# Patient Record
Sex: MALE | Race: WHITE | NOT HISPANIC OR LATINO | Employment: FULL TIME | URBAN - METROPOLITAN AREA
[De-identification: names, ages, dates, MRNs, and addresses within clinical notes are randomized per-mention and may not be internally consistent; named-entity substitution may affect disease eponyms.]

---

## 2017-01-10 DIAGNOSIS — R06.2 WHEEZING: ICD-10-CM

## 2017-02-13 ENCOUNTER — LAB VISIT (OUTPATIENT)
Dept: LAB | Facility: HOSPITAL | Age: 62
End: 2017-02-13
Attending: INTERNAL MEDICINE
Payer: COMMERCIAL

## 2017-02-13 ENCOUNTER — OFFICE VISIT (OUTPATIENT)
Dept: INTERNAL MEDICINE | Facility: CLINIC | Age: 62
End: 2017-02-13
Payer: COMMERCIAL

## 2017-02-13 VITALS
HEIGHT: 70 IN | TEMPERATURE: 99 F | BODY MASS INDEX: 38.13 KG/M2 | HEART RATE: 81 BPM | DIASTOLIC BLOOD PRESSURE: 78 MMHG | RESPIRATION RATE: 16 BRPM | SYSTOLIC BLOOD PRESSURE: 122 MMHG | WEIGHT: 266.31 LBS

## 2017-02-13 DIAGNOSIS — Z11.59 NEED FOR HEPATITIS C SCREENING TEST: ICD-10-CM

## 2017-02-13 DIAGNOSIS — M54.50 CHRONIC MIDLINE LOW BACK PAIN WITHOUT SCIATICA: ICD-10-CM

## 2017-02-13 DIAGNOSIS — Z00.00 ANNUAL PHYSICAL EXAM: ICD-10-CM

## 2017-02-13 DIAGNOSIS — I10 ESSENTIAL HYPERTENSION: Chronic | ICD-10-CM

## 2017-02-13 DIAGNOSIS — Z12.5 PROSTATE CANCER SCREENING: ICD-10-CM

## 2017-02-13 DIAGNOSIS — G47.00 INSOMNIA, UNSPECIFIED TYPE: Chronic | ICD-10-CM

## 2017-02-13 DIAGNOSIS — E78.1 HYPERTRIGLYCERIDEMIA: Chronic | ICD-10-CM

## 2017-02-13 DIAGNOSIS — G89.29 CHRONIC MIDLINE LOW BACK PAIN WITHOUT SCIATICA: ICD-10-CM

## 2017-02-13 DIAGNOSIS — Z00.00 ANNUAL PHYSICAL EXAM: Primary | ICD-10-CM

## 2017-02-13 LAB
ALBUMIN SERPL BCP-MCNC: 4.1 G/DL
ALP SERPL-CCNC: 80 U/L
ALT SERPL W/O P-5'-P-CCNC: 28 U/L
ANION GAP SERPL CALC-SCNC: 12 MMOL/L
AST SERPL-CCNC: 21 U/L
BASOPHILS # BLD AUTO: 0.03 K/UL
BASOPHILS NFR BLD: 0.2 %
BILIRUB SERPL-MCNC: 0.7 MG/DL
BUN SERPL-MCNC: 14 MG/DL
CALCIUM SERPL-MCNC: 9.5 MG/DL
CHLORIDE SERPL-SCNC: 108 MMOL/L
CHOLEST/HDLC SERPL: 4.2 {RATIO}
CO2 SERPL-SCNC: 24 MMOL/L
COMPLEXED PSA SERPL-MCNC: 2.8 NG/ML
CREAT SERPL-MCNC: 0.9 MG/DL
DIFFERENTIAL METHOD: ABNORMAL
EOSINOPHIL # BLD AUTO: 0.2 K/UL
EOSINOPHIL NFR BLD: 1.2 %
ERYTHROCYTE [DISTWIDTH] IN BLOOD BY AUTOMATED COUNT: 13.5 %
EST. GFR  (AFRICAN AMERICAN): >60 ML/MIN/1.73 M^2
EST. GFR  (NON AFRICAN AMERICAN): >60 ML/MIN/1.73 M^2
GLUCOSE SERPL-MCNC: 94 MG/DL
HCT VFR BLD AUTO: 45.2 %
HCV AB SERPL QL IA: NEGATIVE
HDL/CHOLESTEROL RATIO: 23.9 %
HDLC SERPL-MCNC: 159 MG/DL
HDLC SERPL-MCNC: 38 MG/DL
HGB BLD-MCNC: 15.3 G/DL
LDLC SERPL CALC-MCNC: 102.6 MG/DL
LYMPHOCYTES # BLD AUTO: 2.8 K/UL
LYMPHOCYTES NFR BLD: 23.1 %
MCH RBC QN AUTO: 32 PG
MCHC RBC AUTO-ENTMCNC: 33.8 %
MCV RBC AUTO: 95 FL
MONOCYTES # BLD AUTO: 0.7 K/UL
MONOCYTES NFR BLD: 5.6 %
NEUTROPHILS # BLD AUTO: 8.3 K/UL
NEUTROPHILS NFR BLD: 69.3 %
NONHDLC SERPL-MCNC: 121 MG/DL
PLATELET # BLD AUTO: 236 K/UL
PMV BLD AUTO: 10.5 FL
POTASSIUM SERPL-SCNC: 4.8 MMOL/L
PROT SERPL-MCNC: 7.1 G/DL
RBC # BLD AUTO: 4.78 M/UL
SODIUM SERPL-SCNC: 144 MMOL/L
TRIGL SERPL-MCNC: 92 MG/DL
TSH SERPL DL<=0.005 MIU/L-ACNC: 1.2 UIU/ML
WBC # BLD AUTO: 12.02 K/UL

## 2017-02-13 PROCEDURE — 99999 PR PBB SHADOW E&M-EST. PATIENT-LVL III: CPT | Mod: PBBFAC,,, | Performed by: INTERNAL MEDICINE

## 2017-02-13 PROCEDURE — 83036 HEMOGLOBIN GLYCOSYLATED A1C: CPT

## 2017-02-13 PROCEDURE — 99396 PREV VISIT EST AGE 40-64: CPT | Mod: S$GLB,,, | Performed by: INTERNAL MEDICINE

## 2017-02-13 PROCEDURE — 80061 LIPID PANEL: CPT

## 2017-02-13 PROCEDURE — 86803 HEPATITIS C AB TEST: CPT

## 2017-02-13 PROCEDURE — 36415 COLL VENOUS BLD VENIPUNCTURE: CPT | Mod: PO

## 2017-02-13 PROCEDURE — 84443 ASSAY THYROID STIM HORMONE: CPT

## 2017-02-13 PROCEDURE — 84153 ASSAY OF PSA TOTAL: CPT

## 2017-02-13 PROCEDURE — 80053 COMPREHEN METABOLIC PANEL: CPT

## 2017-02-13 PROCEDURE — 85025 COMPLETE CBC W/AUTO DIFF WBC: CPT

## 2017-02-13 PROCEDURE — 3074F SYST BP LT 130 MM HG: CPT | Mod: S$GLB,,, | Performed by: INTERNAL MEDICINE

## 2017-02-13 PROCEDURE — 3078F DIAST BP <80 MM HG: CPT | Mod: S$GLB,,, | Performed by: INTERNAL MEDICINE

## 2017-02-13 RX ORDER — ATORVASTATIN CALCIUM 40 MG/1
40 TABLET, FILM COATED ORAL NIGHTLY
Qty: 90 TABLET | Refills: 3 | Status: SHIPPED | OUTPATIENT
Start: 2017-02-13 | End: 2018-02-19 | Stop reason: SDUPTHER

## 2017-02-13 RX ORDER — AMITRIPTYLINE HYDROCHLORIDE 10 MG/1
10 TABLET, FILM COATED ORAL NIGHTLY PRN
Qty: 90 TABLET | Refills: 3 | Status: SHIPPED | OUTPATIENT
Start: 2017-02-13 | End: 2018-02-19 | Stop reason: SDUPTHER

## 2017-02-13 RX ORDER — SILDENAFIL 100 MG/1
100 TABLET, FILM COATED ORAL DAILY PRN
Qty: 5 TABLET | Refills: 11 | Status: SHIPPED | OUTPATIENT
Start: 2017-02-13 | End: 2018-02-19 | Stop reason: SDUPTHER

## 2017-02-13 RX ORDER — LISINOPRIL 40 MG/1
40 TABLET ORAL DAILY
Qty: 90 TABLET | Refills: 3 | Status: SHIPPED | OUTPATIENT
Start: 2017-02-13 | End: 2018-01-26 | Stop reason: SDUPTHER

## 2017-02-13 RX ORDER — AMLODIPINE BESYLATE 5 MG/1
5 TABLET ORAL DAILY
Qty: 90 TABLET | Refills: 3 | Status: SHIPPED | OUTPATIENT
Start: 2017-02-13 | End: 2018-02-19 | Stop reason: SDUPTHER
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
## 2017-02-13 NOTE — PATIENT INSTRUCTIONS
Varicella-Zoster Virus Vaccine Live injection  What is this medicine?  VARICELLA VIRUS VACCINE (antoinette mariya JEFF uh [facility] SEEN) is used to prevent infections of chickenpox.  HERPES ZOSTER VIRUS VACCINE (BREONNA peez ZOS ter [facility] SEEN) is used to prevent shingles in adults 50 years old and over. This vaccine is not used to treat shingles or nerve pain from shingles.  These medicines may be used for other purposes; ask your health care provider or pharmacist if you have questions.  How should I use this medicine?  These vaccines are for injection under the skin. They are given by a health care professional.  A copy of Vaccine Information Statements will be given before each varicella virus vaccination. Read this sheet carefully each time. The sheet may change frequently. A Vaccine Information Statement is not given before the herpes zoster virus vaccine.  Talk to your pediatrician regarding the use of the varicella virus vaccine in children. While this drug may be prescribed for children as young as 12 months of age for selected conditions, precautions do apply. The herpes zoster virus vaccine is not approved in children.  What side effects may I notice from receiving this medicine?  Side effects that you should report to your doctor or health care professional as soon as possible:  · allergic reactions like skin rash, itching or hives, swelling of the face, lips, or tongue  · breathing problems  · extreme changes in behavior  · feeling faint or lightheaded, falls  · fever over 102 degrees F  · pain, tingling, numbness in the hands or feet  · redness, blistering, peeling or loosening of the skin, including inside the mouth  · seizures  · unusually weak or tired  Side effects that usually do not require medical attention (report to your doctor or health care professional if they continue or are bothersome):  · aches or pains  · chickenpox-like rash  · diarrhea  · headache  · low-grade fever under 102 degrees  F  · loss of appetite  · nausea, vomiting  · redness, pain, swelling at site where injected  · sleepy  · trouble sleeping  What may interact with this medicine?  Do not take these medicines with any of the following medications:  · adalimumab  · anakinra  · etanercept  · infliximab  · medicines that suppress your immune system  · medicines to treat cancer  These medicines may also interact with the following medications:  · aspirin and aspirin-like medicines (varicella virus vaccine only)  · blood transfusions (varicella virus vaccine only)  · immunoglobulins (varicella virus vaccine only)  · steroid medicines like prednisone or cortisone  What if I miss a dose?  Keep appointments for follow-up (booster) doses of varicella virus vaccine as directed. It is important not to miss your dose. Call your doctor or health care professional if you are unable to keep an appointment.  Follow-up (booster) doses are not needed for the herpes zoster virus vaccine.  Where should I keep my medicine?  These drugs are given in a hospital or clinic and will not be stored at home.  What should I tell my health care provider before I take this medicine?  They need to know if you have any of the following conditions:  · blood disorders or disease  · cancer like leukemia or lymphoma  · immune system problems or therapy  · infection with fever  · recent immune globulin therapy  · tuberculosis  · an unusual or allergic reaction to vaccines, neomycin, gelatin, other medicines, foods, dyes, or preservatives  · pregnant or trying to get pregnant  · breast-feeding  What should I watch for while using this medicine?  Visit your doctor for regular check ups.  These vaccines, like all vaccines, may not fully protect everyone.  After receiving these vaccines it may be possible to pass chickenpox infection to others. For up to 6 weeks, avoid people with immune system problems, pregnant women who have not had chickenpox, newborns of women who have  not had chickenpox, and all newborns born at less than 28 weeks of pregnancy. Talk to your doctor for more information.  Do not become pregnant for 3 months after taking these vaccines. Women should inform their doctor if they wish to become pregnant or think they might be pregnant. There is a potential for serious side effects to an unborn child. Talk to your health care professional or pharmacist for more information.  Date Last Reviewed:   NOTE:This sheet is a summary. It may not cover all possible information. If you have questions about this medicine, talk to your doctor, pharmacist, or health care provider. Copyright© 2016 [employer]

## 2017-02-14 LAB
ESTIMATED AVG GLUCOSE: 108 MG/DL
HBA1C MFR BLD HPLC: 5.4 %

## 2017-02-17 ENCOUNTER — TELEPHONE (OUTPATIENT)
Dept: INTERNAL MEDICINE | Facility: CLINIC | Age: 62
End: 2017-02-17

## 2017-02-17 NOTE — TELEPHONE ENCOUNTER
----- Message from Ric Brambila MD sent at 2/14/2017  6:56 AM CST -----  Electrolytes, kidney/liver function, cholesterol, thyroid function, PSA are normal.

## 2017-02-17 NOTE — TELEPHONE ENCOUNTER
----- Message from Ric Brambila MD sent at 2/13/2017  1:35 PM CST -----  Blood counts are normal.  Awaiting further blood work.

## 2017-02-17 NOTE — TELEPHONE ENCOUNTER
----- Message from Ric Brambila MD sent at 2/13/2017  3:41 PM CST -----  Patient negative for hepatitis.

## 2017-10-17 ENCOUNTER — TELEPHONE (OUTPATIENT)
Dept: INTERNAL MEDICINE | Facility: CLINIC | Age: 62
End: 2017-10-17

## 2017-10-17 DIAGNOSIS — Z00.00 ANNUAL PHYSICAL EXAM: Primary | ICD-10-CM

## 2017-10-17 NOTE — TELEPHONE ENCOUNTER
----- Message from Case Frankel sent at 10/17/2017  7:45 AM CDT -----  Contact: self [phone]  Doctor appointment and lab have been scheduled.  Please link lab orders to the lab appointment.  Date of doctor appointment:  02/19  Physical or EP:  Physical  Date of lab appointment:  02/12  Comments:

## 2017-11-20 ENCOUNTER — HOSPITAL ENCOUNTER (OUTPATIENT)
Dept: RADIOLOGY | Facility: HOSPITAL | Age: 62
Discharge: HOME | End: 2017-11-20
Attending: INTERNAL MEDICINE
Payer: COMMERCIAL

## 2017-11-20 ENCOUNTER — OFFICE VISIT (OUTPATIENT)
Dept: INTERNAL MEDICINE | Facility: CLINIC | Age: 62
End: 2017-11-20
Payer: COMMERCIAL

## 2017-11-20 VITALS
HEIGHT: 70 IN | SYSTOLIC BLOOD PRESSURE: 143 MMHG | WEIGHT: 276.25 LBS | BODY MASS INDEX: 39.55 KG/M2 | TEMPERATURE: 98 F | DIASTOLIC BLOOD PRESSURE: 72 MMHG | HEART RATE: 75 BPM

## 2017-11-20 DIAGNOSIS — M79.605 BILATERAL LEG PAIN: ICD-10-CM

## 2017-11-20 DIAGNOSIS — M79.604 BILATERAL LEG PAIN: ICD-10-CM

## 2017-11-20 DIAGNOSIS — M79.605 BILATERAL LEG PAIN: Primary | ICD-10-CM

## 2017-11-20 DIAGNOSIS — M79.604 BILATERAL LEG PAIN: Primary | ICD-10-CM

## 2017-11-20 DIAGNOSIS — M79.89 LOCALIZED SWELLING OF LOWER EXTREMITY: ICD-10-CM

## 2017-11-20 PROCEDURE — 73562 X-RAY EXAM OF KNEE 3: CPT | Mod: 26,50,, | Performed by: RADIOLOGY

## 2017-11-20 PROCEDURE — 99214 OFFICE O/P EST MOD 30 MIN: CPT | Mod: S$GLB,,, | Performed by: INTERNAL MEDICINE

## 2017-11-20 PROCEDURE — 73562 X-RAY EXAM OF KNEE 3: CPT | Mod: 50,TC,PO

## 2017-11-20 PROCEDURE — 99999 PR PBB SHADOW E&M-EST. PATIENT-LVL III: CPT | Mod: PBBFAC,,, | Performed by: INTERNAL MEDICINE

## 2017-11-20 RX ORDER — NAPROXEN 500 MG/1
500 TABLET ORAL 2 TIMES DAILY
Qty: 60 TABLET | Refills: 1 | Status: SHIPPED | OUTPATIENT
Start: 2017-11-20 | End: 2018-02-19 | Stop reason: SDUPTHER

## 2017-11-20 NOTE — PROGRESS NOTES
Subjective:       Patient ID: Stevie Villalba is a 62 y.o. male.    Chief Complaint: Leg Pain (right leg)    HPI     62-year-old male here for evaluation of right leg pain.  This started about a week ago.  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  His legs are swollen and has been wearing the bandages and gets sharp pains from his knees down.  This is worse in the am.  When he puts pressure on it going to the bathroom, it feels better.  When he sits down, he has pain when he goes to get up.      Review of Systems    Objective:      Physical Exam   Constitutional: He is oriented to person, place, and time. He appears well-developed and well-nourished.   HENT:   Head: Normocephalic and atraumatic.   Mouth/Throat: No oropharyngeal exudate.   Eyes: EOM are normal. Pupils are equal, round, and reactive to light. Right eye exhibits no discharge. Left eye exhibits no discharge. No scleral icterus.   Neck: Normal range of motion. Neck supple. No tracheal deviation present. No thyromegaly present.   Cardiovascular: Normal rate, regular rhythm and normal heart sounds.  Exam reveals no gallop and no friction rub.    No murmur heard.  Pulmonary/Chest: Effort normal and breath sounds normal. No respiratory distress. He has no wheezes. He has no rales. He exhibits no tenderness.   Abdominal: Soft. Bowel sounds are normal. He exhibits no distension and no mass. There is no tenderness. There is no rebound and no guarding.   Musculoskeletal: Normal range of motion. He exhibits no edema or tenderness.   Neurological: He is alert and oriented to person, place, and time.   Skin: Skin is warm and dry. No rash noted. No erythema. No pallor.   Psychiatric: He has a normal mood and affect. His behavior is normal.   Vitals reviewed.      Assessment:       1. Bilateral leg pain    2. Localized swelling of lower extremity        Plan:       1/2.  Check x-rays bilateral knees.  Naproxen 500 mg twice a day prescribed.  Compression stockings  ordered as well.  Do not think this is a blood clot, because 0 Wells score.  If necessary, plan to refer to orthopedics.  Awaiting x-ray results before make this decision.

## 2018-01-26 RX ORDER — LISINOPRIL 40 MG/1
40 TABLET ORAL DAILY
Qty: 90 TABLET | Refills: 3 | Status: SHIPPED | OUTPATIENT
Start: 2018-01-26 | End: 2018-02-19 | Stop reason: SDUPTHER

## 2018-02-12 ENCOUNTER — LAB VISIT (OUTPATIENT)
Dept: LAB | Facility: HOSPITAL | Age: 63
End: 2018-02-12
Attending: INTERNAL MEDICINE
Payer: COMMERCIAL

## 2018-02-12 DIAGNOSIS — Z00.00 ANNUAL PHYSICAL EXAM: ICD-10-CM

## 2018-02-12 LAB
ALBUMIN SERPL BCP-MCNC: 3.7 G/DL
ALP SERPL-CCNC: 93 U/L
ALT SERPL W/O P-5'-P-CCNC: 35 U/L
ANION GAP SERPL CALC-SCNC: 10 MMOL/L
AST SERPL-CCNC: 26 U/L
BASOPHILS # BLD AUTO: 0.05 K/UL
BASOPHILS NFR BLD: 0.5 %
BILIRUB SERPL-MCNC: 0.7 MG/DL
BUN SERPL-MCNC: 13 MG/DL
CALCIUM SERPL-MCNC: 8.9 MG/DL
CHLORIDE SERPL-SCNC: 106 MMOL/L
CHOLEST SERPL-MCNC: 144 MG/DL
CHOLEST/HDLC SERPL: 4.2 {RATIO}
CO2 SERPL-SCNC: 24 MMOL/L
COMPLEXED PSA SERPL-MCNC: 3.2 NG/ML
CREAT SERPL-MCNC: 0.8 MG/DL
DIFFERENTIAL METHOD: ABNORMAL
EOSINOPHIL # BLD AUTO: 0.2 K/UL
EOSINOPHIL NFR BLD: 1.9 %
ERYTHROCYTE [DISTWIDTH] IN BLOOD BY AUTOMATED COUNT: 12.9 %
EST. GFR  (AFRICAN AMERICAN): >60 ML/MIN/1.73 M^2
EST. GFR  (NON AFRICAN AMERICAN): >60 ML/MIN/1.73 M^2
ESTIMATED AVG GLUCOSE: 105 MG/DL
GLUCOSE SERPL-MCNC: 103 MG/DL
HBA1C MFR BLD HPLC: 5.3 %
HCT VFR BLD AUTO: 44.3 %
HDLC SERPL-MCNC: 34 MG/DL
HDLC SERPL: 23.6 %
HGB BLD-MCNC: 14.7 G/DL
IMM GRANULOCYTES # BLD AUTO: 0.07 K/UL
IMM GRANULOCYTES NFR BLD AUTO: 0.7 %
LDLC SERPL CALC-MCNC: 89.4 MG/DL
LYMPHOCYTES # BLD AUTO: 3.2 K/UL
LYMPHOCYTES NFR BLD: 30.6 %
MCH RBC QN AUTO: 30.6 PG
MCHC RBC AUTO-ENTMCNC: 33.2 G/DL
MCV RBC AUTO: 92 FL
MONOCYTES # BLD AUTO: 0.8 K/UL
MONOCYTES NFR BLD: 7.9 %
NEUTROPHILS # BLD AUTO: 6.1 K/UL
NEUTROPHILS NFR BLD: 58.4 %
NONHDLC SERPL-MCNC: 110 MG/DL
NRBC BLD-RTO: 0 /100 WBC
PLATELET # BLD AUTO: 233 K/UL
PMV BLD AUTO: 10.5 FL
POTASSIUM SERPL-SCNC: 4.5 MMOL/L
PROT SERPL-MCNC: 6.9 G/DL
RBC # BLD AUTO: 4.8 M/UL
SODIUM SERPL-SCNC: 140 MMOL/L
TRIGL SERPL-MCNC: 103 MG/DL
TSH SERPL DL<=0.005 MIU/L-ACNC: 1.33 UIU/ML
WBC # BLD AUTO: 10.44 K/UL

## 2018-02-12 PROCEDURE — 80053 COMPREHEN METABOLIC PANEL: CPT

## 2018-02-12 PROCEDURE — 83036 HEMOGLOBIN GLYCOSYLATED A1C: CPT

## 2018-02-12 PROCEDURE — 80061 LIPID PANEL: CPT

## 2018-02-12 PROCEDURE — 84153 ASSAY OF PSA TOTAL: CPT

## 2018-02-12 PROCEDURE — 85025 COMPLETE CBC W/AUTO DIFF WBC: CPT

## 2018-02-12 PROCEDURE — 84443 ASSAY THYROID STIM HORMONE: CPT

## 2018-02-12 PROCEDURE — 36415 COLL VENOUS BLD VENIPUNCTURE: CPT | Mod: PO

## 2018-02-19 ENCOUNTER — OFFICE VISIT (OUTPATIENT)
Dept: INTERNAL MEDICINE | Facility: CLINIC | Age: 63
End: 2018-02-19
Payer: COMMERCIAL

## 2018-02-19 VITALS
TEMPERATURE: 98 F | WEIGHT: 268.31 LBS | RESPIRATION RATE: 16 BRPM | SYSTOLIC BLOOD PRESSURE: 149 MMHG | HEART RATE: 72 BPM | BODY MASS INDEX: 37.56 KG/M2 | HEIGHT: 71 IN | DIASTOLIC BLOOD PRESSURE: 71 MMHG

## 2018-02-19 DIAGNOSIS — Z00.00 ANNUAL PHYSICAL EXAM: Primary | ICD-10-CM

## 2018-02-19 DIAGNOSIS — E78.1 HYPERTRIGLYCERIDEMIA: Chronic | ICD-10-CM

## 2018-02-19 DIAGNOSIS — I10 ESSENTIAL HYPERTENSION: Chronic | ICD-10-CM

## 2018-02-19 DIAGNOSIS — G47.00 INSOMNIA, UNSPECIFIED TYPE: Chronic | ICD-10-CM

## 2018-02-19 PROCEDURE — 99999 PR PBB SHADOW E&M-EST. PATIENT-LVL III: CPT | Mod: PBBFAC,,, | Performed by: INTERNAL MEDICINE

## 2018-02-19 PROCEDURE — 90471 IMMUNIZATION ADMIN: CPT | Mod: S$GLB,,, | Performed by: INTERNAL MEDICINE

## 2018-02-19 PROCEDURE — 99396 PREV VISIT EST AGE 40-64: CPT | Mod: 25,S$GLB,, | Performed by: INTERNAL MEDICINE

## 2018-02-19 PROCEDURE — 90686 IIV4 VACC NO PRSV 0.5 ML IM: CPT | Mod: S$GLB,,, | Performed by: INTERNAL MEDICINE

## 2018-02-19 RX ORDER — ATORVASTATIN CALCIUM 40 MG/1
40 TABLET, FILM COATED ORAL NIGHTLY
Qty: 90 TABLET | Refills: 3 | Status: SHIPPED | OUTPATIENT
Start: 2018-02-19 | End: 2019-04-29 | Stop reason: SDUPTHER

## 2018-02-19 RX ORDER — ALBUTEROL SULFATE 90 UG/1
2 AEROSOL, METERED RESPIRATORY (INHALATION) EVERY 4 HOURS PRN
Qty: 1 INHALER | Refills: 11 | Status: SHIPPED | OUTPATIENT
Start: 2018-02-19 | End: 2021-08-25

## 2018-02-19 RX ORDER — SILDENAFIL 100 MG/1
100 TABLET, FILM COATED ORAL DAILY PRN
Qty: 5 TABLET | Refills: 11 | Status: SHIPPED | OUTPATIENT
Start: 2018-02-19 | End: 2020-07-06 | Stop reason: SDUPTHER

## 2018-02-19 RX ORDER — AMLODIPINE BESYLATE 10 MG/1
10 TABLET ORAL DAILY
Qty: 90 TABLET | Refills: 3 | Status: SHIPPED | OUTPATIENT
Start: 2018-02-19 | End: 2019-01-28 | Stop reason: SDUPTHER

## 2018-02-19 RX ORDER — AMITRIPTYLINE HYDROCHLORIDE 10 MG/1
10 TABLET, FILM COATED ORAL NIGHTLY PRN
Qty: 90 TABLET | Refills: 3 | Status: SHIPPED | OUTPATIENT
Start: 2018-02-19 | End: 2018-04-14

## 2018-02-19 RX ORDER — MONTELUKAST SODIUM 10 MG/1
10 TABLET ORAL NIGHTLY
Qty: 90 TABLET | Refills: 3 | Status: SHIPPED | OUTPATIENT
Start: 2018-02-19 | End: 2019-03-06 | Stop reason: SDUPTHER

## 2018-02-19 RX ORDER — NAPROXEN 500 MG/1
500 TABLET ORAL 2 TIMES DAILY
Qty: 60 TABLET | Refills: 1 | Status: SHIPPED | OUTPATIENT
Start: 2018-02-19 | End: 2018-05-31 | Stop reason: HOSPADM

## 2018-02-19 RX ORDER — LISINOPRIL 40 MG/1
40 TABLET ORAL DAILY
Qty: 90 TABLET | Refills: 3 | Status: SHIPPED | OUTPATIENT
Start: 2018-02-19 | End: 2019-04-29 | Stop reason: SDUPTHER
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
## 2018-03-05 ENCOUNTER — TELEPHONE (OUTPATIENT)
Dept: INTERNAL MEDICINE | Facility: CLINIC | Age: 63
End: 2018-03-05

## 2018-03-05 ENCOUNTER — HOSPITAL ENCOUNTER (OUTPATIENT)
Dept: RADIOLOGY | Facility: HOSPITAL | Age: 63
Discharge: HOME | End: 2018-03-05
Attending: INTERNAL MEDICINE
Payer: COMMERCIAL

## 2018-03-05 DIAGNOSIS — Z20.1 TUBERCULOSIS EXPOSURE: ICD-10-CM

## 2018-03-05 DIAGNOSIS — Z20.1 TUBERCULOSIS EXPOSURE: Primary | ICD-10-CM

## 2018-03-05 PROCEDURE — 71046 X-RAY EXAM CHEST 2 VIEWS: CPT | Mod: TC,PO

## 2018-03-05 PROCEDURE — 71046 X-RAY EXAM CHEST 2 VIEWS: CPT | Mod: 26,,, | Performed by: RADIOLOGY

## 2018-03-05 RX ORDER — LANOLIN ALCOHOL/MO/W.PET/CERES
50 CREAM (GRAM) TOPICAL DAILY
Refills: 0 | COMMUNITY
Start: 2018-03-05 | End: 2018-04-09

## 2018-03-05 RX ORDER — ISONIAZID 300 MG/1
300 TABLET ORAL DAILY
Qty: 30 TABLET | Refills: 5 | Status: SHIPPED | OUTPATIENT
Start: 2018-03-05 | End: 2018-04-09

## 2018-03-12 ENCOUNTER — LAB VISIT (OUTPATIENT)
Dept: LAB | Facility: HOSPITAL | Age: 63
End: 2018-03-12
Attending: INTERNAL MEDICINE
Payer: COMMERCIAL

## 2018-03-12 DIAGNOSIS — Z20.1 TUBERCULOSIS EXPOSURE: ICD-10-CM

## 2018-03-12 PROCEDURE — 86480 TB TEST CELL IMMUN MEASURE: CPT

## 2018-03-12 PROCEDURE — 36415 COLL VENOUS BLD VENIPUNCTURE: CPT

## 2018-03-13 LAB
MITOGEN NIL: >10 IU/ML
NIL: 0.02 IU/ML
TB ANTIGEN NIL: -0 IU/ML
TB ANTIGEN: 0.02 IU/ML
TB GOLD: NEGATIVE

## 2018-03-31 RX ORDER — AMLODIPINE BESYLATE 5 MG/1
TABLET ORAL
Qty: 90 TABLET | Refills: 2 | OUTPATIENT
Start: 2018-03-31

## 2018-04-09 ENCOUNTER — OFFICE VISIT (OUTPATIENT)
Dept: INTERNAL MEDICINE | Facility: CLINIC | Age: 63
End: 2018-04-09
Payer: COMMERCIAL

## 2018-04-09 ENCOUNTER — HOSPITAL ENCOUNTER (OUTPATIENT)
Dept: RADIOLOGY | Facility: HOSPITAL | Age: 63
Discharge: HOME | End: 2018-04-09
Attending: INTERNAL MEDICINE
Payer: COMMERCIAL

## 2018-04-09 VITALS
RESPIRATION RATE: 16 BRPM | WEIGHT: 272.5 LBS | DIASTOLIC BLOOD PRESSURE: 76 MMHG | BODY MASS INDEX: 38.15 KG/M2 | HEIGHT: 71 IN | HEART RATE: 75 BPM | TEMPERATURE: 98 F | SYSTOLIC BLOOD PRESSURE: 154 MMHG

## 2018-04-09 DIAGNOSIS — G89.29 CHRONIC PAIN OF BOTH KNEES: ICD-10-CM

## 2018-04-09 DIAGNOSIS — M25.562 CHRONIC PAIN OF BOTH KNEES: ICD-10-CM

## 2018-04-09 DIAGNOSIS — E78.1 HYPERTRIGLYCERIDEMIA: Chronic | ICD-10-CM

## 2018-04-09 DIAGNOSIS — I10 ESSENTIAL HYPERTENSION: Primary | Chronic | ICD-10-CM

## 2018-04-09 DIAGNOSIS — M25.561 CHRONIC PAIN OF BOTH KNEES: ICD-10-CM

## 2018-04-09 PROCEDURE — 73562 X-RAY EXAM OF KNEE 3: CPT | Mod: 50,TC,PO

## 2018-04-09 PROCEDURE — 99999 PR PBB SHADOW E&M-EST. PATIENT-LVL III: CPT | Mod: PBBFAC,,, | Performed by: INTERNAL MEDICINE

## 2018-04-09 PROCEDURE — 73562 X-RAY EXAM OF KNEE 3: CPT | Mod: 26,59,LT, | Performed by: RADIOLOGY

## 2018-04-09 PROCEDURE — 73562 X-RAY EXAM OF KNEE 3: CPT | Mod: 26,RT,, | Performed by: RADIOLOGY

## 2018-04-09 PROCEDURE — 99214 OFFICE O/P EST MOD 30 MIN: CPT | Mod: S$GLB,,, | Performed by: INTERNAL MEDICINE

## 2018-04-09 RX ORDER — TRAMADOL HYDROCHLORIDE 50 MG/1
50 TABLET ORAL EVERY 6 HOURS PRN
Qty: 28 TABLET | Refills: 0 | Status: SHIPPED | OUTPATIENT
Start: 2018-04-09 | End: 2018-04-19

## 2018-04-09 RX ORDER — HYDROCHLOROTHIAZIDE 25 MG/1
25 TABLET ORAL DAILY
Qty: 30 TABLET | Refills: 11 | Status: SHIPPED | OUTPATIENT
Start: 2018-04-09 | End: 2018-05-31 | Stop reason: SDUPTHER

## 2018-04-09 NOTE — PROGRESS NOTES
Subjective:       Patient ID: Stevie Villalba is a 63 y.o. male.    Chief Complaint: Follow-up    HPI     63-year-old male here for one-month follow-up.      HTN - Patient is currently on norvasc 10 mg, fosinopril 40 mg. He does check his BP at home, and it runs 148/60s. Side effects of medications note: none. Denies headaches, blurred vision, chest pain, shortness of breath, nausea.    Legs - he has soreness in his legs.  He has to hold onto the rails sometimes. He is favoring his right leg.  At night, his ankles are swollen from standing all day.  He has some knee braces and uses icy hot.  He has pain in his knees and behind his legs.  He feels like something is pulling.  He has been using naproxen without relief.    HLD - Patient is currently on lipitor 40 mg.  His last lipid panel was   Cholesterol   Date Value Ref Range Status   02/12/2018 144 120 - 199 mg/dL Final     Comment:     The National Cholesterol Education Program (NCEP) has set the  following guidelines (reference ranges) for Cholesterol:  Optimal.....................<200 mg/dL  Borderline High.............200-239 mg/dL  High........................> or = 240 mg/dL       Triglycerides   Date Value Ref Range Status   02/12/2018 103 30 - 150 mg/dL Final     Comment:     The National Cholesterol Education Program (NCEP) has set the  following guidelines (reference values) for triglycerides:  Normal......................<150 mg/dL  Borderline High.............150-199 mg/dL  High........................200-499 mg/dL       HDL   Date Value Ref Range Status   02/12/2018 34 (L) 40 - 75 mg/dL Final     Comment:     The National Cholesterol Education Program (NCEP) has set the  following guidelines (reference values) for HDL Cholesterol:  Low...............<40 mg/dL  Optimal...........>60 mg/dL       LDL Cholesterol   Date Value Ref Range Status   02/12/2018 89.4 63.0 - 159.0 mg/dL Final     Comment:     The National Cholesterol Education Program (NCEP) has  set the  following guidelines (reference values) for LDL Cholesterol:  Optimal.......................<130 mg/dL  Borderline High...............130-159 mg/dL  High..........................160-189 mg/dL  Very High.....................>190 mg/dL     .  Side effects of the medication: none.    Review of Systems    Objective:      Physical Exam   Constitutional: He is oriented to person, place, and time. He appears well-developed and well-nourished.   HENT:   Head: Normocephalic and atraumatic.   Mouth/Throat: No oropharyngeal exudate.   Eyes: EOM are normal. Pupils are equal, round, and reactive to light. Right eye exhibits no discharge. Left eye exhibits no discharge. No scleral icterus.   Neck: Normal range of motion. Neck supple. No tracheal deviation present. No thyromegaly present.   Cardiovascular: Normal rate, regular rhythm and normal heart sounds.  Exam reveals no gallop and no friction rub.    No murmur heard.  Pulmonary/Chest: Effort normal and breath sounds normal. No respiratory distress. He has no wheezes. He has no rales. He exhibits no tenderness.   Abdominal: Soft. Bowel sounds are normal. He exhibits no distension and no mass. There is no tenderness. There is no rebound and no guarding.   Musculoskeletal: Normal range of motion. He exhibits no edema or tenderness.   Neurological: He is alert and oriented to person, place, and time.   Skin: Skin is warm and dry. No rash noted. No erythema. No pallor.   Psychiatric: He has a normal mood and affect. His behavior is normal.   Vitals reviewed.      Assessment:       1. Essential hypertension    2. Hypertriglyceridemia    3. Chronic pain of both knees        Plan:       1.  Continue lisinopril 40 mg, Norvasc 10 mg.  Start HCTZ 25 mg.  2.  Continue Lipitor 40 mg.  3.  Refer to orthopedics, check x-ray.  Tramadol called to pharmacy.

## 2018-04-14 RX ORDER — AMITRIPTYLINE HYDROCHLORIDE 10 MG/1
10 TABLET, FILM COATED ORAL NIGHTLY PRN
Qty: 90 TABLET | Refills: 3 | Status: SHIPPED | OUTPATIENT
Start: 2018-04-14 | End: 2019-06-10 | Stop reason: SDUPTHER

## 2018-04-23 ENCOUNTER — HOSPITAL ENCOUNTER (OUTPATIENT)
Dept: RADIOLOGY | Facility: HOSPITAL | Age: 63
Discharge: HOME | End: 2018-04-23
Attending: PHYSICIAN ASSISTANT
Payer: COMMERCIAL

## 2018-04-23 ENCOUNTER — OFFICE VISIT (OUTPATIENT)
Dept: ORTHOPEDICS | Facility: CLINIC | Age: 63
End: 2018-04-23
Payer: COMMERCIAL

## 2018-04-23 VITALS — HEIGHT: 71 IN | WEIGHT: 269.31 LBS | BODY MASS INDEX: 37.7 KG/M2

## 2018-04-23 DIAGNOSIS — M17.0 PRIMARY OSTEOARTHRITIS OF BOTH KNEES: Primary | ICD-10-CM

## 2018-04-23 DIAGNOSIS — M25.561 PAIN IN BOTH KNEES, UNSPECIFIED CHRONICITY: ICD-10-CM

## 2018-04-23 DIAGNOSIS — M25.562 PAIN IN BOTH KNEES, UNSPECIFIED CHRONICITY: ICD-10-CM

## 2018-04-23 PROCEDURE — 99999 PR PBB SHADOW E&M-EST. PATIENT-LVL III: CPT | Mod: PBBFAC,,, | Performed by: PHYSICIAN ASSISTANT

## 2018-04-23 PROCEDURE — 73560 X-RAY EXAM OF KNEE 1 OR 2: CPT | Mod: 26,50,, | Performed by: RADIOLOGY

## 2018-04-23 PROCEDURE — 73560 X-RAY EXAM OF KNEE 1 OR 2: CPT | Mod: 50,TC

## 2018-04-23 PROCEDURE — 99203 OFFICE O/P NEW LOW 30 MIN: CPT | Mod: 25,S$GLB,, | Performed by: PHYSICIAN ASSISTANT

## 2018-04-23 PROCEDURE — 20610 DRAIN/INJ JOINT/BURSA W/O US: CPT | Mod: 50,S$GLB,, | Performed by: PHYSICIAN ASSISTANT

## 2018-04-23 RX ORDER — METHYLPREDNISOLONE ACETATE 80 MG/ML
80 INJECTION, SUSPENSION INTRA-ARTICULAR; INTRALESIONAL; INTRAMUSCULAR; SOFT TISSUE
Status: COMPLETED | OUTPATIENT
Start: 2018-04-23 | End: 2018-04-23

## 2018-04-23 RX ADMIN — METHYLPREDNISOLONE ACETATE 80 MG: 80 INJECTION, SUSPENSION INTRA-ARTICULAR; INTRALESIONAL; INTRAMUSCULAR; SOFT TISSUE at 11:04

## 2018-04-23 NOTE — PROGRESS NOTES
Subjective:      Patient ID: Stevie Villalba is a 63 y.o. male.    Chief Complaint: No chief complaint on file.    HPI  63 year old male presents with chief complaint of bilateral knee pain R>L x 6 months. He denies trauma. Pain is worse going up and down steps and increased standing. He took naproxen with no relief. He reports popping. He does not use assistive devices.   Review of Systems   Constitution: Negative for chills, fever and night sweats.   Cardiovascular: Negative for chest pain.   Respiratory: Negative for cough and shortness of breath.    Hematologic/Lymphatic: Does not bruise/bleed easily.   Skin: Negative for color change.   Gastrointestinal: Negative for heartburn.   Genitourinary: Negative for dysuria.   Neurological: Negative for numbness and paresthesias.   Psychiatric/Behavioral: Negative for altered mental status.   Allergic/Immunologic: Negative for persistent infections.         Objective:            General    Vitals reviewed.  Constitutional: He is oriented to person, place, and time. He appears well-developed and well-nourished.   Cardiovascular: Normal rate.    Neurological: He is alert and oriented to person, place, and time.             Right Knee Exam:  ROM 0-125 degrees  +crepitus  No effusion  TTP medial joint line    Left Knee Exam:  ROM 0-125 degrees  +crepitus  Small effusion  TTP medial and lateral joint line      X-ray: ordered and reviewed by myself. DJD both knees R>L.       Assessment:       Encounter Diagnosis   Name Primary?    Primary osteoarthritis of both knees Yes          Plan:       Discussed treatment options with patient. He would like to try cortisone injections. Ice and elevate knee. RTC prn.     PROCEDURE:  I have explained the risks, benefits, and alternatives of the procedure in detail.  The patient voices understanding and all questions have been answered.  The patient agrees to proceed as planned. So after I performed a sterile prep of the skin in the  normal fashion the bilateral knee is injected using a 22 gauge needle from the anteromedial approach with a combination of 4cc 1% plain lidocaine and 80 mg of depo medrol.  The patient is cautioned and immediate relief of pain is secondary to the local anesthetic and will be temporary.  After the anesthetic wears off there may be a increase in pain that may last for a few hours or a few days and they should use ice to help alleviate this flair up of pain.

## 2018-04-23 NOTE — LETTER
April 23, 2018      Ric Brambila MD  [address] [place] [address]           [facility] - Orthopedics  [address]  [facility] [address]  Phone: [phone]          Patient: Stevie Villalba   MR Number: [id number]   YOB: 1955   Date of Visit: 4/23/2018       Dear Dr. Ric Brambila:    Thank you for referring Stevie Villalba to me for evaluation. Attached you will find relevant portions of my assessment and plan of care.    If you have questions, please do not hesitate to call me. I look forward to following Stevie Villalba along with you.    Sincerely,    Tawnya Dsouza PA-C    Enclosure  CC:  No Recipients    If you would like to receive this communication electronically, please contact [email or web address] or [phone] to request more information on [facility] access.    For providers and/or their staff who would like to refer a patient to [facility], please contact us through our one-stop-shop provider referral line, Carlito Snider, at [phone].    If you feel you have received this communication in error or would no longer like to receive these types of communications, please e-mail [email or web address]

## 2018-05-01 ENCOUNTER — HOSPITAL ENCOUNTER (OUTPATIENT)
Dept: RADIOLOGY | Facility: HOSPITAL | Age: 63
Discharge: HOME | End: 2018-05-01
Attending: ORTHOPAEDIC SURGERY
Payer: COMMERCIAL

## 2018-05-01 ENCOUNTER — OFFICE VISIT (OUTPATIENT)
Dept: ORTHOPEDICS | Facility: CLINIC | Age: 63
End: 2018-05-01
Payer: COMMERCIAL

## 2018-05-01 VITALS
DIASTOLIC BLOOD PRESSURE: 76 MMHG | HEART RATE: 73 BPM | BODY MASS INDEX: 36.73 KG/M2 | WEIGHT: 262.38 LBS | SYSTOLIC BLOOD PRESSURE: 121 MMHG | HEIGHT: 71 IN

## 2018-05-01 DIAGNOSIS — M17.9 OSTEOARTHRITIS OF KNEE, UNSPECIFIED (CODE): Primary | ICD-10-CM

## 2018-05-01 DIAGNOSIS — M17.9 OSTEOARTHRITIS OF KNEE, UNSPECIFIED (CODE): ICD-10-CM

## 2018-05-01 DIAGNOSIS — M17.11 PRIMARY OSTEOARTHRITIS OF RIGHT KNEE: Primary | ICD-10-CM

## 2018-05-01 PROCEDURE — 99213 OFFICE O/P EST LOW 20 MIN: CPT | Mod: S$GLB,,, | Performed by: PHYSICIAN ASSISTANT

## 2018-05-01 PROCEDURE — 73560 X-RAY EXAM OF KNEE 1 OR 2: CPT | Mod: 26,RT,, | Performed by: RADIOLOGY

## 2018-05-01 PROCEDURE — 99999 PR PBB SHADOW E&M-EST. PATIENT-LVL IV: CPT | Mod: PBBFAC,,, | Performed by: PHYSICIAN ASSISTANT

## 2018-05-01 PROCEDURE — 73560 X-RAY EXAM OF KNEE 1 OR 2: CPT | Mod: TC,RT
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
## 2018-05-03 ENCOUNTER — TELEPHONE (OUTPATIENT)
Dept: INTERNAL MEDICINE | Facility: CLINIC | Age: 63
End: 2018-05-03

## 2018-05-03 ENCOUNTER — ANESTHESIA EVENT (OUTPATIENT)
Dept: SURGERY | Facility: HOSPITAL | Age: 63
DRG: 470 | End: 2018-05-03
Payer: COMMERCIAL

## 2018-05-03 NOTE — ANESTHESIA PREPROCEDURE EVALUATION
Anesthesia Assessment: Preoperative EQUATION    Planned Procedure: Procedure(s) (LRB):  REPLACEMENT-KNEE-TOTAL (Right)  Requested Anesthesia Type:Femoral Block  Surgeon: John L. [facility] Jr., MD  Service: Orthopedics  Known or anticipated Date of Surgery:5/23/2018    Plan:    Testing:  Hematology Profile, BMP, PT/INR, EKG and UA   Pre-anesthesia  visit       Visit focus: possible regional anesthesia and/or nerve block      Consultation:Patient's PCP for a statement of optimization  recent appt 4/9, f/u 5/14    Shama Bennett RN 05/03/2018 05/03/2018  Stevie Villalba is a 63 y.o., male.    Anesthesia Evaluation    I have reviewed the Patient Summary Reports.    I have reviewed the Nursing Notes.      Review of Systems  Anesthesia Hx:  No problems with previous Anesthesia Last sx hernia repair 15+ years ago   Social:  Non-Smoker, Alcohol Use 2-3 beers a week   Hematology/Oncology:  Hematology Normal   Oncology Normal     EENT/Dental:  EENT/Dental Normal Allergies in the winter months- uses flonase and inhaler around that time   Cardiovascular:   Hypertension hyperlipidemia Climbs 3-4 flights of stairs on the [place].yardwork. Denies chest pain or sob   Pulmonary:  Pulmonary Normal  Possible Obstructive Sleep Apnea , (STOP/BANG) Symptoms S - Snoring (loud), P - Pressure being treated for high BP, B - BMI > 35, A - Age > 50, N - Neck circumference > 40 cms and G - Gender (Male > Female)    Renal/:  Renal/ Normal     Hepatic/GI:   Denies GERD. Denies Liver Disease.    Musculoskeletal:  Musculoskeletal General/Symptoms: Functional capacity is ambulatory without assistance.  Joint Disease:  Arthritis, Osteoarthritis    Neurological:   Denies CVA. Denies Seizures.  Pain , onset is chronic , location of knee , quality of sharp , severity is a 10 , precipitating factors are going from sitting to standing ,  alleviating factors are topical pain medication. Osteoarthritis  Denies Peripheral Neuropathy    Endocrine:  Endocrine Normal    Dermatological:  Skin Normal    Psych:  Psychiatric Normal           Physical Exam  General:  Well nourished    Airway/Jaw/Neck:  Airway Findings: Mouth Opening: Normal General Airway Assessment: Adult  Mallampati: III  Improves to II with phonation.  TM Distance: Normal, at least 6 cm  Jaw/Neck Findings:  Neck ROM: Normal ROM      Dental:  Dental Findings: (right lower bridge)   Chest/Lungs:  Chest/Lungs Findings: Normal Respiratory Rate     Heart/Vascular:  Heart Findings: Rate: Normal  Sounds: Normal        Mental Status:  Mental Status Findings:  Cooperative, Alert and Oriented           Labs and ekg reviewed; BUN 24 creatinine 0.8    Discharge disposition: home with wife Tamika [phone]    Medical evaluation with Dr. Brambila 5/14-patient may proceed with planned procedure    Shama Bennett RN 05/14/2018                Anesthesia Plan  Type of Anesthesia, risks & benefits discussed:  Anesthesia Type:  CSE, spinal, general  Patient's Preference: Neuraxial vs GA  Intra-op Monitoring Plan: standard ASA monitors  Intra-op Monitoring Plan Comments:   Post Op Pain Control Plan: multimodal analgesia, IV/PO Opiods PRN and peripheral nerve block  Post Op Pain Control Plan Comments:   Induction:   IV  Beta Blocker:  Patient is not currently on a Beta-Blocker (No further documentation required).       Informed Consent: Patient understands risks and agrees with Anesthesia plan.  Questions answered. Anesthesia consent signed with patient.  ASA Score: 2     Day of Surgery Review of History & Physical:    H&P update referred to the surgeon.     Anesthesia Plan Notes: Discussed Risks/Benefits of anesthetic plan  PMH was reviewed and PE was performed  Will plan for neuraxial technique and convert to GA if needed        Ready For Surgery From Anesthesia Perspective.

## 2018-05-03 NOTE — PRE ADMISSION SCREENING
Anesthesia Assessment: Preoperative EQUATION    Planned Procedure: Procedure(s) (LRB):  REPLACEMENT-KNEE-TOTAL (Right)  Requested Anesthesia Type:Femoral Block  Surgeon: John L. [facility] Jr., MD  Service: Orthopedics  Known or anticipated Date of Surgery:5/23/2018    Plan:    Testing:  Hematology Profile, BMP, PT/INR, EKG and UA   Pre-anesthesia  visit       Visit focus: possible regional anesthesia and/or nerve block      Consultation:Patient's PCP for a statement of optimization  recent appt 4/9    Shama Bennett RN 05/03/2018

## 2018-05-03 NOTE — TELEPHONE ENCOUNTER
----- Message from Georgie Mi sent at 5/3/2018  8:22 AM CDT -----      ----- Message -----  From: Shama Bennett RN  Sent: 5/3/2018   7:32 AM  To: Kosta Larios Staff    Patient is having a R-TKR under spinal anesthesia 5/23 with Dr. [facility].  Patient will be seen in the anesthesia clinic and we will order a BMP, UA, hematology profile, EKG, PT/INR. Anesthesia is requesting medical optimization.  Patient was just seen by you 4/9. Can you clear by chart review based on his last appt.or will patient require a preop visit?    Shama Bennett RN  Preop Center  [phone]

## 2018-05-03 NOTE — TELEPHONE ENCOUNTER
Patient has an appointment scheduled on May 14th.  Please advise if he will need to be seen sooner.

## 2018-05-08 DIAGNOSIS — I10 HYPERTENSION, UNSPECIFIED TYPE: Primary | ICD-10-CM

## 2018-05-08 DIAGNOSIS — Z91.89 AT RISK OF UTI: ICD-10-CM

## 2018-05-08 DIAGNOSIS — M79.606 PAIN OF LOWER EXTREMITY, UNSPECIFIED LATERALITY: ICD-10-CM

## 2018-05-13 NOTE — DISCHARGE INSTRUCTIONS
Your surgery has been scheduled for:__________________________________________    You should report to:  ____[facility], located on the [place] side of the first floor of the           [facility] ([phone])  ____[facility], located on the [facility] side of the            Second floor of the [facility] ([phone])  ____3rd Floor [facility] located on the [facility] side of the [facility] [phone]  Please Note   - Tell your doctor if you take Aspirin, products containing Aspirin, herbal medications  or blood thinners, such as Coumadin, Ticlid, or Plavix.  (Consult your provider regarding holding or stopping before surgery).  - Arrange for someone to drive you home following surgery.  You will not be allowed to leave the surgical facility alone or drive yourself home following sedation and anesthesia.  Before Surgery  - Stop taking all herbal medications 14days prior to surgery  - No Motrin/Advil (Ibuprofen) 7 days before surgery  - No Aleve (Naproxen) 7 days before surgery  - Stop Taking Asprin, products containing Asprin _____days before surgery  - Stop taking blood thinners_______days before surgery  - Refrain from drinking alcoholic beverages for 24hours before and after surgery  - Stop or limit smoking _________days before surgery  Night before Surgery  - DO NOT EAT OR DRINK ANYTHING AFTER MIDNIGHT, INCLUDING GUM, HARD CANDY, MINTS, OR CHEWING TOBACCO.  - Take a shower or bath (shower is recommended).  Bathe with Hibiclens soap or an antibacterial soap from the neck down.  If not supplied by your surgeon, hibiclens soap will need to be purchased over the counter in pharmacy.  Rinse soap off thoroughly.  - Shampoo your hair with your regular shampoo  The Day of Surgery  - Take another bath or shower with hibiclens or any antibacterial soap, to reduce the chance of infection.  - Take heart and blood  pressure medications with a small sip of water, as advised by the perioperative team.  - Do not take fluid pills  - You may brush your teeth and rinse your mouth, but do not swall any additional water.   - Do not apply perfumes, powder, body lotions or deodorant on the day of surgery.  - Nail polish should be removed.  - Do not wear makeup or moisturizer  - Wear comfortable clothes, such as a button front shirt and loose fitting pants.  - Leave all jewelry, including body piercings, and valuables at home.    - Bring any devices you will neeed after surgery such as crutches or canes.  - If you have sleep apnea, please bring your CPAP machine  In the event that your physical condition changes including the onset of a cold or respiratory illness, or if you have to delay or cancel your surgery, please notify your surgeon.Anesthesia: Regional Anesthesia    Youre scheduled for surgery. During surgery, youll receive medicine called anesthesia to keep you comfortable and pain-free. Your surgeon has decided that youll receive regional anesthesia. This sheet tells you what to expect with this type of anesthesia.  What is regional anesthesia?  Regional anesthesia numbs one region of your body. The anesthesia may be given around nerves or into veins in your arms, neck, or legs (nerve block or Emilia block). Or it may be sent into the spinal fluid (spinal anesthesia) or into the space just outside the spinal fluid (epidural anesthesia). You may also be given sedatives to help you relax.  Nerve block or [place] block  A small area of the body, such as an arm or leg, can be numbed using a nerve block or Emilia block.  · Nerve block. During a nerve block, your skin is numbed. A needle is then inserted near nerves that serve the area to be numbed. Anesthetic is sent through the needle.  · IV regional or Emilia block. For this type of block, an IV line is put into a vein. The blood flow to the area to be numbed is blocked for a short time.  Anesthetic is sent through the IV.  Spinal anesthesia  Spinal anesthesia numbs your body from about the waist down.  · Anesthetic is injected into the spinal fluid. This is a substance that surrounds the spinal cord in your spinal column. The anesthetic blocks pain traveling from the body to the brain.  · To receive the anesthetic, your skin is numbed at the injection site on your back.  · A needle is then inserted into the spinal space. Anesthetic is sent into the spinal fluid through the needle.  Epidural anesthesia  Epidural anesthesia is most commonly used during childbirth and may also be used after surgical procedures of the chest, belly, and legs.  · Anesthetic is injected into the epidural space. This is just outside the dural sac which contains the spinal fluid.  · To receive the anesthetic, your skin is numbed at the injection site on your back.  · A needle is then inserted into the epidural space. Anesthetic is sent into the epidural space through the needle.  · A small flexible catheter may be attached to the needle and left in place. This allows for continuous injections or infusions of anesthetic.  Anesthesia tools and medicines that might be near you during your procedure  · Local anesthetic. This medicine is given through a needle numbs one region of your body.  · Electrocardiography leads (electrodes). These are used to record your heart rate and rhythm.  · Blood pressure cuff. A cuff is placed on your arm to keep track of your blood pressure.  · Pulse oximeter. This small clip is placed on the end of the finger. It measures your blood oxygen level.  · Sedatives. These medicines may be given through an IV. They help to relax you and keep you comfortable. You may stay awake or sleep lightly.  · Oxygen. You may be given oxygen through a facemask.  Risks and possible complications  Regional anesthesia carries some risks. These include:  · Nausea and vomiting  · Headache  · Backache  · Decreased blood  pressure  · Allergic reaction to the anesthetic  · Ongoing numbness (rare)  · Irregular heartbeat (rare)  · Cardiac arrest (rare)   Date Last Reviewed: 12/1/2016  © 6440-0156 [employer]. [address] [place] [address], [place] [address]. All rights reserved. This information is not intended as a substitute for professional medical care. Always follow your healthcare professional's instructions.

## 2018-05-14 ENCOUNTER — HOSPITAL ENCOUNTER (OUTPATIENT)
Dept: PREADMISSION TESTING | Facility: HOSPITAL | Age: 63
Discharge: HOME | End: 2018-05-14
Attending: ANESTHESIOLOGY
Payer: COMMERCIAL

## 2018-05-14 ENCOUNTER — HOSPITAL ENCOUNTER (OUTPATIENT)
Dept: CARDIOLOGY | Facility: CLINIC | Age: 63
Discharge: HOME | End: 2018-05-14
Attending: ANESTHESIOLOGY
Payer: COMMERCIAL

## 2018-05-14 ENCOUNTER — OFFICE VISIT (OUTPATIENT)
Dept: INTERNAL MEDICINE | Facility: CLINIC | Age: 63
End: 2018-05-14
Payer: COMMERCIAL

## 2018-05-14 ENCOUNTER — OFFICE VISIT (OUTPATIENT)
Dept: ORTHOPEDICS | Facility: CLINIC | Age: 63
End: 2018-05-14
Payer: COMMERCIAL

## 2018-05-14 VITALS
WEIGHT: 266.88 LBS | HEIGHT: 71 IN | HEART RATE: 77 BPM | SYSTOLIC BLOOD PRESSURE: 124 MMHG | DIASTOLIC BLOOD PRESSURE: 76 MMHG | BODY MASS INDEX: 37.36 KG/M2

## 2018-05-14 VITALS
WEIGHT: 265.88 LBS | HEART RATE: 74 BPM | SYSTOLIC BLOOD PRESSURE: 125 MMHG | BODY MASS INDEX: 38.06 KG/M2 | TEMPERATURE: 98 F | RESPIRATION RATE: 16 BRPM | HEIGHT: 70 IN | DIASTOLIC BLOOD PRESSURE: 78 MMHG

## 2018-05-14 VITALS — OXYGEN SATURATION: 95 %

## 2018-05-14 DIAGNOSIS — G47.00 INSOMNIA, UNSPECIFIED TYPE: Chronic | ICD-10-CM

## 2018-05-14 DIAGNOSIS — M17.11 PRIMARY OSTEOARTHRITIS OF RIGHT KNEE: Primary | ICD-10-CM

## 2018-05-14 DIAGNOSIS — I10 HYPERTENSION, UNSPECIFIED TYPE: ICD-10-CM

## 2018-05-14 DIAGNOSIS — I10 ESSENTIAL HYPERTENSION: Chronic | ICD-10-CM

## 2018-05-14 DIAGNOSIS — Z01.818 PRE-OP EVALUATION: Primary | ICD-10-CM

## 2018-05-14 DIAGNOSIS — E78.1 HYPERTRIGLYCERIDEMIA: Chronic | ICD-10-CM

## 2018-05-14 DIAGNOSIS — M79.606 PAIN OF LOWER EXTREMITY, UNSPECIFIED LATERALITY: ICD-10-CM

## 2018-05-14 PROCEDURE — 99999 PR PBB SHADOW E&M-EST. PATIENT-LVL IV: CPT | Mod: PBBFAC,,, | Performed by: PHYSICIAN ASSISTANT

## 2018-05-14 PROCEDURE — 99499 UNLISTED E&M SERVICE: CPT | Mod: S$GLB,,, | Performed by: PHYSICIAN ASSISTANT

## 2018-05-14 PROCEDURE — 99999 PR PBB SHADOW E&M-EST. PATIENT-LVL III: CPT | Mod: PBBFAC,,, | Performed by: INTERNAL MEDICINE

## 2018-05-14 PROCEDURE — 93000 ELECTROCARDIOGRAM COMPLETE: CPT | Mod: S$GLB,,, | Performed by: INTERNAL MEDICINE

## 2018-05-14 PROCEDURE — 99214 OFFICE O/P EST MOD 30 MIN: CPT | Mod: S$GLB,,, | Performed by: INTERNAL MEDICINE

## 2018-05-14 RX ORDER — ASPIRIN 325 MG
325 TABLET, DELAYED RELEASE (ENTERIC COATED) ORAL 2 TIMES DAILY
Status: CANCELLED | OUTPATIENT
Start: 2018-05-14

## 2018-05-14 RX ORDER — NALOXONE HCL 0.4 MG/ML
0.02 VIAL (ML) INJECTION
Status: CANCELLED | OUTPATIENT
Start: 2018-05-14

## 2018-05-14 RX ORDER — AMOXICILLIN 250 MG
1 CAPSULE ORAL 2 TIMES DAILY
Status: CANCELLED | OUTPATIENT
Start: 2018-05-14

## 2018-05-14 RX ORDER — SODIUM CHLORIDE 9 MG/ML
INJECTION, SOLUTION INTRAVENOUS
Status: CANCELLED | OUTPATIENT
Start: 2018-05-14

## 2018-05-14 RX ORDER — RAMELTEON 8 MG/1
8 TABLET ORAL NIGHTLY PRN
Status: CANCELLED | OUTPATIENT
Start: 2018-05-14

## 2018-05-14 RX ORDER — ACETAMINOPHEN 500 MG
1000 TABLET ORAL EVERY 6 HOURS
Status: CANCELLED | OUTPATIENT
Start: 2018-05-14 | End: 2018-05-16

## 2018-05-14 RX ORDER — MORPHINE SULFATE 10 MG/ML
2 INJECTION, SOLUTION INTRAMUSCULAR; INTRAVENOUS
Status: CANCELLED | OUTPATIENT
Start: 2018-05-14

## 2018-05-14 RX ORDER — ONDANSETRON 2 MG/ML
4 INJECTION INTRAMUSCULAR; INTRAVENOUS EVERY 8 HOURS PRN
Status: CANCELLED | OUTPATIENT
Start: 2018-05-14

## 2018-05-14 RX ORDER — OXYCODONE HYDROCHLORIDE 5 MG/1
15 TABLET ORAL
Status: CANCELLED | OUTPATIENT
Start: 2018-05-14

## 2018-05-14 RX ORDER — PREGABALIN 25 MG/1
75 CAPSULE ORAL NIGHTLY
Status: CANCELLED | OUTPATIENT
Start: 2018-05-14

## 2018-05-14 RX ORDER — SODIUM CHLORIDE 9 MG/ML
INJECTION, SOLUTION INTRAVENOUS CONTINUOUS
Status: CANCELLED | OUTPATIENT
Start: 2018-05-14 | End: 2018-05-15

## 2018-05-14 RX ORDER — AMOXICILLIN 500 MG
CAPSULE ORAL DAILY
COMMUNITY
End: 2022-02-02

## 2018-05-14 RX ORDER — FAMOTIDINE 20 MG/1
20 TABLET, FILM COATED ORAL 2 TIMES DAILY
Status: CANCELLED | OUTPATIENT
Start: 2018-05-14

## 2018-05-14 RX ORDER — ROPIVACAINE HYDROCHLORIDE 2 MG/ML
8 INJECTION, SOLUTION EPIDURAL; INFILTRATION; PERINEURAL CONTINUOUS
Status: CANCELLED | OUTPATIENT
Start: 2018-05-14

## 2018-05-14 RX ORDER — MIDAZOLAM HYDROCHLORIDE 5 MG/ML
1 INJECTION INTRAMUSCULAR; INTRAVENOUS EVERY 5 MIN PRN
Status: CANCELLED | OUTPATIENT
Start: 2018-05-14

## 2018-05-14 RX ORDER — CELECOXIB 100 MG/1
200 CAPSULE ORAL DAILY
Status: CANCELLED | OUTPATIENT
Start: 2018-05-14

## 2018-05-14 RX ORDER — SODIUM CHLORIDE 0.9 % (FLUSH) 0.9 %
3 SYRINGE (ML) INJECTION EVERY 8 HOURS PRN
Status: CANCELLED | OUTPATIENT
Start: 2018-05-14

## 2018-05-14 RX ORDER — MUPIROCIN 20 MG/G
1 OINTMENT TOPICAL
Status: CANCELLED | OUTPATIENT
Start: 2018-05-14

## 2018-05-14 RX ORDER — ACETAMINOPHEN 10 MG/ML
1000 INJECTION, SOLUTION INTRAVENOUS ONCE
Status: CANCELLED | OUTPATIENT
Start: 2018-05-14 | End: 2018-05-14

## 2018-05-14 RX ORDER — CELECOXIB 100 MG/1
400 CAPSULE ORAL
Status: CANCELLED | OUTPATIENT
Start: 2018-05-14

## 2018-05-14 RX ORDER — BISACODYL 10 MG
10 SUPPOSITORY, RECTAL RECTAL EVERY 12 HOURS PRN
Status: CANCELLED | OUTPATIENT
Start: 2018-05-14

## 2018-05-14 RX ORDER — OXYCODONE HYDROCHLORIDE 5 MG/1
5 TABLET ORAL
Status: CANCELLED | OUTPATIENT
Start: 2018-05-14

## 2018-05-14 RX ORDER — PREGABALIN 25 MG/1
75 CAPSULE ORAL
Status: CANCELLED | OUTPATIENT
Start: 2018-05-14

## 2018-05-14 RX ORDER — OXYCODONE HYDROCHLORIDE 5 MG/1
10 TABLET ORAL
Status: CANCELLED | OUTPATIENT
Start: 2018-05-14

## 2018-05-14 RX ORDER — FENTANYL CITRATE 50 UG/ML
25 INJECTION, SOLUTION INTRAMUSCULAR; INTRAVENOUS EVERY 5 MIN PRN
Status: CANCELLED | OUTPATIENT
Start: 2018-05-14

## 2018-05-14 RX ORDER — POLYETHYLENE GLYCOL 3350 17 G/17G
17 POWDER, FOR SOLUTION ORAL DAILY
Status: CANCELLED | OUTPATIENT
Start: 2018-05-14

## 2018-05-14 RX ORDER — LIDOCAINE HYDROCHLORIDE 10 MG/ML
1 INJECTION, SOLUTION EPIDURAL; INFILTRATION; INTRACAUDAL; PERINEURAL
Status: CANCELLED | OUTPATIENT
Start: 2018-05-14

## 2018-05-14 NOTE — PATIENT INSTRUCTIONS
Elavil 10 mg (take), Norvasc 10 mg (take), Lipitor 40 mg (take), soma 350 mg(take if needed), Flonase 50 µg (take if needed), HCTZ 25 mg (hold am of surgery), Norco  mg ((take if needed), Indocin 50 mg (try to hold week prior to surgery), lisinopril 40 mg (take), singular 10 mg (take), naproxen 500 mg (try to hold week prior to surgery), Viagra 100 mg (hold am of surgery), albuterol inhaler (take if needed)

## 2018-05-14 NOTE — PROGRESS NOTES
Stevie Villalba is a 63 y.o. year old here today for a pre-operative visit in preparation for a Right total knee arthroplasty to be performed by  Dr. [facility] on 5/23/2018.  he was last seen and treated in the clinic on 5/1/2018. he will be medically optimized by the pre op center. There has been no significant change in medical status since last visit. No fever, chills, malaise, or unexplained weight change.      Allergies, Medications, past medical and surgical history reviewed.    Focused examination performed.    Patient declined to see Dr. [facility] today in clinic.  All questions answered. Patient encouraged to call with questions. Contact information given.     Pre, masha, and post operative procedures and expectations discussed. Questions were answered. Stevie Villalba has been educated and is ready to proceed with surgery. Approximately 30 minutes was spent discussing surgical outcomes, plans, procedures pre, masha, and post operative expections and care.  Surgical consent signed.    Stevie Villalba will contact us if there are any questions, concerns, or changes in medical status prior to surgery.
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
## 2018-05-14 NOTE — PROGRESS NOTES
Subjective:       Patient ID: Stevie Villalba is a 63 y.o. male.    Chief Complaint: Pre-op Exam    HPI     62 yo male here for pre-op evaluation of knee replacement (right knee).    Denies CP/SOB/nausea/MI last 3 months, never diagnosed with heart failure, never diagnosed with arrhythmias, never diagnosed with valvular heart disease.    RCRI criteria: - IDDM, - CAD, - CVA, - chronic renal insufficiency, - heart failure, - high risk surgery    Functional status: Works daily.  He goes up and down three flights of stairs - no associated CP or SOB.    Bleeding risk - history of bleeding with prior surgeries - none    History of prior anesthetic complications - none    - tobacco, + EtOH - occasional beer, - Illicit substances    Chronic conditions/medication usage - Elavil 10 mg (take), Norvasc 10 mg (take), Lipitor 40 mg (take), soma 350 mg(take if needed), Flonase 50 µg (take if needed), HCTZ 25 mg (hold am of surgery), Norco  mg ((take if needed), Indocin 50 mg (try to hold week prior to surgery), lisinopril 40 mg (take), singular 10 mg (take), naproxen 500 mg (try to hold week prior to surgery), Viagra 100 mg (hold am of surgery), albuterol inhaler (take if needed)    Chronic Steroid usage - none    Review of Systems    Objective:      Physical Exam   Constitutional: He is oriented to person, place, and time. He appears well-developed and well-nourished.   HENT:   Head: Normocephalic and atraumatic.   Mouth/Throat: No oropharyngeal exudate.   Eyes: EOM are normal. Pupils are equal, round, and reactive to light. Right eye exhibits no discharge. Left eye exhibits no discharge. No scleral icterus.   Neck: Normal range of motion. Neck supple. No tracheal deviation present. No thyromegaly present.   Cardiovascular: Normal rate, regular rhythm and normal heart sounds.  Exam reveals no gallop and no friction rub.    No murmur heard.  Pulmonary/Chest: Effort normal and breath sounds normal. No respiratory distress.  He has no wheezes. He has no rales. He exhibits no tenderness.   Abdominal: Soft. Bowel sounds are normal. He exhibits no distension and no mass. There is no tenderness. There is no rebound and no guarding.   Musculoskeletal: Normal range of motion. He exhibits no edema or tenderness.   Neurological: He is alert and oriented to person, place, and time.   Skin: Skin is warm and dry. No rash noted. No erythema. No pallor.   Psychiatric: He has a normal mood and affect. His behavior is normal.   Vitals reviewed.      Assessment:       1. Pre-op evaluation    2. Essential hypertension    3. Hypertriglyceridemia    4. Insomnia, unspecified type        Plan:       1.  Surgery is not emergent.  Patient has no active cardiac conditions.  Surgery is not low risk.  Patient has greater than 4 METs.  Proceed with planned procedure.  2.  Continue Norvasc 10 mg, HCTZ 25 mg, lisinopril 40 mrem.  3.  Continue Lipitor 40 mrem per  4.  Monitor.  5.  DVT prevention - Recommend use of TEDs, and early ambulation if able.  Consider short term use of anti-coagulation if appropriate post-op.  6.  Chronic medications - Elavil 10 mg (take), Norvasc 10 mg (take), Lipitor 40 mg (take), soma 350 mg(take if needed), Flonase 50 µg (take if needed), HCTZ 25 mg (hold am of surgery), Norco  mg ((take if needed), Indocin 50 mg (try to hold week prior to surgery), lisinopril 40 mg (take), singular 10 mg (take), naproxen 500 mg (try to hold week prior to surgery), Viagra 100 mg (hold am of surgery), albuterol inhaler (take if needed)  11.  Minimize urinary catheter use.

## 2018-05-16 ENCOUNTER — TELEPHONE (OUTPATIENT)
Dept: ORTHOPEDICS | Facility: CLINIC | Age: 63
End: 2018-05-16

## 2018-05-21 ENCOUNTER — TELEPHONE (OUTPATIENT)
Dept: ORTHOPEDICS | Facility: CLINIC | Age: 63
End: 2018-05-21

## 2018-05-21 NOTE — TELEPHONE ENCOUNTER
Dr [facility] called Mr Villalba     He moved his surgery to the following Wednesday May 30th , he voiced understanding

## 2018-05-23 ENCOUNTER — ANESTHESIA (OUTPATIENT)
Dept: SURGERY | Facility: HOSPITAL | Age: 63
DRG: 470 | End: 2018-05-23
Payer: COMMERCIAL

## 2018-05-29 ENCOUNTER — TELEPHONE (OUTPATIENT)
Dept: ORTHOPEDICS | Facility: CLINIC | Age: 63
End: 2018-05-29

## 2018-05-29 NOTE — TELEPHONE ENCOUNTER
----- Message from Helen Gutierrez sent at 5/29/2018  9:28 AM CDT -----  Contact: [email or web address]  Patient called regarding surgery time for tomorrow please call patient.    We had spoken already  See previous note

## 2018-05-29 NOTE — TELEPHONE ENCOUNTER
we spoke : Reminded to eat light the night before surgery, NPO after midnight, take hibclens shower the night before surgery  & again in the am , sleep on clean sheets  in clean clothes, no laxatives or stool softeners , report to the 2nd floor surgery unit for 8 am tomorrow  He voiced understanding

## 2018-05-30 ENCOUNTER — HOSPITAL ENCOUNTER (OUTPATIENT)
Facility: HOSPITAL | Age: 63
Discharge: HOME HEALTH | DRG: 470 | End: 2018-05-31
Attending: ORTHOPAEDIC SURGERY | Admitting: ORTHOPAEDIC SURGERY
Payer: COMMERCIAL

## 2018-05-30 DIAGNOSIS — M17.11 PRIMARY OSTEOARTHRITIS OF RIGHT KNEE: ICD-10-CM

## 2018-05-30 LAB
ANION GAP SERPL CALC-SCNC: 9 MMOL/L
BUN SERPL-MCNC: 25 MG/DL
CALCIUM SERPL-MCNC: 8.6 MG/DL
CHLORIDE SERPL-SCNC: 107 MMOL/L
CO2 SERPL-SCNC: 21 MMOL/L
CREAT SERPL-MCNC: 0.8 MG/DL
EST. GFR  (AFRICAN AMERICAN): >60 ML/MIN/1.73 M^2
EST. GFR  (NON AFRICAN AMERICAN): >60 ML/MIN/1.73 M^2
GLUCOSE SERPL-MCNC: 106 MG/DL
POTASSIUM SERPL-SCNC: 4.8 MMOL/L
SODIUM SERPL-SCNC: 137 MMOL/L

## 2018-05-30 PROCEDURE — 27447 TOTAL KNEE ARTHROPLASTY: CPT | Mod: RT,,, | Performed by: ORTHOPAEDIC SURGERY

## 2018-05-30 PROCEDURE — 64448 NJX AA&/STRD FEM NRV NFS IMG: CPT | Mod: 59,RT,, | Performed by: ANESTHESIOLOGY

## 2018-05-30 PROCEDURE — 88311 DECALCIFY TISSUE: CPT | Performed by: PATHOLOGY

## 2018-05-30 PROCEDURE — 27201423 OPTIME MED/SURG SUP & DEVICES STERILE SUPPLY: Performed by: ORTHOPAEDIC SURGERY

## 2018-05-30 PROCEDURE — G0378 HOSPITAL OBSERVATION PER HR: HCPCS

## 2018-05-30 PROCEDURE — 27447 TOTAL KNEE ARTHROPLASTY: CPT | Mod: AS,RT,, | Performed by: PHYSICIAN ASSISTANT

## 2018-05-30 PROCEDURE — C1776 JOINT DEVICE (IMPLANTABLE): HCPCS | Performed by: ORTHOPAEDIC SURGERY

## 2018-05-30 PROCEDURE — 80048 BASIC METABOLIC PNL TOTAL CA: CPT

## 2018-05-30 PROCEDURE — 63600175 PHARM REV CODE 636 W HCPCS

## 2018-05-30 PROCEDURE — 25000003 PHARM REV CODE 250: Performed by: PHYSICIAN ASSISTANT

## 2018-05-30 PROCEDURE — 27200750 HC INSULATED NEEDLE/ STIMUPLEX: Performed by: ANESTHESIOLOGY

## 2018-05-30 PROCEDURE — 71000039 HC RECOVERY, EACH ADD'L HOUR: Performed by: ORTHOPAEDIC SURGERY

## 2018-05-30 PROCEDURE — 76942 ECHO GUIDE FOR BIOPSY: CPT | Mod: 26,,, | Performed by: ANESTHESIOLOGY

## 2018-05-30 PROCEDURE — 64450 NJX AA&/STRD OTHER PN/BRANCH: CPT | Performed by: ANESTHESIOLOGY

## 2018-05-30 PROCEDURE — 71000033 HC RECOVERY, INTIAL HOUR: Performed by: ORTHOPAEDIC SURGERY

## 2018-05-30 PROCEDURE — 63600175 PHARM REV CODE 636 W HCPCS: Performed by: NURSE ANESTHETIST, CERTIFIED REGISTERED

## 2018-05-30 PROCEDURE — 27800517 HC TRAY,EPIDURAL-CONTINUOUS: Performed by: ANESTHESIOLOGY

## 2018-05-30 PROCEDURE — 20610 DRAIN/INJ JOINT/BURSA W/O US: CPT | Mod: 51,LT,, | Performed by: ORTHOPAEDIC SURGERY

## 2018-05-30 PROCEDURE — 97161 PT EVAL LOW COMPLEX 20 MIN: CPT

## 2018-05-30 PROCEDURE — 25000003 PHARM REV CODE 250: Performed by: ANESTHESIOLOGY

## 2018-05-30 PROCEDURE — C1713 ANCHOR/SCREW BN/BN,TIS/BN: HCPCS | Performed by: ORTHOPAEDIC SURGERY

## 2018-05-30 PROCEDURE — 25000003 PHARM REV CODE 250: Performed by: ORTHOPAEDIC SURGERY

## 2018-05-30 PROCEDURE — G0379 DIRECT REFER HOSPITAL OBSERV: HCPCS

## 2018-05-30 PROCEDURE — 97535 SELF CARE MNGMENT TRAINING: CPT

## 2018-05-30 PROCEDURE — 63600175 PHARM REV CODE 636 W HCPCS: Performed by: PHYSICIAN ASSISTANT

## 2018-05-30 PROCEDURE — D9220A PRA ANESTHESIA: Mod: ANES,,, | Performed by: ANESTHESIOLOGY

## 2018-05-30 PROCEDURE — 25000003 PHARM REV CODE 250: Performed by: NURSE ANESTHETIST, CERTIFIED REGISTERED

## 2018-05-30 PROCEDURE — 97165 OT EVAL LOW COMPLEX 30 MIN: CPT

## 2018-05-30 PROCEDURE — D9220A PRA ANESTHESIA: Mod: CRNA,,, | Performed by: NURSE ANESTHETIST, CERTIFIED REGISTERED

## 2018-05-30 PROCEDURE — 36000710: Performed by: ORTHOPAEDIC SURGERY

## 2018-05-30 PROCEDURE — 12000002 HC ACUTE/MED SURGE SEMI-PRIVATE ROOM

## 2018-05-30 PROCEDURE — 36000711: Performed by: ORTHOPAEDIC SURGERY

## 2018-05-30 PROCEDURE — 94761 N-INVAS EAR/PLS OXIMETRY MLT: CPT

## 2018-05-30 PROCEDURE — 88311 DECALCIFY TISSUE: CPT | Mod: 26,,, | Performed by: PATHOLOGY

## 2018-05-30 PROCEDURE — 97530 THERAPEUTIC ACTIVITIES: CPT

## 2018-05-30 PROCEDURE — 99900035 HC TECH TIME PER 15 MIN (STAT)

## 2018-05-30 PROCEDURE — 63600175 PHARM REV CODE 636 W HCPCS: Performed by: ORTHOPAEDIC SURGERY

## 2018-05-30 PROCEDURE — 63600175 PHARM REV CODE 636 W HCPCS: Performed by: ANESTHESIOLOGY

## 2018-05-30 PROCEDURE — 37000009 HC ANESTHESIA EA ADD 15 MINS: Performed by: ORTHOPAEDIC SURGERY

## 2018-05-30 PROCEDURE — 88305 TISSUE EXAM BY PATHOLOGIST: CPT | Mod: 26,,, | Performed by: PATHOLOGY

## 2018-05-30 PROCEDURE — 37000008 HC ANESTHESIA 1ST 15 MINUTES: Performed by: ORTHOPAEDIC SURGERY

## 2018-05-30 DEVICE — TIBIA STM PSN 5 DEG SZ F R: Type: IMPLANTABLE DEVICE | Site: KNEE | Status: FUNCTIONAL

## 2018-05-30 DEVICE — PSN ALL POLY PAT 35MM: Type: IMPLANTABLE DEVICE | Site: KNEE | Status: FUNCTIONAL

## 2018-05-30 DEVICE — INSERT TIBIAL PSN ASF R 6-9EF: Type: IMPLANTABLE DEVICE | Site: KNEE | Status: FUNCTIONAL

## 2018-05-30 DEVICE — PSN FEM PS CMT CCR STD SZ 7 R: Type: IMPLANTABLE DEVICE | Site: KNEE | Status: FUNCTIONAL

## 2018-05-30 DEVICE — PLUG BONE #5: Type: IMPLANTABLE DEVICE | Site: KNEE | Status: FUNCTIONAL

## 2018-05-30 DEVICE — CEMENT BONE SIMPLE P INDIVID: Type: IMPLANTABLE DEVICE | Site: KNEE | Status: FUNCTIONAL

## 2018-05-30 RX ORDER — PROPOFOL 10 MG/ML
VIAL (ML) INTRAVENOUS
Status: DISCONTINUED | OUTPATIENT
Start: 2018-05-30 | End: 2018-05-30

## 2018-05-30 RX ORDER — OXYCODONE HYDROCHLORIDE 5 MG/1
15 TABLET ORAL
Status: DISCONTINUED | OUTPATIENT
Start: 2018-05-30 | End: 2018-05-31 | Stop reason: HOSPADM

## 2018-05-30 RX ORDER — MORPHINE SULFATE 2 MG/ML
2 INJECTION, SOLUTION INTRAMUSCULAR; INTRAVENOUS
Status: DISCONTINUED | OUTPATIENT
Start: 2018-05-30 | End: 2018-05-31 | Stop reason: HOSPADM

## 2018-05-30 RX ORDER — OXYCODONE AND ACETAMINOPHEN 5; 325 MG/1; MG/1
1 TABLET ORAL
Qty: 60 TABLET | Refills: 0 | Status: SHIPPED | OUTPATIENT
Start: 2018-05-30 | End: 2018-06-14 | Stop reason: SDUPTHER

## 2018-05-30 RX ORDER — PREGABALIN 150 MG/1
150 CAPSULE ORAL NIGHTLY
Status: DISCONTINUED | OUTPATIENT
Start: 2018-05-30 | End: 2018-05-31 | Stop reason: HOSPADM

## 2018-05-30 RX ORDER — AMOXICILLIN 250 MG
1 CAPSULE ORAL 2 TIMES DAILY
Status: DISCONTINUED | OUTPATIENT
Start: 2018-05-30 | End: 2018-05-31 | Stop reason: HOSPADM

## 2018-05-30 RX ORDER — DOCUSATE SODIUM 100 MG/1
100 CAPSULE, LIQUID FILLED ORAL 2 TIMES DAILY PRN
Qty: 60 CAPSULE | Refills: 0 | Status: SHIPPED | OUTPATIENT
Start: 2018-05-30 | End: 2018-08-23

## 2018-05-30 RX ORDER — VANCOMYCIN HYDROCHLORIDE 500 MG/10ML
INJECTION, POWDER, LYOPHILIZED, FOR SOLUTION INTRAVENOUS
Status: DISCONTINUED | OUTPATIENT
Start: 2018-05-30 | End: 2018-05-30 | Stop reason: HOSPADM

## 2018-05-30 RX ORDER — SODIUM CHLORIDE 0.9 % (FLUSH) 0.9 %
3 SYRINGE (ML) INJECTION EVERY 8 HOURS PRN
Status: DISCONTINUED | OUTPATIENT
Start: 2018-05-30 | End: 2018-05-31 | Stop reason: HOSPADM

## 2018-05-30 RX ORDER — BUPIVACAINE HYDROCHLORIDE 2.5 MG/ML
INJECTION, SOLUTION EPIDURAL; INFILTRATION; INTRACAUDAL
Status: DISCONTINUED | OUTPATIENT
Start: 2018-05-30 | End: 2018-05-30 | Stop reason: HOSPADM

## 2018-05-30 RX ORDER — ROPIVACAINE HYDROCHLORIDE 2 MG/ML
8 INJECTION, SOLUTION EPIDURAL; INFILTRATION; PERINEURAL CONTINUOUS
Status: DISCONTINUED | OUTPATIENT
Start: 2018-05-30 | End: 2018-05-31 | Stop reason: HOSPADM

## 2018-05-30 RX ORDER — SODIUM CHLORIDE 0.9 % (FLUSH) 0.9 %
3 SYRINGE (ML) INJECTION
Status: DISCONTINUED | OUTPATIENT
Start: 2018-05-30 | End: 2018-05-30 | Stop reason: HOSPADM

## 2018-05-30 RX ORDER — CEFAZOLIN SODIUM 1 G/3ML
2 INJECTION, POWDER, FOR SOLUTION INTRAMUSCULAR; INTRAVENOUS
Status: DISCONTINUED | OUTPATIENT
Start: 2018-05-30 | End: 2018-05-31 | Stop reason: HOSPADM

## 2018-05-30 RX ORDER — METHYLPREDNISOLONE ACETATE 40 MG/ML
INJECTION, SUSPENSION INTRA-ARTICULAR; INTRALESIONAL; INTRAMUSCULAR; SOFT TISSUE
Status: DISCONTINUED | OUTPATIENT
Start: 2018-05-30 | End: 2018-05-30 | Stop reason: HOSPADM

## 2018-05-30 RX ORDER — VANCOMYCIN HYDROCHLORIDE
1500
Status: COMPLETED | OUTPATIENT
Start: 2018-05-30 | End: 2018-05-30

## 2018-05-30 RX ORDER — NALOXONE HCL 0.4 MG/ML
0.02 VIAL (ML) INJECTION
Status: DISCONTINUED | OUTPATIENT
Start: 2018-05-30 | End: 2018-05-31 | Stop reason: HOSPADM

## 2018-05-30 RX ORDER — LIDOCAINE HYDROCHLORIDE 10 MG/ML
1 INJECTION, SOLUTION EPIDURAL; INFILTRATION; INTRACAUDAL; PERINEURAL
Status: COMPLETED | OUTPATIENT
Start: 2018-05-30 | End: 2018-05-30

## 2018-05-30 RX ORDER — ONDANSETRON 8 MG/1
8 TABLET, ORALLY DISINTEGRATING ORAL EVERY 12 HOURS PRN
Qty: 20 TABLET | Refills: 0 | Status: SHIPPED | OUTPATIENT
Start: 2018-05-30 | End: 2018-08-23

## 2018-05-30 RX ORDER — MIDAZOLAM HYDROCHLORIDE 1 MG/ML
INJECTION, SOLUTION INTRAMUSCULAR; INTRAVENOUS
Status: DISCONTINUED | OUTPATIENT
Start: 2018-05-30 | End: 2018-05-30

## 2018-05-30 RX ORDER — POLYETHYLENE GLYCOL 3350 17 G/17G
17 POWDER, FOR SOLUTION ORAL DAILY
Status: DISCONTINUED | OUTPATIENT
Start: 2018-05-31 | End: 2018-05-31 | Stop reason: HOSPADM

## 2018-05-30 RX ORDER — BISACODYL 10 MG
10 SUPPOSITORY, RECTAL RECTAL EVERY 12 HOURS PRN
Status: DISCONTINUED | OUTPATIENT
Start: 2018-05-30 | End: 2018-05-31 | Stop reason: HOSPADM

## 2018-05-30 RX ORDER — DEXAMETHASONE SODIUM PHOSPHATE 4 MG/ML
INJECTION, SOLUTION INTRA-ARTICULAR; INTRALESIONAL; INTRAMUSCULAR; INTRAVENOUS; SOFT TISSUE
Status: DISCONTINUED | OUTPATIENT
Start: 2018-05-30 | End: 2018-05-30

## 2018-05-30 RX ORDER — MUPIROCIN 20 MG/G
1 OINTMENT TOPICAL
Status: COMPLETED | OUTPATIENT
Start: 2018-05-30 | End: 2018-05-30

## 2018-05-30 RX ORDER — SODIUM CHLORIDE 9 MG/ML
INJECTION, SOLUTION INTRAVENOUS
Status: COMPLETED | OUTPATIENT
Start: 2018-05-30 | End: 2018-05-30

## 2018-05-30 RX ORDER — ASPIRIN 81 MG/1
81 TABLET ORAL 2 TIMES DAILY
Status: DISCONTINUED | OUTPATIENT
Start: 2018-05-30 | End: 2018-05-31 | Stop reason: HOSPADM

## 2018-05-30 RX ORDER — KETAMINE HCL IN 0.9 % NACL 50 MG/5 ML
SYRINGE (ML) INTRAVENOUS
Status: DISCONTINUED | OUTPATIENT
Start: 2018-05-30 | End: 2018-05-30

## 2018-05-30 RX ORDER — ACETAMINOPHEN 10 MG/ML
1000 INJECTION, SOLUTION INTRAVENOUS ONCE
Status: COMPLETED | OUTPATIENT
Start: 2018-05-30 | End: 2018-05-30

## 2018-05-30 RX ORDER — CELECOXIB 200 MG/1
400 CAPSULE ORAL
Status: COMPLETED | OUTPATIENT
Start: 2018-05-30 | End: 2018-05-30

## 2018-05-30 RX ORDER — ROPIVACAINE HYDROCHLORIDE 2 MG/ML
INJECTION, SOLUTION EPIDURAL; INFILTRATION; PERINEURAL
Status: COMPLETED
Start: 2018-05-30 | End: 2018-05-30

## 2018-05-30 RX ORDER — OXYCODONE HYDROCHLORIDE 5 MG/1
10 TABLET ORAL
Status: DISCONTINUED | OUTPATIENT
Start: 2018-05-30 | End: 2018-05-31 | Stop reason: HOSPADM

## 2018-05-30 RX ORDER — PREGABALIN 75 MG/1
75 CAPSULE ORAL
Status: COMPLETED | OUTPATIENT
Start: 2018-05-30 | End: 2018-05-30

## 2018-05-30 RX ORDER — GENTAMICIN SULFATE 40 MG/ML
INJECTION, SOLUTION INTRAMUSCULAR; INTRAVENOUS
Status: DISCONTINUED | OUTPATIENT
Start: 2018-05-30 | End: 2018-05-30 | Stop reason: HOSPADM

## 2018-05-30 RX ORDER — ACETAMINOPHEN 500 MG
1000 TABLET ORAL EVERY 6 HOURS
Status: DISCONTINUED | OUTPATIENT
Start: 2018-05-30 | End: 2018-05-31 | Stop reason: HOSPADM

## 2018-05-30 RX ORDER — OXYCODONE HYDROCHLORIDE 5 MG/1
5 TABLET ORAL
Status: DISCONTINUED | OUTPATIENT
Start: 2018-05-30 | End: 2018-05-31 | Stop reason: HOSPADM

## 2018-05-30 RX ORDER — PROPOFOL 10 MG/ML
VIAL (ML) INTRAVENOUS CONTINUOUS PRN
Status: DISCONTINUED | OUTPATIENT
Start: 2018-05-30 | End: 2018-05-30

## 2018-05-30 RX ORDER — MIDAZOLAM HYDROCHLORIDE 1 MG/ML
1 INJECTION INTRAMUSCULAR; INTRAVENOUS EVERY 5 MIN PRN
Status: DISCONTINUED | OUTPATIENT
Start: 2018-05-30 | End: 2018-05-30 | Stop reason: HOSPADM

## 2018-05-30 RX ORDER — CELECOXIB 200 MG/1
200 CAPSULE ORAL DAILY
Status: DISCONTINUED | OUTPATIENT
Start: 2018-05-31 | End: 2018-05-31 | Stop reason: HOSPADM

## 2018-05-30 RX ORDER — SODIUM CHLORIDE 9 MG/ML
INJECTION, SOLUTION INTRAVENOUS CONTINUOUS
Status: DISCONTINUED | OUTPATIENT
Start: 2018-05-30 | End: 2018-05-31 | Stop reason: HOSPADM

## 2018-05-30 RX ORDER — PHENYLEPHRINE HYDROCHLORIDE 10 MG/ML
INJECTION INTRAVENOUS
Status: DISCONTINUED | OUTPATIENT
Start: 2018-05-30 | End: 2018-05-30

## 2018-05-30 RX ORDER — LISINOPRIL 20 MG/1
40 TABLET ORAL DAILY
Status: DISCONTINUED | OUTPATIENT
Start: 2018-05-31 | End: 2018-05-31 | Stop reason: HOSPADM

## 2018-05-30 RX ORDER — FAMOTIDINE 20 MG/1
20 TABLET, FILM COATED ORAL 2 TIMES DAILY
Status: DISCONTINUED | OUTPATIENT
Start: 2018-05-30 | End: 2018-05-31 | Stop reason: HOSPADM

## 2018-05-30 RX ORDER — HYDROCHLOROTHIAZIDE 25 MG/1
25 TABLET ORAL DAILY
Status: DISCONTINUED | OUTPATIENT
Start: 2018-05-31 | End: 2018-05-31 | Stop reason: HOSPADM

## 2018-05-30 RX ORDER — ASPIRIN 81 MG/1
81 TABLET ORAL 2 TIMES DAILY
Qty: 30 TABLET | Refills: 0 | Status: SHIPPED | OUTPATIENT
Start: 2018-05-30 | End: 2018-08-23

## 2018-05-30 RX ORDER — CEFAZOLIN SODIUM 1 G/3ML
INJECTION, POWDER, FOR SOLUTION INTRAMUSCULAR; INTRAVENOUS
Status: DISCONTINUED | OUTPATIENT
Start: 2018-05-30 | End: 2018-05-30

## 2018-05-30 RX ORDER — ATORVASTATIN CALCIUM 10 MG/1
40 TABLET, FILM COATED ORAL NIGHTLY
Status: DISCONTINUED | OUTPATIENT
Start: 2018-05-31 | End: 2018-05-31 | Stop reason: HOSPADM

## 2018-05-30 RX ORDER — SODIUM CHLORIDE 9 MG/ML
INJECTION, SOLUTION INTRAVENOUS CONTINUOUS PRN
Status: DISCONTINUED | OUTPATIENT
Start: 2018-05-30 | End: 2018-05-30

## 2018-05-30 RX ORDER — FENTANYL CITRATE 50 UG/ML
25 INJECTION, SOLUTION INTRAMUSCULAR; INTRAVENOUS EVERY 5 MIN PRN
Status: DISCONTINUED | OUTPATIENT
Start: 2018-05-30 | End: 2018-05-30 | Stop reason: HOSPADM

## 2018-05-30 RX ORDER — AMLODIPINE BESYLATE 10 MG/1
10 TABLET ORAL DAILY
Status: DISCONTINUED | OUTPATIENT
Start: 2018-05-31 | End: 2018-05-31 | Stop reason: HOSPADM

## 2018-05-30 RX ORDER — AMITRIPTYLINE HYDROCHLORIDE 10 MG/1
10 TABLET, FILM COATED ORAL NIGHTLY PRN
Status: DISCONTINUED | OUTPATIENT
Start: 2018-05-30 | End: 2018-05-31 | Stop reason: HOSPADM

## 2018-05-30 RX ADMIN — SODIUM CHLORIDE: 0.9 INJECTION, SOLUTION INTRAVENOUS at 01:05

## 2018-05-30 RX ADMIN — SODIUM CHLORIDE, SODIUM GLUCONATE, SODIUM ACETATE, POTASSIUM CHLORIDE, MAGNESIUM CHLORIDE, SODIUM PHOSPHATE, DIBASIC, AND POTASSIUM PHOSPHATE: .53; .5; .37; .037; .03; .012; .00082 INJECTION, SOLUTION INTRAVENOUS at 11:05

## 2018-05-30 RX ADMIN — MUPIROCIN 1 G: 20 OINTMENT TOPICAL at 08:05

## 2018-05-30 RX ADMIN — FENTANYL CITRATE 25 MCG: 50 INJECTION, SOLUTION INTRAMUSCULAR; INTRAVENOUS at 03:05

## 2018-05-30 RX ADMIN — ASPIRIN 81 MG: 81 TABLET, COATED ORAL at 09:05

## 2018-05-30 RX ADMIN — ROPIVACAINE HYDROCHLORIDE 8 ML/HR: 2 INJECTION, SOLUTION EPIDURAL; INFILTRATION at 01:05

## 2018-05-30 RX ADMIN — SODIUM CHLORIDE, SODIUM GLUCONATE, SODIUM ACETATE, POTASSIUM CHLORIDE, MAGNESIUM CHLORIDE, SODIUM PHOSPHATE, DIBASIC, AND POTASSIUM PHOSPHATE: .53; .5; .37; .037; .03; .012; .00082 INJECTION, SOLUTION INTRAVENOUS at 12:05

## 2018-05-30 RX ADMIN — MORPHINE SULFATE 2 MG: 2 INJECTION, SOLUTION INTRAMUSCULAR; INTRAVENOUS at 04:05

## 2018-05-30 RX ADMIN — VANCOMYCIN HYDROCHLORIDE 1500 MG: 10 INJECTION, POWDER, LYOPHILIZED, FOR SOLUTION INTRAVENOUS at 09:05

## 2018-05-30 RX ADMIN — PROPOFOL 100 MCG/KG/MIN: 10 INJECTION, EMULSION INTRAVENOUS at 11:05

## 2018-05-30 RX ADMIN — DEXAMETHASONE SODIUM PHOSPHATE 8 MG: 4 INJECTION, SOLUTION INTRAMUSCULAR; INTRAVENOUS at 11:05

## 2018-05-30 RX ADMIN — OXYCODONE HYDROCHLORIDE 15 MG: 5 TABLET ORAL at 05:05

## 2018-05-30 RX ADMIN — SODIUM CHLORIDE: 0.9 INJECTION, SOLUTION INTRAVENOUS at 11:05

## 2018-05-30 RX ADMIN — LIDOCAINE HYDROCHLORIDE 10 MG: 10 INJECTION, SOLUTION EPIDURAL; INFILTRATION; INTRACAUDAL; PERINEURAL at 09:05

## 2018-05-30 RX ADMIN — CEFAZOLIN 2 G: 330 INJECTION, POWDER, FOR SOLUTION INTRAMUSCULAR; INTRAVENOUS at 10:05

## 2018-05-30 RX ADMIN — MORPHINE SULFATE 2 MG: 2 INJECTION, SOLUTION INTRAMUSCULAR; INTRAVENOUS at 03:05

## 2018-05-30 RX ADMIN — ACETAMINOPHEN 1000 MG: 10 INJECTION, SOLUTION INTRAVENOUS at 01:05

## 2018-05-30 RX ADMIN — MEPIVACAINE HYDROCHLORIDE 2.25 ML: 20 INJECTION, SOLUTION EPIDURAL; INFILTRATION at 11:05

## 2018-05-30 RX ADMIN — STANDARDIZED SENNA CONCENTRATE AND DOCUSATE SODIUM 1 TABLET: 8.6; 5 TABLET, FILM COATED ORAL at 09:05

## 2018-05-30 RX ADMIN — Medication 25 MG: at 11:05

## 2018-05-30 RX ADMIN — PHENYLEPHRINE HYDROCHLORIDE 150 MCG: 10 INJECTION INTRAVENOUS at 12:05

## 2018-05-30 RX ADMIN — OXYCODONE HYDROCHLORIDE 15 MG: 5 TABLET ORAL at 11:05

## 2018-05-30 RX ADMIN — SODIUM CHLORIDE: 0.9 INJECTION, SOLUTION INTRAVENOUS at 10:05

## 2018-05-30 RX ADMIN — FAMOTIDINE 20 MG: 20 TABLET ORAL at 09:05

## 2018-05-30 RX ADMIN — PROPOFOL 40 MG: 10 INJECTION, EMULSION INTRAVENOUS at 11:05

## 2018-05-30 RX ADMIN — ROPIVACAINE HYDROCHLORIDE 8 ML/HR: 2 INJECTION, SOLUTION EPIDURAL; INFILTRATION at 10:05

## 2018-05-30 RX ADMIN — MIDAZOLAM HYDROCHLORIDE 2 MG: 1 INJECTION, SOLUTION INTRAMUSCULAR; INTRAVENOUS at 10:05

## 2018-05-30 RX ADMIN — PHENYLEPHRINE HYDROCHLORIDE 100 MCG: 10 INJECTION INTRAVENOUS at 12:05

## 2018-05-30 RX ADMIN — Medication 1500 MG: at 09:05

## 2018-05-30 RX ADMIN — OXYCODONE HYDROCHLORIDE 15 MG: 5 TABLET ORAL at 01:05

## 2018-05-30 RX ADMIN — CEFAZOLIN 3 G: 330 INJECTION, POWDER, FOR SOLUTION INTRAMUSCULAR; INTRAVENOUS at 11:05

## 2018-05-30 RX ADMIN — OXYCODONE HYDROCHLORIDE 15 MG: 5 TABLET ORAL at 08:05

## 2018-05-30 RX ADMIN — MIDAZOLAM HYDROCHLORIDE 1 MG: 1 INJECTION, SOLUTION INTRAMUSCULAR; INTRAVENOUS at 11:05

## 2018-05-30 RX ADMIN — PHENYLEPHRINE HYDROCHLORIDE 100 MCG: 10 INJECTION INTRAVENOUS at 11:05

## 2018-05-30 RX ADMIN — FENTANYL CITRATE 50 MCG: 50 INJECTION, SOLUTION INTRAMUSCULAR; INTRAVENOUS at 10:05

## 2018-05-30 RX ADMIN — CELECOXIB 400 MG: 200 CAPSULE ORAL at 08:05

## 2018-05-30 RX ADMIN — PREGABALIN 150 MG: 150 CAPSULE ORAL at 09:05

## 2018-05-30 RX ADMIN — PREGABALIN 75 MG: 75 CAPSULE ORAL at 08:05

## 2018-05-30 NOTE — OPERATIVE NOTE ADDENDUM
Certification of Assistant at Surgery       Surgery Date: 5/30/2018     Participating Surgeons:  Surgeon(s) and Role:     * [facility] L[facility] Jr., MD - Primary    Procedures:  Procedure(s) (LRB):  REPLACEMENT-KNEE-TOTAL (Right)    Assistant Surgeon's Certification of Necessity:  I understand that section 1842 (b) (6) (d) of the Social Security Act generally prohibits Medicare Part B reasonable charge payment for the services of assistants at surgery in teaching hospitals when qualified residents are available to furnish such services. I certify that the services for which payment is claimed were medically necessary, and that no qualified resident was available to perform the services. I further understand that these services are subject to post-payment review by the Medicare carrier.      Stephanie Hickey PA-C    05/30/2018  1:45 PM
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
## 2018-05-30 NOTE — BRIEF OP NOTE
[facility]  Surgery Department  Operative Note    SUMMARY     Date of Procedure: 5/30/2018     Procedure: Procedure(s) (LRB):  REPLACEMENT-KNEE-TOTAL (Right)  ASPIRATION (Left)  INJECTION (Left)     Surgeon(s) and Role:     * [facility]. [facility] Jr., MD - Primary    Assisting Surgeon: None    Pre-Operative Diagnosis: Osteoarthritis of knee, unspecified (CODE) [M17.9]    Post-Operative Diagnosis: Post-Op Diagnosis Codes:     * Osteoarthritis of knee, unspecified (CODE) [M17.9]    Anesthesia: Spinal    Technical Procedures Used:  PS       Description of the Findings of the Procedure: Varus    Significant Surgical Tasks Conducted by the Assistant(s), if Applicable: closure    Complications: No    Estimated Blood Loss (EBL): 100cc             Implants:   Implant Name Type Inv. Item Serial No.  Lot No. LRB No. Used   PLUG BONE #5 - [id number]  PLUG BONE #5  Telanetix SUHAS. [id number] Right 1   TIBIA STM PSN 5 DEG SZ F R - [id number]  TIBIA STM PSN 5 DEG SZ F R  DIANA,INC [id number] Right 1   CEMENT BONE SIMPLE P INDIVID - [id number]  CEMENT BONE SIMPLE P INDIVID  FELICITACompleteSet SUHAS. [id number] Right 2   PSN ALL POLY PAT 35MM - [id number]  PSN ALL POLY PAT 35MM  DIANA,INC [id number] Right 1   PSN FEM PS CMT CCR STD SZ 7 R - [id number]  PSN FEM PS CMT CCR STD SZ 7 R  DIANA,INC [id number] Right 1   SCREW HEX HEAD - [id number]  SCREW HEX HEAD  DIANA,INC  Right 1   BIT DRILL PIN TROCAR 3.2MM - [id number]  BIT DRILL PIN TROCAR 3.2MM  DIANA,INC  Right 2   SCREW HEX HEAD 3.5X38MM - [id number]  SCREW HEX HEAD 3.5X38MM  DIANA,INC  Right 2   INSERT TIBIAL PSN ASF R 6-9EF - [id number]   INSERT TIBIAL PSN ASF R 6-9EF   DIANA,INC [id number] Right 1       Specimens:   Specimen (12h ago through future)    Start     Ordered    05/30/18 1334  Specimen to Pathology - Surgery  Once     Comments:  1.  Bone and soft tissue right knee - gross only      05/30/18 1336    05/30/18 1334  Specimen to Pathology - Surgery  Once       05/30/18 1336    05/30/18 1210  Specimen to Pathology - Surgery  Once     Comments:  1.) Right Knee bones and soft tissues - permanent      05/30/18 1211                  Condition: Good    Disposition: PACU - hemodynamically stable.    Attestation: I was present and scrubbed for the entire procedure.

## 2018-05-30 NOTE — ANESTHESIA PROCEDURE NOTES
R IPACK    Patient location during procedure: pre-op   Block not for primary anesthetic.  Reason for block: at surgeon's request and post-op pain management   Post-op Pain Location: R posterior knee  Start time: 5/30/2018 10:15 AM  Timeout: 5/30/2018 10:13 AM   End time: 5/30/2018 10:29 AM  Staffing  Anesthesiologist: ZENIA RODGERS  Performed: anesthesiologist   Preanesthetic Checklist  Completed: patient identified, site marked, surgical consent, pre-op evaluation, timeout performed, IV checked, risks and benefits discussed and monitors and equipment checked  Peripheral Block  Patient position: supine  Prep: ChloraPrep  Patient monitoring: heart rate, cardiac monitor, continuous pulse ox, continuous capnometry and frequent blood pressure checks  Block type: I PACK  Laterality: right  Injection technique: single shot  Needle  Needle type: Stimuplex   Needle gauge: 21 G  Needle length: 4 in  Needle localization: anatomical landmarks and ultrasound guidance   -ultrasound image captured on disc.  Assessment  Injection assessment: negative aspiration, negative parasthesia and local visualized surrounding nerve  Paresthesia pain: none  Heart rate change: no  Slow fractionated injection: yes  Medications:  Bolus administered: 20 mL of 0.25 ropivacaine  Epinephrine added: 3.75 mcg/mL (1/300,000)  Additional Notes  VSS.  [facility] RN monitoring vitals throughout procedure.  Patient tolerated procedure well.

## 2018-05-30 NOTE — TRANSFER OF CARE
"Anesthesia Transfer of Care Note    Patient: Stevie Villalba    Procedure(s) Performed: Procedure(s) (LRB):  REPLACEMENT-KNEE-TOTAL (Right)    Patient location: PACU    Anesthesia Type: spinal    Transport from OR: Transported from OR on room air with adequate spontaneous ventilation    Post pain: adequate analgesia    Post assessment: no apparent anesthetic complications    Post vital signs: stable    Level of consciousness: awake, alert and oriented    Nausea/Vomiting: no nausea/vomiting    Complications: none    Transfer of care protocol was followed      Last vitals:   Visit Vitals  BP (!) 99/54 (BP Location: Left arm, Patient Position: Lying)   Pulse 62   Temp 36.7 °C (98.1 °F) (Oral)   Resp 16   Ht 5' 10" (1.778 m)   Wt 120.2 kg (265 lb)   SpO2 97%   BMI 38.02 kg/m²     "

## 2018-05-30 NOTE — ANESTHESIA PROCEDURE NOTES
Spinal    Diagnosis: R knee arthropathy   Patient location during procedure: OR  Start time: 5/30/2018 11:24 AM  Timeout: 5/30/2018 11:24 AM  End time: 5/30/2018 11:30 AM  Staffing  Anesthesiologist: SANNA ADAN  Performed: anesthesiologist   Preanesthetic Checklist  Completed: patient identified, site marked, surgical consent, pre-op evaluation, timeout performed, IV checked, risks and benefits discussed and monitors and equipment checked  Spinal Block  Patient position: sitting  Prep: ChloraPrep  Patient monitoring: heart rate, cardiac monitor and continuous pulse ox  Approach: midline  Location: L3-4  Injection technique: single shot  CSF Fluid: clear free-flowing CSF  Needle  Needle type: Isela   Needle gauge: 25 G  Needle length: 4 in  Additional Documentation: incremental injection, negative aspiration for heme and no paresthesia on injection  Needle localization: anatomical landmarks  Assessment  Ease of block: easy  Patient's tolerance of the procedure: comfortable throughout block and no complaints  Medications:  Bolus administered: 2.3 mL of 2.0 mepivacaine

## 2018-05-30 NOTE — PLAN OF CARE
Problem: Physical Therapy Goal  Goal: Physical Therapy Goal  Goals to be met by: 18     Patient will increase functional independence with mobility by performin. Supine to sit with Supervision  2. Sit to supine with Supervision   3. Sit to stand transfer with Stand-by Assistance  4. Gait  x 150 feet with Stand-by Assistance using Rolling Walker.   5. Ascend/descend 5 stair with bilateral handrails with Contact Guard Assistance    6. Lower extremity exercise program x 30 reps per handout, with independence         Outcome: Ongoing (interventions implemented as appropriate)  PT eval complete and POC initiated.
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
## 2018-05-30 NOTE — OP NOTE
DATE OF PROCEDURE:  05/30/2018    SURGEON:  [facility]. [facility], Jr., M.D.    ASSISTANT:  ROSANNA Rutledge    OPERATION PERFORMED:  Right total knee arthroplasty & Aspiration and injection left knee.    PREOPERATIVE DIAGNOSIS:  Osteoarthritis, right knee.    POSTOPERATIVE DIAGNOSIS:  Osteoarthritis, right knee.    COMPONENTS USED:  Frank Persona knee system.  The tibia was a size F right   5-degree stemmed.  The femur was a size 7 right standard posterior stabilized.    The insert was a 12 mm articular insert, posterior stabilized, vitamin E, highly   cross-linked poly and the patella was a 35 mm patella.    No residents were available.  ROSANNA Rutledge, scrubbed on this case and was   necessary.    ESTIMATED BLOOD LOSS:  100 mL.    SPECIMEN:  Resected bone and tissue was sent to Pathology for routine   examination.    OPERATIVE TECHNIQUE:  The patient was placed supine on the operating table.    Spinal anesthesia had been introduced.  The right leg was prepped and draped in   sterile fashion.  The room was laminar airflow.  The patient was given   preoperative antibiotics and the OR team wore the sterile exhaust suits in order   to minimize risk for infection.  A foot pump was placed on the left foot to   help prevent DVT.  Right leg was exsanguinated and the tourniquet was inflated   to 300 pounds of pressure.  Straight anterior incision was made.  Sharp   dissection was carried down to the extensor mechanism.  The extensor mechanism   was opened in a straight Insall approach.  Partial release of the medial   collateral ligament was performed to correct some slight varus deformity.  The   intramedullary guide was placed in the femur.  Distal cut was made at 5 degrees   of valgus on the +2 setting.  There was a little bit of flexion contracture.    The proximal tibial cut was performed removing about 4 to 5 from the medial side   and about 6 to 7 from the lateral side.  The femur measured for a 7 and we cut   it  posteriorly for the 7.  The flexion-extension gaps were balanced.  I went   ahead and did the notch and chamfer-plasty on the femur, sized the tibia to the   F and drilled and prepared it with the same rotation as the femur.  The patella   was 24 mm, so we cut it at 16.  I then Pulsavac'd and dried the surfaces,   cemented the tibial baseplate, then the femoral component, removed the excess   cement, put the 12 mm trial in, put the knee out in extension and cemented the   patella, bathed the knee in the Betadine solution for 3 minutes, then Pulsavac'd   cleared.  When the cement was hard, I put the actual 12 mm insert in, then we   closed the extensor mechanism with #1 Vicryl over tranexamic acid and vancomycin   powder, closed the subcutaneous tissues with #0 and 3-0 Vicryl and the skin   with a running 3-0 Monocryl subcuticular stitch and skin adhesive.  Sterile   surgical dressing was applied.  There were no complications.   30 cc's drained from the left knee in sterile fashion. Cortisone and marcaine preparation injected.    LESLIE  dd: 05/30/2018 13:53:35 (CDT)  td: 05/30/2018 14:22:56 (CDT)  Doc ID   #[id number]  Job ID #[id number]    CC:

## 2018-05-30 NOTE — NURSING TRANSFER
Nursing Transfer Note      5/30/2018     Transfer To: [address]    Transfer via stretcher    Transfer with [facility]    Transported by pct    Medicines sent: yes    Chart send with patient: Yes    Notified: spouse

## 2018-05-30 NOTE — ANESTHESIA PROCEDURE NOTES
R Saphenous Cont    Patient location during procedure: pre-op   Block not for primary anesthetic.  Reason for block: at surgeon's request and post-op pain management   Post-op Pain Location: R knee  Start time: 5/30/2018 10:15 AM  Timeout: 5/30/2018 10:13 AM   End time: 5/30/2018 10:29 AM  Staffing  Anesthesiologist: ZENIA RODGERS  Performed: anesthesiologist   Preanesthetic Checklist  Completed: patient identified, site marked, surgical consent, pre-op evaluation, timeout performed, IV checked, risks and benefits discussed and monitors and equipment checked  Peripheral Block  Patient position: supine  Prep: ChloraPrep and site prepped and draped  Patient monitoring: heart rate, cardiac monitor, continuous pulse ox, continuous capnometry and frequent blood pressure checks  Block type: adductor canal  Laterality: right  Injection technique: continuous  Needle  Needle type: Tuohy   Needle gauge: 17 G  Needle length: 3.5 in  Needle localization: anatomical landmarks and ultrasound guidance  Catheter type: spring wound  Catheter size: 19 G  Test dose: lidocaine 1.5% with Epi 1-to-200,000 and negative   -ultrasound image captured on disc.  Assessment  Injection assessment: negative aspiration, negative parasthesia and local visualized surrounding nerve  Paresthesia pain: none  Heart rate change: no  Slow fractionated injection: yes  Medications:  Bolus administered: 20 mL of 0.25 ropivacaine  Epinephrine added: 3.75 mcg/mL (1/300,000)  Additional Notes  VSS.  [facility] RN monitoring vitals throughout procedure.  Patient tolerated procedure well.

## 2018-05-31 VITALS
SYSTOLIC BLOOD PRESSURE: 115 MMHG | HEART RATE: 74 BPM | DIASTOLIC BLOOD PRESSURE: 56 MMHG | WEIGHT: 265 LBS | HEIGHT: 70 IN | OXYGEN SATURATION: 98 % | RESPIRATION RATE: 16 BRPM | BODY MASS INDEX: 37.94 KG/M2 | TEMPERATURE: 96 F

## 2018-05-31 PROCEDURE — 97530 THERAPEUTIC ACTIVITIES: CPT

## 2018-05-31 PROCEDURE — 25000003 PHARM REV CODE 250: Performed by: ANESTHESIOLOGY

## 2018-05-31 PROCEDURE — 97535 SELF CARE MNGMENT TRAINING: CPT | Mod: 59

## 2018-05-31 PROCEDURE — 97116 GAIT TRAINING THERAPY: CPT

## 2018-05-31 PROCEDURE — 99231 SBSQ HOSP IP/OBS SF/LOW 25: CPT | Mod: ,,, | Performed by: ANESTHESIOLOGY

## 2018-05-31 PROCEDURE — 63600175 PHARM REV CODE 636 W HCPCS: Performed by: PHYSICIAN ASSISTANT

## 2018-05-31 PROCEDURE — 97110 THERAPEUTIC EXERCISES: CPT

## 2018-05-31 PROCEDURE — G0378 HOSPITAL OBSERVATION PER HR: HCPCS

## 2018-05-31 PROCEDURE — 25000003 PHARM REV CODE 250: Performed by: PHYSICIAN ASSISTANT

## 2018-05-31 RX ORDER — HYDROCHLOROTHIAZIDE 25 MG/1
25 TABLET ORAL DAILY
Qty: 90 TABLET | Refills: 3 | Status: SHIPPED | OUTPATIENT
Start: 2018-05-31 | End: 2019-06-10 | Stop reason: SDUPTHER

## 2018-05-31 RX ADMIN — FAMOTIDINE 20 MG: 20 TABLET ORAL at 08:05

## 2018-05-31 RX ADMIN — CEFAZOLIN 2 G: 330 INJECTION, POWDER, FOR SOLUTION INTRAMUSCULAR; INTRAVENOUS at 06:05

## 2018-05-31 RX ADMIN — ASPIRIN 81 MG: 81 TABLET, COATED ORAL at 08:05

## 2018-05-31 RX ADMIN — ACETAMINOPHEN 1000 MG: 500 TABLET, FILM COATED ORAL at 12:05

## 2018-05-31 RX ADMIN — STANDARDIZED SENNA CONCENTRATE AND DOCUSATE SODIUM 1 TABLET: 8.6; 5 TABLET, FILM COATED ORAL at 08:05

## 2018-05-31 RX ADMIN — OXYCODONE HYDROCHLORIDE 15 MG: 5 TABLET ORAL at 03:05

## 2018-05-31 RX ADMIN — ACETAMINOPHEN 1000 MG: 500 TABLET, FILM COATED ORAL at 06:05

## 2018-05-31 RX ADMIN — CELECOXIB 200 MG: 200 CAPSULE ORAL at 08:05

## 2018-05-31 RX ADMIN — OXYCODONE HYDROCHLORIDE 10 MG: 5 TABLET ORAL at 08:05

## 2018-05-31 NOTE — ANESTHESIA POSTPROCEDURE EVALUATION
"Anesthesia Post Evaluation    Patient: Stevie Villalba    Procedure(s) Performed: Procedure(s) (LRB):  REPLACEMENT-KNEE-TOTAL (Right)  ASPIRATION (Left)  INJECTION (Left)    Final Anesthesia Type: general  Patient location during evaluation: PACU  Patient participation: Yes- Able to Participate  Level of consciousness: awake and alert  Post-procedure vital signs: reviewed and stable  Pain management: adequate  Airway patency: patent  PONV status at discharge: No PONV  Anesthetic complications: no      Cardiovascular status: blood pressure returned to baseline and hemodynamically stable  Respiratory status: unassisted, spontaneous ventilation and room air  Hydration status: euvolemic  Follow-up not needed.        Visit Vitals  /65 (BP Location: Right arm, Patient Position: Lying)   Pulse 71   Temp 36.8 °C (98.2 °F) (Oral)   Resp 20   Ht 5' 10" (1.778 m)   Wt 120.2 kg (265 lb)   SpO2 97%   BMI 38.02 kg/m²       Pain/Lory Score: Pain Assessment Performed: Yes (5/30/2018  5:45 PM)  Presence of Pain: complains of pain/discomfort (5/30/2018  5:45 PM)  Pain Rating Prior to Med Admin: 8 (5/31/2018  3:59 AM)  Lory Score: 10 (5/30/2018  1:45 PM)      "

## 2018-05-31 NOTE — PLAN OF CARE
[facility]    HOME HEALTH ORDERS  FACE TO FACE ENCOUNTER    Patient Name: Stevie Villalba  YOB: 1955    PCP: Ric Brambila MD   PCP Address: [address] [facility] / [place] [address]  PCP Phone Number: [phone]  PCP Fax: [phone]    Encounter Date: 05/31/2018    Admit to Home Health    Diagnoses:  Active Hospital Problems    Diagnosis  POA    *Primary osteoarthritis of right knee [M17.11]  Yes    Hypertension [I10]  Yes     Chronic      Resolved Hospital Problems    Diagnosis Date Resolved POA   No resolved problems to display.       Future Appointments  Date Time Provider Department Center   6/14/2018 9:15 AM Sirena Blair NP [facility]           I have seen and examined this patient face to face today. My clinical findings that support the need for the home health skilled services and home bound status are the following:  Weakness/numbness causing balance and gait disturbance due to Joint Replacement making it taxing to leave home.    Allergies:Review of patient's allergies indicates:  No Known Allergies    Diet: regular diet    Activities: activity as tolerated    Nursing:   SN to complete comprehensive assessment including routine vital signs. Instruct on disease process and s/s of complications to report to MD. Follow specific home health arthoplasty protocol. Review/verify medication list sent home with the patient at time of discharge  and instruct patient/caregiver as needed. If coumadin ordered, coumadin clinic to manage INR with INR draws 2x per week with a goal to maintain INR between 1.8 and 2.2. Frequency may be adjusted depending on start of care date.    Notify MD if SBP > 160 or < 90; DBP > 90 or < 50; HR > 120 or < 50; Temp > 101    Home Medical Equipment:  Walker, 3-1 bedside commode, transfer tub bench    CONSULTS:    Physical Therapy to evaluate and treat. Evaluate for home safety and equipment needs; Establish/upgrade home exercise  program. Perform / instruct on therapeutic exercises, gait training, transfer training, and Range of Motion.    MISCELLANEOUS CARE:  Routine Skin for Bedridden Patients: Instruct patient/caregiver to apply moisture barrier cream to all skin folds and wet areas in perineal area daily and after baths and all bowel movements.    WOUND CARE ORDERS  Follow Home Health specific protocol.    Medications: Review discharge medications with patient and family and provide education.      Current Discharge Medication List      START taking these medications    Details   aspirin (ECOTRIN) 81 MG EC tablet Take 1 tablet (81 mg total) by mouth 2 (two) times daily.  Qty: 30 tablet, Refills: 0      docusate sodium (COLACE) 100 MG capsule Take 1 capsule (100 mg total) by mouth 2 (two) times daily as needed for Constipation.  Qty: 60 capsule, Refills: 0      ondansetron (ZOFRAN-ODT) 8 MG TbDL Take 1 tablet (8 mg total) by mouth every 12 (twelve) hours as needed (nausea).  Qty: 20 tablet, Refills: 0      oxyCODONE-acetaminophen (PERCOCET) 5-325 mg per tablet Take 1 tablet by mouth every 4 to 6 hours as needed for Pain.  Qty: 60 tablet, Refills: 0         CONTINUE these medications which have NOT CHANGED    Details   amLODIPine (NORVASC) 10 MG tablet Take 1 tablet (10 mg total) by mouth once daily.  Qty: 90 tablet, Refills: 3      atorvastatin (LIPITOR) 40 MG tablet Take 1 tablet (40 mg total) by mouth every evening.  Qty: 90 tablet, Refills: 3      hydroCHLOROthiazide (HYDRODIURIL) 25 MG tablet Take 1 tablet (25 mg total) by mouth once daily.  Qty: 30 tablet, Refills: 11      hydrocodone-acetaminophen 10-325mg (NORCO)  mg Tab Refills: 0      indomethacin (INDOCIN) 50 MG capsule TAKE 1 CAPSULE THREE TIMES A DAY AS NEEDED WITH FOOD  Refills: 0      lisinopril (PRINIVIL,ZESTRIL) 40 MG tablet Take 1 tablet (40 mg total) by mouth once daily.  Qty: 90 tablet, Refills: 3      montelukast (SINGULAIR) 10 mg tablet Take 1 tablet (10 mg  total) by mouth every evening.  Qty: 90 tablet, Refills: 3      albuterol 90 mcg/actuation inhaler Inhale 2 puffs into the lungs every 4 (four) hours as needed for Wheezing. Use with spacer  Qty: 1 Inhaler, Refills: 11      amitriptyline (ELAVIL) 10 MG tablet TAKE 1 TABLET (10 MG TOTAL) BY MOUTH NIGHTLY AS NEEDED FOR INSOMNIA.  Qty: 90 tablet, Refills: 3      butalbital-acetaminophen-caffeine -40 mg (FIORICET, ESGIC) -40 mg per tablet Refills: 0      carisoprodol (SOMA) 350 MG tablet Take 350 mg by mouth 4 (four) times daily as needed for Muscle spasms.      cetirizine (ZYRTEC) 10 MG tablet Take 1 tablet (10 mg total) by mouth every evening. Take nightly for allergies  Qty: 90 tablet, Refills: 3    Associated Diagnoses: Seasonal allergic rhinitis due to pollen      fish oil-omega-3 fatty acids 300-1,000 mg capsule Take by mouth once daily.      fluticasone (FLONASE) 50 mcg/actuation nasal spray PLACE 1 SPRAY INTO EACH NOSTRIL ONCE DAILY  Refills: 11      inhalation device (AEROCHAMBER PLUS FLOW-VU) Use with inhaler dispense with mouthpiece  Qty: 1 Device, Refills: 1    Associated Diagnoses: Wheezing      menthol/camphor (ICY HOT ADVANCED RELIEF TOP) Apply topically.      naproxen (NAPROSYN) 500 MG tablet Take 1 tablet (500 mg total) by mouth 2 (two) times daily.  Qty: 60 tablet, Refills: 1      sildenafil (VIAGRA) 100 MG tablet Take 1 tablet (100 mg total) by mouth daily as needed for Erectile Dysfunction.  Qty: 5 tablet, Refills: 11      TENS UNITS MISC by Misc.(Non-Drug; Combo Route) route.             I certify that this patient is confined to his home and needs intermittent skilled nursing care and physical therapy.

## 2018-05-31 NOTE — PT/OT/SLP PROGRESS
Physical Therapy Treatment and Discharge    Patient Name:  Stevie Villalba   MRN:  [id number]    Recommendations:     Discharge Recommendations:  home with home health   Discharge Equipment Recommendations: bedside commode, walker, rolling   Barriers to discharge: None    Assessment:     Stevie Villalba is a 63 y.o. male admitted with a medical diagnosis of Primary osteoarthritis of right knee.  Pt tolerated treatment well.  Pt is able to perform all functional mobility without incident and is safe to discharge home from a mobility standpoint.  Pt will continue to benefit from skilled PT services to address the below deficits and to increase functional independence.      Rehab Prognosis:  good; patient would benefit from acute skilled PT services to address these deficits and reach maximum level of function.      Recent Surgery: Procedure(s) (LRB):  REPLACEMENT-KNEE-TOTAL (Right)  ASPIRATION (Left)  INJECTION (Left) 1 Day Post-Op    Plan:     · D/C acute PT   Plan of Care Reviewed with: patient    Subjective     Communicated with RN prior to session.  Patient found seated in chair upon PT entry to room, agreeable to treatment.      Chief Complaint: none; ready to go home    Pain/Comfort:  · Pain Rating 1: 0/10  · Pain Rating Post-Intervention 1: 0/10    Patients cultural, spiritual, Yarsanism conflicts given the current situation: none noted    Objective:     Patient found with: perineural catheter     General Precautions: Standard, fall   Orthopedic Precautions:RLE weight bearing as tolerated   Braces: N/A     Functional Mobility:    Bed Mobility:    · Supine to Sit: (S)  · Sit to Supine: (S)    Transfers:  · Sit to Stand: (S) with RW    Gait:  Pt ambulated 160 feet with RW and SBA.  No LOB or SOB noted.  Good gait quality noted.  Able to perform reciprocal gait pattern.    Stairs:  Pt ascended/descended 4 stair(s) with No Assistive Device with right handrail with Stand-by Assistance.     AM-PAC 6 CLICK  MOBILITY  Turning over in bed (including adjusting bedclothes, sheets and blankets)?: 4  Sitting down on and standing up from a chair with arms (e.g., wheelchair, bedside commode, etc.): 3  Moving from lying on back to sitting on the side of the bed?: 4  Moving to and from a bed to a chair (including a wheelchair)?: 3  Need to walk in hospital room?: 3  Climbing 3-5 steps with a railing?: 3  Total Score: 20     Therapeutic Activities and Exercises:    Supine therex per handout (Knee protocol), 30x each  · Ankle pumps  · Quad sets  · Glute squeezes  · SAQ  · Heel slides  · Supine hip abduction  · SLR  · LAQ      Pt educated on:  · Car transfers  · DME Management   · Common signs and symptoms associated with TKA  · Importance of protecting surgical incision during functional tasks to reduce the risk of bleeding/infection  · HEP      Knee flexion: 108 degrees   Knee extension: -2 degrees     Patient left up in chair with all lines intact, call button in reach and RN notified..    GOALS:    Physical Therapy Goals        Problem: Physical Therapy Goal    Goal Priority Disciplines Outcome Goal Variances Interventions   Physical Therapy Goal     PT/OT, PT Ongoing (interventions implemented as appropriate)     Description:  Goals to be met by: 18     Patient will increase functional independence with mobility by performin. Supine to sit with Supervision  2. Sit to supine with Supervision   3. Sit to stand transfer with Stand-by Assistance  4. Gait  x 150 feet with Stand-by Assistance using Rolling Walker.   5. Ascend/descend 5 stair with bilateral handrails with Contact Guard Assistance    6. Lower extremity exercise program x 30 reps per handout, with independence                           Time Tracking:     PT Received On: 18  PT Start Time: 1000     PT Stop Time: 1042  PT Total Time (min): 42 min     Billable Minutes: Gait Training 10, Therapeutic Activity 10 and Therapeutic Exercise 22    Faustino Cote  PT, DPT  5/31/2018   [phone]

## 2018-05-31 NOTE — PLAN OF CARE
CM ordered a rolling walker and bedside commode from [facility] with [facility]. DME for bedside delivery.    Joanne Enriquez RN, CM  [facility]  [phone]

## 2018-05-31 NOTE — NURSING
Pt received dc instructions and verbalized understanding of instructions, pt received paper prescriptions, iv removed no signs of irritation, pt stable, Q ball in place, polar ice in place, pt has equipment, leaving via wheelchair with wife

## 2018-05-31 NOTE — DISCHARGE SUMMARY
[facility]  Orthopedics  Discharge Summary      Patient Name: Stevie Villalba  MRN: [id number]  Admission Date: 5/30/2018  Hospital Length of Stay: 1 days  Discharge Date and Time:  05/31/2018 3:22 PM  Attending Physician: J. [facility], MD  Discharging Provider: Lucie Lozada MD  Primary Care Provider: Ric Brambila MD    HPI:   Stevie Villalba is a 63 y.o. male with bilateral knee osteoarthritis.    Procedure(s) (LRB):  REPLACEMENT-KNEE-TOTAL (Right)  ASPIRATION (Left)  INJECTION (Left)      Hospital Course:  Patient presented for above procedure.  Tolerated it well and was discharged home POD1 after voiding, tolerating diet, ambulating, pain controlled.  Discharge instructions, follow-up appointment, and med rec are below.      Consults         Status Ordering Provider     Inpatient consult to Pain Management  Once     Provider:  (Not yet assigned)    Acknowledged VIRA DAVIS     Inpatient consult to Respiratory Care  Once     Provider:  (Not yet assigned)    Acknowledged VIRA DAVIS     Inpatient consult to Social Work  Once     Provider:  (Not yet assigned)    Acknowledged VIRA DAVIS          Significant Diagnostic Studies: No pertinent studies.    Pending Diagnostic Studies:     None        Final Active Diagnoses:    Diagnosis Date Noted POA    PRINCIPAL PROBLEM:  Primary osteoarthritis of right knee [M17.11] 05/30/2018 Yes    Hypertension [I10] 09/08/2014 Yes     Chronic      Problems Resolved During this Admission:    Diagnosis Date Noted Date Resolved POA      Discharged Condition: good    Disposition: Home-Health Care [facility]    Follow Up:  Follow-up Information     Sirena Blair NP On 6/14/2018.    Specialty:  Orthopedic Surgery  Why:  For wound re-check  Contact information:  [address]  [facility] [address]  [phone]                 Patient Instructions:     3 IN 1 COMMODE FOR HOME USE   Order Specific Question Answer Comments   Type: Standard   "  Height: 5' 11" (1.803 m)    Weight: 121.1 kg (266 lb 13.9 oz)    Does patient have medical equipment at home? none    Length of need (1-99 months): 99    Vendor: Other (use comments)    Expected Date of Delivery: 5/31/2018      TRANSFER TUB BENCH FOR HOME USE   Order Specific Question Answer Comments   Type of Transfer Tub Bench: Unpadded    Height: 5' 11" (1.803 m)    Weight: 121.1 kg (266 lb 13.9 oz)    Does patient have medical equipment at home? none    Length of need (1-99 months): 99    Vendor: Other (use comments)    Expected Date of Delivery: 5/31/2018      WALKER FOR HOME USE   Order Specific Question Answer Comments   Type of Walker: Adult (5'4"-6'6")    With wheels? No    Height: 5' 11" (1.803 m)    Weight: 121.1 kg (266 lb 13.9 oz)    Length of need (1-99 months): 99    Does patient have medical equipment at home? none    Please check all that apply: Walker will be used for gait training.    Vendor: Other (use comments)    Expected Date of Delivery: 5/31/2018      WALKER FOR HOME USE   Order Specific Question Answer Comments   Type of Walker: Adult (5'4"-6'6")    With wheels? Yes    Height: 5' 10" (1.778 m)    Weight: 120.2 kg (265 lb)    Length of need (1-99 months): 99    Does patient have medical equipment at home? none    Please check all that apply: Patient is unable to safely ambulate without equipment.    Please check all that apply: Walker will be used for gait training.    Vendor: [facility]    Expected Date of Delivery: 5/31/2018      COMMODE FOR HOME USE   Order Specific Question Answer Comments   Type: Standard    Height: 5' 10" (1.778 m)    Weight: 120.2 kg (265 lb)    Does patient have medical equipment at home? none    Length of need (1-99 months): 99    Vendor: [facility]    Expected Date of Delivery: 5/31/2018      Activity as tolerated     Call MD for:  difficulty breathing, headache or visual disturbances     Call MD for:  extreme fatigue     Call MD for:  hives     Call MD for:  " persistent dizziness or light-headedness     Call MD for:  persistent nausea and vomiting     Call MD for:  redness, tenderness, or signs of infection (pain, swelling, redness, odor or green/yellow discharge around incision site)     Call MD for:  severe uncontrolled pain     Call MD for:  temperature >100.4     Keep surgical extremity elevated     Leave dressing on - Keep it clean, dry, and intact until clinic visit   Order Comments: Do not remove surgical dressing for 2 weeks post-op. This will be done only by MD at initial post-op visit. If dressing is completely saturated, replace with identical dressing - silver-impregnated hydrocolloid dressing.     Do not get dressings wet. Do not shower.     If dressing continues to be saturated or there are signs of infection, please call [facility] [phone] for further instructions and to make appt to be seen.     Lifting restrictions   Order Comments: No strenuous exercise or lifting of > 10 lbs     No driving, operating heavy equipment or signing legal documents while taking pain medication     Sponge bath only until clinic visit     Weight bearing as tolerated       Medications:  Reconciled Home Medications:      Medication List      START taking these medications    aspirin 81 MG EC tablet  Commonly known as:  ECOTRIN  Take 1 tablet (81 mg total) by mouth 2 (two) times daily.     docusate sodium 100 MG capsule  Commonly known as:  COLACE  Take 1 capsule (100 mg total) by mouth 2 (two) times daily as needed for Constipation.     ondansetron 8 MG Tbdl  Commonly known as:  ZOFRAN-ODT  Take 1 tablet (8 mg total) by mouth every 12 (twelve) hours as needed (nausea).     oxyCODONE-acetaminophen 5-325 mg per tablet  Commonly known as:  PERCOCET  Take 1 tablet by mouth every 4 to 6 hours as needed for Pain.        CONTINUE taking these medications    albuterol 90 mcg/actuation inhaler  Inhale 2 puffs into the lungs every 4 (four) hours as needed for Wheezing. Use with  spacer     amitriptyline 10 MG tablet  Commonly known as:  ELAVIL  TAKE 1 TABLET (10 MG TOTAL) BY MOUTH NIGHTLY AS NEEDED FOR INSOMNIA.     amLODIPine 10 MG tablet  Commonly known as:  NORVASC  Take 1 tablet (10 mg total) by mouth once daily.     atorvastatin 40 MG tablet  Commonly known as:  LIPITOR  Take 1 tablet (40 mg total) by mouth every evening.     butalbital-acetaminophen-caffeine -40 mg -40 mg per tablet  Commonly known as:  FIORICET, ESGIC     carisoprodol 350 MG tablet  Commonly known as:  SOMA  Take 350 mg by mouth 4 (four) times daily as needed for Muscle spasms.     cetirizine 10 MG tablet  Commonly known as:  ZYRTEC  Take 1 tablet (10 mg total) by mouth every evening. Take nightly for allergies     fish oil-omega-3 fatty acids 300-1,000 mg capsule  Take by mouth once daily.     fluticasone 50 mcg/actuation nasal spray  Commonly known as:  FLONASE  PLACE 1 SPRAY INTO EACH NOSTRIL ONCE DAILY     ICY HOT ADVANCED RELIEF TOP  Apply topically.     inhalation spacing device  Use with inhaler dispense with mouthpiece     lisinopril 40 MG tablet  Commonly known as:  PRINIVIL,ZESTRIL  Take 1 tablet (40 mg total) by mouth once daily.     montelukast 10 mg tablet  Commonly known as:  SINGULAIR  Take 1 tablet (10 mg total) by mouth every evening.     sildenafil 100 MG tablet  Commonly known as:  VIAGRA  Take 1 tablet (100 mg total) by mouth daily as needed for Erectile Dysfunction.     TENS UNITS MISC  by Misc.(Non-Drug; Combo Route) route.        STOP taking these medications    HYDROcodone-acetaminophen  mg per tablet  Commonly known as:  NORCO     indomethacin 50 MG capsule  Commonly known as:  INDOCIN     naproxen 500 MG tablet  Commonly known as:  NAPROSRUDY Lozada MD  Orthopedics  [facility]

## 2018-05-31 NOTE — SUBJECTIVE & OBJECTIVE
"Principal Problem:Primary osteoarthritis of right knee    Principal Orthopedic Problem: same    Interval History: AFVSS. Pain controlled overnight. Worked with PT in recovery. Tolerating PO.    Review of patient's allergies indicates:  No Known Allergies    Current Facility-Administered Medications   Medication    0.9%  NaCl infusion    acetaminophen tablet 1,000 mg    amitriptyline tablet 10 mg    amLODIPine tablet 10 mg    aspirin EC tablet 81 mg    atorvastatin tablet 40 mg    bisacodyl suppository 10 mg    ceFAZolin injection 2 g    celecoxib capsule 200 mg    famotidine tablet 20 mg    hydroCHLOROthiazide tablet 25 mg    lisinopril tablet 40 mg    morphine injection 2 mg    morphine injection 2 mg    morphine injection 2 mg    naloxone 0.4 mg/mL injection 0.02 mg    oxyCODONE immediate release tablet 10 mg    oxyCODONE immediate release tablet 15 mg    oxyCODONE immediate release tablet 5 mg    polyethylene glycol packet 17 g    pregabalin capsule 150 mg    ropivacaine (PF) 2 mg/ml (0.2%) infusion    ropivacaine 0.2% ON-Q C-BLOC 400 ML (SELECT A FLOW)    senna-docusate 8.6-50 mg per tablet 1 tablet    sodium chloride 0.9% flush 3 mL     Objective:     Vital Signs (Most Recent):  Temp: 97.1 °F (36.2 °C) (05/31/18 0735)  Pulse: 73 (05/31/18 0735)  Resp: 17 (05/31/18 0735)  BP: 113/61 (05/31/18 0735)  SpO2: 97 % (05/31/18 0735) Vital Signs (24h Range):  Temp:  [96.8 °F (36 °C)-98.2 °F (36.8 °C)] 97.1 °F (36.2 °C)  Pulse:  [53-84] 73  Resp:  [16-22] 17  SpO2:  [90 %-99 %] 97 %  BP: ()/(54-75) 113/61     Weight: 120.2 kg (265 lb)  Height: 5' 10" (177.8 cm)  Body mass index is 38.02 kg/m².      Intake/Output Summary (Last 24 hours) at 05/31/18 0737  Last data filed at 05/30/18 1800   Gross per 24 hour   Intake             2480 ml   Output                0 ml   Net             2480 ml       Ortho/SPM Exam  HEENT: normocephalic, atraumatic  Resp: no increased work of breathing  CV: " regular rate and rhythm  MSK: moves b/l upper extremities well    right lower extremity:  Aquacel c/d/i  No ecchymoses, erythema, or swelling  Sensation intact SP/DP/T/Sural/Saphenous nerves  Motor intact EHL/FHL/TA/gastroc  Palpable DP/PT pulses      Significant Labs: All pertinent labs within the past 24 hours have been reviewed.    Significant Imaging: I have reviewed all pertinent imaging results/findings.

## 2018-05-31 NOTE — PLAN OF CARE
POD 1 s/p right TKA. PT/OT ordered to eval and treat. PT/OT recommended HH and DME. Patient's spouse is present at the bedside. Patient has good family support. CM completed discharge assessment and planning with patient and family. Patient and family verbalized understanding. All questions and concerns addressed. SW and CM will continue to follow for any additional needs. Plan A to discharge home with home health as soon as medically stable. Plan B to discharge home with family support and plans for outpatient rehab.     Patient requested [facility]. CM updated SW.    PCP: Ric Brambila MD    Pharmacy:   [id number] - [place], [place] - [address]  [address]  [place] [address]  Phone: [phone] Fax: [phone]    Payor: AETNA / Plan: [employer] CHOICE POS / Product Type: Commercial /      05/31/18 0800   Discharge Assessment   Assessment Type Discharge Planning Assessment   Confirmed/corrected address and phone number on facesheet? Yes   Assessment information obtained from? Patient;Caregiver;Medical Record   Expected Length of Stay (days) 2   Communicated expected length of stay with patient/caregiver yes   Prior to hospitilization cognitive status: Alert/Oriented   Prior to hospitalization functional status: Independent   Current cognitive status: Alert/Oriented   Current Functional Status: Assistive Equipment   Lives With spouse   Able to Return to Prior Arrangements yes   Is patient able to care for self after discharge? Yes   Who are your caregiver(s) and their phone number(s)? spouse- Wilda Villalba [phone], [phone]   Patient's perception of discharge disposition home health   Readmission Within The Last 30 Days no previous admission in last 30 days   Patient currently being followed by outpatient case management? No   Patient currently receives any other outside agency services? No   Equipment Currently Used at Home none   Do you have any problems affording any of your  prescribed medications? No   Is the patient taking medications as prescribed? yes   Does the patient have transportation home? Yes   Transportation Available family or friend will provide   Does the patient receive services at the [facility]? No   Discharge Plan A Home Health;Home with family   Discharge Plan B Home with family;Other  (outpatient rehab)   Patient/Family In Agreement With Plan yes

## 2018-05-31 NOTE — ASSESSMENT & PLAN NOTE
Stevie Villalba is a 63 y.o. male POD1 s/p R TKA  - Antibiotics: post-op Ancef  - Weight bearing status: wbat  - Labs: reviewed  - DVT Prophylaxis: mechanical, ASA 81 mg BID  - Lines/Drains: PIV, PNC  - Pain control: multimodal per anesthesia    Dispo: home with

## 2018-05-31 NOTE — PT/OT/SLP PROGRESS
Occupational Therapy   Treatment    Name: Stevie Villalba  MRN: [id number]  Admitting Diagnosis:  Primary osteoarthritis of right knee  1 Day Post-Op    Recommendations:     Discharge Recommendations: home health OT  Discharge Equipment Recommendations:  bedside commode, walker, rolling  Barriers to discharge:  None    Subjective     Communicated with: RN prior to session.  Pain/Comfort:  · Pain Rating 1: 0/10    Patients cultural, spiritual, Gnosticism conflicts given the current situation: None    Objective:     Patient found with: perineural catheter    General Precautions: Standard, fall   Orthopedic Precautions:RLE weight bearing as tolerated   Braces: N/A     Occupational Performance:    Bed Mobility:    · Patient completed Supine to Sit with stand by assistance     Functional Mobility/Transfers:  · Patient completed Sit <> Stand Transfer with stand by assistance  with  rolling walker   · Patient completed Bed <> Chair Transfer using Stand Pivot technique with stand by assistance with rolling walker  · Patient completed Toilet Transfer Stand Pivot technique with stand by assistance with  rolling walker  · Functional Mobility: Pt was able to walk to bathroom c SBA and RW.    Activities of Daily Living:  · UB Dressing: modified independence to don shirt.  · LB Dressing: modified independence to don shoes.  Pt donned shorts prior to OT arrival and states that he had no difficulty.    Patient left up in chair with all lines intact, call button in reach and RN notified    [facility]:  AMPA Total Score: 24    Treatment & Education:  Educated pt on bathing and car T/F's.  Education:    Assessment:     Stevie Villalba is a 63 y.o. male with a medical diagnosis of Primary osteoarthritis of right knee.  He was able to perform supine/sit, sit/stand, bed/chair, and BSC T/F c SBA and RW.  Able to perform UB/LB dressing c mod I.  Pt is progressing well.  Performance deficits affecting function are impaired self care  skills, impaired functional mobilty, orthopedic precautions.      Rehab Prognosis:  Good; patient would benefit from acute skilled OT services to address these deficits and reach maximum level of function.       Plan:     Patient to be seen daily to address the above listed problems via self-care/home management, therapeutic activities, therapeutic exercises  · Plan of Care Expires:    · Plan of Care Reviewed with: patient    This Plan of care has been discussed with the patient who was involved in its development and understands and is in agreement with the identified goals and treatment plan    GOALS:    Occupational Therapy Goals        Problem: Occupational Therapy Goal    Goal Priority Disciplines Outcome Interventions   Occupational Therapy Goal     OT, PT/OT Ongoing (interventions implemented as appropriate)    Description:  Goals to be met by: 7 days    Patient will increase functional independence with ADLs by performing:    UE Dressing with Supervision.  LE Dressing with Supervision with AD as needed.  Grooming while standing with Supervision.  Toileting from bedside commode with Supervision for hygiene and clothing management.   Stand pivot transfers with Supervision.  Toilet transfer to bedside commode with Supervision.                         Time Tracking:     OT Date of Treatment: 05/31/18  OT Start Time: 0854  OT Stop Time: 0917  OT Total Time (min): 23 min    Billable Minutes:Self Care/Home Management 23    VAUGHN Aaron  5/31/2018

## 2018-05-31 NOTE — PROGRESS NOTES
[facility]  Orthopedics  Progress Note    Patient Name: Stevie Villalba  MRN: [id number]  Admission Date: 5/30/2018  Hospital Length of Stay: 1 days  Attending Provider: John L. [facility] Jr., MD  Primary Care Provider: Ric Brambila MD  Follow-up For: Procedure(s) (LRB):  REPLACEMENT-KNEE-TOTAL (Right)  ASPIRATION (Left)  INJECTION (Left)    Post-Operative Day: 1 Day Post-Op  Subjective:     Principal Problem:Primary osteoarthritis of right knee    Principal Orthopedic Problem: same    Interval History: AFVSS. Pain controlled overnight. Worked with PT in recovery. Tolerating PO.    Review of patient's allergies indicates:  No Known Allergies    Current Facility-Administered Medications   Medication    0.9%  NaCl infusion    acetaminophen tablet 1,000 mg    amitriptyline tablet 10 mg    amLODIPine tablet 10 mg    aspirin EC tablet 81 mg    atorvastatin tablet 40 mg    bisacodyl suppository 10 mg    ceFAZolin injection 2 g    celecoxib capsule 200 mg    famotidine tablet 20 mg    hydroCHLOROthiazide tablet 25 mg    lisinopril tablet 40 mg    morphine injection 2 mg    morphine injection 2 mg    morphine injection 2 mg    naloxone 0.4 mg/mL injection 0.02 mg    oxyCODONE immediate release tablet 10 mg    oxyCODONE immediate release tablet 15 mg    oxyCODONE immediate release tablet 5 mg    polyethylene glycol packet 17 g    pregabalin capsule 150 mg    ropivacaine (PF) 2 mg/ml (0.2%) infusion    ropivacaine 0.2% ON-Q C-BLOC 400 ML (SELECT A FLOW)    senna-docusate 8.6-50 mg per tablet 1 tablet    sodium chloride 0.9% flush 3 mL     Objective:     Vital Signs (Most Recent):  Temp: 97.1 °F (36.2 °C) (05/31/18 0735)  Pulse: 73 (05/31/18 0735)  Resp: 17 (05/31/18 0735)  BP: 113/61 (05/31/18 0735)  SpO2: 97 % (05/31/18 0735) Vital Signs (24h Range):  Temp:  [96.8 °F (36 °C)-98.2 °F (36.8 °C)] 97.1 °F (36.2 °C)  Pulse:  [53-84] 73  Resp:  [16-22] 17  SpO2:  [90 %-99 %] 97 %  BP:  "(131)/(54-75) 113/61     Weight: 120.2 kg (265 lb)  Height: 5' 10" (177.8 cm)  Body mass index is 38.02 kg/m².      Intake/Output Summary (Last 24 hours) at 05/31/18 0737  Last data filed at 05/30/18 1800   Gross per 24 hour   Intake             2480 ml   Output                0 ml   Net             2480 ml       Ortho/SPM Exam  HEENT: normocephalic, atraumatic  Resp: no increased work of breathing  CV: regular rate and rhythm  MSK: moves b/l upper extremities well    right lower extremity:  Aquacel c/d/i  No ecchymoses, erythema, or swelling  Sensation intact SP/DP/T/Sural/Saphenous nerves  Motor intact EHL/FHL/TA/gastroc  Palpable DP/PT pulses      Significant Labs: All pertinent labs within the past 24 hours have been reviewed.    Significant Imaging: I have reviewed all pertinent imaging results/findings.    Assessment/Plan:     * Primary osteoarthritis of right knee    Stevie Villalba is a 63 y.o. male POD1 s/p R TKA  - Antibiotics: post-op Ancef  - Weight bearing status: wbat  - Labs: reviewed  - DVT Prophylaxis: mechanical, ASA 81 mg BID  - Lines/Drains: PIV, PNC  - Pain control: multimodal per anesthesia    Dispo: home with         Hypertension    - Home meds              Lucie Lozada MD  Orthopedics  [facility]  "

## 2018-05-31 NOTE — PLAN OF CARE
POD 1 s/p right TKA. PT/OT recommended HH and DME. Plans to discharge patient home today with DME and HH. Patient's spouse is present at the bedside. Patient and family verbalized understanding. All questions and concerns addressed. SW and CM will continue to follow for any additional needs. Discharge and follow-up instructions to be completed by the bedside nurse.    Future Appointments  Date Time Provider Department Center   6/14/2018 9:15 AM Sirena Blair NP [facility] ORTHO [place] [facility]      05/31/18 0900   Final Note   Assessment Type Final Discharge Note   Discharge Disposition Home-Health   What phone number can be called within the next 1-3 days to see how you are doing after discharge? ([phone])   Hospital Follow Up  Appt(s) scheduled? Yes   Discharge plans and expectations educations in teach back method with documentation complete? Yes

## 2018-05-31 NOTE — ADDENDUM NOTE
Addendum  created 05/31/18 1054 by Jose Hoyos MD    Charge Capture section accepted, Cosign clinical note with attestation

## 2018-05-31 NOTE — PLAN OF CARE
8:44 AM  SW received home health orders. Pt's preference is [facility] . Faxed orders to [facility]. Will f/u as needed.   9:57 AM  SW received notification from [facility] that they cannot accept pt as they do not accept insurance. Faxed orders to [facility]. Will f/u as needed.      Karina Melgoza LMSW   [facility]  Ext [phone]

## 2018-05-31 NOTE — PROGRESS NOTES
Pt and family present, consent to converting adductor PNC to On Q.  Site CDI.  Educated regarding continued pain management, fall risk, signs of complications, continued monitoring, as well as discontinuing catheter on 6/2.  Two numbers obtained for APS to follow up.  Understanding verbalized.

## 2018-05-31 NOTE — ANESTHESIA POST-OP PAIN MANAGEMENT
Acute Pain Service and Perioperative Surgical Home Progress Note    HPI  Stevie Villalba is a 63 y.o., male,     Interval history  Doing very well this AM. Ate well yesterday. Denies pain today; states had some pain yesterday afternoon but has since felt better. Required 3 doses of oxy 15s (got one this AM in preparation for PT) and 2 doses of IV morphine yesterday.  Excited to work with PT today and hopes to then go home    Surgery:  Procedure(s) (LRB):  REPLACEMENT-KNEE-TOTAL (Right)  ASPIRATION (Left)  INJECTION (Left)    Post Op Day #: 1    Catheter type: Perineural Adductor Canal    Infusion type: Ropivacaine 0.2%  8 ml/hr basal    Problem List:    Active Hospital Problems    Diagnosis  POA    *Primary osteoarthritis of right knee [M17.11]  Yes    Hypertension [I10]  Yes     Chronic      Resolved Hospital Problems    Diagnosis Date Resolved POA   No resolved problems to display.       Subjective:       General appearance of alert, oriented, no complaints   Pain with rest: 3    Numbers   Pain with movement: 6    Numbers   Side Effects    1. Pruritis No    2. Nausea No    3. Motor Blockade No, 0=Ability to raise lower extremities off bed    4. Sedation No, 1=awake and alert    Schedule Medications:    acetaminophen  1,000 mg Oral Q6H    amLODIPine  10 mg Oral Daily    aspirin  81 mg Oral BID    atorvastatin  40 mg Oral QHS    ceFAZolin (ANCEF) IVPB  2 g Intravenous Q6H    celecoxib  200 mg Oral Daily    famotidine  20 mg Oral BID    hydroCHLOROthiazide  25 mg Oral Daily    lisinopril  40 mg Oral Daily    polyethylene glycol  17 g Oral Daily    pregabalin  150 mg Oral QHS    senna-docusate 8.6-50 mg  1 tablet Oral BID        Continuous Infusions:   sodium chloride 0.9% 150 mL/hr at 05/30/18 1356    ropivacaine (PF) 2 mg/ml (0.2%) 8 mL/hr (05/30/18 8300)    ropivacaine          PRN Medications:  amitriptyline, bisacodyl, morphine, morphine, morphine, naloxone, oxyCODONE, oxyCODONE, oxyCODONE,  ropivacaine, sodium chloride 0.9%       Antibiotics:  Antibiotics     Start     Stop Route Frequency Ordered    05/30/18 1930  ceFAZolin injection 2 g      05/31 1759 IV Every 6 hours (non-standard times) 05/30/18 1334             Objective:     Catheter site clean, dry, intact          Vital Signs (Most Recent):  Temp: 36.2 °C (97.1 °F) (05/31/18 0735)  Pulse: 73 (05/31/18 0735)  Resp: 17 (05/31/18 0735)  BP: 113/61 (05/31/18 0735)  SpO2: 97 % (05/31/18 0735) Vital Signs Range (Last 24H):  Temp:  [36 °C (96.8 °F)-36.8 °C (98.2 °F)]   Pulse:  [53-84]   Resp:  [16-22]   BP: ()/(54-75)   SpO2:  [90 %-99 %]          I & O (Last 24H):  Intake/Output Summary (Last 24 hours) at 05/31/18 0743  Last data filed at 05/30/18 1800   Gross per 24 hour   Intake             2480 ml   Output                0 ml   Net             2480 ml       Physical Exam:    GA: Alert, comfortable, no acute distress.   Pulmonary: Clear to auscultation A/P/L. No wheezing, crackles, or rhonchi.  Cardiac: RRR S1 & S2 w/o rubs/murmurs/gallops.   Abdominal:Bowel sounds present. No tenderness to palpation or distension. No appreciable hepatosplenomegaly.   Skin: No jaundice, rashes, or visible lesions.         Laboratory:  CBC: No results for input(s): WBC, RBC, HGB, HCT, PLT, MCV, MCH, MCHC in the last 72 hours.    BMP: Recent Labs      05/30/18   1359   NA  137   K  4.8   CO2  21*   CL  107   BUN  25*   CREATININE  0.8   GLU  106   CALCIUM  8.6*       No results for input(s): PT, INR, PROTIME, APTT in the last 72 hours.      Anticoagulant dose: ASA 81 BID    Assessment:         Pain control adequate    Plan:     1) Pain:    Adductor canal perineural catheter in place and infusing. Good level. Multimodal pain regimen with acetaminophen, Celebrex, Lyrica, and prn oxycodone given  Will continue to monitor. Plan to D/C home with On-Q if patient does well with PT and is discharged today.   2) Home meds reordered; AFVSS   3) Post op labs stable    4)  FEN/GI: Tolerating liquids, advance diet as tolerated. - flatus. - BM.   5) Dispo: Pt working well with PT/OT. Case management and SW for placement. Plan for D/C  possibly today if patient  works well with PT and meets goals.          Evaluator Zina Bhatti MD

## 2018-06-01 NOTE — PROGRESS NOTES
Called and spoke with Mr. Villalba, who stated that his pain was well controlled and that he was having no issues with the catheter. All questions and concerns addressed. Will contact tomorrow to ensure safe catheter removal.     DEVON Eduardo  APS     no vomiting/no blood in stool

## 2018-06-02 NOTE — PROGRESS NOTES
Contacted Stevie Villalba at home regarding their OnQ ball. Patient reports tolerable pain level, well controlled w/ supplemental PO meds. Discussed removal of OnQ ball and reinforced instructions regarding removal. Patient plans to remove catheter w/ PT later this afternoon. All questions answered, all concerns addressed during telephone conversation.      Jacob Toussaint MD  CA-1  [phone]

## 2018-06-04 ENCOUNTER — TELEPHONE (OUTPATIENT)
Dept: ORTHOPEDICS | Facility: CLINIC | Age: 63
End: 2018-06-04

## 2018-06-04 NOTE — TELEPHONE ENCOUNTER
----- Message from Triny Gates sent at 6/4/2018  1:55 PM CDT -----  Contact: Ange olivas/[employer]   Ange is calling in regards to finding out when pt's surgery is scheduled for.    Ange would like a call back at [phone].    Thank you    I left a message that Ange had called asking about when was the surgery=May 30th  A right total knee was done    and his left knee was injected  He was discharged May 31  To call me back if need be

## 2018-06-14 ENCOUNTER — OFFICE VISIT (OUTPATIENT)
Dept: ORTHOPEDICS | Facility: CLINIC | Age: 63
End: 2018-06-14
Payer: COMMERCIAL

## 2018-06-14 ENCOUNTER — TELEPHONE (OUTPATIENT)
Dept: ORTHOPEDICS | Facility: CLINIC | Age: 63
End: 2018-06-14

## 2018-06-14 VITALS
DIASTOLIC BLOOD PRESSURE: 82 MMHG | WEIGHT: 260.25 LBS | HEIGHT: 70 IN | BODY MASS INDEX: 37.26 KG/M2 | SYSTOLIC BLOOD PRESSURE: 138 MMHG | HEART RATE: 85 BPM | TEMPERATURE: 98 F

## 2018-06-14 DIAGNOSIS — Z96.651 STATUS POST RIGHT KNEE REPLACEMENT: ICD-10-CM

## 2018-06-14 DIAGNOSIS — G89.18 POST-OP PAIN: Primary | ICD-10-CM

## 2018-06-14 PROCEDURE — 99024 POSTOP FOLLOW-UP VISIT: CPT | Mod: S$GLB,,, | Performed by: NURSE PRACTITIONER

## 2018-06-14 PROCEDURE — 99999 PR PBB SHADOW E&M-EST. PATIENT-LVL V: CPT | Mod: PBBFAC,,, | Performed by: NURSE PRACTITIONER

## 2018-06-14 RX ORDER — OXYCODONE AND ACETAMINOPHEN 5; 325 MG/1; MG/1
1 TABLET ORAL
Qty: 60 TABLET | Refills: 0 | Status: SHIPPED | OUTPATIENT
Start: 2018-06-14 | End: 2018-08-23

## 2018-06-14 RX ORDER — INDOMETHACIN 50 MG/1
CAPSULE ORAL
Refills: 3 | Status: ON HOLD | COMMUNITY
Start: 2018-06-06 | End: 2023-03-27 | Stop reason: HOSPADM

## 2018-06-14 NOTE — TELEPHONE ENCOUNTER
----- Message from Yana Louise MA sent at 6/14/2018  1:46 PM CDT -----  Contact: Self/ [phone]  Do you know if anything came yet?  ----- Message -----  From: Marilu Espino  Sent: 6/14/2018  11:33 AM  To: Rossy Pack Staff    Patient was calling to see if the office have the fax that was sent over.

## 2018-06-14 NOTE — TELEPHONE ENCOUNTER
Called and spoke to Laurent Chand. The fax number that they had was [phone]. Number corrected to [phone] and information to be faxed over.

## 2018-06-14 NOTE — PROGRESS NOTES
"Stevie Villalba presents for initial post-operative visit following a right total knee arthroplasty performed by Dr. [facility] on 5/23/2018. Tolerating pain medication well.      Exam:   Blood pressure 138/82, pulse 85, temperature 97.9 °F (36.6 °C), height 5' 10" (1.778 m), weight 118.1 kg (260 lb 4.1 oz).   Ambulating well without assistive device.  Incision is clean and dry without drainage or erythema. ROM:0-110    Initial post-operative radiographs reviewed today revealing a well fixed and aligned prosthesis.    A/P:  2 weeks s/p right total knee replacement  - The patient was advised to keep the incision clean and dry for the next 24 hours after which he may wash the area with antibacterial soap in the shower. Will not submerge until the incision is completely healed.   - Outpatient PT: orders sent to [facility] on [facility]  - Continue aspirin for 1 month from surgery.  - Pain medication refilled  - Follow up in 4 weeks with Dr. [facility]. Pt will call clinic with problems/concerns.     "

## 2018-06-15 ENCOUNTER — TELEPHONE (OUTPATIENT)
Dept: ORTHOPEDICS | Facility: CLINIC | Age: 63
End: 2018-06-15

## 2018-06-15 NOTE — TELEPHONE ENCOUNTER
Called patient and let him know that forms have been received. They will be completed once Dr. [facility] returns next week.

## 2018-06-15 NOTE — TELEPHONE ENCOUNTER
----- Message from Yana Louise MA sent at 6/15/2018  9:26 AM CDT -----  Contact: self      ----- Message -----  From: Margot Kline  Sent: 6/15/2018   9:11 AM  To: Rossy Pack Staff    Patient ask for a call from Sirena in regard to his forms for his insurance co. Patient can be reached at

## 2018-06-20 ENCOUNTER — TELEPHONE (OUTPATIENT)
Dept: ORTHOPEDICS | Facility: CLINIC | Age: 63
End: 2018-06-20

## 2018-06-20 NOTE — TELEPHONE ENCOUNTER
----- Message from Yana Louise MA sent at 6/20/2018  1:50 PM CDT -----  Contact:  Carly olivas/[facility]       ----- Message -----  From: Triny Gates  Sent: 6/20/2018   1:47 PM  To: Rossy Pack Staff    Freddy is calling in regards to when pt can be cleared to go back to work. Per Freddy is asking on the pt's behave.    Freddy can be reached at [phone].    Thank you     Spoke with Freddy-Mr Villalba works on the [employer]   He must be seen for his 6 week visit July 19  No work until Dr [facility] says so

## 2018-07-05 ENCOUNTER — TELEPHONE (OUTPATIENT)
Dept: ORTHOPEDICS | Facility: CLINIC | Age: 63
End: 2018-07-05

## 2018-07-05 NOTE — TELEPHONE ENCOUNTER
----- Message from Marilu Espino sent at 7/5/2018  9:05 AM CDT -----  Contact: Self/ [phone]  The patient would like to know can he come in sooner for his post-op appt..

## 2018-07-06 ENCOUNTER — TELEPHONE (OUTPATIENT)
Dept: ADMINISTRATIVE | Facility: CLINIC | Age: 63
End: 2018-07-06

## 2018-07-12 ENCOUNTER — OFFICE VISIT (OUTPATIENT)
Dept: ORTHOPEDICS | Facility: CLINIC | Age: 63
End: 2018-07-12
Payer: COMMERCIAL

## 2018-07-12 VITALS — BODY MASS INDEX: 37.46 KG/M2 | WEIGHT: 261.69 LBS | HEIGHT: 70 IN

## 2018-07-12 DIAGNOSIS — Z96.651 STATUS POST TOTAL RIGHT KNEE REPLACEMENT: Primary | ICD-10-CM

## 2018-07-12 PROCEDURE — 99999 PR PBB SHADOW E&M-EST. PATIENT-LVL II: CPT | Mod: PBBFAC,,, | Performed by: ORTHOPAEDIC SURGERY

## 2018-07-12 PROCEDURE — 99024 POSTOP FOLLOW-UP VISIT: CPT | Mod: S$GLB,,, | Performed by: ORTHOPAEDIC SURGERY

## 2018-07-12 RX ORDER — HYDROCODONE BITARTRATE AND ACETAMINOPHEN 10; 325 MG/1; MG/1
TABLET ORAL
Refills: 0 | COMMUNITY
Start: 2018-07-01

## 2018-08-23 ENCOUNTER — OFFICE VISIT (OUTPATIENT)
Dept: ORTHOPEDICS | Facility: CLINIC | Age: 63
End: 2018-08-23
Payer: COMMERCIAL

## 2018-08-23 VITALS
BODY MASS INDEX: 38.5 KG/M2 | HEIGHT: 70 IN | WEIGHT: 268.94 LBS | DIASTOLIC BLOOD PRESSURE: 61 MMHG | SYSTOLIC BLOOD PRESSURE: 100 MMHG | HEART RATE: 76 BPM

## 2018-08-23 DIAGNOSIS — M17.9 OSTEOARTHRITIS OF KNEE, UNSPECIFIED (CODE): ICD-10-CM

## 2018-08-23 DIAGNOSIS — Z96.651 STATUS POST TOTAL RIGHT KNEE REPLACEMENT: Primary | ICD-10-CM

## 2018-08-23 PROCEDURE — 99999 PR PBB SHADOW E&M-EST. PATIENT-LVL III: CPT | Mod: PBBFAC,,, | Performed by: ORTHOPAEDIC SURGERY

## 2018-08-23 PROCEDURE — 99024 POSTOP FOLLOW-UP VISIT: CPT | Mod: S$GLB,,, | Performed by: ORTHOPAEDIC SURGERY

## 2018-08-23 NOTE — PROGRESS NOTES
Patient is here today for 12 week PO FU of his TKA on 5/30/18. He is progressing well.      We will allow him to return as needed for repeat radiographs and certainly for any other questions.    xrays are  reviewed today with good alignment.    We will check repeat radiographs in 1 year.

## 2018-08-28 ENCOUNTER — TELEPHONE (OUTPATIENT)
Dept: ORTHOPEDICS | Facility: CLINIC | Age: 63
End: 2018-08-28

## 2018-08-28 NOTE — TELEPHONE ENCOUNTER
Spoke w pt to advise him injections haven't been approved yet. I will call back once they are approved.

## 2018-08-28 NOTE — TELEPHONE ENCOUNTER
----- Message from Marilu Espino sent at 8/28/2018  8:20 AM CDT -----  Contact: Self/ [phone]  Patient would like a call back to make sure his insurance was approved for his joint injections.

## 2018-09-04 ENCOUNTER — TELEPHONE (OUTPATIENT)
Dept: ORTHOPEDICS | Facility: CLINIC | Age: 63
End: 2018-09-04

## 2018-09-04 NOTE — TELEPHONE ENCOUNTER
----- Message from Alba Brandt sent at 9/4/2018  8:06 AM CDT -----  Pt called to possibly change his appt to a later time that day if possible. Pt could be reached at [phone]

## 2018-09-10 ENCOUNTER — OFFICE VISIT (OUTPATIENT)
Dept: ORTHOPEDICS | Facility: CLINIC | Age: 63
End: 2018-09-10
Payer: COMMERCIAL

## 2018-09-10 VITALS
WEIGHT: 266.75 LBS | HEART RATE: 79 BPM | SYSTOLIC BLOOD PRESSURE: 113 MMHG | DIASTOLIC BLOOD PRESSURE: 71 MMHG | BODY MASS INDEX: 38.19 KG/M2 | HEIGHT: 70 IN

## 2018-09-10 DIAGNOSIS — M17.12 PRIMARY OSTEOARTHRITIS OF LEFT KNEE: Primary | ICD-10-CM

## 2018-09-10 PROCEDURE — 99999 PR PBB SHADOW E&M-EST. PATIENT-LVL IV: CPT | Mod: PBBFAC,,, | Performed by: PHYSICIAN ASSISTANT

## 2018-09-10 PROCEDURE — 99499 UNLISTED E&M SERVICE: CPT | Mod: S$GLB,,, | Performed by: PHYSICIAN ASSISTANT

## 2018-09-10 PROCEDURE — 20610 DRAIN/INJ JOINT/BURSA W/O US: CPT | Mod: LT,S$GLB,, | Performed by: PHYSICIAN ASSISTANT

## 2018-09-10 NOTE — PROGRESS NOTES
Stevie Villalba presents to clinic today for the first left knee Euflexxa injection.    Exam demonstrates the no effusion in the  left knee, and the skin is intact.    Diagnosis: osteoarthritis knee    After time out was performed and patient ID, side, and site were verified, the  left  knee was sterilly prepped in the standard fashion.  A 22-gauge needle was introduced into left knee joint from an aleyda-lateral site without complication and knee was then injected with 2 ml of Euflexxa.  Sterile dressing was applied.  The patient was instructed to resume activities as tolerated and to call with any problems.     We will see Stevie Villalba back next week for the second injection.

## 2018-09-10 NOTE — LETTER
September 10, 2018      [facility]  [address]  [facility] [address]           [facility]  [address]  [facility] [address]  Phone: [phone]          Patient: Stevie Villalba   MR Number: [id number]   YOB: 1955   Date of Visit: 9/10/2018       Dear Dr[facility].:    Thank you for referring Stevie Villalba to me for evaluation. Attached you will find relevant portions of my assessment and plan of care.    If you have questions, please do not hesitate to call me. I look forward to following Stevie Villalba along with you.    Sincerely,    Stephanie Hickey PA-C    Enclosure  CC:  No Recipients    If you would like to receive this communication electronically, please contact [email or web address] or [phone] to request more information on [facility] Link access.    For providers and/or their staff who would like to refer a patient to [facility], please contact us through our one-stop-shop provider referral line, [facility] [place], at [phone].    If you feel you have received this communication in error or would no longer like to receive these types of communications, please e-mail [email or web address]

## 2018-09-17 ENCOUNTER — OFFICE VISIT (OUTPATIENT)
Dept: ORTHOPEDICS | Facility: CLINIC | Age: 63
End: 2018-09-17
Payer: COMMERCIAL

## 2018-09-17 VITALS
WEIGHT: 266.75 LBS | DIASTOLIC BLOOD PRESSURE: 66 MMHG | HEART RATE: 78 BPM | HEIGHT: 70 IN | SYSTOLIC BLOOD PRESSURE: 114 MMHG | BODY MASS INDEX: 38.19 KG/M2

## 2018-09-17 DIAGNOSIS — M17.12 PRIMARY OSTEOARTHRITIS OF LEFT KNEE: Primary | ICD-10-CM

## 2018-09-17 PROCEDURE — 20610 DRAIN/INJ JOINT/BURSA W/O US: CPT | Mod: LT,S$GLB,, | Performed by: PHYSICIAN ASSISTANT

## 2018-09-17 PROCEDURE — 99999 PR PBB SHADOW E&M-EST. PATIENT-LVL IV: CPT | Mod: PBBFAC,,, | Performed by: PHYSICIAN ASSISTANT

## 2018-09-17 PROCEDURE — 99499 UNLISTED E&M SERVICE: CPT | Mod: S$GLB,,, | Performed by: PHYSICIAN ASSISTANT

## 2018-09-17 NOTE — PROGRESS NOTES
Stevie Villalba presents to clinic today for the second left knee Euflexxa injection.    Exam demonstrates the no effusion in the  left knee, and the skin is intact.    Diagnosis: osteoarthritis knee    After time out was performed and patient ID, side, and site were verified, the  left  knee was sterilly prepped in the standard fashion.  A 22-gauge needle was introduced into left knee joint from an aleyda-lateral site without complication and knee was then injected with 2 ml of Euflexxa.  Sterile dressing was applied.  The patient was instructed to resume activities as tolerated and to call with any problems.     We will see Stevie Villalba back next week for the third injection.

## 2018-09-24 ENCOUNTER — OFFICE VISIT (OUTPATIENT)
Dept: ORTHOPEDICS | Facility: CLINIC | Age: 63
End: 2018-09-24
Payer: COMMERCIAL

## 2018-09-24 VITALS
WEIGHT: 266.75 LBS | HEART RATE: 78 BPM | SYSTOLIC BLOOD PRESSURE: 106 MMHG | HEIGHT: 70 IN | DIASTOLIC BLOOD PRESSURE: 64 MMHG | BODY MASS INDEX: 38.19 KG/M2

## 2018-09-24 DIAGNOSIS — M17.12 PRIMARY OSTEOARTHRITIS OF LEFT KNEE: Primary | ICD-10-CM

## 2018-09-24 PROCEDURE — 20610 DRAIN/INJ JOINT/BURSA W/O US: CPT | Mod: LT,S$GLB,, | Performed by: PHYSICIAN ASSISTANT

## 2018-09-24 PROCEDURE — 99999 PR PBB SHADOW E&M-EST. PATIENT-LVL IV: CPT | Mod: PBBFAC,,, | Performed by: PHYSICIAN ASSISTANT

## 2018-09-24 PROCEDURE — 99499 UNLISTED E&M SERVICE: CPT | Mod: S$GLB,,, | Performed by: PHYSICIAN ASSISTANT

## 2018-09-24 NOTE — PROGRESS NOTES
Stevie Villalba presents to clinic today for the third left knee Euflexxa injection.    Exam demonstrates the no effusion in the  left knee, and the skin is intact.    Diagnosis: osteoarthritis knee    After time out was performed and patient ID, side, and site were verified, the  left  knee was sterilly prepped in the standard fashion.  A 22-gauge needle was introduced into left knee joint from an aleyda-lateral site without complication and knee was then injected with 2 ml of Euflexxa.  Sterile dressing was applied.  The patient was instructed to resume activities as tolerated and to call with any problems.     F/u prn.

## 2019-01-28 RX ORDER — AMLODIPINE BESYLATE 10 MG/1
10 TABLET ORAL DAILY
Qty: 90 TABLET | Refills: 0 | Status: SHIPPED | OUTPATIENT
Start: 2019-01-28 | End: 2019-05-20 | Stop reason: SDUPTHER

## 2019-03-06 RX ORDER — MONTELUKAST SODIUM 10 MG/1
10 TABLET ORAL NIGHTLY
Qty: 90 TABLET | Refills: 0 | Status: SHIPPED | OUTPATIENT
Start: 2019-03-06 | End: 2019-06-10 | Stop reason: SDUPTHER

## 2019-04-29 DIAGNOSIS — Z12.5 PROSTATE CANCER SCREENING: ICD-10-CM

## 2019-04-29 DIAGNOSIS — Z00.00 ANNUAL PHYSICAL EXAM: Primary | ICD-10-CM

## 2019-04-29 RX ORDER — LISINOPRIL 40 MG/1
40 TABLET ORAL DAILY
Qty: 90 TABLET | Refills: 0 | Status: SHIPPED | OUTPATIENT
Start: 2019-04-29 | End: 2019-06-10

## 2019-04-29 RX ORDER — ATORVASTATIN CALCIUM 40 MG/1
40 TABLET, FILM COATED ORAL NIGHTLY
Qty: 90 TABLET | Refills: 0 | Status: SHIPPED | OUTPATIENT
Start: 2019-04-29 | End: 2019-06-10 | Stop reason: SDUPTHER

## 2019-05-01 ENCOUNTER — TELEPHONE (OUTPATIENT)
Dept: ORTHOPEDICS | Facility: CLINIC | Age: 64
End: 2019-05-01

## 2019-05-01 NOTE — TELEPHONE ENCOUNTER
----- Message from Aleksandra Knapp sent at 5/1/2019  7:00 AM CDT -----  Contact: self  Pt would like to discuss getting gel shots in his knee.  He can be reached at [phone]         We spoke  informed Dr [facility] does not do them  I will relay message to Ms Stephanie NICHOLS   I'm not sure if enough time has lapsed

## 2019-05-03 ENCOUNTER — TELEPHONE (OUTPATIENT)
Dept: ORTHOPEDICS | Facility: CLINIC | Age: 64
End: 2019-05-03

## 2019-05-06 DIAGNOSIS — M17.12 PRIMARY OSTEOARTHRITIS OF LEFT KNEE: Primary | ICD-10-CM

## 2019-05-06 NOTE — PROGRESS NOTES
Patient had Euflexxa in the past for left knee osteoarthritis. He did well and pain was relieved. Will reorder.

## 2019-05-07 ENCOUNTER — TELEPHONE (OUTPATIENT)
Dept: ORTHOPEDICS | Facility: CLINIC | Age: 64
End: 2019-05-07

## 2019-05-07 NOTE — TELEPHONE ENCOUNTER
----- Message from Geoffrey Red sent at 5/7/2019  9:08 AM CDT -----  Contact: Self   Pt is Requesting a call back in reference to the injection being covered.     [phone]

## 2019-05-20 RX ORDER — AMLODIPINE BESYLATE 10 MG/1
TABLET ORAL
Qty: 90 TABLET | Refills: 0 | Status: SHIPPED | OUTPATIENT
Start: 2019-05-20 | End: 2019-06-10 | Stop reason: SDUPTHER

## 2019-05-27 ENCOUNTER — LAB VISIT (OUTPATIENT)
Dept: LAB | Facility: HOSPITAL | Age: 64
End: 2019-05-27
Attending: INTERNAL MEDICINE
Payer: COMMERCIAL

## 2019-05-27 DIAGNOSIS — Z00.00 ANNUAL PHYSICAL EXAM: ICD-10-CM

## 2019-05-27 DIAGNOSIS — Z12.5 PROSTATE CANCER SCREENING: ICD-10-CM

## 2019-05-27 LAB
ALBUMIN SERPL BCP-MCNC: 3.8 G/DL (ref 3.5–5.2)
ALP SERPL-CCNC: 92 U/L (ref 55–135)
ALT SERPL W/O P-5'-P-CCNC: 25 U/L (ref 10–44)
ANION GAP SERPL CALC-SCNC: 8 MMOL/L (ref 8–16)
AST SERPL-CCNC: 20 U/L (ref 10–40)
BASOPHILS # BLD AUTO: 0.05 K/UL (ref 0–0.2)
BASOPHILS NFR BLD: 0.5 % (ref 0–1.9)
BILIRUB SERPL-MCNC: 0.6 MG/DL (ref 0.1–1)
BUN SERPL-MCNC: 23 MG/DL (ref 8–23)
CALCIUM SERPL-MCNC: 9.7 MG/DL (ref 8.7–10.5)
CHLORIDE SERPL-SCNC: 105 MMOL/L (ref 95–110)
CHOLEST SERPL-MCNC: 160 MG/DL (ref 120–199)
CHOLEST/HDLC SERPL: 5 {RATIO} (ref 2–5)
CO2 SERPL-SCNC: 26 MMOL/L (ref 23–29)
COMPLEXED PSA SERPL-MCNC: 3.5 NG/ML (ref 0–4)
CREAT SERPL-MCNC: 0.8 MG/DL (ref 0.5–1.4)
DIFFERENTIAL METHOD: NORMAL
EOSINOPHIL # BLD AUTO: 0.2 K/UL (ref 0–0.5)
EOSINOPHIL NFR BLD: 2.1 % (ref 0–8)
ERYTHROCYTE [DISTWIDTH] IN BLOOD BY AUTOMATED COUNT: 13.2 % (ref 11.5–14.5)
EST. GFR  (AFRICAN AMERICAN): >60 ML/MIN/1.73 M^2
EST. GFR  (NON AFRICAN AMERICAN): >60 ML/MIN/1.73 M^2
ESTIMATED AVG GLUCOSE: 114 MG/DL (ref 68–131)
GLUCOSE SERPL-MCNC: 118 MG/DL (ref 70–110)
HBA1C MFR BLD HPLC: 5.6 % (ref 4–5.6)
HCT VFR BLD AUTO: 44.1 % (ref 40–54)
HDLC SERPL-MCNC: 32 MG/DL (ref 40–75)
HDLC SERPL: 20 % (ref 20–50)
HGB BLD-MCNC: 14.5 G/DL (ref 14–18)
IMM GRANULOCYTES # BLD AUTO: 0.04 K/UL (ref 0–0.04)
IMM GRANULOCYTES NFR BLD AUTO: 0.4 % (ref 0–0.5)
LDLC SERPL CALC-MCNC: 88.4 MG/DL (ref 63–159)
LYMPHOCYTES # BLD AUTO: 2.8 K/UL (ref 1–4.8)
LYMPHOCYTES NFR BLD: 29.7 % (ref 18–48)
MCH RBC QN AUTO: 30.9 PG (ref 27–31)
MCHC RBC AUTO-ENTMCNC: 32.9 G/DL (ref 32–36)
MCV RBC AUTO: 94 FL (ref 82–98)
MONOCYTES # BLD AUTO: 0.9 K/UL (ref 0.3–1)
MONOCYTES NFR BLD: 9.1 % (ref 4–15)
NEUTROPHILS # BLD AUTO: 5.5 K/UL (ref 1.8–7.7)
NEUTROPHILS NFR BLD: 58.2 % (ref 38–73)
NONHDLC SERPL-MCNC: 128 MG/DL
NRBC BLD-RTO: 0 /100 WBC
PLATELET # BLD AUTO: 259 K/UL (ref 150–350)
PMV BLD AUTO: 10.8 FL (ref 9.2–12.9)
POTASSIUM SERPL-SCNC: 4.3 MMOL/L (ref 3.5–5.1)
PROT SERPL-MCNC: 7.1 G/DL (ref 6–8.4)
RBC # BLD AUTO: 4.69 M/UL (ref 4.6–6.2)
SODIUM SERPL-SCNC: 139 MMOL/L (ref 136–145)
TRIGL SERPL-MCNC: 198 MG/DL (ref 30–150)
TSH SERPL DL<=0.005 MIU/L-ACNC: 1.72 UIU/ML (ref 0.4–4)
WBC # BLD AUTO: 9.39 K/UL (ref 3.9–12.7)

## 2019-05-27 PROCEDURE — 83036 HEMOGLOBIN GLYCOSYLATED A1C: CPT

## 2019-05-27 PROCEDURE — 36415 COLL VENOUS BLD VENIPUNCTURE: CPT | Mod: PO

## 2019-05-27 PROCEDURE — 85025 COMPLETE CBC W/AUTO DIFF WBC: CPT

## 2019-05-27 PROCEDURE — 84153 ASSAY OF PSA TOTAL: CPT

## 2019-05-27 PROCEDURE — 80053 COMPREHEN METABOLIC PANEL: CPT

## 2019-05-27 PROCEDURE — 84443 ASSAY THYROID STIM HORMONE: CPT

## 2019-05-27 PROCEDURE — 80061 LIPID PANEL: CPT

## 2019-06-03 ENCOUNTER — OFFICE VISIT (OUTPATIENT)
Dept: ORTHOPEDICS | Facility: CLINIC | Age: 64
End: 2019-06-03
Payer: COMMERCIAL

## 2019-06-03 ENCOUNTER — HOSPITAL ENCOUNTER (OUTPATIENT)
Dept: RADIOLOGY | Facility: HOSPITAL | Age: 64
Discharge: HOME | End: 2019-06-03
Attending: PHYSICIAN ASSISTANT
Payer: COMMERCIAL

## 2019-06-03 VITALS
HEART RATE: 70 BPM | WEIGHT: 274.25 LBS | SYSTOLIC BLOOD PRESSURE: 120 MMHG | HEIGHT: 70 IN | BODY MASS INDEX: 39.26 KG/M2 | DIASTOLIC BLOOD PRESSURE: 67 MMHG

## 2019-06-03 DIAGNOSIS — M17.12 PRIMARY OSTEOARTHRITIS OF LEFT KNEE: Primary | ICD-10-CM

## 2019-06-03 DIAGNOSIS — M17.12 PRIMARY OSTEOARTHRITIS OF LEFT KNEE: ICD-10-CM

## 2019-06-03 PROCEDURE — 99999 PR PBB SHADOW E&M-EST. PATIENT-LVL IV: CPT | Mod: PBBFAC,,, | Performed by: PHYSICIAN ASSISTANT

## 2019-06-03 PROCEDURE — 73564 XR KNEE ORTHO LEFT WITH FLEXION: ICD-10-PCS | Mod: 26,LT,, | Performed by: RADIOLOGY

## 2019-06-03 PROCEDURE — 99999 PR PBB SHADOW E&M-EST. PATIENT-LVL IV: ICD-10-PCS | Mod: PBBFAC,,, | Performed by: PHYSICIAN ASSISTANT

## 2019-06-03 PROCEDURE — 73562 XR KNEE ORTHO LEFT WITH FLEXION: ICD-10-PCS | Mod: 26,59,RT, | Performed by: RADIOLOGY

## 2019-06-03 PROCEDURE — 20610 PR DRAIN/INJECT LARGE JOINT/BURSA: ICD-10-PCS | Mod: LT,S$GLB,, | Performed by: PHYSICIAN ASSISTANT

## 2019-06-03 PROCEDURE — 99499 UNLISTED E&M SERVICE: CPT | Mod: S$GLB,,, | Performed by: PHYSICIAN ASSISTANT

## 2019-06-03 PROCEDURE — 73564 X-RAY EXAM KNEE 4 OR MORE: CPT | Mod: 26,LT,, | Performed by: RADIOLOGY

## 2019-06-03 PROCEDURE — 20610 DRAIN/INJ JOINT/BURSA W/O US: CPT | Mod: LT,S$GLB,, | Performed by: PHYSICIAN ASSISTANT

## 2019-06-03 PROCEDURE — 99499 NO LOS: ICD-10-PCS | Mod: S$GLB,,, | Performed by: PHYSICIAN ASSISTANT

## 2019-06-03 PROCEDURE — 73562 X-RAY EXAM OF KNEE 3: CPT | Mod: TC,LT

## 2019-06-03 PROCEDURE — 73562 X-RAY EXAM OF KNEE 3: CPT | Mod: 26,59,RT, | Performed by: RADIOLOGY

## 2019-06-03 NOTE — PROGRESS NOTES
Stevie Villalba presents to clinic today for the first left knee Euflexxa injection.    Exam demonstrates the no effusion in the  left knee, and the skin is intact.    Diagnosis: osteoarthritis knee    After time out was performed and patient ID, side, and site were verified, the  left  knee was sterilly prepped in the standard fashion.  A 22-gauge needle was introduced into left knee joint from an aleyda-lateral site without complication and knee was then injected with 2 ml of Euflexxa.  Sterile dressing was applied.  The patient was instructed to resume activities as tolerated and to call with any problems.     XR: OCD medial femoral condyle left knee; tka on right     We will see Stevie Villalba back next week for the second injection.

## 2019-06-10 ENCOUNTER — OFFICE VISIT (OUTPATIENT)
Dept: INTERNAL MEDICINE | Facility: CLINIC | Age: 64
End: 2019-06-10
Payer: COMMERCIAL

## 2019-06-10 ENCOUNTER — OFFICE VISIT (OUTPATIENT)
Dept: ORTHOPEDICS | Facility: CLINIC | Age: 64
End: 2019-06-10
Payer: COMMERCIAL

## 2019-06-10 VITALS
HEART RATE: 73 BPM | WEIGHT: 274.38 LBS | HEIGHT: 70 IN | SYSTOLIC BLOOD PRESSURE: 117 MMHG | DIASTOLIC BLOOD PRESSURE: 67 MMHG | BODY MASS INDEX: 39.28 KG/M2

## 2019-06-10 VITALS
HEART RATE: 70 BPM | RESPIRATION RATE: 12 BRPM | OXYGEN SATURATION: 96 % | BODY MASS INDEX: 39.36 KG/M2 | SYSTOLIC BLOOD PRESSURE: 112 MMHG | TEMPERATURE: 98 F | WEIGHT: 274.94 LBS | HEIGHT: 70 IN | DIASTOLIC BLOOD PRESSURE: 68 MMHG

## 2019-06-10 DIAGNOSIS — I10 ESSENTIAL HYPERTENSION: Chronic | ICD-10-CM

## 2019-06-10 DIAGNOSIS — Z00.00 ANNUAL PHYSICAL EXAM: Primary | ICD-10-CM

## 2019-06-10 DIAGNOSIS — M17.12 PRIMARY OSTEOARTHRITIS OF LEFT KNEE: Primary | ICD-10-CM

## 2019-06-10 DIAGNOSIS — E78.1 HYPERTRIGLYCERIDEMIA: Chronic | ICD-10-CM

## 2019-06-10 PROCEDURE — 99396 PR PREVENTIVE VISIT,EST,40-64: ICD-10-PCS | Mod: 25,S$GLB,, | Performed by: INTERNAL MEDICINE

## 2019-06-10 PROCEDURE — 99499 UNLISTED E&M SERVICE: CPT | Mod: S$GLB,,, | Performed by: PHYSICIAN ASSISTANT

## 2019-06-10 PROCEDURE — 20610 DRAIN/INJ JOINT/BURSA W/O US: CPT | Mod: LT,S$GLB,, | Performed by: PHYSICIAN ASSISTANT

## 2019-06-10 PROCEDURE — 99999 PR PBB SHADOW E&M-EST. PATIENT-LVL III: ICD-10-PCS | Mod: PBBFAC,,, | Performed by: INTERNAL MEDICINE

## 2019-06-10 PROCEDURE — 99396 PREV VISIT EST AGE 40-64: CPT | Mod: 25,S$GLB,, | Performed by: INTERNAL MEDICINE

## 2019-06-10 PROCEDURE — 99999 PR PBB SHADOW E&M-EST. PATIENT-LVL III: CPT | Mod: PBBFAC,,, | Performed by: INTERNAL MEDICINE

## 2019-06-10 PROCEDURE — 99999 PR PBB SHADOW E&M-EST. PATIENT-LVL IV: ICD-10-PCS | Mod: PBBFAC,,, | Performed by: PHYSICIAN ASSISTANT

## 2019-06-10 PROCEDURE — 99999 PR PBB SHADOW E&M-EST. PATIENT-LVL IV: CPT | Mod: PBBFAC,,, | Performed by: PHYSICIAN ASSISTANT

## 2019-06-10 PROCEDURE — 99499 NO LOS: ICD-10-PCS | Mod: S$GLB,,, | Performed by: PHYSICIAN ASSISTANT

## 2019-06-10 PROCEDURE — 20610 PR DRAIN/INJECT LARGE JOINT/BURSA: ICD-10-PCS | Mod: LT,S$GLB,, | Performed by: PHYSICIAN ASSISTANT

## 2019-06-10 RX ORDER — LOSARTAN POTASSIUM 100 MG/1
100 TABLET ORAL DAILY
Qty: 90 TABLET | Refills: 3 | Status: SHIPPED | OUTPATIENT
Start: 2019-06-10 | End: 2020-05-04

## 2019-06-10 RX ORDER — HYDROCHLOROTHIAZIDE 25 MG/1
25 TABLET ORAL DAILY
Qty: 90 TABLET | Refills: 3 | Status: SHIPPED | OUTPATIENT
Start: 2019-06-10 | End: 2020-07-30

## 2019-06-10 RX ORDER — AMLODIPINE BESYLATE 10 MG/1
10 TABLET ORAL DAILY
Qty: 90 TABLET | Refills: 3 | Status: SHIPPED | OUTPATIENT
Start: 2019-06-10 | End: 2020-08-03

## 2019-06-10 RX ORDER — AMITRIPTYLINE HYDROCHLORIDE 10 MG/1
10 TABLET, FILM COATED ORAL NIGHTLY PRN
Qty: 90 TABLET | Refills: 3 | Status: SHIPPED | OUTPATIENT
Start: 2019-06-10 | End: 2020-06-21

## 2019-06-10 RX ORDER — MONTELUKAST SODIUM 10 MG/1
10 TABLET ORAL NIGHTLY
Qty: 90 TABLET | Refills: 3 | Status: SHIPPED | OUTPATIENT
Start: 2019-06-10 | End: 2021-08-25

## 2019-06-10 RX ORDER — ATORVASTATIN CALCIUM 40 MG/1
40 TABLET, FILM COATED ORAL NIGHTLY
Qty: 90 TABLET | Refills: 3 | Status: SHIPPED | OUTPATIENT
Start: 2019-06-10 | End: 2019-08-25

## 2019-06-10 RX ORDER — AMPICILLIN 500 MG/1
500 CAPSULE ORAL 2 TIMES DAILY
Refills: 0 | COMMUNITY
Start: 2019-03-18 | End: 2021-08-25
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
## 2019-06-17 ENCOUNTER — OFFICE VISIT (OUTPATIENT)
Dept: ORTHOPEDICS | Facility: CLINIC | Age: 64
End: 2019-06-17
Payer: COMMERCIAL

## 2019-06-17 VITALS
BODY MASS INDEX: 39.51 KG/M2 | HEART RATE: 69 BPM | HEIGHT: 70 IN | DIASTOLIC BLOOD PRESSURE: 68 MMHG | SYSTOLIC BLOOD PRESSURE: 115 MMHG | WEIGHT: 276 LBS

## 2019-06-17 DIAGNOSIS — M17.12 PRIMARY OSTEOARTHRITIS OF LEFT KNEE: Primary | ICD-10-CM

## 2019-06-17 PROCEDURE — 99999 PR PBB SHADOW E&M-EST. PATIENT-LVL IV: ICD-10-PCS | Mod: PBBFAC,,, | Performed by: PHYSICIAN ASSISTANT

## 2019-06-17 PROCEDURE — 99499 UNLISTED E&M SERVICE: CPT | Mod: S$GLB,,, | Performed by: PHYSICIAN ASSISTANT

## 2019-06-17 PROCEDURE — 20610 DRAIN/INJ JOINT/BURSA W/O US: CPT | Mod: LT,S$GLB,, | Performed by: PHYSICIAN ASSISTANT

## 2019-06-17 PROCEDURE — 99499 NO LOS: ICD-10-PCS | Mod: S$GLB,,, | Performed by: PHYSICIAN ASSISTANT

## 2019-06-17 PROCEDURE — 99999 PR PBB SHADOW E&M-EST. PATIENT-LVL IV: CPT | Mod: PBBFAC,,, | Performed by: PHYSICIAN ASSISTANT

## 2019-06-17 PROCEDURE — 20610 PR DRAIN/INJECT LARGE JOINT/BURSA: ICD-10-PCS | Mod: LT,S$GLB,, | Performed by: PHYSICIAN ASSISTANT

## 2019-07-30 RX ORDER — LISINOPRIL 40 MG/1
TABLET ORAL
Qty: 90 TABLET | Refills: 0 | OUTPATIENT
Start: 2019-07-30

## 2019-08-25 RX ORDER — ATORVASTATIN CALCIUM 40 MG/1
TABLET, FILM COATED ORAL
Qty: 90 TABLET | Refills: 3 | Status: SHIPPED | OUTPATIENT
Start: 2019-08-25 | End: 2020-08-03

## 2020-05-04 RX ORDER — LOSARTAN POTASSIUM 100 MG/1
TABLET ORAL
Qty: 90 TABLET | Refills: 0 | Status: SHIPPED | OUTPATIENT
Start: 2020-05-04 | End: 2020-08-03

## 2020-05-08 DIAGNOSIS — Z00.00 ANNUAL PHYSICAL EXAM: Primary | ICD-10-CM

## 2020-05-29 DIAGNOSIS — M17.12 PRIMARY OSTEOARTHRITIS OF LEFT KNEE: Primary | ICD-10-CM

## 2020-05-29 NOTE — PROGRESS NOTES
Patient is 64 yo male with advanced osteoarthritis of left knee. Kellgren-Chavez grade 3 on xray. He has had Euflexxa injections in the past with good relief. Will order and repeat.

## 2020-06-15 ENCOUNTER — OFFICE VISIT (OUTPATIENT)
Dept: ORTHOPEDICS | Facility: CLINIC | Age: 65
End: 2020-06-15
Payer: COMMERCIAL

## 2020-06-15 VITALS
WEIGHT: 276.13 LBS | BODY MASS INDEX: 39.53 KG/M2 | TEMPERATURE: 97 F | DIASTOLIC BLOOD PRESSURE: 76 MMHG | SYSTOLIC BLOOD PRESSURE: 134 MMHG | HEART RATE: 75 BPM | HEIGHT: 70 IN

## 2020-06-15 DIAGNOSIS — M17.12 PRIMARY OSTEOARTHRITIS OF LEFT KNEE: Primary | ICD-10-CM

## 2020-06-15 PROCEDURE — 20610 DRAIN/INJ JOINT/BURSA W/O US: CPT | Mod: LT,S$GLB,, | Performed by: PHYSICIAN ASSISTANT

## 2020-06-15 PROCEDURE — 99499 NO LOS: ICD-10-PCS | Mod: S$GLB,,, | Performed by: PHYSICIAN ASSISTANT

## 2020-06-15 PROCEDURE — 99999 PR PBB SHADOW E&M-EST. PATIENT-LVL IV: ICD-10-PCS | Mod: PBBFAC,,, | Performed by: PHYSICIAN ASSISTANT

## 2020-06-15 PROCEDURE — 20610 PR DRAIN/INJECT LARGE JOINT/BURSA: ICD-10-PCS | Mod: LT,S$GLB,, | Performed by: PHYSICIAN ASSISTANT

## 2020-06-15 PROCEDURE — 99499 UNLISTED E&M SERVICE: CPT | Mod: S$GLB,,, | Performed by: PHYSICIAN ASSISTANT

## 2020-06-15 PROCEDURE — 99999 PR PBB SHADOW E&M-EST. PATIENT-LVL IV: CPT | Mod: PBBFAC,,, | Performed by: PHYSICIAN ASSISTANT

## 2020-06-15 NOTE — PROGRESS NOTES
Stevie Villalba presents to clinic today for the first left knee Euflexxa injection.    Exam demonstrates the no effusion in the  left knee, and the skin is intact.    Diagnosis: primary osteoarthritis left knee    After time out was performed and patient ID, side, and site were verified, the  left  knee was sterilly prepped in the standard fashion.  A 22-gauge needle was introduced into left knee joint from an aleyda-lateral site without complication and knee was then injected with 2 ml of Euflexxa.  Sterile dressing was applied.  The patient was instructed to resume activities as tolerated and to call with any problems.     We will see Stevie Villalba back next week for the second injection.

## 2020-06-21 RX ORDER — AMITRIPTYLINE HYDROCHLORIDE 10 MG/1
10 TABLET, FILM COATED ORAL NIGHTLY PRN
Qty: 90 TABLET | Refills: 0 | Status: SHIPPED | OUTPATIENT
Start: 2020-06-21 | End: 2020-09-21

## 2020-06-22 ENCOUNTER — OFFICE VISIT (OUTPATIENT)
Dept: ORTHOPEDICS | Facility: CLINIC | Age: 65
End: 2020-06-22
Payer: COMMERCIAL

## 2020-06-22 VITALS
HEART RATE: 77 BPM | WEIGHT: 130.63 LBS | DIASTOLIC BLOOD PRESSURE: 71 MMHG | BODY MASS INDEX: 18.7 KG/M2 | SYSTOLIC BLOOD PRESSURE: 123 MMHG | TEMPERATURE: 97 F | HEIGHT: 70 IN

## 2020-06-22 DIAGNOSIS — M17.12 PRIMARY OSTEOARTHRITIS OF LEFT KNEE: Primary | ICD-10-CM

## 2020-06-22 PROCEDURE — 99499 NO LOS: ICD-10-PCS | Mod: S$GLB,,, | Performed by: PHYSICIAN ASSISTANT

## 2020-06-22 PROCEDURE — 99999 PR PBB SHADOW E&M-EST. PATIENT-LVL IV: CPT | Mod: PBBFAC,,, | Performed by: PHYSICIAN ASSISTANT

## 2020-06-22 PROCEDURE — 20610 PR DRAIN/INJECT LARGE JOINT/BURSA: ICD-10-PCS | Mod: LT,S$GLB,, | Performed by: PHYSICIAN ASSISTANT

## 2020-06-22 PROCEDURE — 99999 PR PBB SHADOW E&M-EST. PATIENT-LVL IV: ICD-10-PCS | Mod: PBBFAC,,, | Performed by: PHYSICIAN ASSISTANT

## 2020-06-22 PROCEDURE — 99499 UNLISTED E&M SERVICE: CPT | Mod: S$GLB,,, | Performed by: PHYSICIAN ASSISTANT

## 2020-06-22 PROCEDURE — 20610 DRAIN/INJ JOINT/BURSA W/O US: CPT | Mod: LT,S$GLB,, | Performed by: PHYSICIAN ASSISTANT

## 2020-06-22 NOTE — PROGRESS NOTES
Stevie Villalba presents to clinic today for the second left knee Euflexxa injection.    Exam demonstrates the no effusion in the  left knee, and the skin is intact.    Diagnosis: primary osteoarthritis left knee    After time out was performed and patient ID, side, and site were verified, the  left  knee was sterilly prepped in the standard fashion.  A 22-gauge needle was introduced into left knee joint from an aleyda-lateral site without complication and knee was then injected with 2 ml of Euflexxa.  Sterile dressing was applied.  The patient was instructed to resume activities as tolerated and to call with any problems.     We will see Stevie Villalba back next week for the third injection.

## 2020-06-29 ENCOUNTER — LAB VISIT (OUTPATIENT)
Dept: LAB | Facility: HOSPITAL | Age: 65
End: 2020-06-29
Attending: INTERNAL MEDICINE
Payer: COMMERCIAL

## 2020-06-29 DIAGNOSIS — Z00.00 ANNUAL PHYSICAL EXAM: ICD-10-CM

## 2020-06-29 LAB
ALBUMIN SERPL BCP-MCNC: 4 G/DL (ref 3.5–5.2)
ALP SERPL-CCNC: 84 U/L (ref 55–135)
ALT SERPL W/O P-5'-P-CCNC: 25 U/L (ref 10–44)
ANION GAP SERPL CALC-SCNC: 13 MMOL/L (ref 8–16)
AST SERPL-CCNC: 23 U/L (ref 10–40)
BASOPHILS # BLD AUTO: 0.04 K/UL (ref 0–0.2)
BASOPHILS NFR BLD: 0.4 % (ref 0–1.9)
BILIRUB SERPL-MCNC: 0.6 MG/DL (ref 0.1–1)
BUN SERPL-MCNC: 19 MG/DL (ref 8–23)
CALCIUM SERPL-MCNC: 9.2 MG/DL (ref 8.7–10.5)
CHLORIDE SERPL-SCNC: 101 MMOL/L (ref 95–110)
CHOLEST SERPL-MCNC: 175 MG/DL (ref 120–199)
CHOLEST/HDLC SERPL: 4.4 {RATIO} (ref 2–5)
CO2 SERPL-SCNC: 27 MMOL/L (ref 23–29)
COMPLEXED PSA SERPL-MCNC: 5 NG/ML (ref 0–4)
CREAT SERPL-MCNC: 0.9 MG/DL (ref 0.5–1.4)
DIFFERENTIAL METHOD: ABNORMAL
EOSINOPHIL # BLD AUTO: 0.2 K/UL (ref 0–0.5)
EOSINOPHIL NFR BLD: 1.5 % (ref 0–8)
ERYTHROCYTE [DISTWIDTH] IN BLOOD BY AUTOMATED COUNT: 13.1 % (ref 11.5–14.5)
EST. GFR  (AFRICAN AMERICAN): >60 ML/MIN/1.73 M^2
EST. GFR  (NON AFRICAN AMERICAN): >60 ML/MIN/1.73 M^2
ESTIMATED AVG GLUCOSE: 117 MG/DL (ref 68–131)
GLUCOSE SERPL-MCNC: 91 MG/DL (ref 70–110)
HBA1C MFR BLD HPLC: 5.7 % (ref 4–5.6)
HCT VFR BLD AUTO: 45.3 % (ref 40–54)
HDLC SERPL-MCNC: 40 MG/DL (ref 40–75)
HDLC SERPL: 22.9 % (ref 20–50)
HGB BLD-MCNC: 15.3 G/DL (ref 14–18)
IMM GRANULOCYTES # BLD AUTO: 0.08 K/UL (ref 0–0.04)
IMM GRANULOCYTES NFR BLD AUTO: 0.8 % (ref 0–0.5)
LDLC SERPL CALC-MCNC: 106.8 MG/DL (ref 63–159)
LYMPHOCYTES # BLD AUTO: 2.8 K/UL (ref 1–4.8)
LYMPHOCYTES NFR BLD: 26.2 % (ref 18–48)
MCH RBC QN AUTO: 31.5 PG (ref 27–31)
MCHC RBC AUTO-ENTMCNC: 33.8 G/DL (ref 32–36)
MCV RBC AUTO: 93 FL (ref 82–98)
MONOCYTES # BLD AUTO: 0.7 K/UL (ref 0.3–1)
MONOCYTES NFR BLD: 7 % (ref 4–15)
NEUTROPHILS # BLD AUTO: 6.8 K/UL (ref 1.8–7.7)
NEUTROPHILS NFR BLD: 64.1 % (ref 38–73)
NONHDLC SERPL-MCNC: 135 MG/DL
NRBC BLD-RTO: 0 /100 WBC
PLATELET # BLD AUTO: 267 K/UL (ref 150–350)
PMV BLD AUTO: 10.4 FL (ref 9.2–12.9)
POTASSIUM SERPL-SCNC: 4.1 MMOL/L (ref 3.5–5.1)
PROT SERPL-MCNC: 7.3 G/DL (ref 6–8.4)
RBC # BLD AUTO: 4.86 M/UL (ref 4.6–6.2)
SODIUM SERPL-SCNC: 141 MMOL/L (ref 136–145)
TRIGL SERPL-MCNC: 141 MG/DL (ref 30–150)
TSH SERPL DL<=0.005 MIU/L-ACNC: 1.85 UIU/ML (ref 0.4–4)
WBC # BLD AUTO: 10.53 K/UL (ref 3.9–12.7)

## 2020-06-29 PROCEDURE — 85025 COMPLETE CBC W/AUTO DIFF WBC: CPT

## 2020-06-29 PROCEDURE — 83036 HEMOGLOBIN GLYCOSYLATED A1C: CPT

## 2020-06-29 PROCEDURE — 84153 ASSAY OF PSA TOTAL: CPT

## 2020-06-29 PROCEDURE — 80053 COMPREHEN METABOLIC PANEL: CPT

## 2020-06-29 PROCEDURE — 36415 COLL VENOUS BLD VENIPUNCTURE: CPT | Mod: PO

## 2020-06-29 PROCEDURE — 84443 ASSAY THYROID STIM HORMONE: CPT

## 2020-06-29 PROCEDURE — 80061 LIPID PANEL: CPT

## 2020-07-06 ENCOUNTER — OFFICE VISIT (OUTPATIENT)
Dept: ORTHOPEDICS | Facility: CLINIC | Age: 65
End: 2020-07-06
Payer: COMMERCIAL

## 2020-07-06 ENCOUNTER — OFFICE VISIT (OUTPATIENT)
Dept: INTERNAL MEDICINE | Facility: CLINIC | Age: 65
End: 2020-07-06
Payer: COMMERCIAL

## 2020-07-06 ENCOUNTER — TELEPHONE (OUTPATIENT)
Dept: INTERNAL MEDICINE | Facility: CLINIC | Age: 65
End: 2020-07-06

## 2020-07-06 VITALS
SYSTOLIC BLOOD PRESSURE: 138 MMHG | TEMPERATURE: 98 F | BODY MASS INDEX: 41.25 KG/M2 | DIASTOLIC BLOOD PRESSURE: 70 MMHG | HEART RATE: 70 BPM | HEIGHT: 70 IN | WEIGHT: 288.13 LBS | RESPIRATION RATE: 16 BRPM

## 2020-07-06 DIAGNOSIS — E78.1 HYPERTRIGLYCERIDEMIA: Chronic | ICD-10-CM

## 2020-07-06 DIAGNOSIS — Z00.00 ANNUAL PHYSICAL EXAM: Primary | ICD-10-CM

## 2020-07-06 DIAGNOSIS — R97.20 ELEVATED PSA: ICD-10-CM

## 2020-07-06 DIAGNOSIS — M17.12 PRIMARY OSTEOARTHRITIS OF LEFT KNEE: Primary | ICD-10-CM

## 2020-07-06 DIAGNOSIS — R73.01 IMPAIRED FASTING BLOOD SUGAR: Chronic | ICD-10-CM

## 2020-07-06 DIAGNOSIS — I10 ESSENTIAL HYPERTENSION: Chronic | ICD-10-CM

## 2020-07-06 PROCEDURE — 99499 UNLISTED E&M SERVICE: CPT | Mod: S$GLB,,, | Performed by: PHYSICIAN ASSISTANT

## 2020-07-06 PROCEDURE — 20610 DRAIN/INJ JOINT/BURSA W/O US: CPT | Mod: LT,S$GLB,, | Performed by: PHYSICIAN ASSISTANT

## 2020-07-06 PROCEDURE — 99499 NO LOS: ICD-10-PCS | Mod: S$GLB,,, | Performed by: PHYSICIAN ASSISTANT

## 2020-07-06 PROCEDURE — 99999 PR PBB SHADOW E&M-EST. PATIENT-LVL V: ICD-10-PCS | Mod: PBBFAC,,, | Performed by: INTERNAL MEDICINE

## 2020-07-06 PROCEDURE — 99396 PREV VISIT EST AGE 40-64: CPT | Mod: S$GLB,,, | Performed by: INTERNAL MEDICINE

## 2020-07-06 PROCEDURE — 99999 PR PBB SHADOW E&M-EST. PATIENT-LVL V: CPT | Mod: PBBFAC,,, | Performed by: INTERNAL MEDICINE

## 2020-07-06 PROCEDURE — 99999 PR PBB SHADOW E&M-EST. PATIENT-LVL III: CPT | Mod: PBBFAC,,, | Performed by: PHYSICIAN ASSISTANT

## 2020-07-06 PROCEDURE — 99999 PR PBB SHADOW E&M-EST. PATIENT-LVL III: ICD-10-PCS | Mod: PBBFAC,,, | Performed by: PHYSICIAN ASSISTANT

## 2020-07-06 PROCEDURE — 99396 PR PREVENTIVE VISIT,EST,40-64: ICD-10-PCS | Mod: S$GLB,,, | Performed by: INTERNAL MEDICINE

## 2020-07-06 PROCEDURE — 20610 PR DRAIN/INJECT LARGE JOINT/BURSA: ICD-10-PCS | Mod: LT,S$GLB,, | Performed by: PHYSICIAN ASSISTANT

## 2020-07-06 RX ORDER — SILDENAFIL 100 MG/1
100 TABLET, FILM COATED ORAL DAILY PRN
Qty: 30 TABLET | Refills: 2 | Status: SHIPPED | OUTPATIENT
Start: 2020-07-06 | End: 2021-08-25 | Stop reason: SDUPTHER

## 2020-07-06 RX ORDER — SILDENAFIL 100 MG/1
100 TABLET, FILM COATED ORAL DAILY PRN
Qty: 30 TABLET | Refills: 2 | Status: SHIPPED | OUTPATIENT
Start: 2020-07-06 | End: 2020-07-06

## 2020-07-06 NOTE — PROGRESS NOTES
Stevie Villalba presents to clinic today for the third left knee Euflexxa injection.    Exam demonstrates the no effusion in the  left knee, and the skin is intact.    Diagnosis: primary osteoarthritis left knee    After time out was performed and patient ID, side, and site were verified, the  left  knee was sterilly prepped in the standard fashion.  A 22-gauge needle was introduced into left knee joint from an aleyda-lateral site without complication and knee was then injected with 2 ml of Euflexxa.  Sterile dressing was applied.  The patient was instructed to resume activities as tolerated and to call with any problems.     F/u prn.
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
## 2020-07-06 NOTE — TELEPHONE ENCOUNTER
----- Message from Sue Duvall sent at 7/6/2020  8:33 AM CDT -----  Contact: [facility] call pt. asap/[facility] meds/  [phone]  Caller says [facility] does not have his perscriptions yet, that you said you sent to them.

## 2020-08-03 ENCOUNTER — OFFICE VISIT (OUTPATIENT)
Dept: UROLOGY | Facility: CLINIC | Age: 65
End: 2020-08-03
Payer: COMMERCIAL

## 2020-08-03 VITALS
BODY MASS INDEX: 39.49 KG/M2 | HEART RATE: 78 BPM | HEIGHT: 70 IN | DIASTOLIC BLOOD PRESSURE: 71 MMHG | WEIGHT: 275.81 LBS | SYSTOLIC BLOOD PRESSURE: 133 MMHG

## 2020-08-03 DIAGNOSIS — R97.20 ELEVATED PSA: ICD-10-CM

## 2020-08-03 PROCEDURE — 99243 OFF/OP CNSLTJ NEW/EST LOW 30: CPT | Mod: S$GLB,,, | Performed by: UROLOGY

## 2020-08-03 PROCEDURE — 99999 PR PBB SHADOW E&M-EST. PATIENT-LVL V: ICD-10-PCS | Mod: PBBFAC,,, | Performed by: UROLOGY

## 2020-08-03 PROCEDURE — 99999 PR PBB SHADOW E&M-EST. PATIENT-LVL V: CPT | Mod: PBBFAC,,, | Performed by: UROLOGY

## 2020-08-03 PROCEDURE — 99243 PR OFFICE CONSULTATION,LEVEL III: ICD-10-PCS | Mod: S$GLB,,, | Performed by: UROLOGY

## 2020-08-03 RX ORDER — AMLODIPINE BESYLATE 10 MG/1
TABLET ORAL
Qty: 90 TABLET | Refills: 3 | Status: SHIPPED | OUTPATIENT
Start: 2020-08-03 | End: 2021-07-24

## 2020-08-03 RX ORDER — ATORVASTATIN CALCIUM 40 MG/1
TABLET, FILM COATED ORAL
Qty: 90 TABLET | Refills: 3 | Status: SHIPPED | OUTPATIENT
Start: 2020-08-03 | End: 2021-07-24

## 2020-08-03 RX ORDER — LIDOCAINE HYDROCHLORIDE 10 MG/ML
20 INJECTION INFILTRATION; PERINEURAL ONCE
Status: CANCELLED | OUTPATIENT
Start: 2020-08-03 | End: 2020-08-03

## 2020-08-03 RX ORDER — LIDOCAINE HYDROCHLORIDE 10 MG/ML
10 INJECTION INFILTRATION; PERINEURAL ONCE
Status: CANCELLED | OUTPATIENT
Start: 2020-08-03 | End: 2020-08-03

## 2020-08-03 RX ORDER — ENEMA 19; 7 G/133ML; G/133ML
1 ENEMA RECTAL ONCE
Qty: 1 ENEMA | Refills: 1 | Status: SHIPPED | OUTPATIENT
Start: 2020-08-03 | End: 2020-08-03

## 2020-08-03 RX ORDER — LIDOCAINE HYDROCHLORIDE 20 MG/ML
JELLY TOPICAL ONCE
Status: CANCELLED | OUTPATIENT
Start: 2020-08-03 | End: 2020-08-03

## 2020-08-03 RX ORDER — CIPROFLOXACIN 500 MG/1
500 TABLET ORAL 2 TIMES DAILY
Qty: 6 TABLET | Refills: 0 | Status: SHIPPED | OUTPATIENT
Start: 2020-08-03 | End: 2020-08-06

## 2020-08-03 RX ORDER — LOSARTAN POTASSIUM 100 MG/1
TABLET ORAL
Qty: 90 TABLET | Refills: 3 | Status: SHIPPED | OUTPATIENT
Start: 2020-08-03 | End: 2021-10-20

## 2020-08-03 NOTE — LETTER
August 3, 2020      Ric Brambila MD  [address] [facility]  [place] [address]           [place] [facility]  [address] [facility]  [place] [address]  Phone: [phone]          Patient: Stevie Villalba   MR Number: [id number]   YOB: 1955   Date of Visit: 8/3/2020       Dear Dr. Ric Brambila:    Thank you for referring Stevie Villalba to me for evaluation. Attached you will find relevant portions of my assessment and plan of care.    If you have questions, please do not hesitate to call me. I look forward to following Stevie Villalba along with you.    Sincerely,    Freedom Warren MD    Enclosure  CC:  No Recipients    If you would like to receive this communication electronically, please contact [email or web address] or [phone] to request more information on [employer] access.    For providers and/or their staff who would like to refer a patient to [facility], please contact us through our one-stop-shop provider referral line, [facility], at [phone].    If you feel you have received this communication in error or would no longer like to receive these types of communications, please e-mail [email or web address]
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
## 2020-08-13 ENCOUNTER — PROCEDURE VISIT (OUTPATIENT)
Dept: UROLOGY | Facility: CLINIC | Age: 65
End: 2020-08-13
Payer: COMMERCIAL

## 2020-08-13 VITALS
RESPIRATION RATE: 17 BRPM | HEIGHT: 70 IN | SYSTOLIC BLOOD PRESSURE: 139 MMHG | BODY MASS INDEX: 38.75 KG/M2 | TEMPERATURE: 99 F | HEART RATE: 80 BPM | WEIGHT: 270.69 LBS | DIASTOLIC BLOOD PRESSURE: 74 MMHG

## 2020-08-13 DIAGNOSIS — R97.20 ELEVATED PSA: ICD-10-CM

## 2020-08-13 PROCEDURE — 55700 PR BIOPSY OF PROSTATE,NEEDLE/PUNCH: CPT | Mod: S$GLB,,, | Performed by: UROLOGY

## 2020-08-13 PROCEDURE — 88341 PR IHC OR ICC EACH ADD'L SINGLE ANTIBODY  STAINPR: ICD-10-PCS | Mod: 26,,, | Performed by: PATHOLOGY

## 2020-08-13 PROCEDURE — 88341 IMHCHEM/IMCYTCHM EA ADD ANTB: CPT | Mod: 26,,, | Performed by: PATHOLOGY

## 2020-08-13 PROCEDURE — 88342 CHG IMMUNOCYTOCHEMISTRY: ICD-10-PCS | Mod: 26,,, | Performed by: PATHOLOGY

## 2020-08-13 PROCEDURE — 88342 IMHCHEM/IMCYTCHM 1ST ANTB: CPT | Performed by: PATHOLOGY

## 2020-08-13 PROCEDURE — 88341 IMHCHEM/IMCYTCHM EA ADD ANTB: CPT | Performed by: PATHOLOGY

## 2020-08-13 PROCEDURE — 76872 PR US TRANSRECTAL: ICD-10-PCS | Mod: 26,S$GLB,, | Performed by: UROLOGY

## 2020-08-13 PROCEDURE — 88342 IMHCHEM/IMCYTCHM 1ST ANTB: CPT | Mod: 26,,, | Performed by: PATHOLOGY

## 2020-08-13 PROCEDURE — 55700 PR BIOPSY OF PROSTATE,NEEDLE/PUNCH: ICD-10-PCS | Mod: S$GLB,,, | Performed by: UROLOGY

## 2020-08-13 PROCEDURE — 88305 TISSUE EXAM BY PATHOLOGIST: CPT | Mod: 26,,, | Performed by: PATHOLOGY

## 2020-08-13 PROCEDURE — 88305 TISSUE EXAM BY PATHOLOGIST: ICD-10-PCS | Mod: 26,,, | Performed by: PATHOLOGY

## 2020-08-13 PROCEDURE — 88305 TISSUE EXAM BY PATHOLOGIST: CPT | Mod: 59 | Performed by: PATHOLOGY

## 2020-08-13 PROCEDURE — 76872 US TRANSRECTAL: CPT | Mod: 26,S$GLB,, | Performed by: UROLOGY

## 2020-08-13 RX ORDER — LIDOCAINE HYDROCHLORIDE 10 MG/ML
20 INJECTION INFILTRATION; PERINEURAL ONCE
Status: COMPLETED | OUTPATIENT
Start: 2020-08-13 | End: 2020-08-13

## 2020-08-13 RX ORDER — LIDOCAINE HYDROCHLORIDE 20 MG/ML
JELLY TOPICAL ONCE
Status: COMPLETED | OUTPATIENT
Start: 2020-08-13 | End: 2020-08-13

## 2020-08-13 RX ADMIN — LIDOCAINE HYDROCHLORIDE 20 ML: 10 INJECTION INFILTRATION; PERINEURAL at 08:08

## 2020-08-13 RX ADMIN — LIDOCAINE HYDROCHLORIDE: 20 JELLY TOPICAL at 08:08

## 2020-08-13 NOTE — PATIENT INSTRUCTIONS

## 2020-08-13 NOTE — PROCEDURES
Transrectal Ultrasound w/ Biopsy    Date/Time: 8/13/2020 8:30 AM  Performed by: Freedom Warren MD  Authorized by: Freedom Warren MD     Total Biopsies:  0     Procedure Date:  08/13/2020    Procedure:                                                                        Transrectal Ultrasound of the Prostate                                       Transrectal Ultrasound Guided Prostate Biopsy                                - With Pudendal Nerve Block                                                                                      Pre-OP Diagnosis:                                                                 Elevated PSA                                              Post-OP Diagnosis:                                                                Awaiting final pathology                                                Anesthesia:                                                                       Anesthesia Administered:                                                     Intrarectal instillation of 10 mL 2% lidocaine (Xylocaine) jelly.       Findings:                                                                         --- Transrectal Ultrasound of the Prostate ---                               Prostate measurements.                                                                                               PSA: Lab Results       Component                Value               Date                       PSA                      5.0 (H)             06/29/2020                   - Volume:41 gm.           PSA Density: 0.12                                                         yes minimal calcification in the prostate at the center close to the midline                         Description of Procedure:                                                         Informed Consent:                                                            - Risks, benefits and alternatives of procedure discussed with                patient and informed consent obtained.                                       Patient Position:                                                            - Left lateral.                                                              --- Transrectal Ultrasound of the Prostate ---                               Instruments:                                                                 - Kushal and Armin.                                                           --- Transrectal U/S Biopsy ---                                               Instruments:                                                                 - Spring-loaded gun used for biopsy.                                         Procedure Details:                                                           Biopsy Core Details:                                                         - Twelve core(s) in total.                                                   - Cores Taken From: Right apex x 2, right mid prostate x 2, right            base x 2, left apex x 2, left mid prostate x 2 and left base x 2.            Estimated Blood Loss:                                                        - Minimal.                                                                   Pathology Specimen:                                                          - The specimen sent.                                                    Complications:                                                                    No immediate complications.                                             Post-OP Plan:                                                                                            Patient was discharged home in a stable condition.         Medications: finish off cipro given.         Will call him with the bx result.          If fever or unable to void, RTC or emergency room immediately.                                           RTC 6 months with PSA.

## 2020-08-18 ENCOUNTER — TELEPHONE (OUTPATIENT)
Dept: UROLOGY | Facility: CLINIC | Age: 65
End: 2020-08-18

## 2020-08-18 DIAGNOSIS — C61 PROSTATE CANCER: ICD-10-CM

## 2020-08-18 LAB
FINAL PATHOLOGIC DIAGNOSIS: NORMAL
GROSS: NORMAL
Lab: NORMAL

## 2020-08-18 NOTE — TELEPHONE ENCOUNTER
Transrectal Ultrasound w/ Biopsy   Date/Time: 8/13/2020 8:30 AM  Performed by: Freedom Warren MD  Authorized by: Freedom Warren MD      Total Biopsies:  0      Procedure Date:  08/13/2020     Procedure:                                                                        Transrectal Ultrasound of the Prostate                                       Transrectal Ultrasound Guided Prostate Biopsy                                - With Pudendal Nerve Block                                                                                      Pre-OP Diagnosis:                                                                 Elevated PSA                                              Post-OP Diagnosis:                                                                Awaiting final pathology                                                Anesthesia:                                                                       Anesthesia Administered:                                                     Intrarectal instillation of 10 mL 2% lidocaine (Xylocaine) jelly.       Findings:                                                                         --- Transrectal Ultrasound of the Prostate ---                               Prostate measurements.                                                                                                          PSA: Lab Results       Component                Value               Date                       PSA                      5.0 (H)             06/29/2020                   - Volume:41 gm.           PSA Density: 0.12                                                         yes minimal calcification in the prostate at the center close to the midline                         Pathology  1.  Prostate, left apex, biopsy:   Prostatic adenocarcinoma, Grade Group 1, [place] score (3+3)=6   Tumor is present in 1 of 2 cores   The tumor measures 5 mm in length, approximately 20% of overall tissue   No  perineural invasion identified   Immunohistochemical stains for AMACR, HMWK, and P63 with appropriate controls   support the diagnosis.   2.  Prostate, left middle, biopsy:   Prostatic adenocarcinoma, Grade Group 1, [place]da score (3+3)=6   Tumor is present in 2 of 3 cores   The tumor measures 4 mm in length, approximately 16% of overall tissue   No perineural invasion identified   3.  Prostate, left base, biopsy:   Benign prostatic tissue   4.  Prostate, right apex, biopsy:   Benign prostatic tissue   5.  Prostate, right middle, biopsy:   Atypical small acinar proliferation (ASAP)   6.  Prostate, right base, biopsy:   Benign prostatic tissue, supported by immunohistochemical stains for AMACR,   HMWK, and p63 with appropriate controls.     Prostate cancer    newly diagnosed prostate cancer with [place] 3+3 involving the left side.  Unable to reach the pt and left a message.  Please schedule him for PCa talk.

## 2020-08-25 ENCOUNTER — PATIENT OUTREACH (OUTPATIENT)
Dept: ADMINISTRATIVE | Facility: OTHER | Age: 65
End: 2020-08-25

## 2020-08-27 ENCOUNTER — OFFICE VISIT (OUTPATIENT)
Dept: UROLOGY | Facility: CLINIC | Age: 65
End: 2020-08-27
Payer: COMMERCIAL

## 2020-08-27 VITALS
HEART RATE: 70 BPM | SYSTOLIC BLOOD PRESSURE: 119 MMHG | HEIGHT: 70 IN | WEIGHT: 264 LBS | BODY MASS INDEX: 37.8 KG/M2 | DIASTOLIC BLOOD PRESSURE: 69 MMHG

## 2020-08-27 DIAGNOSIS — C61 MALIGNANT NEOPLASM OF PROSTATE: ICD-10-CM

## 2020-08-27 DIAGNOSIS — C61 PROSTATE CANCER: Primary | ICD-10-CM

## 2020-08-27 PROCEDURE — 99999 PR PBB SHADOW E&M-EST. PATIENT-LVL IV: ICD-10-PCS | Mod: PBBFAC,,, | Performed by: UROLOGY

## 2020-08-27 PROCEDURE — 99215 OFFICE O/P EST HI 40 MIN: CPT | Mod: S$GLB,,, | Performed by: UROLOGY

## 2020-08-27 PROCEDURE — 99215 PR OFFICE/OUTPT VISIT, EST, LEVL V, 40-54 MIN: ICD-10-PCS | Mod: S$GLB,,, | Performed by: UROLOGY

## 2020-08-27 PROCEDURE — 99999 PR PBB SHADOW E&M-EST. PATIENT-LVL IV: CPT | Mod: PBBFAC,,, | Performed by: UROLOGY
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
## 2020-09-21 RX ORDER — AMITRIPTYLINE HYDROCHLORIDE 10 MG/1
10 TABLET, FILM COATED ORAL NIGHTLY PRN
Qty: 90 TABLET | Refills: 3 | Status: SHIPPED | OUTPATIENT
Start: 2020-09-21 | End: 2021-08-25

## 2020-11-30 ENCOUNTER — HOSPITAL ENCOUNTER (OUTPATIENT)
Dept: RADIOLOGY | Facility: HOSPITAL | Age: 65
Discharge: HOME | End: 2020-11-30
Attending: UROLOGY
Payer: COMMERCIAL

## 2020-11-30 DIAGNOSIS — C61 MALIGNANT NEOPLASM OF PROSTATE: ICD-10-CM

## 2020-11-30 DIAGNOSIS — C61 PROSTATE CANCER: ICD-10-CM

## 2020-11-30 LAB
CREAT SERPL-MCNC: 0.9 MG/DL (ref 0.5–1.4)
SAMPLE: NORMAL

## 2020-11-30 PROCEDURE — A9585 GADOBUTROL INJECTION: HCPCS | Performed by: UROLOGY

## 2020-11-30 PROCEDURE — 72197 MRI PROSTATE W W/O CONTRAST: ICD-10-PCS | Mod: 26,,, | Performed by: RADIOLOGY

## 2020-11-30 PROCEDURE — 72197 MRI PELVIS W/O & W/DYE: CPT | Mod: 26,,, | Performed by: RADIOLOGY

## 2020-11-30 PROCEDURE — 25500020 PHARM REV CODE 255: Performed by: UROLOGY

## 2020-11-30 PROCEDURE — 72197 MRI PELVIS W/O & W/DYE: CPT | Mod: TC

## 2020-11-30 RX ORDER — GADOBUTROL 604.72 MG/ML
10 INJECTION INTRAVENOUS
Status: COMPLETED | OUTPATIENT
Start: 2020-11-30 | End: 2020-11-30

## 2020-11-30 RX ADMIN — GADOBUTROL 10 ML: 604.72 INJECTION INTRAVENOUS at 02:11
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
## 2020-12-03 ENCOUNTER — OFFICE VISIT (OUTPATIENT)
Dept: UROLOGY | Facility: CLINIC | Age: 65
End: 2020-12-03
Payer: COMMERCIAL

## 2020-12-03 VITALS
HEART RATE: 72 BPM | SYSTOLIC BLOOD PRESSURE: 157 MMHG | BODY MASS INDEX: 34.9 KG/M2 | WEIGHT: 243.81 LBS | HEIGHT: 70 IN | DIASTOLIC BLOOD PRESSURE: 81 MMHG

## 2020-12-03 DIAGNOSIS — C61 PROSTATE CANCER: Primary | ICD-10-CM

## 2020-12-03 PROCEDURE — 99215 PR OFFICE/OUTPT VISIT, EST, LEVL V, 40-54 MIN: ICD-10-PCS | Mod: S$GLB,,, | Performed by: UROLOGY

## 2020-12-03 PROCEDURE — 99999 PR PBB SHADOW E&M-EST. PATIENT-LVL IV: ICD-10-PCS | Mod: PBBFAC,,, | Performed by: UROLOGY

## 2020-12-03 PROCEDURE — 99215 OFFICE O/P EST HI 40 MIN: CPT | Mod: S$GLB,,, | Performed by: UROLOGY

## 2020-12-03 PROCEDURE — 99999 PR PBB SHADOW E&M-EST. PATIENT-LVL IV: CPT | Mod: PBBFAC,,, | Performed by: UROLOGY

## 2020-12-03 RX ORDER — LIDOCAINE HYDROCHLORIDE 20 MG/ML
JELLY TOPICAL ONCE
Status: CANCELLED | OUTPATIENT
Start: 2020-12-03 | End: 2020-12-03

## 2020-12-03 RX ORDER — CEFTRIAXONE 1 G/1
1 INJECTION, POWDER, FOR SOLUTION INTRAMUSCULAR; INTRAVENOUS ONCE
Status: CANCELLED | OUTPATIENT
Start: 2020-12-03 | End: 2020-12-03

## 2020-12-03 RX ORDER — CIPROFLOXACIN 500 MG/1
500 TABLET ORAL 2 TIMES DAILY
Qty: 6 TABLET | Refills: 0 | Status: SHIPPED | OUTPATIENT
Start: 2020-12-03 | End: 2020-12-06

## 2020-12-03 RX ORDER — LIDOCAINE HYDROCHLORIDE 10 MG/ML
20 INJECTION INFILTRATION; PERINEURAL ONCE
Status: CANCELLED | OUTPATIENT
Start: 2020-12-03 | End: 2020-12-03

## 2020-12-03 RX ORDER — ENEMA 19; 7 G/133ML; G/133ML
1 ENEMA RECTAL ONCE
Qty: 1 ENEMA | Refills: 1 | Status: SHIPPED | OUTPATIENT
Start: 2020-12-03 | End: 2020-12-03

## 2020-12-16 ENCOUNTER — PATIENT MESSAGE (OUTPATIENT)
Dept: UROLOGY | Facility: CLINIC | Age: 65
End: 2020-12-16

## 2021-01-21 ENCOUNTER — PROCEDURE VISIT (OUTPATIENT)
Dept: UROLOGY | Facility: CLINIC | Age: 66
End: 2021-01-21
Payer: COMMERCIAL

## 2021-01-21 VITALS
HEART RATE: 77 BPM | BODY MASS INDEX: 34.16 KG/M2 | RESPIRATION RATE: 16 BRPM | DIASTOLIC BLOOD PRESSURE: 74 MMHG | WEIGHT: 238.63 LBS | HEIGHT: 70 IN | SYSTOLIC BLOOD PRESSURE: 152 MMHG | TEMPERATURE: 99 F

## 2021-01-21 DIAGNOSIS — C61 PROSTATE CANCER: Primary | ICD-10-CM

## 2021-01-21 DIAGNOSIS — R97.20 ELEVATED PSA: ICD-10-CM

## 2021-01-21 PROCEDURE — 88342 IMHCHEM/IMCYTCHM 1ST ANTB: CPT | Performed by: PATHOLOGY

## 2021-01-21 PROCEDURE — 76872 TRANSRECTAL ULTRASOUND W/ BIOPSY: ICD-10-PCS | Mod: 26,S$GLB,, | Performed by: UROLOGY

## 2021-01-21 PROCEDURE — 55700 TRANSRECTAL ULTRASOUND W/ BIOPSY: CPT | Mod: S$GLB,,, | Performed by: UROLOGY

## 2021-01-21 PROCEDURE — 88341 IMHCHEM/IMCYTCHM EA ADD ANTB: CPT | Mod: 26,,, | Performed by: PATHOLOGY

## 2021-01-21 PROCEDURE — 88305 TISSUE EXAM BY PATHOLOGIST: CPT | Mod: 26,,, | Performed by: PATHOLOGY

## 2021-01-21 PROCEDURE — 88342 IMHCHEM/IMCYTCHM 1ST ANTB: CPT | Mod: 26,,, | Performed by: PATHOLOGY

## 2021-01-21 PROCEDURE — 55700 TRANSRECTAL ULTRASOUND W/ BIOPSY: ICD-10-PCS | Mod: S$GLB,,, | Performed by: UROLOGY

## 2021-01-21 PROCEDURE — 88341 PR IHC OR ICC EACH ADD'L SINGLE ANTIBODY  STAINPR: ICD-10-PCS | Mod: 26,,, | Performed by: PATHOLOGY

## 2021-01-21 PROCEDURE — 88341 IMHCHEM/IMCYTCHM EA ADD ANTB: CPT | Mod: 59 | Performed by: PATHOLOGY

## 2021-01-21 PROCEDURE — 88342 CHG IMMUNOCYTOCHEMISTRY: ICD-10-PCS | Mod: 26,,, | Performed by: PATHOLOGY

## 2021-01-21 PROCEDURE — 88305 TISSUE EXAM BY PATHOLOGIST: CPT | Performed by: PATHOLOGY

## 2021-01-21 PROCEDURE — 76872 US TRANSRECTAL: CPT | Mod: 26,S$GLB,, | Performed by: UROLOGY

## 2021-01-21 PROCEDURE — 88305 TISSUE EXAM BY PATHOLOGIST: ICD-10-PCS | Mod: 26,,, | Performed by: PATHOLOGY

## 2021-01-21 RX ORDER — LIDOCAINE HYDROCHLORIDE 10 MG/ML
20 INJECTION INFILTRATION; PERINEURAL ONCE
Status: COMPLETED | OUTPATIENT
Start: 2021-01-21 | End: 2021-01-21

## 2021-01-21 RX ORDER — CEFTRIAXONE 1 G/1
1 INJECTION, POWDER, FOR SOLUTION INTRAMUSCULAR; INTRAVENOUS ONCE
Status: COMPLETED | OUTPATIENT
Start: 2021-01-21 | End: 2021-01-21

## 2021-01-21 RX ORDER — LIDOCAINE HYDROCHLORIDE 20 MG/ML
JELLY TOPICAL ONCE
Status: COMPLETED | OUTPATIENT
Start: 2021-01-21 | End: 2021-01-21

## 2021-01-21 RX ADMIN — LIDOCAINE HYDROCHLORIDE: 20 JELLY TOPICAL at 08:01

## 2021-01-21 RX ADMIN — CEFTRIAXONE 1 G: 1 INJECTION, POWDER, FOR SOLUTION INTRAMUSCULAR; INTRAVENOUS at 08:01

## 2021-01-21 RX ADMIN — LIDOCAINE HYDROCHLORIDE 20 ML: 10 INJECTION INFILTRATION; PERINEURAL at 08:01

## 2021-01-28 ENCOUNTER — TELEPHONE (OUTPATIENT)
Dept: UROLOGY | Facility: CLINIC | Age: 66
End: 2021-01-28

## 2021-01-28 DIAGNOSIS — C61 PROSTATE CANCER: Primary | ICD-10-CM

## 2021-01-28 LAB
FINAL PATHOLOGIC DIAGNOSIS: NORMAL
GROSS: NORMAL
Lab: NORMAL

## 2021-01-29 ENCOUNTER — OFFICE VISIT (OUTPATIENT)
Dept: UROLOGY | Facility: CLINIC | Age: 66
End: 2021-01-29
Payer: COMMERCIAL

## 2021-01-29 DIAGNOSIS — C61 PROSTATE CANCER: Primary | ICD-10-CM

## 2021-01-29 PROCEDURE — 99215 PR OFFICE/OUTPT VISIT, EST, LEVL V, 40-54 MIN: ICD-10-PCS | Mod: 95,,, | Performed by: UROLOGY

## 2021-01-29 PROCEDURE — 99215 OFFICE O/P EST HI 40 MIN: CPT | Mod: 95,,, | Performed by: UROLOGY

## 2021-02-03 DIAGNOSIS — E11.9 TYPE 2 DIABETES MELLITUS WITHOUT COMPLICATION: ICD-10-CM

## 2021-02-11 ENCOUNTER — OFFICE VISIT (OUTPATIENT)
Dept: RADIATION ONCOLOGY | Facility: CLINIC | Age: 66
End: 2021-02-11
Payer: COMMERCIAL

## 2021-02-11 VITALS
BODY MASS INDEX: 33.93 KG/M2 | SYSTOLIC BLOOD PRESSURE: 154 MMHG | DIASTOLIC BLOOD PRESSURE: 73 MMHG | TEMPERATURE: 98 F | HEART RATE: 80 BPM | WEIGHT: 237 LBS | RESPIRATION RATE: 18 BRPM | HEIGHT: 70 IN

## 2021-02-11 DIAGNOSIS — C61 PROSTATE CANCER: Primary | ICD-10-CM

## 2021-02-11 PROCEDURE — 3077F PR MOST RECENT SYSTOLIC BLOOD PRESSURE >= 140 MM HG: ICD-10-PCS | Mod: CPTII,S$GLB,, | Performed by: RADIOLOGY

## 2021-02-11 PROCEDURE — 99999 PR PBB SHADOW E&M-EST. PATIENT-LVL IV: CPT | Mod: PBBFAC,,, | Performed by: RADIOLOGY

## 2021-02-11 PROCEDURE — 3078F DIAST BP <80 MM HG: CPT | Mod: CPTII,S$GLB,, | Performed by: RADIOLOGY

## 2021-02-11 PROCEDURE — 99204 OFFICE O/P NEW MOD 45 MIN: CPT | Mod: S$GLB,,, | Performed by: RADIOLOGY

## 2021-02-11 PROCEDURE — 99204 PR OFFICE/OUTPT VISIT, NEW, LEVL IV, 45-59 MIN: ICD-10-PCS | Mod: S$GLB,,, | Performed by: RADIOLOGY

## 2021-02-11 PROCEDURE — 3078F PR MOST RECENT DIASTOLIC BLOOD PRESSURE < 80 MM HG: ICD-10-PCS | Mod: CPTII,S$GLB,, | Performed by: RADIOLOGY

## 2021-02-11 PROCEDURE — 1126F AMNT PAIN NOTED NONE PRSNT: CPT | Mod: S$GLB,,, | Performed by: RADIOLOGY

## 2021-02-11 PROCEDURE — 3077F SYST BP >= 140 MM HG: CPT | Mod: CPTII,S$GLB,, | Performed by: RADIOLOGY

## 2021-02-11 PROCEDURE — 1126F PR PAIN SEVERITY QUANTIFIED, NO PAIN PRESENT: ICD-10-PCS | Mod: S$GLB,,, | Performed by: RADIOLOGY

## 2021-02-11 PROCEDURE — 99999 PR PBB SHADOW E&M-EST. PATIENT-LVL IV: ICD-10-PCS | Mod: PBBFAC,,, | Performed by: RADIOLOGY

## 2021-02-11 PROCEDURE — 3008F PR BODY MASS INDEX (BMI) DOCUMENTED: ICD-10-PCS | Mod: CPTII,S$GLB,, | Performed by: RADIOLOGY

## 2021-02-11 PROCEDURE — 3008F BODY MASS INDEX DOCD: CPT | Mod: CPTII,S$GLB,, | Performed by: RADIOLOGY

## 2021-02-18 ENCOUNTER — CLINICAL SUPPORT (OUTPATIENT)
Dept: URGENT CARE | Facility: CLINIC | Age: 66
End: 2021-02-18
Payer: COMMERCIAL

## 2021-02-18 DIAGNOSIS — Z11.59 ENCOUNTER FOR SCREENING FOR OTHER VIRAL DISEASES: Primary | ICD-10-CM

## 2021-02-18 LAB
CTP QC/QA: YES
SARS-COV-2 RDRP RESP QL NAA+PROBE: NEGATIVE

## 2021-02-18 PROCEDURE — U0002 COVID-19 LAB TEST NON-CDC: HCPCS | Mod: QW,S$GLB,, | Performed by: STUDENT IN AN ORGANIZED HEALTH CARE EDUCATION/TRAINING PROGRAM

## 2021-02-18 PROCEDURE — U0002: ICD-10-PCS | Mod: QW,S$GLB,, | Performed by: STUDENT IN AN ORGANIZED HEALTH CARE EDUCATION/TRAINING PROGRAM

## 2021-02-26 ENCOUNTER — IMMUNIZATION (OUTPATIENT)
Dept: INTERNAL MEDICINE | Facility: CLINIC | Age: 66
End: 2021-02-26
Payer: COMMERCIAL

## 2021-02-26 DIAGNOSIS — Z23 NEED FOR VACCINATION: Primary | ICD-10-CM

## 2021-02-26 PROCEDURE — 91300 COVID-19, MRNA, LNP-S, PF, 30 MCG/0.3 ML DOSE VACCINE: CPT | Mod: PBBFAC | Performed by: INTERNAL MEDICINE

## 2021-03-11 ENCOUNTER — TELEPHONE (OUTPATIENT)
Dept: UROLOGY | Facility: CLINIC | Age: 66
End: 2021-03-11

## 2021-03-11 DIAGNOSIS — C61 PROSTATE CANCER: Primary | ICD-10-CM

## 2021-03-19 ENCOUNTER — IMMUNIZATION (OUTPATIENT)
Dept: INTERNAL MEDICINE | Facility: CLINIC | Age: 66
End: 2021-03-19
Payer: COMMERCIAL

## 2021-03-19 DIAGNOSIS — Z23 NEED FOR VACCINATION: Primary | ICD-10-CM

## 2021-03-19 PROCEDURE — 91300 COVID-19, MRNA, LNP-S, PF, 30 MCG/0.3 ML DOSE VACCINE: CPT | Mod: PBBFAC | Performed by: INTERNAL MEDICINE

## 2021-03-19 PROCEDURE — 0002A COVID-19, MRNA, LNP-S, PF, 30 MCG/0.3 ML DOSE VACCINE: CPT | Mod: PBBFAC | Performed by: INTERNAL MEDICINE

## 2021-04-01 ENCOUNTER — HOSPITAL ENCOUNTER (OUTPATIENT)
Dept: RADIATION THERAPY | Facility: HOSPITAL | Age: 66
Discharge: HOME | End: 2021-04-01
Attending: RADIOLOGY
Payer: MEDICARE

## 2021-04-08 ENCOUNTER — ANESTHESIA EVENT (OUTPATIENT)
Dept: SURGERY | Facility: HOSPITAL | Age: 66
End: 2021-04-08
Payer: MEDICARE

## 2021-04-09 ENCOUNTER — TELEPHONE (OUTPATIENT)
Dept: PREADMISSION TESTING | Facility: HOSPITAL | Age: 66
End: 2021-04-09

## 2021-04-11 ENCOUNTER — LAB VISIT (OUTPATIENT)
Dept: URGENT CARE | Facility: CLINIC | Age: 66
End: 2021-04-11
Payer: MEDICARE

## 2021-04-11 DIAGNOSIS — C61 PROSTATE CANCER: ICD-10-CM

## 2021-04-11 PROCEDURE — U0003 INFECTIOUS AGENT DETECTION BY NUCLEIC ACID (DNA OR RNA); SEVERE ACUTE RESPIRATORY SYNDROME CORONAVIRUS 2 (SARS-COV-2) (CORONAVIRUS DISEASE [COVID-19]), AMPLIFIED PROBE TECHNIQUE, MAKING USE OF HIGH THROUGHPUT TECHNOLOGIES AS DESCRIBED BY CMS-2020-01-R: HCPCS | Performed by: UROLOGY

## 2021-04-11 PROCEDURE — U0005 INFEC AGEN DETEC AMPLI PROBE: HCPCS | Performed by: UROLOGY

## 2021-04-12 LAB — SARS-COV-2 RNA RESP QL NAA+PROBE: NOT DETECTED

## 2021-04-13 ENCOUNTER — TELEPHONE (OUTPATIENT)
Dept: UROLOGY | Facility: CLINIC | Age: 66
End: 2021-04-13

## 2021-04-13 ENCOUNTER — LAB VISIT (OUTPATIENT)
Dept: LAB | Facility: HOSPITAL | Age: 66
End: 2021-04-13
Attending: ANESTHESIOLOGY
Payer: MEDICARE

## 2021-04-13 ENCOUNTER — PATIENT MESSAGE (OUTPATIENT)
Dept: SURGERY | Facility: HOSPITAL | Age: 66
End: 2021-04-13

## 2021-04-13 DIAGNOSIS — Z01.818 PREOP TESTING: ICD-10-CM

## 2021-04-13 LAB
ANION GAP SERPL CALC-SCNC: 11 MMOL/L (ref 8–16)
BUN SERPL-MCNC: 11 MG/DL (ref 8–23)
CALCIUM SERPL-MCNC: 9 MG/DL (ref 8.7–10.5)
CHLORIDE SERPL-SCNC: 103 MMOL/L (ref 95–110)
CO2 SERPL-SCNC: 26 MMOL/L (ref 23–29)
CREAT SERPL-MCNC: 0.8 MG/DL (ref 0.5–1.4)
ERYTHROCYTE [DISTWIDTH] IN BLOOD BY AUTOMATED COUNT: 12.4 % (ref 11.5–14.5)
EST. GFR  (AFRICAN AMERICAN): >60 ML/MIN/1.73 M^2
EST. GFR  (NON AFRICAN AMERICAN): >60 ML/MIN/1.73 M^2
GLUCOSE SERPL-MCNC: 100 MG/DL (ref 70–110)
HCT VFR BLD AUTO: 41.4 % (ref 40–54)
HGB BLD-MCNC: 14.4 G/DL (ref 14–18)
MCH RBC QN AUTO: 32.9 PG (ref 27–31)
MCHC RBC AUTO-ENTMCNC: 34.8 G/DL (ref 32–36)
MCV RBC AUTO: 95 FL (ref 82–98)
PLATELET # BLD AUTO: 238 K/UL (ref 150–450)
PMV BLD AUTO: 9.6 FL (ref 9.2–12.9)
POTASSIUM SERPL-SCNC: 3.6 MMOL/L (ref 3.5–5.1)
RBC # BLD AUTO: 4.38 M/UL (ref 4.6–6.2)
SODIUM SERPL-SCNC: 140 MMOL/L (ref 136–145)
WBC # BLD AUTO: 10.26 K/UL (ref 3.9–12.7)

## 2021-04-13 PROCEDURE — 77295 3-D RADIOTHERAPY PLAN: CPT | Mod: TC | Performed by: RADIOLOGY

## 2021-04-13 PROCEDURE — 36415 COLL VENOUS BLD VENIPUNCTURE: CPT | Performed by: ANESTHESIOLOGY

## 2021-04-13 PROCEDURE — 80048 BASIC METABOLIC PNL TOTAL CA: CPT | Performed by: ANESTHESIOLOGY

## 2021-04-13 PROCEDURE — 77263 PR  RADIATION THERAPY PLAN COMPLEX: ICD-10-PCS | Mod: ,,, | Performed by: RADIOLOGY

## 2021-04-13 PROCEDURE — 77790 RADIATION HANDLING: CPT | Mod: TC | Performed by: RADIOLOGY

## 2021-04-13 PROCEDURE — 77295 PR 3D RADIOTHERAPY PLAN: ICD-10-PCS | Mod: 26,,, | Performed by: RADIOLOGY

## 2021-04-13 PROCEDURE — 77263 THER RADIOLOGY TX PLNG CPLX: CPT | Mod: ,,, | Performed by: RADIOLOGY

## 2021-04-13 PROCEDURE — 85027 COMPLETE CBC AUTOMATED: CPT | Performed by: ANESTHESIOLOGY

## 2021-04-13 PROCEDURE — 77295 3-D RADIOTHERAPY PLAN: CPT | Mod: 26,,, | Performed by: RADIOLOGY

## 2021-04-14 ENCOUNTER — HOSPITAL ENCOUNTER (OUTPATIENT)
Dept: RADIOLOGY | Facility: HOSPITAL | Age: 66
Discharge: HOME | End: 2021-04-14
Attending: UROLOGY | Admitting: UROLOGY
Payer: MEDICARE

## 2021-04-14 ENCOUNTER — HOSPITAL ENCOUNTER (OUTPATIENT)
Facility: HOSPITAL | Age: 66
Discharge: HOME | End: 2021-04-14
Attending: UROLOGY | Admitting: UROLOGY
Payer: MEDICARE

## 2021-04-14 ENCOUNTER — ANESTHESIA (OUTPATIENT)
Dept: SURGERY | Facility: HOSPITAL | Age: 66
End: 2021-04-14
Payer: MEDICARE

## 2021-04-14 VITALS
HEART RATE: 58 BPM | RESPIRATION RATE: 20 BRPM | OXYGEN SATURATION: 96 % | BODY MASS INDEX: 34.96 KG/M2 | WEIGHT: 236 LBS | SYSTOLIC BLOOD PRESSURE: 147 MMHG | TEMPERATURE: 98 F | HEIGHT: 69 IN | DIASTOLIC BLOOD PRESSURE: 90 MMHG

## 2021-04-14 DIAGNOSIS — C61 PROSTATE CANCER: Primary | ICD-10-CM

## 2021-04-14 DIAGNOSIS — C61 PROSTATE CANCER: ICD-10-CM

## 2021-04-14 PROCEDURE — C2640 BRACHYTX, STRANDED, P-103: HCPCS | Performed by: RADIOLOGY

## 2021-04-14 PROCEDURE — 37000008 HC ANESTHESIA 1ST 15 MINUTES: Performed by: UROLOGY

## 2021-04-14 PROCEDURE — 25000003 PHARM REV CODE 250: Performed by: UROLOGY

## 2021-04-14 PROCEDURE — 71000016 HC POSTOP RECOV ADDL HR: Performed by: UROLOGY

## 2021-04-14 PROCEDURE — D9220A PRA ANESTHESIA: Mod: ANES,,, | Performed by: ANESTHESIOLOGY

## 2021-04-14 PROCEDURE — 77470 SPECIAL RADIATION TREATMENT: CPT | Mod: 59,TC | Performed by: RADIOLOGY

## 2021-04-14 PROCEDURE — 76965 CHG SONO GUIDE RADIOELEMT APPLIC: ICD-10-PCS | Mod: 26,,, | Performed by: UROLOGY

## 2021-04-14 PROCEDURE — 36000705 HC OR TIME LEV I EA ADD 15 MIN: Performed by: UROLOGY

## 2021-04-14 PROCEDURE — D9220A PRA ANESTHESIA: ICD-10-PCS | Mod: ANES,,, | Performed by: ANESTHESIOLOGY

## 2021-04-14 PROCEDURE — D9220A PRA ANESTHESIA: Mod: CRNA,,, | Performed by: NURSE ANESTHETIST, CERTIFIED REGISTERED

## 2021-04-14 PROCEDURE — 77470 PR  SPECIAL RADIATION TREATMENT: ICD-10-PCS | Mod: 26,59,, | Performed by: RADIOLOGY

## 2021-04-14 PROCEDURE — 77370 RADIATION PHYSICS CONSULT: CPT | Performed by: RADIOLOGY

## 2021-04-14 PROCEDURE — 55875 PR PERCUT/NEEDLE INSERT,PROSTATE,RADIOISOT: ICD-10-PCS | Mod: ,,, | Performed by: UROLOGY

## 2021-04-14 PROCEDURE — C1715 BRACHYTHERAPY NEEDLE: HCPCS | Performed by: UROLOGY

## 2021-04-14 PROCEDURE — 76965 ECHO GUIDANCE RADIOTHERAPY: CPT | Mod: 26,,, | Performed by: UROLOGY

## 2021-04-14 PROCEDURE — 77778 APPLY INTERSTIT RADIAT COMPL: CPT | Mod: 26,,, | Performed by: RADIOLOGY

## 2021-04-14 PROCEDURE — 63600175 PHARM REV CODE 636 W HCPCS: Performed by: STUDENT IN AN ORGANIZED HEALTH CARE EDUCATION/TRAINING PROGRAM

## 2021-04-14 PROCEDURE — 71000015 HC POSTOP RECOV 1ST HR: Performed by: UROLOGY

## 2021-04-14 PROCEDURE — 55875 TRANSPERI NEEDLE PLACE PROS: CPT | Mod: ,,, | Performed by: UROLOGY

## 2021-04-14 PROCEDURE — 25000003 PHARM REV CODE 250: Performed by: NURSE ANESTHETIST, CERTIFIED REGISTERED

## 2021-04-14 PROCEDURE — 77300 RADIATION THERAPY DOSE PLAN: CPT | Mod: TC | Performed by: RADIOLOGY

## 2021-04-14 PROCEDURE — 77014 CT GUIDANCE RADIATION THERAPY FIELD: CPT | Mod: TC

## 2021-04-14 PROCEDURE — 71000044 HC DOSC ROUTINE RECOVERY FIRST HOUR: Performed by: UROLOGY

## 2021-04-14 PROCEDURE — 77778 PR 77778 - APPLY INTERSTITIAL RADIATION COMPLEX: ICD-10-PCS | Mod: 26,,, | Performed by: RADIOLOGY

## 2021-04-14 PROCEDURE — 77778 APPLY INTERSTIT RADIAT COMPL: CPT | Mod: TC | Performed by: RADIOLOGY

## 2021-04-14 PROCEDURE — D9220A PRA ANESTHESIA: ICD-10-PCS | Mod: CRNA,,, | Performed by: NURSE ANESTHETIST, CERTIFIED REGISTERED

## 2021-04-14 PROCEDURE — 37000009 HC ANESTHESIA EA ADD 15 MINS: Performed by: UROLOGY

## 2021-04-14 PROCEDURE — 63600175 PHARM REV CODE 636 W HCPCS: Performed by: NURSE ANESTHETIST, CERTIFIED REGISTERED

## 2021-04-14 PROCEDURE — 36000704 HC OR TIME LEV I 1ST 15 MIN: Performed by: UROLOGY

## 2021-04-14 PROCEDURE — 77470 SPECIAL RADIATION TREATMENT: CPT | Mod: 26,59,, | Performed by: RADIOLOGY

## 2021-04-14 DEVICE — NDL W/PD103 SEEDS STERILE: Type: IMPLANTABLE DEVICE | Site: PERIANAL | Status: FUNCTIONAL

## 2021-04-14 RX ORDER — SODIUM CHLORIDE 9 MG/ML
INJECTION, SOLUTION INTRAVENOUS CONTINUOUS
Status: DISCONTINUED | OUTPATIENT
Start: 2021-04-14 | End: 2021-04-14 | Stop reason: HOSPADM

## 2021-04-14 RX ORDER — SODIUM CHLORIDE 9 MG/ML
INJECTION, SOLUTION INTRAVENOUS CONTINUOUS PRN
Status: DISCONTINUED | OUTPATIENT
Start: 2021-04-14 | End: 2021-04-14

## 2021-04-14 RX ORDER — METHYLPREDNISOLONE 4 MG/1
TABLET ORAL
Qty: 21 TABLET | Refills: 0 | Status: SHIPPED | OUTPATIENT
Start: 2021-04-14 | End: 2021-08-25

## 2021-04-14 RX ORDER — PROPOFOL 10 MG/ML
VIAL (ML) INTRAVENOUS
Status: DISCONTINUED | OUTPATIENT
Start: 2021-04-14 | End: 2021-04-14

## 2021-04-14 RX ORDER — CIPROFLOXACIN 500 MG/1
500 TABLET ORAL EVERY 12 HOURS
Qty: 14 TABLET | Refills: 0 | Status: SHIPPED | OUTPATIENT
Start: 2021-04-14 | End: 2021-08-25 | Stop reason: ALTCHOICE

## 2021-04-14 RX ORDER — MIDAZOLAM HYDROCHLORIDE 1 MG/ML
INJECTION INTRAMUSCULAR; INTRAVENOUS
Status: DISCONTINUED | OUTPATIENT
Start: 2021-04-14 | End: 2021-04-14

## 2021-04-14 RX ORDER — DEXAMETHASONE SODIUM PHOSPHATE 4 MG/ML
INJECTION, SOLUTION INTRA-ARTICULAR; INTRALESIONAL; INTRAMUSCULAR; INTRAVENOUS; SOFT TISSUE
Status: DISCONTINUED | OUTPATIENT
Start: 2021-04-14 | End: 2021-04-14

## 2021-04-14 RX ORDER — LIDOCAINE HYDROCHLORIDE 10 MG/ML
1 INJECTION, SOLUTION EPIDURAL; INFILTRATION; INTRACAUDAL; PERINEURAL ONCE
Status: DISCONTINUED | OUTPATIENT
Start: 2021-04-14 | End: 2021-04-14 | Stop reason: HOSPADM

## 2021-04-14 RX ORDER — FENTANYL CITRATE 50 UG/ML
INJECTION, SOLUTION INTRAMUSCULAR; INTRAVENOUS
Status: DISCONTINUED | OUTPATIENT
Start: 2021-04-14 | End: 2021-04-14

## 2021-04-14 RX ORDER — HYDROMORPHONE HYDROCHLORIDE 1 MG/ML
0.2 INJECTION, SOLUTION INTRAMUSCULAR; INTRAVENOUS; SUBCUTANEOUS EVERY 5 MIN PRN
Status: DISCONTINUED | OUTPATIENT
Start: 2021-04-14 | End: 2021-04-14 | Stop reason: HOSPADM

## 2021-04-14 RX ORDER — HYDROCODONE BITARTRATE AND ACETAMINOPHEN 5; 325 MG/1; MG/1
1 TABLET ORAL ONCE AS NEEDED
Status: COMPLETED | OUTPATIENT
Start: 2021-04-14 | End: 2021-04-14

## 2021-04-14 RX ORDER — ONDANSETRON 2 MG/ML
INJECTION INTRAMUSCULAR; INTRAVENOUS
Status: DISCONTINUED | OUTPATIENT
Start: 2021-04-14 | End: 2021-04-14

## 2021-04-14 RX ORDER — IBUPROFEN 800 MG/1
800 TABLET ORAL EVERY 6 HOURS PRN
Qty: 10 TABLET | Refills: 0 | Status: SHIPPED | OUTPATIENT
Start: 2021-04-14 | End: 2022-02-02

## 2021-04-14 RX ORDER — CEFTRIAXONE 1 G/1
1 INJECTION, POWDER, FOR SOLUTION INTRAMUSCULAR; INTRAVENOUS
Status: COMPLETED | OUTPATIENT
Start: 2021-04-14 | End: 2021-04-14

## 2021-04-14 RX ORDER — LIDOCAINE HYDROCHLORIDE 20 MG/ML
JELLY TOPICAL
Status: DISCONTINUED | OUTPATIENT
Start: 2021-04-14 | End: 2021-04-14 | Stop reason: HOSPADM

## 2021-04-14 RX ORDER — FENTANYL CITRATE 50 UG/ML
50 INJECTION, SOLUTION INTRAMUSCULAR; INTRAVENOUS EVERY 5 MIN PRN
Status: DISCONTINUED | OUTPATIENT
Start: 2021-04-14 | End: 2021-04-14 | Stop reason: HOSPADM

## 2021-04-14 RX ORDER — DEXMEDETOMIDINE HYDROCHLORIDE 100 UG/ML
INJECTION, SOLUTION INTRAVENOUS
Status: DISCONTINUED | OUTPATIENT
Start: 2021-04-14 | End: 2021-04-14

## 2021-04-14 RX ORDER — TAMSULOSIN HYDROCHLORIDE 0.4 MG/1
0.4 CAPSULE ORAL DAILY
Qty: 30 CAPSULE | Refills: 2 | Status: SHIPPED | OUTPATIENT
Start: 2021-04-14 | End: 2023-09-25

## 2021-04-14 RX ORDER — LIDOCAINE HCL/PF 100 MG/5ML
SYRINGE (ML) INTRAVENOUS
Status: DISCONTINUED | OUTPATIENT
Start: 2021-04-14 | End: 2021-04-14

## 2021-04-14 RX ORDER — SODIUM CHLORIDE 0.9 % (FLUSH) 0.9 %
3 SYRINGE (ML) INJECTION
Status: DISCONTINUED | OUTPATIENT
Start: 2021-04-14 | End: 2021-04-14 | Stop reason: HOSPADM

## 2021-04-14 RX ORDER — HYDROCODONE BITARTRATE AND ACETAMINOPHEN 5; 325 MG/1; MG/1
1 TABLET ORAL EVERY 6 HOURS PRN
Qty: 6 TABLET | Refills: 0 | Status: SHIPPED | OUTPATIENT
Start: 2021-04-14 | End: 2021-08-25

## 2021-04-14 RX ADMIN — LIDOCAINE HYDROCHLORIDE 100 MG: 20 INJECTION, SOLUTION INTRAVENOUS at 12:04

## 2021-04-14 RX ADMIN — DEXMEDETOMIDINE HYDROCHLORIDE 4 MCG: 100 INJECTION, SOLUTION, CONCENTRATE INTRAVENOUS at 12:04

## 2021-04-14 RX ADMIN — ONDANSETRON 4 MG: 2 INJECTION, SOLUTION INTRAMUSCULAR; INTRAVENOUS at 12:04

## 2021-04-14 RX ADMIN — DEXAMETHASONE SODIUM PHOSPHATE 4 MG: 4 INJECTION, SOLUTION INTRAMUSCULAR; INTRAVENOUS at 12:04

## 2021-04-14 RX ADMIN — MIDAZOLAM HYDROCHLORIDE 2 MG: 1 INJECTION, SOLUTION INTRAMUSCULAR; INTRAVENOUS at 12:04

## 2021-04-14 RX ADMIN — CEFTRIAXONE SODIUM 1 G: 1 INJECTION, POWDER, FOR SOLUTION INTRAMUSCULAR; INTRAVENOUS at 12:04

## 2021-04-14 RX ADMIN — GLYCOPYRROLATE 0.2 MG: 0.2 INJECTION, SOLUTION INTRAMUSCULAR; INTRAVITREAL at 12:04

## 2021-04-14 RX ADMIN — HYDROCODONE BITARTRATE AND ACETAMINOPHEN 1 TABLET: 5; 325 TABLET ORAL at 01:04

## 2021-04-14 RX ADMIN — FENTANYL CITRATE 50 MCG: 50 INJECTION, SOLUTION INTRAMUSCULAR; INTRAVENOUS at 12:04

## 2021-04-14 RX ADMIN — SODIUM CHLORIDE: 0.9 INJECTION, SOLUTION INTRAVENOUS at 11:04

## 2021-04-14 RX ADMIN — PROPOFOL 200 MG: 10 INJECTION, EMULSION INTRAVENOUS at 12:04

## 2021-04-14 RX ADMIN — FENTANYL CITRATE 50 MCG: 50 INJECTION, SOLUTION INTRAMUSCULAR; INTRAVENOUS at 01:04

## 2021-04-16 ENCOUNTER — TELEPHONE (OUTPATIENT)
Dept: RADIATION ONCOLOGY | Facility: CLINIC | Age: 66
End: 2021-04-16

## 2021-04-20 PROCEDURE — 77318 BRACHYTX ISODOSE COMPLEX: CPT | Mod: TC | Performed by: RADIOLOGY

## 2021-04-20 PROCEDURE — 77318 PR BRACYTHERAPY ISODOSE PLAN; COMPLEX: ICD-10-PCS | Mod: 26,,, | Performed by: RADIOLOGY

## 2021-04-20 PROCEDURE — 77318 BRACHYTX ISODOSE COMPLEX: CPT | Mod: 26,,, | Performed by: RADIOLOGY

## 2021-04-29 ENCOUNTER — OFFICE VISIT (OUTPATIENT)
Dept: RADIATION ONCOLOGY | Facility: CLINIC | Age: 66
End: 2021-04-29
Payer: MEDICARE

## 2021-04-29 VITALS
WEIGHT: 234.69 LBS | BODY MASS INDEX: 33.6 KG/M2 | SYSTOLIC BLOOD PRESSURE: 136 MMHG | HEART RATE: 71 BPM | DIASTOLIC BLOOD PRESSURE: 68 MMHG | HEIGHT: 70 IN | RESPIRATION RATE: 18 BRPM | TEMPERATURE: 99 F

## 2021-04-29 DIAGNOSIS — C61 PROSTATE CANCER: Primary | ICD-10-CM

## 2021-04-29 PROCEDURE — 1101F PR PT FALLS ASSESS DOC 0-1 FALLS W/OUT INJ PAST YR: ICD-10-PCS | Mod: CPTII,S$GLB,, | Performed by: RADIOLOGY

## 2021-04-29 PROCEDURE — 99499 UNLISTED E&M SERVICE: CPT | Mod: HCNC,S$GLB,, | Performed by: RADIOLOGY

## 2021-04-29 PROCEDURE — 3008F PR BODY MASS INDEX (BMI) DOCUMENTED: ICD-10-PCS | Mod: CPTII,S$GLB,, | Performed by: RADIOLOGY

## 2021-04-29 PROCEDURE — 1101F PT FALLS ASSESS-DOCD LE1/YR: CPT | Mod: CPTII,S$GLB,, | Performed by: RADIOLOGY

## 2021-04-29 PROCEDURE — 99999 PR PBB SHADOW E&M-EST. PATIENT-LVL V: CPT | Mod: PBBFAC,,, | Performed by: RADIOLOGY

## 2021-04-29 PROCEDURE — 99499 UNLISTED E&M SERVICE: CPT | Mod: S$GLB,,, | Performed by: RADIOLOGY

## 2021-04-29 PROCEDURE — 3288F PR FALLS RISK ASSESSMENT DOCUMENTED: ICD-10-PCS | Mod: CPTII,S$GLB,, | Performed by: RADIOLOGY

## 2021-04-29 PROCEDURE — 99499 RISK ADDL DX/OHS AUDIT: ICD-10-PCS | Mod: HCNC,S$GLB,, | Performed by: RADIOLOGY

## 2021-04-29 PROCEDURE — 99999 PR PBB SHADOW E&M-EST. PATIENT-LVL V: ICD-10-PCS | Mod: PBBFAC,,, | Performed by: RADIOLOGY

## 2021-04-29 PROCEDURE — 3008F BODY MASS INDEX DOCD: CPT | Mod: CPTII,S$GLB,, | Performed by: RADIOLOGY

## 2021-04-29 PROCEDURE — 3288F FALL RISK ASSESSMENT DOCD: CPT | Mod: CPTII,S$GLB,, | Performed by: RADIOLOGY

## 2021-07-24 RX ORDER — ATORVASTATIN CALCIUM 40 MG/1
TABLET, FILM COATED ORAL
Qty: 30 TABLET | Refills: 0 | Status: SHIPPED | OUTPATIENT
Start: 2021-07-24 | End: 2021-08-18

## 2021-07-24 RX ORDER — AMLODIPINE BESYLATE 10 MG/1
TABLET ORAL
Qty: 30 TABLET | Refills: 0 | Status: SHIPPED | OUTPATIENT
Start: 2021-07-24 | End: 2021-08-18

## 2021-07-24 RX ORDER — HYDROCHLOROTHIAZIDE 25 MG/1
TABLET ORAL
Qty: 30 TABLET | Refills: 0 | Status: SHIPPED | OUTPATIENT
Start: 2021-07-24 | End: 2021-08-18

## 2021-07-26 ENCOUNTER — LAB VISIT (OUTPATIENT)
Dept: LAB | Facility: HOSPITAL | Age: 66
End: 2021-07-26
Attending: RADIOLOGY
Payer: MEDICARE

## 2021-07-26 DIAGNOSIS — C61 PROSTATE CANCER: ICD-10-CM

## 2021-07-26 LAB — COMPLEXED PSA SERPL-MCNC: 3.1 NG/ML (ref 0–4)

## 2021-07-26 PROCEDURE — 36415 COLL VENOUS BLD VENIPUNCTURE: CPT | Performed by: RADIOLOGY

## 2021-07-26 PROCEDURE — 84153 ASSAY OF PSA TOTAL: CPT | Performed by: RADIOLOGY

## 2021-07-28 ENCOUNTER — PATIENT MESSAGE (OUTPATIENT)
Dept: INTERNAL MEDICINE | Facility: CLINIC | Age: 66
End: 2021-07-28

## 2021-07-29 ENCOUNTER — OFFICE VISIT (OUTPATIENT)
Dept: RADIATION ONCOLOGY | Facility: CLINIC | Age: 66
End: 2021-07-29
Payer: MEDICARE

## 2021-07-29 VITALS
DIASTOLIC BLOOD PRESSURE: 63 MMHG | HEART RATE: 77 BPM | OXYGEN SATURATION: 95 % | WEIGHT: 239.63 LBS | RESPIRATION RATE: 18 BRPM | BODY MASS INDEX: 34.3 KG/M2 | HEIGHT: 70 IN | SYSTOLIC BLOOD PRESSURE: 134 MMHG

## 2021-07-29 DIAGNOSIS — C61 PROSTATE CANCER: Primary | ICD-10-CM

## 2021-07-29 PROCEDURE — 99212 PR OFFICE/OUTPT VISIT, EST, LEVL II, 10-19 MIN: ICD-10-PCS | Mod: S$GLB,,, | Performed by: RADIOLOGY

## 2021-07-29 PROCEDURE — 3288F PR FALLS RISK ASSESSMENT DOCUMENTED: ICD-10-PCS | Mod: CPTII,S$GLB,, | Performed by: RADIOLOGY

## 2021-07-29 PROCEDURE — 1159F PR MEDICATION LIST DOCUMENTED IN MEDICAL RECORD: ICD-10-PCS | Mod: CPTII,S$GLB,, | Performed by: RADIOLOGY

## 2021-07-29 PROCEDURE — 3075F SYST BP GE 130 - 139MM HG: CPT | Mod: CPTII,S$GLB,, | Performed by: RADIOLOGY

## 2021-07-29 PROCEDURE — 3008F PR BODY MASS INDEX (BMI) DOCUMENTED: ICD-10-PCS | Mod: CPTII,S$GLB,, | Performed by: RADIOLOGY

## 2021-07-29 PROCEDURE — 99999 PR PBB SHADOW E&M-EST. PATIENT-LVL V: ICD-10-PCS | Mod: PBBFAC,,, | Performed by: RADIOLOGY

## 2021-07-29 PROCEDURE — 3078F DIAST BP <80 MM HG: CPT | Mod: CPTII,S$GLB,, | Performed by: RADIOLOGY

## 2021-07-29 PROCEDURE — 1159F MED LIST DOCD IN RCRD: CPT | Mod: CPTII,S$GLB,, | Performed by: RADIOLOGY

## 2021-07-29 PROCEDURE — 3288F FALL RISK ASSESSMENT DOCD: CPT | Mod: CPTII,S$GLB,, | Performed by: RADIOLOGY

## 2021-07-29 PROCEDURE — 1160F PR REVIEW ALL MEDS BY PRESCRIBER/CLIN PHARMACIST DOCUMENTED: ICD-10-PCS | Mod: CPTII,S$GLB,, | Performed by: RADIOLOGY

## 2021-07-29 PROCEDURE — 3075F PR MOST RECENT SYSTOLIC BLOOD PRESS GE 130-139MM HG: ICD-10-PCS | Mod: CPTII,S$GLB,, | Performed by: RADIOLOGY

## 2021-07-29 PROCEDURE — 3008F BODY MASS INDEX DOCD: CPT | Mod: CPTII,S$GLB,, | Performed by: RADIOLOGY

## 2021-07-29 PROCEDURE — 99499 UNLISTED E&M SERVICE: CPT | Mod: S$GLB,,, | Performed by: RADIOLOGY

## 2021-07-29 PROCEDURE — 99499 RISK ADDL DX/OHS AUDIT: ICD-10-PCS | Mod: S$GLB,,, | Performed by: RADIOLOGY

## 2021-07-29 PROCEDURE — 3078F PR MOST RECENT DIASTOLIC BLOOD PRESSURE < 80 MM HG: ICD-10-PCS | Mod: CPTII,S$GLB,, | Performed by: RADIOLOGY

## 2021-07-29 PROCEDURE — 99212 OFFICE O/P EST SF 10 MIN: CPT | Mod: S$GLB,,, | Performed by: RADIOLOGY

## 2021-07-29 PROCEDURE — 1160F RVW MEDS BY RX/DR IN RCRD: CPT | Mod: CPTII,S$GLB,, | Performed by: RADIOLOGY

## 2021-07-29 PROCEDURE — 1126F PR PAIN SEVERITY QUANTIFIED, NO PAIN PRESENT: ICD-10-PCS | Mod: CPTII,S$GLB,, | Performed by: RADIOLOGY

## 2021-07-29 PROCEDURE — 1101F PR PT FALLS ASSESS DOC 0-1 FALLS W/OUT INJ PAST YR: ICD-10-PCS | Mod: CPTII,S$GLB,, | Performed by: RADIOLOGY

## 2021-07-29 PROCEDURE — 1101F PT FALLS ASSESS-DOCD LE1/YR: CPT | Mod: CPTII,S$GLB,, | Performed by: RADIOLOGY

## 2021-07-29 PROCEDURE — 1126F AMNT PAIN NOTED NONE PRSNT: CPT | Mod: CPTII,S$GLB,, | Performed by: RADIOLOGY

## 2021-07-29 PROCEDURE — 99999 PR PBB SHADOW E&M-EST. PATIENT-LVL V: CPT | Mod: PBBFAC,,, | Performed by: RADIOLOGY

## 2021-08-20 ENCOUNTER — TELEPHONE (OUTPATIENT)
Dept: INTERNAL MEDICINE | Facility: CLINIC | Age: 66
End: 2021-08-20

## 2021-08-20 DIAGNOSIS — I10 ESSENTIAL HYPERTENSION: Primary | ICD-10-CM

## 2021-08-20 DIAGNOSIS — E78.1 HYPERTRIGLYCERIDEMIA: ICD-10-CM

## 2021-08-20 DIAGNOSIS — M25.569 KNEE PAIN, UNSPECIFIED CHRONICITY, UNSPECIFIED LATERALITY: Primary | ICD-10-CM

## 2021-08-20 DIAGNOSIS — R73.01 IMPAIRED FASTING BLOOD SUGAR: ICD-10-CM

## 2021-08-23 ENCOUNTER — LAB VISIT (OUTPATIENT)
Dept: LAB | Facility: HOSPITAL | Age: 66
End: 2021-08-23
Attending: INTERNAL MEDICINE
Payer: MEDICARE

## 2021-08-23 DIAGNOSIS — I10 ESSENTIAL HYPERTENSION: ICD-10-CM

## 2021-08-23 DIAGNOSIS — E78.1 HYPERTRIGLYCERIDEMIA: ICD-10-CM

## 2021-08-23 DIAGNOSIS — R73.01 IMPAIRED FASTING BLOOD SUGAR: ICD-10-CM

## 2021-08-23 LAB
ALBUMIN SERPL BCP-MCNC: 3.9 G/DL (ref 3.5–5.2)
ALP SERPL-CCNC: 86 U/L (ref 55–135)
ALT SERPL W/O P-5'-P-CCNC: 36 U/L (ref 10–44)
ANION GAP SERPL CALC-SCNC: 11 MMOL/L (ref 8–16)
AST SERPL-CCNC: 25 U/L (ref 10–40)
BASOPHILS # BLD AUTO: 0.05 K/UL (ref 0–0.2)
BASOPHILS NFR BLD: 0.5 % (ref 0–1.9)
BILIRUB SERPL-MCNC: 0.8 MG/DL (ref 0.1–1)
BUN SERPL-MCNC: 20 MG/DL (ref 8–23)
CALCIUM SERPL-MCNC: 9 MG/DL (ref 8.7–10.5)
CHLORIDE SERPL-SCNC: 107 MMOL/L (ref 95–110)
CHOLEST SERPL-MCNC: 160 MG/DL (ref 120–199)
CHOLEST/HDLC SERPL: 3.5 {RATIO} (ref 2–5)
CO2 SERPL-SCNC: 23 MMOL/L (ref 23–29)
CREAT SERPL-MCNC: 0.9 MG/DL (ref 0.5–1.4)
DIFFERENTIAL METHOD: ABNORMAL
EOSINOPHIL # BLD AUTO: 0.2 K/UL (ref 0–0.5)
EOSINOPHIL NFR BLD: 1.8 % (ref 0–8)
ERYTHROCYTE [DISTWIDTH] IN BLOOD BY AUTOMATED COUNT: 12.8 % (ref 11.5–14.5)
EST. GFR  (AFRICAN AMERICAN): >60 ML/MIN/1.73 M^2
EST. GFR  (NON AFRICAN AMERICAN): >60 ML/MIN/1.73 M^2
ESTIMATED AVG GLUCOSE: 100 MG/DL (ref 68–131)
GLUCOSE SERPL-MCNC: 102 MG/DL (ref 70–110)
HBA1C MFR BLD: 5.1 % (ref 4–5.6)
HCT VFR BLD AUTO: 41.4 % (ref 40–54)
HDLC SERPL-MCNC: 46 MG/DL (ref 40–75)
HDLC SERPL: 28.8 % (ref 20–50)
HGB BLD-MCNC: 14.1 G/DL (ref 14–18)
IMM GRANULOCYTES # BLD AUTO: 0.1 K/UL (ref 0–0.04)
IMM GRANULOCYTES NFR BLD AUTO: 1 % (ref 0–0.5)
LDLC SERPL CALC-MCNC: 96.4 MG/DL (ref 63–159)
LYMPHOCYTES # BLD AUTO: 2.5 K/UL (ref 1–4.8)
LYMPHOCYTES NFR BLD: 25 % (ref 18–48)
MCH RBC QN AUTO: 31.5 PG (ref 27–31)
MCHC RBC AUTO-ENTMCNC: 34.1 G/DL (ref 32–36)
MCV RBC AUTO: 93 FL (ref 82–98)
MONOCYTES # BLD AUTO: 0.8 K/UL (ref 0.3–1)
MONOCYTES NFR BLD: 8.2 % (ref 4–15)
NEUTROPHILS # BLD AUTO: 6.4 K/UL (ref 1.8–7.7)
NEUTROPHILS NFR BLD: 63.5 % (ref 38–73)
NONHDLC SERPL-MCNC: 114 MG/DL
NRBC BLD-RTO: 0 /100 WBC
PLATELET # BLD AUTO: 260 K/UL (ref 150–450)
PMV BLD AUTO: 10 FL (ref 9.2–12.9)
POTASSIUM SERPL-SCNC: 4 MMOL/L (ref 3.5–5.1)
PROT SERPL-MCNC: 6.8 G/DL (ref 6–8.4)
RBC # BLD AUTO: 4.47 M/UL (ref 4.6–6.2)
SODIUM SERPL-SCNC: 141 MMOL/L (ref 136–145)
TRIGL SERPL-MCNC: 88 MG/DL (ref 30–150)
TSH SERPL DL<=0.005 MIU/L-ACNC: 1.99 UIU/ML (ref 0.4–4)
WBC # BLD AUTO: 10.06 K/UL (ref 3.9–12.7)

## 2021-08-23 PROCEDURE — 80053 COMPREHEN METABOLIC PANEL: CPT | Performed by: INTERNAL MEDICINE

## 2021-08-23 PROCEDURE — 83036 HEMOGLOBIN GLYCOSYLATED A1C: CPT | Performed by: INTERNAL MEDICINE

## 2021-08-23 PROCEDURE — 84443 ASSAY THYROID STIM HORMONE: CPT | Performed by: INTERNAL MEDICINE

## 2021-08-23 PROCEDURE — 36415 COLL VENOUS BLD VENIPUNCTURE: CPT | Mod: PN | Performed by: INTERNAL MEDICINE

## 2021-08-23 PROCEDURE — 80061 LIPID PANEL: CPT | Performed by: INTERNAL MEDICINE

## 2021-08-23 PROCEDURE — 85025 COMPLETE CBC W/AUTO DIFF WBC: CPT | Performed by: INTERNAL MEDICINE

## 2021-08-25 ENCOUNTER — OFFICE VISIT (OUTPATIENT)
Dept: INTERNAL MEDICINE | Facility: CLINIC | Age: 66
End: 2021-08-25
Payer: MEDICARE

## 2021-08-25 ENCOUNTER — TELEPHONE (OUTPATIENT)
Dept: SPORTS MEDICINE | Facility: CLINIC | Age: 66
End: 2021-08-25

## 2021-08-25 VITALS
HEART RATE: 72 BPM | TEMPERATURE: 99 F | SYSTOLIC BLOOD PRESSURE: 138 MMHG | DIASTOLIC BLOOD PRESSURE: 80 MMHG | HEIGHT: 70 IN | WEIGHT: 238.13 LBS | OXYGEN SATURATION: 98 % | BODY MASS INDEX: 34.09 KG/M2 | RESPIRATION RATE: 18 BRPM

## 2021-08-25 DIAGNOSIS — E78.1 HYPERTRIGLYCERIDEMIA: Chronic | ICD-10-CM

## 2021-08-25 DIAGNOSIS — I10 ESSENTIAL HYPERTENSION: Chronic | ICD-10-CM

## 2021-08-25 DIAGNOSIS — Z23 NEED FOR PNEUMOCOCCAL VACCINATION: Primary | ICD-10-CM

## 2021-08-25 DIAGNOSIS — Z00.00 ANNUAL PHYSICAL EXAM: Primary | ICD-10-CM

## 2021-08-25 DIAGNOSIS — R73.01 IMPAIRED FASTING BLOOD SUGAR: Chronic | ICD-10-CM

## 2021-08-25 DIAGNOSIS — C61 PROSTATE CANCER: ICD-10-CM

## 2021-08-25 DIAGNOSIS — M25.562 LEFT KNEE PAIN, UNSPECIFIED CHRONICITY: Primary | ICD-10-CM

## 2021-08-25 PROCEDURE — G0009 PNEUMOCOCCAL POLYSACCHARIDE VACCINE 23-VALENT =>2YO SQ IM: ICD-10-PCS | Mod: S$GLB,,, | Performed by: INTERNAL MEDICINE

## 2021-08-25 PROCEDURE — 1159F PR MEDICATION LIST DOCUMENTED IN MEDICAL RECORD: ICD-10-PCS | Mod: CPTII,S$GLB,, | Performed by: INTERNAL MEDICINE

## 2021-08-25 PROCEDURE — 1126F AMNT PAIN NOTED NONE PRSNT: CPT | Mod: CPTII,S$GLB,, | Performed by: INTERNAL MEDICINE

## 2021-08-25 PROCEDURE — 3288F PR FALLS RISK ASSESSMENT DOCUMENTED: ICD-10-PCS | Mod: CPTII,S$GLB,, | Performed by: INTERNAL MEDICINE

## 2021-08-25 PROCEDURE — 3288F FALL RISK ASSESSMENT DOCD: CPT | Mod: CPTII,S$GLB,, | Performed by: INTERNAL MEDICINE

## 2021-08-25 PROCEDURE — 99397 PER PM REEVAL EST PAT 65+ YR: CPT | Mod: S$GLB,,, | Performed by: INTERNAL MEDICINE

## 2021-08-25 PROCEDURE — 99397 PR PREVENTIVE VISIT,EST,65 & OVER: ICD-10-PCS | Mod: S$GLB,,, | Performed by: INTERNAL MEDICINE

## 2021-08-25 PROCEDURE — 1126F PR PAIN SEVERITY QUANTIFIED, NO PAIN PRESENT: ICD-10-PCS | Mod: CPTII,S$GLB,, | Performed by: INTERNAL MEDICINE

## 2021-08-25 PROCEDURE — G0009 ADMIN PNEUMOCOCCAL VACCINE: HCPCS | Mod: S$GLB,,, | Performed by: INTERNAL MEDICINE

## 2021-08-25 PROCEDURE — 3075F PR MOST RECENT SYSTOLIC BLOOD PRESS GE 130-139MM HG: ICD-10-PCS | Mod: CPTII,S$GLB,, | Performed by: INTERNAL MEDICINE

## 2021-08-25 PROCEDURE — 1159F MED LIST DOCD IN RCRD: CPT | Mod: CPTII,S$GLB,, | Performed by: INTERNAL MEDICINE

## 2021-08-25 PROCEDURE — 3008F PR BODY MASS INDEX (BMI) DOCUMENTED: ICD-10-PCS | Mod: CPTII,S$GLB,, | Performed by: INTERNAL MEDICINE

## 2021-08-25 PROCEDURE — 1160F RVW MEDS BY RX/DR IN RCRD: CPT | Mod: CPTII,S$GLB,, | Performed by: INTERNAL MEDICINE

## 2021-08-25 PROCEDURE — 1160F PR REVIEW ALL MEDS BY PRESCRIBER/CLIN PHARMACIST DOCUMENTED: ICD-10-PCS | Mod: CPTII,S$GLB,, | Performed by: INTERNAL MEDICINE

## 2021-08-25 PROCEDURE — 3079F DIAST BP 80-89 MM HG: CPT | Mod: CPTII,S$GLB,, | Performed by: INTERNAL MEDICINE

## 2021-08-25 PROCEDURE — 1101F PR PT FALLS ASSESS DOC 0-1 FALLS W/OUT INJ PAST YR: ICD-10-PCS | Mod: CPTII,S$GLB,, | Performed by: INTERNAL MEDICINE

## 2021-08-25 PROCEDURE — 99999 PR PBB SHADOW E&M-EST. PATIENT-LVL IV: CPT | Mod: PBBFAC,,, | Performed by: INTERNAL MEDICINE

## 2021-08-25 PROCEDURE — 1101F PT FALLS ASSESS-DOCD LE1/YR: CPT | Mod: CPTII,S$GLB,, | Performed by: INTERNAL MEDICINE

## 2021-08-25 PROCEDURE — 90732 PPSV23 VACC 2 YRS+ SUBQ/IM: CPT | Mod: S$GLB,,, | Performed by: INTERNAL MEDICINE

## 2021-08-25 PROCEDURE — 3008F BODY MASS INDEX DOCD: CPT | Mod: CPTII,S$GLB,, | Performed by: INTERNAL MEDICINE

## 2021-08-25 PROCEDURE — 3044F PR MOST RECENT HEMOGLOBIN A1C LEVEL <7.0%: ICD-10-PCS | Mod: CPTII,S$GLB,, | Performed by: INTERNAL MEDICINE

## 2021-08-25 PROCEDURE — 3075F SYST BP GE 130 - 139MM HG: CPT | Mod: CPTII,S$GLB,, | Performed by: INTERNAL MEDICINE

## 2021-08-25 PROCEDURE — 3079F PR MOST RECENT DIASTOLIC BLOOD PRESSURE 80-89 MM HG: ICD-10-PCS | Mod: CPTII,S$GLB,, | Performed by: INTERNAL MEDICINE

## 2021-08-25 PROCEDURE — 3044F HG A1C LEVEL LT 7.0%: CPT | Mod: CPTII,S$GLB,, | Performed by: INTERNAL MEDICINE

## 2021-08-25 PROCEDURE — 90732 PNEUMOCOCCAL POLYSACCHARIDE VACCINE 23-VALENT =>2YO SQ IM: ICD-10-PCS | Mod: S$GLB,,, | Performed by: INTERNAL MEDICINE

## 2021-08-25 PROCEDURE — 99999 PR PBB SHADOW E&M-EST. PATIENT-LVL IV: ICD-10-PCS | Mod: PBBFAC,,, | Performed by: INTERNAL MEDICINE

## 2021-08-25 RX ORDER — SILDENAFIL 100 MG/1
100 TABLET, FILM COATED ORAL DAILY PRN
Qty: 30 TABLET | Refills: 2 | Status: SHIPPED | OUTPATIENT
Start: 2021-08-25 | End: 2023-09-25 | Stop reason: SDUPTHER

## 2021-09-21 ENCOUNTER — TELEPHONE (OUTPATIENT)
Dept: SPORTS MEDICINE | Facility: CLINIC | Age: 66
End: 2021-09-21

## 2021-09-22 ENCOUNTER — TELEPHONE (OUTPATIENT)
Dept: SPORTS MEDICINE | Facility: CLINIC | Age: 66
End: 2021-09-22

## 2021-09-24 NOTE — ADDENDUM NOTE
Addendum  created 06/02/18 1454 by Eliot Toussaint MD    Anesthesia Event edited       Patient:  Clementina Jovel  YOB: 1930  Date of Visit:  09/24/21    Chief Complaint   Patient presents with   • Follow-up     pt here for follow up but has c/o cough for a couple weeks, dry cough and has noticed SOB.       HPI: Mrs. Jovel presents to the office today for follow-up but she is complaining of shortness of breath and coughing, she reports the cough is dry she has had it for several months and every time she stops coughing she feels very short of breath she has had a couple of episodes like that here in the office and it is evident that she is short of breath we did a walk test for oxygen saturation and her oxygen stays around 90s her heart rate has been in the 100s since she came to the office and her blood pressure is low, 94/53.  She was reporting that this feeling of shortness of breath has been with her for some time she also states that she has chest pain intermittent at night pressure-like short-lived localized to the sternum without radiations she is states that she used tries to fall asleep not pain attention to the pain and she falls asleep without difficulty and the pain does not wake her up she does not feel nauseous or short of breath when she is having the pain  She is also having back pain mid thoracic spine area right at the area of her kyphosis and every time she takes a deep breath she feels pain in that area.  She just saw her cardiologist on September 22 an electrocardiogram was performed there and it is reported as normal, she as per the note shows was not complaining of shortness of breath or chest pain during that time.  She has a history of hypertension, congestive heart failure hyperlipidemia, aortic valve stenosis, coronary artery disease, chronic diastolic heart failure, concentric left ventricular hypertrophy.  Hypothyroidism    Past Medical History:   Diagnosis Date   • CAD (coronary artery disease)    • GERD (gastroesophageal reflux disease)    •  Hyperlipidemia    • Osteoarthritis    • Osteopenia    • Palpitations    • SOB (shortness of breath)    • Thyroid disease        Past Surgical History:   Procedure Laterality Date   • Appendectomy     • Bronchoscopy  2009   • Cardiac catherization     • Colonoscopy  2007   • Esophagogastroduodenoscopy  10/09/2009   • Esophagogastroduodenoscopy  2012   • Hip surgery Right    • Joint replacement     • Knee arthroplasty Left 2005   • Nasal reconstruction  2007   • Shoulder surgery Left    • Tonsillectomy     • Trigger finger release Right 2010       No family history on file.    Social History     Tobacco Use   • Smoking status: Former Smoker     Packs/day: 0.00     Quit date: 2000     Years since quittin.7   • Smokeless tobacco: Never Used   Substance Use Topics   • Alcohol use: Yes     Alcohol/week: 5.0 standard drinks     Types: 5 Glasses of wine per week   • Drug use: Not Currently       ALLERGIES:   Allergen Reactions   • Dust Mite Extract Other (See Comments)   • Lactose Intolerance   (Food Or Med) Other (See Comments)   • Iodine   (Environmental Or Med) NAUSEA   • Shellfish Allergy   (Food Or Med) GI UPSET and Other (See Comments)   • Shrimp Flavor   (Food Or Med) Other (See Comments)       Current Outpatient Medications   Medication Sig Dispense Refill   • aspirin (Billy Aspirin) 325 MG tablet Take 1 tablet by mouth daily.     • HYDROcodone-acetaminophen (NORCO) 5-325 MG per tablet Take 1 tablet by mouth at bedtime. 20 tablet 0   • amLODIPine (NORVASC) 2.5 MG tablet Take 1 tablet by mouth daily. 90 tablet 1   • famotidine (PEPCID) 20 MG tablet Take 1 tablet by mouth 2 times daily. 60 tablet 1   • furosemide (LASIX) 20 MG tablet Take 1 tablet by mouth 2 times daily. 90 tablet 1   • albuterol 108 (90 Base) MCG/ACT inhaler Inhale 2 puffs into the lungs every 4 hours as needed for Shortness of Breath or Wheezing. 1 Inhaler 1   • budesonide-formoterol (SYMBICORT) 160-4.5  MCG/ACT inhaler Inhale 2 puffs into the lungs 2 times daily. 10.2 g 1   • oxybutynin (DITROPAN) 5 MG tablet Take 2 tablets by mouth twice daily 180 tablet 0   • ferrous sulfate 325 (65 FE) MG EC tablet Take 1 tablet by mouth daily. 90 tablet 0   • Respiratory Therapy Supplies (Nebulizer/Tubing/Mouthpiece) Kit Use qid as needed 1 kit 0   • Nebulizers Misc Use qid as needed 1 Device 0   • Eliquis 5 MG Tab Take 5 mg by mouth 2 times daily.     • lovastatin (MEVACOR) 40 MG tablet Take 1 tablet by mouth daily. 90 tablet 1   • metoPROLOL succinate (TOPROL-XL) 25 MG 24 hr tablet Take 25 mg by mouth.     • potassium chloride (KLOR-CON) 10 MEQ ER tablet Take 10 mEq by mouth daily.     • levothyroxine (SYNTHROID, LEVOTHROID) 100 MCG tablet Take 1 tablet by mouth daily. 60 tablet 1     No current facility-administered medications for this visit.       Review of Systems   Constitutional: Positive for fatigue. Negative for activity change, appetite change, chills, diaphoresis, fever and unexpected weight change.   HENT: Negative for congestion, ear discharge, ear pain, facial swelling, hearing loss, mouth sores, nosebleeds, postnasal drip, rhinorrhea, sinus pressure, sinus pain, sneezing, sore throat, tinnitus, trouble swallowing and voice change.    Eyes: Negative for photophobia, redness and visual disturbance.   Respiratory: Positive for shortness of breath. Negative for apnea, cough, choking, chest tightness, wheezing and stridor.    Cardiovascular: Negative for chest pain, palpitations and leg swelling.   Gastrointestinal: Negative for abdominal distention, abdominal pain, anal bleeding, blood in stool, constipation, diarrhea, nausea and rectal pain.   Endocrine: Negative for cold intolerance, heat intolerance, polydipsia, polyphagia and polyuria.   Genitourinary: Negative for decreased urine volume, difficulty urinating, dysuria, flank pain, frequency, genital sores, hematuria and urgency.   Musculoskeletal: Negative for  arthralgias, back pain, gait problem, joint swelling, myalgias, neck pain and neck stiffness.   Skin: Negative for color change, pallor, rash and wound.   Allergic/Immunologic: Negative for environmental allergies and food allergies.   Neurological: Negative for dizziness, tremors, seizures, syncope, speech difficulty, weakness, light-headedness, numbness and headaches.   Hematological: Negative for adenopathy. Does not bruise/bleed easily.   Psychiatric/Behavioral: Negative for agitation, behavioral problems, confusion, decreased concentration, dysphoric mood, hallucinations, self-injury, sleep disturbance and suicidal ideas. The patient is not nervous/anxious and is not hyperactive.        Vitals:    09/24/21 1509   BP: 94/53   Pulse: (!) 105   Resp: 18       Physical Exam  Vitals and nursing note reviewed.   Constitutional:       Appearance: Normal appearance. She is well-developed.      Comments: The patient appears to be breathing fast even though she is not complaining too much   HENT:      Head: Normocephalic and atraumatic.      Right Ear: External ear normal.      Left Ear: External ear normal.      Nose: Nose normal.      Mouth/Throat:      Mouth: Mucous membranes are moist.   Eyes:      Conjunctiva/sclera: Conjunctivae normal.      Pupils: Pupils are equal, round, and reactive to light.   Cardiovascular:      Rate and Rhythm: Normal rate. Rhythm irregularly irregular.      Heart sounds: Normal heart sounds.   Pulmonary:      Effort: Pulmonary effort is normal.      Comments: There are crepitations on the right lung fields clear to auscultation left lung fields slight rhonchi in the base of the left side  Abdominal:      General: Bowel sounds are normal.      Palpations: Abdomen is soft. There is no mass.      Tenderness: There is no abdominal tenderness.   Musculoskeletal:         General: Normal range of motion.      Cervical back: Normal range of motion and neck supple.      Right lower leg: No edema.       Left lower leg: No edema.   Skin:     General: Skin is warm and dry.      Capillary Refill: Capillary refill takes 2 to 3 seconds.   Neurological:      General: No focal deficit present.      Mental Status: She is alert and oriented to person, place, and time. Mental status is at baseline.      Cranial Nerves: No cranial nerve deficit.   Psychiatric:         Mood and Affect: Mood normal.         Behavior: Behavior normal.         Thought Content: Thought content normal.         Judgment: Judgment normal.         Results    No image results found.      Assessment/Plan    SOB (shortness of breath)  Attempted to contact the patient's cardiologist for an unknown reason the communication did not occur in a timely basis so I have decided to send the patient to the emergency room at [facility] I have made contact with the emergency room physician and gave him progress report of the findings.    Abnormal breath sounds  Patient has crepitation all the lung fields on the right hemithorax, rhonchi in the base of the left patient has been sent to the hospital for further evaluation    Abnormal electrocardiogram  Electrocardiogram was performed because the patient had an irregularly irregular rhythm during auscultation, the EKG reads sinus tachycardia with first-degree AV block with marked right axis deviation right bundle branch block, and the cardiology consultation note from 2 days ago it is reported that the electrocardiogram was normal, I tried to contact the patient's cardiologist was unable to do that on a timely basis, I discussed the situation with the patient and her  and they agreed to go to the emergency room, I made contact with the emergency room physician and gave him per his report and patient was taken by her  to the emergency room at [facility].        Total face to face time spent with patient: 75 minutes and greater than 50% of the total time was spent counseling  and/or coordinating care.      Salbador Bailey MD

## 2021-09-29 ENCOUNTER — OFFICE VISIT (OUTPATIENT)
Dept: SPORTS MEDICINE | Facility: CLINIC | Age: 66
End: 2021-09-29
Payer: MEDICARE

## 2021-09-29 ENCOUNTER — HOSPITAL ENCOUNTER (OUTPATIENT)
Dept: RADIOLOGY | Facility: HOSPITAL | Age: 66
Discharge: HOME | End: 2021-09-29
Attending: STUDENT IN AN ORGANIZED HEALTH CARE EDUCATION/TRAINING PROGRAM
Payer: MEDICARE

## 2021-09-29 VITALS
HEIGHT: 70 IN | SYSTOLIC BLOOD PRESSURE: 139 MMHG | BODY MASS INDEX: 34.02 KG/M2 | DIASTOLIC BLOOD PRESSURE: 81 MMHG | WEIGHT: 237.63 LBS

## 2021-09-29 DIAGNOSIS — G89.29 CHRONIC PAIN OF LEFT KNEE: ICD-10-CM

## 2021-09-29 DIAGNOSIS — M25.562 LEFT KNEE PAIN, UNSPECIFIED CHRONICITY: ICD-10-CM

## 2021-09-29 DIAGNOSIS — M17.12 PRIMARY OSTEOARTHRITIS OF LEFT KNEE: Primary | ICD-10-CM

## 2021-09-29 DIAGNOSIS — M25.562 CHRONIC PAIN OF LEFT KNEE: ICD-10-CM

## 2021-09-29 PROCEDURE — 4010F PR ACE/ARB THEARPY RXD/TAKEN: ICD-10-PCS | Mod: HCNC,CPTII,S$GLB, | Performed by: STUDENT IN AN ORGANIZED HEALTH CARE EDUCATION/TRAINING PROGRAM

## 2021-09-29 PROCEDURE — 99204 OFFICE O/P NEW MOD 45 MIN: CPT | Mod: HCNC,S$GLB,, | Performed by: STUDENT IN AN ORGANIZED HEALTH CARE EDUCATION/TRAINING PROGRAM

## 2021-09-29 PROCEDURE — 73564 XR KNEE ORTHO LEFT WITH FLEXION: ICD-10-PCS | Mod: 26,HCNC,LT, | Performed by: RADIOLOGY

## 2021-09-29 PROCEDURE — 73562 X-RAY EXAM OF KNEE 3: CPT | Mod: 26,HCNC,RT, | Performed by: RADIOLOGY

## 2021-09-29 PROCEDURE — 3008F PR BODY MASS INDEX (BMI) DOCUMENTED: ICD-10-PCS | Mod: HCNC,CPTII,S$GLB, | Performed by: STUDENT IN AN ORGANIZED HEALTH CARE EDUCATION/TRAINING PROGRAM

## 2021-09-29 PROCEDURE — 3044F PR MOST RECENT HEMOGLOBIN A1C LEVEL <7.0%: ICD-10-PCS | Mod: HCNC,CPTII,S$GLB, | Performed by: STUDENT IN AN ORGANIZED HEALTH CARE EDUCATION/TRAINING PROGRAM

## 2021-09-29 PROCEDURE — 3075F PR MOST RECENT SYSTOLIC BLOOD PRESS GE 130-139MM HG: ICD-10-PCS | Mod: HCNC,CPTII,S$GLB, | Performed by: STUDENT IN AN ORGANIZED HEALTH CARE EDUCATION/TRAINING PROGRAM

## 2021-09-29 PROCEDURE — 99204 PR OFFICE/OUTPT VISIT, NEW, LEVL IV, 45-59 MIN: ICD-10-PCS | Mod: HCNC,S$GLB,, | Performed by: STUDENT IN AN ORGANIZED HEALTH CARE EDUCATION/TRAINING PROGRAM

## 2021-09-29 PROCEDURE — 1159F MED LIST DOCD IN RCRD: CPT | Mod: HCNC,CPTII,S$GLB, | Performed by: STUDENT IN AN ORGANIZED HEALTH CARE EDUCATION/TRAINING PROGRAM

## 2021-09-29 PROCEDURE — 4010F ACE/ARB THERAPY RXD/TAKEN: CPT | Mod: HCNC,CPTII,S$GLB, | Performed by: STUDENT IN AN ORGANIZED HEALTH CARE EDUCATION/TRAINING PROGRAM

## 2021-09-29 PROCEDURE — 1160F RVW MEDS BY RX/DR IN RCRD: CPT | Mod: HCNC,CPTII,S$GLB, | Performed by: STUDENT IN AN ORGANIZED HEALTH CARE EDUCATION/TRAINING PROGRAM

## 2021-09-29 PROCEDURE — 99999 PR PBB SHADOW E&M-EST. PATIENT-LVL III: CPT | Mod: PBBFAC,HCNC,, | Performed by: STUDENT IN AN ORGANIZED HEALTH CARE EDUCATION/TRAINING PROGRAM

## 2021-09-29 PROCEDURE — 3075F SYST BP GE 130 - 139MM HG: CPT | Mod: HCNC,CPTII,S$GLB, | Performed by: STUDENT IN AN ORGANIZED HEALTH CARE EDUCATION/TRAINING PROGRAM

## 2021-09-29 PROCEDURE — 73562 XR KNEE ORTHO LEFT WITH FLEXION: ICD-10-PCS | Mod: 26,HCNC,RT, | Performed by: RADIOLOGY

## 2021-09-29 PROCEDURE — 3079F PR MOST RECENT DIASTOLIC BLOOD PRESSURE 80-89 MM HG: ICD-10-PCS | Mod: HCNC,CPTII,S$GLB, | Performed by: STUDENT IN AN ORGANIZED HEALTH CARE EDUCATION/TRAINING PROGRAM

## 2021-09-29 PROCEDURE — 3008F BODY MASS INDEX DOCD: CPT | Mod: HCNC,CPTII,S$GLB, | Performed by: STUDENT IN AN ORGANIZED HEALTH CARE EDUCATION/TRAINING PROGRAM

## 2021-09-29 PROCEDURE — 3044F HG A1C LEVEL LT 7.0%: CPT | Mod: HCNC,CPTII,S$GLB, | Performed by: STUDENT IN AN ORGANIZED HEALTH CARE EDUCATION/TRAINING PROGRAM

## 2021-09-29 PROCEDURE — 1125F PR PAIN SEVERITY QUANTIFIED, PAIN PRESENT: ICD-10-PCS | Mod: HCNC,CPTII,S$GLB, | Performed by: STUDENT IN AN ORGANIZED HEALTH CARE EDUCATION/TRAINING PROGRAM

## 2021-09-29 PROCEDURE — 3079F DIAST BP 80-89 MM HG: CPT | Mod: HCNC,CPTII,S$GLB, | Performed by: STUDENT IN AN ORGANIZED HEALTH CARE EDUCATION/TRAINING PROGRAM

## 2021-09-29 PROCEDURE — 99999 PR PBB SHADOW E&M-EST. PATIENT-LVL III: ICD-10-PCS | Mod: PBBFAC,HCNC,, | Performed by: STUDENT IN AN ORGANIZED HEALTH CARE EDUCATION/TRAINING PROGRAM

## 2021-09-29 PROCEDURE — 1159F PR MEDICATION LIST DOCUMENTED IN MEDICAL RECORD: ICD-10-PCS | Mod: HCNC,CPTII,S$GLB, | Performed by: STUDENT IN AN ORGANIZED HEALTH CARE EDUCATION/TRAINING PROGRAM

## 2021-09-29 PROCEDURE — 1125F AMNT PAIN NOTED PAIN PRSNT: CPT | Mod: HCNC,CPTII,S$GLB, | Performed by: STUDENT IN AN ORGANIZED HEALTH CARE EDUCATION/TRAINING PROGRAM

## 2021-09-29 PROCEDURE — 73564 X-RAY EXAM KNEE 4 OR MORE: CPT | Mod: TC,HCNC,PN,LT

## 2021-09-29 PROCEDURE — 73564 X-RAY EXAM KNEE 4 OR MORE: CPT | Mod: 26,HCNC,LT, | Performed by: RADIOLOGY

## 2021-09-29 PROCEDURE — 1160F PR REVIEW ALL MEDS BY PRESCRIBER/CLIN PHARMACIST DOCUMENTED: ICD-10-PCS | Mod: HCNC,CPTII,S$GLB, | Performed by: STUDENT IN AN ORGANIZED HEALTH CARE EDUCATION/TRAINING PROGRAM

## 2021-10-08 RX ORDER — AMLODIPINE BESYLATE 10 MG/1
TABLET ORAL
Qty: 30 TABLET | Refills: 10 | Status: SHIPPED | OUTPATIENT
Start: 2021-10-08 | End: 2022-07-04

## 2021-10-08 RX ORDER — ATORVASTATIN CALCIUM 40 MG/1
TABLET, FILM COATED ORAL
Qty: 30 TABLET | Refills: 10 | Status: SHIPPED | OUTPATIENT
Start: 2021-10-08 | End: 2022-07-04

## 2021-10-08 RX ORDER — HYDROCHLOROTHIAZIDE 25 MG/1
TABLET ORAL
Qty: 30 TABLET | Refills: 10 | Status: SHIPPED | OUTPATIENT
Start: 2021-10-08 | End: 2022-07-04

## 2021-10-11 ENCOUNTER — OFFICE VISIT (OUTPATIENT)
Dept: SPORTS MEDICINE | Facility: CLINIC | Age: 66
End: 2021-10-11
Payer: MEDICARE

## 2021-10-11 VITALS
BODY MASS INDEX: 34.02 KG/M2 | SYSTOLIC BLOOD PRESSURE: 115 MMHG | WEIGHT: 237.63 LBS | DIASTOLIC BLOOD PRESSURE: 67 MMHG | HEIGHT: 70 IN

## 2021-10-11 DIAGNOSIS — G89.29 CHRONIC PAIN OF LEFT KNEE: ICD-10-CM

## 2021-10-11 DIAGNOSIS — M17.12 PRIMARY OSTEOARTHRITIS OF LEFT KNEE: Primary | ICD-10-CM

## 2021-10-11 DIAGNOSIS — M25.562 CHRONIC PAIN OF LEFT KNEE: ICD-10-CM

## 2021-10-11 PROCEDURE — 4010F ACE/ARB THERAPY RXD/TAKEN: CPT | Mod: HCNC,CPTII,S$GLB, | Performed by: STUDENT IN AN ORGANIZED HEALTH CARE EDUCATION/TRAINING PROGRAM

## 2021-10-11 PROCEDURE — 3078F PR MOST RECENT DIASTOLIC BLOOD PRESSURE < 80 MM HG: ICD-10-PCS | Mod: HCNC,CPTII,S$GLB, | Performed by: STUDENT IN AN ORGANIZED HEALTH CARE EDUCATION/TRAINING PROGRAM

## 2021-10-11 PROCEDURE — 99499 NO LOS: ICD-10-PCS | Mod: HCNC,S$GLB,, | Performed by: STUDENT IN AN ORGANIZED HEALTH CARE EDUCATION/TRAINING PROGRAM

## 2021-10-11 PROCEDURE — 3078F DIAST BP <80 MM HG: CPT | Mod: HCNC,CPTII,S$GLB, | Performed by: STUDENT IN AN ORGANIZED HEALTH CARE EDUCATION/TRAINING PROGRAM

## 2021-10-11 PROCEDURE — 1160F RVW MEDS BY RX/DR IN RCRD: CPT | Mod: HCNC,CPTII,S$GLB, | Performed by: STUDENT IN AN ORGANIZED HEALTH CARE EDUCATION/TRAINING PROGRAM

## 2021-10-11 PROCEDURE — 1159F PR MEDICATION LIST DOCUMENTED IN MEDICAL RECORD: ICD-10-PCS | Mod: HCNC,CPTII,S$GLB, | Performed by: STUDENT IN AN ORGANIZED HEALTH CARE EDUCATION/TRAINING PROGRAM

## 2021-10-11 PROCEDURE — 3044F PR MOST RECENT HEMOGLOBIN A1C LEVEL <7.0%: ICD-10-PCS | Mod: HCNC,CPTII,S$GLB, | Performed by: STUDENT IN AN ORGANIZED HEALTH CARE EDUCATION/TRAINING PROGRAM

## 2021-10-11 PROCEDURE — 1159F MED LIST DOCD IN RCRD: CPT | Mod: HCNC,CPTII,S$GLB, | Performed by: STUDENT IN AN ORGANIZED HEALTH CARE EDUCATION/TRAINING PROGRAM

## 2021-10-11 PROCEDURE — 3008F PR BODY MASS INDEX (BMI) DOCUMENTED: ICD-10-PCS | Mod: HCNC,CPTII,S$GLB, | Performed by: STUDENT IN AN ORGANIZED HEALTH CARE EDUCATION/TRAINING PROGRAM

## 2021-10-11 PROCEDURE — 99999 PR PBB SHADOW E&M-EST. PATIENT-LVL III: ICD-10-PCS | Mod: PBBFAC,HCNC,, | Performed by: STUDENT IN AN ORGANIZED HEALTH CARE EDUCATION/TRAINING PROGRAM

## 2021-10-11 PROCEDURE — 3044F HG A1C LEVEL LT 7.0%: CPT | Mod: HCNC,CPTII,S$GLB, | Performed by: STUDENT IN AN ORGANIZED HEALTH CARE EDUCATION/TRAINING PROGRAM

## 2021-10-11 PROCEDURE — 99999 PR PBB SHADOW E&M-EST. PATIENT-LVL III: CPT | Mod: PBBFAC,HCNC,, | Performed by: STUDENT IN AN ORGANIZED HEALTH CARE EDUCATION/TRAINING PROGRAM

## 2021-10-11 PROCEDURE — 3074F SYST BP LT 130 MM HG: CPT | Mod: HCNC,CPTII,S$GLB, | Performed by: STUDENT IN AN ORGANIZED HEALTH CARE EDUCATION/TRAINING PROGRAM

## 2021-10-11 PROCEDURE — 1160F PR REVIEW ALL MEDS BY PRESCRIBER/CLIN PHARMACIST DOCUMENTED: ICD-10-PCS | Mod: HCNC,CPTII,S$GLB, | Performed by: STUDENT IN AN ORGANIZED HEALTH CARE EDUCATION/TRAINING PROGRAM

## 2021-10-11 PROCEDURE — 99499 UNLISTED E&M SERVICE: CPT | Mod: HCNC,S$GLB,, | Performed by: STUDENT IN AN ORGANIZED HEALTH CARE EDUCATION/TRAINING PROGRAM

## 2021-10-11 PROCEDURE — 3008F BODY MASS INDEX DOCD: CPT | Mod: HCNC,CPTII,S$GLB, | Performed by: STUDENT IN AN ORGANIZED HEALTH CARE EDUCATION/TRAINING PROGRAM

## 2021-10-11 PROCEDURE — 3074F PR MOST RECENT SYSTOLIC BLOOD PRESSURE < 130 MM HG: ICD-10-PCS | Mod: HCNC,CPTII,S$GLB, | Performed by: STUDENT IN AN ORGANIZED HEALTH CARE EDUCATION/TRAINING PROGRAM

## 2021-10-11 PROCEDURE — 4010F PR ACE/ARB THEARPY RXD/TAKEN: ICD-10-PCS | Mod: HCNC,CPTII,S$GLB, | Performed by: STUDENT IN AN ORGANIZED HEALTH CARE EDUCATION/TRAINING PROGRAM

## 2021-10-11 PROCEDURE — 20611 LARGE JOINT ASPIRATION/INJECTION: L KNEE: ICD-10-PCS | Mod: HCNC,LT,S$GLB, | Performed by: STUDENT IN AN ORGANIZED HEALTH CARE EDUCATION/TRAINING PROGRAM

## 2021-10-11 PROCEDURE — 20611 DRAIN/INJ JOINT/BURSA W/US: CPT | Mod: HCNC,LT,S$GLB, | Performed by: STUDENT IN AN ORGANIZED HEALTH CARE EDUCATION/TRAINING PROGRAM

## 2021-10-18 ENCOUNTER — OFFICE VISIT (OUTPATIENT)
Dept: SPORTS MEDICINE | Facility: CLINIC | Age: 66
End: 2021-10-18
Payer: MEDICARE

## 2021-10-18 ENCOUNTER — IMMUNIZATION (OUTPATIENT)
Dept: INTERNAL MEDICINE | Facility: CLINIC | Age: 66
End: 2021-10-18
Payer: MEDICARE

## 2021-10-18 VITALS
WEIGHT: 237.63 LBS | HEIGHT: 70 IN | SYSTOLIC BLOOD PRESSURE: 121 MMHG | DIASTOLIC BLOOD PRESSURE: 69 MMHG | BODY MASS INDEX: 34.02 KG/M2

## 2021-10-18 DIAGNOSIS — Z23 NEED FOR VACCINATION: Primary | ICD-10-CM

## 2021-10-18 DIAGNOSIS — M17.12 PRIMARY OSTEOARTHRITIS OF LEFT KNEE: Primary | ICD-10-CM

## 2021-10-18 PROCEDURE — 4010F ACE/ARB THERAPY RXD/TAKEN: CPT | Mod: HCNC,CPTII,S$GLB, | Performed by: STUDENT IN AN ORGANIZED HEALTH CARE EDUCATION/TRAINING PROGRAM

## 2021-10-18 PROCEDURE — 3008F BODY MASS INDEX DOCD: CPT | Mod: HCNC,CPTII,S$GLB, | Performed by: STUDENT IN AN ORGANIZED HEALTH CARE EDUCATION/TRAINING PROGRAM

## 2021-10-18 PROCEDURE — 1159F PR MEDICATION LIST DOCUMENTED IN MEDICAL RECORD: ICD-10-PCS | Mod: HCNC,CPTII,S$GLB, | Performed by: STUDENT IN AN ORGANIZED HEALTH CARE EDUCATION/TRAINING PROGRAM

## 2021-10-18 PROCEDURE — 99999 PR PBB SHADOW E&M-EST. PATIENT-LVL III: ICD-10-PCS | Mod: PBBFAC,HCNC,, | Performed by: STUDENT IN AN ORGANIZED HEALTH CARE EDUCATION/TRAINING PROGRAM

## 2021-10-18 PROCEDURE — 99999 PR PBB SHADOW E&M-EST. PATIENT-LVL III: CPT | Mod: PBBFAC,HCNC,, | Performed by: STUDENT IN AN ORGANIZED HEALTH CARE EDUCATION/TRAINING PROGRAM

## 2021-10-18 PROCEDURE — 1160F PR REVIEW ALL MEDS BY PRESCRIBER/CLIN PHARMACIST DOCUMENTED: ICD-10-PCS | Mod: HCNC,CPTII,S$GLB, | Performed by: STUDENT IN AN ORGANIZED HEALTH CARE EDUCATION/TRAINING PROGRAM

## 2021-10-18 PROCEDURE — 20611 LARGE JOINT ASPIRATION/INJECTION: L KNEE: ICD-10-PCS | Mod: HCNC,LT,S$GLB, | Performed by: STUDENT IN AN ORGANIZED HEALTH CARE EDUCATION/TRAINING PROGRAM

## 2021-10-18 PROCEDURE — 1125F AMNT PAIN NOTED PAIN PRSNT: CPT | Mod: HCNC,CPTII,S$GLB, | Performed by: STUDENT IN AN ORGANIZED HEALTH CARE EDUCATION/TRAINING PROGRAM

## 2021-10-18 PROCEDURE — 3044F PR MOST RECENT HEMOGLOBIN A1C LEVEL <7.0%: ICD-10-PCS | Mod: HCNC,CPTII,S$GLB, | Performed by: STUDENT IN AN ORGANIZED HEALTH CARE EDUCATION/TRAINING PROGRAM

## 2021-10-18 PROCEDURE — 1160F RVW MEDS BY RX/DR IN RCRD: CPT | Mod: HCNC,CPTII,S$GLB, | Performed by: STUDENT IN AN ORGANIZED HEALTH CARE EDUCATION/TRAINING PROGRAM

## 2021-10-18 PROCEDURE — 99499 NO LOS: ICD-10-PCS | Mod: HCNC,S$GLB,, | Performed by: STUDENT IN AN ORGANIZED HEALTH CARE EDUCATION/TRAINING PROGRAM

## 2021-10-18 PROCEDURE — 3078F DIAST BP <80 MM HG: CPT | Mod: HCNC,CPTII,S$GLB, | Performed by: STUDENT IN AN ORGANIZED HEALTH CARE EDUCATION/TRAINING PROGRAM

## 2021-10-18 PROCEDURE — 1159F MED LIST DOCD IN RCRD: CPT | Mod: HCNC,CPTII,S$GLB, | Performed by: STUDENT IN AN ORGANIZED HEALTH CARE EDUCATION/TRAINING PROGRAM

## 2021-10-18 PROCEDURE — 4010F PR ACE/ARB THEARPY RXD/TAKEN: ICD-10-PCS | Mod: HCNC,CPTII,S$GLB, | Performed by: STUDENT IN AN ORGANIZED HEALTH CARE EDUCATION/TRAINING PROGRAM

## 2021-10-18 PROCEDURE — 99499 UNLISTED E&M SERVICE: CPT | Mod: HCNC,S$GLB,, | Performed by: STUDENT IN AN ORGANIZED HEALTH CARE EDUCATION/TRAINING PROGRAM

## 2021-10-18 PROCEDURE — 0003A COVID-19, MRNA, LNP-S, PF, 30 MCG/0.3 ML DOSE VACCINE: CPT | Mod: HCNC,PBBFAC | Performed by: INTERNAL MEDICINE

## 2021-10-18 PROCEDURE — 3044F HG A1C LEVEL LT 7.0%: CPT | Mod: HCNC,CPTII,S$GLB, | Performed by: STUDENT IN AN ORGANIZED HEALTH CARE EDUCATION/TRAINING PROGRAM

## 2021-10-18 PROCEDURE — 3078F PR MOST RECENT DIASTOLIC BLOOD PRESSURE < 80 MM HG: ICD-10-PCS | Mod: HCNC,CPTII,S$GLB, | Performed by: STUDENT IN AN ORGANIZED HEALTH CARE EDUCATION/TRAINING PROGRAM

## 2021-10-18 PROCEDURE — 1125F PR PAIN SEVERITY QUANTIFIED, PAIN PRESENT: ICD-10-PCS | Mod: HCNC,CPTII,S$GLB, | Performed by: STUDENT IN AN ORGANIZED HEALTH CARE EDUCATION/TRAINING PROGRAM

## 2021-10-18 PROCEDURE — 3008F PR BODY MASS INDEX (BMI) DOCUMENTED: ICD-10-PCS | Mod: HCNC,CPTII,S$GLB, | Performed by: STUDENT IN AN ORGANIZED HEALTH CARE EDUCATION/TRAINING PROGRAM

## 2021-10-18 PROCEDURE — 3074F SYST BP LT 130 MM HG: CPT | Mod: HCNC,CPTII,S$GLB, | Performed by: STUDENT IN AN ORGANIZED HEALTH CARE EDUCATION/TRAINING PROGRAM

## 2021-10-18 PROCEDURE — 20611 DRAIN/INJ JOINT/BURSA W/US: CPT | Mod: HCNC,LT,S$GLB, | Performed by: STUDENT IN AN ORGANIZED HEALTH CARE EDUCATION/TRAINING PROGRAM

## 2021-10-18 PROCEDURE — 3074F PR MOST RECENT SYSTOLIC BLOOD PRESSURE < 130 MM HG: ICD-10-PCS | Mod: HCNC,CPTII,S$GLB, | Performed by: STUDENT IN AN ORGANIZED HEALTH CARE EDUCATION/TRAINING PROGRAM

## 2021-10-18 PROCEDURE — 91300 COVID-19, MRNA, LNP-S, PF, 30 MCG/0.3 ML DOSE VACCINE: CPT | Mod: HCNC,PBBFAC | Performed by: INTERNAL MEDICINE

## 2021-10-25 ENCOUNTER — OFFICE VISIT (OUTPATIENT)
Dept: SPORTS MEDICINE | Facility: CLINIC | Age: 66
End: 2021-10-25
Payer: MEDICARE

## 2021-10-25 VITALS
BODY MASS INDEX: 34.02 KG/M2 | SYSTOLIC BLOOD PRESSURE: 122 MMHG | HEIGHT: 70 IN | WEIGHT: 237.63 LBS | DIASTOLIC BLOOD PRESSURE: 73 MMHG

## 2021-10-25 DIAGNOSIS — M17.12 PRIMARY OSTEOARTHRITIS OF LEFT KNEE: Primary | ICD-10-CM

## 2021-10-25 PROCEDURE — 1159F PR MEDICATION LIST DOCUMENTED IN MEDICAL RECORD: ICD-10-PCS | Mod: HCNC,CPTII,S$GLB, | Performed by: STUDENT IN AN ORGANIZED HEALTH CARE EDUCATION/TRAINING PROGRAM

## 2021-10-25 PROCEDURE — 3078F DIAST BP <80 MM HG: CPT | Mod: HCNC,CPTII,S$GLB, | Performed by: STUDENT IN AN ORGANIZED HEALTH CARE EDUCATION/TRAINING PROGRAM

## 2021-10-25 PROCEDURE — 1160F PR REVIEW ALL MEDS BY PRESCRIBER/CLIN PHARMACIST DOCUMENTED: ICD-10-PCS | Mod: HCNC,CPTII,S$GLB, | Performed by: STUDENT IN AN ORGANIZED HEALTH CARE EDUCATION/TRAINING PROGRAM

## 2021-10-25 PROCEDURE — 3078F PR MOST RECENT DIASTOLIC BLOOD PRESSURE < 80 MM HG: ICD-10-PCS | Mod: HCNC,CPTII,S$GLB, | Performed by: STUDENT IN AN ORGANIZED HEALTH CARE EDUCATION/TRAINING PROGRAM

## 2021-10-25 PROCEDURE — 20611 DRAIN/INJ JOINT/BURSA W/US: CPT | Mod: HCNC,LT,S$GLB, | Performed by: STUDENT IN AN ORGANIZED HEALTH CARE EDUCATION/TRAINING PROGRAM

## 2021-10-25 PROCEDURE — 99999 PR PBB SHADOW E&M-EST. PATIENT-LVL III: CPT | Mod: PBBFAC,HCNC,, | Performed by: STUDENT IN AN ORGANIZED HEALTH CARE EDUCATION/TRAINING PROGRAM

## 2021-10-25 PROCEDURE — 99499 UNLISTED E&M SERVICE: CPT | Mod: HCNC,S$GLB,, | Performed by: STUDENT IN AN ORGANIZED HEALTH CARE EDUCATION/TRAINING PROGRAM

## 2021-10-25 PROCEDURE — 3008F PR BODY MASS INDEX (BMI) DOCUMENTED: ICD-10-PCS | Mod: HCNC,CPTII,S$GLB, | Performed by: STUDENT IN AN ORGANIZED HEALTH CARE EDUCATION/TRAINING PROGRAM

## 2021-10-25 PROCEDURE — 3008F BODY MASS INDEX DOCD: CPT | Mod: HCNC,CPTII,S$GLB, | Performed by: STUDENT IN AN ORGANIZED HEALTH CARE EDUCATION/TRAINING PROGRAM

## 2021-10-25 PROCEDURE — 4010F PR ACE/ARB THEARPY RXD/TAKEN: ICD-10-PCS | Mod: HCNC,CPTII,S$GLB, | Performed by: STUDENT IN AN ORGANIZED HEALTH CARE EDUCATION/TRAINING PROGRAM

## 2021-10-25 PROCEDURE — 1125F PR PAIN SEVERITY QUANTIFIED, PAIN PRESENT: ICD-10-PCS | Mod: HCNC,CPTII,S$GLB, | Performed by: STUDENT IN AN ORGANIZED HEALTH CARE EDUCATION/TRAINING PROGRAM

## 2021-10-25 PROCEDURE — 1160F RVW MEDS BY RX/DR IN RCRD: CPT | Mod: HCNC,CPTII,S$GLB, | Performed by: STUDENT IN AN ORGANIZED HEALTH CARE EDUCATION/TRAINING PROGRAM

## 2021-10-25 PROCEDURE — 20611 LARGE JOINT ASPIRATION/INJECTION: L KNEE: ICD-10-PCS | Mod: HCNC,LT,S$GLB, | Performed by: STUDENT IN AN ORGANIZED HEALTH CARE EDUCATION/TRAINING PROGRAM

## 2021-10-25 PROCEDURE — 3044F HG A1C LEVEL LT 7.0%: CPT | Mod: HCNC,CPTII,S$GLB, | Performed by: STUDENT IN AN ORGANIZED HEALTH CARE EDUCATION/TRAINING PROGRAM

## 2021-10-25 PROCEDURE — 4010F ACE/ARB THERAPY RXD/TAKEN: CPT | Mod: HCNC,CPTII,S$GLB, | Performed by: STUDENT IN AN ORGANIZED HEALTH CARE EDUCATION/TRAINING PROGRAM

## 2021-10-25 PROCEDURE — 1125F AMNT PAIN NOTED PAIN PRSNT: CPT | Mod: HCNC,CPTII,S$GLB, | Performed by: STUDENT IN AN ORGANIZED HEALTH CARE EDUCATION/TRAINING PROGRAM

## 2021-10-25 PROCEDURE — 1159F MED LIST DOCD IN RCRD: CPT | Mod: HCNC,CPTII,S$GLB, | Performed by: STUDENT IN AN ORGANIZED HEALTH CARE EDUCATION/TRAINING PROGRAM

## 2021-10-25 PROCEDURE — 3044F PR MOST RECENT HEMOGLOBIN A1C LEVEL <7.0%: ICD-10-PCS | Mod: HCNC,CPTII,S$GLB, | Performed by: STUDENT IN AN ORGANIZED HEALTH CARE EDUCATION/TRAINING PROGRAM

## 2021-10-25 PROCEDURE — 3074F SYST BP LT 130 MM HG: CPT | Mod: HCNC,CPTII,S$GLB, | Performed by: STUDENT IN AN ORGANIZED HEALTH CARE EDUCATION/TRAINING PROGRAM

## 2021-10-25 PROCEDURE — 99499 NO LOS: ICD-10-PCS | Mod: HCNC,S$GLB,, | Performed by: STUDENT IN AN ORGANIZED HEALTH CARE EDUCATION/TRAINING PROGRAM

## 2021-10-25 PROCEDURE — 99999 PR PBB SHADOW E&M-EST. PATIENT-LVL III: ICD-10-PCS | Mod: PBBFAC,HCNC,, | Performed by: STUDENT IN AN ORGANIZED HEALTH CARE EDUCATION/TRAINING PROGRAM

## 2021-10-25 PROCEDURE — 3074F PR MOST RECENT SYSTOLIC BLOOD PRESSURE < 130 MM HG: ICD-10-PCS | Mod: HCNC,CPTII,S$GLB, | Performed by: STUDENT IN AN ORGANIZED HEALTH CARE EDUCATION/TRAINING PROGRAM

## 2022-01-31 ENCOUNTER — LAB VISIT (OUTPATIENT)
Dept: LAB | Facility: HOSPITAL | Age: 67
End: 2022-01-31
Attending: RADIOLOGY
Payer: MEDICARE

## 2022-01-31 DIAGNOSIS — C61 PROSTATE CANCER: ICD-10-CM

## 2022-01-31 LAB — COMPLEXED PSA SERPL-MCNC: 1.9 NG/ML (ref 0–4)

## 2022-01-31 PROCEDURE — 84153 ASSAY OF PSA TOTAL: CPT | Mod: HCNC | Performed by: RADIOLOGY

## 2022-01-31 PROCEDURE — 36415 COLL VENOUS BLD VENIPUNCTURE: CPT | Mod: HCNC | Performed by: RADIOLOGY

## 2022-02-02 ENCOUNTER — OFFICE VISIT (OUTPATIENT)
Dept: RADIATION ONCOLOGY | Facility: CLINIC | Age: 67
End: 2022-02-02
Payer: MEDICARE

## 2022-02-02 VITALS
HEART RATE: 74 BPM | DIASTOLIC BLOOD PRESSURE: 76 MMHG | RESPIRATION RATE: 18 BRPM | WEIGHT: 253.19 LBS | BODY MASS INDEX: 36.25 KG/M2 | SYSTOLIC BLOOD PRESSURE: 149 MMHG | HEIGHT: 70 IN

## 2022-02-02 DIAGNOSIS — C61 PROSTATE CANCER: Primary | ICD-10-CM

## 2022-02-02 PROCEDURE — 1126F AMNT PAIN NOTED NONE PRSNT: CPT | Mod: HCNC,CPTII,S$GLB, | Performed by: RADIOLOGY

## 2022-02-02 PROCEDURE — 99212 PR OFFICE/OUTPT VISIT, EST, LEVL II, 10-19 MIN: ICD-10-PCS | Mod: HCNC,S$GLB,, | Performed by: RADIOLOGY

## 2022-02-02 PROCEDURE — 3288F PR FALLS RISK ASSESSMENT DOCUMENTED: ICD-10-PCS | Mod: HCNC,CPTII,S$GLB, | Performed by: RADIOLOGY

## 2022-02-02 PROCEDURE — 3008F PR BODY MASS INDEX (BMI) DOCUMENTED: ICD-10-PCS | Mod: HCNC,CPTII,S$GLB, | Performed by: RADIOLOGY

## 2022-02-02 PROCEDURE — 1160F PR REVIEW ALL MEDS BY PRESCRIBER/CLIN PHARMACIST DOCUMENTED: ICD-10-PCS | Mod: HCNC,CPTII,S$GLB, | Performed by: RADIOLOGY

## 2022-02-02 PROCEDURE — 1160F RVW MEDS BY RX/DR IN RCRD: CPT | Mod: HCNC,CPTII,S$GLB, | Performed by: RADIOLOGY

## 2022-02-02 PROCEDURE — 1101F PT FALLS ASSESS-DOCD LE1/YR: CPT | Mod: HCNC,CPTII,S$GLB, | Performed by: RADIOLOGY

## 2022-02-02 PROCEDURE — 4010F PR ACE/ARB THEARPY RXD/TAKEN: ICD-10-PCS | Mod: HCNC,CPTII,S$GLB, | Performed by: RADIOLOGY

## 2022-02-02 PROCEDURE — 1159F PR MEDICATION LIST DOCUMENTED IN MEDICAL RECORD: ICD-10-PCS | Mod: HCNC,CPTII,S$GLB, | Performed by: RADIOLOGY

## 2022-02-02 PROCEDURE — 1126F PR PAIN SEVERITY QUANTIFIED, NO PAIN PRESENT: ICD-10-PCS | Mod: HCNC,CPTII,S$GLB, | Performed by: RADIOLOGY

## 2022-02-02 PROCEDURE — 99999 PR PBB SHADOW E&M-EST. PATIENT-LVL IV: ICD-10-PCS | Mod: PBBFAC,HCNC,, | Performed by: RADIOLOGY

## 2022-02-02 PROCEDURE — 3078F DIAST BP <80 MM HG: CPT | Mod: HCNC,CPTII,S$GLB, | Performed by: RADIOLOGY

## 2022-02-02 PROCEDURE — 3008F BODY MASS INDEX DOCD: CPT | Mod: HCNC,CPTII,S$GLB, | Performed by: RADIOLOGY

## 2022-02-02 PROCEDURE — 1101F PR PT FALLS ASSESS DOC 0-1 FALLS W/OUT INJ PAST YR: ICD-10-PCS | Mod: HCNC,CPTII,S$GLB, | Performed by: RADIOLOGY

## 2022-02-02 PROCEDURE — 3077F SYST BP >= 140 MM HG: CPT | Mod: HCNC,CPTII,S$GLB, | Performed by: RADIOLOGY

## 2022-02-02 PROCEDURE — 1159F MED LIST DOCD IN RCRD: CPT | Mod: HCNC,CPTII,S$GLB, | Performed by: RADIOLOGY

## 2022-02-02 PROCEDURE — 99999 PR PBB SHADOW E&M-EST. PATIENT-LVL IV: CPT | Mod: PBBFAC,HCNC,, | Performed by: RADIOLOGY

## 2022-02-02 PROCEDURE — 99212 OFFICE O/P EST SF 10 MIN: CPT | Mod: HCNC,S$GLB,, | Performed by: RADIOLOGY

## 2022-02-02 PROCEDURE — 3078F PR MOST RECENT DIASTOLIC BLOOD PRESSURE < 80 MM HG: ICD-10-PCS | Mod: HCNC,CPTII,S$GLB, | Performed by: RADIOLOGY

## 2022-02-02 PROCEDURE — 4010F ACE/ARB THERAPY RXD/TAKEN: CPT | Mod: HCNC,CPTII,S$GLB, | Performed by: RADIOLOGY

## 2022-02-02 PROCEDURE — 3288F FALL RISK ASSESSMENT DOCD: CPT | Mod: HCNC,CPTII,S$GLB, | Performed by: RADIOLOGY

## 2022-02-02 PROCEDURE — 3077F PR MOST RECENT SYSTOLIC BLOOD PRESSURE >= 140 MM HG: ICD-10-PCS | Mod: HCNC,CPTII,S$GLB, | Performed by: RADIOLOGY

## 2022-02-02 RX ORDER — HYALURONATE SODIUM 30 MG/2 ML
SYRINGE (ML) INTRAARTICULAR
Status: ON HOLD | COMMUNITY
Start: 2021-10-25 | End: 2023-03-27 | Stop reason: HOSPADM

## 2022-02-02 RX ORDER — PNEUMOCOCCAL VACCINE POLYVALENT 25; 25; 25; 25; 25; 25; 25; 25; 25; 25; 25; 25; 25; 25; 25; 25; 25; 25; 25; 25; 25; 25; 25 UG/.5ML; UG/.5ML; UG/.5ML; UG/.5ML; UG/.5ML; UG/.5ML; UG/.5ML; UG/.5ML; UG/.5ML; UG/.5ML; UG/.5ML; UG/.5ML; UG/.5ML; UG/.5ML; UG/.5ML; UG/.5ML; UG/.5ML; UG/.5ML; UG/.5ML; UG/.5ML; UG/.5ML; UG/.5ML; UG/.5ML
INJECTION, SOLUTION INTRAMUSCULAR; SUBCUTANEOUS
Status: ON HOLD | COMMUNITY
Start: 2021-08-25 | End: 2023-03-27 | Stop reason: HOSPADM

## 2022-02-02 NOTE — PROGRESS NOTES
Subjective:       Patient ID: Stevie Villalba is a 66 y.o. male.    Chief Complaint: Prostate Cancer (F/u w/psa)    This patient presents for follow up visit.     Mr. Villalba has a history of Stage IIB, T1c, N0, M0, P<10, G2 prostate cancer.  He presented for evaluation of a mildly elevated PSA of 5 ng/ml drawn in June of 2020. Biopsies from the Lt. apex and Lt. mid gland revealed [place] 6 (3+3) adenocarcinoma involving 20% of 1 of 2 cores from the apex and 16% of 2 of 3 cores at the mid gland. The remaining biopsies were negative.  MRI of the prostate on 11/30/20 revealed  a 2.5 cm T2 hypointense lesion bilaterally in the anterior mid zone, PI-RADS 5.  There was no extracapsular extension.  The neurovascular bundles and seminal vesicles were unremarkable.  There was no lymphadenopathy.  Repeat TRUS with targeted biopsies were performed on 1/21/21. Biopsies from the targeted lesion revealed [place] 7 (3+4) adenocarcinoma involving 80% of 4 of 4 cores.  The [place] pattern 4 accounted for 5% of the tumor.  There was no perineural invasion.  The remaining cores from throughout the prostate were negative.  The patient elected to proceed with prostate brachytherapy and underwent placement of Palladium seeds on 4/14/21. Today the patient states he feels well  no complaints.  Currently not taking tamsulosin.   Sexually active with Viagra.     Review of Systems   Constitutional: Negative for activity change, appetite change, chills and fatigue.   Respiratory: Negative for choking and shortness of breath.    Gastrointestinal: Negative for abdominal pain, change in bowel habit, constipation, diarrhea, fecal incontinence and change in bowel habit.   Genitourinary: Negative for bladder incontinence, difficulty urinating, dysuria, frequency and hematuria.        Nocturia at 2 - 3 times per night which is his baseline          Objective:      Physical Exam  Constitutional:       General: He is not in acute distress.      Appearance: Normal appearance.   Pulmonary:      Effort: Pulmonary effort is normal. No respiratory distress.   Abdominal:      General: Abdomen is flat. There is no distension.   Neurological:      Mental Status: He is alert and oriented to person, place, and time.   Psychiatric:         Mood and Affect: Mood normal.         Judgment: Judgment normal.       PSA decreased to 1.9 ng/ml.    Assessment:         Prostate cancer   Plan:       Doing well.  PSA continues to decrease.  Patient still notes some ED with Viagra, would like to state PIP injection.  Will send to urology.  plan follow up in 6 months with Dr. Warren with PSA.

## 2022-03-07 ENCOUNTER — OFFICE VISIT (OUTPATIENT)
Dept: UROLOGY | Facility: CLINIC | Age: 67
End: 2022-03-07
Payer: MEDICARE

## 2022-03-07 VITALS
WEIGHT: 253 LBS | BODY MASS INDEX: 36.22 KG/M2 | HEART RATE: 74 BPM | HEIGHT: 70 IN | DIASTOLIC BLOOD PRESSURE: 78 MMHG | SYSTOLIC BLOOD PRESSURE: 133 MMHG

## 2022-03-07 DIAGNOSIS — Z92.3 HISTORY OF BRACHYTHERAPY: ICD-10-CM

## 2022-03-07 DIAGNOSIS — N52.35 ERECTILE DYSFUNCTION AFTER PROSTATE BRACHYTHERAPY: ICD-10-CM

## 2022-03-07 DIAGNOSIS — C61 PROSTATE CANCER: Primary | ICD-10-CM

## 2022-03-07 PROCEDURE — 99999 PR PBB SHADOW E&M-EST. PATIENT-LVL V: ICD-10-PCS | Mod: PBBFAC,,, | Performed by: NURSE PRACTITIONER

## 2022-03-07 PROCEDURE — 1126F AMNT PAIN NOTED NONE PRSNT: CPT | Mod: CPTII,S$GLB,, | Performed by: NURSE PRACTITIONER

## 2022-03-07 PROCEDURE — 1160F PR REVIEW ALL MEDS BY PRESCRIBER/CLIN PHARMACIST DOCUMENTED: ICD-10-PCS | Mod: CPTII,S$GLB,, | Performed by: NURSE PRACTITIONER

## 2022-03-07 PROCEDURE — 99214 OFFICE O/P EST MOD 30 MIN: CPT | Mod: S$GLB,,, | Performed by: NURSE PRACTITIONER

## 2022-03-07 PROCEDURE — 1160F RVW MEDS BY RX/DR IN RCRD: CPT | Mod: CPTII,S$GLB,, | Performed by: NURSE PRACTITIONER

## 2022-03-07 PROCEDURE — 3075F PR MOST RECENT SYSTOLIC BLOOD PRESS GE 130-139MM HG: ICD-10-PCS | Mod: CPTII,S$GLB,, | Performed by: NURSE PRACTITIONER

## 2022-03-07 PROCEDURE — 4010F ACE/ARB THERAPY RXD/TAKEN: CPT | Mod: CPTII,S$GLB,, | Performed by: NURSE PRACTITIONER

## 2022-03-07 PROCEDURE — 1159F MED LIST DOCD IN RCRD: CPT | Mod: CPTII,S$GLB,, | Performed by: NURSE PRACTITIONER

## 2022-03-07 PROCEDURE — 3008F BODY MASS INDEX DOCD: CPT | Mod: CPTII,S$GLB,, | Performed by: NURSE PRACTITIONER

## 2022-03-07 PROCEDURE — 3078F DIAST BP <80 MM HG: CPT | Mod: CPTII,S$GLB,, | Performed by: NURSE PRACTITIONER

## 2022-03-07 PROCEDURE — 4010F PR ACE/ARB THEARPY RXD/TAKEN: ICD-10-PCS | Mod: CPTII,S$GLB,, | Performed by: NURSE PRACTITIONER

## 2022-03-07 PROCEDURE — 3075F SYST BP GE 130 - 139MM HG: CPT | Mod: CPTII,S$GLB,, | Performed by: NURSE PRACTITIONER

## 2022-03-07 PROCEDURE — 99999 PR PBB SHADOW E&M-EST. PATIENT-LVL V: CPT | Mod: PBBFAC,,, | Performed by: NURSE PRACTITIONER

## 2022-03-07 PROCEDURE — 3078F PR MOST RECENT DIASTOLIC BLOOD PRESSURE < 80 MM HG: ICD-10-PCS | Mod: CPTII,S$GLB,, | Performed by: NURSE PRACTITIONER

## 2022-03-07 PROCEDURE — 1126F PR PAIN SEVERITY QUANTIFIED, NO PAIN PRESENT: ICD-10-PCS | Mod: CPTII,S$GLB,, | Performed by: NURSE PRACTITIONER

## 2022-03-07 PROCEDURE — 1159F PR MEDICATION LIST DOCUMENTED IN MEDICAL RECORD: ICD-10-PCS | Mod: CPTII,S$GLB,, | Performed by: NURSE PRACTITIONER

## 2022-03-07 PROCEDURE — 3008F PR BODY MASS INDEX (BMI) DOCUMENTED: ICD-10-PCS | Mod: CPTII,S$GLB,, | Performed by: NURSE PRACTITIONER

## 2022-03-07 PROCEDURE — 99214 PR OFFICE/OUTPT VISIT, EST, LEVL IV, 30-39 MIN: ICD-10-PCS | Mod: S$GLB,,, | Performed by: NURSE PRACTITIONER

## 2022-03-07 RX ORDER — PAPAVERINE HYDROCHLORIDE 30 MG/ML
INJECTION INTRAMUSCULAR; INTRAVENOUS
Qty: 5 ML | Refills: 0 | Status: SHIPPED | OUTPATIENT
Start: 2022-03-07 | End: 2022-03-21 | Stop reason: ALTCHOICE
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
## 2022-03-07 NOTE — PATIENT INSTRUCTIONS
[facility]  [phone]      [facility]  [phone]      [facility]  [phone]

## 2022-03-21 ENCOUNTER — OFFICE VISIT (OUTPATIENT)
Dept: UROLOGY | Facility: CLINIC | Age: 67
End: 2022-03-21
Payer: MEDICARE

## 2022-03-21 VITALS
HEIGHT: 70 IN | WEIGHT: 253 LBS | BODY MASS INDEX: 36.22 KG/M2 | DIASTOLIC BLOOD PRESSURE: 68 MMHG | SYSTOLIC BLOOD PRESSURE: 122 MMHG | HEART RATE: 75 BPM

## 2022-03-21 DIAGNOSIS — C61 PROSTATE CANCER: Primary | ICD-10-CM

## 2022-03-21 DIAGNOSIS — N52.35 ERECTILE DYSFUNCTION AFTER PROSTATE BRACHYTHERAPY: ICD-10-CM

## 2022-03-21 PROCEDURE — 1126F PR PAIN SEVERITY QUANTIFIED, NO PAIN PRESENT: ICD-10-PCS | Mod: CPTII,S$GLB,, | Performed by: NURSE PRACTITIONER

## 2022-03-21 PROCEDURE — 3008F PR BODY MASS INDEX (BMI) DOCUMENTED: ICD-10-PCS | Mod: CPTII,S$GLB,, | Performed by: NURSE PRACTITIONER

## 2022-03-21 PROCEDURE — 1160F RVW MEDS BY RX/DR IN RCRD: CPT | Mod: CPTII,S$GLB,, | Performed by: NURSE PRACTITIONER

## 2022-03-21 PROCEDURE — 3078F PR MOST RECENT DIASTOLIC BLOOD PRESSURE < 80 MM HG: ICD-10-PCS | Mod: CPTII,S$GLB,, | Performed by: NURSE PRACTITIONER

## 2022-03-21 PROCEDURE — 1159F PR MEDICATION LIST DOCUMENTED IN MEDICAL RECORD: ICD-10-PCS | Mod: CPTII,S$GLB,, | Performed by: NURSE PRACTITIONER

## 2022-03-21 PROCEDURE — 1101F PT FALLS ASSESS-DOCD LE1/YR: CPT | Mod: CPTII,S$GLB,, | Performed by: NURSE PRACTITIONER

## 2022-03-21 PROCEDURE — 1101F PR PT FALLS ASSESS DOC 0-1 FALLS W/OUT INJ PAST YR: ICD-10-PCS | Mod: CPTII,S$GLB,, | Performed by: NURSE PRACTITIONER

## 2022-03-21 PROCEDURE — 3288F FALL RISK ASSESSMENT DOCD: CPT | Mod: CPTII,S$GLB,, | Performed by: NURSE PRACTITIONER

## 2022-03-21 PROCEDURE — 1126F AMNT PAIN NOTED NONE PRSNT: CPT | Mod: CPTII,S$GLB,, | Performed by: NURSE PRACTITIONER

## 2022-03-21 PROCEDURE — 99999 PR PBB SHADOW E&M-EST. PATIENT-LVL IV: CPT | Mod: PBBFAC,,, | Performed by: NURSE PRACTITIONER

## 2022-03-21 PROCEDURE — 1159F MED LIST DOCD IN RCRD: CPT | Mod: CPTII,S$GLB,, | Performed by: NURSE PRACTITIONER

## 2022-03-21 PROCEDURE — 3078F DIAST BP <80 MM HG: CPT | Mod: CPTII,S$GLB,, | Performed by: NURSE PRACTITIONER

## 2022-03-21 PROCEDURE — 99999 PR PBB SHADOW E&M-EST. PATIENT-LVL IV: ICD-10-PCS | Mod: PBBFAC,,, | Performed by: NURSE PRACTITIONER

## 2022-03-21 PROCEDURE — 3008F BODY MASS INDEX DOCD: CPT | Mod: CPTII,S$GLB,, | Performed by: NURSE PRACTITIONER

## 2022-03-21 PROCEDURE — 4010F ACE/ARB THERAPY RXD/TAKEN: CPT | Mod: CPTII,S$GLB,, | Performed by: NURSE PRACTITIONER

## 2022-03-21 PROCEDURE — 3288F PR FALLS RISK ASSESSMENT DOCUMENTED: ICD-10-PCS | Mod: CPTII,S$GLB,, | Performed by: NURSE PRACTITIONER

## 2022-03-21 PROCEDURE — 99215 OFFICE O/P EST HI 40 MIN: CPT | Mod: S$GLB,,, | Performed by: NURSE PRACTITIONER

## 2022-03-21 PROCEDURE — 99215 PR OFFICE/OUTPT VISIT, EST, LEVL V, 40-54 MIN: ICD-10-PCS | Mod: S$GLB,,, | Performed by: NURSE PRACTITIONER

## 2022-03-21 PROCEDURE — 3074F PR MOST RECENT SYSTOLIC BLOOD PRESSURE < 130 MM HG: ICD-10-PCS | Mod: CPTII,S$GLB,, | Performed by: NURSE PRACTITIONER

## 2022-03-21 PROCEDURE — 4010F PR ACE/ARB THEARPY RXD/TAKEN: ICD-10-PCS | Mod: CPTII,S$GLB,, | Performed by: NURSE PRACTITIONER

## 2022-03-21 PROCEDURE — 1160F PR REVIEW ALL MEDS BY PRESCRIBER/CLIN PHARMACIST DOCUMENTED: ICD-10-PCS | Mod: CPTII,S$GLB,, | Performed by: NURSE PRACTITIONER

## 2022-03-21 PROCEDURE — 3074F SYST BP LT 130 MM HG: CPT | Mod: CPTII,S$GLB,, | Performed by: NURSE PRACTITIONER

## 2022-03-21 RX ORDER — PAPAVERINE HYDROCHLORIDE 30 MG/ML
INJECTION INTRAMUSCULAR; INTRAVENOUS
Qty: 5 ML | Refills: 12 | Status: SHIPPED | OUTPATIENT
Start: 2022-03-21 | End: 2023-03-21

## 2022-03-21 RX ORDER — NAPROXEN SODIUM 220 MG
TABLET ORAL
Qty: 10 EACH | Status: SHIPPED | OUTPATIENT
Start: 2022-03-21
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
## 2022-07-04 RX ORDER — ATORVASTATIN CALCIUM 40 MG/1
TABLET, FILM COATED ORAL
Qty: 90 TABLET | Refills: 0 | Status: SHIPPED | OUTPATIENT
Start: 2022-07-04 | End: 2022-09-30

## 2022-07-04 RX ORDER — HYDROCHLOROTHIAZIDE 25 MG/1
TABLET ORAL
Qty: 90 TABLET | Refills: 0 | Status: SHIPPED | OUTPATIENT
Start: 2022-07-04 | End: 2022-09-30

## 2022-07-04 RX ORDER — AMLODIPINE BESYLATE 10 MG/1
TABLET ORAL
Qty: 90 TABLET | Refills: 0 | Status: SHIPPED | OUTPATIENT
Start: 2022-07-04 | End: 2022-09-30

## 2022-07-18 ENCOUNTER — HOSPITAL ENCOUNTER (OUTPATIENT)
Dept: RADIOLOGY | Facility: HOSPITAL | Age: 67
Discharge: HOME | End: 2022-07-18
Attending: INTERNAL MEDICINE
Payer: MEDICARE

## 2022-07-18 ENCOUNTER — OFFICE VISIT (OUTPATIENT)
Dept: INTERNAL MEDICINE | Facility: CLINIC | Age: 67
End: 2022-07-18
Payer: MEDICARE

## 2022-07-18 VITALS
OXYGEN SATURATION: 96 % | HEIGHT: 70 IN | RESPIRATION RATE: 18 BRPM | HEART RATE: 78 BPM | BODY MASS INDEX: 36.83 KG/M2 | DIASTOLIC BLOOD PRESSURE: 50 MMHG | SYSTOLIC BLOOD PRESSURE: 130 MMHG | WEIGHT: 257.25 LBS | TEMPERATURE: 98 F

## 2022-07-18 DIAGNOSIS — I10 PRIMARY HYPERTENSION: Primary | Chronic | ICD-10-CM

## 2022-07-18 DIAGNOSIS — R60.1 GENERALIZED EDEMA: ICD-10-CM

## 2022-07-18 DIAGNOSIS — M54.2 NECK PAIN: ICD-10-CM

## 2022-07-18 DIAGNOSIS — R30.0 DYSURIA: ICD-10-CM

## 2022-07-18 DIAGNOSIS — M79.89 SWELLING OF BOTH LOWER EXTREMITIES: ICD-10-CM

## 2022-07-18 DIAGNOSIS — R07.9 CHEST PAIN, UNSPECIFIED TYPE: ICD-10-CM

## 2022-07-18 PROCEDURE — 3008F BODY MASS INDEX DOCD: CPT | Mod: CPTII,S$GLB,, | Performed by: INTERNAL MEDICINE

## 2022-07-18 PROCEDURE — 93010 EKG 12-LEAD: ICD-10-PCS | Mod: S$GLB,,, | Performed by: INTERNAL MEDICINE

## 2022-07-18 PROCEDURE — 1126F PR PAIN SEVERITY QUANTIFIED, NO PAIN PRESENT: ICD-10-PCS | Mod: CPTII,S$GLB,, | Performed by: INTERNAL MEDICINE

## 2022-07-18 PROCEDURE — 3078F DIAST BP <80 MM HG: CPT | Mod: CPTII,S$GLB,, | Performed by: INTERNAL MEDICINE

## 2022-07-18 PROCEDURE — 1159F MED LIST DOCD IN RCRD: CPT | Mod: CPTII,S$GLB,, | Performed by: INTERNAL MEDICINE

## 2022-07-18 PROCEDURE — 72040 X-RAY EXAM NECK SPINE 2-3 VW: CPT | Mod: 26,,, | Performed by: INTERNAL MEDICINE

## 2022-07-18 PROCEDURE — 1101F PR PT FALLS ASSESS DOC 0-1 FALLS W/OUT INJ PAST YR: ICD-10-PCS | Mod: CPTII,S$GLB,, | Performed by: INTERNAL MEDICINE

## 2022-07-18 PROCEDURE — 3288F FALL RISK ASSESSMENT DOCD: CPT | Mod: CPTII,S$GLB,, | Performed by: INTERNAL MEDICINE

## 2022-07-18 PROCEDURE — 3078F PR MOST RECENT DIASTOLIC BLOOD PRESSURE < 80 MM HG: ICD-10-PCS | Mod: CPTII,S$GLB,, | Performed by: INTERNAL MEDICINE

## 2022-07-18 PROCEDURE — 99999 PR PBB SHADOW E&M-EST. PATIENT-LVL V: CPT | Mod: PBBFAC,,, | Performed by: INTERNAL MEDICINE

## 2022-07-18 PROCEDURE — 93010 ELECTROCARDIOGRAM REPORT: CPT | Mod: S$GLB,,, | Performed by: INTERNAL MEDICINE

## 2022-07-18 PROCEDURE — 3075F SYST BP GE 130 - 139MM HG: CPT | Mod: CPTII,S$GLB,, | Performed by: INTERNAL MEDICINE

## 2022-07-18 PROCEDURE — 99214 OFFICE O/P EST MOD 30 MIN: CPT | Mod: S$GLB,,, | Performed by: INTERNAL MEDICINE

## 2022-07-18 PROCEDURE — 1160F PR REVIEW ALL MEDS BY PRESCRIBER/CLIN PHARMACIST DOCUMENTED: ICD-10-PCS | Mod: CPTII,S$GLB,, | Performed by: INTERNAL MEDICINE

## 2022-07-18 PROCEDURE — 1101F PT FALLS ASSESS-DOCD LE1/YR: CPT | Mod: CPTII,S$GLB,, | Performed by: INTERNAL MEDICINE

## 2022-07-18 PROCEDURE — 3008F PR BODY MASS INDEX (BMI) DOCUMENTED: ICD-10-PCS | Mod: CPTII,S$GLB,, | Performed by: INTERNAL MEDICINE

## 2022-07-18 PROCEDURE — 3075F PR MOST RECENT SYSTOLIC BLOOD PRESS GE 130-139MM HG: ICD-10-PCS | Mod: CPTII,S$GLB,, | Performed by: INTERNAL MEDICINE

## 2022-07-18 PROCEDURE — 93005 ELECTROCARDIOGRAM TRACING: CPT | Mod: S$GLB,,, | Performed by: INTERNAL MEDICINE

## 2022-07-18 PROCEDURE — 99999 PR PBB SHADOW E&M-EST. PATIENT-LVL V: ICD-10-PCS | Mod: PBBFAC,,, | Performed by: INTERNAL MEDICINE

## 2022-07-18 PROCEDURE — 72040 X-RAY EXAM NECK SPINE 2-3 VW: CPT | Mod: TC,PO

## 2022-07-18 PROCEDURE — 99214 PR OFFICE/OUTPT VISIT, EST, LEVL IV, 30-39 MIN: ICD-10-PCS | Mod: S$GLB,,, | Performed by: INTERNAL MEDICINE

## 2022-07-18 PROCEDURE — 3288F PR FALLS RISK ASSESSMENT DOCUMENTED: ICD-10-PCS | Mod: CPTII,S$GLB,, | Performed by: INTERNAL MEDICINE

## 2022-07-18 PROCEDURE — 72040 XR CERVICAL SPINE AP LATERAL: ICD-10-PCS | Mod: 26,,, | Performed by: INTERNAL MEDICINE

## 2022-07-18 PROCEDURE — 1126F AMNT PAIN NOTED NONE PRSNT: CPT | Mod: CPTII,S$GLB,, | Performed by: INTERNAL MEDICINE

## 2022-07-18 PROCEDURE — 4010F ACE/ARB THERAPY RXD/TAKEN: CPT | Mod: CPTII,S$GLB,, | Performed by: INTERNAL MEDICINE

## 2022-07-18 PROCEDURE — 1159F PR MEDICATION LIST DOCUMENTED IN MEDICAL RECORD: ICD-10-PCS | Mod: CPTII,S$GLB,, | Performed by: INTERNAL MEDICINE

## 2022-07-18 PROCEDURE — 4010F PR ACE/ARB THEARPY RXD/TAKEN: ICD-10-PCS | Mod: CPTII,S$GLB,, | Performed by: INTERNAL MEDICINE

## 2022-07-18 PROCEDURE — 1160F RVW MEDS BY RX/DR IN RCRD: CPT | Mod: CPTII,S$GLB,, | Performed by: INTERNAL MEDICINE

## 2022-07-18 PROCEDURE — 93005 EKG 12-LEAD: ICD-10-PCS | Mod: S$GLB,,, | Performed by: INTERNAL MEDICINE

## 2022-07-18 RX ORDER — CIPROFLOXACIN 500 MG/1
500 TABLET ORAL EVERY 12 HOURS
Qty: 14 TABLET | Refills: 0 | Status: SHIPPED | OUTPATIENT
Start: 2022-07-18 | End: 2022-07-25

## 2022-07-18 NOTE — PROGRESS NOTES
Subjective:       Patient ID: Stevie Villalba is a 67 y.o. male.    Chief Complaint: Joint Swelling and R hand/arm numbness/tingling    HPI     67-year-old male here for evaluation of hypertension and swelling in legs.    HTN - Patient is currently on amlodipine 10 mg, HCTZ 25 mg, losartan 100 mg. He does occasionally check his BP at home, and it runs 130s/50s. Side effects of medications note: none. Denies headaches, blurred vision, chest pain, shortness of breath, nausea.  He has had back pain, but feels better today.      Patient has swelling in his lower extremities.  He has had swelling in his feet at night when he is on his feet all day.  He has pain in his right foot on the bottom.  He has used indomethacin for this with relief.  This bothers him when he has more swelling.    He has had pains under his right arm.  This goes numb.  This has been going on the last couple of weeks.  It is inconsistent.  He is concerned about having heart disease because his brother just had an MI at 71 yo.      He has dysuria that has been going on the last couple of weeks.  He got a needle shot instead of viagra.  When he first starts to urinate, he gets a burning sensation.  He urinates more than usual.    Review of Systems      Objective:      Physical Exam  Vitals reviewed. Exam conducted with a chaperone present.   Constitutional:       Appearance: He is well-developed.   HENT:      Head: Normocephalic and atraumatic.      Mouth/Throat:      Pharynx: No oropharyngeal exudate.   Eyes:      General: No scleral icterus.        Right eye: No discharge.         Left eye: No discharge.      Pupils: Pupils are equal, round, and reactive to light.   Neck:      Thyroid: No thyromegaly.      Trachea: No tracheal deviation.   Cardiovascular:      Rate and Rhythm: Normal rate and regular rhythm.      Heart sounds: Normal heart sounds. No murmur heard.    No friction rub. No gallop.   Pulmonary:      Effort: Pulmonary effort is normal.  No respiratory distress.      Breath sounds: Normal breath sounds. No wheezing or rales.   Chest:      Chest wall: No tenderness.   Abdominal:      General: Bowel sounds are normal. There is no distension.      Palpations: Abdomen is soft. There is no mass.      Tenderness: There is no abdominal tenderness. There is no guarding or rebound.   Genitourinary:     Rectum: Normal.   Musculoskeletal:         General: No tenderness. Normal range of motion.      Cervical back: Normal range of motion and neck supple.   Skin:     General: Skin is warm and dry.      Coloration: Skin is not pale.      Findings: No erythema or rash.   Neurological:      Mental Status: He is alert and oriented to person, place, and time.   Psychiatric:         Behavior: Behavior normal.         Assessment:       1. Primary hypertension    2. Swelling of both lower extremities  - COMPRESSION STOCKINGS  - CV US Lower Extremity Veins Bilateral Insufficiency; Future    3. Generalized edema   - CV US Lower Extremity Veins Bilateral Insufficiency; Future    4. Chest pain, unspecified type  - Stress Echo Which stress agent will be used? Treadmill Exercise; Color Flow Doppler? No; Future  - X-Ray Cervical Spine AP And Lateral; Future  - IN OFFICE EKG 12-LEAD (to Muse)    5. Dysuria  - Urinalysis; Future  - CULTURE, URINE; Future      Plan:       1.  Continue amlodipine 10 mg, HCTZ 25 mg, losartan 100 mg  2/3. Compression stockings, check ultrasound.  4. Check EKG, chest x-ray, exercise stress echo.  5. Check urinalysis, urine culture.  Cipro 500 mg twice daily for 7 days prescribed.

## 2022-08-01 ENCOUNTER — HOSPITAL ENCOUNTER (OUTPATIENT)
Dept: CARDIOLOGY | Facility: HOSPITAL | Age: 67
Discharge: HOME | End: 2022-08-01
Attending: INTERNAL MEDICINE
Payer: MEDICARE

## 2022-08-01 VITALS — BODY MASS INDEX: 36.79 KG/M2 | WEIGHT: 257 LBS | HEIGHT: 70 IN

## 2022-08-01 DIAGNOSIS — R60.1 GENERALIZED EDEMA: ICD-10-CM

## 2022-08-01 DIAGNOSIS — M79.89 SWELLING OF BOTH LOWER EXTREMITIES: ICD-10-CM

## 2022-08-01 DIAGNOSIS — R07.9 CHEST PAIN, UNSPECIFIED TYPE: ICD-10-CM

## 2022-08-01 LAB
ASCENDING AORTA: 3.06 CM
BSA FOR ECHO PROCEDURE: 2.4 M2
CV ECHO LV RWT: 0.18 CM
CV STRESS BASE HR: 82 BPM
DIASTOLIC BLOOD PRESSURE: 76 MMHG
DOP CALC LVOT AREA: 3.7 CM2
DOP CALC LVOT DIAMETER: 2.16 CM
DOP CALC LVOT PEAK VEL: 0.94 M/S
DOP CALC LVOT STROKE VOLUME: 66.47 CM3
DOP CALCLVOT PEAK VEL VTI: 18.15 CM
E/E' RATIO: 10.19 M/S
ECHO LV POSTERIOR WALL: 0.48 CM (ref 0.6–1.1)
EJECTION FRACTION: 55 %
FRACTIONAL SHORTENING: 33 % (ref 28–44)
INTERVENTRICULAR SEPTUM: 0.68 CM (ref 0.6–1.1)
IVRT: 71.36 MSEC
LA MAJOR: 6.94 CM
LA MINOR: 6.91 CM
LA WIDTH: 4.38 CM
LEFT ATRIUM SIZE: 4.36 CM
LEFT ATRIUM VOLUME INDEX MOD: 49.1 ML/M2
LEFT ATRIUM VOLUME INDEX: 48.5 ML/M2
LEFT ATRIUM VOLUME MOD: 114 CM3
LEFT ATRIUM VOLUME: 112.41 CM3
LEFT GREAT SAPHENOUS DISTAL THIGH DIA: 0.42 CM
LEFT GREAT SAPHENOUS JUNCTION DIA: 0.41 CM
LEFT GREAT SAPHENOUS KNEE DIA: 0.26 CM
LEFT GREAT SAPHENOUS MIDDLE THIGH DIA: 0.37 CM
LEFT GREAT SAPHENOUS PROXIMAL CALF DIA: 0.3 CM
LEFT INTERNAL DIMENSION IN SYSTOLE: 3.51 CM (ref 2.1–4)
LEFT SMALL SAPHENOUS SPJ DIA: 0.27 CM
LEFT VENTRICLE DIASTOLIC VOLUME INDEX: 56.78 ML/M2
LEFT VENTRICLE DIASTOLIC VOLUME: 131.72 ML
LEFT VENTRICLE MASS INDEX: 43 G/M2
LEFT VENTRICLE SYSTOLIC VOLUME INDEX: 22 ML/M2
LEFT VENTRICLE SYSTOLIC VOLUME: 51.14 ML
LEFT VENTRICULAR INTERNAL DIMENSION IN DIASTOLE: 5.24 CM (ref 3.5–6)
LEFT VENTRICULAR MASS: 99 G
LV LATERAL E/E' RATIO: 9.73 M/S
LV SEPTAL E/E' RATIO: 10.7 M/S
MV PEAK E VEL: 1.07 M/S
OHS CV CPX 1 MINUTE RECOVERY HEART RATE: 133 BPM
OHS CV CPX 85 PERCENT MAX PREDICTED HEART RATE MALE: 131
OHS CV CPX ESTIMATED METS: 9
OHS CV CPX MAX PREDICTED HEART RATE: 154
OHS CV CPX PATIENT IS FEMALE: 0
OHS CV CPX PATIENT IS MALE: 1
OHS CV CPX PEAK DIASTOLIC BLOOD PRESSURE: 44 MMHG
OHS CV CPX PEAK HEAR RATE: 164 BPM
OHS CV CPX PEAK RATE PRESSURE PRODUCT: ABNORMAL
OHS CV CPX PEAK SYSTOLIC BLOOD PRESSURE: 199 MMHG
OHS CV CPX PERCENT MAX PREDICTED HEART RATE ACHIEVED: 106
OHS CV CPX RATE PRESSURE PRODUCT PRESENTING: ABNORMAL
PISA TR MAX VEL: 2.69 M/S
PULM VEIN S/D RATIO: 0.56
PV PEAK D VEL: 0.61 M/S
PV PEAK S VEL: 0.34 M/S
RA MAJOR: 6.34 CM
RA PRESSURE: 3 MMHG
RA WIDTH: 4.73 CM
RIGHT ATRIAL AREA: 20 CM2
RIGHT GIAC DIA: 0.3 MM
RIGHT GREAT SAPHENOUS DISTAL THIGH DIA: 0.33 CM
RIGHT GREAT SAPHENOUS JUNCTION DIA: 0.41 CM
RIGHT GREAT SAPHENOUS KNEE DIA: 0.26 CM
RIGHT GREAT SAPHENOUS MIDDLE THIGH DIA: 0.45 CM
RIGHT GREAT SAPHENOUS PROXIMAL CALF DIA: 0.28 CM
RIGHT VENTRICULAR END-DIASTOLIC DIMENSION: 3.5 CM
RV TISSUE DOPPLER FREE WALL SYSTOLIC VELOCITY 1 (APICAL 4 CHAMBER VIEW): 11.85 CM/S
SINUS: 2.73 CM
STJ: 2.46 CM
STRESS ECHO POST EXERCISE DUR MIN: 5 MINUTES
STRESS ECHO POST EXERCISE DUR SEC: 31 SECONDS
SYSTOLIC BLOOD PRESSURE: 144 MMHG
TDI LATERAL: 0.11 M/S
TDI SEPTAL: 0.1 M/S
TDI: 0.11 M/S
TR MAX PG: 29 MMHG
TRICUSPID ANNULAR PLANE SYSTOLIC EXCURSION: 1.85 CM
TV REST PULMONARY ARTERY PRESSURE: 32 MMHG

## 2022-08-01 PROCEDURE — 93970 EXTREMITY STUDY: CPT | Mod: TC

## 2022-08-01 PROCEDURE — 93970 EXTREMITY STUDY: CPT | Mod: 26,,, | Performed by: INTERNAL MEDICINE

## 2022-08-01 PROCEDURE — 93351 STRESS TTE COMPLETE: CPT | Mod: 26,,, | Performed by: INTERNAL MEDICINE

## 2022-08-01 PROCEDURE — 93351 STRESS TTE COMPLETE: CPT

## 2022-08-01 PROCEDURE — 93970 CV US LOWER VENOUS INSUFFICIENCY BILATERAL (CUPID ONLY): ICD-10-PCS | Mod: 26,,, | Performed by: INTERNAL MEDICINE

## 2022-08-01 PROCEDURE — 93351 STRESS ECHO (CUPID ONLY): ICD-10-PCS | Mod: 26,,, | Performed by: INTERNAL MEDICINE

## 2022-08-08 ENCOUNTER — OFFICE VISIT (OUTPATIENT)
Dept: CARDIOLOGY | Facility: CLINIC | Age: 67
End: 2022-08-08
Payer: MEDICARE

## 2022-08-08 VITALS
WEIGHT: 263.25 LBS | DIASTOLIC BLOOD PRESSURE: 68 MMHG | HEART RATE: 77 BPM | OXYGEN SATURATION: 96 % | BODY MASS INDEX: 37.69 KG/M2 | SYSTOLIC BLOOD PRESSURE: 121 MMHG | HEIGHT: 70 IN

## 2022-08-08 DIAGNOSIS — R60.0 LOWER EXTREMITY EDEMA: ICD-10-CM

## 2022-08-08 DIAGNOSIS — E78.5 HYPERLIPIDEMIA, UNSPECIFIED HYPERLIPIDEMIA TYPE: ICD-10-CM

## 2022-08-08 DIAGNOSIS — G47.33 OSA (OBSTRUCTIVE SLEEP APNEA): ICD-10-CM

## 2022-08-08 DIAGNOSIS — I10 HYPERTENSION, UNSPECIFIED TYPE: ICD-10-CM

## 2022-08-08 DIAGNOSIS — I48.91 ATRIAL FIBRILLATION, UNSPECIFIED TYPE: Primary | ICD-10-CM

## 2022-08-08 PROCEDURE — 1101F PT FALLS ASSESS-DOCD LE1/YR: CPT | Mod: CPTII,GC,S$GLB, | Performed by: STUDENT IN AN ORGANIZED HEALTH CARE EDUCATION/TRAINING PROGRAM

## 2022-08-08 PROCEDURE — 3074F SYST BP LT 130 MM HG: CPT | Mod: CPTII,GC,S$GLB, | Performed by: STUDENT IN AN ORGANIZED HEALTH CARE EDUCATION/TRAINING PROGRAM

## 2022-08-08 PROCEDURE — 99999 PR PBB SHADOW E&M-EST. PATIENT-LVL V: ICD-10-PCS | Mod: PBBFAC,GC,, | Performed by: STUDENT IN AN ORGANIZED HEALTH CARE EDUCATION/TRAINING PROGRAM

## 2022-08-08 PROCEDURE — 1159F MED LIST DOCD IN RCRD: CPT | Mod: CPTII,GC,S$GLB, | Performed by: STUDENT IN AN ORGANIZED HEALTH CARE EDUCATION/TRAINING PROGRAM

## 2022-08-08 PROCEDURE — 1101F PR PT FALLS ASSESS DOC 0-1 FALLS W/OUT INJ PAST YR: ICD-10-PCS | Mod: CPTII,GC,S$GLB, | Performed by: STUDENT IN AN ORGANIZED HEALTH CARE EDUCATION/TRAINING PROGRAM

## 2022-08-08 PROCEDURE — 1126F AMNT PAIN NOTED NONE PRSNT: CPT | Mod: CPTII,GC,S$GLB, | Performed by: STUDENT IN AN ORGANIZED HEALTH CARE EDUCATION/TRAINING PROGRAM

## 2022-08-08 PROCEDURE — 3074F PR MOST RECENT SYSTOLIC BLOOD PRESSURE < 130 MM HG: ICD-10-PCS | Mod: CPTII,GC,S$GLB, | Performed by: STUDENT IN AN ORGANIZED HEALTH CARE EDUCATION/TRAINING PROGRAM

## 2022-08-08 PROCEDURE — 3078F PR MOST RECENT DIASTOLIC BLOOD PRESSURE < 80 MM HG: ICD-10-PCS | Mod: CPTII,GC,S$GLB, | Performed by: STUDENT IN AN ORGANIZED HEALTH CARE EDUCATION/TRAINING PROGRAM

## 2022-08-08 PROCEDURE — 99204 OFFICE O/P NEW MOD 45 MIN: CPT | Mod: 25,GC,S$GLB, | Performed by: STUDENT IN AN ORGANIZED HEALTH CARE EDUCATION/TRAINING PROGRAM

## 2022-08-08 PROCEDURE — 93000 EKG 12-LEAD: ICD-10-PCS | Mod: S$GLB,,, | Performed by: INTERNAL MEDICINE

## 2022-08-08 PROCEDURE — 1126F PR PAIN SEVERITY QUANTIFIED, NO PAIN PRESENT: ICD-10-PCS | Mod: CPTII,GC,S$GLB, | Performed by: STUDENT IN AN ORGANIZED HEALTH CARE EDUCATION/TRAINING PROGRAM

## 2022-08-08 PROCEDURE — 3008F PR BODY MASS INDEX (BMI) DOCUMENTED: ICD-10-PCS | Mod: CPTII,GC,S$GLB, | Performed by: STUDENT IN AN ORGANIZED HEALTH CARE EDUCATION/TRAINING PROGRAM

## 2022-08-08 PROCEDURE — 99999 PR PBB SHADOW E&M-EST. PATIENT-LVL V: CPT | Mod: PBBFAC,GC,, | Performed by: STUDENT IN AN ORGANIZED HEALTH CARE EDUCATION/TRAINING PROGRAM

## 2022-08-08 PROCEDURE — 3078F DIAST BP <80 MM HG: CPT | Mod: CPTII,GC,S$GLB, | Performed by: STUDENT IN AN ORGANIZED HEALTH CARE EDUCATION/TRAINING PROGRAM

## 2022-08-08 PROCEDURE — 4010F ACE/ARB THERAPY RXD/TAKEN: CPT | Mod: CPTII,GC,S$GLB, | Performed by: STUDENT IN AN ORGANIZED HEALTH CARE EDUCATION/TRAINING PROGRAM

## 2022-08-08 PROCEDURE — 3008F BODY MASS INDEX DOCD: CPT | Mod: CPTII,GC,S$GLB, | Performed by: STUDENT IN AN ORGANIZED HEALTH CARE EDUCATION/TRAINING PROGRAM

## 2022-08-08 PROCEDURE — 4010F PR ACE/ARB THEARPY RXD/TAKEN: ICD-10-PCS | Mod: CPTII,GC,S$GLB, | Performed by: STUDENT IN AN ORGANIZED HEALTH CARE EDUCATION/TRAINING PROGRAM

## 2022-08-08 PROCEDURE — 1159F PR MEDICATION LIST DOCUMENTED IN MEDICAL RECORD: ICD-10-PCS | Mod: CPTII,GC,S$GLB, | Performed by: STUDENT IN AN ORGANIZED HEALTH CARE EDUCATION/TRAINING PROGRAM

## 2022-08-08 PROCEDURE — 99204 PR OFFICE/OUTPT VISIT, NEW, LEVL IV, 45-59 MIN: ICD-10-PCS | Mod: 25,GC,S$GLB, | Performed by: STUDENT IN AN ORGANIZED HEALTH CARE EDUCATION/TRAINING PROGRAM

## 2022-08-08 PROCEDURE — 93000 ELECTROCARDIOGRAM COMPLETE: CPT | Mod: S$GLB,,, | Performed by: INTERNAL MEDICINE

## 2022-08-08 PROCEDURE — 3288F PR FALLS RISK ASSESSMENT DOCUMENTED: ICD-10-PCS | Mod: CPTII,GC,S$GLB, | Performed by: STUDENT IN AN ORGANIZED HEALTH CARE EDUCATION/TRAINING PROGRAM

## 2022-08-08 PROCEDURE — 3288F FALL RISK ASSESSMENT DOCD: CPT | Mod: CPTII,GC,S$GLB, | Performed by: STUDENT IN AN ORGANIZED HEALTH CARE EDUCATION/TRAINING PROGRAM
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
## 2022-08-10 DIAGNOSIS — I48.91 ATRIAL FIBRILLATION, UNSPECIFIED TYPE: Primary | ICD-10-CM

## 2022-08-11 ENCOUNTER — OFFICE VISIT (OUTPATIENT)
Dept: ELECTROPHYSIOLOGY | Facility: CLINIC | Age: 67
End: 2022-08-11
Payer: MEDICARE

## 2022-08-11 ENCOUNTER — HOSPITAL ENCOUNTER (OUTPATIENT)
Dept: CARDIOLOGY | Facility: CLINIC | Age: 67
Discharge: HOME | End: 2022-08-11
Payer: MEDICARE

## 2022-08-11 VITALS
SYSTOLIC BLOOD PRESSURE: 140 MMHG | BODY MASS INDEX: 36.58 KG/M2 | HEART RATE: 72 BPM | WEIGHT: 255.5 LBS | DIASTOLIC BLOOD PRESSURE: 81 MMHG | HEIGHT: 70 IN

## 2022-08-11 DIAGNOSIS — Z85.46 HISTORY OF PROSTATE CANCER: ICD-10-CM

## 2022-08-11 DIAGNOSIS — M17.11 PRIMARY OSTEOARTHRITIS OF RIGHT KNEE: ICD-10-CM

## 2022-08-11 DIAGNOSIS — I48.0 PAROXYSMAL ATRIAL FIBRILLATION: Primary | ICD-10-CM

## 2022-08-11 DIAGNOSIS — I10 PRIMARY HYPERTENSION: ICD-10-CM

## 2022-08-11 DIAGNOSIS — I48.91 ATRIAL FIBRILLATION, UNSPECIFIED TYPE: ICD-10-CM

## 2022-08-11 DIAGNOSIS — M54.50 CHRONIC MIDLINE LOW BACK PAIN, UNSPECIFIED WHETHER SCIATICA PRESENT: ICD-10-CM

## 2022-08-11 DIAGNOSIS — E78.2 MIXED HYPERLIPIDEMIA: ICD-10-CM

## 2022-08-11 DIAGNOSIS — E66.9 CLASS 2 OBESITY WITH BODY MASS INDEX (BMI) OF 36.0 TO 36.9 IN ADULT, UNSPECIFIED OBESITY TYPE, UNSPECIFIED WHETHER SERIOUS COMORBIDITY PRESENT: ICD-10-CM

## 2022-08-11 DIAGNOSIS — G47.33 OSA (OBSTRUCTIVE SLEEP APNEA): ICD-10-CM

## 2022-08-11 DIAGNOSIS — G89.29 CHRONIC MIDLINE LOW BACK PAIN, UNSPECIFIED WHETHER SCIATICA PRESENT: ICD-10-CM

## 2022-08-11 PROBLEM — E66.812 CLASS 2 OBESITY WITH BODY MASS INDEX (BMI) OF 36.0 TO 36.9 IN ADULT: Status: ACTIVE | Noted: 2022-08-11

## 2022-08-11 PROCEDURE — 3008F BODY MASS INDEX DOCD: CPT | Mod: CPTII,S$GLB,, | Performed by: STUDENT IN AN ORGANIZED HEALTH CARE EDUCATION/TRAINING PROGRAM

## 2022-08-11 PROCEDURE — 3077F SYST BP >= 140 MM HG: CPT | Mod: CPTII,S$GLB,, | Performed by: STUDENT IN AN ORGANIZED HEALTH CARE EDUCATION/TRAINING PROGRAM

## 2022-08-11 PROCEDURE — 3077F PR MOST RECENT SYSTOLIC BLOOD PRESSURE >= 140 MM HG: ICD-10-PCS | Mod: CPTII,S$GLB,, | Performed by: STUDENT IN AN ORGANIZED HEALTH CARE EDUCATION/TRAINING PROGRAM

## 2022-08-11 PROCEDURE — 1159F MED LIST DOCD IN RCRD: CPT | Mod: CPTII,S$GLB,, | Performed by: STUDENT IN AN ORGANIZED HEALTH CARE EDUCATION/TRAINING PROGRAM

## 2022-08-11 PROCEDURE — 3079F PR MOST RECENT DIASTOLIC BLOOD PRESSURE 80-89 MM HG: ICD-10-PCS | Mod: CPTII,S$GLB,, | Performed by: STUDENT IN AN ORGANIZED HEALTH CARE EDUCATION/TRAINING PROGRAM

## 2022-08-11 PROCEDURE — 3079F DIAST BP 80-89 MM HG: CPT | Mod: CPTII,S$GLB,, | Performed by: STUDENT IN AN ORGANIZED HEALTH CARE EDUCATION/TRAINING PROGRAM

## 2022-08-11 PROCEDURE — 93010 ELECTROCARDIOGRAM REPORT: CPT | Mod: S$GLB,,, | Performed by: INTERNAL MEDICINE

## 2022-08-11 PROCEDURE — 99205 PR OFFICE/OUTPT VISIT, NEW, LEVL V, 60-74 MIN: ICD-10-PCS | Mod: S$GLB,,, | Performed by: STUDENT IN AN ORGANIZED HEALTH CARE EDUCATION/TRAINING PROGRAM

## 2022-08-11 PROCEDURE — 93010 RHYTHM STRIP: ICD-10-PCS | Mod: S$GLB,,, | Performed by: INTERNAL MEDICINE

## 2022-08-11 PROCEDURE — 1126F AMNT PAIN NOTED NONE PRSNT: CPT | Mod: CPTII,S$GLB,, | Performed by: STUDENT IN AN ORGANIZED HEALTH CARE EDUCATION/TRAINING PROGRAM

## 2022-08-11 PROCEDURE — 1159F PR MEDICATION LIST DOCUMENTED IN MEDICAL RECORD: ICD-10-PCS | Mod: CPTII,S$GLB,, | Performed by: STUDENT IN AN ORGANIZED HEALTH CARE EDUCATION/TRAINING PROGRAM

## 2022-08-11 PROCEDURE — 4010F ACE/ARB THERAPY RXD/TAKEN: CPT | Mod: CPTII,S$GLB,, | Performed by: STUDENT IN AN ORGANIZED HEALTH CARE EDUCATION/TRAINING PROGRAM

## 2022-08-11 PROCEDURE — 99999 PR PBB SHADOW E&M-EST. PATIENT-LVL IV: CPT | Mod: PBBFAC,,, | Performed by: STUDENT IN AN ORGANIZED HEALTH CARE EDUCATION/TRAINING PROGRAM

## 2022-08-11 PROCEDURE — 1126F PR PAIN SEVERITY QUANTIFIED, NO PAIN PRESENT: ICD-10-PCS | Mod: CPTII,S$GLB,, | Performed by: STUDENT IN AN ORGANIZED HEALTH CARE EDUCATION/TRAINING PROGRAM

## 2022-08-11 PROCEDURE — 93005 ELECTROCARDIOGRAM TRACING: CPT | Mod: S$GLB,,, | Performed by: STUDENT IN AN ORGANIZED HEALTH CARE EDUCATION/TRAINING PROGRAM

## 2022-08-11 PROCEDURE — 4010F PR ACE/ARB THEARPY RXD/TAKEN: ICD-10-PCS | Mod: CPTII,S$GLB,, | Performed by: STUDENT IN AN ORGANIZED HEALTH CARE EDUCATION/TRAINING PROGRAM

## 2022-08-11 PROCEDURE — 3008F PR BODY MASS INDEX (BMI) DOCUMENTED: ICD-10-PCS | Mod: CPTII,S$GLB,, | Performed by: STUDENT IN AN ORGANIZED HEALTH CARE EDUCATION/TRAINING PROGRAM

## 2022-08-11 PROCEDURE — 93005 RHYTHM STRIP: ICD-10-PCS | Mod: S$GLB,,, | Performed by: STUDENT IN AN ORGANIZED HEALTH CARE EDUCATION/TRAINING PROGRAM

## 2022-08-11 PROCEDURE — 99205 OFFICE O/P NEW HI 60 MIN: CPT | Mod: S$GLB,,, | Performed by: STUDENT IN AN ORGANIZED HEALTH CARE EDUCATION/TRAINING PROGRAM

## 2022-08-11 PROCEDURE — 99999 PR PBB SHADOW E&M-EST. PATIENT-LVL IV: ICD-10-PCS | Mod: PBBFAC,,, | Performed by: STUDENT IN AN ORGANIZED HEALTH CARE EDUCATION/TRAINING PROGRAM

## 2022-08-11 NOTE — Clinical Note
Yoseph Sotelo,  This patient will need a DEB/cardioversion. He is on apixaban, but just started this agent last week. Let's plan to start flecainide and Bb following his cardioversion.   Thank you!
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
## 2022-08-12 DIAGNOSIS — I48.19 PERSISTENT ATRIAL FIBRILLATION: Primary | ICD-10-CM

## 2022-08-12 DIAGNOSIS — I49.8 OTHER CARDIAC ARRHYTHMIA: ICD-10-CM

## 2022-08-15 ENCOUNTER — HOSPITAL ENCOUNTER (OUTPATIENT)
Dept: CARDIOLOGY | Facility: HOSPITAL | Age: 67
Discharge: HOME | End: 2022-08-15
Attending: STUDENT IN AN ORGANIZED HEALTH CARE EDUCATION/TRAINING PROGRAM
Payer: MEDICARE

## 2022-08-15 DIAGNOSIS — R60.0 LOWER EXTREMITY EDEMA: ICD-10-CM

## 2022-08-15 LAB
LEFT ABI: 1.32
LEFT ARM BP: 118 MMHG
LEFT DORSALIS PEDIS: 171 MMHG
LEFT POSTERIOR TIBIAL: 169 MMHG
LEFT TBI: 0.95
LEFT TOE PRESSURE: 124 MMHG
RIGHT ABI: 1.53
RIGHT ARM BP: 130 MMHG
RIGHT DORSALIS PEDIS: 199 MMHG
RIGHT POSTERIOR TIBIAL: 197 MMHG
RIGHT TBI: 0.86
RIGHT TOE PRESSURE: 112 MMHG

## 2022-08-15 PROCEDURE — 93922 ANKLE BRACHIAL INDICES (ABI): ICD-10-PCS | Mod: 26,,, | Performed by: INTERNAL MEDICINE

## 2022-08-15 PROCEDURE — 93922 UPR/L XTREMITY ART 2 LEVELS: CPT

## 2022-08-15 PROCEDURE — 93922 UPR/L XTREMITY ART 2 LEVELS: CPT | Mod: 26,,, | Performed by: INTERNAL MEDICINE

## 2022-08-18 ENCOUNTER — TELEPHONE (OUTPATIENT)
Dept: ELECTROPHYSIOLOGY | Facility: CLINIC | Age: 67
End: 2022-08-18
Payer: MEDICARE

## 2022-08-18 NOTE — TELEPHONE ENCOUNTER
Spoke to Mr. Villalba    CONFIRMED procedure arrival time of 1100 8/22    Reiterated instructions including:  -Directions to check in desk  -NPO after midnight night prior to procedure   -Pre-procedure LABS done, no alerts  -COVID test n/a, vaccinated  -Confirmed no fever, cough, or shortness of breath in the past 30 days    Patient verbalizes understanding of above and appreciates call.

## 2022-08-22 ENCOUNTER — ANESTHESIA (OUTPATIENT)
Dept: MEDSURG UNIT | Facility: HOSPITAL | Age: 67
End: 2022-08-22
Payer: MEDICARE

## 2022-08-22 ENCOUNTER — HOSPITAL ENCOUNTER (OUTPATIENT)
Facility: HOSPITAL | Age: 67
Discharge: HOME | End: 2022-08-22
Attending: STUDENT IN AN ORGANIZED HEALTH CARE EDUCATION/TRAINING PROGRAM | Admitting: STUDENT IN AN ORGANIZED HEALTH CARE EDUCATION/TRAINING PROGRAM
Payer: MEDICARE

## 2022-08-22 ENCOUNTER — HOSPITAL ENCOUNTER (OUTPATIENT)
Dept: CARDIOLOGY | Facility: HOSPITAL | Age: 67
Discharge: HOME | End: 2022-08-22
Attending: STUDENT IN AN ORGANIZED HEALTH CARE EDUCATION/TRAINING PROGRAM | Admitting: STUDENT IN AN ORGANIZED HEALTH CARE EDUCATION/TRAINING PROGRAM
Payer: MEDICARE

## 2022-08-22 ENCOUNTER — ANESTHESIA EVENT (OUTPATIENT)
Dept: MEDSURG UNIT | Facility: HOSPITAL | Age: 67
End: 2022-08-22
Payer: MEDICARE

## 2022-08-22 VITALS
BODY MASS INDEX: 37.94 KG/M2 | OXYGEN SATURATION: 93 % | HEIGHT: 70 IN | WEIGHT: 265 LBS | SYSTOLIC BLOOD PRESSURE: 113 MMHG | DIASTOLIC BLOOD PRESSURE: 69 MMHG | TEMPERATURE: 98 F | RESPIRATION RATE: 23 BRPM | HEART RATE: 86 BPM

## 2022-08-22 VITALS
DIASTOLIC BLOOD PRESSURE: 68 MMHG | BODY MASS INDEX: 37.94 KG/M2 | HEIGHT: 70 IN | SYSTOLIC BLOOD PRESSURE: 126 MMHG | WEIGHT: 265 LBS

## 2022-08-22 DIAGNOSIS — I48.19 PERSISTENT ATRIAL FIBRILLATION: ICD-10-CM

## 2022-08-22 DIAGNOSIS — I49.8 OTHER CARDIAC ARRHYTHMIA: ICD-10-CM

## 2022-08-22 DIAGNOSIS — I48.91 ATRIAL FIBRILLATION: ICD-10-CM

## 2022-08-22 LAB
AORTIC ROOT ANNULUS: 2.4 CM
ASCENDING AORTA: 3.3 CM
BSA FOR ECHO PROCEDURE: 2.44 M2
EJECTION FRACTION: 50 %
PROX AORTA: 3.5 CM
SINUS: 3.3 CM
STJ: 2.8 CM

## 2022-08-22 PROCEDURE — D9220A PRA ANESTHESIA: ICD-10-PCS | Mod: CRNA,,, | Performed by: NURSE ANESTHETIST, CERTIFIED REGISTERED

## 2022-08-22 PROCEDURE — 93010 ELECTROCARDIOGRAM REPORT: CPT | Mod: ,,, | Performed by: INTERNAL MEDICINE

## 2022-08-22 PROCEDURE — 93320 DOPPLER ECHO COMPLETE: CPT | Mod: 26,,, | Performed by: INTERNAL MEDICINE

## 2022-08-22 PROCEDURE — D9220A PRA ANESTHESIA: Mod: ANES,,, | Performed by: ANESTHESIOLOGY

## 2022-08-22 PROCEDURE — 37000008 HC ANESTHESIA 1ST 15 MINUTES: Performed by: STUDENT IN AN ORGANIZED HEALTH CARE EDUCATION/TRAINING PROGRAM

## 2022-08-22 PROCEDURE — 92960 PR CARDIOVERSION, ELECTIVE;EXTERN: ICD-10-PCS | Mod: ,,, | Performed by: STUDENT IN AN ORGANIZED HEALTH CARE EDUCATION/TRAINING PROGRAM

## 2022-08-22 PROCEDURE — 93325 DOPPLER ECHO COLOR FLOW MAPG: CPT | Mod: 26,,, | Performed by: INTERNAL MEDICINE

## 2022-08-22 PROCEDURE — 93005 ELECTROCARDIOGRAM TRACING: CPT | Mod: 59

## 2022-08-22 PROCEDURE — 25000003 PHARM REV CODE 250: Performed by: NURSE ANESTHETIST, CERTIFIED REGISTERED

## 2022-08-22 PROCEDURE — 92960 CARDIOVERSION ELECTRIC EXT: CPT | Performed by: STUDENT IN AN ORGANIZED HEALTH CARE EDUCATION/TRAINING PROGRAM

## 2022-08-22 PROCEDURE — 93312 TRANSESOPHAGEAL ECHO (TEE) (CUPID ONLY): ICD-10-PCS | Mod: 26,,, | Performed by: INTERNAL MEDICINE

## 2022-08-22 PROCEDURE — 92960 CARDIOVERSION ELECTRIC EXT: CPT | Mod: ,,, | Performed by: STUDENT IN AN ORGANIZED HEALTH CARE EDUCATION/TRAINING PROGRAM

## 2022-08-22 PROCEDURE — D9220A PRA ANESTHESIA: Mod: CRNA,,, | Performed by: NURSE ANESTHETIST, CERTIFIED REGISTERED

## 2022-08-22 PROCEDURE — 93005 ELECTROCARDIOGRAM TRACING: CPT

## 2022-08-22 PROCEDURE — 37000009 HC ANESTHESIA EA ADD 15 MINS: Performed by: STUDENT IN AN ORGANIZED HEALTH CARE EDUCATION/TRAINING PROGRAM

## 2022-08-22 PROCEDURE — 93325 DOPPLER ECHO COLOR FLOW MAPG: CPT

## 2022-08-22 PROCEDURE — 93312 ECHO TRANSESOPHAGEAL: CPT | Mod: 26,,, | Performed by: INTERNAL MEDICINE

## 2022-08-22 PROCEDURE — 93010 EKG 12-LEAD: ICD-10-PCS | Mod: ,,, | Performed by: INTERNAL MEDICINE

## 2022-08-22 PROCEDURE — 93320 TRANSESOPHAGEAL ECHO (TEE) (CUPID ONLY): ICD-10-PCS | Mod: 26,,, | Performed by: INTERNAL MEDICINE

## 2022-08-22 PROCEDURE — 63600175 PHARM REV CODE 636 W HCPCS: Performed by: NURSE ANESTHETIST, CERTIFIED REGISTERED

## 2022-08-22 PROCEDURE — D9220A PRA ANESTHESIA: ICD-10-PCS | Mod: ANES,,, | Performed by: ANESTHESIOLOGY

## 2022-08-22 PROCEDURE — 93325 TRANSESOPHAGEAL ECHO (TEE) (CUPID ONLY): ICD-10-PCS | Mod: 26,,, | Performed by: INTERNAL MEDICINE

## 2022-08-22 RX ORDER — METOPROLOL SUCCINATE 25 MG/1
25 TABLET, EXTENDED RELEASE ORAL DAILY
Qty: 30 TABLET | Refills: 11 | Status: SHIPPED | OUTPATIENT
Start: 2022-08-22 | End: 2023-09-25 | Stop reason: SDUPTHER

## 2022-08-22 RX ORDER — SODIUM CHLORIDE 0.9 % (FLUSH) 0.9 %
10 SYRINGE (ML) INJECTION
Status: DISCONTINUED | OUTPATIENT
Start: 2022-08-22 | End: 2022-08-22 | Stop reason: HOSPADM

## 2022-08-22 RX ORDER — LIDOCAINE HYDROCHLORIDE 10 MG/ML
INJECTION, SOLUTION INTRAVENOUS
Status: DISCONTINUED | OUTPATIENT
Start: 2022-08-22 | End: 2022-08-22

## 2022-08-22 RX ORDER — LIDOCAINE HYDROCHLORIDE 20 MG/ML
SOLUTION OROPHARYNGEAL
Status: DISCONTINUED | OUTPATIENT
Start: 2022-08-22 | End: 2022-08-22

## 2022-08-22 RX ORDER — MIDAZOLAM HYDROCHLORIDE 1 MG/ML
INJECTION, SOLUTION INTRAMUSCULAR; INTRAVENOUS
Status: DISCONTINUED | OUTPATIENT
Start: 2022-08-22 | End: 2022-08-22

## 2022-08-22 RX ORDER — FENTANYL CITRATE 50 UG/ML
INJECTION, SOLUTION INTRAMUSCULAR; INTRAVENOUS
Status: DISCONTINUED | OUTPATIENT
Start: 2022-08-22 | End: 2022-08-22

## 2022-08-22 RX ORDER — KETAMINE HCL IN 0.9 % NACL 50 MG/5 ML
SYRINGE (ML) INTRAVENOUS
Status: DISCONTINUED | OUTPATIENT
Start: 2022-08-22 | End: 2022-08-22

## 2022-08-22 RX ORDER — PROPOFOL 10 MG/ML
VIAL (ML) INTRAVENOUS CONTINUOUS PRN
Status: DISCONTINUED | OUTPATIENT
Start: 2022-08-22 | End: 2022-08-22

## 2022-08-22 RX ORDER — FLECAINIDE ACETATE 100 MG/1
100 TABLET ORAL EVERY 12 HOURS
Qty: 60 TABLET | Refills: 11 | Status: SHIPPED | OUTPATIENT
Start: 2022-08-22 | End: 2023-08-22

## 2022-08-22 RX ADMIN — LIDOCAINE HYDROCHLORIDE 15 ML: 20 SOLUTION ORAL; TOPICAL at 01:08

## 2022-08-22 RX ADMIN — MIDAZOLAM 2 MG: 1 INJECTION INTRAMUSCULAR; INTRAVENOUS at 01:08

## 2022-08-22 RX ADMIN — PROPOFOL 100 MCG/KG/MIN: 10 INJECTION, EMULSION INTRAVENOUS at 01:08

## 2022-08-22 RX ADMIN — SODIUM CHLORIDE: 0.9 INJECTION, SOLUTION INTRAVENOUS at 01:08

## 2022-08-22 RX ADMIN — LIDOCAINE HYDROCHLORIDE 50 MG: 10 INJECTION, SOLUTION INTRAVENOUS at 01:08

## 2022-08-22 RX ADMIN — Medication 40 MG: at 01:08

## 2022-08-22 RX ADMIN — GLYCOPYRROLATE 0.2 MG: 0.2 INJECTION INTRAMUSCULAR; INTRAVENOUS at 01:08

## 2022-08-22 RX ADMIN — FENTANYL CITRATE 100 MCG: 50 INJECTION, SOLUTION INTRAMUSCULAR; INTRAVENOUS at 01:08

## 2022-08-22 RX ADMIN — Medication 10 MG: at 01:08
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
## 2022-08-22 NOTE — HPI
67-year-old gentleman who presents today for evaluation and recommendations regarding paroxysmal atrial fibrillation. He has a past medical history significant for paroxysmal atrial fibrillation, hypertension, hyperlipidemia, a history of prostate cancer, chronic low back pain, likely obstructive sleep apnea, and obesity.      He is followed in general cardiology clinic with Grayson Walker and Jose, and was recently seen in clinic on 8/8/2022. At that encounter, his primary complaint was continued bilateral lower extremity edema, with reported fatigue and numbness bilaterally in his lower extremities at the end of the day. Recently, he completed an echocardiogram exercise stress test that captured atrial fibrillation. There was no evidence of ischemia or regional wall motion abnormalities, with preserved systolic function, LVEF 55%. In discussion with Suzi Deejay, he reports that he was originally diagnosed with paroxysmal atrial fibrillation in 2018 at a screening ECG with his PCP. He spontaneously converted to sinus rhythm and to his knowledge, did not have any subsequent episodes of atrial fibrillation until 7/2022. At that time, his wife reports that his snoring had gotten worse, with slight weight gain, and she encouraged him to be seen by a cardiologist. His echocardiogram with atrial fibrillation was performed on 8/1/2022, and at his visit with Dr. Walker on 8/8/2022, he remained in rate-controlled atrial fibrillation. On ECG today, he remains in atrial fibrillation with excellent rate control. He was initiated on apixaban 5mg po BID and denies any adverse bleeding events.       Mr. Villalba presents to clinic today with his wife. In discussion with Mr. Villalba today, he tells me that he is feeling overall quite well. He cannot tell when he is in atrial fibrillation, and denies any palpitation symptoms. His wife feels that he has mild exercise intolerance and slightly worsened shortness of breath since  returning to atrial fibrillation. He denies any episodes of dizziness, lightheadedness, syncope/presyncope, palpitations, chest pain or chest discomfort, nausea or vomiting, orthopnea, or PND. He reports baseline mild shortness of breath and dyspnea with exertion. He reports baseline trace bilateral pedal edema that he feels has remained stable with his compression socks. 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 He reports loud snoring at night and his wife has recorded him having several apneic events - he has an upcoming appointment with the sleep clinic for diagnosis of likely LATANYA.

## 2022-08-22 NOTE — ASSESSMENT & PLAN NOTE
Presents today for Latisha/DCCV    1. LATISHA for evaluation of ROBERTH thrombus  -No absolute contraindications of esophageal stricture, tumor, perforation, laceration,or diverticulum and/or active GI bleed  -The risks, benefits & alternatives of the procedure were explained to the patient.   -The risks of transesophageal echo include but are not limited to:  Dental trauma, esophageal trauma/perforation, bleeding, laryngospasm/brochospasm, aspiration, sore throat/hoarseness, & dislodgement of the endotracheal tube/nasogastric tube (where applicable).    -The risks of moderate sedation include hypotension, respiratory depression, arrhythmias, bronchospasm, & death.    -Informed consent was obtained. The patient is agreeable to proceed with the procedure and all questions and concerns addressed.    Case discussed with an attending in echocardiography lab.     Further recommendations per attending addendum

## 2022-08-22 NOTE — ANESTHESIA POSTPROCEDURE EVALUATION
Anesthesia Post Evaluation    Patient: Stevie Villalba    Procedure(s) Performed: Procedure(s) (LRB):  Cardioversion or Defibrillation (N/A)  Transesophageal echo (DEB) intra-procedure log documentation (N/A)    Final Anesthesia Type: general (Natural airway)      Patient location during evaluation: PACU  Patient participation: Yes- Able to Participate  Level of consciousness: awake and alert  Post-procedure vital signs: reviewed and stable  Pain management: adequate  Airway patency: patent    PONV status at discharge: No PONV  Anesthetic complications: no      Cardiovascular status: hemodynamically stable  Respiratory status: unassisted  Hydration status: euvolemic  Follow-up not needed.          Vitals Value Taken Time   /68 08/22/22 1431   Temp 36.6 °C (97.9 °F) 08/22/22 1355   Pulse 90 08/22/22 1439   Resp 31 08/22/22 1434   SpO2 95 % 08/22/22 1439   Vitals shown include unvalidated device data.      No case tracking events are documented in the log.      Pain/Lory Score: Lory Score: 10 (8/22/2022  1:55 PM)

## 2022-08-22 NOTE — PLAN OF CARE
Report received from RN and CRNA. Patient received s/p DEB/dccv. Aao x4. Vss. No complaints. No distress noted.  EKG notified for post EKG.

## 2022-08-22 NOTE — PROGRESS NOTES
Reviewed discharge instructions with patient and family. Verbalized understanding. Removed right arm piv. Dressed. Able to ambulate and void without complication. Patient ambulated off unit with family.

## 2022-08-22 NOTE — TRANSFER OF CARE
"Anesthesia Transfer of Care Note    Patient: Stevie Villalba    Procedure(s) Performed: Procedure(s) (LRB):  Cardioversion or Defibrillation (N/A)  Transesophageal echo (DEB) intra-procedure log documentation (N/A)    Patient location: PACU    Anesthesia Type: general    Transport from OR: Transported from OR on room air with adequate spontaneous ventilation    Post pain: adequate analgesia    Post assessment: no apparent anesthetic complications and tolerated procedure well    Post vital signs: stable    Level of consciousness: awake    Nausea/Vomiting: no nausea/vomiting    Complications: none    Transfer of care protocol was followed      Last vitals:   Visit Vitals  /81 (BP Location: Left arm, Patient Position: Lying)   Pulse 72   Temp 37.2 °C (99 °F)   Resp 18   Ht 5' 10" (1.778 m)   Wt 120.2 kg (265 lb)   SpO2 96%   BMI 38.02 kg/m²     "

## 2022-08-22 NOTE — ANESTHESIA PREPROCEDURE EVALUATION
"                                                                                                             08/22/2022  Pre-operative evaluation for Procedure(s) (LRB):  Cardioversion or Defibrillation (N/A)  Transesophageal echo (DEB) intra-procedure log documentation (N/A)    Stevie Villalba is a 67 y.o. male     Patient Active Problem List   Diagnosis    Hypertriglyceridemia    Hypertension    Insomnia    Lumbago    Primary osteoarthritis of right knee    Impaired fasting blood sugar    Elevated PSA    Prostate cancer    Paroxysmal atrial fibrillation    Class 2 obesity with body mass index (BMI) of 36.0 to 36.9 in adult       Review of patient's allergies indicates:  No Known Allergies    No current facility-administered medications on file prior to encounter.     Current Outpatient Medications on File Prior to Encounter   Medication Sig Dispense Refill    amLODIPine (NORVASC) 10 MG tablet TAKE 1 TABLET BY MOUTH EVERY DAY 90 tablet 0    apixaban (ELIQUIS) 5 mg Tab Take 1 tablet (5 mg total) by mouth 2 (two) times daily. 180 tablet 3    atorvastatin (LIPITOR) 40 MG tablet TAKE 1 TABLET BY MOUTH EVERY DAY IN THE EVENING 90 tablet 0    carisoprodol (SOMA) 350 MG tablet Take 350 mg by mouth 4 (four) times daily as needed for Muscle spasms.      hydroCHLOROthiazide (HYDRODIURIL) 25 MG tablet TAKE 1 TABLET BY MOUTH EVERY DAY 90 tablet 0    HYDROcodone-acetaminophen (NORCO)  mg per tablet TK 1 T PO  Q 6 TO 8 PRN P  0    indomethacin (INDOCIN) 50 MG capsule TK ONE C PO TID PRF PAIN  3    insulin syringe-needle U-100 0.5 mL 31 gauge x 5/16" Syrg Use along with ICI therapy. 10 each PRN    losartan (COZAAR) 100 MG tablet TAKE 1 TABLET BY MOUTH EVERY DAY 90 tablet 3    ORTHOVISC 30 mg/2 mL       papaverine 30 mg/mL injection Add: Phentolamine 1 mg  Add: PGE1 10 mcg    Sig:  Inject 15 units as directed; titrate up by 5 units until desired results 5 mL 12    PNEUMOVAX-23 25 mcg/0.5 mL vaccine    "    sildenafiL (VIAGRA) 100 MG tablet Take 1 tablet (100 mg total) by mouth daily as needed for Erectile Dysfunction. (Patient not taking: Reported on 2022) 30 tablet 2    tamsulosin (FLOMAX) 0.4 mg Cap Take 1 capsule (0.4 mg total) by mouth once daily. 30 capsule 2       Past Surgical History:   Procedure Laterality Date    HERNIA REPAIR      umbilical and inguinal    JOINT REPLACEMENT      RADIOACTIVE SEED IMPLANTATION N/A 2021    Procedure: INSERTION, RADIOACTIVE SEED;  Surgeon: Freedom Warren MD;  Location: [facility] OR [address] [place] [address];  Service: Urology;  Laterality: N/A;  1 hour     TONSILLECTOMY      TOTAL KNEE ARTHROPLASTY Right 2018    Procedure: REPLACEMENT-KNEE-TOTAL;  Surgeon: John L. Ochsner Jr., MD;  Location: [facility] OR [address] [place] [address];  Service: Orthopedics;  Laterality: Right;  23hr observation    TRANSRECTAL ULTRASOUND EXAMINATION N/A 2021    Procedure: ULTRASOUND, RECTAL APPROACH;  Surgeon: Freedom Warren MD;  Location: [facility] OR [address] [place] [address];  Service: Urology;  Laterality: N/A;       Social History     Socioeconomic History    Marital status:     Number of children: 3   Tobacco Use    Smoking status: Never Smoker    Smokeless tobacco: Never Used   Substance and Sexual Activity    Alcohol use: Yes     Alcohol/week: 10.0 standard drinks     Types: 10 Cans of beer per week     Comment: couple here and there    Drug use: No    Sexual activity: Yes     Partners: Female     Comment:          CBC: No results for input(s): WBC, RBC, HGB, HCT, PLT, MCV, MCH, MCHC in the last 72 hours.    CMP: No results for input(s): NA, K, CL, CO2, BUN, CREATININE, GLU, MG, PHOS, CALCIUM, ALBUMIN, PROT, ALKPHOS, ALT, AST, BILITOT in the last 72 hours.    INR  No results for input(s): PT, INR, PROTIME, APTT in the last 72 hours.        Diagnostic Studies:      EKD Echo:  No results found for this or any previous visit.        Pre-op Assessment    I have reviewed the Patient Summary Reports.      I have reviewed the Nursing Notes. I have reviewed the NPO Status.      Review of Systems  Hematology/Oncology:         -- Cancer in past history (prostate):   Cardiovascular:   Hypertension Dysrhythmias atrial fibrillation    Musculoskeletal:   Arthritis     Endocrine:  Obesity / BMI > 30      Physical Exam  General: Well nourished, Cooperative and Alert    Airway:  Mallampati: II   Mouth Opening: Normal    Dental:  Intact        Anesthesia Plan  Type of Anesthesia, risks & benefits discussed:    Anesthesia Type: Gen Natural Airway, Gen ETT  Informed Consent: Informed consent signed with the Patient and all parties understand the risks and agree with anesthesia plan.  All questions answered.   ASA Score: 3  Day of Surgery Review of History & Physical: H&P Update referred to the surgeon/provider.    Ready For Surgery From Anesthesia Perspective.     .

## 2022-08-22 NOTE — HOSPITAL COURSE
Pt brought to the DEB lab.  Underwent DEB which showed no ROBERTH thrombus and low normal BiV function.  Tolerated procedure well.  Underwent DCCV x1 with Dr. Alaniz with successful conversion to NSR.  He tolerated both procedures without complications and recovered without issues.  He was started on flecainide 100mg BID and metoprolol succinate 25mg qD.

## 2022-08-22 NOTE — DISCHARGE SUMMARY
[facility]  Cardiology  Discharge Summary      Patient Name: Stevie Villalba  MRN: [id number]  Admission Date: 8/22/2022  Hospital Length of Stay: 0 days  Discharge Date and Time:  08/22/2022 1:40 PM  Attending Physician: TAL Alaniz MD    Discharging Provider: Andrea Cruz MD  Primary Care Physician: Ric Brambila MD    HPI:   67-year-old gentleman who presents today for evaluation and recommendations regarding paroxysmal atrial fibrillation. He has a past medical history significant for paroxysmal atrial fibrillation, hypertension, hyperlipidemia, a history of prostate cancer, chronic low back pain, likely obstructive sleep apnea, and obesity.      He is followed in general cardiology clinic with Grayson Walker and Jose, and was recently seen in clinic on 8/8/2022. At that encounter, his primary complaint was continued bilateral lower extremity edema, with reported fatigue and numbness bilaterally in his lower extremities at the end of the day. Recently, he completed an echocardiogram exercise stress test that captured atrial fibrillation. There was no evidence of ischemia or regional wall motion abnormalities, with preserved systolic function, LVEF 55%. In discussion with Mr. Villalba, he reports that he was originally diagnosed with paroxysmal atrial fibrillation in 2018 at a screening ECG with his PCP. He spontaneously converted to sinus rhythm and to his knowledge, did not have any subsequent episodes of atrial fibrillation until 7/2022. At that time, his wife reports that his snoring had gotten worse, with slight weight gain, and she encouraged him to be seen by a cardiologist. His echocardiogram with atrial fibrillation was performed on 8/1/2022, and at his visit with Dr. Walker on 8/8/2022, he remained in rate-controlled atrial fibrillation. On ECG today, he remains in atrial fibrillation with excellent rate control. He was initiated on apixaban 5mg po BID and denies any adverse bleeding  events.       Mr. Villalba presents to clinic today with his wife. In discussion with Mr. Villalba today, he tells me that he is feeling overall quite well. He cannot tell when he is in atrial fibrillation, and denies any palpitation symptoms. His wife feels that he has mild exercise intolerance and slightly worsened shortness of breath since returning to atrial fibrillation. He denies any episodes of dizziness, lightheadedness, syncope/presyncope, palpitations, chest pain or chest discomfort, nausea or vomiting, orthopnea, or PND. He reports baseline mild shortness of breath and dyspnea with exertion. He reports baseline trace bilateral pedal edema that he feels has remained stable with his compression socks. 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 He reports loud snoring at night and his wife has recorded him having several apneic events - he has an upcoming appointment with the sleep clinic for diagnosis of likely LATANYA.       Procedure(s) (LRB):  Cardioversion or Defibrillation (N/A)  Transesophageal echo (DEB) intra-procedure log documentation (N/A)     Indwelling Lines/Drains at time of discharge:  Lines/Drains/Airways     Epidural Line  Duration                Perineural Analgesia/Anesthesia Assessment (using dermatomes) Epidural 05/30/18 1015 1545 days                Hospital Course:  Pt brought to the DEB lab.  Underwent EDB which showed no ROBERTH thrombus and low normal BiV function.  Tolerated procedure well.  Underwent DCCV x1 with Dr. Alaniz with successful conversion to NSR.  He tolerated both procedures without complications and recovered without issues.  He was started on flecainide 100mg BID and metoprolol succinate 25mg qD.      Goals of Care Treatment Preferences:         Consults:     Significant Diagnostic Studies: DEB: no ROBERTH thrombus, see report for details    Pending Diagnostic Studies:      "Procedure Component Value Units Date/Time    EKG 12-lead [[id number]]     Order Status: Sent Lab Status: No result           Final Active Diagnoses:    Diagnosis Date Noted POA    Paroxysmal atrial fibrillation [I48.0] 08/11/2022 Yes      Problems Resolved During this Admission:     No new Assessment & Plan notes have been filed under this hospital service since the last note was generated.  Service: Cardiology      Discharged Condition: good    Disposition: Home or Self Care    Follow Up:    Patient Instructions:   No discharge procedures on file.  Medications:  Reconciled Home Medications:      Medication List      START taking these medications    flecainide 100 MG Tab  Commonly known as: TAMBOCOR  Take 1 tablet (100 mg total) by mouth every 12 (twelve) hours.     metoprolol succinate 25 MG 24 hr tablet  Commonly known as: TOPROL-XL  Take 1 tablet (25 mg total) by mouth once daily.        CONTINUE taking these medications    amLODIPine 10 MG tablet  Commonly known as: NORVASC  TAKE 1 TABLET BY MOUTH EVERY DAY     apixaban 5 mg Tab  Commonly known as: ELIQUIS  Take 1 tablet (5 mg total) by mouth 2 (two) times daily.     atorvastatin 40 MG tablet  Commonly known as: LIPITOR  TAKE 1 TABLET BY MOUTH EVERY DAY IN THE EVENING     carisoprodoL 350 MG tablet  Commonly known as: SOMA  Take 350 mg by mouth 4 (four) times daily as needed for Muscle spasms.     hydroCHLOROthiazide 25 MG tablet  Commonly known as: HYDRODIURIL  TAKE 1 TABLET BY MOUTH EVERY DAY     HYDROcodone-acetaminophen  mg per tablet  Commonly known as: NORCO  TK 1 T PO  Q 6 TO 8 PRN P     indomethacin 50 MG capsule  Commonly known as: INDOCIN  TK ONE C PO TID PRF PAIN     insulin syringe-needle U-100 0.5 mL 31 gauge x 5/16" Syrg  Use along with ICI therapy.     losartan 100 MG tablet  Commonly known as: COZAAR  TAKE 1 TABLET BY MOUTH EVERY DAY     ORTHOVISC 30 mg/2 mL  Generic drug: sodium hyaluronate (orthovisc)     papaverine 30 mg/mL " injection  Add: Phentolamine 1 mg  Add: PGE1 10 mcg    Sig:  Inject 15 units as directed; titrate up by 5 units until desired results     PNEUMOVAX-23 25 mcg/0.5 mL vaccine  Generic drug: pneumococcal 23-anat ps     sildenafiL 100 MG tablet  Commonly known as: VIAGRA  Take 1 tablet (100 mg total) by mouth daily as needed for Erectile Dysfunction.     tamsulosin 0.4 mg Cap  Commonly known as: FLOMAX  Take 1 capsule (0.4 mg total) by mouth once daily.            Time spent on the discharge of patient: 31 minutes    Andrea Cruz MD  Cardiology  [facility] - Cardiology
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
## 2022-08-29 ENCOUNTER — HOSPITAL ENCOUNTER (OUTPATIENT)
Dept: CARDIOLOGY | Facility: CLINIC | Age: 67
Discharge: HOME | End: 2022-08-29
Payer: MEDICARE

## 2022-08-29 DIAGNOSIS — I48.91 ATRIAL FIBRILLATION, UNSPECIFIED TYPE: ICD-10-CM

## 2022-08-29 PROCEDURE — 93010 ELECTROCARDIOGRAM REPORT: CPT | Mod: S$GLB,,, | Performed by: INTERNAL MEDICINE

## 2022-08-29 PROCEDURE — 93005 RHYTHM STRIP: ICD-10-PCS | Mod: S$GLB,,, | Performed by: STUDENT IN AN ORGANIZED HEALTH CARE EDUCATION/TRAINING PROGRAM

## 2022-08-29 PROCEDURE — 93010 RHYTHM STRIP: ICD-10-PCS | Mod: S$GLB,,, | Performed by: INTERNAL MEDICINE

## 2022-08-29 PROCEDURE — 93005 ELECTROCARDIOGRAM TRACING: CPT | Mod: S$GLB,,, | Performed by: STUDENT IN AN ORGANIZED HEALTH CARE EDUCATION/TRAINING PROGRAM

## 2022-09-12 ENCOUNTER — LAB VISIT (OUTPATIENT)
Dept: LAB | Facility: HOSPITAL | Age: 67
End: 2022-09-12
Attending: INTERNAL MEDICINE
Payer: MEDICARE

## 2022-09-12 ENCOUNTER — OFFICE VISIT (OUTPATIENT)
Dept: INTERNAL MEDICINE | Facility: CLINIC | Age: 67
End: 2022-09-12
Payer: MEDICARE

## 2022-09-12 VITALS
DIASTOLIC BLOOD PRESSURE: 72 MMHG | TEMPERATURE: 98 F | HEIGHT: 70 IN | HEART RATE: 61 BPM | SYSTOLIC BLOOD PRESSURE: 130 MMHG | OXYGEN SATURATION: 95 % | RESPIRATION RATE: 16 BRPM | WEIGHT: 254.63 LBS | BODY MASS INDEX: 36.45 KG/M2

## 2022-09-12 DIAGNOSIS — I10 PRIMARY HYPERTENSION: Chronic | ICD-10-CM

## 2022-09-12 DIAGNOSIS — M17.12 PRIMARY OSTEOARTHRITIS OF LEFT KNEE: Primary | ICD-10-CM

## 2022-09-12 DIAGNOSIS — Z12.5 PROSTATE CANCER SCREENING: ICD-10-CM

## 2022-09-12 DIAGNOSIS — R73.01 IMPAIRED FASTING BLOOD SUGAR: Chronic | ICD-10-CM

## 2022-09-12 DIAGNOSIS — Z00.00 ANNUAL PHYSICAL EXAM: Primary | ICD-10-CM

## 2022-09-12 DIAGNOSIS — E78.1 HYPERTRIGLYCERIDEMIA: Chronic | ICD-10-CM

## 2022-09-12 DIAGNOSIS — G47.00 INSOMNIA, UNSPECIFIED TYPE: Chronic | ICD-10-CM

## 2022-09-12 DIAGNOSIS — I48.0 PAROXYSMAL ATRIAL FIBRILLATION: ICD-10-CM

## 2022-09-12 LAB
ALBUMIN SERPL BCP-MCNC: 4.2 G/DL (ref 3.5–5.2)
ALP SERPL-CCNC: 98 U/L (ref 55–135)
ALT SERPL W/O P-5'-P-CCNC: 34 U/L (ref 10–44)
ANION GAP SERPL CALC-SCNC: 11 MMOL/L (ref 8–16)
AST SERPL-CCNC: 22 U/L (ref 10–40)
BILIRUB SERPL-MCNC: 1.4 MG/DL (ref 0.1–1)
BUN SERPL-MCNC: 20 MG/DL (ref 8–23)
CALCIUM SERPL-MCNC: 9.9 MG/DL (ref 8.7–10.5)
CHLORIDE SERPL-SCNC: 103 MMOL/L (ref 95–110)
CHOLEST SERPL-MCNC: 135 MG/DL (ref 120–199)
CHOLEST/HDLC SERPL: 4 {RATIO} (ref 2–5)
CO2 SERPL-SCNC: 25 MMOL/L (ref 23–29)
COMPLEXED PSA SERPL-MCNC: 1.3 NG/ML (ref 0–4)
CREAT SERPL-MCNC: 1.1 MG/DL (ref 0.5–1.4)
EST. GFR  (NO RACE VARIABLE): >60 ML/MIN/1.73 M^2
ESTIMATED AVG GLUCOSE: 105 MG/DL (ref 68–131)
GLUCOSE SERPL-MCNC: 126 MG/DL (ref 70–110)
HBA1C MFR BLD: 5.3 % (ref 4–5.6)
HDLC SERPL-MCNC: 34 MG/DL (ref 40–75)
HDLC SERPL: 25.2 % (ref 20–50)
LDLC SERPL CALC-MCNC: 76.6 MG/DL (ref 63–159)
NONHDLC SERPL-MCNC: 101 MG/DL
POTASSIUM SERPL-SCNC: 4.3 MMOL/L (ref 3.5–5.1)
PROT SERPL-MCNC: 7.1 G/DL (ref 6–8.4)
SODIUM SERPL-SCNC: 139 MMOL/L (ref 136–145)
TRIGL SERPL-MCNC: 122 MG/DL (ref 30–150)
TSH SERPL DL<=0.005 MIU/L-ACNC: 1.95 UIU/ML (ref 0.4–4)

## 2022-09-12 PROCEDURE — 3078F PR MOST RECENT DIASTOLIC BLOOD PRESSURE < 80 MM HG: ICD-10-PCS | Mod: CPTII,S$GLB,, | Performed by: INTERNAL MEDICINE

## 2022-09-12 PROCEDURE — 36415 COLL VENOUS BLD VENIPUNCTURE: CPT | Mod: PO | Performed by: INTERNAL MEDICINE

## 2022-09-12 PROCEDURE — 3075F SYST BP GE 130 - 139MM HG: CPT | Mod: CPTII,S$GLB,, | Performed by: INTERNAL MEDICINE

## 2022-09-12 PROCEDURE — 3288F FALL RISK ASSESSMENT DOCD: CPT | Mod: CPTII,S$GLB,, | Performed by: INTERNAL MEDICINE

## 2022-09-12 PROCEDURE — 99397 PR PREVENTIVE VISIT,EST,65 & OVER: ICD-10-PCS | Mod: S$GLB,,, | Performed by: INTERNAL MEDICINE

## 2022-09-12 PROCEDURE — 1159F MED LIST DOCD IN RCRD: CPT | Mod: CPTII,S$GLB,, | Performed by: INTERNAL MEDICINE

## 2022-09-12 PROCEDURE — 84443 ASSAY THYROID STIM HORMONE: CPT | Performed by: INTERNAL MEDICINE

## 2022-09-12 PROCEDURE — 1160F RVW MEDS BY RX/DR IN RCRD: CPT | Mod: CPTII,S$GLB,, | Performed by: INTERNAL MEDICINE

## 2022-09-12 PROCEDURE — 3008F PR BODY MASS INDEX (BMI) DOCUMENTED: ICD-10-PCS | Mod: CPTII,S$GLB,, | Performed by: INTERNAL MEDICINE

## 2022-09-12 PROCEDURE — 3075F PR MOST RECENT SYSTOLIC BLOOD PRESS GE 130-139MM HG: ICD-10-PCS | Mod: CPTII,S$GLB,, | Performed by: INTERNAL MEDICINE

## 2022-09-12 PROCEDURE — 3078F DIAST BP <80 MM HG: CPT | Mod: CPTII,S$GLB,, | Performed by: INTERNAL MEDICINE

## 2022-09-12 PROCEDURE — 1160F PR REVIEW ALL MEDS BY PRESCRIBER/CLIN PHARMACIST DOCUMENTED: ICD-10-PCS | Mod: CPTII,S$GLB,, | Performed by: INTERNAL MEDICINE

## 2022-09-12 PROCEDURE — 3288F PR FALLS RISK ASSESSMENT DOCUMENTED: ICD-10-PCS | Mod: CPTII,S$GLB,, | Performed by: INTERNAL MEDICINE

## 2022-09-12 PROCEDURE — 99999 PR PBB SHADOW E&M-EST. PATIENT-LVL IV: CPT | Mod: PBBFAC,,, | Performed by: INTERNAL MEDICINE

## 2022-09-12 PROCEDURE — 4010F ACE/ARB THERAPY RXD/TAKEN: CPT | Mod: CPTII,S$GLB,, | Performed by: INTERNAL MEDICINE

## 2022-09-12 PROCEDURE — 4010F PR ACE/ARB THEARPY RXD/TAKEN: ICD-10-PCS | Mod: CPTII,S$GLB,, | Performed by: INTERNAL MEDICINE

## 2022-09-12 PROCEDURE — 1159F PR MEDICATION LIST DOCUMENTED IN MEDICAL RECORD: ICD-10-PCS | Mod: CPTII,S$GLB,, | Performed by: INTERNAL MEDICINE

## 2022-09-12 PROCEDURE — 84153 ASSAY OF PSA TOTAL: CPT | Performed by: INTERNAL MEDICINE

## 2022-09-12 PROCEDURE — 3008F BODY MASS INDEX DOCD: CPT | Mod: CPTII,S$GLB,, | Performed by: INTERNAL MEDICINE

## 2022-09-12 PROCEDURE — 83036 HEMOGLOBIN GLYCOSYLATED A1C: CPT | Performed by: INTERNAL MEDICINE

## 2022-09-12 PROCEDURE — 80061 LIPID PANEL: CPT | Performed by: INTERNAL MEDICINE

## 2022-09-12 PROCEDURE — 80053 COMPREHEN METABOLIC PANEL: CPT | Performed by: INTERNAL MEDICINE

## 2022-09-12 PROCEDURE — 1126F PR PAIN SEVERITY QUANTIFIED, NO PAIN PRESENT: ICD-10-PCS | Mod: CPTII,S$GLB,, | Performed by: INTERNAL MEDICINE

## 2022-09-12 PROCEDURE — 99999 PR PBB SHADOW E&M-EST. PATIENT-LVL IV: ICD-10-PCS | Mod: PBBFAC,,, | Performed by: INTERNAL MEDICINE

## 2022-09-12 PROCEDURE — 99397 PER PM REEVAL EST PAT 65+ YR: CPT | Mod: S$GLB,,, | Performed by: INTERNAL MEDICINE

## 2022-09-12 PROCEDURE — 1101F PT FALLS ASSESS-DOCD LE1/YR: CPT | Mod: CPTII,S$GLB,, | Performed by: INTERNAL MEDICINE

## 2022-09-12 PROCEDURE — 1101F PR PT FALLS ASSESS DOC 0-1 FALLS W/OUT INJ PAST YR: ICD-10-PCS | Mod: CPTII,S$GLB,, | Performed by: INTERNAL MEDICINE

## 2022-09-12 PROCEDURE — 1126F AMNT PAIN NOTED NONE PRSNT: CPT | Mod: CPTII,S$GLB,, | Performed by: INTERNAL MEDICINE
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
## 2022-09-12 NOTE — PROGRESS NOTES
Stevie Villalba has primary osteoarthritis of left knee. Radiographs reveal Kellgren- Chavez grade 4. Patient has had Euflexxa injections in the past with excellent relief and would like to repeat. Will order and schedule 30 mg weekly for three weeks.

## 2022-09-12 NOTE — Clinical Note
You've seen him in the past for knee injections.  He is asking to get back in for another injection.  I think you've seen him at [facility], which he would prefer.  Ric Brambila

## 2022-09-19 ENCOUNTER — OFFICE VISIT (OUTPATIENT)
Dept: SLEEP MEDICINE | Facility: CLINIC | Age: 67
End: 2022-09-19
Payer: MEDICARE

## 2022-09-19 ENCOUNTER — OFFICE VISIT (OUTPATIENT)
Dept: ORTHOPEDICS | Facility: CLINIC | Age: 67
End: 2022-09-19
Payer: MEDICARE

## 2022-09-19 VITALS — WEIGHT: 262.31 LBS | BODY MASS INDEX: 37.55 KG/M2 | HEIGHT: 70 IN

## 2022-09-19 VITALS
BODY MASS INDEX: 36.92 KG/M2 | DIASTOLIC BLOOD PRESSURE: 72 MMHG | HEART RATE: 61 BPM | HEIGHT: 70 IN | WEIGHT: 257.88 LBS | SYSTOLIC BLOOD PRESSURE: 130 MMHG

## 2022-09-19 DIAGNOSIS — G47.33 OSA (OBSTRUCTIVE SLEEP APNEA): ICD-10-CM

## 2022-09-19 DIAGNOSIS — G47.10 HYPERSOMNOLENCE: Primary | ICD-10-CM

## 2022-09-19 DIAGNOSIS — F51.09 OTHER INSOMNIA NOT DUE TO A SUBSTANCE OR KNOWN PHYSIOLOGICAL CONDITION: ICD-10-CM

## 2022-09-19 DIAGNOSIS — M17.12 PRIMARY OSTEOARTHRITIS OF LEFT KNEE: Primary | ICD-10-CM

## 2022-09-19 PROCEDURE — 3008F BODY MASS INDEX DOCD: CPT | Mod: CPTII,S$GLB,, | Performed by: PHYSICIAN ASSISTANT

## 2022-09-19 PROCEDURE — 1126F AMNT PAIN NOTED NONE PRSNT: CPT | Mod: CPTII,S$GLB,, | Performed by: INTERNAL MEDICINE

## 2022-09-19 PROCEDURE — 4010F PR ACE/ARB THEARPY RXD/TAKEN: ICD-10-PCS | Mod: CPTII,S$GLB,, | Performed by: INTERNAL MEDICINE

## 2022-09-19 PROCEDURE — 3044F HG A1C LEVEL LT 7.0%: CPT | Mod: CPTII,S$GLB,, | Performed by: INTERNAL MEDICINE

## 2022-09-19 PROCEDURE — 1159F MED LIST DOCD IN RCRD: CPT | Mod: CPTII,S$GLB,, | Performed by: INTERNAL MEDICINE

## 2022-09-19 PROCEDURE — 3078F PR MOST RECENT DIASTOLIC BLOOD PRESSURE < 80 MM HG: ICD-10-PCS | Mod: CPTII,S$GLB,, | Performed by: INTERNAL MEDICINE

## 2022-09-19 PROCEDURE — 99499 NO LOS: ICD-10-PCS | Mod: S$GLB,,, | Performed by: PHYSICIAN ASSISTANT

## 2022-09-19 PROCEDURE — 3044F HG A1C LEVEL LT 7.0%: CPT | Mod: CPTII,S$GLB,, | Performed by: PHYSICIAN ASSISTANT

## 2022-09-19 PROCEDURE — 1159F PR MEDICATION LIST DOCUMENTED IN MEDICAL RECORD: ICD-10-PCS | Mod: CPTII,S$GLB,, | Performed by: INTERNAL MEDICINE

## 2022-09-19 PROCEDURE — 1159F PR MEDICATION LIST DOCUMENTED IN MEDICAL RECORD: ICD-10-PCS | Mod: CPTII,S$GLB,, | Performed by: PHYSICIAN ASSISTANT

## 2022-09-19 PROCEDURE — 99999 PR PBB SHADOW E&M-EST. PATIENT-LVL IV: CPT | Mod: PBBFAC,,, | Performed by: INTERNAL MEDICINE

## 2022-09-19 PROCEDURE — 1125F PR PAIN SEVERITY QUANTIFIED, PAIN PRESENT: ICD-10-PCS | Mod: CPTII,S$GLB,, | Performed by: PHYSICIAN ASSISTANT

## 2022-09-19 PROCEDURE — 3075F SYST BP GE 130 - 139MM HG: CPT | Mod: CPTII,S$GLB,, | Performed by: INTERNAL MEDICINE

## 2022-09-19 PROCEDURE — 99204 OFFICE O/P NEW MOD 45 MIN: CPT | Mod: S$GLB,,, | Performed by: INTERNAL MEDICINE

## 2022-09-19 PROCEDURE — 20610 DRAIN/INJ JOINT/BURSA W/O US: CPT | Mod: LT,S$GLB,, | Performed by: PHYSICIAN ASSISTANT

## 2022-09-19 PROCEDURE — 4010F ACE/ARB THERAPY RXD/TAKEN: CPT | Mod: CPTII,S$GLB,, | Performed by: INTERNAL MEDICINE

## 2022-09-19 PROCEDURE — 1125F AMNT PAIN NOTED PAIN PRSNT: CPT | Mod: CPTII,S$GLB,, | Performed by: PHYSICIAN ASSISTANT

## 2022-09-19 PROCEDURE — 20610 PR DRAIN/INJECT LARGE JOINT/BURSA: ICD-10-PCS | Mod: LT,S$GLB,, | Performed by: PHYSICIAN ASSISTANT

## 2022-09-19 PROCEDURE — 3008F PR BODY MASS INDEX (BMI) DOCUMENTED: ICD-10-PCS | Mod: CPTII,S$GLB,, | Performed by: INTERNAL MEDICINE

## 2022-09-19 PROCEDURE — 3044F PR MOST RECENT HEMOGLOBIN A1C LEVEL <7.0%: ICD-10-PCS | Mod: CPTII,S$GLB,, | Performed by: INTERNAL MEDICINE

## 2022-09-19 PROCEDURE — 3288F PR FALLS RISK ASSESSMENT DOCUMENTED: ICD-10-PCS | Mod: CPTII,S$GLB,, | Performed by: PHYSICIAN ASSISTANT

## 2022-09-19 PROCEDURE — 1126F PR PAIN SEVERITY QUANTIFIED, NO PAIN PRESENT: ICD-10-PCS | Mod: CPTII,S$GLB,, | Performed by: INTERNAL MEDICINE

## 2022-09-19 PROCEDURE — 4010F PR ACE/ARB THEARPY RXD/TAKEN: ICD-10-PCS | Mod: CPTII,S$GLB,, | Performed by: PHYSICIAN ASSISTANT

## 2022-09-19 PROCEDURE — 3288F FALL RISK ASSESSMENT DOCD: CPT | Mod: CPTII,S$GLB,, | Performed by: PHYSICIAN ASSISTANT

## 2022-09-19 PROCEDURE — 4010F ACE/ARB THERAPY RXD/TAKEN: CPT | Mod: CPTII,S$GLB,, | Performed by: PHYSICIAN ASSISTANT

## 2022-09-19 PROCEDURE — 99204 PR OFFICE/OUTPT VISIT, NEW, LEVL IV, 45-59 MIN: ICD-10-PCS | Mod: S$GLB,,, | Performed by: INTERNAL MEDICINE

## 2022-09-19 PROCEDURE — 3044F PR MOST RECENT HEMOGLOBIN A1C LEVEL <7.0%: ICD-10-PCS | Mod: CPTII,S$GLB,, | Performed by: PHYSICIAN ASSISTANT

## 2022-09-19 PROCEDURE — 3008F BODY MASS INDEX DOCD: CPT | Mod: CPTII,S$GLB,, | Performed by: INTERNAL MEDICINE

## 2022-09-19 PROCEDURE — 99499 UNLISTED E&M SERVICE: CPT | Mod: S$GLB,,, | Performed by: PHYSICIAN ASSISTANT

## 2022-09-19 PROCEDURE — 3078F DIAST BP <80 MM HG: CPT | Mod: CPTII,S$GLB,, | Performed by: INTERNAL MEDICINE

## 2022-09-19 PROCEDURE — 1101F PT FALLS ASSESS-DOCD LE1/YR: CPT | Mod: CPTII,S$GLB,, | Performed by: PHYSICIAN ASSISTANT

## 2022-09-19 PROCEDURE — 1159F MED LIST DOCD IN RCRD: CPT | Mod: CPTII,S$GLB,, | Performed by: PHYSICIAN ASSISTANT

## 2022-09-19 PROCEDURE — 99999 PR PBB SHADOW E&M-EST. PATIENT-LVL III: ICD-10-PCS | Mod: PBBFAC,,, | Performed by: PHYSICIAN ASSISTANT

## 2022-09-19 PROCEDURE — 3075F PR MOST RECENT SYSTOLIC BLOOD PRESS GE 130-139MM HG: ICD-10-PCS | Mod: CPTII,S$GLB,, | Performed by: INTERNAL MEDICINE

## 2022-09-19 PROCEDURE — 99999 PR PBB SHADOW E&M-EST. PATIENT-LVL IV: ICD-10-PCS | Mod: PBBFAC,,, | Performed by: INTERNAL MEDICINE

## 2022-09-19 PROCEDURE — 99999 PR PBB SHADOW E&M-EST. PATIENT-LVL III: CPT | Mod: PBBFAC,,, | Performed by: PHYSICIAN ASSISTANT

## 2022-09-19 PROCEDURE — 1101F PR PT FALLS ASSESS DOC 0-1 FALLS W/OUT INJ PAST YR: ICD-10-PCS | Mod: CPTII,S$GLB,, | Performed by: PHYSICIAN ASSISTANT

## 2022-09-19 PROCEDURE — 3008F PR BODY MASS INDEX (BMI) DOCUMENTED: ICD-10-PCS | Mod: CPTII,S$GLB,, | Performed by: PHYSICIAN ASSISTANT
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
## 2022-09-19 NOTE — PROGRESS NOTES
Stevie Villalba presents to clinic today for the first left knee Orthovisc injection.     Exam demonstrates the no effusion in the  left knee, and the skin is intact.    Radiographs reveal degenerative changes of knee    Diagnosis: primary osteoarthritis left knee    After time out was performed and patient ID, side, and site were verified, the  left  knee was sterilly prepped in the standard fashion.  A 22-gauge needle was introduced into left knee joint from an aleyda-lateral site without complication and knee was then injected with 2 ml of Orthovisc  Sterile dressing was applied.  The patient was instructed to resume activities as tolerated and to call with any problems.     Patient will return next week for the second injection.

## 2022-09-20 ENCOUNTER — TELEPHONE (OUTPATIENT)
Dept: SLEEP MEDICINE | Facility: OTHER | Age: 67
End: 2022-09-20
Payer: MEDICARE

## 2022-09-23 ENCOUNTER — TELEPHONE (OUTPATIENT)
Dept: SLEEP MEDICINE | Facility: OTHER | Age: 67
End: 2022-09-23
Payer: MEDICARE

## 2022-09-26 ENCOUNTER — OFFICE VISIT (OUTPATIENT)
Dept: ORTHOPEDICS | Facility: CLINIC | Age: 67
End: 2022-09-26
Payer: MEDICARE

## 2022-09-26 ENCOUNTER — HOSPITAL ENCOUNTER (OUTPATIENT)
Dept: SLEEP MEDICINE | Facility: OTHER | Age: 67
Discharge: HOME | End: 2022-09-26
Attending: INTERNAL MEDICINE
Payer: MEDICARE

## 2022-09-26 VITALS — WEIGHT: 260 LBS | HEIGHT: 70 IN | BODY MASS INDEX: 37.22 KG/M2

## 2022-09-26 DIAGNOSIS — F51.09 OTHER INSOMNIA NOT DUE TO A SUBSTANCE OR KNOWN PHYSIOLOGICAL CONDITION: ICD-10-CM

## 2022-09-26 DIAGNOSIS — G47.33 OSA (OBSTRUCTIVE SLEEP APNEA): ICD-10-CM

## 2022-09-26 DIAGNOSIS — M17.12 PRIMARY OSTEOARTHRITIS OF LEFT KNEE: Primary | ICD-10-CM

## 2022-09-26 DIAGNOSIS — G47.10 HYPERSOMNOLENCE: ICD-10-CM

## 2022-09-26 PROCEDURE — 4010F PR ACE/ARB THEARPY RXD/TAKEN: ICD-10-PCS | Mod: CPTII,S$GLB,, | Performed by: PHYSICIAN ASSISTANT

## 2022-09-26 PROCEDURE — 3008F BODY MASS INDEX DOCD: CPT | Mod: CPTII,S$GLB,, | Performed by: PHYSICIAN ASSISTANT

## 2022-09-26 PROCEDURE — 99499 NO LOS: ICD-10-PCS | Mod: S$GLB,,, | Performed by: PHYSICIAN ASSISTANT

## 2022-09-26 PROCEDURE — 99999 PR PBB SHADOW E&M-EST. PATIENT-LVL III: ICD-10-PCS | Mod: PBBFAC,,, | Performed by: PHYSICIAN ASSISTANT

## 2022-09-26 PROCEDURE — 1126F AMNT PAIN NOTED NONE PRSNT: CPT | Mod: CPTII,S$GLB,, | Performed by: PHYSICIAN ASSISTANT

## 2022-09-26 PROCEDURE — 99499 UNLISTED E&M SERVICE: CPT | Mod: S$GLB,,, | Performed by: PHYSICIAN ASSISTANT

## 2022-09-26 PROCEDURE — 3288F PR FALLS RISK ASSESSMENT DOCUMENTED: ICD-10-PCS | Mod: CPTII,S$GLB,, | Performed by: PHYSICIAN ASSISTANT

## 2022-09-26 PROCEDURE — 1159F PR MEDICATION LIST DOCUMENTED IN MEDICAL RECORD: ICD-10-PCS | Mod: CPTII,S$GLB,, | Performed by: PHYSICIAN ASSISTANT

## 2022-09-26 PROCEDURE — 3044F PR MOST RECENT HEMOGLOBIN A1C LEVEL <7.0%: ICD-10-PCS | Mod: CPTII,S$GLB,, | Performed by: PHYSICIAN ASSISTANT

## 2022-09-26 PROCEDURE — 1159F MED LIST DOCD IN RCRD: CPT | Mod: CPTII,S$GLB,, | Performed by: PHYSICIAN ASSISTANT

## 2022-09-26 PROCEDURE — 99999 PR PBB SHADOW E&M-EST. PATIENT-LVL III: CPT | Mod: PBBFAC,,, | Performed by: PHYSICIAN ASSISTANT

## 2022-09-26 PROCEDURE — 3008F PR BODY MASS INDEX (BMI) DOCUMENTED: ICD-10-PCS | Mod: CPTII,S$GLB,, | Performed by: PHYSICIAN ASSISTANT

## 2022-09-26 PROCEDURE — 1126F PR PAIN SEVERITY QUANTIFIED, NO PAIN PRESENT: ICD-10-PCS | Mod: CPTII,S$GLB,, | Performed by: PHYSICIAN ASSISTANT

## 2022-09-26 PROCEDURE — 4010F ACE/ARB THERAPY RXD/TAKEN: CPT | Mod: CPTII,S$GLB,, | Performed by: PHYSICIAN ASSISTANT

## 2022-09-26 PROCEDURE — 95800 SLP STDY UNATTENDED: CPT

## 2022-09-26 PROCEDURE — 3288F FALL RISK ASSESSMENT DOCD: CPT | Mod: CPTII,S$GLB,, | Performed by: PHYSICIAN ASSISTANT

## 2022-09-26 PROCEDURE — 1101F PR PT FALLS ASSESS DOC 0-1 FALLS W/OUT INJ PAST YR: ICD-10-PCS | Mod: CPTII,S$GLB,, | Performed by: PHYSICIAN ASSISTANT

## 2022-09-26 PROCEDURE — 95806 PR SLEEP STUDY, UNATTENDED, SIMUL RECORD HR/O2 SAT/RESP FLOW/RESP EFFT: ICD-10-PCS | Mod: 26,,, | Performed by: INTERNAL MEDICINE

## 2022-09-26 PROCEDURE — 20610 PR DRAIN/INJECT LARGE JOINT/BURSA: ICD-10-PCS | Mod: LT,S$GLB,, | Performed by: PHYSICIAN ASSISTANT

## 2022-09-26 PROCEDURE — 3044F HG A1C LEVEL LT 7.0%: CPT | Mod: CPTII,S$GLB,, | Performed by: PHYSICIAN ASSISTANT

## 2022-09-26 PROCEDURE — 20610 DRAIN/INJ JOINT/BURSA W/O US: CPT | Mod: LT,S$GLB,, | Performed by: PHYSICIAN ASSISTANT

## 2022-09-26 PROCEDURE — 1101F PT FALLS ASSESS-DOCD LE1/YR: CPT | Mod: CPTII,S$GLB,, | Performed by: PHYSICIAN ASSISTANT

## 2022-09-26 PROCEDURE — 95806 SLEEP STUDY UNATT&RESP EFFT: CPT | Mod: 26,,, | Performed by: INTERNAL MEDICINE

## 2022-09-26 NOTE — PROGRESS NOTES
Stevie Villalba presents to clinic today for the second left knee Orthovisc injection.     Exam demonstrates the no effusion in the  left knee, and the skin is intact.    Radiographs reveal degenerative changes of knee    Diagnosis: primary osteoarthritis left knee    After time out was performed and patient ID, side, and site were verified, the  left  knee was sterilly prepped in the standard fashion.  A 22-gauge needle was introduced into left knee joint from an aleyda-lateral site without complication and knee was then injected with 2 ml of Orthovisc  Sterile dressing was applied.  The patient was instructed to resume activities as tolerated and to call with any problems.     Patient will return next week for the third injection.

## 2022-09-27 PROBLEM — G47.33 OSA (OBSTRUCTIVE SLEEP APNEA): Status: ACTIVE | Noted: 2022-09-27

## 2022-10-02 ENCOUNTER — PATIENT MESSAGE (OUTPATIENT)
Dept: ORTHOPEDICS | Facility: CLINIC | Age: 67
End: 2022-10-02
Payer: MEDICARE

## 2022-10-03 ENCOUNTER — LAB VISIT (OUTPATIENT)
Dept: LAB | Facility: HOSPITAL | Age: 67
End: 2022-10-03
Payer: MEDICARE

## 2022-10-03 ENCOUNTER — HOSPITAL ENCOUNTER (OUTPATIENT)
Dept: CARDIOLOGY | Facility: CLINIC | Age: 67
Discharge: HOME | End: 2022-10-03
Payer: MEDICARE

## 2022-10-03 ENCOUNTER — OFFICE VISIT (OUTPATIENT)
Dept: ELECTROPHYSIOLOGY | Facility: CLINIC | Age: 67
End: 2022-10-03
Payer: MEDICARE

## 2022-10-03 ENCOUNTER — OFFICE VISIT (OUTPATIENT)
Dept: ORTHOPEDICS | Facility: CLINIC | Age: 67
End: 2022-10-03
Payer: MEDICARE

## 2022-10-03 VITALS
RESPIRATION RATE: 18 BRPM | SYSTOLIC BLOOD PRESSURE: 131 MMHG | HEIGHT: 70 IN | WEIGHT: 259.94 LBS | HEART RATE: 54 BPM | BODY MASS INDEX: 37.21 KG/M2 | DIASTOLIC BLOOD PRESSURE: 70 MMHG

## 2022-10-03 VITALS
DIASTOLIC BLOOD PRESSURE: 68 MMHG | SYSTOLIC BLOOD PRESSURE: 120 MMHG | HEART RATE: 57 BPM | WEIGHT: 263 LBS | BODY MASS INDEX: 37.65 KG/M2 | HEIGHT: 70 IN

## 2022-10-03 DIAGNOSIS — E66.9 CLASS 2 OBESITY WITH BODY MASS INDEX (BMI) OF 37.0 TO 37.9 IN ADULT, UNSPECIFIED OBESITY TYPE, UNSPECIFIED WHETHER SERIOUS COMORBIDITY PRESENT: ICD-10-CM

## 2022-10-03 DIAGNOSIS — E78.2 MIXED HYPERLIPIDEMIA: ICD-10-CM

## 2022-10-03 DIAGNOSIS — M17.12 PRIMARY OSTEOARTHRITIS OF LEFT KNEE: ICD-10-CM

## 2022-10-03 DIAGNOSIS — M54.50 CHRONIC MIDLINE LOW BACK PAIN, UNSPECIFIED WHETHER SCIATICA PRESENT: ICD-10-CM

## 2022-10-03 DIAGNOSIS — G47.33 OSA (OBSTRUCTIVE SLEEP APNEA): ICD-10-CM

## 2022-10-03 DIAGNOSIS — G89.29 CHRONIC MIDLINE LOW BACK PAIN, UNSPECIFIED WHETHER SCIATICA PRESENT: ICD-10-CM

## 2022-10-03 DIAGNOSIS — M17.11 PRIMARY OSTEOARTHRITIS OF RIGHT KNEE: ICD-10-CM

## 2022-10-03 DIAGNOSIS — Z96.659 HISTORY OF TOTAL KNEE REPLACEMENT, UNSPECIFIED LATERALITY: Primary | ICD-10-CM

## 2022-10-03 DIAGNOSIS — Z96.659 HISTORY OF TOTAL KNEE REPLACEMENT, UNSPECIFIED LATERALITY: ICD-10-CM

## 2022-10-03 DIAGNOSIS — I10 PRIMARY HYPERTENSION: ICD-10-CM

## 2022-10-03 DIAGNOSIS — I48.0 PAROXYSMAL ATRIAL FIBRILLATION: Primary | ICD-10-CM

## 2022-10-03 DIAGNOSIS — M17.12 PRIMARY OSTEOARTHRITIS OF LEFT KNEE: Primary | ICD-10-CM

## 2022-10-03 DIAGNOSIS — I48.91 ATRIAL FIBRILLATION, UNSPECIFIED TYPE: ICD-10-CM

## 2022-10-03 DIAGNOSIS — Z85.46 HISTORY OF PROSTATE CANCER: ICD-10-CM

## 2022-10-03 PROCEDURE — 3044F PR MOST RECENT HEMOGLOBIN A1C LEVEL <7.0%: ICD-10-PCS | Mod: CPTII,S$GLB,, | Performed by: PHYSICIAN ASSISTANT

## 2022-10-03 PROCEDURE — 1101F PT FALLS ASSESS-DOCD LE1/YR: CPT | Mod: CPTII,S$GLB,, | Performed by: STUDENT IN AN ORGANIZED HEALTH CARE EDUCATION/TRAINING PROGRAM

## 2022-10-03 PROCEDURE — 1125F AMNT PAIN NOTED PAIN PRSNT: CPT | Mod: CPTII,S$GLB,, | Performed by: PHYSICIAN ASSISTANT

## 2022-10-03 PROCEDURE — 36415 COLL VENOUS BLD VENIPUNCTURE: CPT | Performed by: NURSE PRACTITIONER

## 2022-10-03 PROCEDURE — 93010 ELECTROCARDIOGRAM REPORT: CPT | Mod: S$GLB,,, | Performed by: INTERNAL MEDICINE

## 2022-10-03 PROCEDURE — 99999 PR PBB SHADOW E&M-EST. PATIENT-LVL III: ICD-10-PCS | Mod: PBBFAC,,, | Performed by: STUDENT IN AN ORGANIZED HEALTH CARE EDUCATION/TRAINING PROGRAM

## 2022-10-03 PROCEDURE — 99499 NO LOS: ICD-10-PCS | Mod: S$GLB,,, | Performed by: PHYSICIAN ASSISTANT

## 2022-10-03 PROCEDURE — 3008F BODY MASS INDEX DOCD: CPT | Mod: CPTII,S$GLB,, | Performed by: STUDENT IN AN ORGANIZED HEALTH CARE EDUCATION/TRAINING PROGRAM

## 2022-10-03 PROCEDURE — 93010 RHYTHM STRIP: ICD-10-PCS | Mod: S$GLB,,, | Performed by: INTERNAL MEDICINE

## 2022-10-03 PROCEDURE — 99499 UNLISTED E&M SERVICE: CPT | Mod: S$GLB,,, | Performed by: PHYSICIAN ASSISTANT

## 2022-10-03 PROCEDURE — 99214 OFFICE O/P EST MOD 30 MIN: CPT | Mod: S$GLB,,, | Performed by: STUDENT IN AN ORGANIZED HEALTH CARE EDUCATION/TRAINING PROGRAM

## 2022-10-03 PROCEDURE — 99999 PR PBB SHADOW E&M-EST. PATIENT-LVL III: CPT | Mod: PBBFAC,,, | Performed by: STUDENT IN AN ORGANIZED HEALTH CARE EDUCATION/TRAINING PROGRAM

## 2022-10-03 PROCEDURE — 3075F PR MOST RECENT SYSTOLIC BLOOD PRESS GE 130-139MM HG: ICD-10-PCS | Mod: CPTII,S$GLB,, | Performed by: PHYSICIAN ASSISTANT

## 2022-10-03 PROCEDURE — 20610 DRAIN/INJ JOINT/BURSA W/O US: CPT | Mod: LT,S$GLB,, | Performed by: PHYSICIAN ASSISTANT

## 2022-10-03 PROCEDURE — 3078F PR MOST RECENT DIASTOLIC BLOOD PRESSURE < 80 MM HG: ICD-10-PCS | Mod: CPTII,S$GLB,, | Performed by: PHYSICIAN ASSISTANT

## 2022-10-03 PROCEDURE — 20610 PR DRAIN/INJECT LARGE JOINT/BURSA: ICD-10-PCS | Mod: LT,S$GLB,, | Performed by: PHYSICIAN ASSISTANT

## 2022-10-03 PROCEDURE — 4010F ACE/ARB THERAPY RXD/TAKEN: CPT | Mod: CPTII,S$GLB,, | Performed by: PHYSICIAN ASSISTANT

## 2022-10-03 PROCEDURE — 1126F PR PAIN SEVERITY QUANTIFIED, NO PAIN PRESENT: ICD-10-PCS | Mod: CPTII,S$GLB,, | Performed by: STUDENT IN AN ORGANIZED HEALTH CARE EDUCATION/TRAINING PROGRAM

## 2022-10-03 PROCEDURE — 86353 LYMPHOCYTE TRANSFORMATION: CPT | Mod: 91 | Performed by: NURSE PRACTITIONER

## 2022-10-03 PROCEDURE — 3044F HG A1C LEVEL LT 7.0%: CPT | Mod: CPTII,S$GLB,, | Performed by: PHYSICIAN ASSISTANT

## 2022-10-03 PROCEDURE — 1159F MED LIST DOCD IN RCRD: CPT | Mod: CPTII,S$GLB,, | Performed by: STUDENT IN AN ORGANIZED HEALTH CARE EDUCATION/TRAINING PROGRAM

## 2022-10-03 PROCEDURE — 4010F ACE/ARB THERAPY RXD/TAKEN: CPT | Mod: CPTII,S$GLB,, | Performed by: STUDENT IN AN ORGANIZED HEALTH CARE EDUCATION/TRAINING PROGRAM

## 2022-10-03 PROCEDURE — 93005 ELECTROCARDIOGRAM TRACING: CPT | Mod: S$GLB,,, | Performed by: STUDENT IN AN ORGANIZED HEALTH CARE EDUCATION/TRAINING PROGRAM

## 2022-10-03 PROCEDURE — 3288F FALL RISK ASSESSMENT DOCD: CPT | Mod: CPTII,S$GLB,, | Performed by: STUDENT IN AN ORGANIZED HEALTH CARE EDUCATION/TRAINING PROGRAM

## 2022-10-03 PROCEDURE — 3288F PR FALLS RISK ASSESSMENT DOCUMENTED: ICD-10-PCS | Mod: CPTII,S$GLB,, | Performed by: STUDENT IN AN ORGANIZED HEALTH CARE EDUCATION/TRAINING PROGRAM

## 2022-10-03 PROCEDURE — 3008F PR BODY MASS INDEX (BMI) DOCUMENTED: ICD-10-PCS | Mod: CPTII,S$GLB,, | Performed by: STUDENT IN AN ORGANIZED HEALTH CARE EDUCATION/TRAINING PROGRAM

## 2022-10-03 PROCEDURE — 1101F PR PT FALLS ASSESS DOC 0-1 FALLS W/OUT INJ PAST YR: ICD-10-PCS | Mod: CPTII,S$GLB,, | Performed by: STUDENT IN AN ORGANIZED HEALTH CARE EDUCATION/TRAINING PROGRAM

## 2022-10-03 PROCEDURE — 99999 PR PBB SHADOW E&M-EST. PATIENT-LVL III: ICD-10-PCS | Mod: PBBFAC,,, | Performed by: PHYSICIAN ASSISTANT

## 2022-10-03 PROCEDURE — 1125F PR PAIN SEVERITY QUANTIFIED, PAIN PRESENT: ICD-10-PCS | Mod: CPTII,S$GLB,, | Performed by: PHYSICIAN ASSISTANT

## 2022-10-03 PROCEDURE — 93005 RHYTHM STRIP: ICD-10-PCS | Mod: S$GLB,,, | Performed by: STUDENT IN AN ORGANIZED HEALTH CARE EDUCATION/TRAINING PROGRAM

## 2022-10-03 PROCEDURE — 99499 UNLISTED E&M SERVICE: CPT | Mod: HCNC,S$GLB,, | Performed by: STUDENT IN AN ORGANIZED HEALTH CARE EDUCATION/TRAINING PROGRAM

## 2022-10-03 PROCEDURE — 4010F PR ACE/ARB THEARPY RXD/TAKEN: ICD-10-PCS | Mod: CPTII,S$GLB,, | Performed by: STUDENT IN AN ORGANIZED HEALTH CARE EDUCATION/TRAINING PROGRAM

## 2022-10-03 PROCEDURE — 3008F BODY MASS INDEX DOCD: CPT | Mod: CPTII,S$GLB,, | Performed by: PHYSICIAN ASSISTANT

## 2022-10-03 PROCEDURE — 3075F SYST BP GE 130 - 139MM HG: CPT | Mod: CPTII,S$GLB,, | Performed by: PHYSICIAN ASSISTANT

## 2022-10-03 PROCEDURE — 99499 RISK ADDL DX/OHS AUDIT: ICD-10-PCS | Mod: HCNC,S$GLB,, | Performed by: STUDENT IN AN ORGANIZED HEALTH CARE EDUCATION/TRAINING PROGRAM

## 2022-10-03 PROCEDURE — 99214 PR OFFICE/OUTPT VISIT, EST, LEVL IV, 30-39 MIN: ICD-10-PCS | Mod: S$GLB,,, | Performed by: STUDENT IN AN ORGANIZED HEALTH CARE EDUCATION/TRAINING PROGRAM

## 2022-10-03 PROCEDURE — 99999 PR PBB SHADOW E&M-EST. PATIENT-LVL III: CPT | Mod: PBBFAC,,, | Performed by: PHYSICIAN ASSISTANT

## 2022-10-03 PROCEDURE — 3074F SYST BP LT 130 MM HG: CPT | Mod: CPTII,S$GLB,, | Performed by: STUDENT IN AN ORGANIZED HEALTH CARE EDUCATION/TRAINING PROGRAM

## 2022-10-03 PROCEDURE — 1126F AMNT PAIN NOTED NONE PRSNT: CPT | Mod: CPTII,S$GLB,, | Performed by: STUDENT IN AN ORGANIZED HEALTH CARE EDUCATION/TRAINING PROGRAM

## 2022-10-03 PROCEDURE — 3078F DIAST BP <80 MM HG: CPT | Mod: CPTII,S$GLB,, | Performed by: STUDENT IN AN ORGANIZED HEALTH CARE EDUCATION/TRAINING PROGRAM

## 2022-10-03 PROCEDURE — 1159F PR MEDICATION LIST DOCUMENTED IN MEDICAL RECORD: ICD-10-PCS | Mod: CPTII,S$GLB,, | Performed by: STUDENT IN AN ORGANIZED HEALTH CARE EDUCATION/TRAINING PROGRAM

## 2022-10-03 PROCEDURE — 3044F HG A1C LEVEL LT 7.0%: CPT | Mod: CPTII,S$GLB,, | Performed by: STUDENT IN AN ORGANIZED HEALTH CARE EDUCATION/TRAINING PROGRAM

## 2022-10-03 PROCEDURE — 3078F PR MOST RECENT DIASTOLIC BLOOD PRESSURE < 80 MM HG: ICD-10-PCS | Mod: CPTII,S$GLB,, | Performed by: STUDENT IN AN ORGANIZED HEALTH CARE EDUCATION/TRAINING PROGRAM

## 2022-10-03 PROCEDURE — 4010F PR ACE/ARB THEARPY RXD/TAKEN: ICD-10-PCS | Mod: CPTII,S$GLB,, | Performed by: PHYSICIAN ASSISTANT

## 2022-10-03 PROCEDURE — 3078F DIAST BP <80 MM HG: CPT | Mod: CPTII,S$GLB,, | Performed by: PHYSICIAN ASSISTANT

## 2022-10-03 PROCEDURE — 3074F PR MOST RECENT SYSTOLIC BLOOD PRESSURE < 130 MM HG: ICD-10-PCS | Mod: CPTII,S$GLB,, | Performed by: STUDENT IN AN ORGANIZED HEALTH CARE EDUCATION/TRAINING PROGRAM

## 2022-10-03 PROCEDURE — 3008F PR BODY MASS INDEX (BMI) DOCUMENTED: ICD-10-PCS | Mod: CPTII,S$GLB,, | Performed by: PHYSICIAN ASSISTANT

## 2022-10-03 PROCEDURE — 3044F PR MOST RECENT HEMOGLOBIN A1C LEVEL <7.0%: ICD-10-PCS | Mod: CPTII,S$GLB,, | Performed by: STUDENT IN AN ORGANIZED HEALTH CARE EDUCATION/TRAINING PROGRAM
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
## 2022-10-05 ENCOUNTER — PATIENT MESSAGE (OUTPATIENT)
Dept: SLEEP MEDICINE | Facility: CLINIC | Age: 67
End: 2022-10-05
Payer: MEDICARE

## 2022-10-05 DIAGNOSIS — G47.33 SEVERE OBSTRUCTIVE SLEEP APNEA: Primary | ICD-10-CM

## 2022-10-10 NOTE — PROGRESS NOTES
Stevie Villalba presents to clinic today for the third left knee Orthovisc injection.     Exam demonstrates the no effusion in the  left knee, and the skin is intact.    Radiographs reveal degenerative changes of knee    Diagnosis: primary osteoarthritis left knee    After time out was performed and patient ID, side, and site were verified, the  left  knee was sterilly prepped in the standard fashion.  A 22-gauge needle was introduced into left knee joint from an aleyda-lateral site without complication and knee was then injected with 2 ml of Orthovisc  Sterile dressing was applied.  The patient was instructed to resume activities as tolerated and to call with any problems.     Patient will return as needed

## 2022-10-11 NOTE — PROGRESS NOTES
LINKS immunization registry updated  Care Everywhere updated  Health Maintenance updated  Chart reviewed for overdue [facility]active [facility] Encounters ([facility]) health maintenance testing (CRS, Breast Ca, Diabetic Eye Exam)   Orders entered:N/A   oral

## 2022-10-12 ENCOUNTER — TELEPHONE (OUTPATIENT)
Dept: SLEEP MEDICINE | Facility: OTHER | Age: 67
End: 2022-10-12
Payer: MEDICARE

## 2022-10-17 LAB — METAL SENSITIVITY TESTING, METALS ONLY: NORMAL

## 2022-10-24 ENCOUNTER — HOSPITAL ENCOUNTER (OUTPATIENT)
Dept: SLEEP MEDICINE | Facility: OTHER | Age: 67
Discharge: HOME | End: 2022-10-24
Attending: INTERNAL MEDICINE
Payer: MEDICARE

## 2022-10-24 ENCOUNTER — TELEPHONE (OUTPATIENT)
Dept: SLEEP MEDICINE | Facility: OTHER | Age: 67
End: 2022-10-24
Payer: MEDICARE

## 2022-10-24 DIAGNOSIS — G47.33 SEVERE OBSTRUCTIVE SLEEP APNEA: ICD-10-CM

## 2022-10-24 PROCEDURE — 95811 POLYSOM 6/>YRS CPAP 4/> PARM: CPT

## 2022-10-24 PROCEDURE — 95811 PR POLYSOMNOGRAPHY W/CPAP: ICD-10-PCS | Mod: 26,,, | Performed by: INTERNAL MEDICINE

## 2022-10-24 PROCEDURE — 95811 POLYSOM 6/>YRS CPAP 4/> PARM: CPT | Mod: 26,,, | Performed by: INTERNAL MEDICINE

## 2022-10-25 NOTE — PROGRESS NOTES
A Cpap titration study was preformed on 67 year old Stevie Villalba on the night of 10/24/2022. The procedure was explained to the patient. Education was giving which included the entire sleep study process, the cpap titration protocal, the importance of supine sleep and why the tech would need to enter the room. All questions were asked and answered prior to the start of the study. The patient did meet cpap titration criteria. Disposable equipment was used where applicable. An end of the night instruction sheet was giving to the patient upon leaving the lab. The patient's reponse to cpap at the end of the night was that it was pretty good.

## 2022-11-01 ENCOUNTER — NURSE TRIAGE (OUTPATIENT)
Dept: ADMINISTRATIVE | Facility: CLINIC | Age: 67
End: 2022-11-01
Payer: MEDICARE

## 2022-11-01 ENCOUNTER — HOSPITAL ENCOUNTER (OUTPATIENT)
Facility: HOSPITAL | Age: 67
Discharge: HOME | End: 2022-11-02
Attending: EMERGENCY MEDICINE | Admitting: INTERNAL MEDICINE
Payer: MEDICARE

## 2022-11-01 DIAGNOSIS — K62.5 RECTAL BLEEDING: Primary | ICD-10-CM

## 2022-11-01 DIAGNOSIS — R07.9 CHEST PAIN: ICD-10-CM

## 2022-11-01 DIAGNOSIS — K92.2 GI BLEED: ICD-10-CM

## 2022-11-01 PROBLEM — K59.00 CONSTIPATION: Status: ACTIVE | Noted: 2022-11-01

## 2022-11-01 LAB
ABO + RH BLD: NORMAL
ALBUMIN SERPL BCP-MCNC: 4.1 G/DL (ref 3.5–5.2)
ALP SERPL-CCNC: 100 U/L (ref 55–135)
ALT SERPL W/O P-5'-P-CCNC: 23 U/L (ref 10–44)
ANION GAP SERPL CALC-SCNC: 8 MMOL/L (ref 8–16)
AST SERPL-CCNC: 20 U/L (ref 10–40)
BASOPHILS # BLD AUTO: 0.06 K/UL (ref 0–0.2)
BASOPHILS NFR BLD: 0.5 % (ref 0–1.9)
BILIRUB SERPL-MCNC: 1.1 MG/DL (ref 0.1–1)
BILIRUB UR QL STRIP: NEGATIVE
BLD GP AB SCN CELLS X3 SERPL QL: NORMAL
BUN SERPL-MCNC: 24 MG/DL (ref 8–23)
CALCIUM SERPL-MCNC: 9.5 MG/DL (ref 8.7–10.5)
CHLORIDE SERPL-SCNC: 102 MMOL/L (ref 95–110)
CLARITY UR REFRACT.AUTO: CLEAR
CO2 SERPL-SCNC: 27 MMOL/L (ref 23–29)
COLOR UR AUTO: NORMAL
CREAT SERPL-MCNC: 1.1 MG/DL (ref 0.5–1.4)
DIFFERENTIAL METHOD: ABNORMAL
EOSINOPHIL # BLD AUTO: 0.2 K/UL (ref 0–0.5)
EOSINOPHIL NFR BLD: 1.3 % (ref 0–8)
ERYTHROCYTE [DISTWIDTH] IN BLOOD BY AUTOMATED COUNT: 12 % (ref 11.5–14.5)
EST. GFR  (NO RACE VARIABLE): >60 ML/MIN/1.73 M^2
GLUCOSE SERPL-MCNC: 115 MG/DL (ref 70–110)
GLUCOSE UR QL STRIP: NEGATIVE
HCT VFR BLD AUTO: 40.1 % (ref 40–54)
HCV AB SERPL QL IA: NORMAL
HGB BLD-MCNC: 13.4 G/DL (ref 14–18)
HGB BLD-MCNC: 13.9 G/DL (ref 14–18)
HGB UR QL STRIP: NEGATIVE
HIV 1+2 AB+HIV1 P24 AG SERPL QL IA: NORMAL
IMM GRANULOCYTES # BLD AUTO: 0.11 K/UL (ref 0–0.04)
IMM GRANULOCYTES NFR BLD AUTO: 0.9 % (ref 0–0.5)
KETONES UR QL STRIP: NEGATIVE
LEUKOCYTE ESTERASE UR QL STRIP: NEGATIVE
LYMPHOCYTES # BLD AUTO: 2.5 K/UL (ref 1–4.8)
LYMPHOCYTES NFR BLD: 20.9 % (ref 18–48)
MCH RBC QN AUTO: 31.4 PG (ref 27–31)
MCHC RBC AUTO-ENTMCNC: 34.7 G/DL (ref 32–36)
MCV RBC AUTO: 91 FL (ref 82–98)
MONOCYTES # BLD AUTO: 0.9 K/UL (ref 0.3–1)
MONOCYTES NFR BLD: 7.3 % (ref 4–15)
NEUTROPHILS # BLD AUTO: 8.2 K/UL (ref 1.8–7.7)
NEUTROPHILS NFR BLD: 69.1 % (ref 38–73)
NITRITE UR QL STRIP: NEGATIVE
NRBC BLD-RTO: 0 /100 WBC
PH UR STRIP: 7 [PH] (ref 5–8)
PLATELET # BLD AUTO: 229 K/UL (ref 150–450)
PMV BLD AUTO: 9.8 FL (ref 9.2–12.9)
POCT GLUCOSE: 171 MG/DL (ref 70–110)
POTASSIUM SERPL-SCNC: 4.1 MMOL/L (ref 3.5–5.1)
PROT SERPL-MCNC: 7.2 G/DL (ref 6–8.4)
PROT UR QL STRIP: NEGATIVE
RBC # BLD AUTO: 4.42 M/UL (ref 4.6–6.2)
SODIUM SERPL-SCNC: 137 MMOL/L (ref 136–145)
SP GR UR STRIP: 1.01 (ref 1–1.03)
URN SPEC COLLECT METH UR: NORMAL
WBC # BLD AUTO: 11.91 K/UL (ref 3.9–12.7)

## 2022-11-01 PROCEDURE — 96374 THER/PROPH/DIAG INJ IV PUSH: CPT

## 2022-11-01 PROCEDURE — 25000003 PHARM REV CODE 250: Performed by: PHYSICIAN ASSISTANT

## 2022-11-01 PROCEDURE — 80053 COMPREHEN METABOLIC PANEL: CPT

## 2022-11-01 PROCEDURE — 87389 HIV-1 AG W/HIV-1&-2 AB AG IA: CPT | Performed by: PHYSICIAN ASSISTANT

## 2022-11-01 PROCEDURE — 93010 ELECTROCARDIOGRAM REPORT: CPT | Mod: ,,, | Performed by: INTERNAL MEDICINE

## 2022-11-01 PROCEDURE — 85018 HEMOGLOBIN: CPT | Mod: 59

## 2022-11-01 PROCEDURE — 99285 EMERGENCY DEPT VISIT HI MDM: CPT | Mod: 25

## 2022-11-01 PROCEDURE — 96361 HYDRATE IV INFUSION ADD-ON: CPT

## 2022-11-01 PROCEDURE — 25000003 PHARM REV CODE 250

## 2022-11-01 PROCEDURE — 81003 URINALYSIS AUTO W/O SCOPE: CPT | Performed by: PHYSICIAN ASSISTANT

## 2022-11-01 PROCEDURE — G0378 HOSPITAL OBSERVATION PER HR: HCPCS

## 2022-11-01 PROCEDURE — 86803 HEPATITIS C AB TEST: CPT | Performed by: PHYSICIAN ASSISTANT

## 2022-11-01 PROCEDURE — 86850 RBC ANTIBODY SCREEN: CPT

## 2022-11-01 PROCEDURE — 99220 PR INITIAL OBSERVATION CARE,LEVL III: ICD-10-PCS | Mod: ,,,

## 2022-11-01 PROCEDURE — 63600175 PHARM REV CODE 636 W HCPCS: Performed by: PHYSICIAN ASSISTANT

## 2022-11-01 PROCEDURE — 93010 EKG 12-LEAD: ICD-10-PCS | Mod: ,,, | Performed by: INTERNAL MEDICINE

## 2022-11-01 PROCEDURE — 99220 PR INITIAL OBSERVATION CARE,LEVL III: CPT | Mod: ,,,

## 2022-11-01 PROCEDURE — 93005 ELECTROCARDIOGRAM TRACING: CPT

## 2022-11-01 PROCEDURE — 36415 COLL VENOUS BLD VENIPUNCTURE: CPT

## 2022-11-01 PROCEDURE — C9113 INJ PANTOPRAZOLE SODIUM, VIA: HCPCS | Performed by: PHYSICIAN ASSISTANT

## 2022-11-01 PROCEDURE — 85025 COMPLETE CBC W/AUTO DIFF WBC: CPT

## 2022-11-01 PROCEDURE — 99285 EMERGENCY DEPT VISIT HI MDM: CPT | Mod: ,,, | Performed by: EMERGENCY MEDICINE

## 2022-11-01 PROCEDURE — 99285 PR EMERGENCY DEPT VISIT,LEVEL V: ICD-10-PCS | Mod: ,,, | Performed by: EMERGENCY MEDICINE

## 2022-11-01 RX ORDER — TAMSULOSIN HYDROCHLORIDE 0.4 MG/1
0.4 CAPSULE ORAL DAILY
Status: DISCONTINUED | OUTPATIENT
Start: 2022-11-02 | End: 2022-11-02 | Stop reason: HOSPADM

## 2022-11-01 RX ORDER — HYDROCHLOROTHIAZIDE 25 MG/1
25 TABLET ORAL DAILY
Status: DISCONTINUED | OUTPATIENT
Start: 2022-11-02 | End: 2022-11-02 | Stop reason: HOSPADM

## 2022-11-01 RX ORDER — HYDROCODONE BITARTRATE AND ACETAMINOPHEN 10; 325 MG/1; MG/1
1 TABLET ORAL EVERY 8 HOURS PRN
Status: DISCONTINUED | OUTPATIENT
Start: 2022-11-01 | End: 2022-11-02 | Stop reason: HOSPADM

## 2022-11-01 RX ORDER — FLECAINIDE ACETATE 50 MG/1
100 TABLET ORAL EVERY 12 HOURS
Status: DISCONTINUED | OUTPATIENT
Start: 2022-11-01 | End: 2022-11-02 | Stop reason: HOSPADM

## 2022-11-01 RX ORDER — GLUCAGON 1 MG
1 KIT INJECTION
Status: DISCONTINUED | OUTPATIENT
Start: 2022-11-01 | End: 2022-11-02 | Stop reason: HOSPADM

## 2022-11-01 RX ORDER — PANTOPRAZOLE SODIUM 40 MG/10ML
40 INJECTION, POWDER, LYOPHILIZED, FOR SOLUTION INTRAVENOUS 2 TIMES DAILY
Status: DISCONTINUED | OUTPATIENT
Start: 2022-11-01 | End: 2022-11-02 | Stop reason: HOSPADM

## 2022-11-01 RX ORDER — AMLODIPINE BESYLATE 10 MG/1
10 TABLET ORAL DAILY
Status: DISCONTINUED | OUTPATIENT
Start: 2022-11-02 | End: 2022-11-02 | Stop reason: HOSPADM

## 2022-11-01 RX ORDER — ACETAMINOPHEN 325 MG/1
650 TABLET ORAL EVERY 4 HOURS PRN
Status: DISCONTINUED | OUTPATIENT
Start: 2022-11-01 | End: 2022-11-02 | Stop reason: HOSPADM

## 2022-11-01 RX ORDER — BISACODYL 10 MG
10 SUPPOSITORY, RECTAL RECTAL DAILY PRN
Status: DISCONTINUED | OUTPATIENT
Start: 2022-11-01 | End: 2022-11-02 | Stop reason: HOSPADM

## 2022-11-01 RX ORDER — TALC
6 POWDER (GRAM) TOPICAL NIGHTLY PRN
Status: DISCONTINUED | OUTPATIENT
Start: 2022-11-01 | End: 2022-11-02 | Stop reason: HOSPADM

## 2022-11-01 RX ORDER — METOPROLOL SUCCINATE 25 MG/1
25 TABLET, EXTENDED RELEASE ORAL DAILY
Status: DISCONTINUED | OUTPATIENT
Start: 2022-11-02 | End: 2022-11-02 | Stop reason: HOSPADM

## 2022-11-01 RX ORDER — POLYETHYLENE GLYCOL 3350 17 G/17G
17 POWDER, FOR SOLUTION ORAL DAILY PRN
Status: DISCONTINUED | OUTPATIENT
Start: 2022-11-01 | End: 2022-11-01

## 2022-11-01 RX ORDER — IPRATROPIUM BROMIDE AND ALBUTEROL SULFATE 2.5; .5 MG/3ML; MG/3ML
3 SOLUTION RESPIRATORY (INHALATION) EVERY 4 HOURS PRN
Status: DISCONTINUED | OUTPATIENT
Start: 2022-11-01 | End: 2022-11-02 | Stop reason: HOSPADM

## 2022-11-01 RX ORDER — POLYETHYLENE GLYCOL 3350 17 G/17G
17 POWDER, FOR SOLUTION ORAL 2 TIMES DAILY
Status: DISCONTINUED | OUTPATIENT
Start: 2022-11-01 | End: 2022-11-02 | Stop reason: HOSPADM

## 2022-11-01 RX ORDER — INSULIN ASPART 100 [IU]/ML
0-5 INJECTION, SOLUTION INTRAVENOUS; SUBCUTANEOUS
Status: DISCONTINUED | OUTPATIENT
Start: 2022-11-01 | End: 2022-11-02 | Stop reason: HOSPADM

## 2022-11-01 RX ORDER — LOSARTAN POTASSIUM 50 MG/1
100 TABLET ORAL DAILY
Status: DISCONTINUED | OUTPATIENT
Start: 2022-11-02 | End: 2022-11-02 | Stop reason: HOSPADM

## 2022-11-01 RX ORDER — IBUPROFEN 200 MG
24 TABLET ORAL
Status: DISCONTINUED | OUTPATIENT
Start: 2022-11-01 | End: 2022-11-02 | Stop reason: HOSPADM

## 2022-11-01 RX ORDER — IBUPROFEN 200 MG
16 TABLET ORAL
Status: DISCONTINUED | OUTPATIENT
Start: 2022-11-01 | End: 2022-11-02 | Stop reason: HOSPADM

## 2022-11-01 RX ORDER — SODIUM CHLORIDE 0.9 % (FLUSH) 0.9 %
10 SYRINGE (ML) INJECTION EVERY 8 HOURS PRN
Status: DISCONTINUED | OUTPATIENT
Start: 2022-11-01 | End: 2022-11-02 | Stop reason: HOSPADM

## 2022-11-01 RX ORDER — ONDANSETRON 2 MG/ML
4 INJECTION INTRAMUSCULAR; INTRAVENOUS EVERY 8 HOURS PRN
Status: DISCONTINUED | OUTPATIENT
Start: 2022-11-01 | End: 2022-11-02 | Stop reason: HOSPADM

## 2022-11-01 RX ORDER — CARISOPRODOL 350 MG/1
350 TABLET ORAL 4 TIMES DAILY PRN
Status: DISCONTINUED | OUTPATIENT
Start: 2022-11-01 | End: 2022-11-02 | Stop reason: HOSPADM

## 2022-11-01 RX ORDER — ATORVASTATIN CALCIUM 40 MG/1
40 TABLET, FILM COATED ORAL NIGHTLY
Status: DISCONTINUED | OUTPATIENT
Start: 2022-11-01 | End: 2022-11-02 | Stop reason: HOSPADM

## 2022-11-01 RX ORDER — NALOXONE HCL 0.4 MG/ML
0.02 VIAL (ML) INJECTION
Status: DISCONTINUED | OUTPATIENT
Start: 2022-11-01 | End: 2022-11-02 | Stop reason: HOSPADM

## 2022-11-01 RX ADMIN — SODIUM CHLORIDE 1000 ML: 0.9 INJECTION, SOLUTION INTRAVENOUS at 06:11

## 2022-11-01 RX ADMIN — PANTOPRAZOLE SODIUM 40 MG: 40 INJECTION, POWDER, FOR SOLUTION INTRAVENOUS at 09:11

## 2022-11-01 RX ADMIN — FLECAINIDE ACETATE 100 MG: 50 TABLET ORAL at 09:11

## 2022-11-01 RX ADMIN — ATORVASTATIN CALCIUM 40 MG: 40 TABLET, FILM COATED ORAL at 09:11

## 2022-11-01 NOTE — ASSESSMENT & PLAN NOTE
Body mass index is 37.74 kg/m². Morbid obesity complicates all aspects of disease management from diagnostic modalities to treatment. Weight loss encouraged and health benefits explained to patient.

## 2022-11-01 NOTE — ASSESSMENT & PLAN NOTE
Patient presented to the ED after two episodes of rectal bleeding. No reports of melena or hematemesis. Described as low volume, light to bright red bleeding. No history of hemorrhoids or GIB.     - Hold Eliquis (for Afib)  - IV Pantoprazole 40 mg BID  - H/H 14/40 at time of admission  - Trend Hgb with CBC BID  - IVF hydration  - 2 large bore IV access  - PRN anti-emetics   - GIB pathway  - Obtain type and screen  - Transfuse if Hgb > 7  - Consult GI in setting of down trending H/H or worsening symptoms
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
## 2022-11-01 NOTE — ASSESSMENT & PLAN NOTE
- Encourage oral hydration  - IVF bolus  - Miralax BID  - Monitor for rectal bleeding with bowel movements

## 2022-11-01 NOTE — ED NOTES
I assumed care of this patient at this time. Pt is AAOx4 + ambulatory. No acute distress observed. Pt denies pain or needs at this time. Pt currently waiting for re-evaluation by MD. Bed low and locked; side rails up x2.    Patient identifiers for Stevie Villalba 67 y.o. male checked and correct.  Chief Complaint   Patient presents with    Rectal Bleeding     Onset yesterday states on Eliquis     LOC: Patient is awake, alert, and aware of environment with an appropriate affect. Patient is oriented x 3 and speaking appropriately.  APPEARANCE: Patient resting comfortably and in no acute distress. Patient is clean and well groomed, patient's clothing is properly fastened.  HEENT: WDL  SKIN: The skin is warm and dry. Patient has normal skin turgor and moist mucus membranes. Skin is intact; no bruising or breakdown noted.  MUSKULOSKELETAL: Patient is moving all extremities well, no obvious deformities noted. Pulses intact.   RESPIRATORY: Airway is open and patent. Respirations are spontaneous and non-labored with normal effort and rate, BBS=clear  CARDIAC: Patient has a normal rate and rhythm. No peripheral edema noted.   ABDOMEN: No distention noted. Bowel sounds active in all 4 quadrants. Soft and non-tender upon palpation.  NEUROLOGICAL: PERRL. Facial expression is symmetrical. Hand grasps are equal bilaterally. Normal sensation in all extremities when touched with finger.
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
## 2022-11-01 NOTE — TELEPHONE ENCOUNTER
Yesterday after noon started seeing blood in his rectum. Cg stated the blood is present without stool. Was spotty yesterday but more blood today. Pt thinks he had a small hemorrhoid last week. Tarry bowel movements. It was spotty on toilet paper yesterday and today its in the toilet. Care advice recommend pt go to Er. Pt verbalized understanding.   Reason for Disposition   Bloody, black, or tarry bowel movements  (Exception: Chronic-unchanged black-grey bowel movements and is taking iron pills or Pepto-Bismol.)    Additional Information   Negative: Passed out (i.e., fainted, collapsed and was not responding)   Negative: Shock suspected (e.g., cold/pale/clammy skin, too weak to stand, low BP, rapid pulse)   Negative: Vomiting red blood or black (coffee ground) material   Negative: Sounds like a life-threatening emergency to the triager   Negative: SEVERE rectal bleeding (large blood clots; constant or on and off bleeding)   Negative: SEVERE dizziness (e.g., unable to stand, requires support to walk, feels like passing out now)   Negative: MODERATE rectal bleeding (small blood clots, passing blood without stool, or toilet water turns red) more than once a day    Protocols used: Rectal Bleeding-A-OH

## 2022-11-01 NOTE — ED TRIAGE NOTES
Patient presents to the ER with complaints of rectal bleeding with bowel movements that started yesterday.Blood noted only with wiping. Patient states it is bright red blood. Patient recently started taking Eliquis daily. Patient denies any pain. Patient denies shortness of breath.

## 2022-11-01 NOTE — ASSESSMENT & PLAN NOTE
Patient with Paroxysmal (<7 days) atrial fibrillation which is controlled currently with Beta Blocker and Calcium Channel Blocker. Patient is currently in sinus rhythm.[id number] Score: 1. Anticoagulation indicated. Anticoagulation done with Eliquis.    - Continue home metoprolol 25 mg daily  - Continue home flecainide 100 mg BID  - On Eliquis 5 mg BID at home - HELD in setting of acute rectal bleeding   - Started anticoagulation in August 2022  - Telemetry

## 2022-11-01 NOTE — ED NOTES
Bed: [facility]  Expected date:   Expected time:   Means of arrival:   Comments:

## 2022-11-01 NOTE — ASSESSMENT & PLAN NOTE
- Continue home amlodipine 10 mg daily  - Continue home HCTZ 25 mg daily  - Continue home losartan 100 mg daily

## 2022-11-02 VITALS
TEMPERATURE: 98 F | RESPIRATION RATE: 18 BRPM | BODY MASS INDEX: 37.96 KG/M2 | WEIGHT: 265.13 LBS | DIASTOLIC BLOOD PRESSURE: 68 MMHG | HEIGHT: 70 IN | SYSTOLIC BLOOD PRESSURE: 124 MMHG | HEART RATE: 68 BPM | OXYGEN SATURATION: 97 %

## 2022-11-02 DIAGNOSIS — K62.7 RADIATION PROCTITIS: ICD-10-CM

## 2022-11-02 DIAGNOSIS — K92.1 HEMATOCHEZIA: Primary | ICD-10-CM

## 2022-11-02 LAB
ALBUMIN SERPL BCP-MCNC: 3.5 G/DL (ref 3.5–5.2)
ALP SERPL-CCNC: 86 U/L (ref 55–135)
ALT SERPL W/O P-5'-P-CCNC: 17 U/L (ref 10–44)
ANION GAP SERPL CALC-SCNC: 11 MMOL/L (ref 8–16)
AST SERPL-CCNC: 18 U/L (ref 10–40)
BASOPHILS # BLD AUTO: 0.05 K/UL (ref 0–0.2)
BASOPHILS NFR BLD: 0.5 % (ref 0–1.9)
BILIRUB SERPL-MCNC: 0.8 MG/DL (ref 0.1–1)
BUN SERPL-MCNC: 17 MG/DL (ref 8–23)
CALCIUM SERPL-MCNC: 9 MG/DL (ref 8.7–10.5)
CHLORIDE SERPL-SCNC: 104 MMOL/L (ref 95–110)
CO2 SERPL-SCNC: 22 MMOL/L (ref 23–29)
CREAT SERPL-MCNC: 0.9 MG/DL (ref 0.5–1.4)
DIFFERENTIAL METHOD: ABNORMAL
EOSINOPHIL # BLD AUTO: 0.2 K/UL (ref 0–0.5)
EOSINOPHIL NFR BLD: 1.7 % (ref 0–8)
ERYTHROCYTE [DISTWIDTH] IN BLOOD BY AUTOMATED COUNT: 12.3 % (ref 11.5–14.5)
EST. GFR  (NO RACE VARIABLE): >60 ML/MIN/1.73 M^2
GLUCOSE SERPL-MCNC: 110 MG/DL (ref 70–110)
HCT VFR BLD AUTO: 35.2 % (ref 40–54)
HGB BLD-MCNC: 12.2 G/DL (ref 14–18)
IMM GRANULOCYTES # BLD AUTO: 0.06 K/UL (ref 0–0.04)
IMM GRANULOCYTES NFR BLD AUTO: 0.5 % (ref 0–0.5)
LYMPHOCYTES # BLD AUTO: 2.1 K/UL (ref 1–4.8)
LYMPHOCYTES NFR BLD: 19.3 % (ref 18–48)
MAGNESIUM SERPL-MCNC: 1.7 MG/DL (ref 1.6–2.6)
MCH RBC QN AUTO: 31.6 PG (ref 27–31)
MCHC RBC AUTO-ENTMCNC: 34.7 G/DL (ref 32–36)
MCV RBC AUTO: 91 FL (ref 82–98)
MONOCYTES # BLD AUTO: 0.9 K/UL (ref 0.3–1)
MONOCYTES NFR BLD: 8.3 % (ref 4–15)
NEUTROPHILS # BLD AUTO: 7.7 K/UL (ref 1.8–7.7)
NEUTROPHILS NFR BLD: 69.7 % (ref 38–73)
NRBC BLD-RTO: 0 /100 WBC
PLATELET # BLD AUTO: 213 K/UL (ref 150–450)
PMV BLD AUTO: 10.4 FL (ref 9.2–12.9)
POCT GLUCOSE: 103 MG/DL (ref 70–110)
POCT GLUCOSE: 118 MG/DL (ref 70–110)
POTASSIUM SERPL-SCNC: 3.8 MMOL/L (ref 3.5–5.1)
PROT SERPL-MCNC: 6.3 G/DL (ref 6–8.4)
RBC # BLD AUTO: 3.86 M/UL (ref 4.6–6.2)
SODIUM SERPL-SCNC: 137 MMOL/L (ref 136–145)
WBC # BLD AUTO: 11.01 K/UL (ref 3.9–12.7)

## 2022-11-02 PROCEDURE — 63600175 PHARM REV CODE 636 W HCPCS: Performed by: PHYSICIAN ASSISTANT

## 2022-11-02 PROCEDURE — 99204 PR OFFICE/OUTPT VISIT, NEW, LEVL IV, 45-59 MIN: ICD-10-PCS | Mod: ,,, | Performed by: INTERNAL MEDICINE

## 2022-11-02 PROCEDURE — 85025 COMPLETE CBC W/AUTO DIFF WBC: CPT | Performed by: PHYSICIAN ASSISTANT

## 2022-11-02 PROCEDURE — 36415 COLL VENOUS BLD VENIPUNCTURE: CPT | Performed by: PHYSICIAN ASSISTANT

## 2022-11-02 PROCEDURE — 96376 TX/PRO/DX INJ SAME DRUG ADON: CPT

## 2022-11-02 PROCEDURE — C9113 INJ PANTOPRAZOLE SODIUM, VIA: HCPCS | Performed by: PHYSICIAN ASSISTANT

## 2022-11-02 PROCEDURE — 99204 OFFICE O/P NEW MOD 45 MIN: CPT | Mod: ,,, | Performed by: INTERNAL MEDICINE

## 2022-11-02 PROCEDURE — 99217 PR OBSERVATION CARE DISCHARGE: CPT | Mod: ,,,

## 2022-11-02 PROCEDURE — 25000003 PHARM REV CODE 250: Performed by: STUDENT IN AN ORGANIZED HEALTH CARE EDUCATION/TRAINING PROGRAM

## 2022-11-02 PROCEDURE — G0378 HOSPITAL OBSERVATION PER HR: HCPCS

## 2022-11-02 PROCEDURE — 25000003 PHARM REV CODE 250: Performed by: PHYSICIAN ASSISTANT

## 2022-11-02 PROCEDURE — 99217 PR OBSERVATION CARE DISCHARGE: ICD-10-PCS | Mod: ,,,

## 2022-11-02 PROCEDURE — 83735 ASSAY OF MAGNESIUM: CPT | Performed by: PHYSICIAN ASSISTANT

## 2022-11-02 PROCEDURE — 25000003 PHARM REV CODE 250

## 2022-11-02 PROCEDURE — 80053 COMPREHEN METABOLIC PANEL: CPT | Performed by: PHYSICIAN ASSISTANT

## 2022-11-02 RX ORDER — SUCRALFATE 1 G/10ML
2 SUSPENSION ORAL 2 TIMES DAILY
Status: DISCONTINUED | OUTPATIENT
Start: 2022-11-02 | End: 2022-11-02 | Stop reason: HOSPADM

## 2022-11-02 RX ORDER — SUCRALFATE 1 G/10ML
2 SUSPENSION ORAL 2 TIMES DAILY
Qty: 1120 ML | Refills: 0 | Status: SHIPPED | OUTPATIENT
Start: 2022-11-02 | End: 2022-11-30

## 2022-11-02 RX ORDER — POLYETHYLENE GLYCOL 3350 17 G/17G
17 POWDER, FOR SOLUTION ORAL 2 TIMES DAILY
Qty: 1020 G | Refills: 0 | Status: SHIPPED | OUTPATIENT
Start: 2022-11-02

## 2022-11-02 RX ORDER — SUCRALFATE 1 G/1
2 TABLET ORAL 2 TIMES DAILY
Qty: 112 TABLET | Refills: 0 | Status: SHIPPED | OUTPATIENT
Start: 2022-11-02 | End: 2022-11-30

## 2022-11-02 RX ADMIN — HYDROCHLOROTHIAZIDE 25 MG: 25 TABLET ORAL at 09:11

## 2022-11-02 RX ADMIN — POLYETHYLENE GLYCOL 3350 17 G: 17 POWDER, FOR SOLUTION ORAL at 09:11

## 2022-11-02 RX ADMIN — SUCRALFATE 2 G: 1 SUSPENSION ORAL at 12:11

## 2022-11-02 RX ADMIN — AMLODIPINE BESYLATE 10 MG: 10 TABLET ORAL at 09:11

## 2022-11-02 RX ADMIN — FLECAINIDE ACETATE 100 MG: 50 TABLET ORAL at 09:11

## 2022-11-02 RX ADMIN — LOSARTAN POTASSIUM 100 MG: 50 TABLET, FILM COATED ORAL at 09:11

## 2022-11-02 RX ADMIN — PANTOPRAZOLE SODIUM 40 MG: 40 INJECTION, POWDER, FOR SOLUTION INTRAVENOUS at 12:11

## 2022-11-02 RX ADMIN — TAMSULOSIN HYDROCHLORIDE 0.4 MG: 0.4 CAPSULE ORAL at 09:11

## 2022-11-02 NOTE — PLAN OF CARE
[facility] - Observation 11H  Initial Discharge Assessment       Primary Care Provider: Ric Brambila MD    Admission Diagnosis: Rectal bleeding [K62.5]  GI bleed [K92.2]  Chest pain [R07.9]    Admission Date: 11/1/2022  Expected Discharge Date: 11/2/2022         Payor: HU[employer] MANAGED MEDICARE / Plan: HUMANA TOTAL CARE ADVANTAGE / Product Type: Medicare Advantage /     Extended Emergency Contact Information  Primary Emergency Contact: Mary Kay Villalba  Mobile Phone: [phone]  Relation: Daughter  Preferred language: English   needed? No  Secondary Emergency Contact: ZEB BROWN  Mobile Phone: [phone]  Relation: Sister  Preferred language: English   needed? No    Discharge Plan A: (P) Home  Discharge Plan B: (P) Home      [id number] - [place], [place] - [address]  [address]  [place] [address]  Phone: [phone] Fax: [phone]    [facility] ([place]) - [place] - [address] [place] [address]  [address] [place] [address]  [place] [address]  Phone: [phone] Fax: [phone]    [facility]  [place] [address]  [address]  [place] [address]  Phone: [phone] Fax: [phone]             SW completed Discharge Planning Assessment with patient via bedside. Discharge planning booklet given to patient/family and whiteboard updated with JUANY and phone #. All questions answered.    Patient reported that family will provide transportation upon discharge.     Patient reported that he lives alone and prior to hospitalization he was independent with his ADL's. Patient reported that he is not on dialysis and does not go to a Coumadin clinic.      Patient lives in a [facility] with 0 steps to enter.       Renetta Cardoza LMSW  [facility]  Ext. [phone]

## 2022-11-02 NOTE — DISCHARGE INSTRUCTIONS
- Miralax twice a day  - Carafate enemas x 4 weeks    Rectal Retention enema: 2 g (tablets or suspension) dissolved in 20 mL water twice daily for at least 4 weeks or until resolution of symptoms; retain each enema for as long as possible or at least 5 minutes

## 2022-11-02 NOTE — PLAN OF CARE
[facility] - Observation 11H  Discharge Final Note    Primary Care Provider: Ric Brambila MD    Expected Discharge Date: 11/2/2022    Patient discharged to home via personal transportation.     Patient's bedside nurse and patient notified of the above.      Final Discharge Note (most recent)       Final Note - 11/02/22 1603          Final Note    Assessment Type Final Discharge Note (P)      Anticipated Discharge Disposition Home or Self Care (P)         Post-Acute Status    Post-Acute Authorization Other (P)      Other Status No Post-Acute Service Needs (P)                      Important Message from Medicare                 Future Appointments   Date Time Provider Department Center   11/3/2022 10:00 AM Anne Lloyd NP [facility] COLON [facility]   4/3/2023  8:40 AM A. Kaycee Alaniz MD [facility] ARRHYTH [facility]        scheduled post-discharge follow-up appointment and information added to AVS.     Renetta Cardoza LMSW  [facility]  Ext. [phone]

## 2022-11-02 NOTE — NURSING
Discharge instructions reviewed with the patient. The patient states understanding of all instructions, including DC medications (and medication schedule), follow-up appointments, and S/S to report to the MD. Printed education was provided on new medications.     Supplies were provided to the patient for home enema administration. Instructions were provided; the patient states understanding.     Ordered medications were prepared at the [facility]; the patient will be picking them up on his way leaving the hospital.     VSS. The patient denies pain. Skin is intact; he appears ready and appropriate for DC.     The patient declined WC transport and requested to ampbulate off unit, accompanied by nursing. Pt Dc'd from unit @ 2427.      Sarah Fernández RN
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
## 2022-11-02 NOTE — HOSPITAL COURSE
Stevie Villalba admitted to  observation with rectal bleeding. Trended H/H, noted to be stable. Consulted GI. Concern for radiation proctitis. Discharged patient on carafate enemas x 4 weeks and Miralax BID. Patient to obtain endoscopy outpatient in 2 weeks from discharge.

## 2022-11-02 NOTE — PLAN OF CARE
Problem: Adjustment to Illness (Gastrointestinal Bleeding)  Goal: Optimal Coping with Acute Illness  Outcome: Ongoing, Progressing     Problem: Bleeding (Gastrointestinal Bleeding)  Goal: Hemostasis  Outcome: Ongoing, Progressing     Problem: Adult Inpatient Plan of Care  Goal: Plan of Care Review  Outcome: Ongoing, Progressing  Goal: Patient-Specific Goal (Individualized)  Outcome: Ongoing, Progressing  Goal: Absence of Hospital-Acquired Illness or Injury  Outcome: Ongoing, Progressing  Goal: Optimal Comfort and Wellbeing  Outcome: Ongoing, Progressing  Goal: Readiness for Transition of Care  Outcome: Ongoing, Progressing     Problem: Fall Injury Risk  Goal: Absence of Fall and Fall-Related Injury  Outcome: Ongoing, Progressing

## 2022-11-03 ENCOUNTER — TELEPHONE (OUTPATIENT)
Dept: ENDOSCOPY | Facility: HOSPITAL | Age: 67
End: 2022-11-03
Payer: MEDICARE

## 2022-11-03 ENCOUNTER — OFFICE VISIT (OUTPATIENT)
Dept: SURGERY | Facility: CLINIC | Age: 67
End: 2022-11-03
Payer: MEDICARE

## 2022-11-03 VITALS
DIASTOLIC BLOOD PRESSURE: 68 MMHG | SYSTOLIC BLOOD PRESSURE: 130 MMHG | WEIGHT: 251.56 LBS | BODY MASS INDEX: 36.02 KG/M2 | HEIGHT: 70 IN | HEART RATE: 74 BPM

## 2022-11-03 DIAGNOSIS — K62.5 RECTAL BLEEDING: ICD-10-CM

## 2022-11-03 DIAGNOSIS — K62.5 GASTROINTESTINAL HEMORRHAGE ASSOCIATED WITH ANORECTAL SOURCE: Primary | ICD-10-CM

## 2022-11-03 DIAGNOSIS — K64.5 THROMBOSED HEMORRHOIDS: ICD-10-CM

## 2022-11-03 DIAGNOSIS — Z98.890 H/O COLONOSCOPY WITH POLYPECTOMY: ICD-10-CM

## 2022-11-03 DIAGNOSIS — Z86.010 H/O COLONOSCOPY WITH POLYPECTOMY: ICD-10-CM

## 2022-11-03 DIAGNOSIS — Z12.11 COLON CANCER SCREENING: Primary | ICD-10-CM

## 2022-11-03 PROCEDURE — 99999 PR PBB SHADOW E&M-EST. PATIENT-LVL V: CPT | Mod: PBBFAC,,, | Performed by: NURSE PRACTITIONER

## 2022-11-03 PROCEDURE — 1160F PR REVIEW ALL MEDS BY PRESCRIBER/CLIN PHARMACIST DOCUMENTED: ICD-10-PCS | Mod: CPTII,S$GLB,, | Performed by: NURSE PRACTITIONER

## 2022-11-03 PROCEDURE — 3078F DIAST BP <80 MM HG: CPT | Mod: CPTII,S$GLB,, | Performed by: NURSE PRACTITIONER

## 2022-11-03 PROCEDURE — 99999 PR PBB SHADOW E&M-EST. PATIENT-LVL V: ICD-10-PCS | Mod: PBBFAC,,, | Performed by: NURSE PRACTITIONER

## 2022-11-03 PROCEDURE — 1159F PR MEDICATION LIST DOCUMENTED IN MEDICAL RECORD: ICD-10-PCS | Mod: CPTII,S$GLB,, | Performed by: NURSE PRACTITIONER

## 2022-11-03 PROCEDURE — 3075F PR MOST RECENT SYSTOLIC BLOOD PRESS GE 130-139MM HG: ICD-10-PCS | Mod: CPTII,S$GLB,, | Performed by: NURSE PRACTITIONER

## 2022-11-03 PROCEDURE — 4010F ACE/ARB THERAPY RXD/TAKEN: CPT | Mod: CPTII,S$GLB,, | Performed by: NURSE PRACTITIONER

## 2022-11-03 PROCEDURE — 3008F PR BODY MASS INDEX (BMI) DOCUMENTED: ICD-10-PCS | Mod: CPTII,S$GLB,, | Performed by: NURSE PRACTITIONER

## 2022-11-03 PROCEDURE — 1101F PT FALLS ASSESS-DOCD LE1/YR: CPT | Mod: CPTII,S$GLB,, | Performed by: NURSE PRACTITIONER

## 2022-11-03 PROCEDURE — 3008F BODY MASS INDEX DOCD: CPT | Mod: CPTII,S$GLB,, | Performed by: NURSE PRACTITIONER

## 2022-11-03 PROCEDURE — 1126F PR PAIN SEVERITY QUANTIFIED, NO PAIN PRESENT: ICD-10-PCS | Mod: CPTII,S$GLB,, | Performed by: NURSE PRACTITIONER

## 2022-11-03 PROCEDURE — 3288F PR FALLS RISK ASSESSMENT DOCUMENTED: ICD-10-PCS | Mod: CPTII,S$GLB,, | Performed by: NURSE PRACTITIONER

## 2022-11-03 PROCEDURE — 1101F PR PT FALLS ASSESS DOC 0-1 FALLS W/OUT INJ PAST YR: ICD-10-PCS | Mod: CPTII,S$GLB,, | Performed by: NURSE PRACTITIONER

## 2022-11-03 PROCEDURE — 46600 PR DIAG2STIC A2SCOPY: ICD-10-PCS | Mod: S$GLB,,, | Performed by: NURSE PRACTITIONER

## 2022-11-03 PROCEDURE — 99204 OFFICE O/P NEW MOD 45 MIN: CPT | Mod: 25,S$GLB,, | Performed by: NURSE PRACTITIONER

## 2022-11-03 PROCEDURE — 4010F PR ACE/ARB THEARPY RXD/TAKEN: ICD-10-PCS | Mod: CPTII,S$GLB,, | Performed by: NURSE PRACTITIONER

## 2022-11-03 PROCEDURE — 1126F AMNT PAIN NOTED NONE PRSNT: CPT | Mod: CPTII,S$GLB,, | Performed by: NURSE PRACTITIONER

## 2022-11-03 PROCEDURE — 1160F RVW MEDS BY RX/DR IN RCRD: CPT | Mod: CPTII,S$GLB,, | Performed by: NURSE PRACTITIONER

## 2022-11-03 PROCEDURE — 3044F PR MOST RECENT HEMOGLOBIN A1C LEVEL <7.0%: ICD-10-PCS | Mod: CPTII,S$GLB,, | Performed by: NURSE PRACTITIONER

## 2022-11-03 PROCEDURE — 1159F MED LIST DOCD IN RCRD: CPT | Mod: CPTII,S$GLB,, | Performed by: NURSE PRACTITIONER

## 2022-11-03 PROCEDURE — 99204 PR OFFICE/OUTPT VISIT, NEW, LEVL IV, 45-59 MIN: ICD-10-PCS | Mod: 25,S$GLB,, | Performed by: NURSE PRACTITIONER

## 2022-11-03 PROCEDURE — 46600 DIAGNOSTIC ANOSCOPY SPX: CPT | Mod: S$GLB,,, | Performed by: NURSE PRACTITIONER

## 2022-11-03 PROCEDURE — 3288F FALL RISK ASSESSMENT DOCD: CPT | Mod: CPTII,S$GLB,, | Performed by: NURSE PRACTITIONER

## 2022-11-03 PROCEDURE — 3078F PR MOST RECENT DIASTOLIC BLOOD PRESSURE < 80 MM HG: ICD-10-PCS | Mod: CPTII,S$GLB,, | Performed by: NURSE PRACTITIONER

## 2022-11-03 PROCEDURE — 3044F HG A1C LEVEL LT 7.0%: CPT | Mod: CPTII,S$GLB,, | Performed by: NURSE PRACTITIONER

## 2022-11-03 PROCEDURE — 3075F SYST BP GE 130 - 139MM HG: CPT | Mod: CPTII,S$GLB,, | Performed by: NURSE PRACTITIONER

## 2022-11-03 RX ORDER — HYDROCORTISONE 25 MG/G
CREAM TOPICAL 2 TIMES DAILY
Qty: 28 G | Refills: 1 | Status: SHIPPED | OUTPATIENT
Start: 2022-11-03

## 2022-11-03 RX ORDER — POLYETHYLENE GLYCOL 3350, SODIUM SULFATE ANHYDROUS, SODIUM BICARBONATE, SODIUM CHLORIDE, POTASSIUM CHLORIDE 236; 22.74; 6.74; 5.86; 2.97 G/4L; G/4L; G/4L; G/4L; G/4L
4 POWDER, FOR SOLUTION ORAL ONCE
Qty: 4000 ML | Refills: 0 | Status: SHIPPED | OUTPATIENT
Start: 2022-11-03 | End: 2022-11-03

## 2022-11-03 NOTE — TELEPHONE ENCOUNTER
Patient was seen in ER 11/1 for rectal bleeding and discharged off his Eliquis. He is in CRS clinic today to schedule his Colonoscopy that is ordered.   He states his last dose of Eliquis was 10/31/22. Can hold his Eliquis for potential colonoscopy on 11/15/22? If so do you want him to stay off the Eliquis until then or does he need to restart it now and then hold the 2 days prior to his colonoscopy on 11/15/22.    Please advise.    Sincerely,  DA Page  Outpatient Endoscopy scheduling
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
## 2022-11-03 NOTE — TELEPHONE ENCOUNTER
Please MD note about his Eliquis. Patient states he has been off since 11/1 when he was discharged from the hospital. Do you want him to stay off until his colonoscopy? Please advise.
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
## 2022-11-04 NOTE — ASSESSMENT & PLAN NOTE
Body mass index is 38.04 kg/m². Morbid obesity complicates all aspects of disease management from diagnostic modalities to treatment. Weight loss encouraged and health benefits explained to patient.

## 2022-11-04 NOTE — ASSESSMENT & PLAN NOTE
Patient with Paroxysmal (<7 days) atrial fibrillation which is controlled currently with Beta Blocker and Calcium Channel Blocker. Patient is currently in sinus rhythm.[id number] Score: 1. Anticoagulation indicated. Anticoagulation done with Eliquis.    - Continue home metoprolol 25 mg daily  - Continue home flecainide 100 mg BID  - On Eliquis 5 mg BID at home - HELD in setting of acute rectal bleeding   - Started anticoagulation in August 2022  - Telemetry

## 2022-11-04 NOTE — ASSESSMENT & PLAN NOTE
Patient presented to the ED after two episodes of rectal bleeding. No reports of melena or hematemesis. Described as low volume, light to bright red bleeding. No history of hemorrhoids or GIB.     - Hold Eliquis (for Afib)  - IV Pantoprazole 40 mg BID  - H/H 14/40 at time of admission  - Trend Hgb with CBC BID - stable  - IVF hydration  - 2 large bore IV access  - PRN anti-emetics   - GIB pathway  - Obtain type and screen  - Transfuse if Hgb > 7  - Consulted GI   - Carafate enemas x 4 weeks recommended   - 20 mL of a 10 percent sucralfate suspension in water twice daily   - Miralax BID    - No inpatient endoscopy, outpatient colonoscopy in 2 weeks

## 2022-11-06 ENCOUNTER — PATIENT MESSAGE (OUTPATIENT)
Dept: SLEEP MEDICINE | Facility: CLINIC | Age: 67
End: 2022-11-06
Payer: MEDICARE

## 2022-11-06 NOTE — PROCEDURES
[facility]  Sleep Center  Tel: [phone]    CPAP TITRATION         Patient Name: DORINDA [facility] #: [id number]   Sex: Female Study Date: 10/24/2022   : 1955 Clinic #: [id number]   Age: 67 Referring Physician: ANUPAM YI MD   Height: 70.0 in Referring Physician #    Weight: 263.0 lbs Sleep Specialist:    B.M.I.: 37.7 Sleep Specialist #    Hypopnea rule: AASM 1B Scoring Tech: JAMILA Varela, RPSGT   Total AHI: 8.3 Recording Tech EVANS SCHULTZ RRT, RPSGT   Lowest O2 sat: 88.0% Recording Location: [facility]     Sleep architecture: This is a CPAP titration study. At light's out, the patient fell asleep in 6.9 minutes. Sleep efficiency was 75.0%. Total sleep time (TST) was 326.5 minutes. Slow wave sleep proportion of TST was reduced and REM stage sleep proportion of TST was reduced. REM latency was 62.5 minutes.    Motor movement / Parasomnia: There were no significant limb movements of sleep noted.  Cardiac: single lead EKG revealed normal sinus rhythm    CPAP titration:  The mask used in the study was airfit P30i size medium with chin strap  Consolidated sleep was first seen at 7 cwp.  CPAP = 11 cwp was partially effective in supine REM.  CPAP = 14 cwp was effective in supine REM.    LIMITATIONS: mild mouth-venting in stage REM sleep    Oxygenation:  At therapeutic levels of PAP therapy, there was no baseline hypoxemia.    Impression:  -obstructive sleep apnea     Recommendations:    -auto-CPAP with CPAP min = 7 cwp  and CPAP max =  14 cwp is recommended  -pressures should be adjusted to CPAP min = 14 cwp depending on pressure tolerance  -the patient has follow up with Sleep Medicine        Anupam Yi MD    (This Sleep Study was interpreted by a Board Certified Sleep Specialist who conducted an epoch-by-epoch review of the entire raw data recording.)  (The indication for this sleep study was reviewed and deemed appropriate by AASM Practice Parameters or other reasons by a Board  Certified Sleep Specialist.)        TABLES

## 2022-11-07 ENCOUNTER — LAB VISIT (OUTPATIENT)
Dept: LAB | Facility: HOSPITAL | Age: 67
End: 2022-11-07
Attending: INTERNAL MEDICINE
Payer: MEDICARE

## 2022-11-07 DIAGNOSIS — Z86.010 H/O COLONOSCOPY WITH POLYPECTOMY: ICD-10-CM

## 2022-11-07 DIAGNOSIS — Z98.890 H/O COLONOSCOPY WITH POLYPECTOMY: ICD-10-CM

## 2022-11-07 DIAGNOSIS — K62.5 GASTROINTESTINAL HEMORRHAGE ASSOCIATED WITH ANORECTAL SOURCE: ICD-10-CM

## 2022-11-07 LAB
BASOPHILS # BLD AUTO: 0.05 K/UL (ref 0–0.2)
BASOPHILS NFR BLD: 0.5 % (ref 0–1.9)
DIFFERENTIAL METHOD: ABNORMAL
EOSINOPHIL # BLD AUTO: 0.2 K/UL (ref 0–0.5)
EOSINOPHIL NFR BLD: 1.6 % (ref 0–8)
ERYTHROCYTE [DISTWIDTH] IN BLOOD BY AUTOMATED COUNT: 12.5 % (ref 11.5–14.5)
HCT VFR BLD AUTO: 39.5 % (ref 40–54)
HGB BLD-MCNC: 13.3 G/DL (ref 14–18)
IMM GRANULOCYTES # BLD AUTO: 0.06 K/UL (ref 0–0.04)
IMM GRANULOCYTES NFR BLD AUTO: 0.6 % (ref 0–0.5)
LYMPHOCYTES # BLD AUTO: 2.5 K/UL (ref 1–4.8)
LYMPHOCYTES NFR BLD: 26.1 % (ref 18–48)
MCH RBC QN AUTO: 32.1 PG (ref 27–31)
MCHC RBC AUTO-ENTMCNC: 33.7 G/DL (ref 32–36)
MCV RBC AUTO: 95 FL (ref 82–98)
MONOCYTES # BLD AUTO: 0.7 K/UL (ref 0.3–1)
MONOCYTES NFR BLD: 7 % (ref 4–15)
NEUTROPHILS # BLD AUTO: 6 K/UL (ref 1.8–7.7)
NEUTROPHILS NFR BLD: 64.2 % (ref 38–73)
NRBC BLD-RTO: 0 /100 WBC
PLATELET # BLD AUTO: 227 K/UL (ref 150–450)
PMV BLD AUTO: 10.6 FL (ref 9.2–12.9)
RBC # BLD AUTO: 4.14 M/UL (ref 4.6–6.2)
WBC # BLD AUTO: 9.4 K/UL (ref 3.9–12.7)

## 2022-11-07 PROCEDURE — 85025 COMPLETE CBC W/AUTO DIFF WBC: CPT | Performed by: NURSE PRACTITIONER

## 2022-11-07 PROCEDURE — 36415 COLL VENOUS BLD VENIPUNCTURE: CPT | Mod: PN | Performed by: NURSE PRACTITIONER

## 2022-11-13 ENCOUNTER — PATIENT MESSAGE (OUTPATIENT)
Dept: ELECTROPHYSIOLOGY | Facility: CLINIC | Age: 67
End: 2022-11-13
Payer: MEDICARE

## 2022-11-14 ENCOUNTER — TELEPHONE (OUTPATIENT)
Dept: ELECTROPHYSIOLOGY | Facility: CLINIC | Age: 67
End: 2022-11-14
Payer: MEDICARE

## 2022-11-14 NOTE — TELEPHONE ENCOUNTER
"I thought I sent a message this morning but I cannot find it. Can you please reach out to patient to see if he may qualify for financial assistance with Eliquis? He is likely "in the gap" and cannot afford to pay $170 for it.   Thanks  "

## 2022-11-14 NOTE — TELEPHONE ENCOUNTER
"Patient may be "in the gap" and cannot afford the $170 copay for Eliquis. Can you please reach out to see if he qualifies for any type of financial assistance?  Thanks  "

## 2022-11-15 ENCOUNTER — ANESTHESIA EVENT (OUTPATIENT)
Dept: ENDOSCOPY | Facility: HOSPITAL | Age: 67
End: 2022-11-15
Payer: MEDICARE

## 2022-11-15 ENCOUNTER — HOSPITAL ENCOUNTER (OUTPATIENT)
Facility: HOSPITAL | Age: 67
Discharge: HOME | End: 2022-11-15
Attending: COLON & RECTAL SURGERY | Admitting: COLON & RECTAL SURGERY
Payer: MEDICARE

## 2022-11-15 ENCOUNTER — ANESTHESIA (OUTPATIENT)
Dept: ENDOSCOPY | Facility: HOSPITAL | Age: 67
End: 2022-11-15
Payer: MEDICARE

## 2022-11-15 VITALS
HEART RATE: 63 BPM | DIASTOLIC BLOOD PRESSURE: 75 MMHG | OXYGEN SATURATION: 98 % | BODY MASS INDEX: 36.51 KG/M2 | HEIGHT: 70 IN | RESPIRATION RATE: 16 BRPM | TEMPERATURE: 97 F | WEIGHT: 255 LBS | SYSTOLIC BLOOD PRESSURE: 131 MMHG

## 2022-11-15 DIAGNOSIS — Z86.010 HISTORY OF COLON POLYPS: Primary | ICD-10-CM

## 2022-11-15 PROCEDURE — E9220 PRA ENDO ANESTHESIA: HCPCS | Mod: ,,, | Performed by: NURSE ANESTHETIST, CERTIFIED REGISTERED

## 2022-11-15 PROCEDURE — G0105 COLORECTAL SCRN; HI RISK IND: HCPCS | Performed by: COLON & RECTAL SURGERY

## 2022-11-15 PROCEDURE — 37000009 HC ANESTHESIA EA ADD 15 MINS: Performed by: COLON & RECTAL SURGERY

## 2022-11-15 PROCEDURE — G0105 COLORECTAL SCRN; HI RISK IND: HCPCS | Mod: ,,, | Performed by: COLON & RECTAL SURGERY

## 2022-11-15 PROCEDURE — 25000003 PHARM REV CODE 250: Performed by: COLON & RECTAL SURGERY

## 2022-11-15 PROCEDURE — 37000008 HC ANESTHESIA 1ST 15 MINUTES: Performed by: COLON & RECTAL SURGERY

## 2022-11-15 PROCEDURE — E9220 PRA ENDO ANESTHESIA: ICD-10-PCS | Mod: ,,, | Performed by: NURSE ANESTHETIST, CERTIFIED REGISTERED

## 2022-11-15 PROCEDURE — 63600175 PHARM REV CODE 636 W HCPCS: Performed by: NURSE ANESTHETIST, CERTIFIED REGISTERED

## 2022-11-15 PROCEDURE — G0105 COLORECTAL SCRN; HI RISK IND: ICD-10-PCS | Mod: ,,, | Performed by: COLON & RECTAL SURGERY

## 2022-11-15 RX ORDER — PROPOFOL 10 MG/ML
VIAL (ML) INTRAVENOUS
Status: DISCONTINUED | OUTPATIENT
Start: 2022-11-15 | End: 2022-11-15

## 2022-11-15 RX ORDER — LIDOCAINE HYDROCHLORIDE 20 MG/ML
INJECTION, SOLUTION EPIDURAL; INFILTRATION; INTRACAUDAL; PERINEURAL
Status: DISCONTINUED | OUTPATIENT
Start: 2022-11-15 | End: 2022-11-15

## 2022-11-15 RX ORDER — PROPOFOL 10 MG/ML
VIAL (ML) INTRAVENOUS CONTINUOUS PRN
Status: DISCONTINUED | OUTPATIENT
Start: 2022-11-15 | End: 2022-11-15

## 2022-11-15 RX ORDER — SODIUM CHLORIDE 9 MG/ML
INJECTION, SOLUTION INTRAVENOUS CONTINUOUS
Status: DISCONTINUED | OUTPATIENT
Start: 2022-11-15 | End: 2022-11-15 | Stop reason: HOSPADM

## 2022-11-15 RX ADMIN — Medication 150 MCG/KG/MIN: at 11:11

## 2022-11-15 RX ADMIN — PROPOFOL 100 MG: 10 INJECTION, EMULSION INTRAVENOUS at 11:11

## 2022-11-15 RX ADMIN — PROPOFOL 30 MG: 10 INJECTION, EMULSION INTRAVENOUS at 11:11

## 2022-11-15 RX ADMIN — LIDOCAINE HYDROCHLORIDE 60 MG: 20 INJECTION, SOLUTION EPIDURAL; INFILTRATION; INTRACAUDAL; PERINEURAL at 11:11

## 2022-11-15 RX ADMIN — SODIUM CHLORIDE: 0.9 INJECTION, SOLUTION INTRAVENOUS at 11:11

## 2022-11-15 NOTE — ANESTHESIA POSTPROCEDURE EVALUATION
Anesthesia Post Evaluation    Patient: Stevie Villalba    Procedure(s) Performed: Procedure(s) (LRB):  COLONOSCOPY (N/A)    Final Anesthesia Type: general      Patient location during evaluation: PACU  Patient participation: Yes- Able to Participate  Level of consciousness: awake and alert  Post-procedure vital signs: reviewed and stable  Pain management: adequate  Airway patency: patent    PONV status at discharge: No PONV  Anesthetic complications: no      Cardiovascular status: hemodynamically stable  Respiratory status: spontaneous ventilation  Follow-up not needed.          Vitals Value Taken Time   /75 11/15/22 1222   Temp 36.3 °C (97.3 °F) 11/15/22 1149   Pulse 63 11/15/22 1222   Resp 16 11/15/22 1222   SpO2 98 % 11/15/22 1222         Event Time   Out of Recovery 12:24:11         Pain/Lory Score: Lory Score: 10 (11/15/2022 12:02 PM)

## 2022-11-15 NOTE — PLAN OF CARE
Discharge instructions reviewed. Pt verbalized understanding & is w/out any questions or concerns at this time.
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
## 2022-11-15 NOTE — TRANSFER OF CARE
"Anesthesia Transfer of Care Note    Patient: Stevie Villalba    Procedure(s) Performed: Procedure(s) (LRB):  COLONOSCOPY (N/A)    Patient location: GI    Anesthesia Type: general    Transport from OR: Transported from OR on room air with adequate spontaneous ventilation    Post pain: adequate analgesia    Post assessment: no apparent anesthetic complications    Post vital signs: stable    Level of consciousness: awake    Nausea/Vomiting: no nausea/vomiting    Complications: none    Transfer of care protocol was followed      Last vitals:   Visit Vitals  /66   Pulse 70   Temp 36.5   Resp 20   Ht 5' 10" (1.778 m)   Wt 115.7 kg (255 lb)   SpO2 95%   BMI 36.59 kg/m²     "

## 2022-11-15 NOTE — PROVATION PATIENT INSTRUCTIONS
Discharge Summary/Instructions after an Endoscopic Procedure  Patient Name: Stevie Villalba  Patient MRN: [id number]  Patient YOB: 1955  Tuesday, November 15, 2022  Woo Goldman MD  Dear patient,  As a result of recent federal legislation (The Federal Cures Act), you may   receive lab or pathology results from your procedure in your [facility]   account before your physician is able to contact you. Your physician or   their representative will relay the results to you with their   recommendations at their soonest availability.  Thank you,  RESTRICTIONS:  During your procedure today, you received medications for sedation.  These   medications may affect your judgment, balance and coordination.  Therefore,   for 24 hours, you have the following restrictions:   - DO NOT drive a car, operate machinery, make legal/financial decisions,   sign important papers or drink alcohol.    ACTIVITY:  Today: no heavy lifting, straining or running due to procedural   sedation/anesthesia.  The following day: return to full activity including work.  DIET:  Eat and drink normally unless instructed otherwise.     TREATMENT FOR COMMON SIDE EFFECTS:  - Mild abdominal pain, nausea, belching, bloating or excessive gas:  rest,   eat lightly and use a heating pad.  - Sore Throat: treat with throat lozenges and/or gargle with warm salt   water.  - Because air was used during the procedure, expelling large amounts of air   from your rectum or belching is normal.  - If a bowel prep was taken, you may not have a bowel movement for 1-3 days.    This is normal.  SYMPTOMS TO WATCH FOR AND REPORT TO YOUR PHYSICIAN:  1. Abdominal pain or bloating, other than gas cramps.  2. Chest pain.  3. Back pain.  4. Signs of infection such as: chills or fever occurring within 24 hours   after the procedure.  5. Rectal bleeding, which would show as bright red, maroon, or black stools.   (A tablespoon of blood from the rectum is not serious, especially if    hemorrhoids are present.)  6. Vomiting.  7. Weakness or dizziness.  GO DIRECTLY TO THE NEAREST EMERGENCY ROOM IF YOU HAVE ANY OF THE FOLLOWING:      Difficulty breathing              Chills and/or fever over 101 F   Persistent vomiting and/or vomiting blood   Severe abdominal pain   Severe chest pain   Black, tarry stools   Bleeding- more than one tablespoon   Any other symptom or condition that you feel may need urgent attention  Your doctor recommends these additional instructions:  If any biopsies were taken, your doctors clinic will contact you in 1 to 2   weeks with any results.  - Discharge patient to home.   - High fiber diet.   - Continue present medications.   - Patient has a contact number available for emergencies.  The signs and   symptoms of potential delayed complications were discussed with the   patient.  Return to normal activities tomorrow.  Written discharge   instructions were provided to the patient.   - Repeat colonoscopy in 5 years for surveillance.  For questions, problems or results please call your physician - Woo Goldman MD at Work:  [phone].  [facility], EMERGENCY ROOM PHONE NUMBER: [phone]  IF A COMPLICATION OR EMERGENCY SITUATION ARISES AND YOU ARE UNABLE TO REACH   YOUR PHYSICIAN - GO DIRECTLY TO THE EMERGENCY ROOM.  Woo Goldman MD  11/15/2022 11:49:16 AM  This report has been verified and signed electronically.  Dear patient,  As a result of recent federal legislation (The Federal Cures Act), you may   receive lab or pathology results from your procedure in your [facility]   account before your physician is able to contact you. Your physician or   their representative will relay the results to you with their   recommendations at their soonest availability.  Thank you,  PROVATION

## 2022-11-16 ENCOUNTER — TELEPHONE (OUTPATIENT)
Dept: PHARMACY | Facility: CLINIC | Age: 67
End: 2022-11-16
Payer: MEDICARE

## 2022-11-16 NOTE — TELEPHONE ENCOUNTER
I have reached out to Stevie Villalba to inform him of the [employer]  application process for Eliquis and whats required to apply.  Stevie Villalba did not answer. I left a voicemail and mailed a letter introducing him to the pharmacy patient assistance program. I will follow up in 5 business days.

## 2022-11-16 NOTE — LETTER
November 16, 2022    Stevie Villalba  [address]  [facility] [address]             [facility]  [address]  [facility] [address]  Phone: [phone]  Fax: [phone]   Dear Mr. Villalba,    My Name is Steffanie Joshua. I am a Pharmacy Technician reaching out on behalf of [facility]s Pharmacy Patient Assistance Team after receiving a referral from your provider inquiring about assistance with your medications. I tried to call you on 11/16/2022 but was unable to reach you. Our goal is to assist qualified patients with financial assistance for their medications to better help you achieve your health goals!    Please note that enrollment and eligibility into available support may require the following documents:     Proof of household Income( such as social security statement, 1099 form, pension statement or 3 consecutive pay stubs   Copy of all insurance cards (front and back)    Print out from your insurance or pharmacy showing how much you have spent on prescriptions this year   Signed HIPAA and Authorization forms (These forms will be sent to you once you contact us)     Please reach out to my phone number below if you are still in need of assistance with your medications. We will attempt to reach out to you through [employer] or via phone call again in 5 business days. We look forward to hearing from you soon!    Thank you for choosing [facility] for your healthcare needs    Sincerely  Huseyin Fernandez

## 2022-11-23 ENCOUNTER — PATIENT MESSAGE (OUTPATIENT)
Dept: PHARMACY | Facility: CLINIC | Age: 67
End: 2022-11-23
Payer: MEDICARE

## 2022-11-23 NOTE — TELEPHONE ENCOUNTER
A 2nd attempt has been made to establish contact with Stevie Villalba  via MY [employer]. The final contact attempt will be made in 5 business days

## 2023-02-02 ENCOUNTER — PES CALL (OUTPATIENT)
Dept: ADMINISTRATIVE | Facility: CLINIC | Age: 68
End: 2023-02-02
Payer: MEDICARE

## 2023-02-06 PROBLEM — K92.2 GIB (GASTROINTESTINAL BLEEDING): Status: RESOLVED | Noted: 2022-11-01 | Resolved: 2023-02-06

## 2023-02-07 DIAGNOSIS — Z00.00 ENCOUNTER FOR MEDICARE ANNUAL WELLNESS EXAM: ICD-10-CM

## 2023-02-09 DIAGNOSIS — Z00.00 ENCOUNTER FOR MEDICARE ANNUAL WELLNESS EXAM: ICD-10-CM

## 2023-03-26 ENCOUNTER — HOSPITAL ENCOUNTER (OUTPATIENT)
Facility: HOSPITAL | Age: 68
Discharge: HOME | End: 2023-03-27
Attending: EMERGENCY MEDICINE | Admitting: STUDENT IN AN ORGANIZED HEALTH CARE EDUCATION/TRAINING PROGRAM
Payer: MEDICARE

## 2023-03-26 DIAGNOSIS — K92.1 MELENA: Primary | ICD-10-CM

## 2023-03-26 DIAGNOSIS — R07.9 CHEST PAIN: ICD-10-CM

## 2023-03-26 DIAGNOSIS — K92.2 GI BLEED: ICD-10-CM

## 2023-03-26 DIAGNOSIS — Z79.01 ON ANTICOAGULANT THERAPY: ICD-10-CM

## 2023-03-26 DIAGNOSIS — I48.0 PAROXYSMAL ATRIAL FIBRILLATION: Chronic | ICD-10-CM

## 2023-03-26 DIAGNOSIS — K92.2 GASTROINTESTINAL HEMORRHAGE, UNSPECIFIED GASTROINTESTINAL HEMORRHAGE TYPE: ICD-10-CM

## 2023-03-26 DIAGNOSIS — K92.1 GASTROINTESTINAL HEMORRHAGE WITH MELENA: ICD-10-CM

## 2023-03-26 PROBLEM — K57.90 DIVERTICULOSIS: Status: ACTIVE | Noted: 2023-03-26

## 2023-03-26 PROBLEM — D62 ACUTE BLOOD LOSS ANEMIA: Status: ACTIVE | Noted: 2023-03-26

## 2023-03-26 PROBLEM — M10.9 GOUT: Status: ACTIVE | Noted: 2023-03-26

## 2023-03-26 LAB
ABO + RH BLD: NORMAL
ALBUMIN SERPL BCP-MCNC: 3.8 G/DL (ref 3.5–5.2)
ALP SERPL-CCNC: 86 U/L (ref 55–135)
ALT SERPL W/O P-5'-P-CCNC: 24 U/L (ref 10–44)
ANION GAP SERPL CALC-SCNC: 12 MMOL/L (ref 8–16)
AST SERPL-CCNC: 23 U/L (ref 10–40)
BASOPHILS # BLD AUTO: 0.04 K/UL (ref 0–0.2)
BASOPHILS # BLD AUTO: 0.06 K/UL (ref 0–0.2)
BASOPHILS NFR BLD: 0.3 % (ref 0–1.9)
BASOPHILS NFR BLD: 0.5 % (ref 0–1.9)
BILIRUB SERPL-MCNC: 0.8 MG/DL (ref 0.1–1)
BLD GP AB SCN CELLS X3 SERPL QL: NORMAL
BUN SERPL-MCNC: 19 MG/DL (ref 8–23)
BUN SERPL-MCNC: 27 MG/DL (ref 6–30)
CALCIUM SERPL-MCNC: 9.1 MG/DL (ref 8.7–10.5)
CHLORIDE SERPL-SCNC: 104 MMOL/L (ref 95–110)
CHLORIDE SERPL-SCNC: 106 MMOL/L (ref 95–110)
CO2 SERPL-SCNC: 22 MMOL/L (ref 23–29)
CREAT SERPL-MCNC: 0.9 MG/DL (ref 0.5–1.4)
CREAT SERPL-MCNC: 1 MG/DL (ref 0.5–1.4)
DIFFERENTIAL METHOD: ABNORMAL
DIFFERENTIAL METHOD: ABNORMAL
EOSINOPHIL # BLD AUTO: 0 K/UL (ref 0–0.5)
EOSINOPHIL # BLD AUTO: 0 K/UL (ref 0–0.5)
EOSINOPHIL NFR BLD: 0.2 % (ref 0–8)
EOSINOPHIL NFR BLD: 0.3 % (ref 0–8)
ERYTHROCYTE [DISTWIDTH] IN BLOOD BY AUTOMATED COUNT: 12.2 % (ref 11.5–14.5)
ERYTHROCYTE [DISTWIDTH] IN BLOOD BY AUTOMATED COUNT: 12.2 % (ref 11.5–14.5)
EST. GFR  (NO RACE VARIABLE): >60 ML/MIN/1.73 M^2
GLUCOSE SERPL-MCNC: 123 MG/DL (ref 70–110)
GLUCOSE SERPL-MCNC: 128 MG/DL (ref 70–110)
HCT VFR BLD AUTO: 24.3 % (ref 40–54)
HCT VFR BLD AUTO: 24.5 % (ref 40–54)
HCT VFR BLD AUTO: 25.8 % (ref 40–54)
HCT VFR BLD AUTO: 26.3 % (ref 40–54)
HCT VFR BLD CALC: 25 %PCV (ref 36–54)
HGB BLD-MCNC: 8.4 G/DL (ref 14–18)
HGB BLD-MCNC: 8.4 G/DL (ref 14–18)
HGB BLD-MCNC: 9 G/DL (ref 14–18)
HGB BLD-MCNC: 9 G/DL (ref 14–18)
IMM GRANULOCYTES # BLD AUTO: 0.08 K/UL (ref 0–0.04)
IMM GRANULOCYTES # BLD AUTO: 0.09 K/UL (ref 0–0.04)
IMM GRANULOCYTES NFR BLD AUTO: 0.7 % (ref 0–0.5)
IMM GRANULOCYTES NFR BLD AUTO: 0.7 % (ref 0–0.5)
LYMPHOCYTES # BLD AUTO: 1.8 K/UL (ref 1–4.8)
LYMPHOCYTES # BLD AUTO: 2.2 K/UL (ref 1–4.8)
LYMPHOCYTES NFR BLD: 15.3 % (ref 18–48)
LYMPHOCYTES NFR BLD: 18.3 % (ref 18–48)
MCH RBC QN AUTO: 32.5 PG (ref 27–31)
MCH RBC QN AUTO: 32.6 PG (ref 27–31)
MCHC RBC AUTO-ENTMCNC: 34.3 G/DL (ref 32–36)
MCHC RBC AUTO-ENTMCNC: 34.9 G/DL (ref 32–36)
MCV RBC AUTO: 93 FL (ref 82–98)
MCV RBC AUTO: 95 FL (ref 82–98)
MONOCYTES # BLD AUTO: 0.7 K/UL (ref 0.3–1)
MONOCYTES # BLD AUTO: 0.7 K/UL (ref 0.3–1)
MONOCYTES NFR BLD: 5.7 % (ref 4–15)
MONOCYTES NFR BLD: 5.8 % (ref 4–15)
NEUTROPHILS # BLD AUTO: 9.1 K/UL (ref 1.8–7.7)
NEUTROPHILS # BLD AUTO: 9.3 K/UL (ref 1.8–7.7)
NEUTROPHILS NFR BLD: 74.4 % (ref 38–73)
NEUTROPHILS NFR BLD: 77.8 % (ref 38–73)
NRBC BLD-RTO: 0 /100 WBC
NRBC BLD-RTO: 0 /100 WBC
PLATELET # BLD AUTO: 196 K/UL (ref 150–450)
PLATELET # BLD AUTO: 223 K/UL (ref 150–450)
PMV BLD AUTO: 9.7 FL (ref 9.2–12.9)
PMV BLD AUTO: 9.8 FL (ref 9.2–12.9)
POC IONIZED CALCIUM: 0.99 MMOL/L (ref 1.06–1.42)
POC TCO2 (MEASURED): 27 MMOL/L (ref 23–29)
POTASSIUM BLD-SCNC: 7 MMOL/L (ref 3.5–5.1)
POTASSIUM SERPL-SCNC: 3.6 MMOL/L (ref 3.5–5.1)
PROT SERPL-MCNC: 6.5 G/DL (ref 6–8.4)
RBC # BLD AUTO: 2.58 M/UL (ref 4.6–6.2)
RBC # BLD AUTO: 2.77 M/UL (ref 4.6–6.2)
SAMPLE: ABNORMAL
SODIUM BLD-SCNC: 135 MMOL/L (ref 136–145)
SODIUM SERPL-SCNC: 140 MMOL/L (ref 136–145)
SPECIMEN OUTDATE: NORMAL
WBC # BLD AUTO: 11.98 K/UL (ref 3.9–12.7)
WBC # BLD AUTO: 12.17 K/UL (ref 3.9–12.7)

## 2023-03-26 PROCEDURE — 25500020 PHARM REV CODE 255: Mod: HCNC | Performed by: FAMILY MEDICINE

## 2023-03-26 PROCEDURE — 63600175 PHARM REV CODE 636 W HCPCS: Mod: HCNC | Performed by: EMERGENCY MEDICINE

## 2023-03-26 PROCEDURE — 85025 COMPLETE CBC W/AUTO DIFF WBC: CPT | Mod: 91,HCNC | Performed by: PHYSICIAN ASSISTANT

## 2023-03-26 PROCEDURE — 36415 COLL VENOUS BLD VENIPUNCTURE: CPT | Mod: HCNC | Performed by: FAMILY MEDICINE

## 2023-03-26 PROCEDURE — 63600175 PHARM REV CODE 636 W HCPCS: Mod: HCNC | Performed by: PHYSICIAN ASSISTANT

## 2023-03-26 PROCEDURE — 99285 PR EMERGENCY DEPT VISIT,LEVEL V: ICD-10-PCS | Mod: ,,, | Performed by: EMERGENCY MEDICINE

## 2023-03-26 PROCEDURE — 86900 BLOOD TYPING SEROLOGIC ABO: CPT | Mod: HCNC | Performed by: EMERGENCY MEDICINE

## 2023-03-26 PROCEDURE — 82330 ASSAY OF CALCIUM: CPT | Mod: HCNC

## 2023-03-26 PROCEDURE — 82272 OCCULT BLD FECES 1-3 TESTS: CPT | Mod: HCNC

## 2023-03-26 PROCEDURE — 80053 COMPREHEN METABOLIC PANEL: CPT | Mod: HCNC | Performed by: EMERGENCY MEDICINE

## 2023-03-26 PROCEDURE — C9113 INJ PANTOPRAZOLE SODIUM, VIA: HCPCS | Mod: HCNC | Performed by: EMERGENCY MEDICINE

## 2023-03-26 PROCEDURE — 80047 BASIC METABLC PNL IONIZED CA: CPT | Mod: HCNC

## 2023-03-26 PROCEDURE — 99214 PR OFFICE/OUTPT VISIT, EST, LEVL IV, 30-39 MIN: ICD-10-PCS | Mod: HCNC,,, | Performed by: INTERNAL MEDICINE

## 2023-03-26 PROCEDURE — 85014 HEMATOCRIT: CPT | Mod: HCNC,91 | Performed by: FAMILY MEDICINE

## 2023-03-26 PROCEDURE — 93010 EKG 12-LEAD: ICD-10-PCS | Mod: HCNC,,, | Performed by: INTERNAL MEDICINE

## 2023-03-26 PROCEDURE — 99223 PR INITIAL HOSPITAL CARE,LEVL III: ICD-10-PCS | Mod: HCNC,,, | Performed by: PHYSICIAN ASSISTANT

## 2023-03-26 PROCEDURE — 25000003 PHARM REV CODE 250: Mod: HCNC | Performed by: PHYSICIAN ASSISTANT

## 2023-03-26 PROCEDURE — 93010 ELECTROCARDIOGRAM REPORT: CPT | Mod: HCNC,,, | Performed by: INTERNAL MEDICINE

## 2023-03-26 PROCEDURE — G0378 HOSPITAL OBSERVATION PER HR: HCPCS | Mod: HCNC

## 2023-03-26 PROCEDURE — 94761 N-INVAS EAR/PLS OXIMETRY MLT: CPT | Mod: HCNC

## 2023-03-26 PROCEDURE — C9113 INJ PANTOPRAZOLE SODIUM, VIA: HCPCS | Mod: HCNC | Performed by: PHYSICIAN ASSISTANT

## 2023-03-26 PROCEDURE — 93005 ELECTROCARDIOGRAM TRACING: CPT | Mod: HCNC

## 2023-03-26 PROCEDURE — 99214 OFFICE O/P EST MOD 30 MIN: CPT | Mod: HCNC,,, | Performed by: INTERNAL MEDICINE

## 2023-03-26 PROCEDURE — 99285 EMERGENCY DEPT VISIT HI MDM: CPT | Mod: 25,HCNC

## 2023-03-26 PROCEDURE — 99285 EMERGENCY DEPT VISIT HI MDM: CPT | Mod: ,,, | Performed by: EMERGENCY MEDICINE

## 2023-03-26 PROCEDURE — 96376 TX/PRO/DX INJ SAME DRUG ADON: CPT | Mod: HCNC

## 2023-03-26 PROCEDURE — 85018 HEMOGLOBIN: CPT | Mod: HCNC | Performed by: FAMILY MEDICINE

## 2023-03-26 PROCEDURE — 99223 1ST HOSP IP/OBS HIGH 75: CPT | Mod: HCNC,,, | Performed by: PHYSICIAN ASSISTANT

## 2023-03-26 PROCEDURE — 85025 COMPLETE CBC W/AUTO DIFF WBC: CPT | Mod: HCNC | Performed by: EMERGENCY MEDICINE

## 2023-03-26 PROCEDURE — 96374 THER/PROPH/DIAG INJ IV PUSH: CPT | Mod: HCNC,59

## 2023-03-26 RX ORDER — SIMETHICONE 80 MG
1 TABLET,CHEWABLE ORAL 4 TIMES DAILY PRN
Status: DISCONTINUED | OUTPATIENT
Start: 2023-03-26 | End: 2023-03-27 | Stop reason: HOSPADM

## 2023-03-26 RX ORDER — ACETAMINOPHEN 325 MG/1
650 TABLET ORAL EVERY 4 HOURS PRN
Status: DISCONTINUED | OUTPATIENT
Start: 2023-03-26 | End: 2023-03-27 | Stop reason: HOSPADM

## 2023-03-26 RX ORDER — ATORVASTATIN CALCIUM 20 MG/1
40 TABLET, FILM COATED ORAL NIGHTLY
Status: DISCONTINUED | OUTPATIENT
Start: 2023-03-26 | End: 2023-03-27 | Stop reason: HOSPADM

## 2023-03-26 RX ORDER — MAG HYDROX/ALUMINUM HYD/SIMETH 200-200-20
30 SUSPENSION, ORAL (FINAL DOSE FORM) ORAL 4 TIMES DAILY PRN
Status: DISCONTINUED | OUTPATIENT
Start: 2023-03-26 | End: 2023-03-27 | Stop reason: HOSPADM

## 2023-03-26 RX ORDER — NALOXONE HCL 0.4 MG/ML
0.02 VIAL (ML) INJECTION
Status: DISCONTINUED | OUTPATIENT
Start: 2023-03-26 | End: 2023-03-27 | Stop reason: HOSPADM

## 2023-03-26 RX ORDER — ONDANSETRON 8 MG/1
8 TABLET, ORALLY DISINTEGRATING ORAL EVERY 8 HOURS PRN
Status: DISCONTINUED | OUTPATIENT
Start: 2023-03-26 | End: 2023-03-27 | Stop reason: HOSPADM

## 2023-03-26 RX ORDER — ACETAMINOPHEN 325 MG/1
650 TABLET ORAL EVERY 8 HOURS PRN
Status: DISCONTINUED | OUTPATIENT
Start: 2023-03-26 | End: 2023-03-27 | Stop reason: HOSPADM

## 2023-03-26 RX ORDER — PANTOPRAZOLE SODIUM 40 MG/10ML
40 INJECTION, POWDER, LYOPHILIZED, FOR SOLUTION INTRAVENOUS 2 TIMES DAILY
Status: DISCONTINUED | OUTPATIENT
Start: 2023-03-26 | End: 2023-03-27 | Stop reason: HOSPADM

## 2023-03-26 RX ORDER — CARISOPRODOL 350 MG/1
350 TABLET ORAL 4 TIMES DAILY PRN
Status: DISCONTINUED | OUTPATIENT
Start: 2023-03-26 | End: 2023-03-27 | Stop reason: HOSPADM

## 2023-03-26 RX ORDER — FLECAINIDE ACETATE 50 MG/1
100 TABLET ORAL EVERY 12 HOURS
Status: DISCONTINUED | OUTPATIENT
Start: 2023-03-26 | End: 2023-03-27 | Stop reason: HOSPADM

## 2023-03-26 RX ORDER — SODIUM CHLORIDE 0.9 % (FLUSH) 0.9 %
5 SYRINGE (ML) INJECTION
Status: DISCONTINUED | OUTPATIENT
Start: 2023-03-26 | End: 2023-03-27 | Stop reason: HOSPADM

## 2023-03-26 RX ORDER — GLUCAGON 1 MG
1 KIT INJECTION
Status: DISCONTINUED | OUTPATIENT
Start: 2023-03-26 | End: 2023-03-27 | Stop reason: HOSPADM

## 2023-03-26 RX ORDER — ONDANSETRON 2 MG/ML
4 INJECTION INTRAMUSCULAR; INTRAVENOUS EVERY 8 HOURS PRN
Status: DISCONTINUED | OUTPATIENT
Start: 2023-03-26 | End: 2023-03-27 | Stop reason: HOSPADM

## 2023-03-26 RX ORDER — POLYETHYLENE GLYCOL 3350 17 G/17G
17 POWDER, FOR SOLUTION ORAL DAILY
Status: DISCONTINUED | OUTPATIENT
Start: 2023-03-27 | End: 2023-03-27 | Stop reason: HOSPADM

## 2023-03-26 RX ORDER — BISACODYL 10 MG
10 SUPPOSITORY, RECTAL RECTAL DAILY PRN
Status: DISCONTINUED | OUTPATIENT
Start: 2023-03-26 | End: 2023-03-27 | Stop reason: HOSPADM

## 2023-03-26 RX ORDER — PANTOPRAZOLE SODIUM 40 MG/10ML
80 INJECTION, POWDER, LYOPHILIZED, FOR SOLUTION INTRAVENOUS
Status: COMPLETED | OUTPATIENT
Start: 2023-03-26 | End: 2023-03-26

## 2023-03-26 RX ORDER — TAMSULOSIN HYDROCHLORIDE 0.4 MG/1
0.4 CAPSULE ORAL DAILY
Status: DISCONTINUED | OUTPATIENT
Start: 2023-03-27 | End: 2023-03-27 | Stop reason: HOSPADM

## 2023-03-26 RX ORDER — DEXTROSE 40 %
30 GEL (GRAM) ORAL
Status: DISCONTINUED | OUTPATIENT
Start: 2023-03-26 | End: 2023-03-27 | Stop reason: HOSPADM

## 2023-03-26 RX ORDER — HYDROCODONE BITARTRATE AND ACETAMINOPHEN 5; 325 MG/1; MG/1
1 TABLET ORAL EVERY 6 HOURS PRN
Status: DISCONTINUED | OUTPATIENT
Start: 2023-03-26 | End: 2023-03-27 | Stop reason: HOSPADM

## 2023-03-26 RX ORDER — DEXTROSE 40 %
15 GEL (GRAM) ORAL
Status: DISCONTINUED | OUTPATIENT
Start: 2023-03-26 | End: 2023-03-27 | Stop reason: HOSPADM

## 2023-03-26 RX ADMIN — PANTOPRAZOLE SODIUM 80 MG: 40 INJECTION, POWDER, FOR SOLUTION INTRAVENOUS at 02:03

## 2023-03-26 RX ADMIN — PANTOPRAZOLE SODIUM 40 MG: 40 INJECTION, POWDER, FOR SOLUTION INTRAVENOUS at 09:03

## 2023-03-26 RX ADMIN — ATORVASTATIN CALCIUM 40 MG: 40 TABLET, FILM COATED ORAL at 09:03

## 2023-03-26 RX ADMIN — IOHEXOL 100 ML: 350 INJECTION, SOLUTION INTRAVENOUS at 04:03

## 2023-03-26 RX ADMIN — FLECAINIDE ACETATE 100 MG: 100 TABLET ORAL at 09:03
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
## 2023-03-26 NOTE — ASSESSMENT & PLAN NOTE
Acute blood loss anemia  Diverticulosis    Patient on eliquis for Afib and indomethacin for gout, with diverticulosis on recent colonoscopy (11/22) presents with melena and symptomatic anemia.     - Hgb 9 on admit, baseline ~13  - HDS, afebrile without leukocytosis  - protonix 80 mg IV followed by 40 mg IV BID  - GI consulted, plan for EGD in am  - CT pending to rule out diverticulitis  - hold home eliquis, indomethacin  - typed and screened, consented  - transfuse for Hgb < 7 or <8 if active bleeding or significant signs of anemia   - correct any coagulopathy with platelets and FFP for goal of platelets >50K and INR <2.0  - CLD, NPO midnight
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
## 2023-03-26 NOTE — ASSESSMENT & PLAN NOTE
- will hold home amlopinine, lisinopril HCTZ in setting of GIB  - continue home metoprolol  - add back as needed

## 2023-03-26 NOTE — HPI
Stevie Villalba is a 67-year-old male with a history of hyperlipidemia, hypertension, atrial fibrillation status post cardioversion on flecainide, metoprolol and Eliquis, gout on indomethacin, history of prostate cancer in remission presenting with abdominal pain in the left lower quadrant and dark black stools X 3 days. Patient reports three episodes of melena since onset associated with abdominal cramping, intermittent left abdominal pain, fatigue, GALLAGHER, and lightheadedness. He is on indomethacin and eliquis. He has chronic LE edema and urinary frequency. No fever, chills, CP,SOB, cough, N/V, hematuria, focal numbness, or weakness. Patient underwent colonoscopy 11/22 which showed diverticulosis and patient has been compliant with a high fiber diet since.     In ED, VSS. Hgb 9, baseline 13. Patient given protonix 80 mg IV X 1 and admitted to observation for GIB.
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
## 2023-03-26 NOTE — ED TRIAGE NOTES
"Pt reports having Melena since Friday 3/24/2023. Pt report stool being "coffee-ground" when wiping. Pt reports using using blood thinners and has hx of HTN. Pt currently complaining of abdominal pain of LLQ. Pt AAOx4. Pt placed on cardiac monitor and cont pulse ox.   "
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
## 2023-03-27 ENCOUNTER — ANESTHESIA EVENT (OUTPATIENT)
Dept: ENDOSCOPY | Facility: HOSPITAL | Age: 68
End: 2023-03-27
Payer: MEDICARE

## 2023-03-27 ENCOUNTER — ANESTHESIA (OUTPATIENT)
Dept: ENDOSCOPY | Facility: HOSPITAL | Age: 68
End: 2023-03-27
Payer: MEDICARE

## 2023-03-27 VITALS
HEART RATE: 62 BPM | WEIGHT: 256.38 LBS | OXYGEN SATURATION: 97 % | TEMPERATURE: 98 F | SYSTOLIC BLOOD PRESSURE: 142 MMHG | DIASTOLIC BLOOD PRESSURE: 67 MMHG | RESPIRATION RATE: 17 BRPM | BODY MASS INDEX: 36.7 KG/M2 | HEIGHT: 70 IN

## 2023-03-27 PROBLEM — K92.1 MELENA: Status: ACTIVE | Noted: 2023-03-27

## 2023-03-27 LAB
ANION GAP SERPL CALC-SCNC: 10 MMOL/L (ref 8–16)
BASOPHILS # BLD AUTO: 0.05 K/UL (ref 0–0.2)
BASOPHILS NFR BLD: 0.4 % (ref 0–1.9)
BUN SERPL-MCNC: 13 MG/DL (ref 8–23)
CALCIUM SERPL-MCNC: 9 MG/DL (ref 8.7–10.5)
CHLORIDE SERPL-SCNC: 108 MMOL/L (ref 95–110)
CO2 SERPL-SCNC: 25 MMOL/L (ref 23–29)
CREAT SERPL-MCNC: 0.9 MG/DL (ref 0.5–1.4)
DIFFERENTIAL METHOD: ABNORMAL
EOSINOPHIL # BLD AUTO: 0.1 K/UL (ref 0–0.5)
EOSINOPHIL NFR BLD: 0.9 % (ref 0–8)
ERYTHROCYTE [DISTWIDTH] IN BLOOD BY AUTOMATED COUNT: 12.3 % (ref 11.5–14.5)
EST. GFR  (NO RACE VARIABLE): >60 ML/MIN/1.73 M^2
GLUCOSE SERPL-MCNC: 100 MG/DL (ref 70–110)
HCT VFR BLD AUTO: 26.3 % (ref 40–54)
HGB BLD-MCNC: 8.9 G/DL (ref 14–18)
IMM GRANULOCYTES # BLD AUTO: 0.08 K/UL (ref 0–0.04)
IMM GRANULOCYTES NFR BLD AUTO: 0.7 % (ref 0–0.5)
LYMPHOCYTES # BLD AUTO: 2.1 K/UL (ref 1–4.8)
LYMPHOCYTES NFR BLD: 18.1 % (ref 18–48)
MCH RBC QN AUTO: 32.1 PG (ref 27–31)
MCHC RBC AUTO-ENTMCNC: 33.8 G/DL (ref 32–36)
MCV RBC AUTO: 95 FL (ref 82–98)
MONOCYTES # BLD AUTO: 0.7 K/UL (ref 0.3–1)
MONOCYTES NFR BLD: 6.4 % (ref 4–15)
NEUTROPHILS # BLD AUTO: 8.3 K/UL (ref 1.8–7.7)
NEUTROPHILS NFR BLD: 73.5 % (ref 38–73)
NRBC BLD-RTO: 0 /100 WBC
PLATELET # BLD AUTO: 212 K/UL (ref 150–450)
PMV BLD AUTO: 9.7 FL (ref 9.2–12.9)
POCT GLUCOSE: 109 MG/DL (ref 70–110)
POCT GLUCOSE: 115 MG/DL (ref 70–110)
POCT GLUCOSE: 93 MG/DL (ref 70–110)
POTASSIUM SERPL-SCNC: 3.5 MMOL/L (ref 3.5–5.1)
RBC # BLD AUTO: 2.77 M/UL (ref 4.6–6.2)
SODIUM SERPL-SCNC: 143 MMOL/L (ref 136–145)
WBC # BLD AUTO: 11.34 K/UL (ref 3.9–12.7)

## 2023-03-27 PROCEDURE — 63600175 PHARM REV CODE 636 W HCPCS: Mod: HCNC | Performed by: PHYSICIAN ASSISTANT

## 2023-03-27 PROCEDURE — 99239 HOSP IP/OBS DSCHRG MGMT >30: CPT | Mod: HCNC,,, | Performed by: FAMILY MEDICINE

## 2023-03-27 PROCEDURE — 25000003 PHARM REV CODE 250: Mod: HCNC | Performed by: PHYSICIAN ASSISTANT

## 2023-03-27 PROCEDURE — 37000008 HC ANESTHESIA 1ST 15 MINUTES: Mod: HCNC | Performed by: INTERNAL MEDICINE

## 2023-03-27 PROCEDURE — 27201012 HC FORCEPS, HOT/COLD, DISP: Mod: HCNC | Performed by: INTERNAL MEDICINE

## 2023-03-27 PROCEDURE — 36415 COLL VENOUS BLD VENIPUNCTURE: CPT | Mod: HCNC | Performed by: PHYSICIAN ASSISTANT

## 2023-03-27 PROCEDURE — 25000003 PHARM REV CODE 250: Mod: HCNC | Performed by: FAMILY MEDICINE

## 2023-03-27 PROCEDURE — 85025 COMPLETE CBC W/AUTO DIFF WBC: CPT | Mod: HCNC | Performed by: PHYSICIAN ASSISTANT

## 2023-03-27 PROCEDURE — 80048 BASIC METABOLIC PNL TOTAL CA: CPT | Mod: HCNC | Performed by: PHYSICIAN ASSISTANT

## 2023-03-27 PROCEDURE — 43239 EGD BIOPSY SINGLE/MULTIPLE: CPT | Mod: HCNC,,, | Performed by: INTERNAL MEDICINE

## 2023-03-27 PROCEDURE — 88342 IMHCHEM/IMCYTCHM 1ST ANTB: CPT | Mod: 26,HCNC,, | Performed by: STUDENT IN AN ORGANIZED HEALTH CARE EDUCATION/TRAINING PROGRAM

## 2023-03-27 PROCEDURE — G0378 HOSPITAL OBSERVATION PER HR: HCPCS | Mod: HCNC

## 2023-03-27 PROCEDURE — C9113 INJ PANTOPRAZOLE SODIUM, VIA: HCPCS | Mod: HCNC | Performed by: PHYSICIAN ASSISTANT

## 2023-03-27 PROCEDURE — D9220A PRA ANESTHESIA: ICD-10-PCS | Mod: HCNC,CRNA,, | Performed by: NURSE ANESTHETIST, CERTIFIED REGISTERED

## 2023-03-27 PROCEDURE — 88305 TISSUE EXAM BY PATHOLOGIST: CPT | Mod: HCNC | Performed by: STUDENT IN AN ORGANIZED HEALTH CARE EDUCATION/TRAINING PROGRAM

## 2023-03-27 PROCEDURE — D9220A PRA ANESTHESIA: Mod: HCNC,ANES,, | Performed by: ANESTHESIOLOGY

## 2023-03-27 PROCEDURE — 43239 PR EGD, FLEX, W/BIOPSY, SGL/MULTI: ICD-10-PCS | Mod: HCNC,,, | Performed by: INTERNAL MEDICINE

## 2023-03-27 PROCEDURE — 88305 TISSUE EXAM BY PATHOLOGIST: ICD-10-PCS | Mod: 26,HCNC,, | Performed by: STUDENT IN AN ORGANIZED HEALTH CARE EDUCATION/TRAINING PROGRAM

## 2023-03-27 PROCEDURE — D9220A PRA ANESTHESIA: Mod: HCNC,CRNA,, | Performed by: NURSE ANESTHETIST, CERTIFIED REGISTERED

## 2023-03-27 PROCEDURE — 88305 TISSUE EXAM BY PATHOLOGIST: CPT | Mod: 26,HCNC,, | Performed by: STUDENT IN AN ORGANIZED HEALTH CARE EDUCATION/TRAINING PROGRAM

## 2023-03-27 PROCEDURE — 88342 CHG IMMUNOCYTOCHEMISTRY: ICD-10-PCS | Mod: 26,HCNC,, | Performed by: STUDENT IN AN ORGANIZED HEALTH CARE EDUCATION/TRAINING PROGRAM

## 2023-03-27 PROCEDURE — 96376 TX/PRO/DX INJ SAME DRUG ADON: CPT

## 2023-03-27 PROCEDURE — 99239 PR HOSPITAL DISCHARGE DAY,>30 MIN: ICD-10-PCS | Mod: HCNC,,, | Performed by: FAMILY MEDICINE

## 2023-03-27 PROCEDURE — 37000009 HC ANESTHESIA EA ADD 15 MINS: Mod: HCNC | Performed by: INTERNAL MEDICINE

## 2023-03-27 PROCEDURE — 25000003 PHARM REV CODE 250: Mod: HCNC | Performed by: NURSE ANESTHETIST, CERTIFIED REGISTERED

## 2023-03-27 PROCEDURE — D9220A PRA ANESTHESIA: ICD-10-PCS | Mod: HCNC,ANES,, | Performed by: ANESTHESIOLOGY

## 2023-03-27 PROCEDURE — 43239 EGD BIOPSY SINGLE/MULTIPLE: CPT | Mod: HCNC | Performed by: INTERNAL MEDICINE

## 2023-03-27 PROCEDURE — 88342 IMHCHEM/IMCYTCHM 1ST ANTB: CPT | Mod: HCNC | Performed by: STUDENT IN AN ORGANIZED HEALTH CARE EDUCATION/TRAINING PROGRAM

## 2023-03-27 PROCEDURE — 63600175 PHARM REV CODE 636 W HCPCS: Mod: HCNC | Performed by: NURSE ANESTHETIST, CERTIFIED REGISTERED

## 2023-03-27 RX ORDER — PROPOFOL 10 MG/ML
VIAL (ML) INTRAVENOUS CONTINUOUS PRN
Status: DISCONTINUED | OUTPATIENT
Start: 2023-03-27 | End: 2023-03-27

## 2023-03-27 RX ORDER — PANTOPRAZOLE SODIUM 40 MG/1
40 TABLET, DELAYED RELEASE ORAL 2 TIMES DAILY
Qty: 60 TABLET | Refills: 1 | Status: SHIPPED | OUTPATIENT
Start: 2023-03-27 | End: 2023-09-25 | Stop reason: SDUPTHER

## 2023-03-27 RX ORDER — PROPOFOL 10 MG/ML
VIAL (ML) INTRAVENOUS
Status: DISCONTINUED | OUTPATIENT
Start: 2023-03-27 | End: 2023-03-27

## 2023-03-27 RX ORDER — SODIUM CHLORIDE 0.9 % (FLUSH) 0.9 %
3 SYRINGE (ML) INJECTION
Status: DISCONTINUED | OUTPATIENT
Start: 2023-03-27 | End: 2023-03-27 | Stop reason: HOSPADM

## 2023-03-27 RX ORDER — HYDROMORPHONE HYDROCHLORIDE 1 MG/ML
0.2 INJECTION, SOLUTION INTRAMUSCULAR; INTRAVENOUS; SUBCUTANEOUS EVERY 5 MIN PRN
Status: DISCONTINUED | OUTPATIENT
Start: 2023-03-27 | End: 2023-03-27 | Stop reason: HOSPADM

## 2023-03-27 RX ORDER — LIDOCAINE HYDROCHLORIDE 20 MG/ML
INJECTION INTRAVENOUS
Status: DISCONTINUED | OUTPATIENT
Start: 2023-03-27 | End: 2023-03-27

## 2023-03-27 RX ADMIN — PROPOFOL 150 MCG/KG/MIN: 10 INJECTION, EMULSION INTRAVENOUS at 01:03

## 2023-03-27 RX ADMIN — LIDOCAINE HYDROCHLORIDE 50 MG: 20 INJECTION INTRAVENOUS at 01:03

## 2023-03-27 RX ADMIN — PROPOFOL 70 MG: 10 INJECTION, EMULSION INTRAVENOUS at 01:03

## 2023-03-27 RX ADMIN — METOPROLOL SUCCINATE 12.5 MG: 25 TABLET, EXTENDED RELEASE ORAL at 03:03

## 2023-03-27 RX ADMIN — TAMSULOSIN HYDROCHLORIDE 0.4 MG: 0.4 CAPSULE ORAL at 03:03

## 2023-03-27 RX ADMIN — PANTOPRAZOLE SODIUM 40 MG: 40 INJECTION, POWDER, FOR SOLUTION INTRAVENOUS at 03:03

## 2023-03-27 RX ADMIN — FLECAINIDE ACETATE 100 MG: 100 TABLET ORAL at 03:03

## 2023-03-27 RX ADMIN — SODIUM CHLORIDE: 0.9 INJECTION, SOLUTION INTRAVENOUS at 01:03

## 2023-03-27 NOTE — HOSPITAL COURSE
"Patient admitted to the hospitalist Service with the assistance of Gastroenterology for probable upper GI bleed.  EGD 3/27 with normal esophagus, nonbleeding gastric ulcer with no stigmata of bleeding.  Biopsies were taken for H pylori testing.  Recommend 40 mg b.i.d. Protonix for 8 weeks, follow up H pylori pathology, okay to resume diet and Eliquis, and follow-up with Gastroenterology in approximately 8 weeks for repeat EGD.  Prescription for 8 weeks of Protonix provided.  Discussed NSAIDs at length with patient and he will hold indomethacin for now.  No active gout.  Patient says he will discuss allopurinol with primary care physician.  CT abdomen pelvis with contrast ordered given history of diverticulosis which revealed [place] mesentery with small with small reactive appearing lymph nodes, overall nonspecific and can be seen with numerous etiologies such as mesenteric panniculitis versus recent or remote infectious or inflammatory process including enteritis.  Omental infarct or sequela of underlying malignancy including lymphoma, less likely but not entirely excluded.  Follow-up CT should be considered to exclude lymphoproliferative process such as lymphoma." No evidence clinically of enteritis, panniculitis, infarct as patient does not have any significant abdominal discomfort, nausea, vomiting, diarrhea, fevers.  CT also reveals fat containing ventral abdominal hernia which is known to the patient.  Additional incidental findings included punctate right renal calculus which is asymptomatic and diverticulosis.  Discussed findings with patient.  Patient has follow-up with outpatient oncologist soon.  Patient says he will discuss repeat CT imaging with them.  Patient is eager to be discharged in medically stable to do so.  Discussed with Gastroenterology and outpatient follow-up is being arranged.  Patient follow-up with PCP in 1-2 weeks and oncologist at next available appointment.  Repeat CBC ordered " 04/03/2023 for continued monitoring of hemoglobin.    The patient's chronic medical conditions were managed appropriately. Discharge plan of care was discussed at length with patient including patient's need for close outpatient follow-up. Medication counseling also took place with the patient being educated on potential side effects/adverse events of medications. Patient also counseled about avoiding driving, operating machinery, or participating in any activities that may pose harm to self or others if they are taking medications that cause drowsiness or altered mental status. Patient also counseled to take medicines as prescribed and do not drink alcohol, use tobacco products, or use illicit substances. Patient counseled to return to the hospital or seek medical care if baseline status should suddenly change, return of symptoms occurs, or for any new or concerning symptoms that arise. Patient was in agreement with plan of care going forward and was then discharged.

## 2023-03-27 NOTE — PROGRESS NOTES
[facility] - [facility] ([facility])  [facility] Medicine  Progress Note    Patient Name: Stevie Villalba  MRN: [id number]  Patient Class: OP- Observation   Admission Date: 3/26/2023  Length of Stay: 0 days  Attending Physician: Sarmad Reis MD  Primary Care Provider: Ric Brambila MD        Subjective:     Principal Problem:GIB (gastrointestinal bleeding)        HPI:  Stevie Villalba is a 67-year-old male with a history of hyperlipidemia, hypertension, atrial fibrillation status post cardioversion on flecainide, metoprolol and Eliquis, gout on indomethacin, history of prostate cancer in remission presenting with abdominal pain in the left lower quadrant and dark black stools X 3 days. Patient reports three episodes of melena since onset associated with abdominal cramping, intermittent left abdominal pain, fatigue, GALLAGHER, and lightheadedness. He is on indomethacin and eliquis. He has chronic LE edema and urinary frequency. No fever, chills, CP,SOB, cough, N/V, hematuria, focal numbness, or weakness. Patient underwent colonoscopy 11/22 which showed diverticulosis and patient has been compliant with a high fiber diet since.     In ED, VSS. Hgb 9, baseline 13. Patient given protonix 80 mg IV X 1 and admitted to observation for GIB.      Overview/Hospital Course:  Patient admitted to the hospitalist Service with the assistance of Gastroenterology for probable upper GI bleed.  EGD 3/27 pending.  Discussed NSAIDs at length with patient and he will hold indomethacin for now.  No active gout.  Patient says he will discuss allopurinol with primary care physician.  CT abdomen pelvis with contrast ordered given history of diverticulosis which revealed [place] mesentery with small with small reactive appearing lymph nodes, overall nonspecific and can be seen with numerous etiologies such as mesenteric panniculitis versus recent or remote infectious or inflammatory process including enteritis.  Omental infarct or sequela of underlying  "malignancy including lymphoma, less likely but not entirely excluded.  Follow-up CT should be considered to exclude lymphoproliferative process such as lymphoma." No evidence clinically of enteritis as patient does not have any significant abdominal discomfort, nausea, vomiting, diarrhea, fevers.  CT also reveals fat containing ventral abdominal hernia which is known to the patient.  In addition to punctate right renal calculus which is asymptomatic and diverticulosis.  Discussed findings with patient.  Patient has follow-up with outpatient oncologist soon.  Patient says he will discuss repeat CT imaging with them.      Interval History:  Patient reports some mild abdominal discomfort in the epigastric area this morning.  Denies any nausea or vomiting.  Patient has not had a bowel movement since admission.  Patient has been up to urinate and denies any dizziness.  Patient tells me that he has been taking indomethacin for some time for gout.  Discussed association of NSAIDs and GI bleeds and possible alternatives for gout prevention.  Patient says that this time he does not have a gout flare and would like to discontinue the indomethacin and discuss with primary care physician regarding starting medications such as allopurinol.    Objective:     Vital Signs (Most Recent):  Temp: 98.4 °F (36.9 °C) (03/27/23 1400)  Pulse: (!) 58 (03/27/23 1400)  Resp: 10 (03/27/23 1400)  BP: (!) 104/55 (03/27/23 1400)  SpO2: 100 % (03/27/23 1400)   Vital Signs (24h Range):  Temp:  [97.9 °F (36.6 °C)-98.4 °F (36.9 °C)] 98.4 °F (36.9 °C)  Pulse:  [53-90] 58  Resp:  [10-19] 10  SpO2:  [92 %-100 %] 100 %  BP: (104-152)/(55-95) 104/55     Weight: 116.3 kg (256 lb 6.3 oz)  Body mass index is 36.79 kg/m².    Intake/Output Summary (Last 24 hours) at 3/27/2023 1408  Last data filed at 3/27/2023 1353  Gross per 24 hour   Intake 600 ml   Output 550 ml   Net 50 ml      Physical Exam  Vitals and nursing note reviewed.   Constitutional:       " Appearance: He is well-developed.      Comments: Elevated BMI, 36.79   Cardiovascular:      Rate and Rhythm: Normal rate and regular rhythm.   Pulmonary:      Effort: Pulmonary effort is normal.      Breath sounds: Normal breath sounds.   Abdominal:      Palpations: Abdomen is soft.      Tenderness: There is no abdominal tenderness.      Comments: Protuberant   Musculoskeletal:         General: No tenderness.   Lymphadenopathy:      Head:      Right side of head: No submental or submandibular adenopathy.      Left side of head: No submental or submandibular adenopathy.      Cervical: No cervical adenopathy.      Upper Body:      Right upper body: No supraclavicular or axillary adenopathy.      Left upper body: No axillary adenopathy.   Skin:     General: Skin is warm and dry.   Neurological:      General: No focal deficit present.      Mental Status: He is alert.   Psychiatric:         Behavior: Behavior normal.       Significant Labs: All pertinent labs within the past 24 hours have been reviewed.  CBC:   Recent Labs   Lab 03/26/23  1326 03/26/23  1351 03/26/23  1748 03/26/23  2326 03/27/23  0522   WBC 12.17  --  11.98  --  11.34   HGB 9.0*  --  8.4*  8.4* 9.0* 8.9*   HCT 25.8*   < > 24.3*  24.5* 26.3* 26.3*     --  196  --  212    < > = values in this interval not displayed.     CMP:   Recent Labs   Lab 03/26/23  1326 03/27/23  0522    143   K 3.6 3.5    108   CO2 22* 25   * 100   BUN 19 13   CREATININE 1.0 0.9   CALCIUM 9.1 9.0   PROT 6.5  --    ALBUMIN 3.8  --    BILITOT 0.8  --    ALKPHOS 86  --    AST 23  --    ALT 24  --    ANIONGAP 12 10       Significant Imaging: I have reviewed all pertinent imaging results/findings within the past 24 hours.      Assessment/Plan:      * GIB (gastrointestinal bleeding)  Acute blood loss anemia  Diverticulosis, no evidence of diverticulitis    Patient on eliquis for Afib and indomethacin for gout, with diverticulosis on recent colonoscopy (11/22)  presents with melena and symptomatic anemia.     - Hgb 9 on admit, baseline ~13, stable  -  afebrile without leukocytosis, no evidence of indirect  - protonix 80 mg IV followed by 40 mg IV BID  - GI consulted, EGD 3/27 pending  - CT abdomen pelvis results reviewed, see hospital course above.  - hold home eliquis, indomethacin  - typed and screened, consented  - transfuse for Hgb < 7 or <8 if active bleeding or significant signs of anemia   - correct any coagulopathy with platelets and FFP for goal of platelets >50K and INR <2.0      Paroxysmal atrial fibrillation  Patient with Paroxysmal (<7 days) atrial fibrillation which is controlled currently with Beta Blocker and flecainide. Patient is currently in sinus rhythm.[id number] Score: 1.  Anticoagulation not indicated due to active GIB.  - hold eliquis in setting of GIB    Gout  - hold home indomethacin   - no acute attacks  - discussed allopurinol with patient for gout prevention.  Patient said that he would prefer to hold the indomethacin for now and discuss further treatment with primary care physician.    Severe obstructive sleep apnea  - cpap hs    Class 2 obesity with body mass index (BMI) of 36.0 to 36.9 in adult  Body mass index is 36.79 kg/m². Morbid obesity complicates all aspects of disease management from diagnostic modalities to treatment. Weight loss encouraged and health benefits explained to patient.         Hypertension  - will hold home amlopinine, lisinopril HCTZ in setting of GIB, blood pressure currently acceptable  - multiple readings of bradycardia in the 50s, decreased dose of metoprolol for now, added parameters.  - will introduce home medications as needed.    Hypertriglyceridemia  - continue home statin    VTE Risk Mitigation (From admission, onward)         Ordered     IP VTE HIGH RISK PATIENT  Once         03/26/23 1618     Place sequential compression device  Until discontinued         03/26/23 1618     Place sequential compression  device  Until discontinued         03/26/23 1540                Discharge Planning   JUANY: 3/28/2023     Code Status: Full Code   Is the patient medically ready for discharge?:     Reason for patient still in hospital (select all that apply): Patient trending condition, Treatment and Consult recommendations                     Chavez Vernon III, MD  Department of Hospital Medicine   [facility] - Surgery ([facility])

## 2023-03-27 NOTE — PLAN OF CARE
Problem: Adult Inpatient Plan of Care  Goal: Plan of Care Review  Outcome: Ongoing, Progressing  Goal: Patient-Specific Goal (Individualized)  Outcome: Ongoing, Progressing  Goal: Absence of Hospital-Acquired Illness or Injury  Outcome: Ongoing, Progressing  Goal: Optimal Comfort and Wellbeing  Outcome: Ongoing, Progressing  Goal: Readiness for Transition of Care  Outcome: Ongoing, Progressing     Problem: Adjustment to Illness (Gastrointestinal Bleeding)  Goal: Optimal Coping with Acute Illness  Outcome: Ongoing, Progressing     Problem: Bleeding (Gastrointestinal Bleeding)  Goal: Hemostasis  Outcome: Ongoing, Progressing     Problem: Fall Injury Risk  Goal: Absence of Fall and Fall-Related Injury  Outcome: Ongoing, Progressing     Problem: Fluid Volume Deficit  Goal: Fluid Balance  Outcome: Ongoing, Progressing     Problem: Pain Acute  Goal: Acceptable Pain Control and Functional Ability  Outcome: Ongoing, Progressing     Problem: Hyperglycemia  Goal: Blood Glucose Level Within Targeted Range  Outcome: Ongoing, Progressing

## 2023-03-27 NOTE — TREATMENT PLAN
Post-Procedure Gastroenterology Treatment Plan:     Stevie Villalba is status post EGD with the following findings:     - Normal esophagus.   - Non-bleeding gastric ulcer with no stigmata of bleeding. Biopsies were taken with a cold forceps for Helicobacter pylori testing.   - Normal examined duodenum.     Our recommendations are as follows:     -Etiology of melena likely from gastric ulcer.   -Okay to resume diet and Apixaban.   -Protonix 40 mg BID for 8 weeks.   -Follow-up H.pylori pathology results.   -Avoid NSAID products.     No further endoscopic interventions planned. We are signing off. Please call with questions.       Gagandeep Del Valle MD, PGY-V  Gastroenterology Fellow  [facility]

## 2023-03-27 NOTE — TRANSFER OF CARE
"Anesthesia Transfer of Care Note    Patient: Stevie Villalba    Procedure(s) Performed: Procedure(s) (LRB):  EGD (ESOPHAGOGASTRODUODENOSCOPY) (N/A)    Patient location: PACU    Anesthesia Type: general    Transport from OR: Transported from OR on 2-3 L/min O2 by NC with adequate spontaneous ventilation    Post pain: adequate analgesia    Post assessment: no apparent anesthetic complications    Post vital signs: stable    Level of consciousness: awake and alert    Nausea/Vomiting: no nausea/vomiting    Complications: none    Transfer of care protocol was followed      Last vitals:   Visit Vitals  BP (!) 104/55 (BP Location: Right arm, Patient Position: Lying)   Pulse (!) 58   Temp 36.9 °C (98.4 °F) (Temporal)   Resp 10   Ht 5' 10" (1.778 m)   Wt 116.3 kg (256 lb 6.3 oz)   SpO2 100%   BMI 36.79 kg/m²     "

## 2023-03-27 NOTE — ANESTHESIA PREPROCEDURE EVALUATION
03/27/2023  Stevie Villalba is a 67 y.o., male.      Pre-op Assessment    I have reviewed the Patient Summary Reports.          Review of Systems  Anesthesia Hx:  No problems with previous Anesthesia    Social:  Non-Smoker    Hematology/Oncology:  Hematology Normal   Oncology Normal     EENT/Dental:EENT/Dental Normal   Cardiovascular:   Hypertension Dysrhythmias atrial fibrillation    Pulmonary:   Sleep Apnea    Renal/:  Renal/ Normal     Hepatic/GI:  Hepatic/GI Normal    Musculoskeletal:  Musculoskeletal Normal    Neurological:  Neurology Normal    Endocrine:  Endocrine Normal    Dermatological:  Skin Normal    Psych:  Psychiatric Normal           Physical Exam  General: Alert and Oriented    Airway:  Mallampati: II / II  Mouth Opening: Normal  TM Distance: Normal  Tongue: Normal  Neck ROM: Normal ROM    Dental:  Intact    Chest/Lungs:  Clear to auscultation, Normal Respiratory Rate    Heart:  Rate: Normal  Rhythm: Regular Rhythm  Sounds: Normal        Anesthesia Plan  Type of Anesthesia, risks & benefits discussed:    Anesthesia Type: Gen ETT, MAC  Intra-op Monitoring Plan: Standard ASA Monitors  Post Op Pain Control Plan: multimodal analgesia  Airway Plan: Direct  Informed Consent: Informed consent signed with the Patient and all parties understand the risks and agree with anesthesia plan.  All questions answered.   ASA Score: 3    Ready For Surgery From Anesthesia Perspective.     .

## 2023-03-27 NOTE — ASSESSMENT & PLAN NOTE
- will hold home amlopinine, lisinopril HCTZ in setting of GIB, blood pressure currently acceptable  - multiple readings of bradycardia in the 50s, decreased dose of metoprolol for now, added parameters.  - will introduce home medications as needed.

## 2023-03-27 NOTE — DISCHARGE SUMMARY
[facility] - Int[facility] ([place] [address])  [facility]  Discharge Summary      Patient Name: Stevie Villalba  MRN: [id number]  AFIA: [id number]  Patient Class: OP- Observation  Admission Date: 3/26/2023  Hospital Length of Stay: 0 days  Discharge Date and Time:  03/27/2023 6:34 PM  Attending Physician: Chavez Vernon III,*   Discharging Provider: Chavez Vernon III, MD  Primary Care Provider: Ric Brambila MD  [facility] Medicine Team: [facility] MED D Chavez Vernon III, MD  Primary Care Team: [facility] D    HPI:   Stevie Villalba is a 67-year-old male with a history of hyperlipidemia, hypertension, atrial fibrillation status post cardioversion on flecainide, metoprolol and Eliquis, gout on indomethacin, history of prostate cancer in remission presenting with abdominal pain in the left lower quadrant and dark black stools X 3 days. Patient reports three episodes of melena since onset associated with abdominal cramping, intermittent left abdominal pain, fatigue, GALLAGHER, and lightheadedness. He is on indomethacin and eliquis. He has chronic LE edema and urinary frequency. No fever, chills, CP,SOB, cough, N/V, hematuria, focal numbness, or weakness. Patient underwent colonoscopy 11/22 which showed diverticulosis and patient has been compliant with a high fiber diet since.     In ED, VSS. Hgb 9, baseline 13. Patient given protonix 80 mg IV X 1 and admitted to observation for GIB.      Procedure(s) (LRB):  EGD (ESOPHAGOGASTRODUODENOSCOPY) (N/A)      Hospital Course:   Patient admitted to the hospitalist Service with the assistance of Gastroenterology for probable upper GI bleed.  EGD 3/27 with normal esophagus, nonbleeding gastric ulcer with no stigmata of bleeding.  Biopsies were taken for H pylori testing.  Recommend 40 mg b.i.d. Protonix for 8 weeks, follow up H pylori pathology, okay to resume diet and Eliquis, and follow-up with Gastroenterology in approximately 8 weeks for repeat EGD.  Prescription for 8  "weeks of Protonix provided.  Discussed NSAIDs at length with patient and he will hold indomethacin for now.  No active gout.  Patient says he will discuss allopurinol with primary care physician.  CT abdomen pelvis with contrast ordered given history of diverticulosis which revealed [place] mesentery with small with small reactive appearing lymph nodes, overall nonspecific and can be seen with numerous etiologies such as mesenteric panniculitis versus recent or remote infectious or inflammatory process including enteritis.  Omental infarct or sequela of underlying malignancy including lymphoma, less likely but not entirely excluded.  Follow-up CT should be considered to exclude lymphoproliferative process such as lymphoma." No evidence clinically of enteritis, panniculitis, infarct as patient does not have any significant abdominal discomfort, nausea, vomiting, diarrhea, fevers.  CT also reveals fat containing ventral abdominal hernia which is known to the patient.  Additional incidental findings included punctate right renal calculus which is asymptomatic and diverticulosis.  Discussed findings with patient.  Patient has follow-up with outpatient oncologist soon.  Patient says he will discuss repeat CT imaging with them.  Patient is eager to be discharged in medically stable to do so.  Hemoglobin has remained stable and symptomatic anemia has resolved.  Discussed with Gastroenterology and outpatient follow-up is being arranged.  Patient should follow-up with PCP in 1-2 weeks and oncologist at next available appointment.  Repeat CBC ordered 04/03/2023 for continued monitoring of hemoglobin.    The patient's chronic medical conditions were managed appropriately. Discharge plan of care was discussed at length with patient including patient's need for close outpatient follow-up. Medication counseling also took place with the patient being educated on potential side effects/adverse events of medications. Patient also " counseled about avoiding driving, operating machinery, or participating in any activities that may pose harm to self or others if they are taking medications that cause drowsiness or altered mental status. Patient also counseled to take medicines as prescribed and do not drink alcohol, use tobacco products, or use illicit substances. Patient counseled to return to the hospital or seek medical care if baseline status should suddenly change, return of symptoms occurs, or for any new or concerning symptoms that arise. Patient was in agreement with plan of care going forward and was then discharged.        Goals of Care Treatment Preferences:  Code Status: Full Code      Consults:   Consults (From admission, onward)        Status Ordering Provider     Inpatient consult to Gastroenterology  Once        Provider:  (Not yet assigned)    Completed FLAQUITO BATRES          No new Assessment & Plan notes have been filed under this hospital service since the last note was generated.  Service: Hospital Medicine    Final Active Diagnoses:    Diagnosis Date Noted POA    PRINCIPAL PROBLEM:  GIB (gastrointestinal bleeding) [K92.2] 11/01/2022 Yes    Paroxysmal atrial fibrillation [I48.0] 08/11/2022 Yes     Chronic    Gout [M10.9] 03/26/2023 Yes    Acute blood loss anemia [D62] 03/26/2023 Yes    Diverticulosis [K57.90] 03/26/2023 Yes    Severe obstructive sleep apnea [G47.33] 09/27/2022 Yes    Class 2 obesity with body mass index (BMI) of 36.0 to 36.9 in adult [E66.9, Z68.36] 08/11/2022 Not Applicable    Hypertension [I10] 09/08/2014 Yes     Chronic      Problems Resolved During this Admission:       Discharged Condition: stable    Disposition: Home or Self Care    Follow Up:   Follow-up Information     Ric Brambila MD Follow up in 2 week(s).    Specialty: Internal Medicine  Why: 1-2 weeks  Contact information:  [address] [facility] [address]  [phone]                       Patient Instructions:      MANJINDER  Without Differential   Standing Status: Future Standing Exp. Date: 05/25/24     Diet Cardiac     Activity as tolerated       Significant Diagnostic Studies: Labs:   BMP:   Recent Labs   Lab 03/26/23  1326 03/27/23  0522   * 100    143   K 3.6 3.5    108   CO2 22* 25   BUN 19 13   CREATININE 1.0 0.9   CALCIUM 9.1 9.0    and CBC   Recent Labs   Lab 03/26/23  1326 03/26/23  1351 03/26/23  1748 03/26/23  2326 03/27/23  0522   WBC 12.17  --  11.98  --  11.34   HGB 9.0*  --  8.4*  8.4* 9.0* 8.9*   HCT 25.8*   < > 24.3*  24.5* 26.3* 26.3*     --  196  --  212    < > = values in this interval not displayed.       Pending Diagnostic Studies:     Procedure Component Value Units Date/Time    Specimen to Pathology, Surgery Gastrointestinal tract [[id number]] Collected: 03/27/23 1353    Order Status: Sent Lab Status: In process Updated: 03/27/23 1728    Specimen: Tissue          Medications:  Reconciled Home Medications:      Medication List      START taking these medications    pantoprazole 40 MG tablet  Commonly known as: PROTONIX  Take 1 tablet (40 mg total) by mouth 2 (two) times daily.        CONTINUE taking these medications    amLODIPine 10 MG tablet  Commonly known as: NORVASC  TAKE 1 TABLET BY MOUTH EVERY DAY     apixaban 5 mg Tab  Commonly known as: ELIQUIS  Take 5 mg by mouth 2 (two) times daily.     atorvastatin 40 MG tablet  Commonly known as: LIPITOR  TAKE 1 TABLET BY MOUTH EVERY DAY IN THE EVENING     carisoprodoL 350 MG tablet  Commonly known as: SOMA  Take 350 mg by mouth 4 (four) times daily as needed for Muscle spasms.     flecainide 100 MG Tab  Commonly known as: TAMBOCOR  Take 1 tablet (100 mg total) by mouth every 12 (twelve) hours.     GAVILAX 17 gram/dose powder  Generic drug: polyethylene glycol  Measure 17g with cap and mix with liquid. Then take by mouth 2 (two) times daily.     hydroCHLOROthiazide 25 MG tablet  Commonly known as: HYDRODIURIL  TAKE 1 TABLET BY MOUTH EVERY  "DAY     HYDROcodone-acetaminophen  mg per tablet  Commonly known as: NORCO  TK 1 T PO  Q 6 TO 8 PRN P     hydrocortisone 2.5 % rectal cream  Commonly known as: ANUSOL-HC  Place rectally 2 (two) times daily.     insulin syringe-needle U-100 0.5 mL 31 gauge x 5/16" Syrg  Use along with ICI therapy.     losartan 100 MG tablet  Commonly known as: COZAAR  TAKE 1 TABLET BY MOUTH EVERY DAY     metoprolol succinate 25 MG 24 hr tablet  Commonly known as: TOPROL-XL  Take 1 tablet (25 mg total) by mouth once daily.     sildenafiL 100 MG tablet  Commonly known as: VIAGRA  Take 1 tablet (100 mg total) by mouth daily as needed for Erectile Dysfunction.     tamsulosin 0.4 mg Cap  Commonly known as: FLOMAX  Take 1 capsule (0.4 mg total) by mouth once daily.        STOP taking these medications    indomethacin 50 MG capsule  Commonly known as: INDOCIN     ORTHOVISC 30 mg/2 mL  Generic drug: sodium hyaluronate (orthovisc)     PNEUMOVAX-23 25 mcg/0.5 mL vaccine  Generic drug: pneumococcal 23-anat ps            Indwelling Lines/Drains at time of discharge:   Lines/Drains/Airways     None                 Time spent on the discharge of patient: 55 minutes         Chavez Vernon III, MD  Department of Hospital Medicine  [facility] - Intensive Care ([place] [address])  "

## 2023-03-27 NOTE — NURSING TRANSFER
Nursing Transfer Note      3/27/2023     Reason patient is being transferred: Out of PACU    Transfer To: [id number]    Transfer via stretcher    Transfer with N/A    Transported by Transport, Leticia    Medicines sent: N/A    Any special needs or follow-up needed: nO    Chart send with patient: Yes    Notified: daughterSUSAN    Patient reassessed at: 2656

## 2023-03-27 NOTE — PROVATION PATIENT INSTRUCTIONS
Discharge Summary/Instructions after an Endoscopic Procedure  Patient Name: Stevie Villalba  Patient MRN: [id number]  Patient YOB: 1955 Monday, March 27, 2023  Diaz Knapp MD  Dear patient,  As a result of recent federal legislation (The Federal Cures Act), you may   receive lab or pathology results from your procedure in your [facility]   account before your physician is able to contact you. Your physician or   their representative will relay the results to you with their   recommendations at their soonest availability.  Thank you,  RESTRICTIONS:  During your procedure today, you received medications for sedation.  These   medications may affect your judgment, balance and coordination.  Therefore,   for 24 hours, you have the following restrictions:   - DO NOT drive a car, operate machinery, make legal/financial decisions,   sign important papers or drink alcohol.    ACTIVITY:  Today: no heavy lifting, straining or running due to procedural   sedation/anesthesia.  The following day: return to full activity including work.  DIET:  Eat and drink normally unless instructed otherwise.     TREATMENT FOR COMMON SIDE EFFECTS:  - Mild abdominal pain, nausea, belching, bloating or excessive gas:  rest,   eat lightly and use a heating pad.  - Sore Throat: treat with throat lozenges and/or gargle with warm salt   water.  - Because air was used during the procedure, expelling large amounts of air   from your rectum or belching is normal.  - If a bowel prep was taken, you may not have a bowel movement for 1-3 days.    This is normal.  SYMPTOMS TO WATCH FOR AND REPORT TO YOUR PHYSICIAN:  1. Abdominal pain or bloating, other than gas cramps.  2. Chest pain.  3. Back pain.  4. Signs of infection such as: chills or fever occurring within 24 hours   after the procedure.  5. Rectal bleeding, which would show as bright red, maroon, or black stools.   (A tablespoon of blood from the rectum is not serious, especially if    hemorrhoids are present.)  6. Vomiting.  7. Weakness or dizziness.  GO DIRECTLY TO THE NEAREST EMERGENCY ROOM IF YOU HAVE ANY OF THE FOLLOWING:      Difficulty breathing              Chills and/or fever over 101 F   Persistent vomiting and/or vomiting blood   Severe abdominal pain   Severe chest pain   Black, tarry stools   Bleeding- more than one tablespoon   Any other symptom or condition that you feel may need urgent attention  Your doctor recommends these additional instructions:  If any biopsies were taken, your doctors clinic will contact you in 1 to 2   weeks with any results.  - Return patient to hospital [facility] for ongoing care.   - Resume previous diet.   - No aspirin, ibuprofen, naproxen, or other non-steroidal anti-inflammatory   drugs.   - Use a proton pump inhibitor PO BID for 8 weeks.   - Await pathology results.   - Return to referring physician.  For questions, problems or results please call your physician - Diaz Knapp MD at Work:  [phone].  [facility], EMERGENCY ROOM PHONE NUMBER: [phone]  IF A COMPLICATION OR EMERGENCY SITUATION ARISES AND YOU ARE UNABLE TO REACH   YOUR PHYSICIAN - GO DIRECTLY TO THE EMERGENCY ROOM.  Diaz Knapp MD  3/27/2023 2:02:07 PM  This report has been verified and signed electronically.  Dear patient,  As a result of recent federal legislation (The Federal Cures Act), you may   receive lab or pathology results from your procedure in your [facility]   account before your physician is able to contact you. Your physician or   their representative will relay the results to you with their   recommendations at their soonest availability.  Thank you,  PROVATION

## 2023-03-27 NOTE — ASSESSMENT & PLAN NOTE
- hold home indomethacin   - no acute attacks  - discussed allopurinol with patient for gout prevention.  Patient said that he would prefer to hold the indomethacin for now and discuss further treatment with primary care physician.

## 2023-03-27 NOTE — PLAN OF CARE
Problem: Adult Inpatient Plan of Care  Goal: Plan of Care Review  Outcome: Ongoing, Progressing     Problem: Adult Inpatient Plan of Care  Goal: Patient-Specific Goal (Individualized)  Outcome: Ongoing, Progressing     Problem: Adjustment to Illness (Gastrointestinal Bleeding)  Goal: Optimal Coping with Acute Illness  Outcome: Ongoing, Progressing     Problem: Bleeding (Gastrointestinal Bleeding)  Goal: Hemostasis  Outcome: Ongoing, Progressing     Problem: Fluid Volume Deficit  Goal: Fluid Balance  Outcome: Ongoing, Progressing     Problem: Pain Acute  Goal: Acceptable Pain Control and Functional Ability  Outcome: Ongoing, Progressing     Problem: Hyperglycemia  Goal: Blood Glucose Level Within Targeted Range  Outcome: Ongoing, Progressing

## 2023-03-27 NOTE — SUBJECTIVE & OBJECTIVE
Interval History:  Patient reports some mild abdominal discomfort in the epigastric area this morning.  Denies any nausea or vomiting.  Patient has not had a bowel movement since admission.  Patient has been up to urinate and denies any dizziness.  Patient tells me that he has been taking indomethacin for some time for gout.  Discussed association of NSAIDs and GI bleeds and possible alternatives for gout prevention.  Patient says that this time he does not have a gout flare and would like to discontinue the indomethacin and discuss with primary care physician regarding starting medications such as allopurinol.    Objective:     Vital Signs (Most Recent):  Temp: 98.4 °F (36.9 °C) (03/27/23 1400)  Pulse: (!) 58 (03/27/23 1400)  Resp: 10 (03/27/23 1400)  BP: (!) 104/55 (03/27/23 1400)  SpO2: 100 % (03/27/23 1400)   Vital Signs (24h Range):  Temp:  [97.9 °F (36.6 °C)-98.4 °F (36.9 °C)] 98.4 °F (36.9 °C)  Pulse:  [53-90] 58  Resp:  [10-19] 10  SpO2:  [92 %-100 %] 100 %  BP: (104-152)/(55-95) 104/55     Weight: 116.3 kg (256 lb 6.3 oz)  Body mass index is 36.79 kg/m².    Intake/Output Summary (Last 24 hours) at 3/27/2023 1408  Last data filed at 3/27/2023 1353  Gross per 24 hour   Intake 600 ml   Output 550 ml   Net 50 ml      Physical Exam  Vitals and nursing note reviewed.   Constitutional:       Appearance: He is well-developed.      Comments: Elevated BMI, 36.79   Cardiovascular:      Rate and Rhythm: Normal rate and regular rhythm.   Pulmonary:      Effort: Pulmonary effort is normal.      Breath sounds: Normal breath sounds.   Abdominal:      Palpations: Abdomen is soft.      Tenderness: There is no abdominal tenderness.      Comments: Protuberant   Musculoskeletal:         General: No tenderness.   Lymphadenopathy:      Head:      Right side of head: No submental or submandibular adenopathy.      Left side of head: No submental or submandibular adenopathy.      Cervical: No cervical adenopathy.      Upper Body:       Right upper body: No supraclavicular or axillary adenopathy.      Left upper body: No axillary adenopathy.   Skin:     General: Skin is warm and dry.   Neurological:      General: No focal deficit present.      Mental Status: He is alert.   Psychiatric:         Behavior: Behavior normal.       Significant Labs: All pertinent labs within the past 24 hours have been reviewed.  CBC:   Recent Labs   Lab 03/26/23  1326 03/26/23  1351 03/26/23  1748 03/26/23  2326 03/27/23  0522   WBC 12.17  --  11.98  --  11.34   HGB 9.0*  --  8.4*  8.4* 9.0* 8.9*   HCT 25.8*   < > 24.3*  24.5* 26.3* 26.3*     --  196  --  212    < > = values in this interval not displayed.     CMP:   Recent Labs   Lab 03/26/23  1326 03/27/23  0522    143   K 3.6 3.5    108   CO2 22* 25   * 100   BUN 19 13   CREATININE 1.0 0.9   CALCIUM 9.1 9.0   PROT 6.5  --    ALBUMIN 3.8  --    BILITOT 0.8  --    ALKPHOS 86  --    AST 23  --    ALT 24  --    ANIONGAP 12 10       Significant Imaging: I have reviewed all pertinent imaging results/findings within the past 24 hours.

## 2023-03-27 NOTE — DISCHARGE INSTRUCTIONS
Seek medical attention for return of black stools, bright red stools or other developing symptoms such as chest pain, shortness of breath, palpitations, worsening fatigue, dizziness or lightheadedness, abdominal pain, nausea/vomiting, fevers/chills.     Avoid tobacco products, alcohol, illicit substances.    As discussed, discontinue indomethacin.  This medication has been associated with GI bleeds.  Additionally, other NSAIDs can cause bleeding as well.  Some examples of other NSAIDs are naproxen, Goody Powder, Advil, Motrin, ibuprofen.  Tylenol is okay to take for pain.  It does not work in the same manner as the NSAIDs.    Discussed with gastroenterology, you are cleared to restart Eliquis at the moment.  However, if he do notice bright red blood or black stools, discontinue this medication and seek medical attention.    As discussed, follow-up with PCP regarding gout preventative medications.  Allopurinol is a medication that is commonly prescribed.    As discussed, there were enlarged lymph nodes seen on CT scan.  Doubt this represents infection as you do not have any symptoms such as diarrhea, nausea, vomiting.  These types of findings are nonspecific and could be associated with inflammatory processes or underlying lymphoma but that diagnosis is less likely.  Follow-up with your oncologist for repeat imaging.    Follow-up with PCP within 1-2 weeks. It is important to note that each of these medications that I have prescribed typically will not have refills.  You will need 8 weeks of Protonix so I did give 1 refill for that medication.  This is so your primary care physician or other specialists in the outpatient setting can review your progress and determine if these medications are to be continued. If they determine the medications need to be continued, make sure that your remind them about refills at your follow-up appointments.     Your PCP should also have the results of the biopsy that was performed on  the ulcer in your stomach.  This biopsy was to rule out an infection called H.pylori.    Follow-up with Gastroenterology in 8 weeks, you will need repeat EGD.    Repeat labs have been ordered for Monday 04/03/2023 to make sure blood levels are not dropping.

## 2023-03-27 NOTE — ASSESSMENT & PLAN NOTE
Patient with Paroxysmal (<7 days) atrial fibrillation which is controlled currently with Beta Blocker and flecainide. Patient is currently in sinus rhythm.[id number] Score: 1.  Anticoagulation not indicated due to active GIB.  - hold eliquis in setting of GIB

## 2023-03-27 NOTE — ASSESSMENT & PLAN NOTE
Body mass index is 36.79 kg/m². Morbid obesity complicates all aspects of disease management from diagnostic modalities to treatment. Weight loss encouraged and health benefits explained to patient.

## 2023-03-27 NOTE — ASSESSMENT & PLAN NOTE
Acute blood loss anemia  Diverticulosis, no evidence of diverticulitis    Patient on eliquis for Afib and indomethacin for gout, with diverticulosis on recent colonoscopy (11/22) presents with melena and symptomatic anemia.     - Hgb 9 on admit, baseline ~13, stable  -  afebrile without leukocytosis, no evidence of indirect  - protonix 80 mg IV followed by 40 mg IV BID  - GI consulted, EGD 3/27 pending  - CT abdomen pelvis results reviewed, see hospital course above.  - hold home eliquis, indomethacin  - typed and screened, consented  - transfuse for Hgb < 7 or <8 if active bleeding or significant signs of anemia   - correct any coagulopathy with platelets and FFP for goal of platelets >50K and INR <2.0

## 2023-03-27 NOTE — NURSING
Pt arrived unit accompanied by RN, in no acute distress. Bed locked and in lowest position, call light within reach. Will continue to monitor.

## 2023-03-27 NOTE — INTERVAL H&P NOTE
The patient has been examined and the H&P has been reviewed:    I concur with the findings and no changes have occurred since H&P was written.    Procedure risks, benefits and alternative options discussed and understood by patient/family.    EGD for melena    Active Hospital Problems    Diagnosis  POA    *GIB (gastrointestinal bleeding) [K92.2]  Yes    Acute blood loss anemia [D62]  Yes    Gout [M10.9]  Yes    Diverticulosis [K57.90]  Yes    Severe obstructive sleep apnea [G47.33]  Yes    Paroxysmal atrial fibrillation [I48.0]  Yes     Chronic    Class 2 obesity with body mass index (BMI) of 36.0 to 36.9 in adult [E66.9, Z68.36]  Not Applicable    Hypertension [I10]  Yes     Chronic      Resolved Hospital Problems   No resolved problems to display.

## 2023-03-28 NOTE — ANESTHESIA POSTPROCEDURE EVALUATION
Anesthesia Post Evaluation    Patient: Stevie Villalba    Procedure(s) Performed: Procedure(s) (LRB):  EGD (ESOPHAGOGASTRODUODENOSCOPY) (N/A)    Final Anesthesia Type: general      Patient location during evaluation: PACU  Patient participation: Yes- Able to Participate  Level of consciousness: awake and alert  Post-procedure vital signs: reviewed and stable  Pain control: Pain has been treated.  Airway patency: patent    PONV status: PONV absent or treated.  Anesthetic complications: no      Cardiovascular status: hemodynamically stable  Respiratory status: spontaneous ventilation  Hydration status: euvolemic  Follow-up not needed.          Vitals Value Taken Time   /67 03/27/23 1639   Temp 36.7 °C (98 °F) 03/27/23 1639   Pulse 81 03/27/23 1737   Resp 17 03/27/23 1639   SpO2 95 % 03/27/23 1737   Vitals shown include unvalidated device data.      Event Time   Out of Recovery 14:22:50         Pain/Lory Score: Lory Score: 9 (3/27/2023  2:00 PM)

## 2023-04-03 ENCOUNTER — HOSPITAL ENCOUNTER (OUTPATIENT)
Dept: CARDIOLOGY | Facility: CLINIC | Age: 68
Discharge: HOME | End: 2023-04-03
Payer: MEDICARE

## 2023-04-03 ENCOUNTER — OFFICE VISIT (OUTPATIENT)
Dept: ELECTROPHYSIOLOGY | Facility: CLINIC | Age: 68
End: 2023-04-03
Payer: MEDICARE

## 2023-04-03 VITALS
WEIGHT: 263.69 LBS | HEIGHT: 70 IN | BODY MASS INDEX: 37.75 KG/M2 | SYSTOLIC BLOOD PRESSURE: 127 MMHG | HEART RATE: 62 BPM | DIASTOLIC BLOOD PRESSURE: 60 MMHG

## 2023-04-03 DIAGNOSIS — M17.12 PRIMARY OSTEOARTHRITIS OF LEFT KNEE: ICD-10-CM

## 2023-04-03 DIAGNOSIS — I70.0 AORTIC ATHEROSCLEROSIS: ICD-10-CM

## 2023-04-03 DIAGNOSIS — M54.50 CHRONIC MIDLINE LOW BACK PAIN, UNSPECIFIED WHETHER SCIATICA PRESENT: ICD-10-CM

## 2023-04-03 DIAGNOSIS — G89.29 CHRONIC MIDLINE LOW BACK PAIN, UNSPECIFIED WHETHER SCIATICA PRESENT: ICD-10-CM

## 2023-04-03 DIAGNOSIS — D62 ACUTE BLOOD LOSS ANEMIA: ICD-10-CM

## 2023-04-03 DIAGNOSIS — K57.90 DIVERTICULOSIS: ICD-10-CM

## 2023-04-03 DIAGNOSIS — G47.33 OSA (OBSTRUCTIVE SLEEP APNEA): ICD-10-CM

## 2023-04-03 DIAGNOSIS — K25.4 GASTROINTESTINAL HEMORRHAGE ASSOCIATED WITH GASTRIC ULCER: ICD-10-CM

## 2023-04-03 DIAGNOSIS — M17.11 PRIMARY OSTEOARTHRITIS OF RIGHT KNEE: ICD-10-CM

## 2023-04-03 DIAGNOSIS — I48.0 PAROXYSMAL ATRIAL FIBRILLATION: Primary | ICD-10-CM

## 2023-04-03 DIAGNOSIS — E78.2 MIXED HYPERLIPIDEMIA: ICD-10-CM

## 2023-04-03 DIAGNOSIS — K92.1 MELENA: ICD-10-CM

## 2023-04-03 DIAGNOSIS — E66.9 CLASS 2 OBESITY WITH BODY MASS INDEX (BMI) OF 37.0 TO 37.9 IN ADULT, UNSPECIFIED OBESITY TYPE, UNSPECIFIED WHETHER SERIOUS COMORBIDITY PRESENT: ICD-10-CM

## 2023-04-03 DIAGNOSIS — I10 PRIMARY HYPERTENSION: ICD-10-CM

## 2023-04-03 DIAGNOSIS — Z85.46 HISTORY OF PROSTATE CANCER: ICD-10-CM

## 2023-04-03 DIAGNOSIS — R73.01 IMPAIRED FASTING BLOOD SUGAR: Chronic | ICD-10-CM

## 2023-04-03 DIAGNOSIS — R97.20 ELEVATED PSA: ICD-10-CM

## 2023-04-03 DIAGNOSIS — I48.91 ATRIAL FIBRILLATION, UNSPECIFIED TYPE: ICD-10-CM

## 2023-04-03 DIAGNOSIS — E66.01 SEVERE OBESITY (BMI 35.0-39.9) WITH COMORBIDITY: ICD-10-CM

## 2023-04-03 DIAGNOSIS — K59.00 CONSTIPATION, UNSPECIFIED CONSTIPATION TYPE: ICD-10-CM

## 2023-04-03 DIAGNOSIS — M10.9 GOUT, UNSPECIFIED CAUSE, UNSPECIFIED CHRONICITY, UNSPECIFIED SITE: ICD-10-CM

## 2023-04-03 LAB
FINAL PATHOLOGIC DIAGNOSIS: NORMAL
GROSS: NORMAL
Lab: NORMAL
SUPPLEMENTAL DIAGNOSIS: NORMAL

## 2023-04-03 PROCEDURE — 93010 ELECTROCARDIOGRAM REPORT: CPT | Mod: HCNC,S$GLB,, | Performed by: INTERNAL MEDICINE

## 2023-04-03 PROCEDURE — 93005 ELECTROCARDIOGRAM TRACING: CPT | Mod: HCNC,S$GLB,, | Performed by: STUDENT IN AN ORGANIZED HEALTH CARE EDUCATION/TRAINING PROGRAM

## 2023-04-03 PROCEDURE — 3078F DIAST BP <80 MM HG: CPT | Mod: HCNC,CPTII,S$GLB, | Performed by: STUDENT IN AN ORGANIZED HEALTH CARE EDUCATION/TRAINING PROGRAM

## 2023-04-03 PROCEDURE — 3074F SYST BP LT 130 MM HG: CPT | Mod: HCNC,CPTII,S$GLB, | Performed by: STUDENT IN AN ORGANIZED HEALTH CARE EDUCATION/TRAINING PROGRAM

## 2023-04-03 PROCEDURE — 3288F PR FALLS RISK ASSESSMENT DOCUMENTED: ICD-10-PCS | Mod: HCNC,CPTII,S$GLB, | Performed by: STUDENT IN AN ORGANIZED HEALTH CARE EDUCATION/TRAINING PROGRAM

## 2023-04-03 PROCEDURE — 99999 PR PBB SHADOW E&M-EST. PATIENT-LVL III: ICD-10-PCS | Mod: PBBFAC,HCNC,, | Performed by: STUDENT IN AN ORGANIZED HEALTH CARE EDUCATION/TRAINING PROGRAM

## 2023-04-03 PROCEDURE — 3288F FALL RISK ASSESSMENT DOCD: CPT | Mod: HCNC,CPTII,S$GLB, | Performed by: STUDENT IN AN ORGANIZED HEALTH CARE EDUCATION/TRAINING PROGRAM

## 2023-04-03 PROCEDURE — 1101F PR PT FALLS ASSESS DOC 0-1 FALLS W/OUT INJ PAST YR: ICD-10-PCS | Mod: HCNC,CPTII,S$GLB, | Performed by: STUDENT IN AN ORGANIZED HEALTH CARE EDUCATION/TRAINING PROGRAM

## 2023-04-03 PROCEDURE — 3074F PR MOST RECENT SYSTOLIC BLOOD PRESSURE < 130 MM HG: ICD-10-PCS | Mod: HCNC,CPTII,S$GLB, | Performed by: STUDENT IN AN ORGANIZED HEALTH CARE EDUCATION/TRAINING PROGRAM

## 2023-04-03 PROCEDURE — 1126F AMNT PAIN NOTED NONE PRSNT: CPT | Mod: HCNC,CPTII,S$GLB, | Performed by: STUDENT IN AN ORGANIZED HEALTH CARE EDUCATION/TRAINING PROGRAM

## 2023-04-03 PROCEDURE — 1126F PR PAIN SEVERITY QUANTIFIED, NO PAIN PRESENT: ICD-10-PCS | Mod: HCNC,CPTII,S$GLB, | Performed by: STUDENT IN AN ORGANIZED HEALTH CARE EDUCATION/TRAINING PROGRAM

## 2023-04-03 PROCEDURE — 99999 PR PBB SHADOW E&M-EST. PATIENT-LVL III: CPT | Mod: PBBFAC,HCNC,, | Performed by: STUDENT IN AN ORGANIZED HEALTH CARE EDUCATION/TRAINING PROGRAM

## 2023-04-03 PROCEDURE — 3008F PR BODY MASS INDEX (BMI) DOCUMENTED: ICD-10-PCS | Mod: HCNC,CPTII,S$GLB, | Performed by: STUDENT IN AN ORGANIZED HEALTH CARE EDUCATION/TRAINING PROGRAM

## 2023-04-03 PROCEDURE — 1159F PR MEDICATION LIST DOCUMENTED IN MEDICAL RECORD: ICD-10-PCS | Mod: HCNC,CPTII,S$GLB, | Performed by: STUDENT IN AN ORGANIZED HEALTH CARE EDUCATION/TRAINING PROGRAM

## 2023-04-03 PROCEDURE — 93005 RHYTHM STRIP: ICD-10-PCS | Mod: HCNC,S$GLB,, | Performed by: STUDENT IN AN ORGANIZED HEALTH CARE EDUCATION/TRAINING PROGRAM

## 2023-04-03 PROCEDURE — 3078F PR MOST RECENT DIASTOLIC BLOOD PRESSURE < 80 MM HG: ICD-10-PCS | Mod: HCNC,CPTII,S$GLB, | Performed by: STUDENT IN AN ORGANIZED HEALTH CARE EDUCATION/TRAINING PROGRAM

## 2023-04-03 PROCEDURE — 99214 PR OFFICE/OUTPT VISIT, EST, LEVL IV, 30-39 MIN: ICD-10-PCS | Mod: HCNC,S$GLB,, | Performed by: STUDENT IN AN ORGANIZED HEALTH CARE EDUCATION/TRAINING PROGRAM

## 2023-04-03 PROCEDURE — 93010 RHYTHM STRIP: ICD-10-PCS | Mod: HCNC,S$GLB,, | Performed by: INTERNAL MEDICINE

## 2023-04-03 PROCEDURE — 1101F PT FALLS ASSESS-DOCD LE1/YR: CPT | Mod: HCNC,CPTII,S$GLB, | Performed by: STUDENT IN AN ORGANIZED HEALTH CARE EDUCATION/TRAINING PROGRAM

## 2023-04-03 PROCEDURE — 99214 OFFICE O/P EST MOD 30 MIN: CPT | Mod: HCNC,S$GLB,, | Performed by: STUDENT IN AN ORGANIZED HEALTH CARE EDUCATION/TRAINING PROGRAM

## 2023-04-03 PROCEDURE — 3008F BODY MASS INDEX DOCD: CPT | Mod: HCNC,CPTII,S$GLB, | Performed by: STUDENT IN AN ORGANIZED HEALTH CARE EDUCATION/TRAINING PROGRAM

## 2023-04-03 PROCEDURE — 1159F MED LIST DOCD IN RCRD: CPT | Mod: HCNC,CPTII,S$GLB, | Performed by: STUDENT IN AN ORGANIZED HEALTH CARE EDUCATION/TRAINING PROGRAM
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
## 2023-04-27 ENCOUNTER — PATIENT MESSAGE (OUTPATIENT)
Dept: INTERNAL MEDICINE | Facility: CLINIC | Age: 68
End: 2023-04-27
Payer: MEDICARE

## 2023-04-27 NOTE — TELEPHONE ENCOUNTER
Pt is requesting a different brand of gout medication , says he is having a gout flare in left leg currently . However, with  most recent procedure, the doctor said the gout medication pt was taking is what cause the ulcer in  stomach.

## 2023-05-01 ENCOUNTER — OFFICE VISIT (OUTPATIENT)
Dept: INTERNAL MEDICINE | Facility: CLINIC | Age: 68
End: 2023-05-01
Payer: MEDICARE

## 2023-05-01 ENCOUNTER — HOSPITAL ENCOUNTER (OUTPATIENT)
Dept: RADIOLOGY | Facility: HOSPITAL | Age: 68
Discharge: HOME | End: 2023-05-01
Attending: INTERNAL MEDICINE
Payer: MEDICARE

## 2023-05-01 VITALS
BODY MASS INDEX: 38.19 KG/M2 | SYSTOLIC BLOOD PRESSURE: 136 MMHG | WEIGHT: 266.75 LBS | DIASTOLIC BLOOD PRESSURE: 64 MMHG | HEART RATE: 86 BPM | HEIGHT: 70 IN | OXYGEN SATURATION: 96 % | TEMPERATURE: 99 F | RESPIRATION RATE: 12 BRPM

## 2023-05-01 DIAGNOSIS — G89.29 CHRONIC FOOT PAIN, LEFT: ICD-10-CM

## 2023-05-01 DIAGNOSIS — M79.672 CHRONIC FOOT PAIN, LEFT: ICD-10-CM

## 2023-05-01 DIAGNOSIS — M79.89 SWELLING OF BOTH LOWER EXTREMITIES: ICD-10-CM

## 2023-05-01 DIAGNOSIS — Z09 HOSPITAL DISCHARGE FOLLOW-UP: Primary | ICD-10-CM

## 2023-05-01 DIAGNOSIS — K92.2 UGIB (UPPER GASTROINTESTINAL BLEED): ICD-10-CM

## 2023-05-01 DIAGNOSIS — M1A.9XX0 CHRONIC GOUT WITHOUT TOPHUS, UNSPECIFIED CAUSE, UNSPECIFIED SITE: ICD-10-CM

## 2023-05-01 PROCEDURE — 1125F PR PAIN SEVERITY QUANTIFIED, PAIN PRESENT: ICD-10-PCS | Mod: HCNC,CPTII,S$GLB, | Performed by: INTERNAL MEDICINE

## 2023-05-01 PROCEDURE — 99999 PR PBB SHADOW E&M-EST. PATIENT-LVL V: ICD-10-PCS | Mod: PBBFAC,HCNC,, | Performed by: INTERNAL MEDICINE

## 2023-05-01 PROCEDURE — 73630 XR FOOT COMPLETE 3 VIEW LEFT: ICD-10-PCS | Mod: 26,HCNC,LT, | Performed by: RADIOLOGY

## 2023-05-01 PROCEDURE — 73630 X-RAY EXAM OF FOOT: CPT | Mod: 26,HCNC,LT, | Performed by: RADIOLOGY

## 2023-05-01 PROCEDURE — 3078F DIAST BP <80 MM HG: CPT | Mod: HCNC,CPTII,S$GLB, | Performed by: INTERNAL MEDICINE

## 2023-05-01 PROCEDURE — 73630 X-RAY EXAM OF FOOT: CPT | Mod: TC,HCNC,PO,LT

## 2023-05-01 PROCEDURE — 4010F PR ACE/ARB THEARPY RXD/TAKEN: ICD-10-PCS | Mod: HCNC,CPTII,S$GLB, | Performed by: INTERNAL MEDICINE

## 2023-05-01 PROCEDURE — 99214 OFFICE O/P EST MOD 30 MIN: CPT | Mod: HCNC,S$GLB,, | Performed by: INTERNAL MEDICINE

## 2023-05-01 PROCEDURE — 1125F AMNT PAIN NOTED PAIN PRSNT: CPT | Mod: HCNC,CPTII,S$GLB, | Performed by: INTERNAL MEDICINE

## 2023-05-01 PROCEDURE — 4010F ACE/ARB THERAPY RXD/TAKEN: CPT | Mod: HCNC,CPTII,S$GLB, | Performed by: INTERNAL MEDICINE

## 2023-05-01 PROCEDURE — 3075F SYST BP GE 130 - 139MM HG: CPT | Mod: HCNC,CPTII,S$GLB, | Performed by: INTERNAL MEDICINE

## 2023-05-01 PROCEDURE — 3008F PR BODY MASS INDEX (BMI) DOCUMENTED: ICD-10-PCS | Mod: HCNC,CPTII,S$GLB, | Performed by: INTERNAL MEDICINE

## 2023-05-01 PROCEDURE — 3008F BODY MASS INDEX DOCD: CPT | Mod: HCNC,CPTII,S$GLB, | Performed by: INTERNAL MEDICINE

## 2023-05-01 PROCEDURE — 1160F RVW MEDS BY RX/DR IN RCRD: CPT | Mod: HCNC,CPTII,S$GLB, | Performed by: INTERNAL MEDICINE

## 2023-05-01 PROCEDURE — 3078F PR MOST RECENT DIASTOLIC BLOOD PRESSURE < 80 MM HG: ICD-10-PCS | Mod: HCNC,CPTII,S$GLB, | Performed by: INTERNAL MEDICINE

## 2023-05-01 PROCEDURE — 99999 PR PBB SHADOW E&M-EST. PATIENT-LVL V: CPT | Mod: PBBFAC,HCNC,, | Performed by: INTERNAL MEDICINE

## 2023-05-01 PROCEDURE — 3075F PR MOST RECENT SYSTOLIC BLOOD PRESS GE 130-139MM HG: ICD-10-PCS | Mod: HCNC,CPTII,S$GLB, | Performed by: INTERNAL MEDICINE

## 2023-05-01 PROCEDURE — 1160F PR REVIEW ALL MEDS BY PRESCRIBER/CLIN PHARMACIST DOCUMENTED: ICD-10-PCS | Mod: HCNC,CPTII,S$GLB, | Performed by: INTERNAL MEDICINE

## 2023-05-01 PROCEDURE — 1159F PR MEDICATION LIST DOCUMENTED IN MEDICAL RECORD: ICD-10-PCS | Mod: HCNC,CPTII,S$GLB, | Performed by: INTERNAL MEDICINE

## 2023-05-01 PROCEDURE — 99214 PR OFFICE/OUTPT VISIT, EST, LEVL IV, 30-39 MIN: ICD-10-PCS | Mod: HCNC,S$GLB,, | Performed by: INTERNAL MEDICINE

## 2023-05-01 PROCEDURE — 1159F MED LIST DOCD IN RCRD: CPT | Mod: HCNC,CPTII,S$GLB, | Performed by: INTERNAL MEDICINE

## 2023-05-01 RX ORDER — ALLOPURINOL 300 MG/1
300 TABLET ORAL DAILY
Qty: 90 TABLET | Refills: 3 | Status: SHIPPED | OUTPATIENT
Start: 2023-05-01

## 2023-05-01 RX ORDER — VALSARTAN 320 MG/1
320 TABLET ORAL DAILY
Qty: 90 TABLET | Refills: 3 | Status: SHIPPED | OUTPATIENT
Start: 2023-05-01 | End: 2023-10-30 | Stop reason: SDUPTHER

## 2023-05-01 RX ORDER — COLCHICINE 0.6 MG/1
0.6 TABLET ORAL DAILY PRN
Qty: 30 TABLET | Refills: 11 | Status: SHIPPED | OUTPATIENT
Start: 2023-05-01 | End: 2024-04-30

## 2023-05-01 NOTE — PROGRESS NOTES
Subjective:       Patient ID: Stevie Villalba is a 68 y.o. male.    Chief Complaint: Hospital Follow Up (/)    HPI     68-year-old male here for hospital follow-up.  Discharged 03/27/2023    Family and/or Caretaker present at visit?  Yes.  Diagnostic tests reviewed/disposition: I have reviewed all completed as well as pending diagnostic tests at the time of discharge.  Disease/illness education: UGIB  Home health/community services discussion/referrals: Patient does not have home health established from hospital visit.  They do not need home health.  If needed, we will set up home health for the patient.   Establishment or re-establishment of referral orders for community resources: No other necessary community resources.   Discussion with other health care providers: No discussion with other health care providers necessary.  I reviewed and reconciled the medications from hospital discharge.    He has chronic pain in the arch of his left foot.  He cannot walk some mornings.    Discharge summary:  HPI:   Stevie Villalba is a 67-year-old male with a history of hyperlipidemia, hypertension, atrial fibrillation status post cardioversion on flecainide, metoprolol and Eliquis, gout on indomethacin, history of prostate cancer in remission presenting with abdominal pain in the left lower quadrant and dark black stools X 3 days. Patient reports three episodes of melena since onset associated with abdominal cramping, intermittent left abdominal pain, fatigue, GALLAGHER, and lightheadedness. He is on indomethacin and eliquis. He has chronic LE edema and urinary frequency. No fever, chills, CP,SOB, cough, N/V, hematuria, focal numbness, or weakness. Patient underwent colonoscopy 11/22 which showed diverticulosis and patient has been compliant with a high fiber diet since.      In ED, VSS. Hgb 9, baseline 13. Patient given protonix 80 mg IV X 1 and admitted to observation for GIB.        Procedure(s) (LRB):  EGD  "(ESOPHAGOGASTRODUODENOSCOPY) (N/A)       Hospital Course:   Patient admitted to the hospitalist Service with the assistance of Gastroenterology for probable upper GI bleed.  EGD 3/27 with normal esophagus, nonbleeding gastric ulcer with no stigmata of bleeding.  Biopsies were taken for H pylori testing.  Recommend 40 mg b.i.d. Protonix for 8 weeks, follow up H pylori pathology, okay to resume diet and Eliquis, and follow-up with Gastroenterology in approximately 8 weeks for repeat EGD.  Prescription for 8 weeks of Protonix provided.  Discussed NSAIDs at length with patient and he will hold indomethacin for now.  No active gout.  Patient says he will discuss allopurinol with primary care physician.  CT abdomen pelvis with contrast ordered given history of diverticulosis which revealed [place] mesentery with small with small reactive appearing lymph nodes, overall nonspecific and can be seen with numerous etiologies such as mesenteric panniculitis versus recent or remote infectious or inflammatory process including enteritis.  Omental infarct or sequela of underlying malignancy including lymphoma, less likely but not entirely excluded.  Follow-up CT should be considered to exclude lymphoproliferative process such as lymphoma." No evidence clinically of enteritis, panniculitis, infarct as patient does not have any significant abdominal discomfort, nausea, vomiting, diarrhea, fevers.  CT also reveals fat containing ventral abdominal hernia which is known to the patient.  Additional incidental findings included punctate right renal calculus which is asymptomatic and diverticulosis.  Discussed findings with patient.  Patient has follow-up with outpatient oncologist soon.  Patient says he will discuss repeat CT imaging with them.  Patient is eager to be discharged in medically stable to do so.  Hemoglobin has remained stable and symptomatic anemia has resolved.  Discussed with Gastroenterology and outpatient follow-up is " being arranged.  Patient should follow-up with PCP in 1-2 weeks and oncologist at next available appointment.  Repeat CBC ordered 04/03/2023 for continued monitoring of hemoglobin.     The patient's chronic medical conditions were managed appropriately. Discharge plan of care was discussed at length with patient including patient's need for close outpatient follow-up. Medication counseling also took place with the patient being educated on potential side effects/adverse events of medications. Patient also counseled about avoiding driving, operating machinery, or participating in any activities that may pose harm to self or others if they are taking medications that cause drowsiness or altered mental status. Patient also counseled to take medicines as prescribed and do not drink alcohol, use tobacco products, or use illicit substances. Patient counseled to return to the hospital or seek medical care if baseline status should suddenly change, return of symptoms occurs, or for any new or concerning symptoms that arise. Patient was in agreement with plan of care going forward and was then discharged.        Review of Systems      Objective:      Physical Exam  Vitals reviewed.   Constitutional:       Appearance: He is well-developed.   HENT:      Head: Normocephalic and atraumatic.      Mouth/Throat:      Pharynx: No oropharyngeal exudate.   Eyes:      General: No scleral icterus.        Right eye: No discharge.         Left eye: No discharge.      Pupils: Pupils are equal, round, and reactive to light.   Neck:      Thyroid: No thyromegaly.      Trachea: No tracheal deviation.   Cardiovascular:      Rate and Rhythm: Normal rate and regular rhythm.      Heart sounds: Normal heart sounds. No murmur heard.    No friction rub. No gallop.   Pulmonary:      Effort: Pulmonary effort is normal. No respiratory distress.      Breath sounds: Normal breath sounds. No wheezing or rales.   Chest:      Chest wall: No tenderness.    Abdominal:      General: Bowel sounds are normal. There is no distension.      Palpations: Abdomen is soft. There is no mass.      Tenderness: There is no abdominal tenderness. There is no guarding or rebound.   Musculoskeletal:         General: No tenderness. Normal range of motion.      Cervical back: Normal range of motion and neck supple.   Skin:     General: Skin is warm and dry.      Coloration: Skin is not pale.      Findings: No erythema or rash.   Neurological:      Mental Status: He is alert and oriented to person, place, and time.   Psychiatric:         Behavior: Behavior normal.       Assessment:       1. Hospital discharge follow-up    2. UGIB (upper gastrointestinal bleed)  - CBC Auto Differential; Future    3. Chronic gout without tophus, unspecified cause, unspecified site  - Uric Acid; Future    4. Swelling of both lower extremities  - COMPRESSION STOCKINGS    5. Chronic foot pain, left  - X-Ray Foot Complete 3 view Left; Future  - Ambulatory referral/consult to Podiatry; Future      Plan:       1/2.  Check CBC  3. Check uric acid.  Start allopurinol 300 mg.  Colchicine 0.6 mg daily for the next month.  Then, take it daily as needed.    4.  Compression stockings ordered.    5. Check x-ray and refer to Podiatry.

## 2023-05-08 ENCOUNTER — OFFICE VISIT (OUTPATIENT)
Dept: PODIATRY | Facility: CLINIC | Age: 68
End: 2023-05-08
Payer: MEDICARE

## 2023-05-08 VITALS
SYSTOLIC BLOOD PRESSURE: 128 MMHG | BODY MASS INDEX: 38.38 KG/M2 | DIASTOLIC BLOOD PRESSURE: 72 MMHG | HEIGHT: 70 IN | WEIGHT: 268.06 LBS | HEART RATE: 61 BPM

## 2023-05-08 DIAGNOSIS — G89.29 CHRONIC FOOT PAIN, LEFT: ICD-10-CM

## 2023-05-08 DIAGNOSIS — M79.672 CHRONIC FOOT PAIN, LEFT: ICD-10-CM

## 2023-05-08 DIAGNOSIS — M72.2 PLANTAR FASCIITIS: Primary | ICD-10-CM

## 2023-05-08 PROCEDURE — 3074F PR MOST RECENT SYSTOLIC BLOOD PRESSURE < 130 MM HG: ICD-10-PCS | Mod: HCNC,CPTII,S$GLB, | Performed by: PODIATRIST

## 2023-05-08 PROCEDURE — 3008F PR BODY MASS INDEX (BMI) DOCUMENTED: ICD-10-PCS | Mod: HCNC,CPTII,S$GLB, | Performed by: PODIATRIST

## 2023-05-08 PROCEDURE — 1125F AMNT PAIN NOTED PAIN PRSNT: CPT | Mod: HCNC,CPTII,S$GLB, | Performed by: PODIATRIST

## 2023-05-08 PROCEDURE — 3078F PR MOST RECENT DIASTOLIC BLOOD PRESSURE < 80 MM HG: ICD-10-PCS | Mod: HCNC,CPTII,S$GLB, | Performed by: PODIATRIST

## 2023-05-08 PROCEDURE — 1159F MED LIST DOCD IN RCRD: CPT | Mod: HCNC,CPTII,S$GLB, | Performed by: PODIATRIST

## 2023-05-08 PROCEDURE — 3008F BODY MASS INDEX DOCD: CPT | Mod: HCNC,CPTII,S$GLB, | Performed by: PODIATRIST

## 2023-05-08 PROCEDURE — 3078F DIAST BP <80 MM HG: CPT | Mod: HCNC,CPTII,S$GLB, | Performed by: PODIATRIST

## 2023-05-08 PROCEDURE — 99999 PR PBB SHADOW E&M-EST. PATIENT-LVL IV: ICD-10-PCS | Mod: PBBFAC,HCNC,, | Performed by: PODIATRIST

## 2023-05-08 PROCEDURE — 1125F PR PAIN SEVERITY QUANTIFIED, PAIN PRESENT: ICD-10-PCS | Mod: HCNC,CPTII,S$GLB, | Performed by: PODIATRIST

## 2023-05-08 PROCEDURE — 4010F PR ACE/ARB THEARPY RXD/TAKEN: ICD-10-PCS | Mod: HCNC,CPTII,S$GLB, | Performed by: PODIATRIST

## 2023-05-08 PROCEDURE — 1159F PR MEDICATION LIST DOCUMENTED IN MEDICAL RECORD: ICD-10-PCS | Mod: HCNC,CPTII,S$GLB, | Performed by: PODIATRIST

## 2023-05-08 PROCEDURE — 1160F RVW MEDS BY RX/DR IN RCRD: CPT | Mod: HCNC,CPTII,S$GLB, | Performed by: PODIATRIST

## 2023-05-08 PROCEDURE — 20550 PR INJECT TENDON SHEATH/LIGAMENT: ICD-10-PCS | Mod: HCNC,LT,S$GLB, | Performed by: PODIATRIST

## 2023-05-08 PROCEDURE — 20550 NJX 1 TENDON SHEATH/LIGAMENT: CPT | Mod: HCNC,LT,S$GLB, | Performed by: PODIATRIST

## 2023-05-08 PROCEDURE — 99203 PR OFFICE/OUTPT VISIT, NEW, LEVL III, 30-44 MIN: ICD-10-PCS | Mod: 25,HCNC,S$GLB, | Performed by: PODIATRIST

## 2023-05-08 PROCEDURE — 3074F SYST BP LT 130 MM HG: CPT | Mod: HCNC,CPTII,S$GLB, | Performed by: PODIATRIST

## 2023-05-08 PROCEDURE — 1160F PR REVIEW ALL MEDS BY PRESCRIBER/CLIN PHARMACIST DOCUMENTED: ICD-10-PCS | Mod: HCNC,CPTII,S$GLB, | Performed by: PODIATRIST

## 2023-05-08 PROCEDURE — 99999 PR PBB SHADOW E&M-EST. PATIENT-LVL IV: CPT | Mod: PBBFAC,HCNC,, | Performed by: PODIATRIST

## 2023-05-08 PROCEDURE — 4010F ACE/ARB THERAPY RXD/TAKEN: CPT | Mod: HCNC,CPTII,S$GLB, | Performed by: PODIATRIST

## 2023-05-08 PROCEDURE — 99203 OFFICE O/P NEW LOW 30 MIN: CPT | Mod: 25,HCNC,S$GLB, | Performed by: PODIATRIST

## 2023-05-08 RX ORDER — BUPIVACAINE HYDROCHLORIDE 5 MG/ML
1 INJECTION, SOLUTION EPIDURAL; INTRACAUDAL ONCE
Status: COMPLETED | OUTPATIENT
Start: 2023-05-08 | End: 2023-05-08

## 2023-05-08 RX ORDER — TRIAMCINOLONE ACETONIDE 40 MG/ML
20 INJECTION, SUSPENSION INTRA-ARTICULAR; INTRAMUSCULAR ONCE
Status: COMPLETED | OUTPATIENT
Start: 2023-05-08 | End: 2023-05-08

## 2023-05-08 RX ORDER — DEXAMETHASONE SODIUM PHOSPHATE 4 MG/ML
2 INJECTION, SOLUTION INTRA-ARTICULAR; INTRALESIONAL; INTRAMUSCULAR; INTRAVENOUS; SOFT TISSUE ONCE
Status: COMPLETED | OUTPATIENT
Start: 2023-05-08 | End: 2023-05-08

## 2023-05-08 RX ORDER — LIDOCAINE HYDROCHLORIDE 20 MG/ML
1 INJECTION, SOLUTION EPIDURAL; INFILTRATION; INTRACAUDAL; PERINEURAL ONCE
Status: COMPLETED | OUTPATIENT
Start: 2023-05-08 | End: 2023-05-08

## 2023-05-08 RX ADMIN — DEXAMETHASONE SODIUM PHOSPHATE 2 MG: 4 INJECTION, SOLUTION INTRA-ARTICULAR; INTRALESIONAL; INTRAMUSCULAR; INTRAVENOUS; SOFT TISSUE at 09:05

## 2023-05-08 RX ADMIN — BUPIVACAINE HYDROCHLORIDE 5 MG: 5 INJECTION, SOLUTION EPIDURAL; INTRACAUDAL at 09:05

## 2023-05-08 RX ADMIN — LIDOCAINE HYDROCHLORIDE 20 MG: 20 INJECTION, SOLUTION EPIDURAL; INFILTRATION; INTRACAUDAL; PERINEURAL at 09:05

## 2023-05-08 RX ADMIN — TRIAMCINOLONE ACETONIDE 20 MG: 40 INJECTION, SUSPENSION INTRA-ARTICULAR; INTRAMUSCULAR at 09:05

## 2023-05-08 NOTE — PATIENT INSTRUCTIONS
Supplements for inflammation: Arnica Tabs, bromelain with tumeric, alpha lipoic acid, garlic     Over the counter pain creams: Voltaren Gel, Biofreeze, Bengay, tiger balm, two old goat, lidocaine gel,  Absorbine Veterinary Liniment Gel Topical Analgesic Sore Muscle and Joint Pain Relief    Recommend lotions: eucerin, eucerin for diabetics, aquaphor, A&D ointment, gold bond for diabetics, sween, Ross's Bees all purpose baby ointment,  urea 40 with aloe (found on amazon.com)    Shoe recommendations: (try 6pm.com, zappos.FooPets , nordstromrack.FooPets, or shoes.FooPets for discounted prices) you can visit [employer] in [place]  or [employer] [facility] in the [facility] (there are also several shoe brand outlets in the [facility])    Asics (GT 2000 or gel foundations), new balance stability type shoes (such as the 940 series), collins (stabil c3),  Fountain (GTS or Beast or transcend), propet, Gino, HokaOne  (tennis shoe)    Sofft Brand, baretraps (women) Ku&Jude (men), clarks, crocs, aerosoles, naturalizers, SAS, ecco, born, charles de los santos, rhona (dress shoes)    Vionic, burkenstocks, fitflops, propet, baretraps (sandals)    HokaOne sandals, rubber birkenstock sandals crocs, propet (house shoes)    Nail Home remedy:  Vicks Vapor rub or Emuaid to nails for easier manageability    Occasional soaks for 15-20 mins in luke warm water with 1/2 cup of listerine and 1 cup of apple cider vinegar are ok You may add several drops of oil of oregano or tea tree oil as well    Understanding Plantar Fasciitis    Plantar fasciitis is a condition that causes foot and heel pain. The plantar fascia is a tough band of tissue that runs across the bottom of the foot from the heel to the toes. This tissue pulls on the heel bone. It supports the arch of the foot as it pushes off the ground. If the tissue becomes irritated or red and swollen (inflamed), it is called plantar fasciitis.  How to say it  PLAN-boris fa-see-IY-tis   What causes plantar  fasciitis?  Plantar fasciitis most often occurs from overusing the plantar fascia. The tissue may become damaged from activities that put repeated stress on the heel and foot. Or it may wear down over time with age and ankle stiffness. You are more likely to have plantar fasciitis if you:  Do activities that require a lot of running, jumping, or dancing  Have a job that requires being on your feet for long periods  Are overweight or obese  Have certain foot problems, such as a tight Achilles tendon, flat feet, or high arches  Often wear poorly fitting shoes  Symptoms of plantar fasciitis  The condition most often causes pain in the heel and the bottom of the foot. The pain may occur when you take your first steps in the morning. It may get better as you walk throughout the day. But as you continue to put weight on the foot, the pain often returns. Pain may also occur after standing or sitting for long periods.  Treating plantar fasciitis  Treatments for plantar fasciitis include:  Resting the foot. This involves limiting movements that make your foot hurt. You may also need to avoid certain sports and types of work for a time.  Using cold packs. Put an ice pack on the heel and foot to help reduce pain and swelling.  Taking pain medicines. Prescription and over-the-counter pain medicines can help relieve pain and swelling.  Using heel cups or foot inserts (orthotics). These are placed in the shoes to help support the heel or arch and cushion the heel. You may also be told to buy proper-fitting shoes with good arch support and cushioned soles.  Taping the foot. This supports the arch and limits the movement of the plantar fascia to help relieve symptoms.  Wearing a night splint. This stretches the plantar fascia and leg muscles while you sleep. This may help relieve pain.  Doing exercises and physical therapy. These stretch and strengthen the plantar fascia and the muscles in the leg that support the heel and  foot.  Getting shots of medicine into the foot. These may help relieve symptoms for a time.  Having surgery. This may be needed if other treatments fail to relieve symptoms. During surgery, the surgeon may partially cut the plantar fascia to release tension.  Possible complications of plantar fasciitis  Without proper care and treatment, healing may take longer than normal. Also, symptoms may continue or get worse. Over time, the plantar fascia may be damaged. This can make it hard to walk or even stand without pain.  When to call your healthcare provider  Call your healthcare provider right away if you have any of these:  Fever of 100.4°F (38°C) or higher, or as directed  Symptoms that dont get better with treatment, or get worse  New symptoms, such as numbness, tingling, or weakness in the foot   © 8332-1094 [employer]. [address] [place] [address], [place] [address]. All rights reserved. This information is not intended as a substitute for professional medical care. Always follow your healthcare professional's instructions.        Treating Plantar Fasciitis  First, your healthcare provider tries to determine the cause of your problem in order to suggest ways to relieve pain. If your pain is due to poor foot mechanics, custom-made shoe inserts (orthoses) may help.    Reduce symptoms  To relieve mild symptoms, try aspirin, ibuprofen, or other medicines as directed. Rubbing ice on the affected area may also help.  To reduce severe pain and swelling, your healthcare provider may prescribe pills or injections or a walking cast in some instances. Physical therapy, such as ultrasound or a daily stretching program, may also be recommended. Surgery is rarely required.  To reduce symptoms caused by poor foot mechanics, your foot may be taped. This supports the arch and temporarily controls movement. Night splints may also help by stretching the fascia.  Control movement  If taping helps, your healthcare provider  may prescribe orthoses. Built from plaster casts of your feet, these inserts control the way your foot moves. As a result, your symptoms should go away.  Reduce overuse  Every time your foot strikes the ground, the plantar fascia is stretched. You can reduce the strain on the plantar fascia and the possibility of overuse by following these suggestions:  Lose any excess weight.  Avoid running on hard or uneven ground.  Use orthoses at all times in your shoes and house slippers.  If surgery is needed  Your healthcare provider may consider surgery if other types of treatment don't control your pain. During surgery, the plantar fascia is partially cut to release tension. As you heal, fibrous tissue fills the space between the heel bone and the plantar fascia.   © 1310-1918 [employer]. [address] [place] [address], [place] [address]. All rights reserved. This information is not intended as a substitute for professional medical care. Always follow your healthcare professional's instructions.        Wearing Proper Shoes                    You walk on your feet every day, forcing them to support the weight of your body. Repeated stress on your feet can cause damage over time. The right shoes can help protect your feet. The wrong shoes can cause more foot problems. Read the information below to help you find a shoe that fits your foot needs.     A good shoe fit will cover your foot outline.       A shoe that doesnt cover the outline is a bad fit.      Whats Your Foot Shape?  To get a good fit, you need to know the shape of your foot. Do this simple test: While standing, place your foot on a piece of paper and trace around it. Is your foot straight or curved? Do you have a foot problem, such as a bunion, that causes your foot outline to show a bulge on the side of your big toe?  Finding Your Fit  Bring your foot outline to the [employer] store to help you find the right shoe. Place a shoe you like on top of the outline  to see if it matches the shape. The shoe should cover the outline. (If you have a bunion, the shoe may not cover the bulge on the outline. Look for soft leather shoes to stretch over the bunion.) Once youve found a pair of proper shoes, put them on. Walk around. Be sure the shoes dont rub or pinch. If the shoes feel good, youve found your fit!  The Right Shoe for You  A good shoe has features that provide comfort and support. It must also be the right size and shape for your feet. Look for a shoe made of breathable fabric and lining, such as leather or canvas. Be sure that shoes have enough tread to prevent slipping. Go to a good shoe store for help finding the right shoe.  Good Shoe Features  An ideal shoe has the following:  Laces for support. If tying laces is a problem for you, try shoes with Velcro fasteners or [place].  A front of the shoe (toe box) with ½ inch space in front of your longest toes.  An arch shape that supports your foot.  No more than 1½ inches of heel.  A stiff, snug back of the shoe to keep your foot from sliding around.  A smooth lining with no rough seams.  Shoe Shopping Tips  Below are some dos and donts for when you go to the shoe store.  Do:  Select the shoes that feel right. Wear them around the house. Then bring them to your foot doctor to check for fit. If they dont fit well, return them.  Shop late in the day, when your feet will be slightly bigger.  Each time you buy shoes, have both your feet measured while you are standing. Foot size changes with time.  Pick shoes to suit their purpose. High heels are okay for an occasional night on the town. But for everyday wear, choose a more sensible shoe.  Try on shoes while wearing any inserts specially made for your feet (orthoses).  Try on both the right and left shoes. If your feet are different sizes, pick a pair that fits the larger foot.  Dont:  Dont buy shoes based on shoe size alone. Always try on shoes, as sizes differ from  brand to brand and within brands.  Dont expect shoes to break in. If they dont fit at the store, dont buy them.  Dont buy a shoe that doesnt match your foot shape.  What About Socks?  Always wear socks with shoes. Socks help absorb sweat and reduce friction and blistering. When shopping for shoes, choose soft, padded socks with seams that dont irritate your feet.  If You Have Foot Problems  Some foot problems cause deformities. This can make it hard to find a good fit. Look for shoes made of soft leather to stretch over the deformity. If you have bunions, buy shoes with a wider toe box. To fit hammertoes, look for shoes with a tall toe box. If you have arch problems, you may need inserts. In some cases, youll need to have custom footwear or orthoses made for your feet.  Suggested Footwear  Ask your healthcare provider what kind of footwear you need. He or she may recommend a certain brand or shoe store.  © 7326-6637 [employer]. [address] [place] [address]. All rights reserved. This information is not intended as a substitute for professional medical care. Always follow your healthcare professional's instructions.        Toe Extension (Flexibility)    These instructions are for your right foot. Switch sides for your left foot.  Sit in a chair. Rest your right ankle on your left knee.  Hold your toes with your right hand. Gently bend the toes backward. Feel a stretch in the undersides of the toes and ball of the foot. Hold for 30 to 60 seconds.  Then gently bend the toes in the other direction. Gently press on them until your foot is pointed. Hold for 30 to 60 seconds.  Repeat 5 times, or as instructed.  © 0616-9211 [employer]. [address] [place] [address]. All rights reserved. This information is not intended as a substitute for professional medical care. Always follow your healthcare professional's instructions.

## 2023-05-08 NOTE — PROGRESS NOTES
Subjective:      Patient ID: Stevie Villalba is a 68 y.o. male.    Chief Complaint: No chief complaint on file.    Stevie Villalba is a 68 y.o. male who presents to the clinic complaining of  Left plantar medial heel pain, especially with the first step in the morning. The pain is described as Aching, Burning, Throbbing, and Sharp.  Stevie Villalba rates the pain as 8/10.  The onset of the pain was gradual and has worsened over the past several week(s).  he relates pain began without known inciting event.  Referred from PCP. Xray's taken prior to encounter.      History of trauma: denies  Relates history of R knee surgery    Shoe gear: boat shoe     Patient Active Problem List   Diagnosis    Hypertriglyceridemia    Hypertension    Insomnia    Lumbago    Primary osteoarthritis of right knee    Impaired fasting blood sugar    Elevated PSA    Prostate cancer    Paroxysmal atrial fibrillation    Class 2 obesity with body mass index (BMI) of 36.0 to 36.9 in adult    Severe obstructive sleep apnea    GIB (gastrointestinal bleeding)    Constipation    Acute blood loss anemia    Gout    Diverticulosis    Melena    Severe obesity (BMI 35.0-39.9) with comorbidity    Aortic atherosclerosis       Current Outpatient Medications on File Prior to Visit   Medication Sig Dispense Refill    allopurinoL (ZYLOPRIM) 300 MG tablet Take 1 tablet (300 mg total) by mouth once daily. 90 tablet 3    apixaban (ELIQUIS) 5 mg Tab Take 5 mg by mouth 2 (two) times daily.      atorvastatin (LIPITOR) 40 MG tablet TAKE 1 TABLET BY MOUTH EVERY DAY IN THE EVENING 90 tablet 3    carisoprodol (SOMA) 350 MG tablet Take 350 mg by mouth 4 (four) times daily as needed for Muscle spasms.      colchicine (COLCRYS) 0.6 mg tablet Take 1 tablet (0.6 mg total) by mouth daily as needed. 30 tablet 11    flecainide (TAMBOCOR) 100 MG Tab Take 1 tablet (100 mg total) by mouth every 12 (twelve) hours. 60 tablet 11    hydroCHLOROthiazide (HYDRODIURIL) 25 MG tablet  "TAKE 1 TABLET BY MOUTH EVERY DAY 90 tablet 3    HYDROcodone-acetaminophen (NORCO)  mg per tablet TK 1 T PO  Q 6 TO 8 PRN P  0    hydrocortisone (ANUSOL-HC) 2.5 % rectal cream Place rectally 2 (two) times daily. 28 g 1    insulin syringe-needle U-100 0.5 mL 31 gauge x 5/16" Syrg Use along with ICI therapy. 10 each PRN    metoprolol succinate (TOPROL-XL) 25 MG 24 hr tablet Take 1 tablet (25 mg total) by mouth once daily. 30 tablet 11    pantoprazole (PROTONIX) 40 MG tablet Take 1 tablet (40 mg total) by mouth 2 (two) times daily. 60 tablet 1    polyethylene glycol (GLYCOLAX) 17 gram/dose powder Measure 17g with cap and mix with liquid. Then take by mouth 2 (two) times daily. 1020 g 0    sildenafiL (VIAGRA) 100 MG tablet Take 1 tablet (100 mg total) by mouth daily as needed for Erectile Dysfunction. 30 tablet 2    valsartan (DIOVAN) 320 MG tablet Take 1 tablet (320 mg total) by mouth once daily. 90 tablet 3    tamsulosin (FLOMAX) 0.4 mg Cap Take 1 capsule (0.4 mg total) by mouth once daily. 30 capsule 2     No current facility-administered medications on file prior to visit.       Review of patient's allergies indicates:  No Known Allergies    Past Surgical History:   Procedure Laterality Date    COLONOSCOPY N/A 11/15/2022    Procedure: COLONOSCOPY;  Surgeon: Woo Goldman MD;  Location: [facility] (4TH FLR);  Service: Endoscopy;  Laterality: N/A;  instructions handed to patient in office -   pt is to restart Eliquis on 11/4/22 and then go ahead and approval to hold 2 days prior to procedure per Dr. Alaniz and Anne Lloyd NP see note 11/3/22 -   precall done/ no answer/mleone    ESOPHAGOGASTRODUODENOSCOPY N/A 3/27/2023    Procedure: EGD (ESOPHAGOGASTRODUODENOSCOPY);  Surgeon: Diaz Knapp MD;  Location: [facility] (2ND FLR);  Service: Endoscopy;  Laterality: N/A;    HERNIA REPAIR      umbilical and inguinal    JOINT REPLACEMENT      RADIOACTIVE SEED IMPLANTATION N/A 4/14/2021    Procedure: " INSERTION, RADIOACTIVE SEED;  Surgeon: Freedom Warren MD;  [facility] [facility];  Service: Urology;  Laterality: N/A;  1 hour     TONSILLECTOMY      TOTAL KNEE ARTHROPLASTY Right 5/30/2018    Procedure: REPLACEMENT-KNEE-TOTAL;  Surgeon: John L. Ochsner Jr., MD;  Location: [facility] OR 2ND FLR;  Service: Orthopedics;  Laterality: Right;  23hr observation    TRANSRECTAL ULTRASOUND EXAMINATION N/A 4/14/2021    Procedure: ULTRASOUND, RECTAL APPROACH;  Surgeon: Freedom Warren MD;  Location: [facility] OR 1ST FLR;  Service: Urology;  Laterality: N/A;    TREATMENT OF CARDIAC ARRHYTHMIA N/A 8/22/2022    Procedure: Cardioversion or Defibrillation;  Surgeon: TAL Alaniz MD;  Location: [facility] EP LAB;  Service: Cardiology;  Laterality: N/A;  AF, DEB, DCCV, MAC, EH, 3 Prep       Family History   Problem Relation Age of Onset    Cancer Mother         lung cancer    Cancer Father         prostate cancer    Prostate cancer Father     Diabetes Father     Stroke Father     Hypertension Father     Heart disease Father         CABG x 3    Hyperlipidemia Father     Colon cancer Neg Hx     Cataracts Neg Hx     Blindness Neg Hx     Glaucoma Neg Hx     Macular degeneration Neg Hx     Retinal detachment Neg Hx     Strabismus Neg Hx     Anesthesia problems Neg Hx        Social History     Socioeconomic History    Marital status:     Number of children: 3   Tobacco Use    Smoking status: Never    Smokeless tobacco: Never   Substance and Sexual Activity    Alcohol use: Yes     Alcohol/week: 10.0 standard drinks     Types: 10 Cans of beer per week     Comment: couple here and there    Drug use: No    Sexual activity: Yes     Partners: Female     Comment:        Review of Systems   Constitutional: Negative for chills and fever.   Cardiovascular:  Negative for claudication and leg swelling.   Respiratory:  Negative for cough and shortness of breath.    Skin:  Positive for dry skin. Negative for itching and rash.   Musculoskeletal:   "Positive for arthritis, gout, joint pain, myalgias and stiffness. Negative for falls, joint swelling and muscle weakness.   Gastrointestinal:  Negative for diarrhea, nausea and vomiting.   Neurological:  Negative for numbness, paresthesias, tremors and weakness.   Psychiatric/Behavioral:  Negative for altered mental status and hallucinations.          Objective:      Vitals:    05/08/23 0805   BP: 128/72   Pulse: 61   Weight: 121.6 kg (268 lb 1.3 oz)   Height: 5' 10" (1.778 m)   PainSc:   8   PainLoc: Foot       Physical Exam          Assessment:       Encounter Diagnoses   Name Primary?    Chronic foot pain, left     Plantar fasciitis Yes         Plan:       Diagnoses and all orders for this visit:    Plantar fasciitis    Chronic foot pain, left  -     Ambulatory referral/consult to Podiatry    Other orders  -     BUPivacaine (PF) 0.5% (5 mg/mL) injection 5 mg  -     dexAMETHasone injection 2 mg  -     LIDOcaine (PF) 20 mg/ml (2%) injection 20 mg  -     triamcinolone acetonide injection 20 mg      I counseled the patient on his conditions, their implications and medical management.      Discussed different treatment options for heel pain. Detailed conversation about patient responsibility for resolution of this condition to avoid need for surgical intervention. Discussed the amount of time it may take to achieve resolution but also that this condition can reoccur. I gave written and verbal instructions on stretching exercises. Patient expressed understanding. Discussed icing the affected area as needed and also wearing appropriate shoe gear and avoiding flats, slippers, sandals, and going barefoot. My recommendation for OTC supports is [employer]. Patient instructed on adequate icing techniques. Patient should ice the affected area at least once per day x 10 minutes for 10 days . I advised the patient that extra icing would also be beneficial to ensure adequate anti inflammatory effect. We also discussed " cortisone injections and NSAID therapy.     Patient would like injection today. Skin was prepped with alcohol and anesthetized with ethyl chloride.  The following mixture was injected into left medial calcaneal tubercle, plantar approach: 3ccs of mixture of (1cc 2% plain Lidocaine : 1cc 0.5% Marcaine plain:  0.5cc kenalog-40 : 0.5cc dexamethasone) directly into problematic areas.  Patient  tolerated the injection well. Related nearly 100% relief following injection.     RTC if no improvement, at this time may require referral to PT. Patient is amenable to plan.

## 2023-05-16 NOTE — PATIENT INSTRUCTIONS
1. Wipe off top of medication vial with an alcohol prep.   2. With the vial held firmly on a flat surface, insert the syringe into the vial.  3. Now carefully  the vial with the needle in place and turn upside down.  4. You can then push in syringe (like you are giving a shot) to get all the air out of the syringe.  5. You can then pull the plunger of the syringe back down while keeping the needle inserted in the vial to get your desired results.   6. If having difficulty seeing the medication, rapidly pull back the syringe and then you will see the medication fill up.    You may never get all the tiny air bubbles out. It is ok.     He was instructed to keep the dosage at 15 units. If noticing a decrease in reaction he can increase the dosage by 5 units. He may also refill the Rx.    C3 nurse attempted to contact Jenniffer Guillory for a TCC post hospital discharge follow up call. Call back at 3Pm for daughter Afshan   Has apt with Elenita Hastings Monday May 15, 2023 1:00 PM  Reed Ruiz MD Thursday May 18, 2023 10:00 AM

## 2023-05-27 ENCOUNTER — PATIENT MESSAGE (OUTPATIENT)
Dept: INTERNAL MEDICINE | Facility: CLINIC | Age: 68
End: 2023-05-27
Payer: MEDICARE

## 2023-05-27 DIAGNOSIS — M25.569 KNEE PAIN, UNSPECIFIED CHRONICITY, UNSPECIFIED LATERALITY: Primary | ICD-10-CM

## 2023-05-29 DIAGNOSIS — M25.562 LEFT KNEE PAIN, UNSPECIFIED CHRONICITY: Primary | ICD-10-CM

## 2023-06-12 ENCOUNTER — HOSPITAL ENCOUNTER (OUTPATIENT)
Dept: RADIOLOGY | Facility: HOSPITAL | Age: 68
Discharge: HOME | End: 2023-06-12
Attending: PHYSICIAN ASSISTANT
Payer: MEDICARE

## 2023-06-12 ENCOUNTER — OFFICE VISIT (OUTPATIENT)
Dept: SPORTS MEDICINE | Facility: CLINIC | Age: 68
End: 2023-06-12
Payer: MEDICARE

## 2023-06-12 ENCOUNTER — OFFICE VISIT (OUTPATIENT)
Dept: INTERNAL MEDICINE | Facility: CLINIC | Age: 68
End: 2023-06-12
Payer: MEDICARE

## 2023-06-12 VITALS
SYSTOLIC BLOOD PRESSURE: 122 MMHG | BODY MASS INDEX: 36.45 KG/M2 | OXYGEN SATURATION: 99 % | TEMPERATURE: 98 F | HEIGHT: 70 IN | RESPIRATION RATE: 20 BRPM | DIASTOLIC BLOOD PRESSURE: 76 MMHG | HEART RATE: 61 BPM | WEIGHT: 254.63 LBS

## 2023-06-12 VITALS
BODY MASS INDEX: 36.57 KG/M2 | SYSTOLIC BLOOD PRESSURE: 121 MMHG | DIASTOLIC BLOOD PRESSURE: 78 MMHG | WEIGHT: 254.88 LBS | HEART RATE: 61 BPM

## 2023-06-12 DIAGNOSIS — I10 PRIMARY HYPERTENSION: Chronic | ICD-10-CM

## 2023-06-12 DIAGNOSIS — G89.29 CHRONIC PAIN OF LEFT KNEE: Primary | ICD-10-CM

## 2023-06-12 DIAGNOSIS — M17.12 PRIMARY OSTEOARTHRITIS OF LEFT KNEE: ICD-10-CM

## 2023-06-12 DIAGNOSIS — I48.0 PAROXYSMAL ATRIAL FIBRILLATION: Chronic | ICD-10-CM

## 2023-06-12 DIAGNOSIS — E78.1 HYPERTRIGLYCERIDEMIA: Chronic | ICD-10-CM

## 2023-06-12 DIAGNOSIS — M25.562 CHRONIC PAIN OF LEFT KNEE: Primary | ICD-10-CM

## 2023-06-12 DIAGNOSIS — R63.4 WEIGHT LOSS: ICD-10-CM

## 2023-06-12 DIAGNOSIS — M25.562 LEFT KNEE PAIN, UNSPECIFIED CHRONICITY: ICD-10-CM

## 2023-06-12 DIAGNOSIS — M1A.9XX0 CHRONIC GOUT WITHOUT TOPHUS, UNSPECIFIED CAUSE, UNSPECIFIED SITE: Primary | ICD-10-CM

## 2023-06-12 PROCEDURE — 3288F FALL RISK ASSESSMENT DOCD: CPT | Mod: HCNC,CPTII,S$GLB, | Performed by: PHYSICIAN ASSISTANT

## 2023-06-12 PROCEDURE — 99214 OFFICE O/P EST MOD 30 MIN: CPT | Mod: HCNC,S$GLB,, | Performed by: PHYSICIAN ASSISTANT

## 2023-06-12 PROCEDURE — 3288F PR FALLS RISK ASSESSMENT DOCUMENTED: ICD-10-PCS | Mod: HCNC,CPTII,S$GLB, | Performed by: INTERNAL MEDICINE

## 2023-06-12 PROCEDURE — 73564 X-RAY EXAM KNEE 4 OR MORE: CPT | Mod: TC,50,HCNC,PN

## 2023-06-12 PROCEDURE — 1125F AMNT PAIN NOTED PAIN PRSNT: CPT | Mod: HCNC,CPTII,S$GLB, | Performed by: PHYSICIAN ASSISTANT

## 2023-06-12 PROCEDURE — 3074F PR MOST RECENT SYSTOLIC BLOOD PRESSURE < 130 MM HG: ICD-10-PCS | Mod: HCNC,CPTII,S$GLB, | Performed by: INTERNAL MEDICINE

## 2023-06-12 PROCEDURE — 99999 PR PBB SHADOW E&M-EST. PATIENT-LVL IV: ICD-10-PCS | Mod: PBBFAC,HCNC,, | Performed by: INTERNAL MEDICINE

## 2023-06-12 PROCEDURE — 3078F PR MOST RECENT DIASTOLIC BLOOD PRESSURE < 80 MM HG: ICD-10-PCS | Mod: HCNC,CPTII,S$GLB, | Performed by: INTERNAL MEDICINE

## 2023-06-12 PROCEDURE — 3008F BODY MASS INDEX DOCD: CPT | Mod: HCNC,CPTII,S$GLB, | Performed by: INTERNAL MEDICINE

## 2023-06-12 PROCEDURE — 1160F RVW MEDS BY RX/DR IN RCRD: CPT | Mod: HCNC,CPTII,S$GLB, | Performed by: PHYSICIAN ASSISTANT

## 2023-06-12 PROCEDURE — 3074F SYST BP LT 130 MM HG: CPT | Mod: HCNC,CPTII,S$GLB, | Performed by: INTERNAL MEDICINE

## 2023-06-12 PROCEDURE — 1160F RVW MEDS BY RX/DR IN RCRD: CPT | Mod: HCNC,CPTII,S$GLB, | Performed by: INTERNAL MEDICINE

## 2023-06-12 PROCEDURE — 4010F ACE/ARB THERAPY RXD/TAKEN: CPT | Mod: HCNC,CPTII,S$GLB, | Performed by: PHYSICIAN ASSISTANT

## 2023-06-12 PROCEDURE — 3008F PR BODY MASS INDEX (BMI) DOCUMENTED: ICD-10-PCS | Mod: HCNC,CPTII,S$GLB, | Performed by: INTERNAL MEDICINE

## 2023-06-12 PROCEDURE — 1126F PR PAIN SEVERITY QUANTIFIED, NO PAIN PRESENT: ICD-10-PCS | Mod: HCNC,CPTII,S$GLB, | Performed by: INTERNAL MEDICINE

## 2023-06-12 PROCEDURE — 3074F PR MOST RECENT SYSTOLIC BLOOD PRESSURE < 130 MM HG: ICD-10-PCS | Mod: HCNC,CPTII,S$GLB, | Performed by: PHYSICIAN ASSISTANT

## 2023-06-12 PROCEDURE — 1159F PR MEDICATION LIST DOCUMENTED IN MEDICAL RECORD: ICD-10-PCS | Mod: HCNC,CPTII,S$GLB, | Performed by: INTERNAL MEDICINE

## 2023-06-12 PROCEDURE — 3078F PR MOST RECENT DIASTOLIC BLOOD PRESSURE < 80 MM HG: ICD-10-PCS | Mod: HCNC,CPTII,S$GLB, | Performed by: PHYSICIAN ASSISTANT

## 2023-06-12 PROCEDURE — 3288F PR FALLS RISK ASSESSMENT DOCUMENTED: ICD-10-PCS | Mod: HCNC,CPTII,S$GLB, | Performed by: PHYSICIAN ASSISTANT

## 2023-06-12 PROCEDURE — 1101F PR PT FALLS ASSESS DOC 0-1 FALLS W/OUT INJ PAST YR: ICD-10-PCS | Mod: HCNC,CPTII,S$GLB, | Performed by: INTERNAL MEDICINE

## 2023-06-12 PROCEDURE — 3008F BODY MASS INDEX DOCD: CPT | Mod: HCNC,CPTII,S$GLB, | Performed by: PHYSICIAN ASSISTANT

## 2023-06-12 PROCEDURE — 3288F FALL RISK ASSESSMENT DOCD: CPT | Mod: HCNC,CPTII,S$GLB, | Performed by: INTERNAL MEDICINE

## 2023-06-12 PROCEDURE — 3074F SYST BP LT 130 MM HG: CPT | Mod: HCNC,CPTII,S$GLB, | Performed by: PHYSICIAN ASSISTANT

## 2023-06-12 PROCEDURE — 99214 OFFICE O/P EST MOD 30 MIN: CPT | Mod: HCNC,S$GLB,, | Performed by: INTERNAL MEDICINE

## 2023-06-12 PROCEDURE — 1159F MED LIST DOCD IN RCRD: CPT | Mod: HCNC,CPTII,S$GLB, | Performed by: PHYSICIAN ASSISTANT

## 2023-06-12 PROCEDURE — 99214 PR OFFICE/OUTPT VISIT, EST, LEVL IV, 30-39 MIN: ICD-10-PCS | Mod: HCNC,S$GLB,, | Performed by: INTERNAL MEDICINE

## 2023-06-12 PROCEDURE — 1125F PR PAIN SEVERITY QUANTIFIED, PAIN PRESENT: ICD-10-PCS | Mod: HCNC,CPTII,S$GLB, | Performed by: PHYSICIAN ASSISTANT

## 2023-06-12 PROCEDURE — 73564 X-RAY EXAM KNEE 4 OR MORE: CPT | Mod: 26,50,HCNC, | Performed by: RADIOLOGY

## 2023-06-12 PROCEDURE — 4010F PR ACE/ARB THEARPY RXD/TAKEN: ICD-10-PCS | Mod: HCNC,CPTII,S$GLB, | Performed by: PHYSICIAN ASSISTANT

## 2023-06-12 PROCEDURE — 73564 XR KNEE ORTHO BILAT WITH FLEXION: ICD-10-PCS | Mod: 26,50,HCNC, | Performed by: RADIOLOGY

## 2023-06-12 PROCEDURE — 1159F MED LIST DOCD IN RCRD: CPT | Mod: HCNC,CPTII,S$GLB, | Performed by: INTERNAL MEDICINE

## 2023-06-12 PROCEDURE — 99999 PR PBB SHADOW E&M-EST. PATIENT-LVL IV: ICD-10-PCS | Mod: PBBFAC,HCNC,, | Performed by: PHYSICIAN ASSISTANT

## 2023-06-12 PROCEDURE — 3078F DIAST BP <80 MM HG: CPT | Mod: HCNC,CPTII,S$GLB, | Performed by: PHYSICIAN ASSISTANT

## 2023-06-12 PROCEDURE — 1160F PR REVIEW ALL MEDS BY PRESCRIBER/CLIN PHARMACIST DOCUMENTED: ICD-10-PCS | Mod: HCNC,CPTII,S$GLB, | Performed by: INTERNAL MEDICINE

## 2023-06-12 PROCEDURE — 1101F PT FALLS ASSESS-DOCD LE1/YR: CPT | Mod: HCNC,CPTII,S$GLB, | Performed by: INTERNAL MEDICINE

## 2023-06-12 PROCEDURE — 99214 PR OFFICE/OUTPT VISIT, EST, LEVL IV, 30-39 MIN: ICD-10-PCS | Mod: HCNC,S$GLB,, | Performed by: PHYSICIAN ASSISTANT

## 2023-06-12 PROCEDURE — 1101F PT FALLS ASSESS-DOCD LE1/YR: CPT | Mod: HCNC,CPTII,S$GLB, | Performed by: PHYSICIAN ASSISTANT

## 2023-06-12 PROCEDURE — 1101F PR PT FALLS ASSESS DOC 0-1 FALLS W/OUT INJ PAST YR: ICD-10-PCS | Mod: HCNC,CPTII,S$GLB, | Performed by: PHYSICIAN ASSISTANT

## 2023-06-12 PROCEDURE — 99999 PR PBB SHADOW E&M-EST. PATIENT-LVL IV: CPT | Mod: PBBFAC,HCNC,, | Performed by: INTERNAL MEDICINE

## 2023-06-12 PROCEDURE — 4010F ACE/ARB THERAPY RXD/TAKEN: CPT | Mod: HCNC,CPTII,S$GLB, | Performed by: INTERNAL MEDICINE

## 2023-06-12 PROCEDURE — 3078F DIAST BP <80 MM HG: CPT | Mod: HCNC,CPTII,S$GLB, | Performed by: INTERNAL MEDICINE

## 2023-06-12 PROCEDURE — 4010F PR ACE/ARB THEARPY RXD/TAKEN: ICD-10-PCS | Mod: HCNC,CPTII,S$GLB, | Performed by: INTERNAL MEDICINE

## 2023-06-12 PROCEDURE — 1159F PR MEDICATION LIST DOCUMENTED IN MEDICAL RECORD: ICD-10-PCS | Mod: HCNC,CPTII,S$GLB, | Performed by: PHYSICIAN ASSISTANT

## 2023-06-12 PROCEDURE — 99999 PR PBB SHADOW E&M-EST. PATIENT-LVL IV: CPT | Mod: PBBFAC,HCNC,, | Performed by: PHYSICIAN ASSISTANT

## 2023-06-12 PROCEDURE — 1160F PR REVIEW ALL MEDS BY PRESCRIBER/CLIN PHARMACIST DOCUMENTED: ICD-10-PCS | Mod: HCNC,CPTII,S$GLB, | Performed by: PHYSICIAN ASSISTANT

## 2023-06-12 PROCEDURE — 3008F PR BODY MASS INDEX (BMI) DOCUMENTED: ICD-10-PCS | Mod: HCNC,CPTII,S$GLB, | Performed by: PHYSICIAN ASSISTANT

## 2023-06-12 PROCEDURE — 1126F AMNT PAIN NOTED NONE PRSNT: CPT | Mod: HCNC,CPTII,S$GLB, | Performed by: INTERNAL MEDICINE

## 2023-06-12 RX ORDER — SEMAGLUTIDE 0.25 MG/.5ML
0.25 INJECTION, SOLUTION SUBCUTANEOUS WEEKLY
Qty: 2 ML | Refills: 0 | Status: SHIPPED | OUTPATIENT
Start: 2023-06-12 | End: 2023-09-25
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
## 2023-06-12 NOTE — PROGRESS NOTES
Subjective:       Patient ID: Stevie Villalba is a 68 y.o. male.    Chief Complaint: Follow-up    HPI    68-year-old male here for follow-up.  His legs are not swollen any longer since stopping the norvasc 10 mg in April.      Patient has gout and is on allopurinol 300 mg, colchicine 0.6 mg as needed.  He has had no gout flares.  This is doing ok.    HTN - Patient is currently on HCTZ 25 mg, Toprol-XL 25 mg, valsartan 320 mg. He does check his BP at home, and it runs 120s/70s. Side effects of medications note: none. Denies headaches, blurred vision, chest pain, shortness of breath, nausea.    HLD - Patient is currently on lipitor 40 mg.  His last lipid panel was   Cholesterol   Date Value Ref Range Status   09/12/2022 135 120 - 199 mg/dL Final     Comment:     The National Cholesterol Education Program (NCEP) has set the  following guidelines (reference ranges) for Cholesterol:  Optimal.....................<200 mg/dL  Borderline High.............200-239 mg/dL  High........................> or = 240 mg/dL       Triglycerides   Date Value Ref Range Status   09/12/2022 122 30 - 150 mg/dL Final     Comment:     The National Cholesterol Education Program (NCEP) has set the  following guidelines (reference values) for triglycerides:  Normal......................<150 mg/dL  Borderline High.............150-199 mg/dL  High........................200-499 mg/dL       HDL   Date Value Ref Range Status   09/12/2022 34 (L) 40 - 75 mg/dL Final     Comment:     The National Cholesterol Education Program (NCEP) has set the  following guidelines (reference values) for HDL Cholesterol:  Low...............<40 mg/dL  Optimal...........>60 mg/dL       LDL Cholesterol   Date Value Ref Range Status   09/12/2022 76.6 63.0 - 159.0 mg/dL Final     Comment:     The National Cholesterol Education Program (NCEP) has set the  following guidelines (reference values) for LDL Cholesterol:  Optimal.......................<130 mg/dL  Borderline  High...............130-159 mg/dL  High..........................160-189 mg/dL  Very High.....................>190 mg/dL     .  Side effects of the medication: none.    Review of Systems      Objective:      Physical Exam  Vitals reviewed.   Constitutional:       Appearance: He is well-developed.   HENT:      Head: Normocephalic and atraumatic.      Mouth/Throat:      Pharynx: No oropharyngeal exudate.   Eyes:      General: No scleral icterus.        Right eye: No discharge.         Left eye: No discharge.      Pupils: Pupils are equal, round, and reactive to light.   Neck:      Thyroid: No thyromegaly.      Trachea: No tracheal deviation.   Cardiovascular:      Rate and Rhythm: Normal rate and regular rhythm.      Heart sounds: Normal heart sounds. No murmur heard.    No friction rub. No gallop.   Pulmonary:      Effort: Pulmonary effort is normal. No respiratory distress.      Breath sounds: Normal breath sounds. No wheezing or rales.   Chest:      Chest wall: No tenderness.   Abdominal:      General: Bowel sounds are normal. There is no distension.      Palpations: Abdomen is soft. There is no mass.      Tenderness: There is no abdominal tenderness. There is no guarding or rebound.   Musculoskeletal:         General: No tenderness. Normal range of motion.      Cervical back: Normal range of motion and neck supple.   Skin:     General: Skin is warm and dry.      Coloration: Skin is not pale.      Findings: No erythema or rash.   Neurological:      Mental Status: He is alert and oriented to person, place, and time.   Psychiatric:         Behavior: Behavior normal.       Assessment:       1. Chronic gout without tophus, unspecified cause, unspecified site    2. Primary hypertension    3. Hypertriglyceridemia    4. Paroxysmal atrial fibrillation    5. Weight loss  - semaglutide, weight loss, (WEGOVY) 0.25 mg/0.5 mL PnIj; Inject 0.25 mg into the skin once a week.  Dispense: 2 mL; Refill: 0      Plan:       1. Continue  allopurinol 300 mg, colchicine 0.6 mg as needed  2. Continue HCTZ 25 mg, Toprol-XL 25 mg, valsartan 320 mg.  3. Continue lipitor 40 mg  4. Continue eliquis 5 mg BID, toprol XL 25 mg  5. Try wegovy.  Side effects discussed.

## 2023-06-19 ENCOUNTER — OFFICE VISIT (OUTPATIENT)
Dept: SPORTS MEDICINE | Facility: CLINIC | Age: 68
End: 2023-06-19
Payer: MEDICARE

## 2023-06-19 VITALS
HEART RATE: 58 BPM | WEIGHT: 257.94 LBS | SYSTOLIC BLOOD PRESSURE: 124 MMHG | BODY MASS INDEX: 37.01 KG/M2 | DIASTOLIC BLOOD PRESSURE: 68 MMHG

## 2023-06-19 DIAGNOSIS — M17.12 PRIMARY OSTEOARTHRITIS OF LEFT KNEE: Primary | ICD-10-CM

## 2023-06-19 DIAGNOSIS — M25.562 CHRONIC PAIN OF LEFT KNEE: ICD-10-CM

## 2023-06-19 DIAGNOSIS — G89.29 CHRONIC PAIN OF LEFT KNEE: ICD-10-CM

## 2023-06-19 PROCEDURE — 20610 DRAIN/INJ JOINT/BURSA W/O US: CPT | Mod: HCNC,LT,S$GLB, | Performed by: PHYSICIAN ASSISTANT

## 2023-06-19 PROCEDURE — 99999 PR PBB SHADOW E&M-EST. PATIENT-LVL III: CPT | Mod: PBBFAC,HCNC,, | Performed by: PHYSICIAN ASSISTANT

## 2023-06-19 PROCEDURE — 20610 LARGE JOINT ASPIRATION/INJECTION: L KNEE: ICD-10-PCS | Mod: HCNC,LT,S$GLB, | Performed by: PHYSICIAN ASSISTANT

## 2023-06-19 PROCEDURE — 99499 UNLISTED E&M SERVICE: CPT | Mod: HCNC,S$GLB,, | Performed by: PHYSICIAN ASSISTANT

## 2023-06-19 PROCEDURE — 99499 NO LOS: ICD-10-PCS | Mod: HCNC,S$GLB,, | Performed by: PHYSICIAN ASSISTANT

## 2023-06-19 PROCEDURE — 99999 PR PBB SHADOW E&M-EST. PATIENT-LVL III: ICD-10-PCS | Mod: PBBFAC,HCNC,, | Performed by: PHYSICIAN ASSISTANT

## 2023-06-19 NOTE — PROCEDURES
Large Joint Aspiration/Injection: L knee    Date/Time: 6/19/2023 11:00 AM  Performed by: Santi Villalobos PA-C  Authorized by: Santi Villalobos PA-C     Consent Done?:  Yes (Verbal)  Indications:  Pain and arthritis  Site marked: the procedure site was marked    Timeout: prior to procedure the correct patient, procedure, and site was verified    Prep: patient was prepped and draped in usual sterile fashion    Local anesthesia used?: No      Details:  Needle Size:  22 G  Ultrasonic Guidance for needle placement?: No    Approach:  Anterolateral  Location:  Knee  Site:  L knee  Medications:  30 mg sodium hyaluronate (orthovisc) 30 mg/2 mL  Patient tolerance:  Patient tolerated the procedure well with no immediate complications

## 2023-06-19 NOTE — PROGRESS NOTES
Patient is here for follow up of left knee arthritis. Pt is requesting Orthovisc injection #1.  PMFH reviewed per encounter record. Has failed other conservative modalities including NSAIDS, activity modification, weight loss.     He has received good relief with these injections in the past.      The prior shots were tolerated well.     PHYSICAL EXAMINATION:      General: The patient is alert and oriented x 3. Mood is pleasant.   Observation of ears, eyes and nose reveals no gross abnormalities. No   labored breathing observed.      No signs of infection or adverse reaction to knee.    X-rays bilateral knees (6/12/2023):    FINDINGS:  Left knee:     No fracture.  Reconfirmed narrowing of medial compartment and mild genu varum.  Preserved lateral and patellofemoral compartments.  Stable periarticular spurring about tibiofemoral and patellofemoral compartments.  No suprapatellar bursal effusion or prepatellar soft tissue swelling.  Vascular calcifications.  Left knee findings unchanged to earlier exam.     Right knee:     Reconfirmed findings of right total knee procedure, stable and satisfactory alignment and position of tibial and femoral hardware.  No acute or periprosthetic fracture.  Several small soft tissue calcifications seen on the lateral exam superior to the patella.  No suprapatellar bursal effusion or prepatellar soft tissue swelling.  Vascular calcifications.  Right knee findings do not appear significantly changed to earlier exam.     Kellgren Chavez grade 2 - left knee           Orthovisc Injection Procedure #1 - left knee    A time out was performed, including verification of patient ID, procedure, site and side, availability of information and equipment, review of safety issues, and agreement with consent, the procedure site was marked.    The patient was prepped aseptically with povidone-iodine swabsticks. A diagnostic and therapeutic injection of 2cc Orthovisc was given under sterile technique  using a 22g x 1.5 needle from the anterolateral aspect of the left knee Joint in the supine position.   Stevie Villalba had no adverse reactions to the medication. Pain decreased. male was instructed to apply ice to the joint for 20 minutes and avoid strenuous activities for 24-36 hours following the injection. male was warned of possible blood pressure changes during that time. Following that time, male can resume activities as prior to the injection.    male was reminded to call the clinic immediately for any adverse side effects as explained in clinic today.        Exp:  6/30/2025  Lot:  [id number]

## 2023-06-26 ENCOUNTER — OFFICE VISIT (OUTPATIENT)
Dept: SPORTS MEDICINE | Facility: CLINIC | Age: 68
End: 2023-06-26
Payer: MEDICARE

## 2023-06-26 VITALS
HEART RATE: 57 BPM | DIASTOLIC BLOOD PRESSURE: 64 MMHG | BODY MASS INDEX: 36 KG/M2 | WEIGHT: 250.88 LBS | SYSTOLIC BLOOD PRESSURE: 126 MMHG

## 2023-06-26 DIAGNOSIS — M25.562 CHRONIC PAIN OF LEFT KNEE: ICD-10-CM

## 2023-06-26 DIAGNOSIS — G89.29 CHRONIC PAIN OF LEFT KNEE: ICD-10-CM

## 2023-06-26 DIAGNOSIS — M17.12 PRIMARY OSTEOARTHRITIS OF LEFT KNEE: Primary | ICD-10-CM

## 2023-06-26 PROBLEM — K92.2 GIB (GASTROINTESTINAL BLEEDING): Status: RESOLVED | Noted: 2022-11-01 | Resolved: 2023-06-26

## 2023-06-26 PROCEDURE — 99499 NO LOS: ICD-10-PCS | Mod: HCNC,S$GLB,, | Performed by: PHYSICIAN ASSISTANT

## 2023-06-26 PROCEDURE — 20610 LARGE JOINT ASPIRATION/INJECTION: L KNEE: ICD-10-PCS | Mod: HCNC,LT,S$GLB, | Performed by: PHYSICIAN ASSISTANT

## 2023-06-26 PROCEDURE — 99999 PR PBB SHADOW E&M-EST. PATIENT-LVL III: CPT | Mod: PBBFAC,HCNC,, | Performed by: PHYSICIAN ASSISTANT

## 2023-06-26 PROCEDURE — 20610 DRAIN/INJ JOINT/BURSA W/O US: CPT | Mod: HCNC,LT,S$GLB, | Performed by: PHYSICIAN ASSISTANT

## 2023-06-26 PROCEDURE — 99499 UNLISTED E&M SERVICE: CPT | Mod: HCNC,S$GLB,, | Performed by: PHYSICIAN ASSISTANT

## 2023-06-26 PROCEDURE — 99999 PR PBB SHADOW E&M-EST. PATIENT-LVL III: ICD-10-PCS | Mod: PBBFAC,HCNC,, | Performed by: PHYSICIAN ASSISTANT

## 2023-06-26 NOTE — PROGRESS NOTES
Patient is here for follow up of left knee arthritis. Pt is requesting Orthovisc injection #2.  PMFH reviewed per encounter record. Has failed other conservative modalities including NSAIDS, activity modification, weight loss.     He has received good relief with these injections in the past.      The prior shots were tolerated well.     PHYSICAL EXAMINATION:      General: The patient is alert and oriented x 3. Mood is pleasant.   Observation of ears, eyes and nose reveals no gross abnormalities. No   labored breathing observed.      No signs of infection or adverse reaction to knee.    X-rays bilateral knees (6/12/2023):    FINDINGS:  Left knee:     No fracture.  Reconfirmed narrowing of medial compartment and mild genu varum.  Preserved lateral and patellofemoral compartments.  Stable periarticular spurring about tibiofemoral and patellofemoral compartments.  No suprapatellar bursal effusion or prepatellar soft tissue swelling.  Vascular calcifications.  Left knee findings unchanged to earlier exam.     Right knee:     Reconfirmed findings of right total knee procedure, stable and satisfactory alignment and position of tibial and femoral hardware.  No acute or periprosthetic fracture.  Several small soft tissue calcifications seen on the lateral exam superior to the patella.  No suprapatellar bursal effusion or prepatellar soft tissue swelling.  Vascular calcifications.  Right knee findings do not appear significantly changed to earlier exam.     Kellgren Chavez grade 2 - left knee           Orthovisc Injection Procedure #2 - left knee    A time out was performed, including verification of patient ID, procedure, site and side, availability of information and equipment, review of safety issues, and agreement with consent, the procedure site was marked.    The patient was prepped aseptically with povidone-iodine swabsticks. A diagnostic and therapeutic injection of 2cc Orthovisc was given under sterile technique  using a 22g x 1.5 needle from the anterolateral aspect of the left knee Joint in the supine position.   Stevie Villalba had no adverse reactions to the medication. Pain decreased. male was instructed to apply ice to the joint for 20 minutes and avoid strenuous activities for 24-36 hours following the injection. male was warned of possible blood pressure changes during that time. Following that time, male can resume activities as prior to the injection.    male was reminded to call the clinic immediately for any adverse side effects as explained in clinic today.        Exp:  6/30/2025  Lot:  [id number]

## 2023-06-26 NOTE — PROCEDURES
Large Joint Aspiration/Injection: L knee    Date/Time: 6/26/2023 2:30 PM  Performed by: Santi Villalobos PA-C  Authorized by: Santi Villalobos PA-C     Consent Done?:  Yes (Verbal)  Indications:  Pain  Site marked: the procedure site was marked    Timeout: prior to procedure the correct patient, procedure, and site was verified    Prep: patient was prepped and draped in usual sterile fashion    Local anesthesia used?: No      Details:  Needle Size:  22 G  Ultrasonic Guidance for needle placement?: No    Approach:  Superior  Location:  Knee  Site:  L knee  Medications:  30 mg sodium hyaluronate (orthovisc) 30 mg/2 mL  Patient tolerance:  Patient tolerated the procedure well with no immediate complications   Yes

## 2023-07-10 ENCOUNTER — OFFICE VISIT (OUTPATIENT)
Dept: SPORTS MEDICINE | Facility: CLINIC | Age: 68
End: 2023-07-10
Payer: MEDICARE

## 2023-07-10 VITALS
SYSTOLIC BLOOD PRESSURE: 106 MMHG | DIASTOLIC BLOOD PRESSURE: 68 MMHG | HEART RATE: 63 BPM | WEIGHT: 250.88 LBS | BODY MASS INDEX: 36 KG/M2

## 2023-07-10 DIAGNOSIS — G89.29 CHRONIC PAIN OF LEFT KNEE: ICD-10-CM

## 2023-07-10 DIAGNOSIS — M25.562 CHRONIC PAIN OF LEFT KNEE: ICD-10-CM

## 2023-07-10 DIAGNOSIS — M17.12 PRIMARY OSTEOARTHRITIS OF LEFT KNEE: Primary | ICD-10-CM

## 2023-07-10 PROCEDURE — 99999 PR PBB SHADOW E&M-EST. PATIENT-LVL III: ICD-10-PCS | Mod: PBBFAC,HCNC,, | Performed by: PHYSICIAN ASSISTANT

## 2023-07-10 PROCEDURE — 99499 NO LOS: ICD-10-PCS | Mod: HCNC,S$GLB,, | Performed by: PHYSICIAN ASSISTANT

## 2023-07-10 PROCEDURE — 20610 LARGE JOINT ASPIRATION/INJECTION: L KNEE: ICD-10-PCS | Mod: HCNC,LT,S$GLB, | Performed by: PHYSICIAN ASSISTANT

## 2023-07-10 PROCEDURE — 99999 PR PBB SHADOW E&M-EST. PATIENT-LVL III: CPT | Mod: PBBFAC,HCNC,, | Performed by: PHYSICIAN ASSISTANT

## 2023-07-10 PROCEDURE — 20610 DRAIN/INJ JOINT/BURSA W/O US: CPT | Mod: HCNC,LT,S$GLB, | Performed by: PHYSICIAN ASSISTANT

## 2023-07-10 PROCEDURE — 99499 UNLISTED E&M SERVICE: CPT | Mod: HCNC,S$GLB,, | Performed by: PHYSICIAN ASSISTANT

## 2023-07-10 NOTE — PROGRESS NOTES
Patient is here for follow up of left knee arthritis. Pt is requesting Orthovisc injection #3.  PMFH reviewed per encounter record. Has failed other conservative modalities including NSAIDS, activity modification, weight loss.     He has received good relief with these injections in the past.      The prior shots were tolerated well.     PHYSICAL EXAMINATION:      General: The patient is alert and oriented x 3. Mood is pleasant.   Observation of ears, eyes and nose reveals no gross abnormalities. No   labored breathing observed.      No signs of infection or adverse reaction to knee.    X-rays bilateral knees (6/12/2023):    FINDINGS:  Left knee:     No fracture.  Reconfirmed narrowing of medial compartment and mild genu varum.  Preserved lateral and patellofemoral compartments.  Stable periarticular spurring about tibiofemoral and patellofemoral compartments.  No suprapatellar bursal effusion or prepatellar soft tissue swelling.  Vascular calcifications.  Left knee findings unchanged to earlier exam.     Right knee:     Reconfirmed findings of right total knee procedure, stable and satisfactory alignment and position of tibial and femoral hardware.  No acute or periprosthetic fracture.  Several small soft tissue calcifications seen on the lateral exam superior to the patella.  No suprapatellar bursal effusion or prepatellar soft tissue swelling.  Vascular calcifications.  Right knee findings do not appear significantly changed to earlier exam.     Kellgren Chavez grade 2 - left knee           Orthovisc Injection Procedure #3- left knee    A time out was performed, including verification of patient ID, procedure, site and side, availability of information and equipment, review of safety issues, and agreement with consent, the procedure site was marked.    The patient was prepped aseptically with povidone-iodine swabsticks. A diagnostic and therapeutic injection of 2cc Orthovisc was given under sterile technique  using a 22g x 1.5 needle from the anterolateral aspect of the left knee Joint in the supine position.   Stevie Villalba had no adverse reactions to the medication. Pain decreased. male was instructed to apply ice to the joint for 20 minutes and avoid strenuous activities for 24-36 hours following the injection. male was warned of possible blood pressure changes during that time. Following that time, male can resume activities as prior to the injection.    male was reminded to call the clinic immediately for any adverse side effects as explained in clinic today.        Exp:  6/30/2025  Lot:  [id number]

## 2023-07-10 NOTE — PROCEDURES
Large Joint Aspiration/Injection: L knee    Date/Time: 7/10/2023 2:15 PM  Performed by: Santi Villalobos PA-C  Authorized by: Santi Villalobos PA-C     Consent Done?:  Yes (Verbal)  Indications:  Pain and arthritis  Site marked: the procedure site was marked    Timeout: prior to procedure the correct patient, procedure, and site was verified    Prep: patient was prepped and draped in usual sterile fashion    Local anesthesia used?: No      Details:  Needle Size:  22 G  Ultrasonic Guidance for needle placement?: No    Approach:  Anterolateral  Location:  Knee  Site:  L knee  Medications:  30 mg sodium hyaluronate (orthovisc) 30 mg/2 mL  Patient tolerance:  Patient tolerated the procedure well with no immediate complications

## 2023-09-25 ENCOUNTER — OFFICE VISIT (OUTPATIENT)
Dept: INTERNAL MEDICINE | Facility: CLINIC | Age: 68
End: 2023-09-25
Payer: MEDICARE

## 2023-09-25 ENCOUNTER — LAB VISIT (OUTPATIENT)
Dept: LAB | Facility: HOSPITAL | Age: 68
End: 2023-09-25
Attending: INTERNAL MEDICINE
Payer: MEDICARE

## 2023-09-25 VITALS
DIASTOLIC BLOOD PRESSURE: 70 MMHG | HEIGHT: 70 IN | SYSTOLIC BLOOD PRESSURE: 130 MMHG | RESPIRATION RATE: 10 BRPM | HEART RATE: 70 BPM | WEIGHT: 247.13 LBS | BODY MASS INDEX: 35.38 KG/M2

## 2023-09-25 DIAGNOSIS — R73.01 IMPAIRED FASTING BLOOD SUGAR: Chronic | ICD-10-CM

## 2023-09-25 DIAGNOSIS — Z12.5 PROSTATE CANCER SCREENING: ICD-10-CM

## 2023-09-25 DIAGNOSIS — I10 PRIMARY HYPERTENSION: Chronic | ICD-10-CM

## 2023-09-25 DIAGNOSIS — I70.0 AORTIC ATHEROSCLEROSIS: ICD-10-CM

## 2023-09-25 DIAGNOSIS — Z85.46 HISTORY OF PROSTATE CANCER: ICD-10-CM

## 2023-09-25 DIAGNOSIS — G47.00 INSOMNIA, UNSPECIFIED TYPE: Chronic | ICD-10-CM

## 2023-09-25 DIAGNOSIS — I48.0 PAROXYSMAL ATRIAL FIBRILLATION: Chronic | ICD-10-CM

## 2023-09-25 DIAGNOSIS — E66.01 SEVERE OBESITY (BMI 35.0-39.9) WITH COMORBIDITY: Chronic | ICD-10-CM

## 2023-09-25 DIAGNOSIS — E78.1 HYPERTRIGLYCERIDEMIA: Chronic | ICD-10-CM

## 2023-09-25 DIAGNOSIS — Z00.00 ANNUAL PHYSICAL EXAM: Primary | ICD-10-CM

## 2023-09-25 DIAGNOSIS — M1A.9XX0 CHRONIC GOUT WITHOUT TOPHUS, UNSPECIFIED CAUSE, UNSPECIFIED SITE: Chronic | ICD-10-CM

## 2023-09-25 PROBLEM — M10.9 GOUT: Chronic | Status: ACTIVE | Noted: 2023-03-26

## 2023-09-25 LAB
ALBUMIN SERPL BCP-MCNC: 3.8 G/DL (ref 3.5–5.2)
ALP SERPL-CCNC: 97 U/L (ref 55–135)
ALT SERPL W/O P-5'-P-CCNC: 20 U/L (ref 10–44)
ANION GAP SERPL CALC-SCNC: 8 MMOL/L (ref 8–16)
AST SERPL-CCNC: 20 U/L (ref 10–40)
BASOPHILS # BLD AUTO: 0.06 K/UL (ref 0–0.2)
BASOPHILS NFR BLD: 0.6 % (ref 0–1.9)
BILIRUB SERPL-MCNC: 0.6 MG/DL (ref 0.1–1)
BUN SERPL-MCNC: 12 MG/DL (ref 8–23)
CALCIUM SERPL-MCNC: 9.4 MG/DL (ref 8.7–10.5)
CHLORIDE SERPL-SCNC: 107 MMOL/L (ref 95–110)
CHOLEST SERPL-MCNC: 156 MG/DL (ref 120–199)
CHOLEST/HDLC SERPL: 3.6 {RATIO} (ref 2–5)
CO2 SERPL-SCNC: 26 MMOL/L (ref 23–29)
COMPLEXED PSA SERPL-MCNC: 0.89 NG/ML (ref 0–4)
CREAT SERPL-MCNC: 0.9 MG/DL (ref 0.5–1.4)
DIFFERENTIAL METHOD: ABNORMAL
EOSINOPHIL # BLD AUTO: 0.1 K/UL (ref 0–0.5)
EOSINOPHIL NFR BLD: 0.7 % (ref 0–8)
ERYTHROCYTE [DISTWIDTH] IN BLOOD BY AUTOMATED COUNT: 13.2 % (ref 11.5–14.5)
EST. GFR  (NO RACE VARIABLE): >60 ML/MIN/1.73 M^2
ESTIMATED AVG GLUCOSE: 103 MG/DL (ref 68–131)
GLUCOSE SERPL-MCNC: 93 MG/DL (ref 70–110)
HBA1C MFR BLD: 5.2 % (ref 4–5.6)
HCT VFR BLD AUTO: 44.8 % (ref 40–54)
HDLC SERPL-MCNC: 43 MG/DL (ref 40–75)
HDLC SERPL: 27.6 % (ref 20–50)
HGB BLD-MCNC: 15.1 G/DL (ref 14–18)
IMM GRANULOCYTES # BLD AUTO: 0.09 K/UL (ref 0–0.04)
IMM GRANULOCYTES NFR BLD AUTO: 0.9 % (ref 0–0.5)
LDLC SERPL CALC-MCNC: 91.4 MG/DL (ref 63–159)
LYMPHOCYTES # BLD AUTO: 1.8 K/UL (ref 1–4.8)
LYMPHOCYTES NFR BLD: 18.3 % (ref 18–48)
MCH RBC QN AUTO: 31.9 PG (ref 27–31)
MCHC RBC AUTO-ENTMCNC: 33.7 G/DL (ref 32–36)
MCV RBC AUTO: 95 FL (ref 82–98)
MONOCYTES # BLD AUTO: 0.6 K/UL (ref 0.3–1)
MONOCYTES NFR BLD: 5.9 % (ref 4–15)
NEUTROPHILS # BLD AUTO: 7.3 K/UL (ref 1.8–7.7)
NEUTROPHILS NFR BLD: 73.6 % (ref 38–73)
NONHDLC SERPL-MCNC: 113 MG/DL
NRBC BLD-RTO: 0 /100 WBC
PLATELET # BLD AUTO: 290 K/UL (ref 150–450)
PMV BLD AUTO: 10.1 FL (ref 9.2–12.9)
POTASSIUM SERPL-SCNC: 4.8 MMOL/L (ref 3.5–5.1)
PROT SERPL-MCNC: 7 G/DL (ref 6–8.4)
RBC # BLD AUTO: 4.74 M/UL (ref 4.6–6.2)
SODIUM SERPL-SCNC: 141 MMOL/L (ref 136–145)
TRIGL SERPL-MCNC: 108 MG/DL (ref 30–150)
TSH SERPL DL<=0.005 MIU/L-ACNC: 1.23 UIU/ML (ref 0.4–4)
WBC # BLD AUTO: 9.94 K/UL (ref 3.9–12.7)

## 2023-09-25 PROCEDURE — 3075F SYST BP GE 130 - 139MM HG: CPT | Mod: HCNC,CPTII,S$GLB, | Performed by: INTERNAL MEDICINE

## 2023-09-25 PROCEDURE — 3078F PR MOST RECENT DIASTOLIC BLOOD PRESSURE < 80 MM HG: ICD-10-PCS | Mod: HCNC,CPTII,S$GLB, | Performed by: INTERNAL MEDICINE

## 2023-09-25 PROCEDURE — 4010F ACE/ARB THERAPY RXD/TAKEN: CPT | Mod: HCNC,CPTII,S$GLB, | Performed by: INTERNAL MEDICINE

## 2023-09-25 PROCEDURE — 1159F MED LIST DOCD IN RCRD: CPT | Mod: HCNC,CPTII,S$GLB, | Performed by: INTERNAL MEDICINE

## 2023-09-25 PROCEDURE — 99397 PER PM REEVAL EST PAT 65+ YR: CPT | Mod: HCNC,S$GLB,, | Performed by: INTERNAL MEDICINE

## 2023-09-25 PROCEDURE — 1160F PR REVIEW ALL MEDS BY PRESCRIBER/CLIN PHARMACIST DOCUMENTED: ICD-10-PCS | Mod: HCNC,CPTII,S$GLB, | Performed by: INTERNAL MEDICINE

## 2023-09-25 PROCEDURE — 99999 PR PBB SHADOW E&M-EST. PATIENT-LVL III: CPT | Mod: PBBFAC,HCNC,, | Performed by: INTERNAL MEDICINE

## 2023-09-25 PROCEDURE — 3075F PR MOST RECENT SYSTOLIC BLOOD PRESS GE 130-139MM HG: ICD-10-PCS | Mod: HCNC,CPTII,S$GLB, | Performed by: INTERNAL MEDICINE

## 2023-09-25 PROCEDURE — 84443 ASSAY THYROID STIM HORMONE: CPT | Mod: HCNC | Performed by: INTERNAL MEDICINE

## 2023-09-25 PROCEDURE — 3008F BODY MASS INDEX DOCD: CPT | Mod: HCNC,CPTII,S$GLB, | Performed by: INTERNAL MEDICINE

## 2023-09-25 PROCEDURE — 80053 COMPREHEN METABOLIC PANEL: CPT | Mod: HCNC | Performed by: INTERNAL MEDICINE

## 2023-09-25 PROCEDURE — 1160F RVW MEDS BY RX/DR IN RCRD: CPT | Mod: HCNC,CPTII,S$GLB, | Performed by: INTERNAL MEDICINE

## 2023-09-25 PROCEDURE — 99999 PR PBB SHADOW E&M-EST. PATIENT-LVL III: ICD-10-PCS | Mod: PBBFAC,HCNC,, | Performed by: INTERNAL MEDICINE

## 2023-09-25 PROCEDURE — 99397 PR PREVENTIVE VISIT,EST,65 & OVER: ICD-10-PCS | Mod: HCNC,S$GLB,, | Performed by: INTERNAL MEDICINE

## 2023-09-25 PROCEDURE — 80061 LIPID PANEL: CPT | Mod: HCNC | Performed by: INTERNAL MEDICINE

## 2023-09-25 PROCEDURE — 84153 ASSAY OF PSA TOTAL: CPT | Mod: HCNC | Performed by: INTERNAL MEDICINE

## 2023-09-25 PROCEDURE — 3078F DIAST BP <80 MM HG: CPT | Mod: HCNC,CPTII,S$GLB, | Performed by: INTERNAL MEDICINE

## 2023-09-25 PROCEDURE — 36415 COLL VENOUS BLD VENIPUNCTURE: CPT | Mod: HCNC,PO | Performed by: INTERNAL MEDICINE

## 2023-09-25 PROCEDURE — 3008F PR BODY MASS INDEX (BMI) DOCUMENTED: ICD-10-PCS | Mod: HCNC,CPTII,S$GLB, | Performed by: INTERNAL MEDICINE

## 2023-09-25 PROCEDURE — 1159F PR MEDICATION LIST DOCUMENTED IN MEDICAL RECORD: ICD-10-PCS | Mod: HCNC,CPTII,S$GLB, | Performed by: INTERNAL MEDICINE

## 2023-09-25 PROCEDURE — 85025 COMPLETE CBC W/AUTO DIFF WBC: CPT | Mod: HCNC | Performed by: INTERNAL MEDICINE

## 2023-09-25 PROCEDURE — 4010F PR ACE/ARB THEARPY RXD/TAKEN: ICD-10-PCS | Mod: HCNC,CPTII,S$GLB, | Performed by: INTERNAL MEDICINE

## 2023-09-25 PROCEDURE — 83036 HEMOGLOBIN GLYCOSYLATED A1C: CPT | Mod: HCNC | Performed by: INTERNAL MEDICINE

## 2023-09-25 RX ORDER — HYDROCHLOROTHIAZIDE 25 MG/1
25 TABLET ORAL DAILY
Qty: 90 TABLET | Refills: 3 | Status: SHIPPED | OUTPATIENT
Start: 2023-09-25

## 2023-09-25 RX ORDER — PANTOPRAZOLE SODIUM 40 MG/1
40 TABLET, DELAYED RELEASE ORAL 2 TIMES DAILY
Qty: 180 TABLET | Refills: 3 | Status: SHIPPED | OUTPATIENT
Start: 2023-09-25 | End: 2024-09-19

## 2023-09-25 RX ORDER — SILDENAFIL 100 MG/1
100 TABLET, FILM COATED ORAL DAILY PRN
Qty: 30 TABLET | Refills: 2 | Status: SHIPPED | OUTPATIENT
Start: 2023-09-25

## 2023-09-25 RX ORDER — METOPROLOL SUCCINATE 25 MG/1
25 TABLET, EXTENDED RELEASE ORAL DAILY
Qty: 90 TABLET | Refills: 3 | Status: SHIPPED | OUTPATIENT
Start: 2023-09-25 | End: 2024-09-24

## 2023-09-25 RX ORDER — ATORVASTATIN CALCIUM 40 MG/1
40 TABLET, FILM COATED ORAL NIGHTLY
Qty: 90 TABLET | Refills: 3 | Status: SHIPPED | OUTPATIENT
Start: 2023-09-25
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
## 2023-09-26 RX ORDER — AMLODIPINE BESYLATE 10 MG/1
TABLET ORAL
Qty: 90 TABLET | Refills: 3 | OUTPATIENT
Start: 2023-09-26

## 2023-09-26 NOTE — TELEPHONE ENCOUNTER
Refill Decision Note   Stevie Villalba  is requesting a refill authorization.  Brief Assessment and Rationale for Refill:  Quick Discontinue     Medication Therapy Plan:  discontinued on 5/1/2023 by Ric Brambila MD      Comments:     Note composed:6:08 AM 09/26/2023             Appointments     Last Visit   9/25/2023 Ric Brambila MD   Next Visit   Visit date not found Ric Brambila MD

## 2023-09-26 NOTE — TELEPHONE ENCOUNTER
No care due was identified.  [facility] Embedded Care Due Messages. Reference number: [id number].   9/26/2023 12:06:15 AM CDT

## 2023-10-23 NOTE — TELEPHONE ENCOUNTER
No care due was identified.  [facility] Embedded Care Due Messages. Reference number: [id number].   10/23/2023 11:13:44 AM CDT

## 2023-10-24 RX ORDER — LOSARTAN POTASSIUM 100 MG/1
TABLET ORAL
Qty: 90 TABLET | Refills: 3 | OUTPATIENT
Start: 2023-10-24

## 2023-10-24 NOTE — TELEPHONE ENCOUNTER
Refill Decision Note   Stevie Villalba  is requesting a refill authorization.  Brief Assessment and Rationale for Refill:  Quick Discontinue     Medication Therapy Plan:  discontinued on 5/1/2023 by Ric Brambila MD      Comments:     Note composed:12:32 PM 10/24/2023

## 2023-10-30 ENCOUNTER — PATIENT MESSAGE (OUTPATIENT)
Dept: INTERNAL MEDICINE | Facility: CLINIC | Age: 68
End: 2023-10-30
Payer: MEDICARE

## 2023-10-30 RX ORDER — VALSARTAN 320 MG/1
320 TABLET ORAL DAILY
Qty: 90 TABLET | Refills: 3 | Status: SHIPPED | OUTPATIENT
Start: 2023-10-30

## 2023-10-30 NOTE — TELEPHONE ENCOUNTER
Refill Decision Note   Stevie Karenjerry  is requesting a refill authorization.  Brief Assessment and Rationale for Refill:  Approve     Medication Therapy Plan:         Comments:     Note composed:4:39 PM 10/30/2023

## 2023-10-30 NOTE — TELEPHONE ENCOUNTER
No care due was identified.  [facility] Embedded Care Due Messages. Reference number: [id number].   10/30/2023 11:56:57 AM CDT

## 2024-02-05 ENCOUNTER — PATIENT MESSAGE (OUTPATIENT)
Dept: INTERNAL MEDICINE | Facility: CLINIC | Age: 69
End: 2024-02-05
Payer: MEDICARE

## 2024-02-05 DIAGNOSIS — M25.569 KNEE PAIN, UNSPECIFIED CHRONICITY, UNSPECIFIED LATERALITY: Primary | ICD-10-CM

## 2024-02-19 ENCOUNTER — PATIENT MESSAGE (OUTPATIENT)
Dept: SPORTS MEDICINE | Facility: CLINIC | Age: 69
End: 2024-02-19
Payer: MEDICARE

## 2024-02-19 ENCOUNTER — TELEPHONE (OUTPATIENT)
Dept: SPORTS MEDICINE | Facility: CLINIC | Age: 69
End: 2024-02-19
Payer: MEDICARE

## 2024-02-19 NOTE — TELEPHONE ENCOUNTER
Called patient to schedule gel injections with Santi. He does not want to be seen in [place]. Wants to see someone at [facility] Dr Villalobos   Im contacting you regarding me getting a knee injection, its really starting to hurt and been awhile since Krysta had one. My Primary Care Providers Dr Ric Brambila office has already sent your office at [facility], but I havent heard anything from your office as to when I can come in for the injection. Its been two weeks since referral was sent. Please let me know when I can come in to get the injection please and thank you.  Sincerely Stevie Villalba

## 2024-02-20 ENCOUNTER — TELEPHONE (OUTPATIENT)
Dept: SPORTS MEDICINE | Facility: CLINIC | Age: 69
End: 2024-02-20
Payer: MEDICARE

## 2024-02-20 DIAGNOSIS — M17.12 PRIMARY OSTEOARTHRITIS OF LEFT KNEE: Primary | ICD-10-CM

## 2024-02-20 NOTE — TELEPHONE ENCOUNTER
Spoke to the patient in regard to scheduling left knee injections. While on the call I scheduled the patient for series of knee injections with Connie Lewis PA-C at the [facility] location.

## 2024-02-20 NOTE — PROGRESS NOTES
We have discussed a variety of treatment options including medications, injections, physical therapy and other alternative treatments. I also explained the indications, risks and benefits of surgery.  Patient's pain is refractory HEP, conservative management, and NSAIDs. Pt would like to proceed with visco-supplementation.    Medical Necessity for viscosupplementation use: After thorough evaluation of the patient, I have determined that viscosupplementation treatment is medically necessary. The patient has painful degenerative joint disease (DJD) of the knee(s) with failure of conservative treatments including lifestyle modifications and rehabilitation exercises. Oral analgesics including NSAIDs have not adequately controlled the patient's symptoms. There is radiographic evidence of Kellgren-Chavez grade II (or greater) osteoarthritic (OA) changes, or if lack of radiographic evidence, there is arthroscopic or other evidence of chondrosis of the knee(s).     I made the decision to obtain old records of the patient including previous notes and imaging. I independently reviewed and interpreted lab results today as well as prior imaging.        1. Pre-authorization placed for left Orthovisc series injections.  2. Ice compress to the affected area 2-3x a day for 15-20 minutes as needed for pain management.  3. RTC to see Connie Lewis PA-C for left Orthovisc series injections.    All of the patient's questions were answered and the patient will contact us if they have any questions or concerns in the interim.

## 2024-03-07 ENCOUNTER — OFFICE VISIT (OUTPATIENT)
Dept: SPORTS MEDICINE | Facility: CLINIC | Age: 69
End: 2024-03-07
Payer: MEDICARE

## 2024-03-07 VITALS
BODY MASS INDEX: 36.74 KG/M2 | HEART RATE: 57 BPM | WEIGHT: 256.63 LBS | DIASTOLIC BLOOD PRESSURE: 72 MMHG | SYSTOLIC BLOOD PRESSURE: 125 MMHG | HEIGHT: 70 IN

## 2024-03-07 DIAGNOSIS — M17.12 PRIMARY OSTEOARTHRITIS OF LEFT KNEE: Primary | ICD-10-CM

## 2024-03-07 PROCEDURE — 20610 DRAIN/INJ JOINT/BURSA W/O US: CPT | Mod: HCNC,LT,S$GLB, | Performed by: PHYSICIAN ASSISTANT

## 2024-03-07 PROCEDURE — 99499 UNLISTED E&M SERVICE: CPT | Mod: HCNC,S$GLB,, | Performed by: PHYSICIAN ASSISTANT

## 2024-03-07 PROCEDURE — 99999 PR PBB SHADOW E&M-EST. PATIENT-LVL III: CPT | Mod: PBBFAC,HCNC,, | Performed by: PHYSICIAN ASSISTANT

## 2024-03-07 NOTE — PROGRESS NOTES
Patient is here for follow up of left knee arthritis. Pt is requesting left knee Orthovisc #1.  [facility]. Has failed other conservative modalities including NSAIDS, activity modification, weight loss.    The prior shot was tolerated well.    PHYSICAL EXAMINATION:     General: The patient is alert and oriented x 3. Mood is pleasant.   Observation of ears, eyes and nose reveals no gross abnormalities. No   labored breathing observed.     No signs of infection or adverse reaction to knee.    PROCEDURE NOTE:  Injection Procedure left knee  A time out was performed, including verification of patient ID, procedure, site and side, availability of information and equipment, review of safety issues, and agreement with consent, the procedure site was marked.    After time out was performed, the patient was prepped aseptically with povidone-iodine swabsticks. A diagnostic and therapeutic injection of 2cc Orthovisc was given under sterile technique using a 22g x 1.5 needle from the anterolateral  aspect of the left Knee Joint in the sitting position.      Stevie Villalba had no adverse reactions to the medication. Pain decreased. He was instructed to apply ice to the joint for 20 minutes and avoid strenuous activities for 24-36 hours following the injection. He was warned of possible blood sugar and/or blood pressure changes during that time. Following that time, he can resume regular activities.    He was reminded to call the clinic immediately for any adverse side effects as explained in clinic today.      RTC 1 week for 2nd injection.  All questions were answered, pt will contact us for questions or concerns in the interim.

## 2024-03-14 ENCOUNTER — OFFICE VISIT (OUTPATIENT)
Dept: SPORTS MEDICINE | Facility: CLINIC | Age: 69
End: 2024-03-14
Payer: MEDICARE

## 2024-03-14 VITALS
DIASTOLIC BLOOD PRESSURE: 68 MMHG | HEIGHT: 70 IN | HEART RATE: 54 BPM | WEIGHT: 255.94 LBS | SYSTOLIC BLOOD PRESSURE: 129 MMHG | BODY MASS INDEX: 36.64 KG/M2

## 2024-03-14 DIAGNOSIS — M17.12 PRIMARY OSTEOARTHRITIS OF LEFT KNEE: Primary | ICD-10-CM

## 2024-03-14 PROCEDURE — 99999 PR PBB SHADOW E&M-EST. PATIENT-LVL III: CPT | Mod: PBBFAC,HCNC,, | Performed by: PHYSICIAN ASSISTANT

## 2024-03-14 PROCEDURE — 20610 DRAIN/INJ JOINT/BURSA W/O US: CPT | Mod: HCNC,LT,S$GLB, | Performed by: PHYSICIAN ASSISTANT

## 2024-03-14 PROCEDURE — 99499 UNLISTED E&M SERVICE: CPT | Mod: HCNC,S$GLB,, | Performed by: PHYSICIAN ASSISTANT

## 2024-03-14 NOTE — PROGRESS NOTES
Patient is here for follow up of left knee arthritis. Pt is requesting left knee Orthovisc #2.  [facility] Has failed other conservative modalities including NSAIDS, activity modification, weight loss.    The prior shot was tolerated well.    PHYSICAL EXAMINATION:     General: The patient is alert and oriented x 3. Mood is pleasant.   Observation of ears, eyes and nose reveals no gross abnormalities. No   labored breathing observed.     No signs of infection or adverse reaction to knee.    PROCEDURE NOTE:  Injection Procedure left knee  A time out was performed, including verification of patient ID, procedure, site and side, availability of information and equipment, review of safety issues, and agreement with consent, the procedure site was marked.    After time out was performed, the patient was prepped aseptically with povidone-iodine swabsticks. A diagnostic and therapeutic injection of 2cc Orthovisc was given under sterile technique using a 22g x 1.5 needle from the anterolateral aspect of the left Knee Joint in the sitting position.      Stevie Villalba had no adverse reactions to the medication. Pain decreased. He was instructed to apply ice to the joint for 20 minutes and avoid strenuous activities for 24-36 hours following the injection. He was warned of possible blood sugar and/or blood pressure changes during that time. Following that time, he can resume regular activities.    He was reminded to call the clinic immediately for any adverse side effects as explained in clinic today.      RTC 1 week for 3rd injection.  All questions were answered, pt will contact us for questions or concerns in the interim.

## 2024-03-21 ENCOUNTER — OFFICE VISIT (OUTPATIENT)
Dept: SPORTS MEDICINE | Facility: CLINIC | Age: 69
End: 2024-03-21
Payer: MEDICARE

## 2024-03-21 VITALS
DIASTOLIC BLOOD PRESSURE: 65 MMHG | WEIGHT: 255.94 LBS | HEIGHT: 70 IN | SYSTOLIC BLOOD PRESSURE: 117 MMHG | HEART RATE: 61 BPM | BODY MASS INDEX: 36.64 KG/M2

## 2024-03-21 DIAGNOSIS — M17.12 PRIMARY OSTEOARTHRITIS OF LEFT KNEE: Primary | ICD-10-CM

## 2024-03-21 PROCEDURE — 20610 DRAIN/INJ JOINT/BURSA W/O US: CPT | Mod: HCNC,LT,S$GLB, | Performed by: PHYSICIAN ASSISTANT

## 2024-03-21 PROCEDURE — 99499 UNLISTED E&M SERVICE: CPT | Mod: 25,HCNC,S$GLB, | Performed by: PHYSICIAN ASSISTANT

## 2024-03-21 PROCEDURE — 99999 PR PBB SHADOW E&M-EST. PATIENT-LVL III: CPT | Mod: PBBFAC,HCNC,, | Performed by: PHYSICIAN ASSISTANT

## 2024-03-21 NOTE — PROGRESS NOTES
Patient is here for follow up of left knee arthritis. Pt is requesting left knee Orthovisc #3.  [facility] reviewed per encounter record. Has failed other conservative modalities including NSAIDS, activity modification, weight loss.    The prior shot was tolerated well.    PHYSICAL EXAMINATION:     General: The patient is alert and oriented x 3. Mood is pleasant.   Observation of ears, eyes and nose reveals no gross abnormalities. No   labored breathing observed.     No signs of infection or adverse reaction to knee.    PROCEDURE NOTE:  Injection Procedure left knee  A time out was performed, including verification of patient ID, procedure, site and side, availability of information and equipment, review of safety issues, and agreement with consent, the procedure site was marked.    After time out was performed, the patient was prepped aseptically with povidone-iodine swabsticks. A diagnostic and therapeutic injection of 2cc Orthovisc was given under sterile technique using a 22g x 1.5 needle from the anterolateral aspect of the left Knee Joint in the sitting position.      Stevie Villalba had no adverse reactions to the medication. Pain decreased. He was instructed to apply ice to the joint for 20 minutes and avoid strenuous activities for 24-36 hours following the injection. He was warned of possible blood sugar and/or blood pressure changes during that time. Following that time, he can resume regular activities.    He was reminded to call the clinic immediately for any adverse side effects as explained in clinic today.      RTC in 6 months with Connie Lewis PA-C for possible repeat visco supplementation.  All questions were answered, pt will contact us for questions or concerns in the interim.

## 2024-03-25 ENCOUNTER — OFFICE VISIT (OUTPATIENT)
Dept: INTERNAL MEDICINE | Facility: CLINIC | Age: 69
End: 2024-03-25
Payer: MEDICARE

## 2024-03-25 VITALS
OXYGEN SATURATION: 95 % | BODY MASS INDEX: 36.29 KG/M2 | HEART RATE: 62 BPM | WEIGHT: 253.5 LBS | RESPIRATION RATE: 12 BRPM | HEIGHT: 70 IN | DIASTOLIC BLOOD PRESSURE: 70 MMHG | TEMPERATURE: 98 F | SYSTOLIC BLOOD PRESSURE: 118 MMHG

## 2024-03-25 DIAGNOSIS — E66.01 SEVERE OBESITY (BMI 35.0-39.9) WITH COMORBIDITY: Chronic | ICD-10-CM

## 2024-03-25 DIAGNOSIS — I48.0 PAROXYSMAL ATRIAL FIBRILLATION: Chronic | ICD-10-CM

## 2024-03-25 DIAGNOSIS — Z85.46 HISTORY OF PROSTATE CANCER: ICD-10-CM

## 2024-03-25 DIAGNOSIS — I10 PRIMARY HYPERTENSION: Primary | Chronic | ICD-10-CM

## 2024-03-25 DIAGNOSIS — I70.0 AORTIC ATHEROSCLEROSIS: Chronic | ICD-10-CM

## 2024-03-25 DIAGNOSIS — E78.1 HYPERTRIGLYCERIDEMIA: Chronic | ICD-10-CM

## 2024-03-25 DIAGNOSIS — M1A.9XX0 CHRONIC GOUT WITHOUT TOPHUS, UNSPECIFIED CAUSE, UNSPECIFIED SITE: Chronic | ICD-10-CM

## 2024-03-25 DIAGNOSIS — R73.01 IMPAIRED FASTING BLOOD SUGAR: Chronic | ICD-10-CM

## 2024-03-25 PROCEDURE — 3074F SYST BP LT 130 MM HG: CPT | Mod: HCNC,CPTII,S$GLB, | Performed by: INTERNAL MEDICINE

## 2024-03-25 PROCEDURE — 1159F MED LIST DOCD IN RCRD: CPT | Mod: HCNC,CPTII,S$GLB, | Performed by: INTERNAL MEDICINE

## 2024-03-25 PROCEDURE — 4010F ACE/ARB THERAPY RXD/TAKEN: CPT | Mod: HCNC,CPTII,S$GLB, | Performed by: INTERNAL MEDICINE

## 2024-03-25 PROCEDURE — 3078F DIAST BP <80 MM HG: CPT | Mod: HCNC,CPTII,S$GLB, | Performed by: INTERNAL MEDICINE

## 2024-03-25 PROCEDURE — 1160F RVW MEDS BY RX/DR IN RCRD: CPT | Mod: HCNC,CPTII,S$GLB, | Performed by: INTERNAL MEDICINE

## 2024-03-25 PROCEDURE — 99999 PR PBB SHADOW E&M-EST. PATIENT-LVL IV: CPT | Mod: PBBFAC,HCNC,, | Performed by: INTERNAL MEDICINE

## 2024-03-25 PROCEDURE — 1126F AMNT PAIN NOTED NONE PRSNT: CPT | Mod: HCNC,CPTII,S$GLB, | Performed by: INTERNAL MEDICINE

## 2024-03-25 PROCEDURE — 99214 OFFICE O/P EST MOD 30 MIN: CPT | Mod: HCNC,S$GLB,, | Performed by: INTERNAL MEDICINE

## 2024-03-25 PROCEDURE — 3008F BODY MASS INDEX DOCD: CPT | Mod: HCNC,CPTII,S$GLB, | Performed by: INTERNAL MEDICINE

## 2024-03-25 NOTE — PROGRESS NOTES
Subjective:       Patient ID: Stevie Villalba is a 68 y.o. male.    Chief Complaint: Follow-up    HPI    68-year-old male here for follow-up.    HTN - Patient is currently on valsartan 320 mg, Toprol-XL 25 mg, HCTZ 25 mg. He does check his BP at home, and it runs 120/60. Side effects of medications note: none. Denies headaches, blurred vision, chest pain, shortness of breath, nausea.    HLD - Patient is currently on Lipitor 40 mg.  His last lipid panel was   Cholesterol   Date Value Ref Range Status   09/25/2023 156 120 - 199 mg/dL Final     Comment:     The National Cholesterol Education Program (NCEP) has set the  following guidelines (reference ranges) for Cholesterol:  Optimal.....................<200 mg/dL  Borderline High.............200-239 mg/dL  High........................> or = 240 mg/dL       Triglycerides   Date Value Ref Range Status   09/25/2023 108 30 - 150 mg/dL Final     Comment:     The National Cholesterol Education Program (NCEP) has set the  following guidelines (reference values) for triglycerides:  Normal......................<150 mg/dL  Borderline High.............150-199 mg/dL  High........................200-499 mg/dL       HDL   Date Value Ref Range Status   09/25/2023 43 40 - 75 mg/dL Final     Comment:     The National Cholesterol Education Program (NCEP) has set the  following guidelines (reference values) for HDL Cholesterol:  Low...............<40 mg/dL  Optimal...........>60 mg/dL       LDL Cholesterol   Date Value Ref Range Status   09/25/2023 91.4 63.0 - 159.0 mg/dL Final     Comment:     The National Cholesterol Education Program (NCEP) has set the  following guidelines (reference values) for LDL Cholesterol:  Optimal.......................<130 mg/dL  Borderline High...............130-159 mg/dL  High..........................160-189 mg/dL  Very High.....................>190 mg/dL     .  Side effects of the medication: none.    Patient has atrial fibrillation and is on flecainide  100 mg, Toprol-XL 25 mg, Eliquis 5 mg b.i.d..  He has no issues with the atrial fibrillation.    Patient has gout and is on allopurinol 300 mg.  He has had no gout attacks the last 6 months.    He is going to [place] April 6th on a cruise.    Lost his brother the other day. He was in a nursing home and fell and broke his pelvis.    Review of Systems      Objective:      Physical Exam  Vitals reviewed.   Constitutional:       Appearance: He is well-developed.   HENT:      Head: Normocephalic and atraumatic.      Mouth/Throat:      Pharynx: No oropharyngeal exudate.   Eyes:      General: No scleral icterus.        Right eye: No discharge.         Left eye: No discharge.      Pupils: Pupils are equal, round, and reactive to light.   Neck:      Thyroid: No thyromegaly.      Trachea: No tracheal deviation.   Cardiovascular:      Rate and Rhythm: Normal rate and regular rhythm.      Heart sounds: Normal heart sounds. No murmur heard.     No friction rub. No gallop.   Pulmonary:      Effort: Pulmonary effort is normal. No respiratory distress.      Breath sounds: Normal breath sounds. No wheezing or rales.   Chest:      Chest wall: No tenderness.   Abdominal:      General: Bowel sounds are normal. There is no distension.      Palpations: Abdomen is soft. There is no mass.      Tenderness: There is no abdominal tenderness. There is no guarding or rebound.   Musculoskeletal:         General: No tenderness. Normal range of motion.      Cervical back: Normal range of motion and neck supple.   Skin:     General: Skin is warm and dry.      Coloration: Skin is not pale.      Findings: No erythema or rash.   Neurological:      Mental Status: He is alert and oriented to person, place, and time.   Psychiatric:         Behavior: Behavior normal.         Assessment:       1. Primary hypertension    2. Hypertriglyceridemia    3. Aortic atherosclerosis    4. Paroxysmal atrial fibrillation    5. Severe obesity (BMI 35.0-39.9) with  comorbidity    6. Impaired fasting blood sugar    7. Chronic gout without tophus, unspecified cause, unspecified site    8. History of prostate cancer      Plan:       1. Continue HCTZ 25 mg, valsartan 320 mg, Toprol-XL 25 mg p.o.  2/3.  Continue Lipitor 40 mg p.o.   4. Continue Eliquis 5 mg b.i.d., flecainide as needed, Toprol-XL 25 mg p.o.  5.  Monitor  6.  Monitor   7. Continue allopurinol 300 mg p.o.  8.  Monitor.

## 2024-05-23 ENCOUNTER — PATIENT MESSAGE (OUTPATIENT)
Dept: PRIMARY CARE CLINIC | Facility: CLINIC | Age: 69
End: 2024-05-23

## 2024-05-23 ENCOUNTER — OFFICE VISIT (OUTPATIENT)
Dept: PRIMARY CARE CLINIC | Facility: CLINIC | Age: 69
End: 2024-05-23
Payer: MEDICARE

## 2024-05-23 VITALS
HEIGHT: 70 IN | SYSTOLIC BLOOD PRESSURE: 128 MMHG | TEMPERATURE: 98 F | BODY MASS INDEX: 35.73 KG/M2 | RESPIRATION RATE: 12 BRPM | WEIGHT: 249.56 LBS | DIASTOLIC BLOOD PRESSURE: 83 MMHG | OXYGEN SATURATION: 96 % | HEART RATE: 60 BPM

## 2024-05-23 DIAGNOSIS — G89.29 CHRONIC PAIN OF LEFT KNEE: Primary | ICD-10-CM

## 2024-05-23 DIAGNOSIS — M25.562 CHRONIC PAIN OF LEFT KNEE: Primary | ICD-10-CM

## 2024-05-23 DIAGNOSIS — M17.12 ARTHRITIS OF LEFT KNEE: ICD-10-CM

## 2024-05-23 PROCEDURE — 99999 PR PBB SHADOW E&M-EST. PATIENT-LVL IV: CPT | Mod: PBBFAC,,, | Performed by: INTERNAL MEDICINE

## 2024-05-23 PROCEDURE — 1160F RVW MEDS BY RX/DR IN RCRD: CPT | Mod: CPTII,S$GLB,, | Performed by: INTERNAL MEDICINE

## 2024-05-23 PROCEDURE — 1126F AMNT PAIN NOTED NONE PRSNT: CPT | Mod: CPTII,S$GLB,, | Performed by: INTERNAL MEDICINE

## 2024-05-23 PROCEDURE — 3079F DIAST BP 80-89 MM HG: CPT | Mod: CPTII,S$GLB,, | Performed by: INTERNAL MEDICINE

## 2024-05-23 PROCEDURE — 1159F MED LIST DOCD IN RCRD: CPT | Mod: CPTII,S$GLB,, | Performed by: INTERNAL MEDICINE

## 2024-05-23 PROCEDURE — 99214 OFFICE O/P EST MOD 30 MIN: CPT | Mod: S$GLB,,, | Performed by: INTERNAL MEDICINE

## 2024-05-23 PROCEDURE — G2211 COMPLEX E/M VISIT ADD ON: HCPCS | Mod: S$GLB,,, | Performed by: INTERNAL MEDICINE

## 2024-05-23 PROCEDURE — 3074F SYST BP LT 130 MM HG: CPT | Mod: CPTII,S$GLB,, | Performed by: INTERNAL MEDICINE

## 2024-05-23 PROCEDURE — 3008F BODY MASS INDEX DOCD: CPT | Mod: CPTII,S$GLB,, | Performed by: INTERNAL MEDICINE

## 2024-05-23 PROCEDURE — 4010F ACE/ARB THERAPY RXD/TAKEN: CPT | Mod: CPTII,S$GLB,, | Performed by: INTERNAL MEDICINE

## 2024-05-23 RX ORDER — MELOXICAM 7.5 MG/1
7.5 TABLET ORAL DAILY
Qty: 14 TABLET | Refills: 0 | Status: SHIPPED | OUTPATIENT
Start: 2024-05-23

## 2024-05-23 NOTE — PROGRESS NOTES
Subjective:       Patient ID: Stevie Villalba is a 69 y.o. male.    Chief Complaint: Knee Pain    HPI    69-year-old male here for evaluation of left knee pain.  He reports that this started back in March with the cruise ship. It felt like it was in his knee, but is behind his knee.  He elevates his legs at night.  He has a compression sock to put on it at night.  He wears this on the boat.      Review of Systems      Objective:      Physical Exam  Vitals reviewed.   Constitutional:       Appearance: He is well-developed.   HENT:      Head: Normocephalic and atraumatic.      Mouth/Throat:      Pharynx: No oropharyngeal exudate.   Eyes:      General: No scleral icterus.        Right eye: No discharge.         Left eye: No discharge.      Pupils: Pupils are equal, round, and reactive to light.   Neck:      Thyroid: No thyromegaly.      Trachea: No tracheal deviation.   Cardiovascular:      Rate and Rhythm: Normal rate and regular rhythm.      Heart sounds: Normal heart sounds. No murmur heard.     No friction rub. No gallop.   Pulmonary:      Effort: Pulmonary effort is normal. No respiratory distress.      Breath sounds: Normal breath sounds. No wheezing or rales.   Chest:      Chest wall: No tenderness.   Abdominal:      General: Bowel sounds are normal. There is no distension.      Palpations: Abdomen is soft. There is no mass.      Tenderness: There is no abdominal tenderness. There is no guarding or rebound.   Musculoskeletal:         General: No tenderness.      Cervical back: Normal range of motion and neck supple.      Left knee: Crepitus present. Decreased range of motion.   Skin:     General: Skin is warm and dry.      Coloration: Skin is not pale.      Findings: No erythema or rash.   Neurological:      Mental Status: He is alert and oriented to person, place, and time.   Psychiatric:         Behavior: Behavior normal.         Assessment:       1. Chronic pain of left knee  - Ambulatory referral/consult to  Orthopedics; Future    2. Arthritis of left knee  - Ambulatory referral/consult to Orthopedics; Future      Plan:       1/2.  Refer to orthopedics.  Mobic 7.5 mg sent to pharmacy.

## 2024-06-17 ENCOUNTER — HOSPITAL ENCOUNTER (OUTPATIENT)
Dept: RADIOLOGY | Facility: HOSPITAL | Age: 69
Discharge: HOME | End: 2024-06-17
Attending: PHYSICIAN ASSISTANT
Payer: MEDICARE

## 2024-06-17 ENCOUNTER — OFFICE VISIT (OUTPATIENT)
Dept: ORTHOPEDICS | Facility: CLINIC | Age: 69
End: 2024-06-17
Payer: MEDICARE

## 2024-06-17 DIAGNOSIS — M25.562 CHRONIC PAIN OF LEFT KNEE: ICD-10-CM

## 2024-06-17 DIAGNOSIS — M25.562 LEFT KNEE PAIN, UNSPECIFIED CHRONICITY: ICD-10-CM

## 2024-06-17 DIAGNOSIS — G89.29 CHRONIC PAIN OF LEFT KNEE: ICD-10-CM

## 2024-06-17 DIAGNOSIS — M17.12 PRIMARY OSTEOARTHRITIS OF LEFT KNEE: Primary | ICD-10-CM

## 2024-06-17 PROCEDURE — 73560 X-RAY EXAM OF KNEE 1 OR 2: CPT | Mod: TC,HCNC,RT

## 2024-06-17 PROCEDURE — 1101F PT FALLS ASSESS-DOCD LE1/YR: CPT | Mod: HCNC,CPTII,S$GLB, | Performed by: PHYSICIAN ASSISTANT

## 2024-06-17 PROCEDURE — 73560 X-RAY EXAM OF KNEE 1 OR 2: CPT | Mod: 26,59,HCNC,RT | Performed by: RADIOLOGY

## 2024-06-17 PROCEDURE — 1159F MED LIST DOCD IN RCRD: CPT | Mod: HCNC,CPTII,S$GLB, | Performed by: PHYSICIAN ASSISTANT

## 2024-06-17 PROCEDURE — 1125F AMNT PAIN NOTED PAIN PRSNT: CPT | Mod: HCNC,CPTII,S$GLB, | Performed by: PHYSICIAN ASSISTANT

## 2024-06-17 PROCEDURE — 73562 X-RAY EXAM OF KNEE 3: CPT | Mod: 26,HCNC,LT, | Performed by: RADIOLOGY

## 2024-06-17 PROCEDURE — 20610 DRAIN/INJ JOINT/BURSA W/O US: CPT | Mod: HCNC,LT,S$GLB, | Performed by: PHYSICIAN ASSISTANT

## 2024-06-17 PROCEDURE — 99999 PR PBB SHADOW E&M-EST. PATIENT-LVL III: CPT | Mod: PBBFAC,HCNC,, | Performed by: PHYSICIAN ASSISTANT

## 2024-06-17 PROCEDURE — 4010F ACE/ARB THERAPY RXD/TAKEN: CPT | Mod: HCNC,CPTII,S$GLB, | Performed by: PHYSICIAN ASSISTANT

## 2024-06-17 PROCEDURE — 1160F RVW MEDS BY RX/DR IN RCRD: CPT | Mod: HCNC,CPTII,S$GLB, | Performed by: PHYSICIAN ASSISTANT

## 2024-06-17 PROCEDURE — 3288F FALL RISK ASSESSMENT DOCD: CPT | Mod: HCNC,CPTII,S$GLB, | Performed by: PHYSICIAN ASSISTANT

## 2024-06-17 PROCEDURE — 99213 OFFICE O/P EST LOW 20 MIN: CPT | Mod: HCNC,25,S$GLB, | Performed by: PHYSICIAN ASSISTANT

## 2024-06-17 RX ORDER — TRIAMCINOLONE ACETONIDE 40 MG/ML
40 INJECTION, SUSPENSION INTRA-ARTICULAR; INTRAMUSCULAR
Status: DISCONTINUED | OUTPATIENT
Start: 2024-06-17 | End: 2024-06-17 | Stop reason: HOSPADM

## 2024-06-17 RX ORDER — LIDOCAINE HYDROCHLORIDE 10 MG/ML
2 INJECTION INFILTRATION; PERINEURAL
Status: DISCONTINUED | OUTPATIENT
Start: 2024-06-17 | End: 2024-06-17 | Stop reason: HOSPADM

## 2024-06-17 RX ADMIN — LIDOCAINE HYDROCHLORIDE 2 ML: 10 INJECTION INFILTRATION; PERINEURAL at 11:06

## 2024-06-17 RX ADMIN — TRIAMCINOLONE ACETONIDE 40 MG: 40 INJECTION, SUSPENSION INTRA-ARTICULAR; INTRAMUSCULAR at 11:06

## 2024-06-17 NOTE — PROGRESS NOTES
Patient ID: Stevie Villalba is a 69 y.o. male.    Chief Complaint: Pain of the Left Knee      HISTORY:  Stevie Villalba is a 69 y.o. male with history of gout here today for evaluation of left knee pain.  He was most recently treated in our sports medicine clinic with HA injections (last orthovisc March 2024). He says that the injections were helpful.  Today he reports some pain in his left knee following possible gout flare in his ankle.  He says it is feeling a little better after starting his gout medication.  He is able to bear weight and he denies any warmth or redness.       PMH/PSH/FamHx/SocHx:    Unchanged from prior visit.    ROS:  Constitution: Negative for chills, fever and weakness.   Respiratory: Negative for cough and shortness of breath.   Musculoskeletal: Positive for left knee pain  Psychiatric/Behavioral: The patient is not nervous/anxious.       PHYSICAL EXAM:   Left knee  Skin intact  No warmth or effusion  ROM 0-130  Stable to testing    IMAGING: X-rays of the left knee, personally reviewed by me, demonstrate moderate degenerative changes with joint space narrowing, osteophyte formation and subchondral sclerosis.  No fracture or dislocation.     ASSESSMENT/PLAN:    Stevie was seen today for pain.    Diagnoses and all orders for this visit:    Primary osteoarthritis of left knee  -     X-ray Knee Ortho Left; Future  -     Large Joint Aspiration/Injection: L knee    Chronic pain of left knee  -     Ambulatory referral/consult to Orthopedics    - Left knee CSI performed today  - Rest, ice as needed  - Follow up if symptoms worsen or fail to improve.  Can return in 3 months for repeat orthovisc if needed

## 2024-06-17 NOTE — PROCEDURES
Large Joint Aspiration/Injection: L knee    Date/Time: 6/17/2024 11:00 AM    Performed by: Margaret Weller PA-C  Authorized by: Margaret Weller PA-C    Consent Done?:  Yes (Verbal)  Indications:  Pain  Timeout: prior to procedure the correct patient, procedure, and site was verified    Prep: patient was prepped and draped in usual sterile fashion    Local anesthetic:  Topical anesthetic    Details:  Needle Size:  22 G  Approach:  Anterolateral  Location:  Knee  Site:  L knee  Medications:  40 mg triamcinolone acetonide 40 mg/mL; 2 mL LIDOcaine HCL 10 mg/ml (1%) 10 mg/mL (1 %)  Patient tolerance:  Patient tolerated the procedure well with no immediate complications

## 2024-06-19 RX ORDER — ALLOPURINOL 300 MG/1
300 TABLET ORAL DAILY
Qty: 90 TABLET | Refills: 3 | Status: SHIPPED | OUTPATIENT
Start: 2024-06-19

## 2024-06-19 NOTE — TELEPHONE ENCOUNTER
Refill Routing Note   Medication(s) are not appropriate for processing by [facility] for the following reason(s):        Required labs outdated: Uric Acid    [facility] action(s):  Defer     Requires labs : Yes             Appointments  past 12m or future 3m with PCP    Date Provider   Last Visit   5/23/2024 Ric Brambila MD   Next Visit   9/30/2024 Ric Brambila MD   ED visits in past 90 days: 0        Note composed:2:45 PM 06/19/2024

## 2024-06-19 NOTE — TELEPHONE ENCOUNTER
Care Due:                  Date            Visit Type   Department     Provider  --------------------------------------------------------------------------------                                EP -                              PRIMARY      [facility] PRIMARY  Last Visit: 05-      CARE ([facility])   CARE           Ric Brambila  Next Visit: None Scheduled  None         None Found                                                            Last  Test          Frequency    Reason                     Performed    Due Date  --------------------------------------------------------------------------------    Uric Acid...  12 months..  allopurinoL, colchicine..  05- 04-    [facility] Embedded Care Due Messages. Reference number: [id number].   6/19/2024 9:13:17 AM CDT

## 2024-09-12 RX ORDER — METOPROLOL SUCCINATE 25 MG/1
25 TABLET, EXTENDED RELEASE ORAL
Qty: 90 TABLET | Refills: 2 | Status: SHIPPED | OUTPATIENT
Start: 2024-09-12

## 2024-09-12 NOTE — TELEPHONE ENCOUNTER
Care Due:                  Date            Visit Type   Department     Provider  --------------------------------------------------------------------------------                                EP -                              PRIMARY      [facility] PRIMARY  Last Visit: 05-      CARE ([facility])   KAMILA Brambila                              EP -                              PRIMARY      [facility] INTERNAL  Next Visit: 09-      CARE ([facility])   WANG Brambila                                                            Last  Test          Frequency    Reason                     Performed    Due Date  --------------------------------------------------------------------------------    CBC.........  12 months..  allopurinoL, meloxicam...  09- 09-    CMP.........  12 months..  allopurinoL,               09- 09-                             atorvastatin, colchicine,                             hydroCHLOROthiazide,                             meloxicam, valsartan.....    Lipid Panel.  12 months..  atorvastatin.............  09- 09-    Uric Acid...  12 months..  allopurinoL, colchicine..  05- 04-    [facility] Embedded Care Due Messages. Reference number: [id number].   9/12/2024 12:20:32 AM CDT

## 2024-09-12 NOTE — TELEPHONE ENCOUNTER
Provider Staff:  Action required for this patient    Requires labs      Please see care gap opportunities below in Care Due Message.    Thanks!  [facility]     Appointments      Date Provider   Last Visit   5/23/2024 Ric Brambila MD   Next Visit   9/30/2024 Ric Brambila MD     Refill Decision Note   Stevie Villalba  is requesting a refill authorization.  Brief Assessment and Rationale for Refill:  Approve     Medication Therapy Plan:  FOVS      Comments:     Note composed:10:02 AM 09/12/2024

## 2024-09-18 RX ORDER — PANTOPRAZOLE SODIUM 40 MG/1
40 TABLET, DELAYED RELEASE ORAL 2 TIMES DAILY
Qty: 180 TABLET | Refills: 2 | Status: SHIPPED | OUTPATIENT
Start: 2024-09-18

## 2024-09-18 NOTE — TELEPHONE ENCOUNTER
No care due was identified.  [facility] Embedded Care Due Messages. Reference number: [id number].   9/18/2024 12:20:28 AM CDT

## 2024-09-18 NOTE — TELEPHONE ENCOUNTER
Refill Decision Note   Stevie Villalba  is requesting a refill authorization.  Brief Assessment and Rationale for Refill:  Approve     Medication Therapy Plan:         Comments:     Note composed:3:22 PM 09/18/2024             Appointments     Last Visit   5/23/2024 Ric Brambila MD   Next Visit   9/30/2024 Ric Brambila MD

## 2024-09-24 RX ORDER — HYDROCHLOROTHIAZIDE 25 MG/1
25 TABLET ORAL
Qty: 90 TABLET | Refills: 1 | Status: SHIPPED | OUTPATIENT
Start: 2024-09-24

## 2024-09-24 RX ORDER — ATORVASTATIN CALCIUM 40 MG/1
40 TABLET, FILM COATED ORAL NIGHTLY
Qty: 90 TABLET | Refills: 1 | Status: SHIPPED | OUTPATIENT
Start: 2024-09-24

## 2024-09-24 NOTE — TELEPHONE ENCOUNTER
No care due was identified.  [facility] Embedded Care Due Messages. Reference number: [id number].   9/24/2024 12:22:45 AM CDT

## 2024-09-24 NOTE — TELEPHONE ENCOUNTER
Refill Routing Note   Medication(s) are not appropriate for processing by [facility] for the following reason(s):        Required labs outdated    [facility] action(s):  Defer               Appointments  past 12m or future 3m with PCP    Date Provider   Last Visit   5/23/2024 Ric Brambila MD   Next Visit   9/30/2024 Ric Brambila MD   ED visits in past 90 days: 0        Note composed:12:02 PM 09/24/2024

## 2024-09-30 ENCOUNTER — OFFICE VISIT (OUTPATIENT)
Dept: INTERNAL MEDICINE | Facility: CLINIC | Age: 69
End: 2024-09-30
Payer: MEDICARE

## 2024-09-30 ENCOUNTER — LAB VISIT (OUTPATIENT)
Dept: LAB | Facility: HOSPITAL | Age: 69
End: 2024-09-30
Attending: INTERNAL MEDICINE
Payer: MEDICARE

## 2024-09-30 VITALS
SYSTOLIC BLOOD PRESSURE: 120 MMHG | HEART RATE: 70 BPM | HEIGHT: 70 IN | BODY MASS INDEX: 34.32 KG/M2 | DIASTOLIC BLOOD PRESSURE: 60 MMHG | WEIGHT: 239.75 LBS | RESPIRATION RATE: 10 BRPM

## 2024-09-30 DIAGNOSIS — I70.0 AORTIC ATHEROSCLEROSIS: Chronic | ICD-10-CM

## 2024-09-30 DIAGNOSIS — G47.00 INSOMNIA, UNSPECIFIED TYPE: Chronic | ICD-10-CM

## 2024-09-30 DIAGNOSIS — R73.01 IMPAIRED FASTING BLOOD SUGAR: Chronic | ICD-10-CM

## 2024-09-30 DIAGNOSIS — I10 PRIMARY HYPERTENSION: Chronic | ICD-10-CM

## 2024-09-30 DIAGNOSIS — E66.01 SEVERE OBESITY (BMI 35.0-39.9) WITH COMORBIDITY: Chronic | ICD-10-CM

## 2024-09-30 DIAGNOSIS — I48.0 PAROXYSMAL ATRIAL FIBRILLATION: Chronic | ICD-10-CM

## 2024-09-30 DIAGNOSIS — M25.841 CYST OF JOINT OF RIGHT HAND: ICD-10-CM

## 2024-09-30 DIAGNOSIS — Z12.5 PROSTATE CANCER SCREENING: ICD-10-CM

## 2024-09-30 DIAGNOSIS — Z00.00 ANNUAL PHYSICAL EXAM: Primary | ICD-10-CM

## 2024-09-30 DIAGNOSIS — E78.1 HYPERTRIGLYCERIDEMIA: Chronic | ICD-10-CM

## 2024-09-30 DIAGNOSIS — M79.604 RIGHT LEG PAIN: ICD-10-CM

## 2024-09-30 LAB
ALBUMIN SERPL BCP-MCNC: 3.7 G/DL (ref 3.5–5.2)
ALP SERPL-CCNC: 99 U/L (ref 55–135)
ALT SERPL W/O P-5'-P-CCNC: 22 U/L (ref 10–44)
ANION GAP SERPL CALC-SCNC: 8 MMOL/L (ref 8–16)
AST SERPL-CCNC: 23 U/L (ref 10–40)
BASOPHILS # BLD AUTO: 0.06 K/UL (ref 0–0.2)
BASOPHILS NFR BLD: 0.7 % (ref 0–1.9)
BILIRUB SERPL-MCNC: 0.5 MG/DL (ref 0.1–1)
BUN SERPL-MCNC: 17 MG/DL (ref 8–23)
CALCIUM SERPL-MCNC: 9.2 MG/DL (ref 8.7–10.5)
CHLORIDE SERPL-SCNC: 107 MMOL/L (ref 95–110)
CHOLEST SERPL-MCNC: 148 MG/DL (ref 120–199)
CHOLEST/HDLC SERPL: 3.5 {RATIO} (ref 2–5)
CO2 SERPL-SCNC: 24 MMOL/L (ref 23–29)
COMPLEXED PSA SERPL-MCNC: 1.5 NG/ML (ref 0–4)
CREAT SERPL-MCNC: 1 MG/DL (ref 0.5–1.4)
DIFFERENTIAL METHOD BLD: ABNORMAL
EOSINOPHIL # BLD AUTO: 0.1 K/UL (ref 0–0.5)
EOSINOPHIL NFR BLD: 1.2 % (ref 0–8)
ERYTHROCYTE [DISTWIDTH] IN BLOOD BY AUTOMATED COUNT: 12.8 % (ref 11.5–14.5)
EST. GFR  (NO RACE VARIABLE): >60 ML/MIN/1.73 M^2
ESTIMATED AVG GLUCOSE: 105 MG/DL (ref 68–131)
GLUCOSE SERPL-MCNC: 93 MG/DL (ref 70–110)
HBA1C MFR BLD: 5.3 % (ref 4–5.6)
HCT VFR BLD AUTO: 44.1 % (ref 40–54)
HDLC SERPL-MCNC: 42 MG/DL (ref 40–75)
HDLC SERPL: 28.4 % (ref 20–50)
HGB BLD-MCNC: 14.5 G/DL (ref 14–18)
IMM GRANULOCYTES # BLD AUTO: 0.11 K/UL (ref 0–0.04)
IMM GRANULOCYTES NFR BLD AUTO: 1.3 % (ref 0–0.5)
LDLC SERPL CALC-MCNC: 75.6 MG/DL (ref 63–159)
LYMPHOCYTES # BLD AUTO: 2.1 K/UL (ref 1–4.8)
LYMPHOCYTES NFR BLD: 26 % (ref 18–48)
MCH RBC QN AUTO: 31.9 PG (ref 27–31)
MCHC RBC AUTO-ENTMCNC: 32.9 G/DL (ref 32–36)
MCV RBC AUTO: 97 FL (ref 82–98)
MONOCYTES # BLD AUTO: 0.6 K/UL (ref 0.3–1)
MONOCYTES NFR BLD: 6.9 % (ref 4–15)
NEUTROPHILS # BLD AUTO: 5.3 K/UL (ref 1.8–7.7)
NEUTROPHILS NFR BLD: 63.9 % (ref 38–73)
NONHDLC SERPL-MCNC: 106 MG/DL
NRBC BLD-RTO: 0 /100 WBC
PLATELET # BLD AUTO: 268 K/UL (ref 150–450)
PMV BLD AUTO: 9.6 FL (ref 9.2–12.9)
POTASSIUM SERPL-SCNC: 4.2 MMOL/L (ref 3.5–5.1)
PROT SERPL-MCNC: 6.9 G/DL (ref 6–8.4)
RBC # BLD AUTO: 4.54 M/UL (ref 4.6–6.2)
SODIUM SERPL-SCNC: 139 MMOL/L (ref 136–145)
TRIGL SERPL-MCNC: 152 MG/DL (ref 30–150)
TSH SERPL DL<=0.005 MIU/L-ACNC: 1.57 UIU/ML (ref 0.4–4)
WBC # BLD AUTO: 8.23 K/UL (ref 3.9–12.7)

## 2024-09-30 PROCEDURE — 3074F SYST BP LT 130 MM HG: CPT | Mod: CPTII,S$GLB,, | Performed by: INTERNAL MEDICINE

## 2024-09-30 PROCEDURE — 85025 COMPLETE CBC W/AUTO DIFF WBC: CPT | Performed by: INTERNAL MEDICINE

## 2024-09-30 PROCEDURE — 36415 COLL VENOUS BLD VENIPUNCTURE: CPT | Mod: PO | Performed by: INTERNAL MEDICINE

## 2024-09-30 PROCEDURE — 4010F ACE/ARB THERAPY RXD/TAKEN: CPT | Mod: CPTII,S$GLB,, | Performed by: INTERNAL MEDICINE

## 2024-09-30 PROCEDURE — 1159F MED LIST DOCD IN RCRD: CPT | Mod: CPTII,S$GLB,, | Performed by: INTERNAL MEDICINE

## 2024-09-30 PROCEDURE — 99999 PR PBB SHADOW E&M-EST. PATIENT-LVL III: CPT | Mod: PBBFAC,HCNC,, | Performed by: INTERNAL MEDICINE

## 2024-09-30 PROCEDURE — 84443 ASSAY THYROID STIM HORMONE: CPT | Performed by: INTERNAL MEDICINE

## 2024-09-30 PROCEDURE — 84153 ASSAY OF PSA TOTAL: CPT | Performed by: INTERNAL MEDICINE

## 2024-09-30 PROCEDURE — 80053 COMPREHEN METABOLIC PANEL: CPT | Performed by: INTERNAL MEDICINE

## 2024-09-30 PROCEDURE — 99397 PER PM REEVAL EST PAT 65+ YR: CPT | Mod: S$GLB,,, | Performed by: INTERNAL MEDICINE

## 2024-09-30 PROCEDURE — 3008F BODY MASS INDEX DOCD: CPT | Mod: CPTII,S$GLB,, | Performed by: INTERNAL MEDICINE

## 2024-09-30 PROCEDURE — 83036 HEMOGLOBIN GLYCOSYLATED A1C: CPT | Performed by: INTERNAL MEDICINE

## 2024-09-30 PROCEDURE — 1160F RVW MEDS BY RX/DR IN RCRD: CPT | Mod: CPTII,S$GLB,, | Performed by: INTERNAL MEDICINE

## 2024-09-30 PROCEDURE — 3078F DIAST BP <80 MM HG: CPT | Mod: CPTII,S$GLB,, | Performed by: INTERNAL MEDICINE

## 2024-09-30 PROCEDURE — 80061 LIPID PANEL: CPT | Performed by: INTERNAL MEDICINE

## 2024-09-30 NOTE — Clinical Note
He is interested in getting evaluated for a steroid injection in his right knee.  Can you help him set this up?  Ric Brambila

## 2024-09-30 NOTE — PROGRESS NOTES
Subjective:       Patient ID: Stevie Villalba is a 69 y.o. male.    Chief Complaint: Annual Exam    HPI    69 y.o. male here for annual exam.     Cholesterol: needs  Vaccines: Influenza - 2024; Tetanus - 2015; Prevnar 20 - 2 done; Zoster - 2 done; COVID - 3 done  Sexual Screening: active  STD screening: no concern  Eye exam: UTD  Prostate: needs  Colonoscopy: 2022, due 2027  A1c: needs    Exercise: Walks a bit.  Diet: eats on the boat.  Does eat out.  Salad.  Tries to eat better than what he used to.    History of Present Illness    CHIEF COMPLAINT:  Mr. Villalba presents today for annual exam.    MEDICAL HISTORY:  He has a history of hypertension managed with valsartan 320 mg, Toprol XL 25 mg, and hydralazine 25 mg. He also has hyperlipidemia and aortic atherosclerosis, managed with Lipitor 40 mg.    SURGICAL HISTORY:  He has a history of right knee replacement and hernia surgery. The hernia surgery required a second procedure to properly place the surgical mesh that was initially forgotten.    RIGHT KNEE AND LEG PAIN:  He reports right knee and leg pain for the past two months without recalling specific trauma. The pain is intermittent and shooting, extending up to the hip when walking certain ways. He received a steroid injection in the right knee in June with minimal relief. He experiences sharp pain when descending stairs and feels tightness in the knee. He denies constant pain.    RIGHT HAND:  He reports a painless cyst on his right hand present since his last visit in June.    SOCIAL HISTORY:  He reports occasional alcohol consumption, denies smoking or illegal drug use. He is sexually active and denies concerns about sexually transmitted diseases. He has worked for a [employer] for nearly 50 years.    DIET AND EXERCISE:  He is attempting to improve his diet, occasionally eating out and opting for meal salads. He avoids pasta and reports difficulty consuming certain foods while on a boat due to  digestive issues. His exercise is limited to walking, which is affected by knee and hip pain.    PREVENTIVE CARE:  He reports receiving recent flu and pneumonia shots, being up-to-date with eye exams, and has a colonoscopy scheduled for 2027.      ROS:  Musculoskeletal: +joint pain, +muscle pain  Integumentary: +skin lesion         Review of Systems      Objective:      Physical Exam  Vitals reviewed.   Constitutional:       Appearance: He is well-developed.   HENT:      Head: Normocephalic and atraumatic.      Mouth/Throat:      Pharynx: No oropharyngeal exudate.   Eyes:      General: No scleral icterus.        Right eye: No discharge.         Left eye: No discharge.      Pupils: Pupils are equal, round, and reactive to light.   Neck:      Thyroid: No thyromegaly.      Trachea: No tracheal deviation.   Cardiovascular:      Rate and Rhythm: Normal rate and regular rhythm.      Heart sounds: Normal heart sounds. No murmur heard.     No friction rub. No gallop.   Pulmonary:      Effort: Pulmonary effort is normal. No respiratory distress.      Breath sounds: Normal breath sounds. No wheezing or rales.   Chest:      Chest wall: No tenderness.   Abdominal:      General: Bowel sounds are normal. There is no distension.      Palpations: Abdomen is soft. There is no mass.      Tenderness: There is no abdominal tenderness. There is no guarding or rebound.   Musculoskeletal:         General: No tenderness. Normal range of motion.      Cervical back: Normal range of motion and neck supple.   Skin:     General: Skin is warm and dry.      Coloration: Skin is not pale.      Findings: No erythema or rash.   Neurological:      Mental Status: He is alert and oriented to person, place, and time.   Psychiatric:         Behavior: Behavior normal.         Assessment:       1. Annual physical exam    2. Primary hypertension  - CBC Auto Differential; Future  - Comprehensive Metabolic Panel; Future  - TSH; Future  - Lipid Panel;  Future    3. Aortic atherosclerosis    4. Hypertriglyceridemia    5. Paroxysmal atrial fibrillation    6. Impaired fasting blood sugar  - Hemoglobin A1C; Future    7. Severe obesity (BMI 35.0-39.9) with comorbidity    8. Insomnia, unspecified type    9. Prostate cancer screening  - PSA, Screening; Future    10. Right leg pain  - Ambulatory referral/consult to Physical/Occupational Therapy; Future    11. Cyst of joint of right hand  - US Soft Tissue Upper Extremity, Right; Future      Plan:       1. Check CBC, CMP, TSH, lipids, A1c, PSA.  Discussed diet and exercise.  Discussed vaccines.  2.  Continue valsartan 320 mg, Toprol XL 25 mg, HCTZ 25 mg.  3/4.  Continue Lipitor mg p.o.    Assessment & Plan    IMPRESSION:  1. Assessed right knee and leg pain, likely muscular in nature, present for 2 months with intermittent shooting pain  2. Evaluated right hand cyst, possibly a ganglion or bursal cyst  3. Noted presence of hernia, no immediate intervention required    GENERAL HEALTH SCREENING:   Ordered labs including complete blood count, electrolytes, kidney and liver function tests, blood sugar, thyroid panel, cholesterol, HbA1c, and PSA.    RIGHT HAND CYST:   Ordered ultrasound of right hand cyst.    RIGHT KNEE AND LEG PAIN:   Referred to physical therapy for right knee and leg pain.    FOLLOW UP:   Follow up in 6 months with nurse practitioner.   Follow up in 1 year for annual visit.         This note was generated with the assistance of ambient listening technology. Verbal consent was obtained by the patient and accompanying visitor(s) for the recording of patient appointment to facilitate this note. I attest to having reviewed and edited the generated note for accuracy, though some syntax or spelling errors may persist. Please contact the author of this note for any clarification.

## 2024-10-01 ENCOUNTER — HOSPITAL ENCOUNTER (OUTPATIENT)
Dept: RADIOLOGY | Facility: HOSPITAL | Age: 69
Discharge: HOME | End: 2024-10-01
Attending: INTERNAL MEDICINE
Payer: MEDICARE

## 2024-10-01 ENCOUNTER — OFFICE VISIT (OUTPATIENT)
Dept: ORTHOPEDICS | Facility: CLINIC | Age: 69
End: 2024-10-01
Payer: MEDICARE

## 2024-10-01 ENCOUNTER — HOSPITAL ENCOUNTER (OUTPATIENT)
Dept: RADIOLOGY | Facility: HOSPITAL | Age: 69
Discharge: HOME | End: 2024-10-01
Attending: PHYSICIAN ASSISTANT
Payer: MEDICARE

## 2024-10-01 VITALS — HEIGHT: 70 IN | WEIGHT: 239.63 LBS | BODY MASS INDEX: 34.3 KG/M2

## 2024-10-01 DIAGNOSIS — M25.551 PAIN OF RIGHT HIP: ICD-10-CM

## 2024-10-01 DIAGNOSIS — Z96.651 PRESENCE OF RIGHT ARTIFICIAL KNEE JOINT: ICD-10-CM

## 2024-10-01 DIAGNOSIS — M25.561 RIGHT KNEE PAIN, UNSPECIFIED CHRONICITY: ICD-10-CM

## 2024-10-01 DIAGNOSIS — M25.841 CYST OF JOINT OF RIGHT HAND: ICD-10-CM

## 2024-10-01 DIAGNOSIS — M25.561 ACUTE PAIN OF RIGHT KNEE: Primary | ICD-10-CM

## 2024-10-01 DIAGNOSIS — M17.12 PRIMARY OSTEOARTHRITIS OF LEFT KNEE: ICD-10-CM

## 2024-10-01 DIAGNOSIS — M16.11 PRIMARY OSTEOARTHRITIS OF RIGHT HIP: ICD-10-CM

## 2024-10-01 PROCEDURE — 76882 US LMTD JT/FCL EVL NVASC XTR: CPT | Mod: TC,RT

## 2024-10-01 PROCEDURE — 1101F PT FALLS ASSESS-DOCD LE1/YR: CPT | Mod: CPTII,S$GLB,, | Performed by: PHYSICIAN ASSISTANT

## 2024-10-01 PROCEDURE — 1160F RVW MEDS BY RX/DR IN RCRD: CPT | Mod: CPTII,S$GLB,, | Performed by: PHYSICIAN ASSISTANT

## 2024-10-01 PROCEDURE — 73560 X-RAY EXAM OF KNEE 1 OR 2: CPT | Mod: TC,LT

## 2024-10-01 PROCEDURE — 4010F ACE/ARB THERAPY RXD/TAKEN: CPT | Mod: CPTII,S$GLB,, | Performed by: PHYSICIAN ASSISTANT

## 2024-10-01 PROCEDURE — 73502 X-RAY EXAM HIP UNI 2-3 VIEWS: CPT | Mod: TC,RT

## 2024-10-01 PROCEDURE — 73562 X-RAY EXAM OF KNEE 3: CPT | Mod: TC,RT

## 2024-10-01 PROCEDURE — 3288F FALL RISK ASSESSMENT DOCD: CPT | Mod: CPTII,S$GLB,, | Performed by: PHYSICIAN ASSISTANT

## 2024-10-01 PROCEDURE — 3044F HG A1C LEVEL LT 7.0%: CPT | Mod: CPTII,S$GLB,, | Performed by: PHYSICIAN ASSISTANT

## 2024-10-01 PROCEDURE — 3008F BODY MASS INDEX DOCD: CPT | Mod: CPTII,S$GLB,, | Performed by: PHYSICIAN ASSISTANT

## 2024-10-01 PROCEDURE — 76882 US LMTD JT/FCL EVL NVASC XTR: CPT | Mod: 26,RT,, | Performed by: INTERNAL MEDICINE

## 2024-10-01 PROCEDURE — 99214 OFFICE O/P EST MOD 30 MIN: CPT | Mod: S$GLB,,, | Performed by: PHYSICIAN ASSISTANT

## 2024-10-01 PROCEDURE — 1159F MED LIST DOCD IN RCRD: CPT | Mod: CPTII,S$GLB,, | Performed by: PHYSICIAN ASSISTANT

## 2024-10-01 PROCEDURE — 99999 PR PBB SHADOW E&M-EST. PATIENT-LVL III: CPT | Mod: PBBFAC,,, | Performed by: PHYSICIAN ASSISTANT

## 2024-10-01 PROCEDURE — 73502 X-RAY EXAM HIP UNI 2-3 VIEWS: CPT | Mod: 26,RT,, | Performed by: RADIOLOGY

## 2024-10-01 RX ORDER — MELOXICAM 7.5 MG/1
7.5 TABLET ORAL DAILY
Qty: 14 TABLET | Refills: 0 | Status: SHIPPED | OUTPATIENT
Start: 2024-10-01

## 2024-10-01 NOTE — PROGRESS NOTES
"Patient ID: Stevie Villalba is a 69 y.o. male.    Chief Complaint: Pain of the Right Hip and Pain of the Right Knee      HISTORY:  Stevie Villalba is a 69 y.o. male who returns to me today for evaluation of right knee pain. He had right TKA with Dr. Ochsner in 2018.  He report pain in his right thigh, which radiates to the hip and knee.  The pain is worse with activity.  He denies any swelling or erythema around the knee.  He had good relief with left knee CSI at his last visit.  It has been 6 months since orthovisc and he would like to repeat those.      PMH/PSH/FamHx/SocHx:    Unchanged from prior visit.    ROS:  Constitution: Negative for chills, fever and weakness.   Respiratory: Negative for cough and shortness of breath.   Musculoskeletal: Positive for right leg pain  Psychiatric/Behavioral: The patient is not nervous/anxious.       PHYSICAL EXAM:   Ht 5' 10" (1.778 m)   Wt 108.7 kg (239 lb 10.2 oz)   BMI 34.38 kg/m²   Right hip and knee  Skin intact  No TTP to hip  There is pain with passive flexion and IR of hip  No significant stiffness  Knee ROM 0-130  Well healed midline incision, no effusion or warmth      IMAGING: X-rays of the right hip, personally reviewed by me, demonstrate severe joint space narrowing, osteophyte formation.  No fracture or dislocation.     X-rays of the right knee, personally reviewed by me, demonstrate well fixed prosthesis, no acute abnormality.  No fracture or dislocation.     ASSESSMENT/PLAN:    Stevie was seen today for pain and pain.    Diagnoses and all orders for this visit:    Acute pain of right knee  -     X-ray Knee Ortho Right; Future    Pain of right hip  -     X-Ray Hip 2 or 3 views Right with Pelvis when performed; Future    Primary osteoarthritis of left knee  -     sodium hyaluronate (orthovisc) 30 mg  -     Prior authorization Order    Presence of right artificial knee joint    Primary osteoarthritis of right hip    Other orders  -     meloxicam (MOBIC) 7.5 MG " tablet; Take 1 tablet (7.5 mg total) by mouth once daily.      - I suspect most of his leg pain is referred from the hip arthritis  - He requests to try steroid injection in the hip as he is not quite ready to consider hip replacement  - Will refer to sports medicine for this  - He would like to repeat left knee orthovisc- had these with Connie at [facility] last time and would like to return.  Ordered placed- will reach out to get him scheduled.    MEDICAL NECESSITY FOR VISCOSUPPLEMENTATION:  A thorough evaluation of the patient, have determined that viscosupplementation is medically necessary.  The patient has painful DJD of the knee with failure of conservative therapy including lifestyle modifications and rehabilitation exercises.  Oral analgesics/NSAIDs have not adequately controlled symptoms and there is radiographic evidence of joint space narrowing, subchondral sclerosis, and osteophyte formation - Kellgren Chavez grade 2 or greater.

## 2024-10-16 ENCOUNTER — TELEPHONE (OUTPATIENT)
Dept: INTERNAL MEDICINE | Facility: CLINIC | Age: 69
End: 2024-10-16
Payer: MEDICARE

## 2024-10-16 NOTE — TELEPHONE ENCOUNTER
DICKI:  ----- Message from Sol sent at 10/15/2024 11:56 AM CDT -----  Regarding: Physical Therapy  Good afternoon    The physical therapy department received your order to get the attached patient scheduled for an evaluation. We have reached out to the patient and scheduled for an evaluation; however, they have cancelled their appointment.We have made several attempts to get the patient rescheduled but have been unsuccessful.      We wanted you to be aware that your patient has not started therapy. If you speak with them again about starting therapy please have them reach out to us at [phone] to get scheduled.        Sincerely,  Sol Wood   Is This A New Presentation, Or A Follow-Up?: Skin Lesion What Type Of Note Output Would You Prefer (Optional)?: Standard Output How Severe Is Your Skin Lesion?: mild Has Your Skin Lesion Been Treated?: not been treated Additional History: Pt is here with dad,

## 2024-10-31 ENCOUNTER — OFFICE VISIT (OUTPATIENT)
Dept: SPORTS MEDICINE | Facility: CLINIC | Age: 69
End: 2024-10-31
Payer: MEDICARE

## 2024-10-31 VITALS
BODY MASS INDEX: 34.84 KG/M2 | WEIGHT: 243.38 LBS | DIASTOLIC BLOOD PRESSURE: 86 MMHG | HEIGHT: 70 IN | HEART RATE: 74 BPM | SYSTOLIC BLOOD PRESSURE: 143 MMHG

## 2024-10-31 DIAGNOSIS — M17.11 PRIMARY OSTEOARTHRITIS OF RIGHT KNEE: Primary | ICD-10-CM

## 2024-10-31 DIAGNOSIS — M17.12 PRIMARY OSTEOARTHRITIS OF LEFT KNEE: ICD-10-CM

## 2024-10-31 PROCEDURE — 99999 PR PBB SHADOW E&M-EST. PATIENT-LVL III: CPT | Mod: PBBFAC,HCNC,, | Performed by: PHYSICIAN ASSISTANT

## 2024-11-07 ENCOUNTER — OFFICE VISIT (OUTPATIENT)
Dept: SPORTS MEDICINE | Facility: CLINIC | Age: 69
End: 2024-11-07
Payer: MEDICARE

## 2024-11-07 VITALS
WEIGHT: 243.38 LBS | DIASTOLIC BLOOD PRESSURE: 80 MMHG | SYSTOLIC BLOOD PRESSURE: 143 MMHG | HEIGHT: 70 IN | HEART RATE: 70 BPM | BODY MASS INDEX: 34.84 KG/M2

## 2024-11-07 DIAGNOSIS — M17.12 PRIMARY OSTEOARTHRITIS OF LEFT KNEE: Primary | ICD-10-CM

## 2024-11-07 PROCEDURE — 99999 PR PBB SHADOW E&M-EST. PATIENT-LVL IV: CPT | Mod: PBBFAC,HCNC,, | Performed by: PHYSICIAN ASSISTANT

## 2024-11-07 RX ORDER — MELOXICAM 7.5 MG/1
7.5 TABLET ORAL DAILY
Qty: 30 TABLET | Refills: 1 | Status: SHIPPED | OUTPATIENT
Start: 2024-11-07

## 2024-11-07 NOTE — PROGRESS NOTES
Patient is here for follow up of left knee arthritis. Pt is requesting left knee Orthovisc #2.  [facility] Has failed other conservative modalities including NSAIDS, activity modification, weight loss.    The prior shot was tolerated well.    PHYSICAL EXAMINATION:     General: The patient is alert and oriented x 3. Mood is pleasant.   Observation of ears, eyes and nose reveals no gross abnormalities. No   labored breathing observed.     No signs of infection or adverse reaction to knee.    PROCEDURE NOTE:  Injection Procedure left knee  A time out was performed, including verification of patient ID, procedure, site and side, availability of information and equipment, review of safety issues, and agreement with consent, the procedure site was marked.    After time out was performed, the patient was prepped aseptically with povidone-iodine swabsticks. A diagnostic and therapeutic injection of 2cc Orthovisc was given under sterile technique using a 22g x 1.5 needle from the anterolateral  aspect of the left Knee Joint in the sitting position.      Stevie Villalba had no adverse reactions to the medication. Pain decreased. He was instructed to apply ice to the joint for 20 minutes and avoid strenuous activities for 24-36 hours following the injection. He was warned of possible blood sugar and/or blood pressure changes during that time. Following that time, he can resume regular activities.    He was reminded to call the clinic immediately for any adverse side effects as explained in clinic today.      RTC 1 week for 3rd injection.  All questions were answered, pt will contact us for questions or concerns in the interim.

## 2024-11-14 ENCOUNTER — OFFICE VISIT (OUTPATIENT)
Dept: SPORTS MEDICINE | Facility: CLINIC | Age: 69
End: 2024-11-14
Payer: MEDICARE

## 2024-11-14 VITALS
HEART RATE: 83 BPM | DIASTOLIC BLOOD PRESSURE: 68 MMHG | HEIGHT: 70 IN | SYSTOLIC BLOOD PRESSURE: 122 MMHG | BODY MASS INDEX: 34.84 KG/M2 | WEIGHT: 243.38 LBS

## 2024-11-14 DIAGNOSIS — M17.12 PRIMARY OSTEOARTHRITIS OF LEFT KNEE: Primary | ICD-10-CM

## 2024-11-14 PROCEDURE — 99999 PR PBB SHADOW E&M-EST. PATIENT-LVL III: CPT | Mod: PBBFAC,HCNC,, | Performed by: PHYSICIAN ASSISTANT

## 2024-11-14 NOTE — PROGRESS NOTES
Patient is here for follow up of left knee arthritis. Pt is requesting left knee Orthovisc #3.  [facility] reviewed per encounter record. Has failed other conservative modalities including NSAIDS, activity modification, weight loss.    The prior shot was tolerated well.    PHYSICAL EXAMINATION:     General: The patient is alert and oriented x 3. Mood is pleasant.   Observation of ears, eyes and nose reveals no gross abnormalities. No   labored breathing observed.     No signs of infection or adverse reaction to knee.    PROCEDURE NOTE:  Injection Procedure left knee  A time out was performed, including verification of patient ID, procedure, site and side, availability of information and equipment, review of safety issues, and agreement with consent, the procedure site was marked.    After time out was performed, the patient was prepped aseptically with povidone-iodine swabsticks. A diagnostic and therapeutic injection of 2cc Orthovisc was given under sterile technique using a 22g x 1.5 needle from the anterolateral  aspect of the left Knee Joint in the sitting position.      Stevie Villalba had no adverse reactions to the medication. Pain decreased. He was instructed to apply ice to the joint for 20 minutes and avoid strenuous activities for 24-36 hours following the injection. He was warned of possible blood sugar and/or blood pressure changes during that time. Following that time, he can resume regular activities.    He was reminded to call the clinic immediately for any adverse side effects as explained in clinic today.      RTC in 6 months with Connie Lewis PA-C for possible repeat visco-supplementation.  All questions were answered, pt will contact us for questions or concerns in the interim.

## 2024-11-27 ENCOUNTER — PATIENT MESSAGE (OUTPATIENT)
Dept: ADMINISTRATIVE | Facility: CLINIC | Age: 69
End: 2024-11-27
Payer: MEDICARE
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
## 2024-11-29 ENCOUNTER — TELEPHONE (OUTPATIENT)
Dept: ADMINISTRATIVE | Facility: CLINIC | Age: 69
End: 2024-11-29
Payer: MEDICARE

## 2024-11-29 DIAGNOSIS — M51.369 DEGENERATION OF INTERVERTEBRAL DISC OF LUMBAR REGION, UNSPECIFIED WHETHER PAIN PRESENT: Primary | ICD-10-CM

## 2024-11-29 NOTE — TELEPHONE ENCOUNTER
Called pt; informed pt I was calling to confirm his virtual EAWV on 12/2/24 at 11:00am and to see if he needed any help; pt stated he did not need any help and would complete e-pre check later today; pt informed to login 10 minutes prior to appt time

## 2024-12-02 ENCOUNTER — OFFICE VISIT (OUTPATIENT)
Dept: ORTHOPEDICS | Facility: CLINIC | Age: 69
End: 2024-12-02
Payer: MEDICARE

## 2024-12-02 ENCOUNTER — HOSPITAL ENCOUNTER (OUTPATIENT)
Dept: RADIOLOGY | Facility: HOSPITAL | Age: 69
Discharge: HOME | End: 2024-12-02
Attending: ORTHOPAEDIC SURGERY
Payer: MEDICARE

## 2024-12-02 ENCOUNTER — TELEPHONE (OUTPATIENT)
Dept: ADMINISTRATIVE | Facility: CLINIC | Age: 69
End: 2024-12-02
Payer: MEDICARE

## 2024-12-02 ENCOUNTER — OFFICE VISIT (OUTPATIENT)
Dept: HOME HEALTH SERVICES | Facility: CLINIC | Age: 69
End: 2024-12-02
Payer: MEDICARE

## 2024-12-02 VITALS — BODY MASS INDEX: 34.79 KG/M2 | HEIGHT: 70 IN | WEIGHT: 243 LBS

## 2024-12-02 VITALS — BODY MASS INDEX: 34.78 KG/M2 | WEIGHT: 242.94 LBS | HEIGHT: 70 IN

## 2024-12-02 DIAGNOSIS — I48.0 PAROXYSMAL ATRIAL FIBRILLATION: Chronic | ICD-10-CM

## 2024-12-02 DIAGNOSIS — M1A.9XX0 CHRONIC GOUT WITHOUT TOPHUS, UNSPECIFIED CAUSE, UNSPECIFIED SITE: Chronic | ICD-10-CM

## 2024-12-02 DIAGNOSIS — E78.1 HYPERTRIGLYCERIDEMIA: Chronic | ICD-10-CM

## 2024-12-02 DIAGNOSIS — F11.20 UNCOMPLICATED OPIOID DEPENDENCE: ICD-10-CM

## 2024-12-02 DIAGNOSIS — I10 PRIMARY HYPERTENSION: Chronic | ICD-10-CM

## 2024-12-02 DIAGNOSIS — M54.16 LUMBAR RADICULOPATHY, CHRONIC: Primary | ICD-10-CM

## 2024-12-02 DIAGNOSIS — Z00.00 ENCOUNTER FOR MEDICARE ANNUAL WELLNESS EXAM: Primary | ICD-10-CM

## 2024-12-02 DIAGNOSIS — G47.33 SEVERE OBSTRUCTIVE SLEEP APNEA: ICD-10-CM

## 2024-12-02 DIAGNOSIS — M17.12 PRIMARY OSTEOARTHRITIS OF LEFT KNEE: ICD-10-CM

## 2024-12-02 DIAGNOSIS — I70.0 AORTIC ATHEROSCLEROSIS: Chronic | ICD-10-CM

## 2024-12-02 DIAGNOSIS — E66.811 CLASS 1 OBESITY WITH BODY MASS INDEX (BMI) OF 34.0 TO 34.9 IN ADULT, UNSPECIFIED OBESITY TYPE, UNSPECIFIED WHETHER SERIOUS COMORBIDITY PRESENT: ICD-10-CM

## 2024-12-02 DIAGNOSIS — Z00.00 ENCOUNTER FOR PREVENTIVE HEALTH EXAMINATION: ICD-10-CM

## 2024-12-02 DIAGNOSIS — M51.369 DEGENERATION OF INTERVERTEBRAL DISC OF LUMBAR REGION, UNSPECIFIED WHETHER PAIN PRESENT: ICD-10-CM

## 2024-12-02 PROBLEM — E66.01 SEVERE OBESITY (BMI 35.0-39.9) WITH COMORBIDITY: Chronic | Status: RESOLVED | Noted: 2023-04-03 | Resolved: 2024-12-02

## 2024-12-02 PROCEDURE — 1101F PT FALLS ASSESS-DOCD LE1/YR: CPT | Mod: CPTII,95,, | Performed by: NURSE PRACTITIONER

## 2024-12-02 PROCEDURE — 99999 PR PBB SHADOW E&M-EST. PATIENT-LVL III: CPT | Mod: PBBFAC,HCNC,, | Performed by: ORTHOPAEDIC SURGERY

## 2024-12-02 PROCEDURE — 1125F AMNT PAIN NOTED PAIN PRSNT: CPT | Mod: CPTII,95,, | Performed by: NURSE PRACTITIONER

## 2024-12-02 PROCEDURE — 1158F ADVNC CARE PLAN TLK DOCD: CPT | Mod: CPTII,95,, | Performed by: NURSE PRACTITIONER

## 2024-12-02 PROCEDURE — 1170F FXNL STATUS ASSESSED: CPT | Mod: CPTII,95,, | Performed by: NURSE PRACTITIONER

## 2024-12-02 PROCEDURE — 1160F RVW MEDS BY RX/DR IN RCRD: CPT | Mod: CPTII,95,, | Performed by: NURSE PRACTITIONER

## 2024-12-02 PROCEDURE — G0439 PPPS, SUBSEQ VISIT: HCPCS | Mod: 95,,, | Performed by: NURSE PRACTITIONER

## 2024-12-02 PROCEDURE — 4010F ACE/ARB THERAPY RXD/TAKEN: CPT | Mod: CPTII,95,, | Performed by: NURSE PRACTITIONER

## 2024-12-02 PROCEDURE — 72114 X-RAY EXAM L-S SPINE BENDING: CPT | Mod: TC,HCNC

## 2024-12-02 PROCEDURE — 3288F FALL RISK ASSESSMENT DOCD: CPT | Mod: CPTII,95,, | Performed by: NURSE PRACTITIONER

## 2024-12-02 PROCEDURE — 1159F MED LIST DOCD IN RCRD: CPT | Mod: CPTII,95,, | Performed by: NURSE PRACTITIONER

## 2024-12-02 PROCEDURE — 72114 X-RAY EXAM L-S SPINE BENDING: CPT | Mod: 26,HCNC,, | Performed by: RADIOLOGY

## 2024-12-02 PROCEDURE — 3044F HG A1C LEVEL LT 7.0%: CPT | Mod: CPTII,95,, | Performed by: NURSE PRACTITIONER

## 2024-12-02 RX ORDER — METHYLPREDNISOLONE 4 MG/1
TABLET ORAL
Qty: 1 EACH | Refills: 0 | Status: SHIPPED | OUTPATIENT
Start: 2024-12-02 | End: 2024-12-23

## 2024-12-02 NOTE — TELEPHONE ENCOUNTER
Called pt; informed pt I was just making a reminder call for pt's virtual visit today at 11:00am and to see if pt needed any help; pt stated didn't need any help and would complete the e-pre check soon; pt informed to login 10 minutes prior to appt time

## 2024-12-02 NOTE — PATIENT INSTRUCTIONS
Counseling and Referral of Other Preventative  (Italic type indicates deductible and co-insurance are waived)    Patient Name: Stevie Villalba  Today's Date: 12/2/2024    Health Maintenance       Date Due Completion Date    RSV Vaccine (Age 60+ and Pregnant patients) (1 - Risk 60-74 years 1-dose series) Never done ---    COVID-19 Vaccine (4 - 2024-25 season) 09/01/2024 10/18/2021    Hemoglobin A1c (Prediabetes) 09/30/2025 9/30/2024    TETANUS VACCINE 11/02/2025 11/2/2015    High Dose Statin 12/02/2025 12/2/2024    Colorectal Cancer Screening 11/15/2027 11/15/2022    Lipid Panel 09/30/2029 9/30/2024        No orders of the defined types were placed in this encounter.      The following information is provided to all patients.  This information is to help you find resources for any of the problems found today that may be affecting your health:                  Living healthy guide: www.[facility].[place].[facility]      Understanding Diabetes: www.diabetes.org      Eating healthy: www.cdc.gov/healthyweight      [facility] home safety checklist: www.cdc.gov/steadi/patient.html      Agency on Aging: www.goea.[place].gov      Alcoholics anonymous (AA): www.aa.org      Physical Activity: www.leo.nih.gov/se5axyv      Tobacco use: www.quitwithusla.org

## 2024-12-02 NOTE — PROGRESS NOTES
The patient location is: [place]  The chief complaint leading to consultation is: Health Risk Assessment    Visit type: audiovisual    Face to Face time with patient: 20  40 minutes of total time spent on the encounter, which includes face to face time and non-face to face time preparing to see the patient (eg, review of tests), Obtaining and/or reviewing separately obtained history, Documenting clinical information in the electronic or other health record, Independently interpreting results (not separately reported) and communicating results to the patient/family/caregiver, or Care coordination (not separately reported).         Each patient to whom he or she provides medical services by telemedicine is:  (1) informed of the relationship between the physician and patient and the respective role of any other health care provider with respect to management of the patient; and (2) notified that he or she may decline to receive medical services by telemedicine and may withdraw from such care at any time.    Notes:       Stevie Villalba presented for an initial Medicare AWV today. The following components were reviewed and updated:    Medical history  Family History  Social history  Allergies and Current Medications  Health Risk Assessment  Health Maintenance  Care Team    **See Completed Assessments for Annual Wellness visit with in the encounter summary    The following assessments were completed:  Depression Screening  Cognitive function Screening  Timed Get Up Test  Whisper Test      Opioid documentation:      Patient does have a current opioid prescription.      Patient accepted further discussion regarding opioid medication use.      Patient is currently taking hydrocodone narcotic for hip pain.        Pain level today is 2/10.       In addition to narcotic pain medications, patient is also using mobic and soma for pain control.       Patient is followed by a specialist currently for their pain and will not be  "referred today.       Patient's opioid risk potential based on ORT-OUD tool:       Leo each box that applies   No   Yes     Family history of substance abuse   Alcohol [x] []   Illegal drugs [x] []   Rx drugs [x] []     Personal history of substance abuse   Alcohol [x] []   Illegal drugs [x] []   Rx drugs [x] []     Age between 16-45 years   [x]   []     Patient with ADD, OCD, Bipolar disorder, schizoprenia   [x]   []     Patient with depression   [x]   []                         Scoring total                                                        0         Non-opioid treatment options have been discussed today and added to the patient's after visit summary.          Vitals:    12/02/24 1058   Weight: 110.2 kg (243 lb)   Height: 5' 10" (1.778 m)     Body mass index is 34.87 kg/m².       Physical Exam  Constitutional:       Appearance: He is obese.   Neurological:      General: No focal deficit present.      Mental Status: He is alert and oriented to person, place, and time.           Diagnoses and health risks identified today and associated recommendations/orders:  1. Encounter for Medicare annual wellness exam  - Ambulatory Referral/Consult to Enhanced Annual Wellness Visit (eAWV)    2. Encounter for preventive health examination  Assessments completed. Preventive measures, health maintenance, and immunizations reviewed with patient.    3. Paroxysmal atrial fibrillation  Stable, patient on Toprol XL. Followed by PCP.    4. Aortic atherosclerosis  Stable, followed by PCP.    5. Primary hypertension  Stable, patient on Hydrochlorothiazide, Diovan, and Toprol XL.    6. Hypertriglyceridemia  Stable, patient on Lipitor. Followed by PCP.    7. Primary osteoarthritis of left knee  Stable, patient on Norco 10/325 mg prn pain, Mobic, and Soma. Followed by Sports Medicine and Orthopedics.    8. Chronic gout without tophus, unspecified cause, unspecified site  Stable, patient on Allopurinol. Followed by PCP.    9. Severe " obstructive sleep apnea  Stable, followed by PCP.    10. Class 1 obesity with body mass index (BMI) of 34.0 to 34.9 in adult, unspecified obesity type, unspecified whether serious comorbidity present  Stable, followed by PCP. Healthy diet and tolerable exercises encouraged.      Provided Stevie with a 5-10 year written screening schedule and personal prevention plan. Recommendations were developed using the USPSTF age appropriate recommendations. Education, counseling, and referrals were provided as needed.  After Visit Summary printed and given to patient which includes a list of additional screenings\tests needed.    Follow up in about 1 year (around 12/2/2025) for your next annual wellness visit.      Antonia Elizondo, JASON  I offered to discuss advanced care planning, including how to pick a person who would make decisions for you if you were unable to make them for yourself, called a health care power of , and what kind of decisions you might make such as use of life sustaining treatments such as ventilators and tube feeding when faced with a life limiting illness recorded on a living will that they will need to know. (How you want to be cared for as you near the end of your natural life)     X Patient is interested in learning more about how to make advanced directives.  I provided them paperwork and offered to discuss this with them.
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
## 2024-12-19 ENCOUNTER — OFFICE VISIT (OUTPATIENT)
Dept: SPORTS MEDICINE | Facility: CLINIC | Age: 69
End: 2024-12-19
Payer: MEDICARE

## 2024-12-19 VITALS
HEIGHT: 70 IN | DIASTOLIC BLOOD PRESSURE: 73 MMHG | WEIGHT: 242.94 LBS | SYSTOLIC BLOOD PRESSURE: 137 MMHG | BODY MASS INDEX: 34.78 KG/M2

## 2024-12-19 DIAGNOSIS — M16.11 PRIMARY OSTEOARTHRITIS OF RIGHT HIP: ICD-10-CM

## 2024-12-19 DIAGNOSIS — M25.551 RIGHT HIP PAIN: Primary | ICD-10-CM

## 2024-12-19 PROCEDURE — 99999 PR PBB SHADOW E&M-EST. PATIENT-LVL III: CPT | Mod: PBBFAC,HCNC,, | Performed by: ORTHOPAEDIC SURGERY

## 2024-12-19 RX ORDER — TRIAMCINOLONE ACETONIDE 40 MG/ML
40 INJECTION, SUSPENSION INTRA-ARTICULAR; INTRAMUSCULAR
Status: COMPLETED | OUTPATIENT
Start: 2024-12-19 | End: 2024-12-19

## 2024-12-19 RX ADMIN — TRIAMCINOLONE ACETONIDE 40 MG: 40 INJECTION, SUSPENSION INTRA-ARTICULAR; INTRAMUSCULAR at 01:12

## 2024-12-23 ENCOUNTER — HOSPITAL ENCOUNTER (OUTPATIENT)
Dept: RADIOLOGY | Facility: HOSPITAL | Age: 69
Discharge: HOME | End: 2024-12-23
Attending: ORTHOPAEDIC SURGERY
Payer: MEDICARE

## 2024-12-23 DIAGNOSIS — M54.16 LUMBAR RADICULOPATHY, CHRONIC: ICD-10-CM

## 2024-12-23 PROCEDURE — 72148 MRI LUMBAR SPINE W/O DYE: CPT | Mod: 26,HCNC,, | Performed by: RADIOLOGY

## 2024-12-23 PROCEDURE — 72148 MRI LUMBAR SPINE W/O DYE: CPT | Mod: TC,HCNC

## 2024-12-26 NOTE — PROGRESS NOTES
Established Patient - Audio Only Telehealth Visit     The patient location is: home  The chief complaint leading to consultation is: MRI results  Visit type: Virtual visit with audio only (telephone)  Total time spent with patient: 10 min       The reason for the audio only service rather than synchronous audio and video virtual visit was related to technical difficulties or patient preference/necessity.     Each patient to whom I provide medical services by telemedicine is:  (1) informed of the relationship between the physician and patient and the respective role of any other health care provider with respect to management of the patient; and (2) notified that they may decline to receive medical services by telemedicine and may withdraw from such care at any time. Patient verbally consented to receive this service via voice-only telephone call.       DATE: 12/26/2024  PATIENT: Stevie Villalba    Attending Physician: Ernie Pratt M.D.    HISTORY:  Stevie Villalba is a 69 y.o. male who returns to me today for MRI results.  He was last seen by me 12/2/2024.  Today he is doing well but notes he continues to have  low back and bilateral (R>L) leg pain (Back - 8, Leg - 8).  The pain in the right leg is what bothers him most.  The pain has been present for months, worsening over time. The patient describes the pain as aching and radiating.  The pain is worse with activity and improved by rest. There is positive associated numbness and tingling. There is negative subjective weakness. Prior treatments have included home exercises, but no ESIs or surgery.     The Patient denies myelopathic symptoms such as handwriting changes or difficulty with buttons/coins/keys. Denies perineal paresthesias, bowel/bladder dysfunction.      EXAM:  There were no vitals taken for this visit.    My physical examination was notable for the following findings:     Musculoskeletal and neuro exam stable      IMAGING:    Today I personally re-  reviewed AP, Lat and Flex/Ex  upright L-spine that demonstrate moderate degenerative changes, questionable L1 VCF     MRI lumbar demonstrates degenerative changes predominantly L3-L5 level with up to moderate central canal narrowing     There is no height or weight on file to calculate BMI.    Hemoglobin A1C   Date Value Ref Range Status   09/30/2024 5.3 4.0 - 5.6 % Final     Comment:     ADA Screening Guidelines:  5.7-6.4%  Consistent with prediabetes  >or=6.5%  Consistent with diabetes    High levels of fetal hemoglobin interfere with the HbA1C  assay. Heterozygous hemoglobin variants (HbS, HgC, etc)do  not significantly interfere with this assay.   However, presence of multiple variants may affect accuracy.     09/25/2023 5.2 4.0 - 5.6 % Final     Comment:     ADA Screening Guidelines:  5.7-6.4%  Consistent with prediabetes  >or=6.5%  Consistent with diabetes    High levels of fetal hemoglobin interfere with the HbA1C  assay. Heterozygous hemoglobin variants (HbS, HgC, etc)do  not significantly interfere with this assay.   However, presence of multiple variants may affect accuracy.     09/12/2022 5.3 4.0 - 5.6 % Final     Comment:     ADA Screening Guidelines:  5.7-6.4%  Consistent with prediabetes  >or=6.5%  Consistent with diabetes    High levels of fetal hemoglobin interfere with the HbA1C  assay. Heterozygous hemoglobin variants (HbS, HgC, etc)do  not significantly interfere with this assay.   However, presence of multiple variants may affect accuracy.           ASSESSMENT/PLAN:    There are no diagnoses linked to this encounter.    Today we discussed at length all of the different treatment options including anti-inflammatories, acetaminophen, rest, ice, heat, physical therapy including strengthening and stretching exercises, home exercises, ROM, aerobic conditioning, aqua therapy, other modalities including ultrasound, massage, and dry needling, epidural steroid injections and finally surgical intervention.       Pt presents with chronic right lumbar radiculopathy. Failure of conservative rx. Will order right L3-4 L4-5 TFESI with pain management. Pt will fu if pain persists.                        This service was not originating from a related E/M service provided within the previous 7 days nor will  to an E/M service or procedure within the next 24 hours or my soonest available appointment.  Prevailing standard of care was able to be met in this audio-only visit.

## 2024-12-30 ENCOUNTER — TELEPHONE (OUTPATIENT)
Dept: PAIN MEDICINE | Facility: CLINIC | Age: 69
End: 2024-12-30
Payer: MEDICARE

## 2024-12-30 ENCOUNTER — OFFICE VISIT (OUTPATIENT)
Dept: ORTHOPEDICS | Facility: CLINIC | Age: 69
End: 2024-12-30
Payer: MEDICARE

## 2024-12-30 DIAGNOSIS — M54.16 LUMBAR RADICULOPATHY: Primary | ICD-10-CM

## 2024-12-30 DIAGNOSIS — M54.16 LUMBAR RADICULOPATHY, CHRONIC: Primary | ICD-10-CM

## 2024-12-30 PROCEDURE — 3044F HG A1C LEVEL LT 7.0%: CPT | Mod: HCNC,CPTII,95, | Performed by: ORTHOPAEDIC SURGERY

## 2024-12-30 PROCEDURE — 99441 PR PHYSICIAN TELEPHONE EVALUATION 5-10 MIN: CPT | Mod: HCNC,95,, | Performed by: ORTHOPAEDIC SURGERY

## 2024-12-30 PROCEDURE — 4010F ACE/ARB THERAPY RXD/TAKEN: CPT | Mod: HCNC,CPTII,95, | Performed by: ORTHOPAEDIC SURGERY

## 2024-12-30 NOTE — TELEPHONE ENCOUNTER
----- Message from Keely Acuna PA-C sent at 2024  1:11 PM CST -----  Regarding: Order for LUIS SETH    Patient Name: LUIS SETH([id number])  Sex: Male  : 1955      PCP: CHRIS HILL    Center: [facility] CENTRAL BILLING OFFICE     Types of orders made on 2024: Procedure Request    Order Date:2024  Ordering User:KEELY ACUNA [[id number]]  Encounter Provider:Keely Acuna PA-C [[id number]]  Authorizing Provider: Keely Acuna PA-C [[id number]]  Supervising Provider:CAMMY PIERCE [[id number]]  Type of Supervision:Supervision Required  Department:[facility][[id number]]    Common Order Information  Procedure -> Transforaminal Injection (Specify level and laterality) Cmt: right             L3-4 L4-5    Order Specific Information  Order: Procedure Order to Pain Management [Custom: [id number]]  Order #:          [id number]: 1 FUTURE    Priority: Routine  Class: Clinic Performed    Future Order Information      Expires on:2025            Expected by:2024                   Associated Diagnoses      M54.16 Lumbar radiculopathy, chronic      Facility Name: -> [facility]           Priority: Routine  Class: Clinic Performed    Future Order Information      Expires on:2025            Expected by:2024                   Associated Diagnoses      M54.16 Lumbar radiculopathy, chronic      Procedure -> Transforaminal Injection (Specify level and laterality) Cmt:                 right L3-4 L4-5        Facility Name: -> [facility]

## 2025-01-03 ENCOUNTER — TELEPHONE (OUTPATIENT)
Dept: PAIN MEDICINE | Facility: CLINIC | Age: 70
End: 2025-01-03
Payer: MEDICARE

## 2025-01-03 DIAGNOSIS — M17.12 PRIMARY OSTEOARTHRITIS OF LEFT KNEE: ICD-10-CM

## 2025-01-03 RX ORDER — MELOXICAM 7.5 MG/1
7.5 TABLET ORAL
Qty: 30 TABLET | Refills: 1 | Status: SHIPPED | OUTPATIENT
Start: 2025-01-03

## 2025-01-07 ENCOUNTER — TELEPHONE (OUTPATIENT)
Dept: PAIN MEDICINE | Facility: CLINIC | Age: 70
End: 2025-01-07
Payer: MEDICARE

## 2025-01-08 ENCOUNTER — TELEPHONE (OUTPATIENT)
Dept: PAIN MEDICINE | Facility: CLINIC | Age: 70
End: 2025-01-08
Payer: MEDICARE

## 2025-01-08 NOTE — TELEPHONE ENCOUNTER
----- Message from Kirsten sent at 1/8/2025  1:43 PM CST -----  Regarding: Procedure Time  Contact: Pt @[phone]  Pt is calling to speak to someone in the office to, discuss procedure arrival time. Pt states that they have not heard from anyone in the office. Please call to advise. Thanks.         Call Back or Sent message thru the [facility]?[phone]

## 2025-01-10 NOTE — PRE-PROCEDURE INSTRUCTIONS
Hello ,     Your arrival time is 0600 and is roughly 1 hour before your anticipated procedure time to allow sufficient time for pre-op..  This procedure will take place at the [facility] at the corner of [address] and [address].  It is in the [place] next to [facility].  The address is:     [address].  [place] [id number]     After entering the building, you will proceed to the second floor where you can check in with registration. You should take any medications that you routinely take for blood pressure, heart medications, thyroid, cholesterol, etc. As directed    Your fasting instructions are as follow:       IV sedation:   You should not eat for 8 hours and can only drink clear liquids (water or black coffee without cream/sugar) up until 2 hours before your scheduled time.  You CANNOT drive yourself and must have a .          If you are a diabetic, do not take your insulin because you will be fasting, but bring it with you.  You should take any medications that you routinely take for blood pressure, heart medications, thyroid, cholesterol, etc.  If you take any major blood thinners (including Aspirin), please follow the instructions you were given when scheduling the appointment to hold them or not. If you are unsure on your instructions, please call us.      No blood thinners unless directed otherwise per surg's office     If you take Ozempic, Trulicity, Mounjaro, Rybelsus, Wegovy or other weight loss, non-insulin injections you must hold this for one week prior if you are having IV sedation.         Please call us if any of the following have occurred:   *running fever or having any flu-like symptoms  *have been taking antibiotics in the past 2 weeks  *have had any or plan on having any immunizations in the 2 weeks before or after your injection (Flu/Shingles/Covid Booster/Pneumonia, etc.)  *had any out patient procedures other than with us in the  past 2 weeks (Colonoscopy, Endoscopy, Biopsy, OBGYN, Dental, etc.)Or received a steroid injection from another provider within the last 2 weeks.  *have any wounds or rashes  *awaiting ANY test results that could result in you having to take antibiotics (Urine Culture, Flu/Covid/Strept Swab, etc)     If you have been COVID positive, you will need to hold off on your procedure until you are symptom free for 10 days. If you did not have any symptoms, you can have your procedure 10 days from your positive test result.         *HOLD ALL VITAMINS, MINERALS, HERBS (INCLUDING HERBAL TEAS) AND SUPPLEMENTS FOR 1 WEEK  *SHOWER WITH ANTIBACTERIAL SOAP (EX. DIAL) NIGHT BEFORE AND MORNING OF PROCEDURE  *DO NOT APPLY ANY LOTIONS, OILS, POWDERS, PERFUME/COLOGNE, OINTMENTS, GELS, CREAMS, MAKEUP OR DEODORANT TO YOUR SKIN MORNING OF PROCEDURE  *LEAVE JEWELRY AND ANY VALUABLES AT HOME  *WEAR LOOSE COMFORTABLE CLOTHING (PREFERABLY A BUTTON UP SHIRT)        If you have any questions please call  [phone].         Thank you,  Deann

## 2025-01-14 ENCOUNTER — HOSPITAL ENCOUNTER (OUTPATIENT)
Facility: HOSPITAL | Age: 70
Discharge: HOME | End: 2025-01-14
Attending: STUDENT IN AN ORGANIZED HEALTH CARE EDUCATION/TRAINING PROGRAM | Admitting: STUDENT IN AN ORGANIZED HEALTH CARE EDUCATION/TRAINING PROGRAM
Payer: MEDICARE

## 2025-01-14 VITALS
OXYGEN SATURATION: 97 % | DIASTOLIC BLOOD PRESSURE: 85 MMHG | TEMPERATURE: 97 F | HEIGHT: 70 IN | SYSTOLIC BLOOD PRESSURE: 139 MMHG | RESPIRATION RATE: 16 BRPM | WEIGHT: 240 LBS | BODY MASS INDEX: 34.36 KG/M2 | HEART RATE: 72 BPM

## 2025-01-14 DIAGNOSIS — M54.16 LUMBAR RADICULOPATHY: Primary | ICD-10-CM

## 2025-01-14 PROCEDURE — 64484 NJX AA&/STRD TFRM EPI L/S EA: CPT | Mod: RT,,, | Performed by: STUDENT IN AN ORGANIZED HEALTH CARE EDUCATION/TRAINING PROGRAM

## 2025-01-14 PROCEDURE — 25500020 PHARM REV CODE 255: Performed by: STUDENT IN AN ORGANIZED HEALTH CARE EDUCATION/TRAINING PROGRAM

## 2025-01-14 PROCEDURE — 63600175 PHARM REV CODE 636 W HCPCS: Performed by: STUDENT IN AN ORGANIZED HEALTH CARE EDUCATION/TRAINING PROGRAM

## 2025-01-14 PROCEDURE — 64483 NJX AA&/STRD TFRM EPI L/S 1: CPT | Mod: RT | Performed by: STUDENT IN AN ORGANIZED HEALTH CARE EDUCATION/TRAINING PROGRAM

## 2025-01-14 PROCEDURE — 64483 NJX AA&/STRD TFRM EPI L/S 1: CPT | Mod: RT,,, | Performed by: STUDENT IN AN ORGANIZED HEALTH CARE EDUCATION/TRAINING PROGRAM

## 2025-01-14 PROCEDURE — 64484 NJX AA&/STRD TFRM EPI L/S EA: CPT | Mod: RT | Performed by: STUDENT IN AN ORGANIZED HEALTH CARE EDUCATION/TRAINING PROGRAM

## 2025-01-14 RX ORDER — FENTANYL CITRATE 50 UG/ML
INJECTION, SOLUTION INTRAMUSCULAR; INTRAVENOUS
Status: DISCONTINUED | OUTPATIENT
Start: 2025-01-14 | End: 2025-01-14 | Stop reason: HOSPADM

## 2025-01-14 RX ORDER — DEXAMETHASONE SODIUM PHOSPHATE 10 MG/ML
INJECTION INTRAMUSCULAR; INTRAVENOUS
Status: DISCONTINUED | OUTPATIENT
Start: 2025-01-14 | End: 2025-01-14 | Stop reason: HOSPADM

## 2025-01-14 RX ORDER — LIDOCAINE HYDROCHLORIDE 10 MG/ML
INJECTION, SOLUTION EPIDURAL; INFILTRATION; INTRACAUDAL; PERINEURAL
Status: DISCONTINUED | OUTPATIENT
Start: 2025-01-14 | End: 2025-01-14 | Stop reason: HOSPADM

## 2025-01-14 RX ORDER — SODIUM CHLORIDE 9 MG/ML
500 INJECTION, SOLUTION INTRAVENOUS CONTINUOUS
Status: DISCONTINUED | OUTPATIENT
Start: 2025-01-14 | End: 2025-01-14 | Stop reason: HOSPADM

## 2025-01-14 RX ORDER — MIDAZOLAM HYDROCHLORIDE 1 MG/ML
INJECTION INTRAMUSCULAR; INTRAVENOUS
Status: DISCONTINUED | OUTPATIENT
Start: 2025-01-14 | End: 2025-01-14 | Stop reason: HOSPADM

## 2025-01-14 RX ORDER — LIDOCAINE HYDROCHLORIDE 20 MG/ML
INJECTION, SOLUTION EPIDURAL; INFILTRATION; INTRACAUDAL; PERINEURAL
Status: DISCONTINUED | OUTPATIENT
Start: 2025-01-14 | End: 2025-01-14 | Stop reason: HOSPADM

## 2025-01-14 NOTE — OP NOTE
"PROCEDURE: right Lumbar L3-4 and L4-5 Transforaminal Epidural Steroid Injection    Patient Name: Stevie Villalba  MRN: [id number]    PROCEDURE DATE: 1/14/2025    INJECTION # 1    DIAGNOSIS: Lumbar Radiculopathy  CPT CODE: 11434 (INJECTION(S), ANESTHETIC AGENT(S) AND/OR STEROID; TRANSFORAMINAL EPIDURAL, WITH IMAGING GUIDANCE (FLUOROSCOPY OR CT), LUMBAR OR SACRAL, SINGLE LEVEL), 64555 (Each additional level).     POSTPROCEDURE DIAGNOSIS: Same    PHYSICIAN: Santi Michel DO  NEEDLE TYPE: - 22G 5" Spinal Needle  MEDICATIONS INJECTED: 6ml mixture of 1ml Dexamethasone 10mg/ml and 5ml 1% lidocaine PF split equally between each site.  CONTRAST: Omni 300    Sedation Medications - Mild Sedation with 2mg Versed and 25mcg Fentanyl    Estimated Blood Loss - <2ml  Drains: None  Specimens Removed: None  Urine Output - Not Measured  Complications: None  Outcome: Good    Informed Consent:  The patient's condition and proposed procedures, risks, and alternatives were discussed with the patient or responsible party.  The patient's / responsible party's questions were answered.   The patient / responsible party appeared to understand and chose to proceed.  Informed consent was obtained.  After obtaining written consent, an IV hep lock was placed. (See nurses notes for details).     Procedure in Detail:  The patient was taken back to the OR suite and placed in a prone position. The skin overlying the injection site was prepped and draped in an aseptic fashion. The target injection site (see above) was identified with fluoroscopy.     Procedural Pause:  A procedural pause verifying correct patient, medical record number, allergies, medications to be administered, current vital signs, and surgical site was performed immediately prior to beginning the procedure.    The skin and subcutaneous tissue overlying the target site(s) of injection for the L3-4 and L4-5 transforaminal epidural steroid injection was/were anesthetized using 4 " mL of 1% lidocaine with a 25-gauge, 1½-inch needle.      The fluoroscopic beam was aligned to create a tunnel view.  The above noted needle was advanced parallel to the fluoroscopic beam towards the above noted foramen under fluoroscopic guidance.  The final position of the needle(s) was identified using AP and lateral views.  Paresthesias were not noted with final needle positioning.       A microbore extension tubing was attached to the needle to minimize any movement of the needle during injection or syringe change.  After negative aspiration for heme or CSF at each site(s) where the needle(s) was placed, 0.5ml of contrast dye was injected to confirm appropriate placement and that there was no vascular uptake.  Pain provocation by the injected contrast material was not noted.  Then the injectate solution described above was injected in increments.  The needle was then retracted approximately FPC and the needle track was flushed with 0.5 mL of Lidocaine 1%.  The needle(s) was then removed.     The same procedural technique outlined above was not repeated on the OPPOSITE side.    The heart rate, pulse oximetry, and blood pressure were continuously monitored throughout the procedure.  The procedure was well tolerated. He was carefully escorted to the recovery room in stable condition. Patient was monitored by RN for recovery period.  The patient will be contacted in the next few days to determine extent of relief.  Patient was given post procedure and discharge instructions to follow at home.  The patient was discharged in a stable condition.    Note Electronically Signed By:  Santi Murry  01/14/2025

## 2025-01-14 NOTE — PLAN OF CARE
Stevie Asencio  has met all discharge criteria from Phase II. Vital Signs are stable, ambulating  without difficulty. Discharge instructions given, patient verbalized understanding. Discharged from facility via wheelchair in stable condition.

## 2025-01-14 NOTE — DISCHARGE SUMMARY
[facility]  Discharge Note  Short Stay    Procedure(s) (LRB):  Right L3-4, L4-5 TFESI (Right)      OUTCOME: Patient tolerated treatment/procedure well without complication and is now ready for discharge.    DISPOSITION: Home or Self Care    FINAL DIAGNOSIS:  <principal problem not specified>    FOLLOWUP: In clinic    DISCHARGE INSTRUCTIONS:  No discharge procedures on file.     TIME SPENT ON DISCHARGE: 10 minutes

## 2025-01-14 NOTE — DISCHARGE INSTRUCTIONS
[facility]  [employer] service # [phone]  On-call pager for emergency# [phone]     POST-PROCEDURE INSTRUCTIONS:    Today you had an injection that included a steroid medications.  The steroid may or may not have been mixed with a local anesthetic when it was injected.   If the injection was in the neck, you may feel some pressure, numbness, or slight weakness in the arm after the procedure for a short period of time (this is a normal response), if this persists for longer than 1 day please contact our office or go to the emergency room.  If the injection was in the low back, you may feel some pressure, numbness, or slight weakness in the leg after the procedure for a short period of time (this is a normal response), if this persists for longer than 1 day please contact our office or go to the emergency room.  You may get side effects from the steroid.  This is not uncommon.  Symptoms include: elevated blood sugar, elevated blood pressure, headache, flushing, nausea, insomnia.  These symptoms are transient and will resolve within 1-3 days.  If symptoms last longer than this please contact our office or head to the emergency room.  Steroid medications can take anywhere from 3-14 days to take effect (rarely longer).  You may notice that your pain worsens for a short period of time after the injection, this would not be unusual due to the pressure and trauma from the needle.    If you do not have a follow up appointment scheduled, please contact my office (or the office of the physician who referred you for the procedure) to get a post-procedure follow up scheduled 2-4 weeks after the procedure.  This can be done as a virtual visit if that is more convenient for you.      What you need to do:    Keep a record of your response to the injection you had today.    How much relief did you get?   When did the relief start and how long did it last?  Were you  able to decrease the use of any of your pain medications?  Were you able to increase your level of activity?  How long did the relief last?    What to watch out for:    If you experience any of the following symptoms after your procedure, please notify the messaging service immediately (see above for contact information):   fever (increased oral temperature)   bleeding or swelling at the injection site,    drainage, rash or redness at the injection site    possible signs of infection    increased pain at the injection site   worsening of your usual pain   severe headache   new or worsening numbness    new arm and/or leg weakness, or    changes in bowel and/or bladder function: urinating or defecating on yourself and not knowing that you did it.    PLEASE FOLLOW ALL INSTRUCTIONS CAREFULLY     Do not engage in strenuous activity (e.g., lifting or pushing heavy objects or repeated bending) for 24 hours.     Do not take a bath, swim or use Jacuzzi for 24 hours after procedure. (A shower is fine).   Remove any Band-Aids when you get home.    Use cold/ice, as needed for comfort.  We recommend the use of cold therapy alternating on for 20 minutes, off for 20 minutes.    Do not apply direct heat (heating pad or heat packs) to the injection site for 24 hours.     Resume your usual medications, unless instructed otherwise by your Pain Physician.     If you are on warfarin (Coumadin) or other blood thinner, resume this medication as instructed by your prescribing Physician.    IF AT ANY POINT YOU ARE VERY CONCERNED ABOUT YOUR SYMPTOMS, PLEASE GO TO THE EMERGENCY ROOM.    If you develop worsening pain, weakness, numbness, lose bowel or bladder control (i.e., having an accident where you did not even know you had to go to the bathroom and suddenly noticed you soiled yourself), saddle anesthesia (a loss of sensation restricted to the area of the buttocks, anus and between the legs -- i.e., those parts of your body that would  touch a saddle if you were sitting on one) you need to go immediately to the emergency department for evaluation and treatment.    ----------------------------------------------------------------------------------------------------------------------------------------------------------------  If you received Sedation please read the following instructions:  POST SEDATION INSTRUCTIONS    Today you received intravenous medication (also known as sedation) that was used to help you relax and/or decrease discomfort during your procedure. This medication will be acting in your body for the next 24 hours, so you might feel a little tired or sleepy. This feeling will slowly wear off.   Common side effects associated with these medications include: drowsiness, dizziness, sleepiness, confusion, feeling excited, difficulty remembering things, lack of steadiness with walking or balance, loss of fine muscle control, slowed reflexes, difficulty focusing, and blurred vision.  Some over-the-counter and prescription medications (e.g., muscle relaxants, opioids, mood-altering medications, sedatives/hypnotics, antihistamines) can interact with the intravenous medication you received and cause an increased risk of the side effects listed above in addition to other potentially life threatening side effects. Use extreme caution if you are taking such medications, and consult with your Pain Physician or prescribing physician if you have any questions.  For the next 12-24 hours:    DO NOT--Drive a car, operate machinery or power tools   DO NOT--Drink any alcoholic beverages (not even beer), they may dangerously increase the risk of side effects.    DO NOT--Make any important legal or business decisions or sign important documents.  We advise you to have someone to assist you at home. Move slowly and carefully. Do not make sudden changes in position. Be aware of dizziness or light-headedness and move accordingly.   If you seek medical  treatment within 24 hours, let the nurse or doctor caring for you know that you have received the above medications. If you have any questions or concerns related to your sedation or treatment today please contact us.

## 2025-03-08 DIAGNOSIS — M17.12 PRIMARY OSTEOARTHRITIS OF LEFT KNEE: ICD-10-CM

## 2025-03-10 RX ORDER — MELOXICAM 7.5 MG/1
7.5 TABLET ORAL
Qty: 30 TABLET | Refills: 1 | Status: SHIPPED | OUTPATIENT
Start: 2025-03-10

## 2025-03-24 ENCOUNTER — PATIENT MESSAGE (OUTPATIENT)
Dept: INTERNAL MEDICINE | Facility: CLINIC | Age: 70
End: 2025-03-24
Payer: MEDICARE

## 2025-03-31 ENCOUNTER — OFFICE VISIT (OUTPATIENT)
Dept: INTERNAL MEDICINE | Facility: CLINIC | Age: 70
End: 2025-03-31
Payer: MEDICARE

## 2025-03-31 VITALS
BODY MASS INDEX: 33.39 KG/M2 | HEIGHT: 70 IN | DIASTOLIC BLOOD PRESSURE: 70 MMHG | TEMPERATURE: 98 F | HEART RATE: 73 BPM | OXYGEN SATURATION: 95 % | SYSTOLIC BLOOD PRESSURE: 134 MMHG | WEIGHT: 233.25 LBS

## 2025-03-31 DIAGNOSIS — I70.0 AORTIC ATHEROSCLEROSIS: Chronic | ICD-10-CM

## 2025-03-31 DIAGNOSIS — I10 PRIMARY HYPERTENSION: Primary | Chronic | ICD-10-CM

## 2025-03-31 DIAGNOSIS — K42.9 UMBILICAL HERNIA WITHOUT OBSTRUCTION AND WITHOUT GANGRENE: ICD-10-CM

## 2025-03-31 DIAGNOSIS — M1A.9XX0 CHRONIC GOUT WITHOUT TOPHUS, UNSPECIFIED CAUSE, UNSPECIFIED SITE: Chronic | ICD-10-CM

## 2025-03-31 DIAGNOSIS — G47.00 INSOMNIA, UNSPECIFIED TYPE: Chronic | ICD-10-CM

## 2025-03-31 DIAGNOSIS — I48.0 PAROXYSMAL ATRIAL FIBRILLATION: Chronic | ICD-10-CM

## 2025-03-31 DIAGNOSIS — R73.01 IMPAIRED FASTING BLOOD SUGAR: Chronic | ICD-10-CM

## 2025-03-31 DIAGNOSIS — E78.1 HYPERTRIGLYCERIDEMIA: Chronic | ICD-10-CM

## 2025-03-31 PROBLEM — F11.20 UNCOMPLICATED OPIOID DEPENDENCE: Status: RESOLVED | Noted: 2024-12-02 | Resolved: 2025-03-31

## 2025-03-31 PROCEDURE — 99999 PR PBB SHADOW E&M-EST. PATIENT-LVL IV: CPT | Mod: PBBFAC,HCNC,, | Performed by: INTERNAL MEDICINE

## 2025-03-31 NOTE — PROGRESS NOTES
Assessment:       1. Primary hypertension    2. Hypertriglyceridemia    3. Aortic atherosclerosis    4. Paroxysmal atrial fibrillation    5. Impaired fasting blood sugar    6. Chronic gout without tophus, unspecified cause, unspecified site    7. Insomnia, unspecified type    8. Umbilical hernia without obstruction and without gangrene  - US Abdomen Limited_Hernia; Future        Plan:       1. Continue valsartan 320 mg, HCTZ 25 mg  2/3. Continue Lipitor 40 mg.   4. Continue flecainide 100 mg, Toprol-XL 25 mg   5. Monitor   6. Continue allopurinol 300 mg.   7. Monitor.  8. Check ultrasound.    Deep Scribe:  IMPRESSION:  1. Continued current medication regimen for HTN, HLD, a-fib, gout, and impaired fasting glucose.  2. Back pain management improving, noting recent steroid injection with positive results.  3. Evaluated abdominal hernia, determining need for further imaging to assess progression.    SUMMARY:   Administered steroid injection in hip/groin area for pain relief   Administered steroid injection for disc issues, potential repeat every 3 months   Ordered abdominal ultrasound for evaluation   Continue Lipitor 40 mg   Continue Allopurinol 300 mg   Continue Valsartan 320 mg and HCTZ 25 mg   Continue Flecainide 100 mg and Propranolol 25 mg   Discussed [facility], a specialized physical therapy option   Follow-up appointment in 6 months for labs    UNCOMPLICATED OPIOID DEPENDENCE:   Confirmed patient is taking prescribed Vicodin for back pain.   Administered steroid injection for disc issues, with potential repeat injections every 3 months as needed.    PAROXYSMAL ATRIAL FIBRILLATION:   Continued current treatment regimen of Flecainide 100 mg and Propranolol 25 mg.   Will monitor patient's condition for any changes or complications.    HYPERTENSION:   Continue current treatment regimen of Valsartan 320 mg and HCTZ 25 mg.   Encourage patient to continue monitoring blood pressure at home, noting that  reported readings are 134/70 or less.    AORTIC ATHEROSCLEROSIS:   Continue current treatment with Lipitor 40 mg.   Monitor condition for any changes or complications.    LUMBOSACRAL DISC DISPLACEMENT:   Discussed [facility], a specialized physical therapy option.   Note: Mr. Villalba reports three discs in lower back pushing on each other.    LEFT HIP PAIN:   Administered steroid injection in the hip/groin area, providing pain relief.   Monitor patient's response to treatment.    ABDOMINAL HERNIA:   Explained to patient that abdominal protrusion is likely a hernia, not cancer.   Ordered abdominal ultrasound for evaluation.   Unable to palpate internally during exam.   Note patient's history of previous hernia repairs, including one with mesh insertion.   Instruct patient to continue monitoring hernia size.    ALCOHOL USE:   Mr. Villalba reports consuming approximately 6 beers on weekends and occasional white Russians.   Assess consumption patterns and potential risks.    GOUT:   Continue current treatment with Allopurinol 300 mg.   Monitor condition in future visits.    HYPERTRIGLYCERIDEMIA:   Continue current treatment with Lipitor 40 mg.    FOLLOW-UP:   Schedule follow-up visit in 6 months for labs.                 This note was generated with the assistance of ambient listening technology. Verbal consent was obtained by the patient and accompanying visitor(s) for the recording of patient appointment to facilitate this note. I attest to having reviewed and edited the generated note for accuracy, though some syntax or spelling errors may persist. Please contact the author of this note for any clarification.       Subjective:       Patient ID: Stevie Villalba is a 69 y.o. male.    Chief Complaint: Follow-up    HPI    69-year-old male here for follow-up.    Patient has hypertension on valsartan 320 mg, HCTZ 25 mg.    Patient has hypertriglyceridemia, aortic atherosclerosis on Lipitor 40 mg.    Patient has  paroxysmal atrial fibrillation on flecainide 100 mg, Toprol-XL 25 mg.    Patient has impaired fasting blood sugars monitored with A1c.    Patient has gout on allopurinol 300 mg.    Patient has insomnia on no current medication.    History of Present Illness    CHIEF COMPLAINT:  Mr. Villalba presents today for follow up    CARDIOVASCULAR:  He regularly checks his blood pressure at home with recent reading of 134/70. He has paroxysmal atrial fibrillation managed with Flecainide 100 mg and Propranolol 25 mg. He also has hypertension controlled with Valsartan 320 mg and HCTZ 25 mg. He has aortic atherosclerosis managed with Lipitor 40 mg.    MUSCULOSKELETAL:  He has three herniated discs in his lower back causing pain, currently managed with Vicodin. He received a steroid injection in his back with good results and was informed he could receive injections every three months as needed. He also had hip inflammation for which he received an injection in the groin area providing relief. He was previously advised to consider hip replacement surgery despite minimal discomfort at the time.    SURGICAL HISTORY:  He has undergone multiple hernia repairs. The first repair did not include mesh, requiring a second surgery. His current hernia remains stable without associated pain or discomfort.    SOCIAL HISTORY:  He consumes approximately six beers and occasional white Russians. He denies smoking or illegal drug use. He reports being occasionally sexually active.    MEDICAL HISTORY:  He has gout managed with Allopurinol 300 mg and hypertriglyceridemia managed with Lipitor 40 mg.      ROS:  Constitutional: +sleep disturbances  Gastrointestinal: +hernias  Male Genitourinary: +change in sexual performance  Musculoskeletal: +back pain, -limb pain  Psychiatric: +difficulty falling asleep, +difficulty staying asleep         Review of Systems          Objective:      Physical Exam  Vitals reviewed.   Constitutional:       Appearance: He  is well-developed.   HENT:      Head: Normocephalic and atraumatic.      Mouth/Throat:      Pharynx: No oropharyngeal exudate.   Eyes:      General: No scleral icterus.        Right eye: No discharge.         Left eye: No discharge.      Pupils: Pupils are equal, round, and reactive to light.   Neck:      Thyroid: No thyromegaly.      Trachea: No tracheal deviation.   Cardiovascular:      Rate and Rhythm: Normal rate and regular rhythm.      Heart sounds: Normal heart sounds. No murmur heard.     No friction rub. No gallop.   Pulmonary:      Effort: Pulmonary effort is normal. No respiratory distress.      Breath sounds: Normal breath sounds. No wheezing or rales.   Chest:      Chest wall: No tenderness.   Abdominal:      General: Bowel sounds are normal. There is no distension.      Palpations: Abdomen is soft. There is no mass.      Tenderness: There is no abdominal tenderness. There is no guarding or rebound.      Hernia: A hernia is present. Hernia is present in the umbilical area.   Musculoskeletal:         General: No tenderness. Normal range of motion.      Cervical back: Normal range of motion and neck supple.   Skin:     General: Skin is warm and dry.      Coloration: Skin is not pale.      Findings: No erythema or rash.   Neurological:      Mental Status: He is alert and oriented to person, place, and time.   Psychiatric:         Behavior: Behavior normal.

## 2025-04-14 ENCOUNTER — RESULTS FOLLOW-UP (OUTPATIENT)
Dept: INTERNAL MEDICINE | Facility: CLINIC | Age: 70
End: 2025-04-14

## 2025-04-14 ENCOUNTER — HOSPITAL ENCOUNTER (OUTPATIENT)
Dept: RADIOLOGY | Facility: HOSPITAL | Age: 70
Discharge: HOME | End: 2025-04-14
Attending: INTERNAL MEDICINE
Payer: MEDICARE

## 2025-04-14 DIAGNOSIS — K42.9 UMBILICAL HERNIA WITHOUT OBSTRUCTION AND WITHOUT GANGRENE: ICD-10-CM

## 2025-04-14 PROCEDURE — 76705 ECHO EXAM OF ABDOMEN: CPT | Mod: TC

## 2025-04-14 PROCEDURE — 76705 ECHO EXAM OF ABDOMEN: CPT | Mod: 26,,, | Performed by: RADIOLOGY

## 2025-04-22 ENCOUNTER — HOSPITAL ENCOUNTER (INPATIENT)
Facility: HOSPITAL | Age: 70
LOS: 2 days | Discharge: HOME | DRG: 871 | End: 2025-04-25
Attending: EMERGENCY MEDICINE | Admitting: INTERNAL MEDICINE
Payer: MEDICARE

## 2025-04-22 DIAGNOSIS — L97.519 ULCER OF TOE OF RIGHT FOOT, UNSPECIFIED ULCER STAGE: ICD-10-CM

## 2025-04-22 DIAGNOSIS — S91.109A OPEN TOE WOUND: ICD-10-CM

## 2025-04-22 DIAGNOSIS — R07.9 CHEST PAIN: ICD-10-CM

## 2025-04-22 DIAGNOSIS — R50.9 FEVER: ICD-10-CM

## 2025-04-22 DIAGNOSIS — A41.9 SEPSIS WITHOUT ACUTE ORGAN DYSFUNCTION, DUE TO UNSPECIFIED ORGANISM: Primary | ICD-10-CM

## 2025-04-22 DIAGNOSIS — Z91.89 AT RISK FOR LONG QT SYNDROME: ICD-10-CM

## 2025-04-22 PROBLEM — L97.509 TOE ULCER: Status: ACTIVE | Noted: 2025-04-22

## 2025-04-22 LAB
ABSOLUTE EOSINOPHIL (OHS): 0 K/UL
ABSOLUTE MONOCYTE (OHS): 1.26 K/UL (ref 0.3–1)
ABSOLUTE NEUTROPHIL COUNT (OHS): 11.22 K/UL (ref 1.8–7.7)
ALBUMIN SERPL BCP-MCNC: 3.7 G/DL (ref 3.5–5.2)
ALP SERPL-CCNC: 115 UNIT/L (ref 40–150)
ALT SERPL W/O P-5'-P-CCNC: 22 UNIT/L (ref 10–44)
ANION GAP (OHS): 12 MMOL/L (ref 8–16)
AST SERPL-CCNC: 28 UNIT/L (ref 11–45)
BACTERIA #/AREA URNS AUTO: NORMAL /HPF
BASOPHILS # BLD AUTO: 0.03 K/UL
BASOPHILS NFR BLD AUTO: 0.2 %
BILIRUB SERPL-MCNC: 1.3 MG/DL (ref 0.1–1)
BILIRUB UR QL STRIP.AUTO: NEGATIVE
BUN SERPL-MCNC: 22 MG/DL (ref 8–23)
CALCIUM SERPL-MCNC: 8.9 MG/DL (ref 8.7–10.5)
CHLORIDE SERPL-SCNC: 93 MMOL/L (ref 95–110)
CLARITY UR: CLEAR
CO2 SERPL-SCNC: 26 MMOL/L (ref 23–29)
COLOR UR AUTO: YELLOW
CREAT SERPL-MCNC: 1.2 MG/DL (ref 0.5–1.4)
CRP SERPL-MCNC: 159.9 MG/L
ERYTHROCYTE [DISTWIDTH] IN BLOOD BY AUTOMATED COUNT: 12.4 % (ref 11.5–14.5)
GFR SERPLBLD CREATININE-BSD FMLA CKD-EPI: >60 ML/MIN/1.73/M2
GLUCOSE SERPL-MCNC: 140 MG/DL (ref 70–110)
GLUCOSE UR QL STRIP: NEGATIVE
GROUP A STREP MOLECULAR (OHS): NEGATIVE
HCT VFR BLD AUTO: 44.8 % (ref 40–54)
HCV AB SERPL QL IA: NORMAL
HGB BLD-MCNC: 15.3 GM/DL (ref 14–18)
HGB UR QL STRIP: ABNORMAL
HIV 1+2 AB+HIV1 P24 AG SERPL QL IA: NORMAL
HOLD SPECIMEN: NORMAL
HYALINE CASTS UR QL AUTO: 0 /LPF (ref 0–1)
IMM GRANULOCYTES # BLD AUTO: 0.06 K/UL (ref 0–0.04)
IMM GRANULOCYTES NFR BLD AUTO: 0.4 % (ref 0–0.5)
INFLUENZA A MOLECULAR (OHS): NEGATIVE
INFLUENZA B MOLECULAR (OHS): NEGATIVE
KETONES UR QL STRIP: ABNORMAL
LACTATE SERPL-SCNC: 1.9 MMOL/L (ref 0.5–2.2)
LEUKOCYTE ESTERASE UR QL STRIP: NEGATIVE
LYMPHOCYTES # BLD AUTO: 1.13 K/UL (ref 1–4.8)
MCH RBC QN AUTO: 33.5 PG (ref 27–31)
MCHC RBC AUTO-ENTMCNC: 34.2 G/DL (ref 32–36)
MCV RBC AUTO: 98 FL (ref 82–98)
MICROSCOPIC COMMENT: NORMAL
NITRITE UR QL STRIP: NEGATIVE
NUCLEATED RBC (/100WBC) (OHS): 0 /100 WBC
PH UR STRIP: 6 [PH]
PLATELET # BLD AUTO: 199 K/UL (ref 150–450)
PMV BLD AUTO: 9.5 FL (ref 9.2–12.9)
POTASSIUM SERPL-SCNC: 3.6 MMOL/L (ref 3.5–5.1)
PROT SERPL-MCNC: 7.9 GM/DL (ref 6–8.4)
PROT UR QL STRIP: ABNORMAL
RBC # BLD AUTO: 4.57 M/UL (ref 4.6–6.2)
RBC #/AREA URNS AUTO: 1 /HPF (ref 0–4)
RELATIVE EOSINOPHIL (OHS): 0 %
RELATIVE LYMPHOCYTE (OHS): 8.2 % (ref 18–48)
RELATIVE MONOCYTE (OHS): 9.2 % (ref 4–15)
RELATIVE NEUTROPHIL (OHS): 82 % (ref 38–73)
SARS-COV-2 RDRP RESP QL NAA+PROBE: NEGATIVE
SODIUM SERPL-SCNC: 131 MMOL/L (ref 136–145)
SP GR UR STRIP: 1.02
SQUAMOUS #/AREA URNS AUTO: <1 /HPF
UROBILINOGEN UR STRIP-ACNC: NEGATIVE EU/DL
WBC # BLD AUTO: 13.7 K/UL (ref 3.9–12.7)
WBC #/AREA URNS AUTO: 1 /HPF (ref 0–5)
YEAST UR QL AUTO: NORMAL /HPF

## 2025-04-22 PROCEDURE — 87651 STREP A DNA AMP PROBE: CPT | Mod: HCNC | Performed by: PHYSICIAN ASSISTANT

## 2025-04-22 PROCEDURE — 96365 THER/PROPH/DIAG IV INF INIT: CPT | Mod: HCNC

## 2025-04-22 PROCEDURE — 85025 COMPLETE CBC W/AUTO DIFF WBC: CPT | Mod: HCNC | Performed by: PHYSICIAN ASSISTANT

## 2025-04-22 PROCEDURE — 82607 VITAMIN B-12: CPT | Mod: HCNC | Performed by: INTERNAL MEDICINE

## 2025-04-22 PROCEDURE — 86140 C-REACTIVE PROTEIN: CPT | Mod: HCNC | Performed by: PHYSICIAN ASSISTANT

## 2025-04-22 PROCEDURE — U0002 COVID-19 LAB TEST NON-CDC: HCPCS | Mod: HCNC | Performed by: PHYSICIAN ASSISTANT

## 2025-04-22 PROCEDURE — 93005 ELECTROCARDIOGRAM TRACING: CPT | Mod: HCNC

## 2025-04-22 PROCEDURE — 63600175 PHARM REV CODE 636 W HCPCS: Mod: HCNC | Performed by: PHYSICIAN ASSISTANT

## 2025-04-22 PROCEDURE — 81001 URINALYSIS AUTO W/SCOPE: CPT | Mod: HCNC | Performed by: PHYSICIAN ASSISTANT

## 2025-04-22 PROCEDURE — 87389 HIV-1 AG W/HIV-1&-2 AB AG IA: CPT | Mod: HCNC | Performed by: PHYSICIAN ASSISTANT

## 2025-04-22 PROCEDURE — G0378 HOSPITAL OBSERVATION PER HR: HCPCS | Mod: HCNC

## 2025-04-22 PROCEDURE — 87502 INFLUENZA DNA AMP PROBE: CPT | Mod: HCNC | Performed by: PHYSICIAN ASSISTANT

## 2025-04-22 PROCEDURE — 87040 BLOOD CULTURE FOR BACTERIA: CPT | Mod: HCNC,91 | Performed by: PHYSICIAN ASSISTANT

## 2025-04-22 PROCEDURE — 83605 ASSAY OF LACTIC ACID: CPT | Mod: 91,HCNC | Performed by: PHYSICIAN ASSISTANT

## 2025-04-22 PROCEDURE — 25000003 PHARM REV CODE 250: Mod: HCNC | Performed by: PHYSICIAN ASSISTANT

## 2025-04-22 PROCEDURE — A9585 GADOBUTROL INJECTION: HCPCS | Mod: HCNC | Performed by: EMERGENCY MEDICINE

## 2025-04-22 PROCEDURE — 96366 THER/PROPH/DIAG IV INF ADDON: CPT | Mod: HCNC

## 2025-04-22 PROCEDURE — 93010 ELECTROCARDIOGRAM REPORT: CPT | Mod: HCNC,,, | Performed by: INTERNAL MEDICINE

## 2025-04-22 PROCEDURE — 25500020 PHARM REV CODE 255: Mod: HCNC | Performed by: EMERGENCY MEDICINE

## 2025-04-22 PROCEDURE — 99285 EMERGENCY DEPT VISIT HI MDM: CPT | Mod: 25,HCNC

## 2025-04-22 PROCEDURE — 96361 HYDRATE IV INFUSION ADD-ON: CPT | Mod: HCNC

## 2025-04-22 PROCEDURE — 96374 THER/PROPH/DIAG INJ IV PUSH: CPT | Mod: 59,HCNC

## 2025-04-22 PROCEDURE — 86803 HEPATITIS C AB TEST: CPT | Mod: HCNC | Performed by: PHYSICIAN ASSISTANT

## 2025-04-22 PROCEDURE — 83605 ASSAY OF LACTIC ACID: CPT | Mod: HCNC | Performed by: PHYSICIAN ASSISTANT

## 2025-04-22 PROCEDURE — 80053 COMPREHEN METABOLIC PANEL: CPT | Mod: HCNC | Performed by: PHYSICIAN ASSISTANT

## 2025-04-22 RX ORDER — ACETAMINOPHEN 325 MG/1
650 TABLET ORAL EVERY 4 HOURS PRN
Status: DISCONTINUED | OUTPATIENT
Start: 2025-04-22 | End: 2025-04-25 | Stop reason: HOSPADM

## 2025-04-22 RX ORDER — IBUPROFEN 200 MG
24 TABLET ORAL
Status: DISCONTINUED | OUTPATIENT
Start: 2025-04-22 | End: 2025-04-25 | Stop reason: HOSPADM

## 2025-04-22 RX ORDER — TALC
6 POWDER (GRAM) TOPICAL NIGHTLY PRN
Status: DISCONTINUED | OUTPATIENT
Start: 2025-04-22 | End: 2025-04-25 | Stop reason: HOSPADM

## 2025-04-22 RX ORDER — IBUPROFEN 600 MG/1
600 TABLET ORAL
Status: COMPLETED | OUTPATIENT
Start: 2025-04-22 | End: 2025-04-22

## 2025-04-22 RX ORDER — IBUPROFEN 200 MG
16 TABLET ORAL
Status: DISCONTINUED | OUTPATIENT
Start: 2025-04-22 | End: 2025-04-25 | Stop reason: HOSPADM

## 2025-04-22 RX ORDER — POLYETHYLENE GLYCOL 3350 17 G/17G
17 POWDER, FOR SOLUTION ORAL DAILY PRN
Status: DISCONTINUED | OUTPATIENT
Start: 2025-04-22 | End: 2025-04-25 | Stop reason: HOSPADM

## 2025-04-22 RX ORDER — VANCOMYCIN 2 GRAM/500 ML IN 0.9 % SODIUM CHLORIDE INTRAVENOUS
20
Status: COMPLETED | OUTPATIENT
Start: 2025-04-22 | End: 2025-04-22

## 2025-04-22 RX ORDER — GADOBUTROL 604.72 MG/ML
10 INJECTION INTRAVENOUS
Status: COMPLETED | OUTPATIENT
Start: 2025-04-22 | End: 2025-04-22

## 2025-04-22 RX ORDER — ENOXAPARIN SODIUM 100 MG/ML
40 INJECTION SUBCUTANEOUS EVERY 24 HOURS
Status: DISCONTINUED | OUTPATIENT
Start: 2025-04-23 | End: 2025-04-25 | Stop reason: HOSPADM

## 2025-04-22 RX ORDER — GLUCAGON 1 MG
1 KIT INJECTION
Status: DISCONTINUED | OUTPATIENT
Start: 2025-04-22 | End: 2025-04-25 | Stop reason: HOSPADM

## 2025-04-22 RX ORDER — ACETAMINOPHEN 500 MG
1000 TABLET ORAL
Status: COMPLETED | OUTPATIENT
Start: 2025-04-22 | End: 2025-04-22

## 2025-04-22 RX ORDER — CEFTRIAXONE 1 G/1
1 INJECTION, POWDER, FOR SOLUTION INTRAMUSCULAR; INTRAVENOUS
Status: COMPLETED | OUTPATIENT
Start: 2025-04-22 | End: 2025-04-22

## 2025-04-22 RX ADMIN — ACETAMINOPHEN 1000 MG: 500 TABLET ORAL at 05:04

## 2025-04-22 RX ADMIN — GADOBUTROL 10 ML: 604.72 INJECTION INTRAVENOUS at 09:04

## 2025-04-22 RX ADMIN — SODIUM CHLORIDE 2000 MG: 9 INJECTION, SOLUTION INTRAVENOUS at 07:04

## 2025-04-22 RX ADMIN — SODIUM CHLORIDE, POTASSIUM CHLORIDE, SODIUM LACTATE AND CALCIUM CHLORIDE 500 ML: 600; 310; 30; 20 INJECTION, SOLUTION INTRAVENOUS at 05:04

## 2025-04-22 RX ADMIN — IBUPROFEN 600 MG: 600 TABLET, FILM COATED ORAL at 05:04

## 2025-04-22 RX ADMIN — CEFTRIAXONE 1 G: 1 INJECTION, POWDER, FOR SOLUTION INTRAMUSCULAR; INTRAVENOUS at 06:04

## 2025-04-22 RX ADMIN — SODIUM CHLORIDE, POTASSIUM CHLORIDE, SODIUM LACTATE AND CALCIUM CHLORIDE 1000 ML: 600; 310; 30; 20 INJECTION, SOLUTION INTRAVENOUS at 09:04

## 2025-04-22 NOTE — FIRST PROVIDER EVALUATION
" Emergency Department TeleTriage Encounter Note      CHIEF COMPLAINT    Chief Complaint   Patient presents with    Chills    Fever       VITAL SIGNS   Initial Vitals [04/22/25 1519]   BP Pulse Resp Temp SpO2   (!) 162/72 93 17 98.1 °F (36.7 °C) 96 %      MAP       --            ALLERGIES    Review of patient's allergies indicates:  No Known Allergies    PROVIDER TRIAGE NOTE  This is a teletriage evaluation of a 70 y.o. male presenting to the ED complaining of fever. Patient reports fever, sore throat, cough, and congestion since yesterday.     Patient is alert and oriented. He speaks in complete sentences. He is sitting upright in the chair in no distress.     Initial orders will be placed and care will be transferred to an alternate provider when patient is roomed for a full evaluation. Any additional orders and the final disposition will be determined by that provider.         ORDERS  Labs Reviewed   INFLUENZA A & B BY MOLECULAR   GROUP A STREP, MOLECULAR   HEPATITIS C ANTIBODY   HEP C VIRUS HOLD SPECIMEN   HIV 1 / 2 ANTIBODY   SARS-COV-2 RNA AMPLIFICATION, QUAL       ED Orders (720h ago, onward)      Start Ordered     Status Ordering Provider    04/22/25 1617 04/22/25 1616  COVID-19 Rapid Screening  STAT         Ordered MEMO ONEILL    04/22/25 1617 04/22/25 1616  Airborne and Contact and Droplet Isolation Status  Continuous         Ordered MEMO ONEILL    04/22/25 1617 04/22/25 1616  Influenza A & B by Molecular  STAT         Ordered MEMO ONEILL    04/22/25 1617 04/22/25 1616  Group A Strep, Molecular  STAT         Ordered MEMO ONEILL    04/22/25 1522 04/22/25 1522  Hepatitis C Antibody  STAT        Placed in "And" Linked Group    Acknowledged MICHAEL LANE    04/22/25 1522 04/22/25 1522  HCV Virus Hold Specimen  STAT        Placed in "And" Linked Group    Acknowledged MICHAEL LANE    04/22/25 1522 04/22/25 1522  HIV 1/2 Ag/Ab (4th Gen)  STAT         Acknowledged MICHAEL LANE "              Virtual Visit Note: The provider triage portion of this emergency department evaluation and documentation was performed via [employer], a HIPAA-compliant telemedicine application, in concert with a tele-presenter in the room. A face to face patient evaluation with one of my colleagues will occur once the patient is placed in an emergency department room.      DISCLAIMER: This note was prepared with [employer] voice recognition transcription software. Garbled syntax, mangled pronouns, and other bizarre constructions may be attributed to that software system.

## 2025-04-22 NOTE — ED TRIAGE NOTES
Patient identifiers verified and correct for Stevie Villalba  C/C: sore throat, productive cough, chills, and bilateral big toe wounds (from pedicure)  APPEARANCE: awake and alert in NAD.   SKIN: warm, dry and intact. No breakdown or bruising.  MUSCULOSKELETAL: Patient moving all extremities spontaneously, no obvious swelling or deformities noted. Ambulates independently. Bilateral wounds to big toes after a pedicure  RESPIRATORY: Denies shortness of breath.Respirations unlabored. Reports productive cough  CARDIAC: Denies CP, 2+ distal pulses; no peripheral edema  ABDOMEN: S/ND/NT, Denies nausea  : voids spontaneously, denies difficulty  Neurologic: AAO x 4; follows commands equal strength in all extremities; denies numbness/tingling. Denies dizziness     Chills, productive cough, sore throat x2 days. Bilateral big toe wounds from a pedicure about a week ago. Warm to touch, swelling, redness to entire area.

## 2025-04-23 PROBLEM — E87.6 HYPOKALEMIA: Status: ACTIVE | Noted: 2025-04-23

## 2025-04-23 PROBLEM — J18.9 PNEUMONIA OF LEFT LOWER LOBE DUE TO INFECTIOUS ORGANISM: Status: ACTIVE | Noted: 2025-04-23

## 2025-04-23 PROBLEM — E83.39 HYPOPHOSPHATEMIA: Status: ACTIVE | Noted: 2025-04-23

## 2025-04-23 PROBLEM — E87.1 HYPONATREMIA: Status: ACTIVE | Noted: 2025-04-23

## 2025-04-23 PROBLEM — D64.9 ANEMIA: Status: ACTIVE | Noted: 2025-04-23

## 2025-04-23 LAB
ABSOLUTE EOSINOPHIL (OHS): 0.01 K/UL
ABSOLUTE MONOCYTE (OHS): 1.2 K/UL (ref 0.3–1)
ABSOLUTE NEUTROPHIL COUNT (OHS): 13 K/UL (ref 1.8–7.7)
ALBUMIN SERPL BCP-MCNC: 2.8 G/DL (ref 3.5–5.2)
ALP SERPL-CCNC: 87 UNIT/L (ref 40–150)
ALT SERPL W/O P-5'-P-CCNC: 17 UNIT/L (ref 10–44)
ANION GAP (OHS): 11 MMOL/L (ref 8–16)
AST SERPL-CCNC: 23 UNIT/L (ref 11–45)
BASOPHILS # BLD AUTO: 0.04 K/UL
BASOPHILS NFR BLD AUTO: 0.3 %
BILIRUB SERPL-MCNC: 1 MG/DL (ref 0.1–1)
BUN SERPL-MCNC: 14 MG/DL (ref 8–23)
CALCIUM SERPL-MCNC: 8.2 MG/DL (ref 8.7–10.5)
CHLORIDE SERPL-SCNC: 99 MMOL/L (ref 95–110)
CO2 SERPL-SCNC: 23 MMOL/L (ref 23–29)
CREAT SERPL-MCNC: 0.7 MG/DL (ref 0.5–1.4)
EAG (OHS): 100 MG/DL (ref 68–131)
ERYTHROCYTE [DISTWIDTH] IN BLOOD BY AUTOMATED COUNT: 12.2 % (ref 11.5–14.5)
GFR SERPLBLD CREATININE-BSD FMLA CKD-EPI: >60 ML/MIN/1.73/M2
GLUCOSE SERPL-MCNC: 119 MG/DL (ref 70–110)
GRAM STN SPEC: NORMAL
HBA1C MFR BLD: 5.1 % (ref 4–5.6)
HCT VFR BLD AUTO: 39.3 % (ref 40–54)
HGB BLD-MCNC: 13.4 GM/DL (ref 14–18)
IMM GRANULOCYTES # BLD AUTO: 0.08 K/UL (ref 0–0.04)
IMM GRANULOCYTES NFR BLD AUTO: 0.5 % (ref 0–0.5)
LACTATE SERPL-SCNC: 1.1 MMOL/L (ref 0.5–2.2)
LYMPHOCYTES # BLD AUTO: 1.2 K/UL (ref 1–4.8)
MAGNESIUM SERPL-MCNC: 1.6 MG/DL (ref 1.6–2.6)
MCH RBC QN AUTO: 32.8 PG (ref 27–31)
MCHC RBC AUTO-ENTMCNC: 34.1 G/DL (ref 32–36)
MCV RBC AUTO: 96 FL (ref 82–98)
MRSA PCR SCRN (OHS): NOT DETECTED
NUCLEATED RBC (/100WBC) (OHS): 0 /100 WBC
OHS QRS DURATION: 84 MS
OHS QRS DURATION: 84 MS
OHS QTC CALCULATION: 460 MS
OHS QTC CALCULATION: 482 MS
PHOSPHATE SERPL-MCNC: 2.3 MG/DL (ref 2.7–4.5)
PLATELET # BLD AUTO: 166 K/UL (ref 150–450)
PMV BLD AUTO: 9.5 FL (ref 9.2–12.9)
POCT GLUCOSE: 109 MG/DL (ref 70–110)
POCT GLUCOSE: 125 MG/DL (ref 70–110)
POCT GLUCOSE: 125 MG/DL (ref 70–110)
POCT GLUCOSE: 131 MG/DL (ref 70–110)
POTASSIUM SERPL-SCNC: 3 MMOL/L (ref 3.5–5.1)
PROT SERPL-MCNC: 6.2 GM/DL (ref 6–8.4)
RBC # BLD AUTO: 4.09 M/UL (ref 4.6–6.2)
RELATIVE EOSINOPHIL (OHS): 0.1 %
RELATIVE LYMPHOCYTE (OHS): 7.7 % (ref 18–48)
RELATIVE MONOCYTE (OHS): 7.7 % (ref 4–15)
RELATIVE NEUTROPHIL (OHS): 83.7 % (ref 38–73)
SODIUM SERPL-SCNC: 133 MMOL/L (ref 136–145)
URATE SERPL-MCNC: 3 MG/DL (ref 3.4–7)
WBC # BLD AUTO: 15.53 K/UL (ref 3.9–12.7)

## 2025-04-23 PROCEDURE — 87070 CULTURE OTHR SPECIMN AEROBIC: CPT | Mod: 59,HCNC | Performed by: INTERNAL MEDICINE

## 2025-04-23 PROCEDURE — 63600175 PHARM REV CODE 636 W HCPCS: Mod: HCNC | Performed by: INTERNAL MEDICINE

## 2025-04-23 PROCEDURE — 99222 1ST HOSP IP/OBS MODERATE 55: CPT | Mod: ,,, | Performed by: STUDENT IN AN ORGANIZED HEALTH CARE EDUCATION/TRAINING PROGRAM

## 2025-04-23 PROCEDURE — 96367 TX/PROPH/DG ADDL SEQ IV INF: CPT

## 2025-04-23 PROCEDURE — 85025 COMPLETE CBC W/AUTO DIFF WBC: CPT | Mod: HCNC | Performed by: PHYSICIAN ASSISTANT

## 2025-04-23 PROCEDURE — 83036 HEMOGLOBIN GLYCOSYLATED A1C: CPT | Mod: HCNC | Performed by: INTERNAL MEDICINE

## 2025-04-23 PROCEDURE — 36415 COLL VENOUS BLD VENIPUNCTURE: CPT | Mod: HCNC | Performed by: PHYSICIAN ASSISTANT

## 2025-04-23 PROCEDURE — 36415 COLL VENOUS BLD VENIPUNCTURE: CPT | Mod: HCNC

## 2025-04-23 PROCEDURE — 84550 ASSAY OF BLOOD/URIC ACID: CPT | Mod: HCNC

## 2025-04-23 PROCEDURE — 94761 N-INVAS EAR/PLS OXIMETRY MLT: CPT | Mod: HCNC

## 2025-04-23 PROCEDURE — 87116 MYCOBACTERIA CULTURE: CPT | Mod: HCNC

## 2025-04-23 PROCEDURE — 87641 MR-STAPH DNA AMP PROBE: CPT | Mod: HCNC | Performed by: INTERNAL MEDICINE

## 2025-04-23 PROCEDURE — 87102 FUNGUS ISOLATION CULTURE: CPT | Mod: HCNC

## 2025-04-23 PROCEDURE — 27000190 HC CPAP FULL FACE MASK W/VALVE: Mod: HCNC

## 2025-04-23 PROCEDURE — 87075 CULTR BACTERIA EXCEPT BLOOD: CPT | Mod: HCNC

## 2025-04-23 PROCEDURE — 25000003 PHARM REV CODE 250: Mod: HCNC | Performed by: PHYSICIAN ASSISTANT

## 2025-04-23 PROCEDURE — 25000003 PHARM REV CODE 250: Mod: HCNC | Performed by: INTERNAL MEDICINE

## 2025-04-23 PROCEDURE — 84100 ASSAY OF PHOSPHORUS: CPT | Mod: HCNC | Performed by: PHYSICIAN ASSISTANT

## 2025-04-23 PROCEDURE — 21400001 HC TELEMETRY ROOM: Mod: HCNC

## 2025-04-23 PROCEDURE — 87205 SMEAR GRAM STAIN: CPT | Mod: HCNC

## 2025-04-23 PROCEDURE — 87070 CULTURE OTHR SPECIMN AEROBIC: CPT | Mod: HCNC

## 2025-04-23 PROCEDURE — 87206 SMEAR FLUORESCENT/ACID STAI: CPT | Mod: HCNC

## 2025-04-23 PROCEDURE — 94660 CPAP INITIATION&MGMT: CPT | Mod: HCNC

## 2025-04-23 PROCEDURE — 80053 COMPREHEN METABOLIC PANEL: CPT | Mod: HCNC | Performed by: PHYSICIAN ASSISTANT

## 2025-04-23 PROCEDURE — 83735 ASSAY OF MAGNESIUM: CPT | Mod: HCNC | Performed by: PHYSICIAN ASSISTANT

## 2025-04-23 PROCEDURE — 63600175 PHARM REV CODE 636 W HCPCS: Mod: HCNC | Performed by: PHYSICIAN ASSISTANT

## 2025-04-23 PROCEDURE — 99900035 HC TECH TIME PER 15 MIN (STAT): Mod: HCNC

## 2025-04-23 PROCEDURE — 96366 THER/PROPH/DIAG IV INF ADDON: CPT

## 2025-04-23 RX ORDER — MAGNESIUM SULFATE HEPTAHYDRATE 40 MG/ML
2 INJECTION, SOLUTION INTRAVENOUS ONCE
Status: COMPLETED | OUTPATIENT
Start: 2025-04-23 | End: 2025-04-23

## 2025-04-23 RX ORDER — METOPROLOL SUCCINATE 25 MG/1
25 TABLET, EXTENDED RELEASE ORAL DAILY
Status: DISCONTINUED | OUTPATIENT
Start: 2025-04-23 | End: 2025-04-25 | Stop reason: HOSPADM

## 2025-04-23 RX ORDER — ATORVASTATIN CALCIUM 40 MG/1
40 TABLET, FILM COATED ORAL NIGHTLY
Status: DISCONTINUED | OUTPATIENT
Start: 2025-04-23 | End: 2025-04-25 | Stop reason: HOSPADM

## 2025-04-23 RX ORDER — ALLOPURINOL 100 MG/1
300 TABLET ORAL DAILY
Status: DISCONTINUED | OUTPATIENT
Start: 2025-04-23 | End: 2025-04-25 | Stop reason: HOSPADM

## 2025-04-23 RX ORDER — SODIUM,POTASSIUM PHOSPHATES 280-250MG
1 POWDER IN PACKET (EA) ORAL ONCE
Status: COMPLETED | OUTPATIENT
Start: 2025-04-23 | End: 2025-04-23

## 2025-04-23 RX ORDER — FLECAINIDE ACETATE 50 MG/1
100 TABLET ORAL EVERY 12 HOURS
Status: DISCONTINUED | OUTPATIENT
Start: 2025-04-23 | End: 2025-04-25 | Stop reason: HOSPADM

## 2025-04-23 RX ORDER — POTASSIUM CHLORIDE 20 MEQ/1
40 TABLET, EXTENDED RELEASE ORAL ONCE
Status: DISCONTINUED | OUTPATIENT
Start: 2025-04-23 | End: 2025-04-23

## 2025-04-23 RX ADMIN — MAGNESIUM SULFATE HEPTAHYDRATE 2 G: 40 INJECTION, SOLUTION INTRAVENOUS at 02:04

## 2025-04-23 RX ADMIN — METOPROLOL SUCCINATE 25 MG: 25 TABLET, EXTENDED RELEASE ORAL at 09:04

## 2025-04-23 RX ADMIN — PIPERACILLIN SODIUM AND TAZOBACTAM SODIUM 4.5 G: 4; .5 INJECTION, POWDER, FOR SOLUTION INTRAVENOUS at 09:04

## 2025-04-23 RX ADMIN — PIPERACILLIN SODIUM AND TAZOBACTAM SODIUM 4.5 G: 4; .5 INJECTION, POWDER, FOR SOLUTION INTRAVENOUS at 05:04

## 2025-04-23 RX ADMIN — ENOXAPARIN SODIUM 40 MG: 40 INJECTION SUBCUTANEOUS at 05:04

## 2025-04-23 RX ADMIN — VANCOMYCIN HYDROCHLORIDE 1000 MG: 1 INJECTION, POWDER, LYOPHILIZED, FOR SOLUTION INTRAVENOUS at 10:04

## 2025-04-23 RX ADMIN — PIPERACILLIN SODIUM AND TAZOBACTAM SODIUM 4.5 G: 4; .5 INJECTION, POWDER, FOR SOLUTION INTRAVENOUS at 01:04

## 2025-04-23 RX ADMIN — ALLOPURINOL 300 MG: 100 TABLET ORAL at 09:04

## 2025-04-23 RX ADMIN — FLECAINIDE ACETATE 100 MG: 50 TABLET ORAL at 10:04

## 2025-04-23 RX ADMIN — FLECAINIDE ACETATE 100 MG: 50 TABLET ORAL at 09:04

## 2025-04-23 RX ADMIN — ATORVASTATIN CALCIUM 40 MG: 40 TABLET, FILM COATED ORAL at 09:04

## 2025-04-23 RX ADMIN — POTASSIUM & SODIUM PHOSPHATES POWDER PACK 280-160-250 MG 1 PACKET: 280-160-250 PACK at 09:04

## 2025-04-23 NOTE — ASSESSMENT & PLAN NOTE
- 3/4 SIRS for fever, tachycardia, leukocytosis   - lactic WNL   - source unclear-- spine vs toe ulcer vs respiratory vs other?  - continue vanc  - switch rocephin to zosyn while infectious work up still pending  - MRI L spine pending  - NPO midnight incase any surgical consult needing pending MRI findings  - consider CT C/A/P if MRI negative  - s/p 1.5L IVFs, hold further IVFs given possible pulm edema on CXR unless becomes hypotensive

## 2025-04-23 NOTE — ASSESSMENT & PLAN NOTE
No signs of any acute foot infection noted to bilateral foot wounds.  X-ray without any OM like changes noted or any emphysema seen bilaterally.  Do not think the toes are the source of patient's sepsis  - no surgical intervention indicated from Podiatry  - wound cultures collected from left great toe, primary can tailor antibiotics based off these  - recommend heel offloading boots any time podiatry is lying in bed  - wound cultures, pending  -dressings applied by Podiatry today as follows Betadine paint followed by border dressings  - patient okay to weightbear as tolerated in postop shoes/darco bilaterally as tolerated, please call extension [phone] if there is any trouble obtaining shoes  - patient can follow up in outpatient podiatry clinic  - podiatry will sign off at this time.  Thank you for involving us in the care of this patient.  Please contact with any additional questions

## 2025-04-23 NOTE — SUBJECTIVE & OBJECTIVE
Interval History:  Seen and examined this morning on bedside rounds, family member Thais at the bedside during my rounds, RN present as well.  He has already been seen by Podiatry this morning who believe that the foot is low likely to be the source of his sepsis.  When inquiring further about pulmonary symptoms, he does report a strong productive cough for several weeks, low appetite for 3 days, postnasal drip, and unintentional weight loss.  He denies any night sweats, urinary symptoms.  We discussed the plan of care including continuing broad-spectrum IV antimicrobials, following a blood cultures, obtaining sputum cultures, and obtaining a repeat chest radiograph now that he is no longer dehydrated.    Review of Systems  Objective:     Vital Signs (Most Recent):  Temp: 98.5 °F (36.9 °C) (04/23/25 0806)  Pulse: 70 (04/23/25 0809)  Resp: 14 (04/23/25 0806)  BP: 123/68 (04/23/25 0806)  SpO2: (!) 93 % (04/23/25 0806) Vital Signs (24h Range):  Temp:  [97.9 °F (36.6 °C)-101.1 °F (38.4 °C)] 98.5 °F (36.9 °C)  Pulse:  [70-93] 70  Resp:  [14-20] 14  SpO2:  [92 %-96 %] 93 %  BP: (123-162)/(68-89) 123/68     Weight: 105.7 kg (233 lb)  Body mass index is 33.43 kg/m².    Intake/Output Summary (Last 24 hours) at 4/23/2025 1041  Last data filed at 4/22/2025 2232  Gross per 24 hour   Intake 1866.29 ml   Output --   Net 1866.29 ml      Physical Exam  HENT:      Head: Normocephalic.      Nose: No congestion.      Mouth/Throat:      Pharynx: Oropharynx is clear.   Eyes:      General: No scleral icterus.     Conjunctiva/sclera: Conjunctivae normal.   Cardiovascular:      Rate and Rhythm: Normal rate and regular rhythm.      Pulses: Normal pulses.      Heart sounds: Normal heart sounds.   Pulmonary:      Effort: Pulmonary effort is normal.      Breath sounds: No wheezing.      Comments: Left lower coarse crackles on auscultation  Musculoskeletal:         General: No swelling.   Skin:     General: Skin is warm and dry.       "Comments: Bilateral great toes with ulcerated lesions, see media tab for further images, no surrounding induration.  Pulses are palpable but faint in bilateral PT.   Neurological:      General: No focal deficit present.      Mental Status: He is alert.             Significant Labs:  CBC:  Recent Labs   Lab 04/22/25 1737 04/23/25  0615   WBC 13.70* 15.53*   HGB 15.3 13.4*   HCT 44.8 39.3*    166     CMP:  Recent Labs   Lab 04/22/25 1737 04/23/25  0615   * 133*   K 3.6 3.0*   CL 93* 99   CO2 26 23   BUN 22 14   CREATININE 1.2 0.7   CALCIUM 8.9 8.2*   ALBUMIN 3.7 2.8*   BILITOT 1.3* 1.0   ALKPHOS 115 87   AST 28 23   ALT 22 17   ANIONGAP 12 11     PTINR:  No results for input(s): "INR" in the last 48 hours.    Imaging Results              MRI Lumbar Spine W WO Cont (Final result)  Result time 04/22/25 23:31:46      Final result by Ranjan Sellers MD (04/22/25 23:31:46)                   Impression:      No infectious source in the lumbar spine.    Small focus of enhancement in the L2 inferior endplate without adjacent disc signal abnormality.  Findings likely reflect degenerative endplate inflammatory changes.  Attention on follow-up suggested to exclude indolent infection.    Stable lumbar spondylosis with up to moderate spinal canal stenosis at L3-L4.  No high-grade neural foraminal narrowing.    Electronically signed by resident: Jaylen Mi  Date:    04/22/2025  Time:    22:40    Electronically signed by: Ranjan Sellers MD  Date:    04/22/2025  Time:    23:31               Narrative:    EXAMINATION:  MRI LUMBAR SPINE W WO CONTRAST    CLINICAL HISTORY:  Low back pain, infection suspected;    TECHNIQUE:  Multiplanar, multisequence MR images were acquired from the thoracolumbar junction to the sacrum without and with 10 mL Gadavist intravenous contrast.    COMPARISON:  MRI lumbar spine 12/23/2024 and CT dated 03/26/2023.    FINDINGS:  Alignment: Normal.    Vertebrae: No fracture or marrow infiltrative " process.  Degenerative fatty marrow conversion in the L2 vertebral body.  No bone marrow edema signal.    Discs: Multilevel disc height loss and desiccation most advanced in the lower lumbar spine.    Cord: Conus terminates at L2 and appears unremarkable.  Cauda equina appears unremarkable.    Enhancement: Small focus of postcontrast enhancement in the inferior endplate of L2 near the region of fatty marrow change, likely representing degenerative inflammatory change.  No evidence of abnormal adjacent disc or vertebral body enhancement.    Degenerative findings:    T12-L1: No spinal canal stenosis or neural foraminal narrowing.    L1-L2: No spinal canal stenosis or neural foraminal narrowing.    L2-L3: Mild broad-based disc bulge.  No spinal canal stenosis or neural foraminal narrowing.    L3-L4: Circumferential disc bulge, bilateral facet arthropathy, and ligamentum flavum thickening contributing to moderate spinal canal stenosis and mild bilateral neural foraminal narrowing.    L4-L5: Circumferential disc bulge, bilateral facet arthropathy, and ligamentum flavum thickening contributing to mild spinal canal stenosis.  No neural foraminal narrowing.    L5-S1: Posterior central disc protrusion with annular fissure and bilateral facet arthropathy.  No spinal canal stenosis or neuroforaminal narrowing.    Paraspinal muscles & soft tissues: There is mild atrophy of the paraspinous musculature..                                       X-Ray Chest PA And Lateral (Final result)  Result time 04/22/25 19:36:41      Final result by Km Juan MD (04/22/25 19:36:41)                   Impression:      1. Interstitial findings are accentuated by habitus, superimposed edema is a consideration.  No large focal consolidation.      Electronically signed by: mK Juan MD  Date:    04/22/2025  Time:    19:36               Narrative:    EXAMINATION:  XR CHEST PA AND LATERAL    CLINICAL HISTORY:  Fever,  unspecified    TECHNIQUE:  PA and lateral views of the chest were performed.    COMPARISON:  03/05/2018    FINDINGS:  The cardiomediastinal silhouette is not enlarged.  There is no pleural effusion.  The trachea is midline.  The lungs are symmetrically expanded bilaterally with mildly coarse interstitial attenuation.  There is bilateral basilar subsegmental atelectasis..  No large focal consolidation seen.  There is no pneumothorax.  The osseous structures are remarkable for degenerative change..                                       X-Ray Foot 2 View Left (Final result)  Result time 04/22/25 19:37:46   Procedure changed from X-Ray Foot Complete Left     Final result by Km Juan MD (04/22/25 19:37:46)                   Impression:      1. No convincing acute displaced fracture or dislocation of the foot noting nonspecific edema as described.  In this patient with provided history of open wound, correlation is needed to exclude exposed bone.      Electronically signed by: Km Juan MD  Date:    04/22/2025  Time:    19:37               Narrative:    EXAMINATION:  XR FOOT 2 VIEW LEFT    CLINICAL HISTORY:  Open toe wound;  Unspecified open wound of unspecified toe(s) without damage to nail, initial encounter    COMPARISON:  05/01/2023    FINDINGS:  Two views left foot.    There is hallux valgus.  There is osteopenia.  There is vascular calcification.  There is edema about the 1st toe and along the dorsal aspect of the foot.  There are degenerative changes of the calcaneus.  There are degenerative changes of the dorsal foot.

## 2025-04-23 NOTE — ASSESSMENT & PLAN NOTE
- bilateral great toe ulcers from pedicure a few weeks ago  - no hx DM or PAD  - diabetes screen negative A1c   - podiatry consulted, recommendations appreciated

## 2025-04-23 NOTE — ASSESSMENT & PLAN NOTE
- previously on eleiquis, flecaide, MTP but no longer taking-- dispense report reviewed and confirms no recent fills of these meds

## 2025-04-23 NOTE — PLAN OF CARE
[facility] - Internal Medicine Telemetry  Initial Discharge Assessment       Primary Care Provider: Ric Brambila MD    Admission Diagnosis: Fever [R50.9]  Chest pain [R07.9]  Open toe wound [S91.109A]  At risk for long QT syndrome [Z91.89]  Sepsis without acute organ dysfunction, due to unspecified organism [A41.9]    Admission Date: 4/22/2025  Expected Discharge Date: 4/26/2025    Transition of Care Barriers: (P) None    Payor: HUMANA MANAGED MEDICARE / Plan: HUMANA MEDICARE SELECT PARTNER / Product Type: Medicare Advantage /     Extended Emergency Contact Information  Primary Emergency Contact: Mary Kay Villalba  Mobile Phone: [phone]  Relation: Daughter  Preferred language: English   needed? No  Secondary Emergency Contact: ZEB BROWN  Mobile Phone: [phone]  Relation: Sister  Preferred language: English   needed? No    Discharge Plan A: (P) Home  Discharge Plan B: (P) Home with family      [id number] - [place], [place] - [address]  [address]  [place] [address]  Phone: [phone] Fax: [phone]    [facility] - [place] - [address]  [address]  [place] [address]  Phone: [phone] Fax: [phone]    [facility] - [place] - [address]  [address]  [place] [address]  Phone: [phone] Fax: [phone]      Initial Assessment (most recent)       Adult Discharge Assessment - 04/23/25 1531          Discharge Assessment    Assessment Type Discharge Planning Assessment     Confirmed/corrected address, phone number and insurance Yes     Confirmed Demographics Correct on Facesheet     Source of Information patient     When was your last doctors appointment? --   Confirmed patient PCP is correct on the facesheet. Patient states last visit was in march    Does patient/caregiver understand observation status Yes     Communicated JUANY with patient/caregiver Yes      Reason For Admission Sepsis     People in Home alone     Do you expect to return to your current living situation? Yes     Do you have help at home or someone to help you manage your care at home? No     Prior to hospitilization cognitive status: Alert/Oriented     Current cognitive status: Alert/Oriented     Walking or Climbing Stairs Difficulty no     Dressing/Bathing Difficulty no     Equipment Currently Used at Home CPAP     Readmission within 30 days? No     Patient currently being followed by outpatient case management? No (P)      Do you currently have service(s) that help you manage your care at home? No (P)      Do you take prescription medications? Yes (P)      Do you have prescription coverage? Yes (P)      Coverage [employer] (P)      Do you have any problems affording any of your prescribed medications? No (P)      Is the patient taking medications as prescribed? yes (P)      Who is going to help you get home at discharge? Family (P)      How do you get to doctors appointments? family or friend will provide;car, drives self (P)      Are you on dialysis? No (P)      Do you take coumadin? No (P)      Discharge Plan A Home (P)      Discharge Plan B Home with family (P)      DME Needed Upon Discharge  none (P)      Discharge Plan discussed with: Patient (P)      Transition of Care Barriers None (P)         Physical Activity    On average, how many days per week do you engage in moderate to strenuous exercise (like a brisk walk)? 5 days (P)      On average, how many minutes do you engage in exercise at this level? 150+ min (P)         Financial Resource Strain    How hard is it for you to pay for the very basics like food, housing, medical care, and heating? Not hard at all (P)         Housing Stability    In the last 12 months, was there a time when you were not able to pay the mortgage or rent on time? No (P)      At any time in the past 12 months, were you homeless or living in a shelter (including now)? No  (P)         Transportation Needs    In the past 12 months, has lack of transportation kept you from medical appointments or from getting medications? No (P)         Food Insecurity    Within the past 12 months, you worried that your food would run out before you got the money to buy more. Never true (P)      Within the past 12 months, the food you bought just didn't last and you didn't have money to get more. Never true (P)         Stress    Do you feel stress - tense, restless, nervous, or anxious, or unable to sleep at night because your mind is troubled all the time - these days? Not at all (P)         Social Isolation    How often do you feel lonely or isolated from those around you?  Never (P)         Alcohol Use    Q1: How often do you have a drink containing alcohol? 4 or more times a week (P)      Q2: How many drinks containing alcohol do you have on a typical day when you are drinking? 3 or 4 (P)      Q3: How often do you have six or more drinks on one occasion? Never (P)         [employer]    In the past 12 months has the electric, gas, oil, or water company threatened to shut off services in your home? No (P)         Health Literacy    How often do you need to have someone help you when you read instructions, pamphlets, or other written material from your doctor or pharmacy? Never (P)                    CM completed the initial assessment with the patient. Patient states that he is independent with mobility and ADLs. Patient states he has a CPAP at home and denies having any other DME. Patient states that he lives alone. Patient denies receiving  services or Dialysis.     Discharge Plan A and Plan B have been determined by review of patient's clinical status, future medical and therapeutic needs, and coverage/benefits for post-acute care in coordination with multidisciplinary team members.    Rad [place] RN-CM  Case Management  [facility]  [phone]

## 2025-04-23 NOTE — ASSESSMENT & PLAN NOTE
"3/4 SIRS on admission for fever 101.1 F, tachycardia 110, leukocytosis 13 K.   Initially considered septic joint, toe osteomyelitis, lumbar osteomyelitis, urinary infection, and pulmonary infection.  - received 1.5 L IV fluid in the ED, fluid restricted for sepsis physiology due to pulmonary edema visualized on chest film  - lactic WNL   - infectious workup thus far negative for urine infection, MRI negative for lumbar infection, plain films low likelihood of foot infection.  - podiatry has been consulted, low concern that an acute osteomyelitis is driving this picture  - highest concern at this time for pneumonia as the source given symptoms of productive cough and left lower lobe crackles auscultated on exam, but we will clarify with additional testing    Plan   - we will get further imaging to clarify presence or absence of consolidation in light of new left lower lobe crackles auscultated on examination  - obtain sputum culture  - obtain MRSA swab, if negative, will discontinue vancomycin  - continue piperacillin-tazobactam until continued improvement or further speciation  - follow up blood cultures      Antibiotics (From admission, onward)      Start     Stop Route Frequency Ordered    04/23/25 0800  vancomycin (VANCOCIN) 1,000 mg in 0.9% NaCl 250 mL IVPB (admixture device)         -- IV Every 12 hours (non-standard times) 04/22/25 2320    04/23/25 0015  piperacillin-tazobactam (ZOSYN) 4.5 g in D5W 100 mL IVPB (MB+)         -- IV Every 8 hours (non-standard times) 04/22/25 2305    04/23/25 0005  vancomycin - pharmacy to dose  (vancomycin IVPB (PEDS and ADULTS))        Placed in "And" Linked Group    -- IV pharmacy to manage frequency 04/22/25 2305                "

## 2025-04-23 NOTE — ED NOTES
Telemetry Verification   Patient placed on Telemetry Box  Verified with [facility] [employer] Room  Tech tele   Box # [id number]   Rate 77   Rhythm ns

## 2025-04-23 NOTE — ASSESSMENT & PLAN NOTE
- patient has conflicting dispensed report, reviewed documentation from Dr. Ric Brambila less than 1 month ago which states that patient should continue to take flecainide and metoprolol  - continue flecainide 100 mg p.o. b.i.d.  - continue metoprolol 25 mg p.o. daily

## 2025-04-23 NOTE — PROGRESS NOTES
[facility] - Internal Medicine [facility]  Progress Note    Patient Name: Stevie Villalba  MRN: [id number]  Patient Class: OP- Observation   Admission Date: 4/22/2025  Length of Stay: 0 days  Attending Physician: Kareen Karimi MD  Primary Care Provider: Ric Brambila MD        Subjective     Principal Problem:Sepsis        HPI:  Stevie Villalba is a 70 y.o. male with a PMHx of gout, HLD, pAFib, HTN, chronic low back pain with lumbar radiculopathy who presents to [facility] for evaluation of fever. Family at bedside assist with history. Patient has been feeling generally unwell over the last few days with fatigue, poor PO intake, subjective fever and chills. He had full body shaking earlier today. He has bilateral big toe wounds which initially began when he got a pedicure a few weeks ago. Says the wounds appear much better and he feels they are healing well. No associated pain. He did have an epidural spinal injection in January from his back pain/ disc disease. Reports slight low back pain at the site of the injection earlier today. Denies numbness/tingling, bowel/bladder incontinence, saddle anesthesia. He does have a new cough which has been ongoing for the past 2-3 days. Denies chest pain, SOB, LE swelling, vision changes, abdominal pain, N/V or syncope.     ED: febrile to Tmax 101.1, vitals otherwise stable. Leukocytosis of WBC 13. CXR with possible pulm edema. UA non infectious. Given IV rocephin, vanc, 1.5L IVFs.     Overview/Hospital Course:  Mr. Villalba was admitted to Hospital Medicine for management of sepsis.  Underlying source of infection initially suspected to be skin/soft tissue/bone from bilateral toe wounds, with alternative considerations for pulmonary infection given patient's cough prior to admission and consideration given to possible spinal infection with recent injection procedure.  He was covered broadly with empiric antimicrobials vancomycin and piperacillin-tazobactam.  Admission  labs were remarkable for leukocytosis of 13, hyponatremia 131, and elevated .  Creatinine was at baseline 1.2, lactic acid was negative at 1.1.  Infectious workup including influenza and COVID swabs were negative, group a strep swab was negative.  Urinalysis showed no infection signs  chest film showed mild coarse interstitial attenuation. There is bilateral basilar subsegmental atelectasis.. No large focal consolidation.     Podiatry was consulted and evaluated his foot wounds, at this time have low concern for acute osteomyelitis.  X-rays of bilateral feet were performed showing nonspecific edema, otherwise no acute abnormality.    Over concern for likely pulmonary infection, sputum culture sample was ordered and repeat chest radiograph was ordered to ascertain focal consolidation now that sepsis physiology has been resuscitated.    Interval History:  Seen and examined this morning on bedside rounds, family member Thais at the bedside during my rounds, RN present as well.  He has already been seen by Podiatry this morning who believe that the foot is low likely to be the source of his sepsis.  When inquiring further about pulmonary symptoms, he does report a strong productive cough for several weeks, low appetite for 3 days, postnasal drip, and unintentional weight loss.  He denies any night sweats, urinary symptoms.  We discussed the plan of care including continuing broad-spectrum IV antimicrobials, following a blood cultures, obtaining sputum cultures, and obtaining a repeat chest radiograph now that he is no longer dehydrated.    Review of Systems  Objective:     Vital Signs (Most Recent):  Temp: 98.5 °F (36.9 °C) (04/23/25 0806)  Pulse: 70 (04/23/25 0809)  Resp: 14 (04/23/25 0806)  BP: 123/68 (04/23/25 0806)  SpO2: (!) 93 % (04/23/25 0806) Vital Signs (24h Range):  Temp:  [97.9 °F (36.6 °C)-101.1 °F (38.4 °C)] 98.5 °F (36.9 °C)  Pulse:  [70-93] 70  Resp:  [14-20] 14  SpO2:  [92 %-96 %] 93 %  BP:  "(123-162)/(68-89) 123/68     Weight: 105.7 kg (233 lb)  Body mass index is 33.43 kg/m².    Intake/Output Summary (Last 24 hours) at 4/23/2025 1041  Last data filed at 4/22/2025 2232  Gross per 24 hour   Intake 1866.29 ml   Output --   Net 1866.29 ml      Physical Exam  HENT:      Head: Normocephalic.      Nose: No congestion.      Mouth/Throat:      Pharynx: Oropharynx is clear.   Eyes:      General: No scleral icterus.     Conjunctiva/sclera: Conjunctivae normal.   Cardiovascular:      Rate and Rhythm: Normal rate and regular rhythm.      Pulses: Normal pulses.      Heart sounds: Normal heart sounds.   Pulmonary:      Effort: Pulmonary effort is normal.      Breath sounds: No wheezing.      Comments: Left lower coarse crackles on auscultation  Musculoskeletal:         General: No swelling.   Skin:     General: Skin is warm and dry.      Comments: Bilateral great toes with ulcerated lesions, see media tab for further images, no surrounding induration.  Pulses are palpable but faint in bilateral PT.   Neurological:      General: No focal deficit present.      Mental Status: He is alert.             Significant Labs:  CBC:  Recent Labs   Lab 04/22/25  1737 04/23/25  0615   WBC 13.70* 15.53*   HGB 15.3 13.4*   HCT 44.8 39.3*    166     CMP:  Recent Labs   Lab 04/22/25  1737 04/23/25  0615   * 133*   K 3.6 3.0*   CL 93* 99   CO2 26 23   BUN 22 14   CREATININE 1.2 0.7   CALCIUM 8.9 8.2*   ALBUMIN 3.7 2.8*   BILITOT 1.3* 1.0   ALKPHOS 115 87   AST 28 23   ALT 22 17   ANIONGAP 12 11     PTINR:  No results for input(s): "INR" in the last 48 hours.    Imaging Results              MRI Lumbar Spine W WO Cont (Final result)  Result time 04/22/25 23:31:46      Final result by Ranjan Sellers MD (04/22/25 23:31:46)                   Impression:      No infectious source in the lumbar spine.    Small focus of enhancement in the L2 inferior endplate without adjacent disc signal abnormality.  Findings likely reflect " degenerative endplate inflammatory changes.  Attention on follow-up suggested to exclude indolent infection.    Stable lumbar spondylosis with up to moderate spinal canal stenosis at L3-L4.  No high-grade neural foraminal narrowing.    Electronically signed by resident: Jaylen Mi  Date:    04/22/2025  Time:    22:40    Electronically signed by: Ranjan Sellers MD  Date:    04/22/2025  Time:    23:31               Narrative:    EXAMINATION:  MRI LUMBAR SPINE W WO CONTRAST    CLINICAL HISTORY:  Low back pain, infection suspected;    TECHNIQUE:  Multiplanar, multisequence MR images were acquired from the thoracolumbar junction to the sacrum without and with 10 mL Gadavist intravenous contrast.    COMPARISON:  MRI lumbar spine 12/23/2024 and CT dated 03/26/2023.    FINDINGS:  Alignment: Normal.    Vertebrae: No fracture or marrow infiltrative process.  Degenerative fatty marrow conversion in the L2 vertebral body.  No bone marrow edema signal.    Discs: Multilevel disc height loss and desiccation most advanced in the lower lumbar spine.    Cord: Conus terminates at L2 and appears unremarkable.  Cauda equina appears unremarkable.    Enhancement: Small focus of postcontrast enhancement in the inferior endplate of L2 near the region of fatty marrow change, likely representing degenerative inflammatory change.  No evidence of abnormal adjacent disc or vertebral body enhancement.    Degenerative findings:    T12-L1: No spinal canal stenosis or neural foraminal narrowing.    L1-L2: No spinal canal stenosis or neural foraminal narrowing.    L2-L3: Mild broad-based disc bulge.  No spinal canal stenosis or neural foraminal narrowing.    L3-L4: Circumferential disc bulge, bilateral facet arthropathy, and ligamentum flavum thickening contributing to moderate spinal canal stenosis and mild bilateral neural foraminal narrowing.    L4-L5: Circumferential disc bulge, bilateral facet arthropathy, and ligamentum flavum thickening  contributing to mild spinal canal stenosis.  No neural foraminal narrowing.    L5-S1: Posterior central disc protrusion with annular fissure and bilateral facet arthropathy.  No spinal canal stenosis or neuroforaminal narrowing.    Paraspinal muscles & soft tissues: There is mild atrophy of the paraspinous musculature..                                       X-Ray Chest PA And Lateral (Final result)  Result time 04/22/25 19:36:41      Final result by Km Juan MD (04/22/25 19:36:41)                   Impression:      1. Interstitial findings are accentuated by habitus, superimposed edema is a consideration.  No large focal consolidation.      Electronically signed by: Km Juan MD  Date:    04/22/2025  Time:    19:36               Narrative:    EXAMINATION:  XR CHEST PA AND LATERAL    CLINICAL HISTORY:  Fever, unspecified    TECHNIQUE:  PA and lateral views of the chest were performed.    COMPARISON:  03/05/2018    FINDINGS:  The cardiomediastinal silhouette is not enlarged.  There is no pleural effusion.  The trachea is midline.  The lungs are symmetrically expanded bilaterally with mildly coarse interstitial attenuation.  There is bilateral basilar subsegmental atelectasis..  No large focal consolidation seen.  There is no pneumothorax.  The osseous structures are remarkable for degenerative change..                                       X-Ray Foot 2 View Left (Final result)  Result time 04/22/25 19:37:46   Procedure changed from X-Ray Foot Complete Left     Final result by Km Juan MD (04/22/25 19:37:46)                   Impression:      1. No convincing acute displaced fracture or dislocation of the foot noting nonspecific edema as described.  In this patient with provided history of open wound, correlation is needed to exclude exposed bone.      Electronically signed by: Km Juan MD  Date:    04/22/2025  Time:    19:37               Narrative:    EXAMINATION:  XR FOOT 2 VIEW  LEFT    CLINICAL HISTORY:  Open toe wound;  Unspecified open wound of unspecified toe(s) without damage to nail, initial encounter    COMPARISON:  05/01/2023    FINDINGS:  Two views left foot.    There is hallux valgus.  There is osteopenia.  There is vascular calcification.  There is edema about the 1st toe and along the dorsal aspect of the foot.  There are degenerative changes of the calcaneus.  There are degenerative changes of the dorsal foot.                                          Assessment & Plan  Sepsis  Pneumonia of left lower lobe due to infectious organism  3/4 SIRS on admission for fever 101.1 F, tachycardia 110, leukocytosis 13 K.   Initially considered septic joint, toe osteomyelitis, lumbar osteomyelitis, urinary infection, and pulmonary infection.  - received 1.5 L IV fluid in the ED, fluid restricted for sepsis physiology due to pulmonary edema visualized on chest film  - lactic WNL   - infectious workup thus far negative for urine infection, MRI negative for lumbar infection, plain films low likelihood of foot infection.  - podiatry has been consulted, low concern that an acute osteomyelitis is driving this picture  - highest concern at this time for pneumonia as the source given symptoms of productive cough and left lower lobe crackles auscultated on exam, but we will clarify with additional testing    Plan   - we will get further imaging to clarify presence or absence of consolidation in light of new left lower lobe crackles auscultated on examination  - obtain sputum culture  - obtain MRSA swab, if negative, will discontinue vancomycin  - continue piperacillin-tazobactam until continued improvement or further speciation  - follow up blood cultures      Antibiotics (From admission, onward)      Start     Stop Route Frequency Ordered    04/23/25 0800  vancomycin (VANCOCIN) 1,000 mg in 0.9% NaCl 250 mL IVPB (admixture device)         -- IV Every 12 hours (non-standard times) 04/22/25 0003     "04/23/25 0015  piperacillin-tazobactam (ZOSYN) 4.5 g in D5W 100 mL IVPB (MB+)         -- IV Every 8 hours (non-standard times) 04/22/25 2305 04/23/25 0005  vancomycin - pharmacy to dose  (vancomycin IVPB (PEDS and ADULTS))        Placed in "And" Linked Group    -- IV pharmacy to manage frequency 04/22/25 2305                Toe ulcer  - bilateral great toe ulcers from pedicure a few weeks ago  - no hx DM or PAD  - diabetes screen negative A1c   - podiatry consulted, recommendations appreciated    Hypertriglyceridemia  - continue statin   Primary hypertension  - now that sepsis physiology has resolved, okay to restart home metoprolol succinate  - metoprolol succinate 25 mg p.o. daily  History of prostate cancer  - noted hx, in remission per chart review  Paroxysmal atrial fibrillation  - patient has conflicting dispensed report, reviewed documentation from Dr. Ric Brambila less than 1 month ago which states that patient should continue to take flecainide and metoprolol  - continue flecainide 100 mg p.o. b.i.d.  - continue metoprolol 25 mg p.o. daily  Gout  - continue allopurinol   Severe obstructive sleep apnea  - cpap qhs     Class 1 obesity with body mass index (BMI) of 34.0 to 34.9 in adult  Body mass index is 33.43 kg/m². Morbid obesity complicates all aspects of disease management from diagnostic modalities to treatment. Weight loss encouraged and health benefits explained to patient.       Hypokalemia  Patient's most recent potassium results are listed below.   Recent Labs     04/22/25 1737 04/23/25  0615   K 3.6 3.0*     Plan  - Replete potassium per protocol  - Monitor potassium Daily  - Patient's hypokalemia is worsening. Will continue current treatment    Hyponatremia  Hyponatremia is likely due to Dehydration/hypovolemia. The patient's most recent sodium results are listed below.  Recent Labs     04/22/25 1737 04/23/25  0615   * 133*     Plan  - Correct the sodium by 4-6mEq in 24 hours.   - " hyponatremia treated with IV fluids  - Monitor sodium Daily.   - Patient hyponatremia is stable    Hypophosphatemia  Patient's most recent phosphorus results are listed below.   Recent Labs     04/23/25  0615   PHOS 2.3*     Plan  - Will treat hypophosphatemia with potassium phosphate supplement  - Patient's hypophosphatemia is worsening. Will continue current treatment      VTE Risk Mitigation (From admission, onward)           Ordered     enoxaparin injection 40 mg  Daily         04/22/25 2154     IP VTE HIGH RISK PATIENT  Once         04/22/25 2154                    Discharge Planning   JUANY: 4/26/2025     Code Status: Full Code   Medical Readiness for Discharge Date:   Discharge Plan A: Home with family                  Kareen Karimi MD, MPH, FACP  Senior Physician, Department of Hospital Medicine  [facility]  [address], [place], [place]

## 2025-04-23 NOTE — ASSESSMENT & PLAN NOTE
Patient's most recent phosphorus results are listed below.   Recent Labs     04/23/25  0615   PHOS 2.3*     Plan  - Will treat hypophosphatemia with potassium phosphate supplement  - Patient's hypophosphatemia is worsening. Will continue current treatment
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
## 2025-04-23 NOTE — PLAN OF CARE
Problem: Adult Inpatient Plan of Care  Goal: Patient-Specific Goal (Individualized)  Outcome: Progressing  Goal: Absence of Hospital-Acquired Illness or Injury  Outcome: Progressing  Goal: Optimal Comfort and Wellbeing  Outcome: Progressing     Problem: Infection  Goal: Absence of Infection Signs and Symptoms  Outcome: Progressing     Problem: Sepsis/Septic Shock  Goal: Optimal Coping  Outcome: Progressing  Goal: Blood Glucose Level Within Targeted Range  Outcome: Progressing

## 2025-04-23 NOTE — HOSPITAL COURSE
Mr. Villalba was admitted to Hospital Medicine for management of sepsis.  Underlying source of infection initially suspected to be skin/soft tissue/bone from bilateral toe wounds, with alternative considerations for pulmonary infection given patient's cough prior to admission and consideration given to possible spinal infection with recent injection procedure.  He was covered broadly with empiric antimicrobials vancomycin and piperacillin-tazobactam.  Admission labs were remarkable for leukocytosis of 13, hyponatremia 131, and elevated .  Creatinine was at baseline 1.2, lactic acid was negative at 1.1.  Infectious workup including influenza and COVID swabs were negative, group a strep swab was negative.  Urinalysis showed no infection signs  chest film showed mild coarse interstitial attenuation. There is bilateral basilar subsegmental atelectasis.. No large focal consolidation.     Podiatry was consulted and evaluated his foot wounds, at this time have low concern for acute osteomyelitis.  X-rays of bilateral feet were performed showing nonspecific edema, otherwise no acute abnormality.    Over concern for likely pulmonary infection, sputum culture sample was ordered and repeat chest radiograph was ordered to ascertain focal consolidation now that sepsis physiology has been resuscitated.    Cultures are NGTD- on vanc and zosyn, feeling well, will plan on dc home today.
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
## 2025-04-23 NOTE — HPI
Stevie Villalba is a 70 y.o. male with a PMHx of gout, HLD, pAFib, HTN, chronic low back pain with lumbar radiculopathy who [place] [facility] [place]. Family at bedside assist with history. Patient has been feeling generally unwell over the last few days with fatigue, poor PO intake, subjective fever and chills. He had full body shaking earlier today. He has bilateral big toe wounds which initially began when he got a pedicure a few weeks ago. Says the wounds appear much better and he feels they are healing well. No associated pain. He did have an epidural spinal injection in January from his back pain/ disc disease. Reports slight low back pain at the site of the injection earlier today. Denies numbness/tingling, bowel/bladder incontinence, saddle anesthesia. He does have a new cough which has been ongoing for the past 2-3 days. Denies chest pain, SOB, LE swelling, vision changes, abdominal pain, N/V or syncope.     ED: febrile to Tmax 101.1, vitals otherwise stable. Leukocytosis of WBC 13. CXR with possible pulm edema. UA non infectious. Given IV rocephin, vanc, 1.5L IVFs.

## 2025-04-23 NOTE — PROGRESS NOTES
"Pharmacokinetic Initial Assessment & Plan: IV Vancomycin    IV Vancomycin 2000 mg x once given in the ED on 04/22 @ 1922  Then Vancomycin 1000 mg every 12 hours  Obtain a Vancomycin trough 30 mins prior to the 4 th dose on 04/24 @ 0730   Desired empiric serum trough concentration is 15 to 20 mcg/mL    Pharmacy will continue to follow and monitor vancomycin.    [id number] with any questions regarding this assessment.     Thank you for the consult,   Elza Dueñas         Patient brief summary:  Stevie Villalba is a 70 y.o. male initiated on antimicrobial therapy with IV Vancomycin for treatment of suspected sepsis    Drug Allergies:   Review of patient's allergies indicates:  No Known Allergies    Actual Body Weight:   105.7 kg    Renal Function:   Estimated Creatinine Clearance: 70.8 mL/min (based on SCr of 1.2 mg/dL).,     CBC (last 72 hours):  Recent Labs   Lab Result Units 04/22/25 1737   WBC K/uL 13.70*   HGB gm/dL 15.3   HCT % 44.8   Platelet Count K/uL 199   Lymph % % 8.2*   Mono % % 9.2   Eos % % 0.0   Basophil % % 0.2       Metabolic Panel (last 72 hours):  Recent Labs   Lab Result Units 04/22/25 1737 04/22/25  1747   Sodium mmol/L 131*  --    Potassium mmol/L 3.6  --    Chloride mmol/L 93*  --    CO2 mmol/L 26  --    Glucose mg/dL 140*  --    Glucose, UA   --  Negative   BUN mg/dL 22  --    Creatinine mg/dL 1.2  --    Albumin g/dL 3.7  --    Bilirubin Total mg/dL 1.3*  --    ALP unit/L 115  --    AST unit/L 28  --    ALT unit/L 22  --        Drug levels (last 3 results):  No results for input(s): "VANCOMYCINRA", "VANCORANDOM", "VANCOMYCINPE", "VANCOPEAK", "VANCOMYCINTR", "VANCOTROUGH" in the last 72 hours.    Microbiologic Results:  Microbiology Results (last 7 days)       Procedure Component Value Units Date/Time    Blood culture #2 **CANNOT BE ORDERED STAT** [[id number]] Collected: 04/22/25 1736    Order Status: Resulted Specimen: Blood from Peripheral, Hand, Left Updated: 04/22/25 1741    Blood culture #1 " **CANNOT BE ORDERED STAT** [[id number]] Collected: 04/22/25 1714    Order Status: Resulted Specimen: Blood from Peripheral, Antecubital, Left Updated: 04/22/25 1741    Influenza A & B by Molecular [[id number]]  (Normal) Collected: 04/22/25 1618    Order Status: Completed Specimen: Nasal Swab Updated: 04/22/25 1730     INFLUENZA A MOLECULAR Negative     INFLUENZA B MOLECULAR  Negative    Group A Strep, Molecular [[id number]]  (Normal) Collected: 04/22/25 1618    Order Status: Completed Specimen: Throat Updated: 04/22/25 1730     Group A Strep Molecular Negative    Narrative:      Arcanobacterium haemolyticum and Beta Streptococcus group C and G will not be detected by this test method.  Please order Throat Culture ([id number]) if suspected.

## 2025-04-23 NOTE — ASSESSMENT & PLAN NOTE
Body mass index is 33.43 kg/m². Morbid obesity complicates all aspects of disease management from diagnostic modalities to treatment. Weight loss encouraged and health benefits explained to patient.

## 2025-04-23 NOTE — PLAN OF CARE
Problem: Adult Inpatient Plan of Care  Goal: Plan of Care Review  Outcome: Progressing  Goal: Patient-Specific Goal (Individualized)  Outcome: Progressing  Goal: Absence of Hospital-Acquired Illness or Injury  Outcome: Progressing  Goal: Optimal Comfort and Wellbeing  Outcome: Progressing  Goal: Readiness for Transition of Care  Outcome: Progressing     Problem: Infection  Goal: Absence of Infection Signs and Symptoms  Outcome: Progressing     Problem: Sepsis/Septic Shock  Goal: Optimal Coping  Outcome: Progressing  Goal: Absence of Bleeding  Outcome: Progressing  Goal: Blood Glucose Level Within Targeted Range  Outcome: Progressing  Goal: Absence of Infection Signs and Symptoms  Outcome: Progressing  Goal: Optimal Nutrition Intake  Outcome: Progressing     Problem: Wound  Goal: Optimal Coping  Outcome: Progressing  Goal: Optimal Functional Ability  Outcome: Progressing  Goal: Absence of Infection Signs and Symptoms  Outcome: Progressing  Goal: Improved Oral Intake  Outcome: Progressing  Goal: Optimal Pain Control and Function  Outcome: Progressing  Goal: Skin Health and Integrity  Outcome: Progressing  Goal: Optimal Wound Healing  Outcome: Progressing

## 2025-04-23 NOTE — ASSESSMENT & PLAN NOTE
- now that sepsis physiology has resolved, okay to restart home metoprolol succinate  - metoprolol succinate 25 mg p.o. daily
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
## 2025-04-23 NOTE — ASSESSMENT & PLAN NOTE
- bilateral great toe ulcers from pedicure a few weeks ago  - no hx DM or PAD  - podiatry consulted given sepsis
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
## 2025-04-23 NOTE — ASSESSMENT & PLAN NOTE
Patient's most recent potassium results are listed below.   Recent Labs     04/22/25  1737 04/23/25  0615   K 3.6 3.0*     Plan  - Replete potassium per protocol  - Monitor potassium Daily  - Patient's hypokalemia is worsening. Will continue current treatment
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
## 2025-04-23 NOTE — ASSESSMENT & PLAN NOTE
Hyponatremia is likely due to Dehydration/hypovolemia. The patient's most recent sodium results are listed below.  Recent Labs     04/22/25  1737 04/23/25  0615   * 133*     Plan  - Correct the sodium by 4-6mEq in 24 hours.   - hyponatremia treated with IV fluids  - Monitor sodium Daily.   - Patient hyponatremia is stable

## 2025-04-24 LAB
ABSOLUTE EOSINOPHIL (OHS): 0.06 K/UL
ABSOLUTE MONOCYTE (OHS): 0.61 K/UL (ref 0.3–1)
ABSOLUTE NEUTROPHIL COUNT (OHS): 6.27 K/UL (ref 1.8–7.7)
ACID FAST MOD KINY STN SPEC: NORMAL
ALBUMIN SERPL BCP-MCNC: 2.7 G/DL (ref 3.5–5.2)
ALP SERPL-CCNC: 78 UNIT/L (ref 40–150)
ALT SERPL W/O P-5'-P-CCNC: 22 UNIT/L (ref 10–44)
ANION GAP (OHS): 8 MMOL/L (ref 8–16)
AST SERPL-CCNC: 30 UNIT/L (ref 11–45)
BASOPHILS # BLD AUTO: 0.03 K/UL
BASOPHILS NFR BLD AUTO: 0.3 %
BILIRUB SERPL-MCNC: 0.8 MG/DL (ref 0.1–1)
BUN SERPL-MCNC: 10 MG/DL (ref 8–23)
CALCIUM SERPL-MCNC: 8.5 MG/DL (ref 8.7–10.5)
CHLORIDE SERPL-SCNC: 103 MMOL/L (ref 95–110)
CO2 SERPL-SCNC: 27 MMOL/L (ref 23–29)
CREAT SERPL-MCNC: 0.7 MG/DL (ref 0.5–1.4)
ERYTHROCYTE [DISTWIDTH] IN BLOOD BY AUTOMATED COUNT: 12.2 % (ref 11.5–14.5)
GFR SERPLBLD CREATININE-BSD FMLA CKD-EPI: >60 ML/MIN/1.73/M2
GLUCOSE SERPL-MCNC: 114 MG/DL (ref 70–110)
HCT VFR BLD AUTO: 39.3 % (ref 40–54)
HGB BLD-MCNC: 13.5 GM/DL (ref 14–18)
IMM GRANULOCYTES # BLD AUTO: 0.04 K/UL (ref 0–0.04)
IMM GRANULOCYTES NFR BLD AUTO: 0.5 % (ref 0–0.5)
LYMPHOCYTES # BLD AUTO: 1.68 K/UL (ref 1–4.8)
MAGNESIUM SERPL-MCNC: 2.1 MG/DL (ref 1.6–2.6)
MCH RBC QN AUTO: 33.3 PG (ref 27–31)
MCHC RBC AUTO-ENTMCNC: 34.4 G/DL (ref 32–36)
MCV RBC AUTO: 97 FL (ref 82–98)
NUCLEATED RBC (/100WBC) (OHS): 0 /100 WBC
PHOSPHATE SERPL-MCNC: 3.2 MG/DL (ref 2.7–4.5)
PLATELET # BLD AUTO: 186 K/UL (ref 150–450)
PMV BLD AUTO: 10.2 FL (ref 9.2–12.9)
POCT GLUCOSE: 121 MG/DL (ref 70–110)
POCT GLUCOSE: 123 MG/DL (ref 70–110)
POCT GLUCOSE: 141 MG/DL (ref 70–110)
POCT GLUCOSE: 157 MG/DL (ref 70–110)
POTASSIUM SERPL-SCNC: 3 MMOL/L (ref 3.5–5.1)
PROT SERPL-MCNC: 6.2 GM/DL (ref 6–8.4)
RBC # BLD AUTO: 4.06 M/UL (ref 4.6–6.2)
RELATIVE EOSINOPHIL (OHS): 0.7 %
RELATIVE LYMPHOCYTE (OHS): 19.3 % (ref 18–48)
RELATIVE MONOCYTE (OHS): 7 % (ref 4–15)
RELATIVE NEUTROPHIL (OHS): 72.2 % (ref 38–73)
SODIUM SERPL-SCNC: 138 MMOL/L (ref 136–145)
VIT B12 SERPL-MCNC: 736 PG/ML (ref 210–950)
WBC # BLD AUTO: 8.69 K/UL (ref 3.9–12.7)

## 2025-04-24 PROCEDURE — 25000003 PHARM REV CODE 250: Mod: HCNC | Performed by: PHYSICIAN ASSISTANT

## 2025-04-24 PROCEDURE — 21400001 HC TELEMETRY ROOM: Mod: HCNC

## 2025-04-24 PROCEDURE — 99900035 HC TECH TIME PER 15 MIN (STAT): Mod: HCNC

## 2025-04-24 PROCEDURE — 83735 ASSAY OF MAGNESIUM: CPT | Mod: HCNC | Performed by: PHYSICIAN ASSISTANT

## 2025-04-24 PROCEDURE — 94761 N-INVAS EAR/PLS OXIMETRY MLT: CPT | Mod: HCNC

## 2025-04-24 PROCEDURE — 83921 ORGANIC ACID SINGLE QUANT: CPT | Mod: HCNC | Performed by: INTERNAL MEDICINE

## 2025-04-24 PROCEDURE — 63600175 PHARM REV CODE 636 W HCPCS: Mod: HCNC | Performed by: PHYSICIAN ASSISTANT

## 2025-04-24 PROCEDURE — 80053 COMPREHEN METABOLIC PANEL: CPT | Mod: HCNC | Performed by: PHYSICIAN ASSISTANT

## 2025-04-24 PROCEDURE — 85025 COMPLETE CBC W/AUTO DIFF WBC: CPT | Mod: HCNC | Performed by: PHYSICIAN ASSISTANT

## 2025-04-24 PROCEDURE — 94660 CPAP INITIATION&MGMT: CPT | Mod: HCNC

## 2025-04-24 PROCEDURE — 36415 COLL VENOUS BLD VENIPUNCTURE: CPT | Mod: HCNC | Performed by: PHYSICIAN ASSISTANT

## 2025-04-24 PROCEDURE — 84100 ASSAY OF PHOSPHORUS: CPT | Mod: HCNC | Performed by: PHYSICIAN ASSISTANT

## 2025-04-24 PROCEDURE — 25000003 PHARM REV CODE 250: Mod: HCNC | Performed by: INTERNAL MEDICINE

## 2025-04-24 RX ADMIN — ALLOPURINOL 300 MG: 100 TABLET ORAL at 09:04

## 2025-04-24 RX ADMIN — FLECAINIDE ACETATE 100 MG: 50 TABLET ORAL at 09:04

## 2025-04-24 RX ADMIN — PIPERACILLIN SODIUM AND TAZOBACTAM SODIUM 4.5 G: 4; .5 INJECTION, POWDER, FOR SOLUTION INTRAVENOUS at 01:04

## 2025-04-24 RX ADMIN — ATORVASTATIN CALCIUM 40 MG: 40 TABLET, FILM COATED ORAL at 10:04

## 2025-04-24 RX ADMIN — ENOXAPARIN SODIUM 40 MG: 40 INJECTION SUBCUTANEOUS at 06:04

## 2025-04-24 RX ADMIN — FLECAINIDE ACETATE 100 MG: 50 TABLET ORAL at 10:04

## 2025-04-24 RX ADMIN — POTASSIUM BICARBONATE 50 MEQ: 978 TABLET, EFFERVESCENT ORAL at 12:04

## 2025-04-24 RX ADMIN — VANCOMYCIN HYDROCHLORIDE 1000 MG: 1 INJECTION, POWDER, LYOPHILIZED, FOR SOLUTION INTRAVENOUS at 09:04

## 2025-04-24 RX ADMIN — METOPROLOL SUCCINATE 25 MG: 25 TABLET, EXTENDED RELEASE ORAL at 09:04

## 2025-04-24 RX ADMIN — PIPERACILLIN SODIUM AND TAZOBACTAM SODIUM 4.5 G: 4; .5 INJECTION, POWDER, FOR SOLUTION INTRAVENOUS at 10:04

## 2025-04-24 RX ADMIN — PIPERACILLIN SODIUM AND TAZOBACTAM SODIUM 4.5 G: 4; .5 INJECTION, POWDER, FOR SOLUTION INTRAVENOUS at 07:04

## 2025-04-24 NOTE — SUBJECTIVE & OBJECTIVE
Interval History: Cultures are NGTD- on vanc and zosyn, feeling well, will plan on dc home tomorrow. OOB today.    Review of Systems  Objective:     Vital Signs (Most Recent):  Temp: 97.7 °F (36.5 °C) (04/24/25 0739)  Pulse: 66 (04/24/25 0800)  Resp: 14 (04/24/25 0739)  BP: 136/81 (04/24/25 0739)  SpO2: 95 % (04/24/25 0739) Vital Signs (24h Range):  Temp:  [97.5 °F (36.4 °C)-98.4 °F (36.9 °C)] 97.7 °F (36.5 °C)  Pulse:  [59-82] 66  Resp:  [14-18] 14  SpO2:  [92 %-95 %] 95 %  BP: (111-136)/(54-81) 136/81     Weight: 105.7 kg (233 lb)  Body mass index is 33.43 kg/m².    Intake/Output Summary (Last 24 hours) at 4/24/2025 0901  Last data filed at 4/24/2025 0108  Gross per 24 hour   Intake --   Output 350 ml   Net -350 ml      Physical Exam  HENT:      Head: Normocephalic.      Nose: No congestion.      Mouth/Throat:      Pharynx: Oropharynx is clear.   Eyes:      General: No scleral icterus.     Conjunctiva/sclera: Conjunctivae normal.   Cardiovascular:      Rate and Rhythm: Normal rate and regular rhythm.      Pulses: Normal pulses.      Heart sounds: Normal heart sounds.   Pulmonary:      Effort: Pulmonary effort is normal.      Breath sounds: No wheezing.      Comments: Left lower coarse crackles on auscultation  Musculoskeletal:         General: No swelling.   Skin:     General: Skin is warm and dry.      Comments: Bilateral great toes with ulcerated lesions, see media tab for further images, no surrounding induration.  Pulses are palpable but faint in bilateral PT.   Neurological:      General: No focal deficit present.      Mental Status: He is alert.         Computed MELD 3.0 unavailable. One or more values for this score either were not found within the given timeframe or did not fit some other criterion.  Computed MELD-Na unavailable. One or more values for this score either were not found within the given timeframe or did not fit some other criterion.      Significant Labs:  CBC:  Recent Labs   Lab  "04/22/25 1737 04/23/25  0615 04/24/25  0625   WBC 13.70* 15.53* 8.69   HGB 15.3 13.4* 13.5*   HCT 44.8 39.3* 39.3*    166 186     CMP:  Recent Labs   Lab 04/22/25 1737 04/23/25  0615 04/24/25  0625   * 133* 138   K 3.6 3.0* 3.0*   CL 93* 99 103   CO2 26 23 27   BUN 22 14 10   CREATININE 1.2 0.7 0.7   CALCIUM 8.9 8.2* 8.5*   ALBUMIN 3.7 2.8* 2.7*   BILITOT 1.3* 1.0 0.8   ALKPHOS 115 87 78   AST 28 23 30   ALT 22 17 22   ANIONGAP 12 11 8     PTINR:  No results for input(s): "INR" in the last 48 hours.    Significant Procedures:   Dobutamine Stress Test with Color Flow: No results found for this or any previous visit.    None  "

## 2025-04-24 NOTE — PROGRESS NOTES
[facility] - Internal Medicine [facility] Medicine  Progress Note    Patient Name: Stevie Villalba  MRN: [id number]  Patient Class: IP- Inpatient   Admission Date: 4/22/2025  Length of Stay: 1 days  Attending Physician: Kash Bell MD  Primary Care Provider: Ric Brambila MD        Subjective     Principal Problem:Sepsis        HPI:  Stevie Villalba is a 70 y.o. male with a PMHx of gout, HLD, pAFib, HTN, chronic low back pain with lumbar radiculopathy who [place] [facility]. Family at bedside assist with history. Patient has been feeling generally unwell over the last few days with fatigue, poor PO intake, subjective fever and chills. He had full body shaking earlier today. He has bilateral big toe wounds which initially began when he got a pedicure a few weeks ago. Says the wounds appear much better and he feels they are healing well. No associated pain. He did have an epidural spinal injection in January from his back pain/ disc disease. Reports slight low back pain at the site of the injection earlier today. Denies numbness/tingling, bowel/bladder incontinence, saddle anesthesia. He does have a new cough which has been ongoing for the past 2-3 days. Denies chest pain, SOB, LE swelling, vision changes, abdominal pain, N/V or syncope.     ED: febrile to Tmax 101.1, vitals otherwise stable. Leukocytosis of WBC 13. CXR with possible pulm edema. UA non infectious. Given IV rocephin, vanc, 1.5L IVFs.     Overview/Hospital Course:  Mr. Villalba was admitted to Hospital Medicine for management of sepsis.  Underlying source of infection initially suspected to be skin/soft tissue/bone from bilateral toe wounds, with alternative considerations for pulmonary infection given patient's cough prior to admission and consideration given to possible spinal infection with recent injection procedure.  He was covered broadly with empiric antimicrobials vancomycin and piperacillin-tazobactam.  Admission  labs were remarkable for leukocytosis of 13, hyponatremia 131, and elevated .  Creatinine was at baseline 1.2, lactic acid was negative at 1.1.  Infectious workup including influenza and COVID swabs were negative, group a strep swab was negative.  Urinalysis showed no infection signs  chest film showed mild coarse interstitial attenuation. There is bilateral basilar subsegmental atelectasis.. No large focal consolidation.     Podiatry was consulted and evaluated his foot wounds, at this time have low concern for acute osteomyelitis.  X-rays of bilateral feet were performed showing nonspecific edema, otherwise no acute abnormality.    Over concern for likely pulmonary infection, sputum culture sample was ordered and repeat chest radiograph was ordered to ascertain focal consolidation now that sepsis physiology has been resuscitated.    Interval History: Cultures are NGTD- on vanc and zosyn, feeling well, will plan on dc home tomorrow. OOB today.    Review of Systems  Objective:     Vital Signs (Most Recent):  Temp: 97.7 °F (36.5 °C) (04/24/25 0739)  Pulse: 66 (04/24/25 0800)  Resp: 14 (04/24/25 0739)  BP: 136/81 (04/24/25 0739)  SpO2: 95 % (04/24/25 0739) Vital Signs (24h Range):  Temp:  [97.5 °F (36.4 °C)-98.4 °F (36.9 °C)] 97.7 °F (36.5 °C)  Pulse:  [59-82] 66  Resp:  [14-18] 14  SpO2:  [92 %-95 %] 95 %  BP: (111-136)/(54-81) 136/81     Weight: 105.7 kg (233 lb)  Body mass index is 33.43 kg/m².    Intake/Output Summary (Last 24 hours) at 4/24/2025 0901  Last data filed at 4/24/2025 0108  Gross per 24 hour   Intake --   Output 350 ml   Net -350 ml      Physical Exam  HENT:      Head: Normocephalic.      Nose: No congestion.      Mouth/Throat:      Pharynx: Oropharynx is clear.   Eyes:      General: No scleral icterus.     Conjunctiva/sclera: Conjunctivae normal.   Cardiovascular:      Rate and Rhythm: Normal rate and regular rhythm.      Pulses: Normal pulses.      Heart sounds: Normal heart sounds.  "  Pulmonary:      Effort: Pulmonary effort is normal.      Breath sounds: No wheezing.      Comments: Left lower coarse crackles on auscultation  Musculoskeletal:         General: No swelling.   Skin:     General: Skin is warm and dry.      Comments: Bilateral great toes with ulcerated lesions, see media tab for further images, no surrounding induration.  Pulses are palpable but faint in bilateral PT.   Neurological:      General: No focal deficit present.      Mental Status: He is alert.         Computed MELD 3.0 unavailable. One or more values for this score either were not found within the given timeframe or did not fit some other criterion.  Computed MELD-Na unavailable. One or more values for this score either were not found within the given timeframe or did not fit some other criterion.      Significant Labs:  CBC:  Recent Labs   Lab 04/22/25 1737 04/23/25  0615 04/24/25  0625   WBC 13.70* 15.53* 8.69   HGB 15.3 13.4* 13.5*   HCT 44.8 39.3* 39.3*    166 186     CMP:  Recent Labs   Lab 04/22/25 1737 04/23/25  0615 04/24/25  0625   * 133* 138   K 3.6 3.0* 3.0*   CL 93* 99 103   CO2 26 23 27   BUN 22 14 10   CREATININE 1.2 0.7 0.7   CALCIUM 8.9 8.2* 8.5*   ALBUMIN 3.7 2.8* 2.7*   BILITOT 1.3* 1.0 0.8   ALKPHOS 115 87 78   AST 28 23 30   ALT 22 17 22   ANIONGAP 12 11 8     PTINR:  No results for input(s): "INR" in the last 48 hours.    Significant Procedures:   Dobutamine Stress Test with Color Flow: No results found for this or any previous visit.    None      Assessment & Plan  Sepsis  Pneumonia of left lower lobe due to infectious organism  3/4 SIRS on admission for fever 101.1 F, tachycardia 110, leukocytosis 13 K.   Initially considered septic joint, toe osteomyelitis, lumbar osteomyelitis, urinary infection, and pulmonary infection.  - received 1.5 L IV fluid in the ED, fluid restricted for sepsis physiology due to pulmonary edema visualized on chest film  - lactic WNL   - infectious workup " "thus far negative for urine infection, MRI negative for lumbar infection, plain films low likelihood of foot infection.  - podiatry has been consulted, low concern that an acute osteomyelitis is driving this picture  - highest concern at this time for pneumonia as the source given symptoms of productive cough and left lower lobe crackles auscultated on exam, but we will clarify with additional testing    Plan   - we will get further imaging to clarify presence or absence of consolidation in light of new left lower lobe crackles auscultated on examination  - obtain sputum culture  - obtain MRSA swab, if negative, will discontinue vancomycin  - continue piperacillin-tazobactam until continued improvement or further speciation  - follow up blood cultures      Antibiotics (From admission, onward)      Start     Stop Route Frequency Ordered    04/23/25 0800  vancomycin (VANCOCIN) 1,000 mg in 0.9% NaCl 250 mL IVPB (admixture device)         -- IV Every 12 hours (non-standard times) 04/22/25 2320    04/23/25 0015  piperacillin-tazobactam (ZOSYN) 4.5 g in D5W 100 mL IVPB (MB+)         -- IV Every 8 hours (non-standard times) 04/22/25 2305    04/23/25 0005  vancomycin - pharmacy to dose  (vancomycin IVPB (PEDS and ADULTS))        Placed in "And" Linked Group    -- IV pharmacy to manage frequency 04/22/25 2305                Toe ulcer  - bilateral great toe ulcers from pedicure a few weeks ago  - no hx DM or PAD  - diabetes screen negative A1c   - podiatry consulted, recommendations appreciated    Hypertriglyceridemia  - continue statin   Primary hypertension  - now that sepsis physiology has resolved, okay to restart home metoprolol succinate  - metoprolol succinate 25 mg p.o. daily  History of prostate cancer  - noted hx, in remission per chart review  Paroxysmal atrial fibrillation  - patient has conflicting dispensed report, reviewed documentation from Dr. Ric Brambila less than 1 month ago which states that patient should " continue to take flecainide and metoprolol  - continue flecainide 100 mg p.o. b.i.d.  - continue metoprolol 25 mg p.o. daily  Gout  - continue allopurinol   Severe obstructive sleep apnea  - cpap qhs     Class 1 obesity with body mass index (BMI) of 34.0 to 34.9 in adult  Body mass index is 33.43 kg/m². Morbid obesity complicates all aspects of disease management from diagnostic modalities to treatment. Weight loss encouraged and health benefits explained to patient.       Hypokalemia  Patient's most recent potassium results are listed below.   Recent Labs     04/22/25  1737 04/23/25  0615 04/24/25  0625   K 3.6 3.0* 3.0*     Plan  - Replete potassium per protocol  - Monitor potassium Daily  - Patient's hypokalemia is worsening. Will continue current treatment    Hyponatremia  Hyponatremia is likely due to Dehydration/hypovolemia. The patient's most recent sodium results are listed below.  Recent Labs     04/22/25 1737 04/23/25  0615 04/24/25  0625   * 133* 138     Plan  - Correct the sodium by 4-6mEq in 24 hours.   - hyponatremia treated with IV fluids  - Monitor sodium Daily.   - Patient hyponatremia is stable    Hypophosphatemia  Patient's most recent phosphorus results are listed below.   Recent Labs     04/23/25  0615 04/24/25  0625   PHOS 2.3* 3.2     Plan  - Will treat hypophosphatemia with potassium phosphate supplement  - Patient's hypophosphatemia is worsening. Will continue current treatment      VTE Risk Mitigation (From admission, onward)           Ordered     enoxaparin injection 40 mg  Daily         04/22/25 2154     IP VTE HIGH RISK PATIENT  Once         04/22/25 2154                    Discharge Planning   JUANY: 4/26/2025     Code Status: Full Code   Medical Readiness for Discharge Date:   Discharge Plan A: Home                        Kash Bell MD  Department of Hospital Medicine   [facility] - [facility] Medicine Telemetry

## 2025-04-24 NOTE — PLAN OF CARE
Problem: Adult Inpatient Plan of Care  Goal: Plan of Care Review  Outcome: Progressing  Goal: Patient-Specific Goal (Individualized)  Outcome: Progressing  Goal: Absence of Hospital-Acquired Illness or Injury  Outcome: Progressing  Goal: Optimal Comfort and Wellbeing  Outcome: Progressing  Goal: Readiness for Transition of Care  Outcome: Progressing     Problem: Infection  Goal: Absence of Infection Signs and Symptoms  Outcome: Progressing     Problem: Sepsis/Septic Shock  Goal: Optimal Coping  Outcome: Progressing  Goal: Absence of Bleeding  Outcome: Progressing  Goal: Blood Glucose Level Within Targeted Range  Outcome: Progressing  Goal: Absence of Infection Signs and Symptoms  Outcome: Progressing  Goal: Optimal Nutrition Intake  Outcome: Progressing     Problem: Wound  Goal: Optimal Coping  Outcome: Progressing  Goal: Optimal Functional Ability  Outcome: Progressing  Goal: Absence of Infection Signs and Symptoms  Outcome: Progressing  Goal: Improved Oral Intake  Outcome: Progressing  Goal: Optimal Pain Control and Function  Outcome: Progressing  Goal: Skin Health and Integrity  Outcome: Progressing  Goal: Optimal Wound Healing  Outcome: Progressing     Problem: Pneumonia  Goal: Fluid Balance  Outcome: Progressing  Goal: Resolution of Infection Signs and Symptoms  Outcome: Progressing  Goal: Effective Oxygenation and Ventilation  Outcome: Progressing

## 2025-04-24 NOTE — CONSULTS
VANCOMYCIN DOSING BY PHARMACY DISCONTINUATION NOTE    Stevie Villalba is a 70 y.o. male who had been consulted for vancomycin dosing.    The pharmacy consult for vancomycin dosing has been discontinued.     Vancomycin Dosing by Pharmacy Consult will sign-off. Please reconsult if necessary. Thank you for allowing us to participate in this patient's care.     Shelia Brown, PharmD  Ext. [phone]

## 2025-04-24 NOTE — ASSESSMENT & PLAN NOTE
Patient's most recent phosphorus results are listed below.   Recent Labs     04/23/25  0615 04/24/25  0625   PHOS 2.3* 3.2     Plan  - Will treat hypophosphatemia with potassium phosphate supplement  - Patient's hypophosphatemia is worsening. Will continue current treatment

## 2025-04-24 NOTE — ASSESSMENT & PLAN NOTE
Hyponatremia is likely due to Dehydration/hypovolemia. The patient's most recent sodium results are listed below.  Recent Labs     04/22/25  1737 04/23/25  0615 04/24/25  0625   * 133* 138     Plan  - Correct the sodium by 4-6mEq in 24 hours.   - hyponatremia treated with IV fluids  - Monitor sodium Daily.   - Patient hyponatremia is stable

## 2025-04-24 NOTE — PLAN OF CARE
Problem: Adult Inpatient Plan of Care  Goal: Plan of Care Review  Outcome: Progressing  Goal: Patient-Specific Goal (Individualized)  Outcome: Progressing  Goal: Absence of Hospital-Acquired Illness or Injury  Outcome: Progressing  Goal: Optimal Comfort and Wellbeing  Outcome: Progressing  Goal: Readiness for Transition of Care  Outcome: Progressing     Problem: Infection  Goal: Absence of Infection Signs and Symptoms  Outcome: Progressing     Problem: Sepsis/Septic Shock  Goal: Optimal Coping  Outcome: Progressing  Goal: Absence of Bleeding  Outcome: Progressing  Goal: Blood Glucose Level Within Targeted Range  Outcome: Progressing  Goal: Absence of Infection Signs and Symptoms  Outcome: Progressing     Problem: Wound  Goal: Absence of Infection Signs and Symptoms  Outcome: Progressing  Goal: Improved Oral Intake  Outcome: Progressing  Goal: Optimal Pain Control and Function  Outcome: Progressing  Goal: Skin Health and Integrity  Outcome: Progressing

## 2025-04-24 NOTE — ASSESSMENT & PLAN NOTE
Patient's most recent potassium results are listed below.   Recent Labs     04/22/25  1737 04/23/25  0615 04/24/25  0625   K 3.6 3.0* 3.0*     Plan  - Replete potassium per protocol  - Monitor potassium Daily  - Patient's hypokalemia is worsening. Will continue current treatment

## 2025-04-25 VITALS
HEART RATE: 56 BPM | DIASTOLIC BLOOD PRESSURE: 80 MMHG | OXYGEN SATURATION: 95 % | SYSTOLIC BLOOD PRESSURE: 142 MMHG | TEMPERATURE: 98 F | BODY MASS INDEX: 33.36 KG/M2 | WEIGHT: 233 LBS | RESPIRATION RATE: 18 BRPM | HEIGHT: 70 IN

## 2025-04-25 LAB
ABSOLUTE EOSINOPHIL (OHS): 0.07 K/UL
ABSOLUTE MONOCYTE (OHS): 0.59 K/UL (ref 0.3–1)
ABSOLUTE NEUTROPHIL COUNT (OHS): 6.23 K/UL (ref 1.8–7.7)
ALBUMIN SERPL BCP-MCNC: 2.7 G/DL (ref 3.5–5.2)
ALP SERPL-CCNC: 74 UNIT/L (ref 40–150)
ALT SERPL W/O P-5'-P-CCNC: 26 UNIT/L (ref 10–44)
ANION GAP (OHS): 11 MMOL/L (ref 8–16)
AST SERPL-CCNC: 28 UNIT/L (ref 11–45)
BASOPHILS # BLD AUTO: 0.05 K/UL
BASOPHILS NFR BLD AUTO: 0.6 %
BILIRUB SERPL-MCNC: 0.7 MG/DL (ref 0.1–1)
BUN SERPL-MCNC: 13 MG/DL (ref 8–23)
CALCIUM SERPL-MCNC: 8.5 MG/DL (ref 8.7–10.5)
CHLORIDE SERPL-SCNC: 105 MMOL/L (ref 95–110)
CO2 SERPL-SCNC: 27 MMOL/L (ref 23–29)
CREAT SERPL-MCNC: 0.8 MG/DL (ref 0.5–1.4)
ERYTHROCYTE [DISTWIDTH] IN BLOOD BY AUTOMATED COUNT: 12.3 % (ref 11.5–14.5)
GFR SERPLBLD CREATININE-BSD FMLA CKD-EPI: >60 ML/MIN/1.73/M2
GLUCOSE SERPL-MCNC: 102 MG/DL (ref 70–110)
HCT VFR BLD AUTO: 38.2 % (ref 40–54)
HGB BLD-MCNC: 13.1 GM/DL (ref 14–18)
IMM GRANULOCYTES # BLD AUTO: 0.06 K/UL (ref 0–0.04)
IMM GRANULOCYTES NFR BLD AUTO: 0.7 % (ref 0–0.5)
LYMPHOCYTES # BLD AUTO: 1.64 K/UL (ref 1–4.8)
MAGNESIUM SERPL-MCNC: 2 MG/DL (ref 1.6–2.6)
MCH RBC QN AUTO: 33.3 PG (ref 27–31)
MCHC RBC AUTO-ENTMCNC: 34.3 G/DL (ref 32–36)
MCV RBC AUTO: 97 FL (ref 82–98)
NUCLEATED RBC (/100WBC) (OHS): 0 /100 WBC
PHOSPHATE SERPL-MCNC: 4.1 MG/DL (ref 2.7–4.5)
PLATELET # BLD AUTO: 201 K/UL (ref 150–450)
PMV BLD AUTO: 9.9 FL (ref 9.2–12.9)
POCT GLUCOSE: 105 MG/DL (ref 70–110)
POCT GLUCOSE: 105 MG/DL (ref 70–110)
POTASSIUM SERPL-SCNC: 3.1 MMOL/L (ref 3.5–5.1)
PROT SERPL-MCNC: 6.1 GM/DL (ref 6–8.4)
RBC # BLD AUTO: 3.93 M/UL (ref 4.6–6.2)
RELATIVE EOSINOPHIL (OHS): 0.8 %
RELATIVE LYMPHOCYTE (OHS): 19 % (ref 18–48)
RELATIVE MONOCYTE (OHS): 6.8 % (ref 4–15)
RELATIVE NEUTROPHIL (OHS): 72.1 % (ref 38–73)
SODIUM SERPL-SCNC: 143 MMOL/L (ref 136–145)
WBC # BLD AUTO: 8.64 K/UL (ref 3.9–12.7)

## 2025-04-25 PROCEDURE — 25000003 PHARM REV CODE 250: Mod: HCNC | Performed by: PHYSICIAN ASSISTANT

## 2025-04-25 PROCEDURE — 80053 COMPREHEN METABOLIC PANEL: CPT | Mod: HCNC | Performed by: PHYSICIAN ASSISTANT

## 2025-04-25 PROCEDURE — 25000003 PHARM REV CODE 250: Mod: HCNC | Performed by: INTERNAL MEDICINE

## 2025-04-25 PROCEDURE — 63600175 PHARM REV CODE 636 W HCPCS: Mod: HCNC | Performed by: PHYSICIAN ASSISTANT

## 2025-04-25 PROCEDURE — 84100 ASSAY OF PHOSPHORUS: CPT | Mod: HCNC | Performed by: PHYSICIAN ASSISTANT

## 2025-04-25 PROCEDURE — 83735 ASSAY OF MAGNESIUM: CPT | Mod: HCNC | Performed by: PHYSICIAN ASSISTANT

## 2025-04-25 PROCEDURE — 85025 COMPLETE CBC W/AUTO DIFF WBC: CPT | Mod: HCNC | Performed by: PHYSICIAN ASSISTANT

## 2025-04-25 PROCEDURE — 36415 COLL VENOUS BLD VENIPUNCTURE: CPT | Mod: HCNC | Performed by: PHYSICIAN ASSISTANT

## 2025-04-25 RX ORDER — AMOXICILLIN AND CLAVULANATE POTASSIUM 875; 125 MG/1; MG/1
1 TABLET, FILM COATED ORAL EVERY 12 HOURS
Qty: 10 TABLET | Refills: 0 | Status: SHIPPED | OUTPATIENT
Start: 2025-04-25 | End: 2025-04-30

## 2025-04-25 RX ORDER — POTASSIUM CHLORIDE 750 MG/1
30 CAPSULE, EXTENDED RELEASE ORAL ONCE
Status: COMPLETED | OUTPATIENT
Start: 2025-04-25 | End: 2025-04-25

## 2025-04-25 RX ADMIN — PIPERACILLIN SODIUM AND TAZOBACTAM SODIUM 4.5 G: 4; .5 INJECTION, POWDER, FOR SOLUTION INTRAVENOUS at 02:04

## 2025-04-25 RX ADMIN — POTASSIUM CHLORIDE 30 MEQ: 750 CAPSULE, EXTENDED RELEASE ORAL at 12:04

## 2025-04-25 RX ADMIN — FLECAINIDE ACETATE 100 MG: 50 TABLET ORAL at 10:04

## 2025-04-25 RX ADMIN — ALLOPURINOL 300 MG: 100 TABLET ORAL at 10:04

## 2025-04-25 RX ADMIN — METOPROLOL SUCCINATE 25 MG: 25 TABLET, EXTENDED RELEASE ORAL at 10:04

## 2025-04-25 NOTE — PLAN OF CARE
Iv removed site clean dry intact with dry gauze. DC instructions completed by virtual nurse. No distress noted. Pt transported by transport. Pt also has po antibiotics in hand

## 2025-04-25 NOTE — DISCHARGE INSTRUCTIONS
Our goal at [facility] is to always give you outstanding care and exceptional service. You may receive a survey from [employer] by mail, text or e-mail in the next 24-48 hours asking about the care you received with us. The survey should only take 5-10 minutes to complete and is very important to us.     Your feedback provides us with a way to recognize our staff who work tirelessly to provide the best care! Also, your responses help us learn how to improve when your experience was below our aspiration of excellence. We are always looking for ways to improve your stay. We WILL use your feedback to continue making improvements to help us provide the highest quality care. We keep your personal information and feedback confidential. We appreciate your time completing this survey and can't wait to hear from you!!!    We look forward to your continued care with us! Thanks so much for choosing [facility] for your healthcare needs!

## 2025-04-25 NOTE — PLAN OF CARE
[facility] scheduled pc f/u   Future Appointments   Date Time Provider Department Center   4/28/2025 10:00 AM [facility]   9/29/2025  8:20 AM Ric Brambila MD [facility]

## 2025-04-25 NOTE — PLAN OF CARE
Problem: Adult Inpatient Plan of Care  Goal: Plan of Care Review  Outcome: Adequate for Care Transition  Goal: Patient-Specific Goal (Individualized)  Outcome: Adequate for Care Transition  Goal: Absence of Hospital-Acquired Illness or Injury  Outcome: Adequate for Care Transition  Goal: Optimal Comfort and Wellbeing  Outcome: Adequate for Care Transition  Goal: Readiness for Transition of Care  Outcome: Adequate for Care Transition     Problem: Infection  Goal: Absence of Infection Signs and Symptoms  Outcome: Adequate for Care Transition     Problem: Sepsis/Septic Shock  Goal: Optimal Coping  Outcome: Adequate for Care Transition  Goal: Absence of Bleeding  Outcome: Adequate for Care Transition  Goal: Blood Glucose Level Within Targeted Range  Outcome: Adequate for Care Transition  Goal: Absence of Infection Signs and Symptoms  Outcome: Adequate for Care Transition  Goal: Optimal Nutrition Intake  Outcome: Adequate for Care Transition     Problem: Wound  Goal: Optimal Coping  Outcome: Adequate for Care Transition  Goal: Optimal Functional Ability  Outcome: Adequate for Care Transition  Goal: Absence of Infection Signs and Symptoms  Outcome: Adequate for Care Transition  Goal: Improved Oral Intake  Outcome: Adequate for Care Transition  Goal: Optimal Pain Control and Function  Outcome: Adequate for Care Transition  Goal: Skin Health and Integrity  Outcome: Adequate for Care Transition  Goal: Optimal Wound Healing  Outcome: Adequate for Care Transition     Problem: Pneumonia  Goal: Fluid Balance  Outcome: Adequate for Care Transition  Goal: Resolution of Infection Signs and Symptoms  Outcome: Adequate for Care Transition  Goal: Effective Oxygenation and Ventilation  Outcome: Adequate for Care Transition

## 2025-04-26 LAB
BACTERIA SPEC AEROBE CULT: ABNORMAL
BACTERIA SPEC CULT: NORMAL
GRAM STN SPEC: NORMAL

## 2025-04-28 ENCOUNTER — OFFICE VISIT (OUTPATIENT)
Dept: INTERNAL MEDICINE | Facility: CLINIC | Age: 70
End: 2025-04-28
Payer: MEDICARE

## 2025-04-28 ENCOUNTER — PATIENT OUTREACH (OUTPATIENT)
Dept: ADMINISTRATIVE | Facility: CLINIC | Age: 70
End: 2025-04-28
Payer: MEDICARE

## 2025-04-28 ENCOUNTER — TELEPHONE (OUTPATIENT)
Dept: PODIATRY | Facility: CLINIC | Age: 70
End: 2025-04-28
Payer: MEDICARE

## 2025-04-28 VITALS
WEIGHT: 223.75 LBS | TEMPERATURE: 97 F | OXYGEN SATURATION: 97 % | SYSTOLIC BLOOD PRESSURE: 130 MMHG | BODY MASS INDEX: 32.03 KG/M2 | DIASTOLIC BLOOD PRESSURE: 70 MMHG | HEART RATE: 91 BPM | HEIGHT: 70 IN

## 2025-04-28 DIAGNOSIS — M25.562 CHRONIC PAIN OF LEFT KNEE: ICD-10-CM

## 2025-04-28 DIAGNOSIS — J18.9 PNEUMONIA OF LEFT LOWER LOBE DUE TO INFECTIOUS ORGANISM: ICD-10-CM

## 2025-04-28 DIAGNOSIS — L97.509 ULCER OF TOE, UNSPECIFIED LATERALITY, UNSPECIFIED ULCER STAGE: ICD-10-CM

## 2025-04-28 DIAGNOSIS — A41.9 SEPSIS, DUE TO UNSPECIFIED ORGANISM, UNSPECIFIED WHETHER ACUTE ORGAN DYSFUNCTION PRESENT: ICD-10-CM

## 2025-04-28 DIAGNOSIS — Z09 HOSPITAL DISCHARGE FOLLOW-UP: Primary | ICD-10-CM

## 2025-04-28 DIAGNOSIS — G89.29 CHRONIC PAIN OF LEFT KNEE: ICD-10-CM

## 2025-04-28 PROBLEM — D62 ACUTE BLOOD LOSS ANEMIA: Status: RESOLVED | Noted: 2023-03-26 | Resolved: 2025-04-28

## 2025-04-28 LAB
BACTERIA BLD CULT: NORMAL
BACTERIA BLD CULT: NORMAL
BACTERIA SPEC ANAEROBE CULT: NORMAL

## 2025-04-28 PROCEDURE — 99214 OFFICE O/P EST MOD 30 MIN: CPT | Mod: HCNC,S$GLB,, | Performed by: INTERNAL MEDICINE

## 2025-04-28 PROCEDURE — 1111F DSCHRG MED/CURRENT MED MERGE: CPT | Mod: CPTII,HCNC,S$GLB, | Performed by: INTERNAL MEDICINE

## 2025-04-28 PROCEDURE — 1160F RVW MEDS BY RX/DR IN RCRD: CPT | Mod: CPTII,HCNC,S$GLB, | Performed by: INTERNAL MEDICINE

## 2025-04-28 PROCEDURE — 3044F HG A1C LEVEL LT 7.0%: CPT | Mod: CPTII,HCNC,S$GLB, | Performed by: INTERNAL MEDICINE

## 2025-04-28 PROCEDURE — 99999 PR PBB SHADOW E&M-EST. PATIENT-LVL V: CPT | Mod: PBBFAC,HCNC,, | Performed by: INTERNAL MEDICINE

## 2025-04-28 PROCEDURE — 1125F AMNT PAIN NOTED PAIN PRSNT: CPT | Mod: CPTII,HCNC,S$GLB, | Performed by: INTERNAL MEDICINE

## 2025-04-28 PROCEDURE — 1159F MED LIST DOCD IN RCRD: CPT | Mod: CPTII,HCNC,S$GLB, | Performed by: INTERNAL MEDICINE

## 2025-04-28 PROCEDURE — 3078F DIAST BP <80 MM HG: CPT | Mod: CPTII,HCNC,S$GLB, | Performed by: INTERNAL MEDICINE

## 2025-04-28 PROCEDURE — 3075F SYST BP GE 130 - 139MM HG: CPT | Mod: CPTII,HCNC,S$GLB, | Performed by: INTERNAL MEDICINE

## 2025-04-28 PROCEDURE — 4010F ACE/ARB THERAPY RXD/TAKEN: CPT | Mod: CPTII,HCNC,S$GLB, | Performed by: INTERNAL MEDICINE

## 2025-04-28 PROCEDURE — 1101F PT FALLS ASSESS-DOCD LE1/YR: CPT | Mod: CPTII,HCNC,S$GLB, | Performed by: INTERNAL MEDICINE

## 2025-04-28 PROCEDURE — 3288F FALL RISK ASSESSMENT DOCD: CPT | Mod: CPTII,HCNC,S$GLB, | Performed by: INTERNAL MEDICINE

## 2025-04-28 RX ORDER — HYDROCODONE BITARTRATE AND ACETAMINOPHEN 10; 325 MG/1; MG/1
1 TABLET ORAL EVERY 6 HOURS PRN
COMMUNITY
Start: 2025-04-20

## 2025-04-28 NOTE — PLAN OF CARE
[facility] - Internal Medicine Telemetry  Discharge Final Note    Primary Care Provider: Ric Brambila MD    Expected Discharge Date: 4/25/2025    Patient medically cleared for discharge. Patient family/friends to provide transportation. Patient cleared from CM standpoint.    Final Discharge Note (most recent)       Final Note - 04/28/25 0900          Final Note    Assessment Type Final Discharge Note (P)      Anticipated Discharge Disposition Home or Self Care (P)      Hospital Resources/Appts/Education Provided Provided patient/caregiver with written discharge plan information;Appointments scheduled and added to AVS (P)         Post-Acute Status    Post-Acute Authorization Other (P)      Coverage [employer] (P)      Other Status No Post-Acute Service Needs (P)      Discharge Delays None known at this time (P)                      Important Message from Medicare             Discharge Plan A and Plan B have been determined by review of patient's clinical status, future medical and therapeutic needs, and coverage/benefits for post-acute care in coordination with multidisciplinary team members.    [place] RN-CM  Case Management  [facility]  [phone]

## 2025-04-28 NOTE — PROGRESS NOTES
C3 nurse spoke with patient who is unable to complete the call at this time. Patient Requested a Callback.

## 2025-04-28 NOTE — TELEPHONE ENCOUNTER
Called patient several times one time he answered and hang up, tried calling him no answer. Appointment schedule for patient unable to let him know, left a message.

## 2025-04-28 NOTE — PROGRESS NOTES
Assessment:       1. Hospital discharge follow-up    2. Pneumonia of left lower lobe due to infectious organism    3. Sepsis, due to unspecified organism, unspecified whether acute organ dysfunction present    4. Ulcer of toe, unspecified laterality, unspecified ulcer stage  - Ambulatory referral/consult to Podiatry; Future    5. Chronic pain of left knee  - Ambulatory referral/consult to Orthopedics        Plan:       1/2/3.  Complete course of Augmentin as prescribed.    4.  Refer to Podiatry   5.  Refer to orthopedics.    Deep Scribe:  IMPRESSION:  1. Discharged from hospital on 04/25/2025 after treatment for pneumonia.  2. Initially suspected septic joint ruled out.  3. MRSA culture preliminarily positive, likely a surface contaminant as foot not clinically infected.  4. Foot ulcer present, requires podiatric follow-up.  5. Took picture of the remaining toe ulcer for documentation and future comparison.    SUMMARY:   Continue Augmentin 875mg/125mg every 12 hours for the remainder of the 5-day course to treat lung infection   Refer to orthopedics for evaluation and possible knee injection   Refer to podiatrist for ongoing management of toe ulcer   Complete full course of antibiotics as prescribed   Schedule follow-up visit with podiatry at    Schedule follow-up visit with orthopedics at    Consult podiatrist about safety of swimming in pools during upcoming trip    PNEUMONIA:   Continued Augmentin (amoxicillin/clavulanic acid) 875mg/125mg every 12 hours for the remainder of the prescribed 5-day course to treat lung infection.   Monitored the patient's condition, noting significant improvement after hospital treatment with multiple antibiotics.   Evaluated the patient's progress, confirming the pneumonia diagnosis.   Discharged the patient from the hospital with appropriate antibiotic regimen.   Instructed the patient to complete the full course of antibiotics as prescribed.    RIGHT FOOT  ULCER:   Monitored the status of the right foot ulcer, noting its persistence while the contralateral ulcer has healed.   Evaluated the ulcer, finding no signs of infection despite a positive MRSA culture from a swab.   Documented the ulcer's appearance with a photograph for future comparison.   Advised against ocean swimming during the patient's upcoming trip to [place] due to the current foot ulcer.   Referred the patient to a podiatrist for ongoing management of the toe ulcer.   Instructed the patient to schedule a follow-up visit with podiatry at the .   Advised to consult with podiatrist about safety of swimming in pools during upcoming trip.    MRSA INFECTION:   Monitored the results of the foot ulcer swab, which came back positive for MRSA.   Evaluated the foot condition, determining that there is no active infection despite the positive MRSA culture, likely due to surface contamination.   Continued the current antibiotic treatment (Augmentin), considering it sufficient to address the MRSA.    KNEE PAIN:   Monitored the patient's report of increasing knee pain.   Assessed the need for further evaluation and treatment of the knee pain.   Referred the patient to orthopedics for evaluation and possible knee injection before the upcoming trip.   Noted that the patient's last knee injection was in December.   Instructed the patient to schedule a follow-up visit with orthopedics at the .    HYPERGLYCEMIA:   Monitored the patient's glucose levels during hospitalization, noting elevations ranging from 109 to 121 mg/dL.   Evaluated the potential causes of the elevated glucose levels.   Educated the patient about the impact of stress and infection on glucose levels.   Advised continued monitoring of glucose levels after discharge.                 This note was generated with the assistance of ambient listening technology. Verbal consent was obtained by the patient and accompanying visitor(s) for the  recording of patient appointment to facilitate this note. I attest to having reviewed and edited the generated note for accuracy, though some syntax or spelling errors may persist. Please contact the author of this note for any clarification.       Subjective:       Patient ID: Stevie Villalba is a 70 y.o. male.    Chief Complaint: Hospital Follow Up    HPI    70-year-old male here for hospital follow-up.  Discharged 04/25/2025.    Family and/or Caretaker present at visit?  No.  Diagnostic tests reviewed/disposition: I have reviewed all completed as well as pending diagnostic tests at the time of discharge.  Disease/illness education: sepsis  Home health/community services discussion/referrals: Patient does not have home health established from hospital visit.  They do not need home health.  If needed, we will set up home health for the patient.   Establishment or re-establishment of referral orders for community resources: No other necessary community resources.   Discussion with other health care providers: No discussion with other health care providers necessary.  I reviewed and reconciled the medications from hospital discharge.    History of Present Illness    CHIEF COMPLAINT:  Mr. Villalba presents today for hospital follow-up after being discharged for pneumonia    RECENT HOSPITALIZATION:  He initially presented with severe chills and muscle weakness while at work, leading to hospitalization for pneumonia. He received multiple rounds of IV antibiotics at 8 and 12-hour intervals during hospitalization. He is currently taking Augmentin every 12 hours for a 5-day course.    FOOT ULCERS:  He reports one foot ulcer has healed, while a small ulcer remains on the other foot. Culture of the ulcer showed MRSA. He denies having a regular podiatrist.    MUSCULOSKELETAL:  He reports increasing knee pain. Last knee injection was received in December.      ROS:  Constitutional: +chills  Musculoskeletal: +joint pain,  +muscle weakness         Discharge summary: None available upon discharge follow-up.    Last note:  Sepsis  Pneumonia of left lower lobe due to infectious organism  3/4 SIRS on admission for fever 101.1 F, tachycardia 110, leukocytosis 13 K.   Initially considered septic joint, toe osteomyelitis, lumbar osteomyelitis, urinary infection, and pulmonary infection.  - received 1.5 L IV fluid in the ED, fluid restricted for sepsis physiology due to pulmonary edema visualized on chest film  - lactic WNL   - infectious workup thus far negative for urine infection, MRI negative for lumbar infection, plain films low likelihood of foot infection.  - podiatry has been consulted, low concern that an acute osteomyelitis is driving this picture  - highest concern at this time for pneumonia as the source given symptoms of productive cough and left lower lobe crackles auscultated on exam, but we will clarify with additional testing     Plan   - we will get further imaging to clarify presence or absence of consolidation in light of new left lower lobe crackles auscultated on examination  - obtain sputum culture  - obtain MRSA swab, if negative, will discontinue vancomycin  - continue piperacillin-tazobactam until continued improvement or further speciation  - follow up blood cultures    Toe ulcer  - bilateral great toe ulcers from pedicure a few weeks ago  - no hx DM or PAD  - diabetes screen negative A1c   - podiatry consulted, recommendations appreciated     Hypertriglyceridemia  - continue statin   Primary hypertension  - now that sepsis physiology has resolved, okay to restart home metoprolol succinate  - metoprolol succinate 25 mg p.o. daily  History of prostate cancer  - noted hx, in remission per chart review  Paroxysmal atrial fibrillation  - patient has conflicting dispensed report, reviewed documentation from Dr. Ric Brambila less than 1 month ago which states that patient should continue to take flecainide and metoprolol  -  continue flecainide 100 mg p.o. b.i.d.  - continue metoprolol 25 mg p.o. daily  Gout  - continue allopurinol   Severe obstructive sleep apnea  - cpap qhs      Class 1 obesity with body mass index (BMI) of 34.0 to 34.9 in adult  Body mass index is 33.43 kg/m². Morbid obesity complicates all aspects of disease management from diagnostic modalities to treatment. Weight loss encouraged and health benefits explained to patient.                    Hypokalemia  Patient's most recent potassium results are listed below.     Hypokalemia  Patient's most recent potassium results are listed below.         Recent Labs     04/22/25  1737 04/23/25  0615 04/24/25  0625   K 3.6 3.0* 3.0*      Plan  - Replete potassium per protocol  - Monitor potassium Daily  - Patient's hypokalemia is worsening. Will continue current treatment     Hyponatremia  Hyponatremia is likely due to Dehydration/hypovolemia. The patient's most recent sodium results are listed below.        Recent Labs     04/22/25  1737 04/23/25  0615 04/24/25  0625   * 133* 138      Plan  - Correct the sodium by 4-6mEq in 24 hours.   - hyponatremia treated with IV fluids  - Monitor sodium Daily.   - Patient hyponatremia is stable     Hypophosphatemia  Patient's most recent phosphorus results are listed below.        Recent Labs     04/23/25  0615 04/24/25  0625   PHOS 2.3* 3.2      Plan  - Will treat hypophosphatemia with potassium phosphate supplement  - Patient's hypophosphatemia is worsening. Will continue current treatment    Review of Systems          Objective:      Physical Exam  Vitals reviewed.   Constitutional:       Appearance: He is well-developed.   HENT:      Head: Normocephalic and atraumatic.      Mouth/Throat:      Pharynx: No oropharyngeal exudate.   Eyes:      General: No scleral icterus.        Right eye: No discharge.         Left eye: No discharge.      Pupils: Pupils are equal, round, and reactive to light.   Neck:      Thyroid: No thyromegaly.       Trachea: No tracheal deviation.   Cardiovascular:      Rate and Rhythm: Normal rate and regular rhythm.      Heart sounds: Normal heart sounds. No murmur heard.     No friction rub. No gallop.   Pulmonary:      Effort: Pulmonary effort is normal. No respiratory distress.      Breath sounds: Normal breath sounds. No wheezing or rales.   Chest:      Chest wall: No tenderness.   Abdominal:      General: Bowel sounds are normal. There is no distension.      Palpations: Abdomen is soft. There is no mass.      Tenderness: There is no abdominal tenderness. There is no guarding or rebound.   Musculoskeletal:         General: No tenderness. Normal range of motion.      Cervical back: Normal range of motion and neck supple.   Skin:     General: Skin is warm and dry.      Coloration: Skin is not pale.      Findings: No erythema or rash.   Neurological:      Mental Status: He is alert and oriented to person, place, and time.   Psychiatric:         Behavior: Behavior normal.

## 2025-04-28 NOTE — TELEPHONE ENCOUNTER
----- Message from [employer] sent at 4/28/2025 10:12 AM CDT -----  Pt has a referral for a Consult to Podiatry. Message on [facility] indicated that the department must schedule the Pt. Please call Pt to schedule.  Diagnosis: Ulcer of toe, unspecified laterality, unspecified ulcer stage [L97.509]MRN:[id number]- Stevie VillierContact #  [phone]

## 2025-04-28 NOTE — PROGRESS NOTES
C3 nurse spoke with Stevie Villalba for a [facility] post hospital discharge follow up call. The patient has a scheduled HOSFU appointment with Ric Brambila on 04/28/25 @ 1000.

## 2025-04-30 LAB — W METHYLMALONIC ACID: 0.22 UMOL/L

## 2025-05-05 ENCOUNTER — HOSPITAL ENCOUNTER (OUTPATIENT)
Dept: RADIOLOGY | Facility: HOSPITAL | Age: 70
Discharge: HOME | End: 2025-05-05
Attending: PODIATRIST
Payer: MEDICARE

## 2025-05-05 ENCOUNTER — HOSPITAL ENCOUNTER (INPATIENT)
Facility: HOSPITAL | Age: 70
LOS: 3 days | Discharge: HOME HEALTH | DRG: 464 | End: 2025-05-08
Attending: EMERGENCY MEDICINE | Admitting: STUDENT IN AN ORGANIZED HEALTH CARE EDUCATION/TRAINING PROGRAM
Payer: MEDICARE

## 2025-05-05 ENCOUNTER — OFFICE VISIT (OUTPATIENT)
Dept: PODIATRY | Facility: CLINIC | Age: 70
End: 2025-05-05
Payer: MEDICARE

## 2025-05-05 VITALS
SYSTOLIC BLOOD PRESSURE: 152 MMHG | DIASTOLIC BLOOD PRESSURE: 77 MMHG | HEIGHT: 70 IN | HEART RATE: 74 BPM | BODY MASS INDEX: 32.11 KG/M2

## 2025-05-05 DIAGNOSIS — L97.509 ULCER OF TOE, UNSPECIFIED LATERALITY, UNSPECIFIED ULCER STAGE: ICD-10-CM

## 2025-05-05 DIAGNOSIS — L97.529 ULCER OF TOE OF LEFT FOOT, UNSPECIFIED ULCER STAGE: ICD-10-CM

## 2025-05-05 DIAGNOSIS — L97.529 BILATERAL GREAT TOES ULCERS: Primary | ICD-10-CM

## 2025-05-05 DIAGNOSIS — L97.509: ICD-10-CM

## 2025-05-05 DIAGNOSIS — L97.529 ULCER OF TOE OF LEFT FOOT, UNSPECIFIED ULCER STAGE: Primary | ICD-10-CM

## 2025-05-05 DIAGNOSIS — L97.519 BILATERAL GREAT TOES ULCERS: Primary | ICD-10-CM

## 2025-05-05 PROBLEM — D72.829 LEUKOCYTOSIS: Status: ACTIVE | Noted: 2025-05-05

## 2025-05-05 LAB
ABSOLUTE EOSINOPHIL (OHS): 0.04 K/UL
ABSOLUTE MONOCYTE (OHS): 0.73 K/UL (ref 0.3–1)
ABSOLUTE NEUTROPHIL COUNT (OHS): 11.09 K/UL (ref 1.8–7.7)
ALBUMIN SERPL BCP-MCNC: 3.4 G/DL (ref 3.5–5.2)
ALP SERPL-CCNC: 111 UNIT/L (ref 40–150)
ALT SERPL W/O P-5'-P-CCNC: 30 UNIT/L (ref 10–44)
ANION GAP (OHS): 9 MMOL/L (ref 8–16)
AST SERPL-CCNC: 27 UNIT/L (ref 11–45)
BASOPHILS # BLD AUTO: 0.05 K/UL
BASOPHILS NFR BLD AUTO: 0.4 %
BILIRUB SERPL-MCNC: 1.1 MG/DL (ref 0.1–1)
BUN SERPL-MCNC: 17 MG/DL (ref 8–23)
CALCIUM SERPL-MCNC: 9.1 MG/DL (ref 8.7–10.5)
CHLORIDE SERPL-SCNC: 101 MMOL/L (ref 95–110)
CO2 SERPL-SCNC: 28 MMOL/L (ref 23–29)
CREAT SERPL-MCNC: 0.8 MG/DL (ref 0.5–1.4)
CRP SERPL-MCNC: 6.2 MG/L
EAG (OHS): 103 MG/DL (ref 68–131)
ERYTHROCYTE [DISTWIDTH] IN BLOOD BY AUTOMATED COUNT: 12.3 % (ref 11.5–14.5)
ERYTHROCYTE [SEDIMENTATION RATE] IN BLOOD BY PHOTOMETRIC METHOD: 36 MM/HR
GFR SERPLBLD CREATININE-BSD FMLA CKD-EPI: >60 ML/MIN/1.73/M2
GLUCOSE SERPL-MCNC: 107 MG/DL (ref 70–110)
HBA1C MFR BLD: 5.2 % (ref 4–5.6)
HCT VFR BLD AUTO: 41.6 % (ref 40–54)
HGB BLD-MCNC: 14.2 GM/DL (ref 14–18)
IMM GRANULOCYTES # BLD AUTO: 0.08 K/UL (ref 0–0.04)
IMM GRANULOCYTES NFR BLD AUTO: 0.6 % (ref 0–0.5)
LYMPHOCYTES # BLD AUTO: 1.88 K/UL (ref 1–4.8)
MCH RBC QN AUTO: 33.3 PG (ref 27–31)
MCHC RBC AUTO-ENTMCNC: 34.1 G/DL (ref 32–36)
MCV RBC AUTO: 97 FL (ref 82–98)
NUCLEATED RBC (/100WBC) (OHS): 0 /100 WBC
PLATELET # BLD AUTO: 370 K/UL (ref 150–450)
PMV BLD AUTO: 9.3 FL (ref 9.2–12.9)
POTASSIUM SERPL-SCNC: 3.9 MMOL/L (ref 3.5–5.1)
PROT SERPL-MCNC: 7 GM/DL (ref 6–8.4)
RBC # BLD AUTO: 4.27 M/UL (ref 4.6–6.2)
RELATIVE EOSINOPHIL (OHS): 0.3 %
RELATIVE LYMPHOCYTE (OHS): 13.6 % (ref 18–48)
RELATIVE MONOCYTE (OHS): 5.3 % (ref 4–15)
RELATIVE NEUTROPHIL (OHS): 79.8 % (ref 38–73)
SODIUM SERPL-SCNC: 138 MMOL/L (ref 136–145)
WBC # BLD AUTO: 13.87 K/UL (ref 3.9–12.7)

## 2025-05-05 PROCEDURE — 4010F ACE/ARB THERAPY RXD/TAKEN: CPT | Mod: CPTII,HCNC,S$GLB, | Performed by: PODIATRIST

## 2025-05-05 PROCEDURE — 85025 COMPLETE CBC W/AUTO DIFF WBC: CPT | Mod: HCNC | Performed by: EMERGENCY MEDICINE

## 2025-05-05 PROCEDURE — 99223 1ST HOSP IP/OBS HIGH 75: CPT | Mod: HCNC,,, | Performed by: PODIATRIST

## 2025-05-05 PROCEDURE — 96367 TX/PROPH/DG ADDL SEQ IV INF: CPT | Mod: HCNC

## 2025-05-05 PROCEDURE — 83036 HEMOGLOBIN GLYCOSYLATED A1C: CPT | Mod: HCNC

## 2025-05-05 PROCEDURE — 3077F SYST BP >= 140 MM HG: CPT | Mod: CPTII,HCNC,S$GLB, | Performed by: PODIATRIST

## 2025-05-05 PROCEDURE — 11000001 HC ACUTE MED/SURG PRIVATE ROOM: Mod: HCNC

## 2025-05-05 PROCEDURE — 3008F BODY MASS INDEX DOCD: CPT | Mod: CPTII,HCNC,S$GLB, | Performed by: PODIATRIST

## 2025-05-05 PROCEDURE — 1126F AMNT PAIN NOTED NONE PRSNT: CPT | Mod: CPTII,HCNC,S$GLB, | Performed by: PODIATRIST

## 2025-05-05 PROCEDURE — 99214 OFFICE O/P EST MOD 30 MIN: CPT | Mod: HCNC,S$GLB,, | Performed by: PODIATRIST

## 2025-05-05 PROCEDURE — 86140 C-REACTIVE PROTEIN: CPT | Mod: HCNC | Performed by: EMERGENCY MEDICINE

## 2025-05-05 PROCEDURE — 73630 X-RAY EXAM OF FOOT: CPT | Mod: TC,HCNC,LT

## 2025-05-05 PROCEDURE — 1101F PT FALLS ASSESS-DOCD LE1/YR: CPT | Mod: CPTII,HCNC,S$GLB, | Performed by: PODIATRIST

## 2025-05-05 PROCEDURE — 3078F DIAST BP <80 MM HG: CPT | Mod: CPTII,HCNC,S$GLB, | Performed by: PODIATRIST

## 2025-05-05 PROCEDURE — 25000003 PHARM REV CODE 250: Mod: HCNC

## 2025-05-05 PROCEDURE — 85652 RBC SED RATE AUTOMATED: CPT | Mod: HCNC | Performed by: EMERGENCY MEDICINE

## 2025-05-05 PROCEDURE — 5A09357 ASSISTANCE WITH RESPIRATORY VENTILATION, LESS THAN 24 CONSECUTIVE HOURS, CONTINUOUS POSITIVE AIRWAY PRESSURE: ICD-10-PCS

## 2025-05-05 PROCEDURE — 73630 X-RAY EXAM OF FOOT: CPT | Mod: 26,HCNC,LT, | Performed by: INTERNAL MEDICINE

## 2025-05-05 PROCEDURE — 3044F HG A1C LEVEL LT 7.0%: CPT | Mod: CPTII,HCNC,S$GLB, | Performed by: PODIATRIST

## 2025-05-05 PROCEDURE — 25000003 PHARM REV CODE 250: Mod: HCNC | Performed by: EMERGENCY MEDICINE

## 2025-05-05 PROCEDURE — 1111F DSCHRG MED/CURRENT MED MERGE: CPT | Mod: CPTII,HCNC,S$GLB, | Performed by: PODIATRIST

## 2025-05-05 PROCEDURE — 96365 THER/PROPH/DIAG IV INF INIT: CPT | Mod: HCNC

## 2025-05-05 PROCEDURE — 99999 PR PBB SHADOW E&M-EST. PATIENT-LVL III: CPT | Mod: PBBFAC,HCNC,, | Performed by: PODIATRIST

## 2025-05-05 PROCEDURE — 3288F FALL RISK ASSESSMENT DOCD: CPT | Mod: CPTII,HCNC,S$GLB, | Performed by: PODIATRIST

## 2025-05-05 PROCEDURE — 80053 COMPREHEN METABOLIC PANEL: CPT | Mod: HCNC | Performed by: EMERGENCY MEDICINE

## 2025-05-05 PROCEDURE — 1159F MED LIST DOCD IN RCRD: CPT | Mod: CPTII,HCNC,S$GLB, | Performed by: PODIATRIST

## 2025-05-05 PROCEDURE — 63600175 PHARM REV CODE 636 W HCPCS: Mod: HCNC | Performed by: EMERGENCY MEDICINE

## 2025-05-05 PROCEDURE — 99285 EMERGENCY DEPT VISIT HI MDM: CPT | Mod: 25,HCNC

## 2025-05-05 RX ORDER — IBUPROFEN 200 MG
16 TABLET ORAL
Status: DISCONTINUED | OUTPATIENT
Start: 2025-05-05 | End: 2025-05-08 | Stop reason: HOSPADM

## 2025-05-05 RX ORDER — ACETAMINOPHEN 325 MG/1
650 TABLET ORAL EVERY 4 HOURS PRN
Status: DISCONTINUED | OUTPATIENT
Start: 2025-05-05 | End: 2025-05-08 | Stop reason: HOSPADM

## 2025-05-05 RX ORDER — VALSARTAN 160 MG/1
320 TABLET ORAL DAILY
Status: DISCONTINUED | OUTPATIENT
Start: 2025-05-06 | End: 2025-05-08 | Stop reason: HOSPADM

## 2025-05-05 RX ORDER — ATORVASTATIN CALCIUM 40 MG/1
40 TABLET, FILM COATED ORAL NIGHTLY
Status: DISCONTINUED | OUTPATIENT
Start: 2025-05-06 | End: 2025-05-08 | Stop reason: HOSPADM

## 2025-05-05 RX ORDER — ACETAMINOPHEN 325 MG/1
650 TABLET ORAL EVERY 8 HOURS PRN
Status: DISCONTINUED | OUTPATIENT
Start: 2025-05-05 | End: 2025-05-08 | Stop reason: HOSPADM

## 2025-05-05 RX ORDER — ONDANSETRON 8 MG/1
8 TABLET, ORALLY DISINTEGRATING ORAL EVERY 8 HOURS PRN
Status: DISCONTINUED | OUTPATIENT
Start: 2025-05-05 | End: 2025-05-08 | Stop reason: HOSPADM

## 2025-05-05 RX ORDER — SODIUM CHLORIDE 0.9 % (FLUSH) 0.9 %
10 SYRINGE (ML) INJECTION EVERY 12 HOURS PRN
Status: DISCONTINUED | OUTPATIENT
Start: 2025-05-05 | End: 2025-05-08 | Stop reason: HOSPADM

## 2025-05-05 RX ORDER — IBUPROFEN 200 MG
24 TABLET ORAL
Status: DISCONTINUED | OUTPATIENT
Start: 2025-05-05 | End: 2025-05-08 | Stop reason: HOSPADM

## 2025-05-05 RX ORDER — ALUMINUM HYDROXIDE, MAGNESIUM HYDROXIDE, AND SIMETHICONE 1200; 120; 1200 MG/30ML; MG/30ML; MG/30ML
30 SUSPENSION ORAL 4 TIMES DAILY PRN
Status: DISCONTINUED | OUTPATIENT
Start: 2025-05-05 | End: 2025-05-08 | Stop reason: HOSPADM

## 2025-05-05 RX ORDER — FLECAINIDE ACETATE 50 MG/1
100 TABLET ORAL EVERY 12 HOURS
Status: DISCONTINUED | OUTPATIENT
Start: 2025-05-05 | End: 2025-05-08 | Stop reason: HOSPADM

## 2025-05-05 RX ORDER — IPRATROPIUM BROMIDE AND ALBUTEROL SULFATE 2.5; .5 MG/3ML; MG/3ML
3 SOLUTION RESPIRATORY (INHALATION) EVERY 6 HOURS PRN
Status: DISCONTINUED | OUTPATIENT
Start: 2025-05-05 | End: 2025-05-08 | Stop reason: HOSPADM

## 2025-05-05 RX ORDER — ALLOPURINOL 300 MG/1
300 TABLET ORAL DAILY
Status: DISCONTINUED | OUTPATIENT
Start: 2025-05-06 | End: 2025-05-08 | Stop reason: HOSPADM

## 2025-05-05 RX ORDER — CARISOPRODOL 350 MG/1
350 TABLET ORAL 4 TIMES DAILY PRN
Refills: 0 | Status: DISCONTINUED | OUTPATIENT
Start: 2025-05-05 | End: 2025-05-08 | Stop reason: HOSPADM

## 2025-05-05 RX ORDER — PANTOPRAZOLE SODIUM 40 MG/1
40 TABLET, DELAYED RELEASE ORAL 2 TIMES DAILY
Status: DISCONTINUED | OUTPATIENT
Start: 2025-05-05 | End: 2025-05-08 | Stop reason: HOSPADM

## 2025-05-05 RX ORDER — POLYETHYLENE GLYCOL 3350 17 G/17G
17 POWDER, FOR SOLUTION ORAL DAILY
Status: DISCONTINUED | OUTPATIENT
Start: 2025-05-06 | End: 2025-05-08 | Stop reason: HOSPADM

## 2025-05-05 RX ORDER — HYDROCHLOROTHIAZIDE 25 MG/1
25 TABLET ORAL DAILY
Status: DISCONTINUED | OUTPATIENT
Start: 2025-05-06 | End: 2025-05-08 | Stop reason: HOSPADM

## 2025-05-05 RX ORDER — HYDROCODONE BITARTRATE AND ACETAMINOPHEN 10; 325 MG/1; MG/1
1 TABLET ORAL EVERY 6 HOURS PRN
Refills: 0 | Status: DISCONTINUED | OUTPATIENT
Start: 2025-05-05 | End: 2025-05-08 | Stop reason: HOSPADM

## 2025-05-05 RX ORDER — ENOXAPARIN SODIUM 100 MG/ML
40 INJECTION SUBCUTANEOUS EVERY 24 HOURS
Status: DISCONTINUED | OUTPATIENT
Start: 2025-05-06 | End: 2025-05-08 | Stop reason: HOSPADM

## 2025-05-05 RX ORDER — VANCOMYCIN 2 GRAM/500 ML IN 0.9 % SODIUM CHLORIDE INTRAVENOUS
20 ONCE
Status: COMPLETED | OUTPATIENT
Start: 2025-05-05 | End: 2025-05-05

## 2025-05-05 RX ORDER — TALC
6 POWDER (GRAM) TOPICAL NIGHTLY PRN
Status: DISCONTINUED | OUTPATIENT
Start: 2025-05-05 | End: 2025-05-08 | Stop reason: HOSPADM

## 2025-05-05 RX ORDER — GLUCAGON 1 MG
1 KIT INJECTION
Status: DISCONTINUED | OUTPATIENT
Start: 2025-05-05 | End: 2025-05-08 | Stop reason: HOSPADM

## 2025-05-05 RX ORDER — NALOXONE HCL 0.4 MG/ML
0.02 VIAL (ML) INJECTION
Status: DISCONTINUED | OUTPATIENT
Start: 2025-05-05 | End: 2025-05-08 | Stop reason: HOSPADM

## 2025-05-05 RX ADMIN — PIPERACILLIN AND TAZOBACTAM 4.5 G: 4; .5 INJECTION, POWDER, LYOPHILIZED, FOR SOLUTION INTRAVENOUS; PARENTERAL at 02:05

## 2025-05-05 RX ADMIN — VANCOMYCIN HYDROCHLORIDE 2000 MG: 10 INJECTION, POWDER, LYOPHILIZED, FOR SOLUTION INTRAVENOUS at 02:05

## 2025-05-05 RX ADMIN — PANTOPRAZOLE SODIUM 40 MG: 40 TABLET, DELAYED RELEASE ORAL at 09:05

## 2025-05-05 RX ADMIN — FLECAINIDE ACETATE 100 MG: 50 TABLET ORAL at 09:05

## 2025-05-05 NOTE — ASSESSMENT & PLAN NOTE
Body mass index is 31.28 kg/m². Morbid obesity complicates all aspects of disease management from diagnostic modalities to treatment. Weight loss encouraged and health benefits explained to patient

## 2025-05-05 NOTE — ED NOTES
Patient identifiers verified and correct for Mr Wolf  C/C:  Sergey toe wounds SEE NN  APPEARANCE: awake and alert in NAD. PAIN  10/10  SKIN: warm, dry  right great toe with ulceration to inner pad of toe, right great toe with redness extending in to joint, ulceration to left foot great toe with red/black area  MUSCULOSKELETAL: Patient moving all extremities spontaneously, no obvious swelling or deformities noted. Ambulates independently.  RESPIRATORY: Denies shortness of breath.Respirations unlabored. Deneis fevers  CARDIAC: Denies CP, 2+ distal pulses; no peripheral edema  ABDOMEN: S/ND/NT, Denies nausea  : voids spontaneously, denies difficulty  Neurologic: AAO x 4; follows commands equal strength in all extremities; denies numbness/tingling. Denies dizziness  Denies new weakness

## 2025-05-05 NOTE — ASSESSMENT & PLAN NOTE
- AFVSS  - CBC with leukocytosis of 13  - CMP unremarkable   - xray bilateral feet reveals soft tissue swelling along the 1st right toe and indolent erosion in the 1st left metatarsal head  - VIGNESH and MRI bilateral feet/legs pending   - started on vanc and zosyn in ED; transition to vanc and ceftriaxone   - podiatry consulted, appreciate recs   - MRI bilateral forefoot ordered, assess for OM and extent of infection  - Pending results of above, may consider OR debridement versus bone biopsy.   - IV antibiotics  - Trace cultures obtained 4/23. Will consider more recent cultures pending imaging.   - Dressed with Hydrafera ready foam, gauze, kerlix, Ace.   - WBAT heel emphasis to transfer bilateral. Ambulate in darco shoes.
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
## 2025-05-05 NOTE — FIRST PROVIDER EVALUATION
"Medical screening examination initiated.  I have conducted a focused provider triage encounter, findings are as follows:    Brief history of present illness:  Sent from podiatry clinic for further evaluation for bilateral toe ulcers    Vitals:    05/05/25 1119   BP: 133/84   Pulse: 74   Resp: 20   Temp: 98 °F (36.7 °C)   TempSrc: Oral   SpO2: 97%   Weight: 98.9 kg (218 lb)   Height: 5' 10" (1.778 m)       Pertinent physical exam:  nontoxic    Brief workup plan:  see orders    Preliminary workup initiated; this workup will be continued and followed by the physician or advanced practice provider that is assigned to the patient when roomed.  "

## 2025-05-05 NOTE — HPI
70-year-old male with PMH HTN, HLD, obesity, paroxysmal Afib, OA of right knee presents to ED 5/5/25 on referral from Dr. Rodriguez podiatrist to present to the ED for imaging workup, IV antibiotics. Podiatry consulted for management of bilateral great toe wounds, to which patient attests was a pedicure several weeks prior.  XR of left foot was obtained in the clinic setting demonstrating pathological fracture within the IPJ of the hallux proximal phalanx, as well as suspicious osseous destruction.  On encounter, patient denies drainage, pain.  He denies symptoms of fevers, chills, nausea.  Daughter at bedside providing partial history, stating that patient was washing his wounds with soap and water.  Labs on encounter demonstrating leukocytosis WBC 13.87.  CRP WNL.  No electrolyte abnormalities demonstrated per CMP panel.  Vital signs are stable on encounter.
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
## 2025-05-05 NOTE — ASSESSMENT & PLAN NOTE
Patient has paroxysmal (<7 days) atrial fibrillation. Patient is currently in sinus rhythm. [id number] Score: 1.   - continue home flecainide BID  - previously on eliquis, no longer taking; unclear why.   - clarify with cardiology in AM; restart if medically appropriate
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
## 2025-05-05 NOTE — HPI
Stevie Villalba is a 70 y.o. male with afib on flecainide (no longer on eliquis, unclear why), hypertension, HLD, gout and chronic back pain being admitted to hospital for bilateral great toe ulcers. Patient reports the ulcers initially started after he went to a nail salon for a pedicure where they scraped off both great toe callouses causing wounds. As the wounds started to heal he continued to pull of the dead/healing skin because he thought that would help. He has been washing the ulcers by scrubbing them with water and soap while in the shower. Initially the ulcers were starting to improve but now have worsened redness and swelling over the past few days. He was seen in podiatry clinic today and was advised to come to the ED for further workup and treatment. He denies any pain or purulent drainage, no fever, chills, abdominal pain, N/V/D, chest pain or shortness of breath. He does not smoke or drink.      In ED: AFVSS. CBC with leukocytosis if 13.78. CMP unremarkable. ESR 36, CRP 6.2. Bilateral foot xray reveals soft tissue swelling along the 1st right toe and indolent erosion in the 1st left metatarsal head. Started on IV vanc and zosyn in the ED. Podiatry consulted, recommended admission with MRI to rule out osteo.

## 2025-05-05 NOTE — PROGRESS NOTES
"Pharmacokinetic Initial Assessment: IV Vancomycin    Assessment/Plan:    Initiate intravenous vancomycin with loading dose of 2000 mg once followed by a maintenance dose of vancomycin 1500mg IV every 12 hours  Desired empiric serum trough concentration is 15 to 20 mcg/mL  Draw vancomycin trough level 60 min prior to fourth dose on 5/7/25 at approximately 0300  Pharmacy will continue to follow and monitor vancomycin.      Please contact pharmacy at extension [phone] with any questions regarding this assessment.     Thank you for the consult,   Emma Umaña       Patient brief summary:  Stevie Villalba is a 70 y.o. male initiated on antimicrobial therapy with IV Vancomycin for treatment of suspected bone/joint infection    Drug Allergies:   Review of patient's allergies indicates:  No Known Allergies    Actual Body Weight:   73 kg    Renal Function:   Estimated Creatinine Clearance: 102.8 mL/min (based on SCr of 0.8 mg/dL).,     Dialysis Method (if applicable):  N/A    CBC (last 72 hours):  Recent Labs   Lab Result Units 05/05/25  1144   WBC K/uL 13.87*   HGB gm/dL 14.2   HCT % 41.6   Platelet Count K/uL 370   Lymph % % 13.6*   Mono % % 5.3   Eos % % 0.3   Basophil % % 0.4       Metabolic Panel (last 72 hours):  Recent Labs   Lab Result Units 05/05/25  1144   Sodium mmol/L 138   Potassium mmol/L 3.9   Chloride mmol/L 101   CO2 mmol/L 28   Glucose mg/dL 107   BUN mg/dL 17   Creatinine mg/dL 0.8   Albumin g/dL 3.4*   Bilirubin Total mg/dL 1.1*   ALP unit/L 111   AST unit/L 27   ALT unit/L 30       Drug levels (last 3 results):  No results for input(s): "VANCOMYCINRA", "VANCORANDOM", "VANCOMYCINPE", "VANCOPEAK", "VANCOMYCINTR", "VANCOTROUGH" in the last 72 hours.    Microbiologic Results:  Microbiology Results (last 7 days)       ** No results found for the last 168 hours. **            "

## 2025-05-05 NOTE — ASSESSMENT & PLAN NOTE
70-year-old male with PMH HTN, HLD, obesity, paroxysmal Afib, OA of right knee presents to ED 5/5/25 on referral from Dr. Rodriguez podiatrist to present to the ED for imaging workup, IV antibiotics. Podiatry consulted for management of bilateral great toe wounds, to which patient attests was a pedicure several weeks prior.     Assessment: Bilateral great toe ulcers, depth to subcutaneous. Chronic in nature. XR of left foot was obtained in the clinic setting demonstrating pathological fracture within the IPJ of the hallux proximal phalanx, as well as suspicious osseous destruction.      Plan:   - Patient seen and evaluated.  Plan of care was discussed with patient.  - MRI bilateral forefoot ordered, assess for OM and extent of infection  - Pending results of above, may consider OR debridement versus bone biopsy.   - Trace cultures obtained 4/23. Will consider more recent cultures pending imaging.   - Dressed with Hydrafera ready foam, gauze, kerlix, Ace.   - WBAT heel emphasis to transfer bilateral. Ambulate in darco shoes.   - Podiatry will follow

## 2025-05-05 NOTE — PROGRESS NOTES
Subjective:      Patient ID: Stevie Villalba is a 70 y.o. male.    Chief Complaint: Wound Care (Bl great toes)    Stevie is a 70 y.o. male who presents to the clinic for evaluation and treatment of high risk feet. Stevie has a past medical history of Hyperlipidemia, Hypertension, Insomnia, and Lumbago. The patient's chief complaint is foot ulcer, both hallux nails. This patient has documented high risk feet requiring routine maintenance secondary to peripheral neuropathy.    PCP: Ric Brambila MD    Date Last Seen by PCP:   Chief Complaint   Patient presents with    Wound Care     Bl great toes         Current shoe gear:  Affected Foot: Slip-on shoes     Unaffected Foot: Slip-on shoes    History of Trauma: negative  Sign of Infection: none    Hemoglobin A1C   Date Value Ref Range Status   09/30/2024 5.3 4.0 - 5.6 % Final     Comment:     ADA Screening Guidelines:  5.7-6.4%  Consistent with prediabetes  >or=6.5%  Consistent with diabetes    High levels of fetal hemoglobin interfere with the HbA1C  assay. Heterozygous hemoglobin variants (HbS, HgC, etc)do  not significantly interfere with this assay.   However, presence of multiple variants may affect accuracy.     09/25/2023 5.2 4.0 - 5.6 % Final     Comment:     ADA Screening Guidelines:  5.7-6.4%  Consistent with prediabetes  >or=6.5%  Consistent with diabetes    High levels of fetal hemoglobin interfere with the HbA1C  assay. Heterozygous hemoglobin variants (HbS, HgC, etc)do  not significantly interfere with this assay.   However, presence of multiple variants may affect accuracy.     09/12/2022 5.3 4.0 - 5.6 % Final     Comment:     ADA Screening Guidelines:  5.7-6.4%  Consistent with prediabetes  >or=6.5%  Consistent with diabetes    High levels of fetal hemoglobin interfere with the HbA1C  assay. Heterozygous hemoglobin variants (HbS, HgC, etc)do  not significantly interfere with this assay.   However, presence of multiple variants may affect accuracy.        Hemoglobin A1c   Date Value Ref Range Status   04/23/2025 5.1 4.0 - 5.6 % Final     Comment:     ADA Screening Guidelines:  5.7-6.4%  Consistent with prediabetes  >=6.5%  Consistent with diabetes    High levels of fetal hemoglobin interfere with the HbA1C  assay. Heterozygous hemoglobin variants (HbS, HgC, etc)do  not significantly interfere with this assay.   However, presence of multiple variants may affect accuracy.       Review of Systems   Constitutional: Negative for chills, fever and malaise/fatigue.   HENT:  Negative for hearing loss.    Cardiovascular:  Negative for claudication.   Respiratory:  Negative for shortness of breath.    Skin:  Negative for flushing and rash.   Musculoskeletal:  Negative for joint pain and myalgias.   Gastrointestinal:  Negative for nausea and vomiting.   Neurological:  Positive for sensory change. Negative for loss of balance, numbness and paresthesias.   Psychiatric/Behavioral:  Negative for altered mental status.            Objective:      Physical Exam  Skin:     General: Skin is warm.      Capillary Refill: Capillary refill takes 2 to 3 seconds.      Comments: Ulceration  Location: right hallux  Measurements: 1.5x 1.5x 0.2cm  Drainage: serous  Wound base: granular  SOI: mild erythema                       Ulceration  Location: left hallux  Measurements: 2.0x 2.0x 0.2cm  Drainage: serosangious  Wound base: fibrogranular  SOI: erythema        Assessment:       Encounter Diagnosis   Name Primary?    Ulcer of toe of left foot, unspecified ulcer stage Yes         Plan:       Stevie was seen today for wound care.    Diagnoses and all orders for this visit:    Ulcer of toe of left foot, unspecified ulcer stage  -     X-Ray Foot Complete Left; Future      I counseled the patient on his conditions, their implications and medical management.    X-rays taken and discussed with pt, left hallux shows signs of osteo  Will send to ED for futher workup. MRI, IV abx, bone bx    I spent a  total of 35 minutes on the day of the visit.This includes face to face time and non-face to face time preparing to see the patient (eg, review of tests), obtaining and/or reviewing separately obtained history, documenting clinical information in the electronic or other health record, independently interpreting results and communicating results to the patient/family/caregiver, or care coordinator.

## 2025-05-06 LAB
ABSOLUTE EOSINOPHIL (OHS): 0.08 K/UL
ABSOLUTE MONOCYTE (OHS): 1.02 K/UL (ref 0.3–1)
ABSOLUTE NEUTROPHIL COUNT (OHS): 11.17 K/UL (ref 1.8–7.7)
ANION GAP (OHS): 8 MMOL/L (ref 8–16)
BASOPHILS # BLD AUTO: 0.06 K/UL
BASOPHILS NFR BLD AUTO: 0.4 %
BUN SERPL-MCNC: 14 MG/DL (ref 8–23)
CALCIUM SERPL-MCNC: 9.1 MG/DL (ref 8.7–10.5)
CHLORIDE SERPL-SCNC: 103 MMOL/L (ref 95–110)
CO2 SERPL-SCNC: 26 MMOL/L (ref 23–29)
CREAT SERPL-MCNC: 0.8 MG/DL (ref 0.5–1.4)
ERYTHROCYTE [DISTWIDTH] IN BLOOD BY AUTOMATED COUNT: 12.2 % (ref 11.5–14.5)
FUNGUS SPEC CULT: NORMAL
GFR SERPLBLD CREATININE-BSD FMLA CKD-EPI: >60 ML/MIN/1.73/M2
GLUCOSE SERPL-MCNC: 105 MG/DL (ref 70–110)
GRAM STN SPEC: NORMAL
GRAM STN SPEC: NORMAL
HCT VFR BLD AUTO: 37.9 % (ref 40–54)
HGB BLD-MCNC: 12.7 GM/DL (ref 14–18)
IMM GRANULOCYTES # BLD AUTO: 0.08 K/UL (ref 0–0.04)
IMM GRANULOCYTES NFR BLD AUTO: 0.6 % (ref 0–0.5)
LYMPHOCYTES # BLD AUTO: 1.69 K/UL (ref 1–4.8)
MAGNESIUM SERPL-MCNC: 1.7 MG/DL (ref 1.6–2.6)
MCH RBC QN AUTO: 32.4 PG (ref 27–31)
MCHC RBC AUTO-ENTMCNC: 33.5 G/DL (ref 32–36)
MCV RBC AUTO: 97 FL (ref 82–98)
NUCLEATED RBC (/100WBC) (OHS): 0 /100 WBC
PHOSPHATE SERPL-MCNC: 3.4 MG/DL (ref 2.7–4.5)
PLATELET # BLD AUTO: 309 K/UL (ref 150–450)
PMV BLD AUTO: 9.6 FL (ref 9.2–12.9)
POTASSIUM SERPL-SCNC: 3.4 MMOL/L (ref 3.5–5.1)
RBC # BLD AUTO: 3.92 M/UL (ref 4.6–6.2)
RELATIVE EOSINOPHIL (OHS): 0.6 %
RELATIVE LYMPHOCYTE (OHS): 12 % (ref 18–48)
RELATIVE MONOCYTE (OHS): 7.2 % (ref 4–15)
RELATIVE NEUTROPHIL (OHS): 79.2 % (ref 38–73)
SODIUM SERPL-SCNC: 137 MMOL/L (ref 136–145)
WBC # BLD AUTO: 14.1 K/UL (ref 3.9–12.7)

## 2025-05-06 PROCEDURE — 25000003 PHARM REV CODE 250: Mod: HCNC

## 2025-05-06 PROCEDURE — 87102 FUNGUS ISOLATION CULTURE: CPT | Mod: HCNC

## 2025-05-06 PROCEDURE — 88307 TISSUE EXAM BY PATHOLOGIST: CPT | Mod: TC,HCNC

## 2025-05-06 PROCEDURE — 0QBR3ZX EXCISION OF LEFT TOE PHALANX, PERCUTANEOUS APPROACH, DIAGNOSTIC: ICD-10-PCS | Performed by: PODIATRIST

## 2025-05-06 PROCEDURE — 88307 TISSUE EXAM BY PATHOLOGIST: CPT | Mod: 26,HCNC,, | Performed by: STUDENT IN AN ORGANIZED HEALTH CARE EDUCATION/TRAINING PROGRAM

## 2025-05-06 PROCEDURE — 85025 COMPLETE CBC W/AUTO DIFF WBC: CPT | Mod: HCNC

## 2025-05-06 PROCEDURE — 63600175 PHARM REV CODE 636 W HCPCS: Mod: HCNC

## 2025-05-06 PROCEDURE — 87070 CULTURE OTHR SPECIMN AEROBIC: CPT | Mod: HCNC

## 2025-05-06 PROCEDURE — 36415 COLL VENOUS BLD VENIPUNCTURE: CPT | Mod: HCNC

## 2025-05-06 PROCEDURE — 84100 ASSAY OF PHOSPHORUS: CPT | Mod: HCNC

## 2025-05-06 PROCEDURE — 87205 SMEAR GRAM STAIN: CPT | Mod: HCNC

## 2025-05-06 PROCEDURE — 88311 DECALCIFY TISSUE: CPT | Mod: 26,HCNC,, | Performed by: STUDENT IN AN ORGANIZED HEALTH CARE EDUCATION/TRAINING PROGRAM

## 2025-05-06 PROCEDURE — 99233 SBSQ HOSP IP/OBS HIGH 50: CPT | Mod: HCNC,,, | Performed by: PODIATRIST

## 2025-05-06 PROCEDURE — 11000001 HC ACUTE MED/SURG PRIVATE ROOM: Mod: HCNC

## 2025-05-06 PROCEDURE — 87116 MYCOBACTERIA CULTURE: CPT | Mod: HCNC

## 2025-05-06 PROCEDURE — 83735 ASSAY OF MAGNESIUM: CPT | Mod: HCNC

## 2025-05-06 PROCEDURE — 63600175 PHARM REV CODE 636 W HCPCS: Mod: HCNC | Performed by: PHYSICIAN ASSISTANT

## 2025-05-06 PROCEDURE — 87075 CULTR BACTERIA EXCEPT BLOOD: CPT | Mod: HCNC

## 2025-05-06 PROCEDURE — 87206 SMEAR FLUORESCENT/ACID STAI: CPT | Mod: HCNC

## 2025-05-06 PROCEDURE — 99223 1ST HOSP IP/OBS HIGH 75: CPT | Mod: HCNC,,, | Performed by: PHYSICIAN ASSISTANT

## 2025-05-06 PROCEDURE — 80048 BASIC METABOLIC PNL TOTAL CA: CPT | Mod: HCNC

## 2025-05-06 RX ORDER — MUPIROCIN 20 MG/G
OINTMENT TOPICAL 2 TIMES DAILY
Status: DISCONTINUED | OUTPATIENT
Start: 2025-05-06 | End: 2025-05-08 | Stop reason: HOSPADM

## 2025-05-06 RX ORDER — CEFTRIAXONE 2 G/1
2 INJECTION, POWDER, FOR SOLUTION INTRAMUSCULAR; INTRAVENOUS
Status: DISCONTINUED | OUTPATIENT
Start: 2025-05-06 | End: 2025-05-08 | Stop reason: HOSPADM

## 2025-05-06 RX ADMIN — ALLOPURINOL 300 MG: 300 TABLET ORAL at 09:05

## 2025-05-06 RX ADMIN — FLECAINIDE ACETATE 100 MG: 50 TABLET ORAL at 09:05

## 2025-05-06 RX ADMIN — CEFTRIAXONE SODIUM 2 G: 2 INJECTION, POWDER, FOR SOLUTION INTRAMUSCULAR; INTRAVENOUS at 09:05

## 2025-05-06 RX ADMIN — PANTOPRAZOLE SODIUM 40 MG: 40 TABLET, DELAYED RELEASE ORAL at 09:05

## 2025-05-06 RX ADMIN — HYDROCHLOROTHIAZIDE 25 MG: 25 TABLET ORAL at 09:05

## 2025-05-06 RX ADMIN — MUPIROCIN: 20 OINTMENT TOPICAL at 09:05

## 2025-05-06 RX ADMIN — VALSARTAN 320 MG: 160 TABLET, FILM COATED ORAL at 09:05

## 2025-05-06 RX ADMIN — VANCOMYCIN HYDROCHLORIDE 1500 MG: 1.5 INJECTION, POWDER, LYOPHILIZED, FOR SOLUTION INTRAVENOUS at 04:05

## 2025-05-06 RX ADMIN — ENOXAPARIN SODIUM 40 MG: 40 INJECTION SUBCUTANEOUS at 04:05

## 2025-05-06 RX ADMIN — ATORVASTATIN CALCIUM 40 MG: 40 TABLET, FILM COATED ORAL at 09:05
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
## 2025-05-06 NOTE — ASSESSMENT & PLAN NOTE
70-year-old male admitted out of concern for bilateral great toe wounds that have progressed.  Wound culture about 2 weeks ago showed MRSA but had no clinical signs of infection per Podiatry at that time.  He had been admitted for pneumonia and given Augmentin.  He was admitted from Podiatry Clinic out of concern for worsening wounds of the toes left greater than right.  MRI is concerning for left great toe osteomyelitis.  Initially on vanc and Zosyn now on vancomycin alone.  Podiatry is consulted and planning on doing bone biopsy.  He denies any abnormal exposure to his toes or water exposure though he has been washing his feet with dial soap.  He has a leukocytosis of unclear etiology and has been afebrile.  ABIs have been done and there is no concern for vascular compromise.    Plan:  Continue vancomycin  And ceftriaxone for Gram-negative coverage until cultures results  Follow up bone cultures  Discussed with ID staff  We will follow

## 2025-05-06 NOTE — NURSING
Patient arrived on unit alert and oriented no complaints of pain or distress at this time. Patient has bilateral great toe ulcers wrapped upon coming to unit. Photos are placed in the chart Podiatry is consulted.

## 2025-05-06 NOTE — PLAN OF CARE
[facility] - Med Surg  Initial Discharge Assessment       Primary Care Provider: Ric Brambila MD    Admission Diagnosis: Chest pain [R07.9]  Ulcer of great toe [L97.509]    Admission Date: 5/5/2025  Expected Discharge Date: 5/9/2025    Transition of Care Barriers: None    Payor: HU[employer] MANAGED MEDICARE / Plan: HU[employer]NA MEDICARE SELECT PARTNER / Product Type: Medicare Advantage /     Extended Emergency Contact Information  Primary Emergency Contact: Mary Kay Villalba  Mobile Phone: [phone]  Relation: Daughter  Preferred language: English   needed? No  Secondary Emergency Contact: ZEB BROWN  Mobile Phone: [phone]  Relation: Sister  Preferred language: English   needed? No    Discharge Plan A: Home  Discharge Plan B: Home      [id number] - [place], [place] - [address]  [address]  [place] [address]  Phone: [phone] Fax: [phone]    [facility] - [place] - [address]  [address]  [place] [address]  Phone: [phone] Fax: [phone]    [facility] - [place] - [address]  [address]  [place] [address]  Phone: [phone] Fax: [phone]      Initial Assessment (most recent)       Adult Discharge Assessment - 05/06/25 1634          Discharge Assessment    Assessment Type Discharge Planning Assessment     Confirmed/corrected address, phone number and insurance Yes     Confirmed Demographics Correct on Facesheet     Source of Information patient     When was your last doctors appointment? --   1-2 weeks ago    Communicated JUANY with patient/caregiver Yes     Reason For Admission toe ulcer     People in Home alone   brother in law resides on 2nd floor    Do you expect to return to your current living situation? Yes     Do you have help at home or someone to help you manage your care at home? No     Prior to hospitilization cognitive status: Alert/Oriented      Current cognitive status: Alert/Oriented     Walking or Climbing Stairs Difficulty no     Dressing/Bathing Difficulty no     Home Layout Able to live on 1st floor     Equipment Currently Used at Home CPAP     Readmission within 30 days? Yes     Patient currently being followed by outpatient case management? No     Do you currently have service(s) that help you manage your care at home? No     Do you take prescription medications? Yes     Do you have prescription coverage? Yes     Coverage [employer] Managed Medicare     Do you have any problems affording any of your prescribed medications? No     Is the patient taking medications as prescribed? yes     Who is going to help you get home at discharge? If Pt unable to drive himself he will ask son to transport him home     How do you get to doctors appointments? car, drives self     Are you on dialysis? No     Do you take coumadin? No     Discharge Plan A Home     Discharge Plan B Home     DME Needed Upon Discharge  none     Discharge Plan discussed with: Patient     Transition of Care Barriers None        Physical Activity    On average, how many days per week do you engage in moderate to strenuous exercise (like a brisk walk)? 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 Not hard at all        Housing Stability    In the last 12 months, was there a time when you were not able to pay the mortgage or rent on time? No     At any time in the past 12 months, were you homeless or living in a shelter (including now)? No        Transportation Needs    In the past 12 months, has lack of transportation kept you from medical appointments or from getting medications? No     In the past 12 months, has lack of transportation kept you from meetings, work, or from getting things needed for daily living? No        Food Insecurity    Within the  past 12 months, you worried that your food would run out before you got the money to buy more. Never true     Within the past 12 months, the food you bought just didn't last and you didn't have money to get more. Never true        Stress    Do you feel stress - tense, restless, nervous, or anxious, or unable to sleep at night because your mind is troubled all the time - these days? Not at all        Social Isolation    How often do you feel lonely or isolated from those around you?  Never        Alcohol Use    Q1: How often do you have a drink containing alcohol? 2-4 times a month   6 beers per weekend    Q2: How many drinks containing alcohol do you have on a typical day when you are drinking? --   6 beers per weekend    Q3: How often do you have six or more drinks on one occasion? --   6 beers per weekend       Utilities    In the past 12 months has the electric, gas, oil, or water company threatened to shut off services in your home? No        Health Literacy    How often do you need to have someone help you when you read instructions, pamphlets, or other written material from your doctor or pharmacy? Never                     Readmission Assessment (most recent)       Readmission Assessment - 05/06/25 8637          Readmission    Was this a planned readmission? No     Why were you hospitalized in the last 30 days? Pt stated he was inpatient approx 2 weeks ago for toe ulcer     Why were you readmitted? Told by provider to go to hospital     When you left the hospital how did you feel? Fine     When you left the hospital where did you go? Home Alone     Did patient/caregiver refused recommended DC plan? No     When did you start not feeling well? Pt stated he was feeling fine but was sent to ED during 5/5 follow-up podiatry appointment and then admitted.     Did you try to manage your symptoms your self? No   Pt did not report symptoms    Did you call anyone? No     Why? Pt was not experiencing symptoms prior to  podiatry appointment     Did you try to see or did see a doctor or nurse before you came? Yes     Were you seen? Yes   Podiatrist sent Pt to ED    Did you have  a follow-up appointment on discharge? Yes     Did you go? Yes   sent to ED from follow-up podiatry  appointment                     SW completed initial assessment with Pt.  Pt reports he was inpatient 2 weeks ago for toe ulcer.  Explained that he went for routine podiatry appt 5/5 and was sent to ED by provided that date and then admitted.    Pt will drive self home upon discharge if able or have son transport if unable.  .  Verified Pt's PCP: Ric Brambila MD.    Last seen 1-2 weeks ago.   Discharge plan booklet provided.    Pt lives on ground floor of 2 story home.    Independent with ADLs and mobility. Pt has good family support including brother in law upstairs and son across the street.  Has CPAP machine.    Pt does not receive dialysis or Coumadin.     Discharge Plan A and Plan B have been determined by review of patient's clinical status, future medical and therapeutic needs, and coverage/benefits for post-acute care in coordination with multidisciplinary team members.       Woo Perera, [facility]    [phone]

## 2025-05-06 NOTE — PROGRESS NOTES
[facility] Medicine  Progress Note    Patient Name: Stevie Villalba  MRN: [id number]  Patient Class: IP- Inpatient   Admission Date: 5/5/2025  Length of Stay: 1 days  Attending Physician: Gurinder Mendoza DO  Primary Care Provider: Ric Brambila MD        Subjective     Principal Problem:Bilateral great toes ulcers        HPI:  Stevie Villalba is a 70 y.o. male with afib on flecainide (no longer on eliquis, unclear why), hypertension, HLD, gout and chronic back pain being admitted to hospital for bilateral great toe ulcers. Patient reports the ulcers initially started after he went to a nail salon for a pedicure where they scraped off both great toe callouses causing wounds. As the wounds started to heal he continued to pull of the dead/healing skin because he thought that would help. He has been washing the ulcers by scrubbing them with water and soap while in the shower. Initially the ulcers were starting to improve but now have worsened redness and swelling over the past few days. He was seen in podiatry clinic today and was advised to come to the ED for further workup and treatment. He denies any pain or purulent drainage, no fever, chills, abdominal pain, N/V/D, chest pain or shortness of breath. He does not smoke or drink.      In ED: AFVSS. CBC with leukocytosis if 13.78. CMP unremarkable. ESR 36, CRP 6.2. Bilateral foot xray reveals soft tissue swelling along the 1st right toe and indolent erosion in the 1st left metatarsal head. Started on IV vanc and zosyn in the ED. Podiatry consulted, recommended admission with MRI to rule out osteo.     Overview/Hospital Course:  Admitted for bilateral great toe wounds with associated SSTI and concern for underlying osteo.  Started on vanc/rocephin.  VIGNESH's showed no flow limiting stenosis.  XR's looked ok.  MR showed possible developing osteo of the left hallux distal phalanx.  ID consulted.  Podiatry following.    Interval History:  Seen and evaluated on  general medical floor  No acute events overnight    Clinical status stable, unchanged  No new complaints    Objective:     Vital Signs (Most Recent):  Temp: 98.2 °F (36.8 °C) (05/06/25 1135)  Pulse: 74 (05/06/25 1135)  Resp: 20 (05/06/25 1135)  BP: (!) 145/78 (05/06/25 1135)  SpO2: 95 % (05/06/25 1135) Vital Signs (24h Range):  Temp:  [98.2 °F (36.8 °C)-99.1 °F (37.3 °C)] 98.2 °F (36.8 °C)  Pulse:  [70-80] 74  Resp:  [18-20] 20  SpO2:  [91 %-99 %] 95 %  BP: (115-145)/(63-78) 145/78     Weight: 98.9 kg (218 lb 0.6 oz)  Body mass index is 31.28 kg/m².    Intake/Output Summary (Last 24 hours) at 5/6/2025 1339  Last data filed at 5/6/2025 0615  Gross per 24 hour   Intake 730 ml   Output 0 ml   Net 730 ml         Physical Exam  Vitals and nursing note reviewed.   Constitutional:       General: He is not in acute distress.     Appearance: He is well-developed. He is not ill-appearing or toxic-appearing.   HENT:      Head: Normocephalic and atraumatic.   Eyes:      Extraocular Movements: Extraocular movements intact.   Cardiovascular:      Rate and Rhythm: Normal rate and regular rhythm.      Heart sounds: Normal heart sounds.   Pulmonary:      Effort: Pulmonary effort is normal. No respiratory distress.   Abdominal:      General: There is no distension.   Musculoskeletal:         General: No tenderness. Normal range of motion.   Feet:      Comments: Bilateral feet wrapped in ace bandages, see media   Skin:     General: Skin is warm and dry.      Findings: No rash.   Neurological:      Mental Status: He is alert and oriented to person, place, and time.   Psychiatric:         Behavior: Behavior normal.         Thought Content: Thought content normal.         Judgment: Judgment normal.               Significant Labs: All pertinent labs within the past 24 hours have been reviewed.    Significant Imaging: I have reviewed all pertinent imaging results/findings within the past 24 hours.      Assessment & Plan  Bilateral great  toes ulcers  Leukocytosis  AFVSS  CBC with leukocytosis of 13  xray bilateral feet reveals soft tissue swelling along the 1st right toe and indolent erosion in the 1st left metatarsal head  VIGNESH's showed no significant stenosis  MR showed Bilateral hallux soft tissue ulcers, more prominent on the left. Left hallux distal phalanx bone marrow edema in the setting of adjacent ulcer suspicious for early osteomyelitis    Cont vanc/rocephin  ID consulted, appreciate recommendations  Podiatry consulted, appreciate recommendations  Dressed with Hydrafera ready foam, gauze, kerlix, Ace per podiatry  WBAT heel emphasis to transfer bilateral. Ambulate in darco shoes    Hypertriglyceridemia  Continue statin    Primary hypertension  Chronic, controlled  Continue home HCTZ and valsartan    Lumbago  Continue home prn pain medications    Paroxysmal atrial fibrillation  Patient has paroxysmal (<7 days) atrial fibrillation. Patient is currently in sinus rhythm. [id number] Score: 1.   Continue home flecainide BID  Previously on eliquis, no longer taking; unclear why    Class 1 obesity with body mass index (BMI) of 31.0 to 31.9 in adult  Body mass index is 31.28 kg/m². Morbid obesity complicates all aspects of disease management from diagnostic modalities to treatment. Weight loss encouraged and health benefits explained to patient  Severe obstructive sleep apnea  CPAP QHS    Gout  Continue home allopurinol    VTE Risk Mitigation (From admission, onward)           Ordered     enoxaparin injection 40 mg  Daily         05/05/25 1525     IP VTE HIGH RISK PATIENT  Once         05/05/25 1525     Place sequential compression device  Until discontinued         05/05/25 1525                    Discharge Planning   JUANY: 5/9/2025     Code Status: Full Code   Medical Readiness for Discharge Date:                            Gurinder Mendoza DO  Department of Hospital Medicine   [facility]

## 2025-05-06 NOTE — PLAN OF CARE
Problem: Adult Inpatient Plan of Care  Goal: Plan of Care Review  Outcome: Not Progressing  Goal: Patient-Specific Goal (Individualized)  Outcome: Not Progressing  Goal: Absence of Hospital-Acquired Illness or Injury  Outcome: Not Progressing  Goal: Optimal Comfort and Wellbeing  Outcome: Not Progressing  Goal: Readiness for Transition of Care  Outcome: Not Progressing     Problem: Infection  Goal: Absence of Infection Signs and Symptoms  Outcome: Not Progressing     Problem: Wound  Goal: Optimal Coping  Outcome: Not Progressing  Goal: Optimal Functional Ability  Outcome: Not Progressing  Goal: Absence of Infection Signs and Symptoms  Outcome: Not Progressing  Goal: Improved Oral Intake  Outcome: Not Progressing  Goal: Optimal Pain Control and Function  Outcome: Not Progressing  Goal: Skin Health and Integrity  Outcome: Not Progressing  Goal: Optimal Wound Healing  Outcome: Not Progressing

## 2025-05-06 NOTE — CARE UPDATE
Unit [facility] Care Support Interaction      I have reviewed the chart of Stevie Villalba who is hospitalized for Bilateral great toes ulcers. The patient is currently located in the following unit: [facility]SU        I have assisted the primary physician in management of the following:      MRSA Decolonization - Mupirocin ordered and CHG ordered    Krysta Madison PA-C  Unit Based [facility]

## 2025-05-06 NOTE — HPI
Stevie Villalba is a 70 y.o. male with afib, hypertension, HLD, gout and chronic back pain being admitted to hospital for bilateral great toe ulcers. Ulcers initially started after he went to a nail salon for a pedicure where they scraped off both great toe callouses causing wounds. As the wounds started to heal he continued to pull of the dead/healing skin because he thought that would help. He has been washing the ulcers by scrubbing them with water and soap while in the shower. Initially the ulcers were starting to improve but now have worsened redness and swelling over the past few days. He was seen in podiatry clinic today and was advised to come to the ED for further workup and treatment.  Of note he was recently admitted and diagnosed with a pneumonia and sent home on Augmentin.  Blood cultures during that admission were negative.  At that admission he was evaluated by Podiatry and a wound culture was done which grew MRSA but the patient was felt not to have clinical signs of toe infection.  Unfortunately his toes have worsened.      Patient was admitted and MRI of the toes was obtained showing:  Bilateral hallux soft tissue ulcers, more prominent on the left.  Left hallux distal phalanx bone marrow edema in the setting of adjacent ulcer suspicious for early osteomyelitis.  He has been afebrile but with a leukocytosis of 13-14.  Initially treated with vanc and Zosyn but now on vancomycin alone.  Podiatry consulted him bone biopsy pending.  ID is consulted for osteomyelitis

## 2025-05-06 NOTE — ASSESSMENT & PLAN NOTE
Patient has paroxysmal (<7 days) atrial fibrillation. Patient is currently in sinus rhythm. [id number] Score: 1.   Continue home flecainide BID  Previously on eliquis, no longer taking; unclear why

## 2025-05-06 NOTE — PROGRESS NOTES
[facility] S[facility]  Podiatry  Progress Note    Patient Name: Stevie Villalba  MRN: [id number]  Admission Date: 5/5/2025  Hospital Length of Stay: 1 days  Attending Physician: Gurinder Mendoza DO  Primary Care Provider: Ric Brambila MD     Subjective:     Interval History:  Patient seen and evaluated bedside by Podiatry and during afternoon rounds.  Family at bedside.  Plan of care was discussed with patient with imaging findings discussed, bone biopsy obtained today of the left hallux distal phalanx.  Patient tolerated well.  Examination otherwise stable.        Scheduled Meds:   allopurinoL  300 mg Oral Daily    atorvastatin  40 mg Oral QHS    enoxparin  40 mg Subcutaneous Daily    flecainide  100 mg Oral Q12H    hydroCHLOROthiazide  25 mg Oral Daily    mupirocin   Nasal BID    pantoprazole  40 mg Oral BID    polyethylene glycol  17 g Oral Daily    valsartan  320 mg Oral Daily    vancomycin (VANCOCIN) IV (PEDS and ADULTS)  15 mg/kg Intravenous Q12H     Continuous Infusions:  PRN Meds:  Current Facility-Administered Medications:     acetaminophen, 650 mg, Oral, Q8H PRN    acetaminophen, 650 mg, Oral, Q4H PRN    albuterol-ipratropium, 3 mL, Nebulization, Q6H PRN    aluminum-magnesium hydroxide-simethicone, 30 mL, Oral, QID PRN    carisoprodoL, 350 mg, Oral, QID PRN    dextrose 50%, 12.5 g, Intravenous, PRN    dextrose 50%, 25 g, Intravenous, PRN    glucagon (human recombinant), 1 mg, Intramuscular, PRN    glucose, 16 g, Oral, PRN    glucose, 24 g, Oral, PRN    HYDROcodone-acetaminophen, 1 tablet, Oral, Q6H PRN    melatonin, 6 mg, Oral, Nightly PRN    naloxone, 0.02 mg, Intravenous, PRN    ondansetron, 8 mg, Oral, Q8H PRN    sodium chloride 0.9%, 10 mL, Intravenous, Q12H PRN    Pharmacy to dose Vancomycin consult, , , Once **AND** vancomycin - pharmacy to dose, , Intravenous, pharmacy to manage frequency    Review of Systems   Constitutional:  Negative for chills and fever.   Cardiovascular:  Positive for leg swelling.    Gastrointestinal:  Negative for nausea and vomiting.   Skin:  Positive for wound.   Neurological:  Negative for numbness.     Objective:     Vital Signs (Most Recent):  Temp: 98.2 °F (36.8 °C) (05/06/25 1624)  Pulse: 69 (05/06/25 1624)  Resp: 18 (05/06/25 1624)  BP: 134/73 (05/06/25 1624)  SpO2: 97 % (05/06/25 1624) Vital Signs (24h Range):  Temp:  [98.2 °F (36.8 °C)-99.1 °F (37.3 °C)] 98.2 °F (36.8 °C)  Pulse:  [69-80] 69  Resp:  [18-20] 18  SpO2:  [91 %-99 %] 97 %  BP: (115-145)/(63-78) 134/73     Weight: 98.9 kg (218 lb 0.6 oz)  Body mass index is 31.28 kg/m².    Foot Exam    General  Orientation: alert and oriented to person, place, and time       Right Foot/Ankle     Inspection and Palpation  Skin Exam: skin changes and ulcer;     Neurovascular  Dorsalis pedis: 1+  Posterior tibial: 1+  Saphenous nerve sensation: diminished  Tibial nerve sensation: diminished  Superficial peroneal nerve sensation: diminished  Deep peroneal nerve sensation: diminished  Sural nerve sensation: diminished    Comments  Right foot: Patient has wound noted to the plantar aspect of the right great toe.  This of subcutaneous tissue.  Fibrogranular tissue noted.  No ascending erythema or swelling noted.  No probe to bone purulence fluctuance or crepitus noted at this time nontender to palpation    Left Foot/Ankle      Inspection and Palpation  Skin Exam: skin changes and ulcer;     Neurovascular  Dorsalis pedis: 1+  Posterior tibial: 1+  Saphenous nerve sensation: diminished  Tibial nerve sensation: diminished  Superficial peroneal nerve sensation: diminished  Deep peroneal nerve sensation: diminished  Sural nerve sensation: diminished    Comments  Patient has wound noted to the plantar aspect of left great toe.  No probe to bone noted.  Fibrogranular tissue noted.  No fluctuance crepitus or purulence noted at this time.  Mild erythema noted.  No other signs of any acute foot infection noted along the foot      Laboratory:  CBC:    Recent Labs   Lab 05/06/25  0534   WBC 14.10*   RBC 3.92*   HGB 12.7*   HCT 37.9*      MCV 97   MCH 32.4*   MCHC 33.5     CMP:   Recent Labs   Lab 05/05/25  1144 05/06/25  0534    105   CALCIUM 9.1 9.1   ALBUMIN 3.4*  --    PROT 7.0  --     137   K 3.9 3.4*   CO2 28 26    103   BUN 17 14   CREATININE 0.8 0.8   ALKPHOS 111  --    ALT 30  --    AST 27  --    BILITOT 1.1*  --      Microbiology Results (last 7 days)       Procedure Component Value Units Date/Time    Afb Culture Stain [[id number]] Collected: 05/06/25 1450    Order Status: Sent Specimen: Bone from Toe, Left Foot Updated: 05/06/25 1501    AFB Culture & Smear [[id number]] Collected: 05/06/25 1450    Order Status: Sent Specimen: Bone from Toe, Left Foot Updated: 05/06/25 1501    Gram stain [[id number]] Collected: 05/06/25 1450    Order Status: Sent Specimen: Bone from Toe, Left Foot Updated: 05/06/25 1501    Fungus culture [[id number]] Collected: 05/06/25 1450    Order Status: Sent Specimen: Bone from Toe, Left Foot Updated: 05/06/25 1501    Culture, Anaerobic [[id number]] Collected: 05/06/25 1451    Order Status: Sent Specimen: Bone from Toe, Left Foot Updated: 05/06/25 1501    Aerobic culture [[id number]] Collected: 05/06/25 1451    Order Status: Sent Specimen: Bone from Toe, Left Foot Updated: 05/06/25 1501          Specimen (24h ago, onward)       Start     Ordered    05/06/25 1439  Specimen to Pathology Podiatry  Once        Comments: Bone, left hallux distal phalanx     References:    Click here for ordering Quick Tip   Question Answer Comment   Service Line: Podiatry    Specimen Source Toe, Left Foot    Procedure Type: Biopsy    Release to patient Immediate        05/06/25 1438                  All pertinent labs reviewed within the last 24 hours.    Diagnostic Results:  I have reviewed all pertinent imaging results/findings within the past 24 hours.    Clinical Findings:  Right  Right  Left  Left                    Assessment/Plan:     Orthopedic  * Bilateral great toes ulcers  70-year-old male with PMH HTN, HLD, obesity, paroxysmal Afib, OA of right knee presents to ED 5/5/25 on referral from Dr. Rodriguez podiatrist to present to the ED for imaging workup, IV antibiotics. Podiatry consulted for management of bilateral great toe wounds, to which patient attests was a pedicure several weeks prior.     Assessment: Bilateral great toe ulcers, depth to subcutaneous. Chronic in nature. XR of left foot was obtained in the clinic setting demonstrating pathological fracture within the IPJ of the hallux proximal phalanx, as well as suspicious osseous destruction.  MRI demonstrating bone marrow edema signal uptake suggestive of early osteomyelitis along the distal phalanx of the left hallux.    Plan:   - Patient seen and evaluated.  Plan of care was discussed with patient.  - Bone biopsy was obtained at bedside of the distal phalanx of the left hallux.  Procedure note below.  Procedure tolerated well. Sent to micro and path  - Tracing cultures  - Dressed with Hydrafera ready foam, gauze, kerlix, Ace.   - WBAT heel emphasis to transfer bilateral. Ambulate in darco shoes.   - Podiatry will follow    Future discharge recs:  - Patient to follow up in Podiatry Clinic outpatient, Podiatry will arrange.  - SNF or HH to apply bandaging as described above, x3/week   - Abx plan per ID  - Patient to only to transfer in short distances to feet with Darco shoe, partial weightbearing to heel  - Patient to keep dressings clean dry and intact  - Patient explained the importance of daily foot checks      Bone biopsy    Date/Time: 5/6/2025 4:43 PM    Performed by: Ted Tai MD  Authorized by: Hollie Rodriguez DPM  Consent: Written consent obtained  Risks and benefits: risks, benefits and alternatives were discussed  Consent given by: patient  Patient understanding: patient states understanding of the procedure being  performed  Patient consent: the patient's understanding of the procedure matches consent given  Imaging studies: imaging studies available  Required items: required blood products, implants, devices, and special equipment available  Patient identity confirmed: verbally with patient  Preparation: Patient was prepped and draped in the usual sterile fashion.  Local anesthesia used: yes    Anesthesia:  Local anesthesia used: yes  Local Anesthetic: lidocaine 2% without epinephrine  Anesthetic total: 10 mL    Sedation:  Patient sedated: no    Patient tolerance: patient tolerated the procedure well with no immediate complications  Comments: 05/06/2025  Procedure: Bone biopsy of the left hallux distal phalanx   Diagnosis: Osteomyelitis  Performing provider: Dr. Ted Tai DPM    Procedure in detail: A time out was performed following WHO surgical safety checklist guidelines. All present are in agreement.     Attention to the left hallux. A local block was performed using 10 mL of 2% lidocaine plain. The site was then prepped and draped in cleanly. A stab incision was made and a jamshidi needle was inserted to extract a small piece of bone. The bone was split and sent for cultures and pathology. Hemostasis was achieved with pressure.             Ted Tai DPM PGY-2  Podiatric Medicine & Surgery  [facility]  Secure Chat Preferred  Pager: [phone]    [facility] - Med Surg

## 2025-05-06 NOTE — ASSESSMENT & PLAN NOTE
70-year-old male with PMH HTN, HLD, obesity, paroxysmal Afib, OA of right knee presents to ED 5/5/25 on referral from Dr. Rodriguez podiatrist to present to the ED for imaging workup, IV antibiotics. Podiatry consulted for management of bilateral great toe wounds, to which patient attests was a pedicure several weeks prior.     Assessment: Bilateral great toe ulcers, depth to subcutaneous. Chronic in nature. XR of left foot was obtained in the clinic setting demonstrating pathological fracture within the IPJ of the hallux proximal phalanx, as well as suspicious osseous destruction.  MRI demonstrating bone marrow edema signal uptake suggestive of early osteomyelitis along the distal phalanx of the left hallux.    Plan:   - Patient seen and evaluated.  Plan of care was discussed with patient.  - Bone biopsy was obtained at bedside of the distal phalanx of the left hallux.  Procedure note below.  Procedure tolerated well. Sent to micro and path  - Tracing cultures  - Dressed with Hydrafera ready foam, gauze, kerlix, Ace.   - WBAT heel emphasis to transfer bilateral. Ambulate in darco shoes.   - Podiatry will follow    Future discharge recs:  - Patient to follow up in Podiatry Clinic outpatient, Podiatry will arrange.  - SNF or HH to apply bandaging as described above, x3/week   - Abx plan per ID  - Patient to only to transfer in short distances to feet with Darco shoe, partial weightbearing to heel  - Patient to keep dressings clean dry and intact  - Patient explained the importance of daily foot checks

## 2025-05-06 NOTE — SUBJECTIVE & OBJECTIVE
Interval History:  Seen and evaluated on general medical floor  No acute events overnight    Clinical status stable, unchanged  No new complaints    Objective:     Vital Signs (Most Recent):  Temp: 98.2 °F (36.8 °C) (05/06/25 1135)  Pulse: 74 (05/06/25 1135)  Resp: 20 (05/06/25 1135)  BP: (!) 145/78 (05/06/25 1135)  SpO2: 95 % (05/06/25 1135) Vital Signs (24h Range):  Temp:  [98.2 °F (36.8 °C)-99.1 °F (37.3 °C)] 98.2 °F (36.8 °C)  Pulse:  [70-80] 74  Resp:  [18-20] 20  SpO2:  [91 %-99 %] 95 %  BP: (115-145)/(63-78) 145/78     Weight: 98.9 kg (218 lb 0.6 oz)  Body mass index is 31.28 kg/m².    Intake/Output Summary (Last 24 hours) at 5/6/2025 1339  Last data filed at 5/6/2025 0615  Gross per 24 hour   Intake 730 ml   Output 0 ml   Net 730 ml         Physical Exam  Vitals and nursing note reviewed.   Constitutional:       General: He is not in acute distress.     Appearance: He is well-developed. He is not ill-appearing or toxic-appearing.   HENT:      Head: Normocephalic and atraumatic.   Eyes:      Extraocular Movements: Extraocular movements intact.   Cardiovascular:      Rate and Rhythm: Normal rate and regular rhythm.      Heart sounds: Normal heart sounds.   Pulmonary:      Effort: Pulmonary effort is normal. No respiratory distress.   Abdominal:      General: There is no distension.   Musculoskeletal:         General: No tenderness. Normal range of motion.   Feet:      Comments: Bilateral feet wrapped in ace bandages, see media   Skin:     General: Skin is warm and dry.      Findings: No rash.   Neurological:      Mental Status: He is alert and oriented to person, place, and time.   Psychiatric:         Behavior: Behavior normal.         Thought Content: Thought content normal.         Judgment: Judgment normal.               Significant Labs: All pertinent labs within the past 24 hours have been reviewed.    Significant Imaging: I have reviewed all pertinent imaging results/findings within the past 24 hours.   Acitretin Pregnancy And Lactation Text: This medication is Pregnancy Category X and should not be given to women who are pregnant or may become pregnant in the future. This medication is excreted in breast milk.

## 2025-05-06 NOTE — ASSESSMENT & PLAN NOTE
AFVSS  CBC with leukocytosis of 13  xray bilateral feet reveals soft tissue swelling along the 1st right toe and indolent erosion in the 1st left metatarsal head  VIGNESH's showed no significant stenosis  MR showed Bilateral hallux soft tissue ulcers, more prominent on the left. Left hallux distal phalanx bone marrow edema in the setting of adjacent ulcer suspicious for early osteomyelitis    Cont vanc/rocephin  ID consulted, appreciate recommendations  Podiatry consulted, appreciate recommendations  Dressed with Hydrafera ready foam, gauze, kerlix, Ace per podiatry  WBAT heel emphasis to transfer bilateral. Ambulate in darco shoes

## 2025-05-06 NOTE — PLAN OF CARE
Pt AAOx4 and achieving adequate pain relief from prescribed meds. Frequent rounds made to assess for safety and discomfort, fall precautions maintained. Bed locked in lowest position. Call bell within reach. See corresponding flowsheets for full documentation. Will continue to monitor.    Problem: Adult Inpatient Plan of Care  Goal: Plan of Care Review  Outcome: Progressing  Goal: Patient-Specific Goal (Individualized)  Outcome: Progressing  Goal: Absence of Hospital-Acquired Illness or Injury  Outcome: Progressing  Goal: Optimal Comfort and Wellbeing  Outcome: Progressing  Goal: Readiness for Transition of Care  Outcome: Progressing

## 2025-05-06 NOTE — SUBJECTIVE & OBJECTIVE
Subjective:     Interval History:  Patient seen and evaluated bedside by Podiatry and during afternoon rounds.  Family at bedside.  Plan of care was discussed with patient with imaging findings discussed, bone biopsy obtained today of the left hallux distal phalanx.  Patient tolerated well.  Examination otherwise stable.        Scheduled Meds:   allopurinoL  300 mg Oral Daily    atorvastatin  40 mg Oral QHS    enoxparin  40 mg Subcutaneous Daily    flecainide  100 mg Oral Q12H    hydroCHLOROthiazide  25 mg Oral Daily    mupirocin   Nasal BID    pantoprazole  40 mg Oral BID    polyethylene glycol  17 g Oral Daily    valsartan  320 mg Oral Daily    vancomycin (VANCOCIN) IV (PEDS and ADULTS)  15 mg/kg Intravenous Q12H     Continuous Infusions:  PRN Meds:  Current Facility-Administered Medications:     acetaminophen, 650 mg, Oral, Q8H PRN    acetaminophen, 650 mg, Oral, Q4H PRN    albuterol-ipratropium, 3 mL, Nebulization, Q6H PRN    aluminum-magnesium hydroxide-simethicone, 30 mL, Oral, QID PRN    carisoprodoL, 350 mg, Oral, QID PRN    dextrose 50%, 12.5 g, Intravenous, PRN    dextrose 50%, 25 g, Intravenous, PRN    glucagon (human recombinant), 1 mg, Intramuscular, PRN    glucose, 16 g, Oral, PRN    glucose, 24 g, Oral, PRN    HYDROcodone-acetaminophen, 1 tablet, Oral, Q6H PRN    melatonin, 6 mg, Oral, Nightly PRN    naloxone, 0.02 mg, Intravenous, PRN    ondansetron, 8 mg, Oral, Q8H PRN    sodium chloride 0.9%, 10 mL, Intravenous, Q12H PRN    Pharmacy to dose Vancomycin consult, , , Once **AND** vancomycin - pharmacy to dose, , Intravenous, pharmacy to manage frequency    Review of Systems   Constitutional:  Negative for chills and fever.   Cardiovascular:  Positive for leg swelling.   Gastrointestinal:  Negative for nausea and vomiting.   Skin:  Positive for wound.   Neurological:  Negative for numbness.     Objective:     Vital Signs (Most Recent):  Temp: 98.2 °F (36.8 °C) (05/06/25 1624)  Pulse: 69 (05/06/25  1624)  Resp: 18 (05/06/25 1624)  BP: 134/73 (05/06/25 1624)  SpO2: 97 % (05/06/25 1624) Vital Signs (24h Range):  Temp:  [98.2 °F (36.8 °C)-99.1 °F (37.3 °C)] 98.2 °F (36.8 °C)  Pulse:  [69-80] 69  Resp:  [18-20] 18  SpO2:  [91 %-99 %] 97 %  BP: (115-145)/(63-78) 134/73     Weight: 98.9 kg (218 lb 0.6 oz)  Body mass index is 31.28 kg/m².    Foot Exam    General  Orientation: alert and oriented to person, place, and time       Right Foot/Ankle     Inspection and Palpation  Skin Exam: skin changes and ulcer;     Neurovascular  Dorsalis pedis: 1+  Posterior tibial: 1+  Saphenous nerve sensation: diminished  Tibial nerve sensation: diminished  Superficial peroneal nerve sensation: diminished  Deep peroneal nerve sensation: diminished  Sural nerve sensation: diminished    Comments  Right foot: Patient has wound noted to the plantar aspect of the right great toe.  This of subcutaneous tissue.  Fibrogranular tissue noted.  No ascending erythema or swelling noted.  No probe to bone purulence fluctuance or crepitus noted at this time nontender to palpation    Left Foot/Ankle      Inspection and Palpation  Skin Exam: skin changes and ulcer;     Neurovascular  Dorsalis pedis: 1+  Posterior tibial: 1+  Saphenous nerve sensation: diminished  Tibial nerve sensation: diminished  Superficial peroneal nerve sensation: diminished  Deep peroneal nerve sensation: diminished  Sural nerve sensation: diminished    Comments  Patient has wound noted to the plantar aspect of left great toe.  No probe to bone noted.  Fibrogranular tissue noted.  No fluctuance crepitus or purulence noted at this time.  Mild erythema noted.  No other signs of any acute foot infection noted along the foot      Laboratory:  CBC:   Recent Labs   Lab 05/06/25  0534   WBC 14.10*   RBC 3.92*   HGB 12.7*   HCT 37.9*      MCV 97   MCH 32.4*   MCHC 33.5     CMP:   Recent Labs   Lab 05/05/25  1144 05/06/25  0534    105   CALCIUM 9.1 9.1   ALBUMIN 3.4*  --     PROT 7.0  --     137   K 3.9 3.4*   CO2 28 26    103   BUN 17 14   CREATININE 0.8 0.8   ALKPHOS 111  --    ALT 30  --    AST 27  --    BILITOT 1.1*  --      Microbiology Results (last 7 days)       Procedure Component Value Units Date/Time    Afb Culture Stain [[id number]] Collected: 05/06/25 1450    Order Status: Sent Specimen: Bone from Toe, Left Foot Updated: 05/06/25 1501    AFB Culture & Smear [[id number]] Collected: 05/06/25 1450    Order Status: Sent Specimen: Bone from Toe, Left Foot Updated: 05/06/25 1501    Gram stain [[id number]] Collected: 05/06/25 1450    Order Status: Sent Specimen: Bone from Toe, Left Foot Updated: 05/06/25 1501    Fungus culture [[id number]] Collected: 05/06/25 1450    Order Status: Sent Specimen: Bone from Toe, Left Foot Updated: 05/06/25 1501    Culture, Anaerobic [[id number]] Collected: 05/06/25 1451    Order Status: Sent Specimen: Bone from Toe, Left Foot Updated: 05/06/25 1501    Aerobic culture [[id number]] Collected: 05/06/25 1451    Order Status: Sent Specimen: Bone from Toe, Left Foot Updated: 05/06/25 1501          Specimen (24h ago, onward)       Start     Ordered    05/06/25 1439  Specimen to Pathology Podiatry  Once        Comments: Bone, left hallux distal phalanx     References:    Click here for ordering Quick Tip   Question Answer Comment   Service Line: Podiatry    Specimen Source Toe, Left Foot    Procedure Type: Biopsy    Release to patient Immediate        05/06/25 1438                  All pertinent labs reviewed within the last 24 hours.    Diagnostic Results:  I have reviewed all pertinent imaging results/findings within the past 24 hours.    Clinical Findings:  Right  Right  Left  Left
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
## 2025-05-06 NOTE — HOSPITAL COURSE
Admitted for bilateral great toe wounds with associated SSTI and concern for underlying osteo.  Started on vanc/rocephin.  VIGNESH's showed no flow limiting stenosis.  XR's looked ok.  MR showed possible developing osteo of the left hallux distal phalanx.  ID consulted.  Podiatry following.  Bone bx and culture pending.  Bone culture resulted negative for osteo.  ID recommended discharging on oral doxy for total 14 days for SSTI.  Pt deemed appropriate for discharge; seen and examined prior to departure.  Plan discussed with pt, who was agreeable and amenable; medications were discussed and reviewed, outpatient follow-up scheduled, ER precautions were given, all questions were answered to the pt's satisfaction, and was subsequently discharged.

## 2025-05-07 PROBLEM — M86.9 OSTEOMYELITIS OF GREAT TOE OF LEFT FOOT: Status: ACTIVE | Noted: 2025-05-07

## 2025-05-07 LAB
ABSOLUTE EOSINOPHIL (OHS): 0.13 K/UL
ABSOLUTE MONOCYTE (OHS): 0.97 K/UL (ref 0.3–1)
ABSOLUTE NEUTROPHIL COUNT (OHS): 8.39 K/UL (ref 1.8–7.7)
ACID FAST MOD KINY STN SPEC: NORMAL
ANION GAP (OHS): 9 MMOL/L (ref 8–16)
BASOPHILS # BLD AUTO: 0.07 K/UL
BASOPHILS NFR BLD AUTO: 0.6 %
BUN SERPL-MCNC: 13 MG/DL (ref 8–23)
CALCIUM SERPL-MCNC: 8.9 MG/DL (ref 8.7–10.5)
CHLORIDE SERPL-SCNC: 104 MMOL/L (ref 95–110)
CO2 SERPL-SCNC: 26 MMOL/L (ref 23–29)
CREAT SERPL-MCNC: 0.8 MG/DL (ref 0.5–1.4)
ERYTHROCYTE [DISTWIDTH] IN BLOOD BY AUTOMATED COUNT: 12.2 % (ref 11.5–14.5)
ESTROGEN SERPL-MCNC: NORMAL PG/ML
GFR SERPLBLD CREATININE-BSD FMLA CKD-EPI: >60 ML/MIN/1.73/M2
GLUCOSE SERPL-MCNC: 103 MG/DL (ref 70–110)
HCT VFR BLD AUTO: 36.9 % (ref 40–54)
HGB BLD-MCNC: 12.2 GM/DL (ref 14–18)
IMM GRANULOCYTES # BLD AUTO: 0.08 K/UL (ref 0–0.04)
IMM GRANULOCYTES NFR BLD AUTO: 0.7 % (ref 0–0.5)
INSULIN SERPL-ACNC: NORMAL U[IU]/ML
LAB AP CLINICAL INFORMATION: NORMAL
LAB AP GROSS DESCRIPTION: NORMAL
LAB AP PERFORMING LOCATION(S): NORMAL
LAB AP REPORT FOOTNOTES: NORMAL
LYMPHOCYTES # BLD AUTO: 2.48 K/UL (ref 1–4.8)
MAGNESIUM SERPL-MCNC: 1.7 MG/DL (ref 1.6–2.6)
MCH RBC QN AUTO: 32.2 PG (ref 27–31)
MCHC RBC AUTO-ENTMCNC: 33.1 G/DL (ref 32–36)
MCV RBC AUTO: 97 FL (ref 82–98)
NUCLEATED RBC (/100WBC) (OHS): 0 /100 WBC
PHOSPHATE SERPL-MCNC: 3.5 MG/DL (ref 2.7–4.5)
PLATELET # BLD AUTO: 297 K/UL (ref 150–450)
PMV BLD AUTO: 9.4 FL (ref 9.2–12.9)
POTASSIUM SERPL-SCNC: 3.7 MMOL/L (ref 3.5–5.1)
RBC # BLD AUTO: 3.79 M/UL (ref 4.6–6.2)
RELATIVE EOSINOPHIL (OHS): 1.1 %
RELATIVE LYMPHOCYTE (OHS): 20.5 % (ref 18–48)
RELATIVE MONOCYTE (OHS): 8 % (ref 4–15)
RELATIVE NEUTROPHIL (OHS): 69.1 % (ref 38–73)
SODIUM SERPL-SCNC: 139 MMOL/L (ref 136–145)
T3RU NFR SERPL: NORMAL %
VANCOMYCIN TROUGH SERPL-MCNC: 14.6 UG/ML (ref 10–22)
WBC # BLD AUTO: 12.12 K/UL (ref 3.9–12.7)

## 2025-05-07 PROCEDURE — 11000001 HC ACUTE MED/SURG PRIVATE ROOM: Mod: HCNC

## 2025-05-07 PROCEDURE — 94761 N-INVAS EAR/PLS OXIMETRY MLT: CPT | Mod: HCNC

## 2025-05-07 PROCEDURE — 63600175 PHARM REV CODE 636 W HCPCS: Mod: HCNC

## 2025-05-07 PROCEDURE — 63600175 PHARM REV CODE 636 W HCPCS: Mod: HCNC | Performed by: PHYSICIAN ASSISTANT

## 2025-05-07 PROCEDURE — 94660 CPAP INITIATION&MGMT: CPT | Mod: HCNC

## 2025-05-07 PROCEDURE — 36415 COLL VENOUS BLD VENIPUNCTURE: CPT | Mod: HCNC

## 2025-05-07 PROCEDURE — 25000003 PHARM REV CODE 250: Mod: HCNC

## 2025-05-07 PROCEDURE — 83735 ASSAY OF MAGNESIUM: CPT | Mod: HCNC

## 2025-05-07 PROCEDURE — 84100 ASSAY OF PHOSPHORUS: CPT | Mod: HCNC

## 2025-05-07 PROCEDURE — 80202 ASSAY OF VANCOMYCIN: CPT | Mod: HCNC

## 2025-05-07 PROCEDURE — 99232 SBSQ HOSP IP/OBS MODERATE 35: CPT | Mod: HCNC,,, | Performed by: PHYSICIAN ASSISTANT

## 2025-05-07 PROCEDURE — 80048 BASIC METABOLIC PNL TOTAL CA: CPT | Mod: HCNC

## 2025-05-07 PROCEDURE — 85025 COMPLETE CBC W/AUTO DIFF WBC: CPT | Mod: HCNC

## 2025-05-07 PROCEDURE — 99900035 HC TECH TIME PER 15 MIN (STAT): Mod: HCNC

## 2025-05-07 RX ADMIN — VANCOMYCIN HYDROCHLORIDE 1500 MG: 1.5 INJECTION, POWDER, LYOPHILIZED, FOR SOLUTION INTRAVENOUS at 04:05

## 2025-05-07 RX ADMIN — CEFTRIAXONE SODIUM 2 G: 2 INJECTION, POWDER, FOR SOLUTION INTRAMUSCULAR; INTRAVENOUS at 08:05

## 2025-05-07 RX ADMIN — ENOXAPARIN SODIUM 40 MG: 40 INJECTION SUBCUTANEOUS at 04:05

## 2025-05-07 RX ADMIN — FLECAINIDE ACETATE 100 MG: 50 TABLET ORAL at 08:05

## 2025-05-07 RX ADMIN — ALLOPURINOL 300 MG: 300 TABLET ORAL at 08:05

## 2025-05-07 RX ADMIN — ATORVASTATIN CALCIUM 40 MG: 40 TABLET, FILM COATED ORAL at 08:05

## 2025-05-07 RX ADMIN — MUPIROCIN: 20 OINTMENT TOPICAL at 08:05

## 2025-05-07 RX ADMIN — PANTOPRAZOLE SODIUM 40 MG: 40 TABLET, DELAYED RELEASE ORAL at 08:05

## 2025-05-07 RX ADMIN — VALSARTAN 320 MG: 160 TABLET, FILM COATED ORAL at 08:05

## 2025-05-07 RX ADMIN — HYDROCHLOROTHIAZIDE 25 MG: 25 TABLET ORAL at 08:05

## 2025-05-07 NOTE — ASSESSMENT & PLAN NOTE
Patient has paroxysmal (<7 days) atrial fibrillation. Patient is currently in sinus rhythm. [id number] Score: 1.   Continue home flecainide BID  Previously on eliquis, no longer taking; unclear why

## 2025-05-07 NOTE — ASSESSMENT & PLAN NOTE
Source unclear but may be due to toe infection - now normalized  Continue antibiotics  We will trend

## 2025-05-07 NOTE — CARE UPDATE
I have reviewed the chart of Stevie Villalba who is hospitalized for the following:    Active Hospital Problems    Diagnosis    *Osteomyelitis of great toe of left foot    Leukocytosis    Hypokalemia    Bilateral great toes ulcers    Gout    Severe obstructive sleep apnea    Class 1 obesity with body mass index (BMI) of 31.0 to 31.9 in adult    Paroxysmal atrial fibrillation    Primary hypertension    Hypertriglyceridemia    Delores Madison PA-C  [facility]

## 2025-05-07 NOTE — CARE UPDATE
05/06/25 2345   PRE-TX-O2   SpO2 (!) 93 %   Pulse 71   Resp 18   Temp 98 °F (36.7 °C)   /77   Positioning   Body Position position changed independently   Head of Bed (HOB) Positioning HOB elevated   General Safety Checklist   Safety Promotion/Fall Prevention side rails raised   Airway Safety   Is Ambu Bag and Mask with Patient? Yes, Adult Ambu Bag and Mask   Suction set is at the bedside? Yes

## 2025-05-07 NOTE — SUBJECTIVE & OBJECTIVE
Interval History:  Seen and evaluated on general medical floor  No acute events overnight    Clinical status stable, unchanged  No new complaints    Objective:     Vital Signs (Most Recent):  Temp: 98 °F (36.7 °C) (05/07/25 1113)  Pulse: 66 (05/07/25 1113)  Resp: 18 (05/07/25 1113)  BP: 112/70 (05/07/25 1113)  SpO2: (!) 94 % (05/07/25 1113) Vital Signs (24h Range):  Temp:  [97.8 °F (36.6 °C)-98.2 °F (36.8 °C)] 98 °F (36.7 °C)  Pulse:  [66-73] 66  Resp:  [18] 18  SpO2:  [91 %-97 %] 94 %  BP: (106-136)/(62-84) 112/70     Weight: 98.9 kg (218 lb 0.6 oz)  Body mass index is 31.28 kg/m².    Intake/Output Summary (Last 24 hours) at 5/7/2025 1202  Last data filed at 5/6/2025 2000  Gross per 24 hour   Intake 120 ml   Output --   Net 120 ml         Physical Exam  Vitals and nursing note reviewed.   Constitutional:       General: He is not in acute distress.     Appearance: He is well-developed. He is not ill-appearing or toxic-appearing.   HENT:      Head: Normocephalic and atraumatic.   Eyes:      Extraocular Movements: Extraocular movements intact.   Cardiovascular:      Rate and Rhythm: Normal rate and regular rhythm.      Heart sounds: Normal heart sounds.   Pulmonary:      Effort: Pulmonary effort is normal. No respiratory distress.   Abdominal:      General: There is no distension.   Musculoskeletal:         General: No tenderness. Normal range of motion.   Feet:      Comments: Bilateral feet wrapped in ace bandages, see media   Skin:     General: Skin is warm and dry.      Findings: No rash.   Neurological:      Mental Status: He is alert and oriented to person, place, and time.   Psychiatric:         Behavior: Behavior normal.         Thought Content: Thought content normal.         Judgment: Judgment normal.               Significant Labs: All pertinent labs within the past 24 hours have been reviewed.    Significant Imaging: I have reviewed all pertinent imaging results/findings within the past 24 hours.

## 2025-05-07 NOTE — ASSESSMENT & PLAN NOTE
70-year-old male admitted out of concern for bilateral great toe wounds that have progressed.  Wound culture about 2 weeks ago showed MRSA but had no clinical signs of infection per Podiatry at that time.  He had been admitted for pneumonia and given Augmentin.  He was admitted from Podiatry Clinic out of concern for worsening wounds of the toes left greater than right.  MRI is concerning for left great toe osteomyelitis.  Initially on vanc and Zosyn now on vancomycin alone.  Podiatry is consulted and planning on doing bone biopsy.  He denies any abnormal exposure to his toes or water exposure though he has been washing his feet with dial soap.  He has a leukocytosis of unclear etiology and has been afebrile.  ABIs have been done and there is no concern for vascular compromise.    Bone biopsy done then culture pending.  Afebrile and white blood cell count has normalized.  CRP initially low at 6.2    Plan:  Continue vancomycin  Continue ceftriaxone for Gram-negative coverage until cultures results  Follow up bone cultures  Discussed with ID staff  We will follow

## 2025-05-07 NOTE — CARE UPDATE
05/07/25 0405   Patient Assessment/Suction   Level of Consciousness (AVPU) alert   Respiratory Effort Normal;Unlabored   Expansion/Accessory Muscles/Retractions no use of accessory muscles;no retractions   All Lung Fields Breath Sounds Anterior:;diminished   Rhythm/Pattern, Respiratory unlabored;pattern regular;depth regular;no shortness of breath reported   Cough Frequency infrequent   Cough Type no productive sputum   PRE-TX-O2   Device (Oxygen Therapy) room air   SpO2 (!) 93 %   Pulse Oximetry Type Intermittent   $ Pulse Oximetry - Multiple Charge Pulse Oximetry - Multiple   Preset CPAP/BiPAP Settings   Mode Of Delivery CPAP   CPAP/BIPAP charged w/in last 24 h NO   $ Initial CPAP/BiPAP Setup? No   $ Is patient using? No/refused   Reason patient is not wearing? Patient refused   Who was contacted if refused? Other (comment)  (Issa Gavin)   Respiratory Evaluation   $ Care Plan Tech Time 15 min

## 2025-05-07 NOTE — PROGRESS NOTES
[facility] Medicine  Progress Note    Patient Name: Stevie Villalba  MRN: [id number]  Patient Class: IP- Inpatient   Admission Date: 5/5/2025  Length of Stay: 2 days  Attending Physician: Gurinder Mendoza DO  Primary Care Provider: Ric Brambila MD        Subjective     Principal Problem:Osteomyelitis of great toe of left foot        HPI:  Stevie Villalba is a 70 y.o. male with afib on flecainide (no longer on eliquis, unclear why), hypertension, HLD, gout and chronic back pain being admitted to hospital for bilateral great toe ulcers. Patient reports the ulcers initially started after he went to a nail salon for a pedicure where they scraped off both great toe callouses causing wounds. As the wounds started to heal he continued to pull of the dead/healing skin because he thought that would help. He has been washing the ulcers by scrubbing them with water and soap while in the shower. Initially the ulcers were starting to improve but now have worsened redness and swelling over the past few days. He was seen in podiatry clinic today and was advised to come to the ED for further workup and treatment. He denies any pain or purulent drainage, no fever, chills, abdominal pain, N/V/D, chest pain or shortness of breath. He does not smoke or drink.      In ED: AFVSS. CBC with leukocytosis if 13.78. CMP unremarkable. ESR 36, CRP 6.2. Bilateral foot xray reveals soft tissue swelling along the 1st right toe and indolent erosion in the 1st left metatarsal head. Started on IV vanc and zosyn in the ED. Podiatry consulted, recommended admission with MRI to rule out osteo.     Overview/Hospital Course:  Admitted for bilateral great toe wounds with associated SSTI and concern for underlying osteo.  Started on vanc/rocephin.  VIGNESH's showed no flow limiting stenosis.  XR's looked ok.  MR showed possible developing osteo of the left hallux distal phalanx.  ID consulted.  Podiatry following.  Bone bx and culture  pending.    Interval History:  Seen and evaluated on general medical floor  No acute events overnight    Clinical status stable, unchanged  No new complaints    Objective:     Vital Signs (Most Recent):  Temp: 98 °F (36.7 °C) (05/07/25 1113)  Pulse: 66 (05/07/25 1113)  Resp: 18 (05/07/25 1113)  BP: 112/70 (05/07/25 1113)  SpO2: (!) 94 % (05/07/25 1113) Vital Signs (24h Range):  Temp:  [97.8 °F (36.6 °C)-98.2 °F (36.8 °C)] 98 °F (36.7 °C)  Pulse:  [66-73] 66  Resp:  [18] 18  SpO2:  [91 %-97 %] 94 %  BP: (106-136)/(62-84) 112/70     Weight: 98.9 kg (218 lb 0.6 oz)  Body mass index is 31.28 kg/m².    Intake/Output Summary (Last 24 hours) at 5/7/2025 1202  Last data filed at 5/6/2025 2000  Gross per 24 hour   Intake 120 ml   Output --   Net 120 ml         Physical Exam  Vitals and nursing note reviewed.   Constitutional:       General: He is not in acute distress.     Appearance: He is well-developed. He is not ill-appearing or toxic-appearing.   HENT:      Head: Normocephalic and atraumatic.   Eyes:      Extraocular Movements: Extraocular movements intact.   Cardiovascular:      Rate and Rhythm: Normal rate and regular rhythm.      Heart sounds: Normal heart sounds.   Pulmonary:      Effort: Pulmonary effort is normal. No respiratory distress.   Abdominal:      General: There is no distension.   Musculoskeletal:         General: No tenderness. Normal range of motion.   Feet:      Comments: Bilateral feet wrapped in ace bandages, see media   Skin:     General: Skin is warm and dry.      Findings: No rash.   Neurological:      Mental Status: He is alert and oriented to person, place, and time.   Psychiatric:         Behavior: Behavior normal.         Thought Content: Thought content normal.         Judgment: Judgment normal.               Significant Labs: All pertinent labs within the past 24 hours have been reviewed.    Significant Imaging: I have reviewed all pertinent imaging results/findings within the past 24  hours.      Assessment & Plan  Osteomyelitis of great toe of left foot  Bilateral great toes ulcers  Leukocytosis  AFVSS  CBC with leukocytosis of 13; resolved  xray bilateral feet reveals soft tissue swelling along the 1st right toe and indolent erosion in the 1st left metatarsal head  VIGNESH's showed no significant stenosis  MR showed Bilateral hallux soft tissue ulcers, more prominent on the left. Left hallux distal phalanx bone marrow edema in the setting of adjacent ulcer suspicious for early osteomyelitis    Cont vanc/rocephin  ID consulted, appreciate recommendations  Podiatry consulted, appreciate recommendations  Dressed with Hydrafera ready foam, gauze, kerlix, Ace per podiatry  WBAT heel emphasis to transfer bilateral. Ambulate in darco shoes  Bone bx and culture performed per podiatry; results pending    Hypertriglyceridemia  Continue statin    Primary hypertension  Chronic, controlled  Continue home HCTZ and valsartan    Lumbago  Continue home prn pain medications    Paroxysmal atrial fibrillation  Patient has paroxysmal (<7 days) atrial fibrillation. Patient is currently in sinus rhythm. [id number] Score: 1.   Continue home flecainide BID  Previously on eliquis, no longer taking; unclear why    Class 1 obesity with body mass index (BMI) of 31.0 to 31.9 in adult  Body mass index is 31.28 kg/m². Morbid obesity complicates all aspects of disease management from diagnostic modalities to treatment. Weight loss encouraged and health benefits explained to patient  Severe obstructive sleep apnea  CPAP QHS    Gout  Continue home allopurinol    Hypokalemia  Resolved    VTE Risk Mitigation (From admission, onward)           Ordered     enoxaparin injection 40 mg  Daily         05/05/25 1525     IP VTE HIGH RISK PATIENT  Once         05/05/25 1525     Place sequential compression device  Until discontinued         05/05/25 1525                    Discharge Planning   JUANY: 5/9/2025     Code Status: Full Code   Medical  Readiness for Discharge Date:   Discharge Plan A: Home                        Gurinder Mendoza DO  Department of Hospital Medicine   [facility]

## 2025-05-07 NOTE — ASSESSMENT & PLAN NOTE
AFVSS  CBC with leukocytosis of 13; resolved  xray bilateral feet reveals soft tissue swelling along the 1st right toe and indolent erosion in the 1st left metatarsal head  VIGNESH's showed no significant stenosis  MR showed Bilateral hallux soft tissue ulcers, more prominent on the left. Left hallux distal phalanx bone marrow edema in the setting of adjacent ulcer suspicious for early osteomyelitis    Cont vanc/rocephin  ID consulted, appreciate recommendations  Podiatry consulted, appreciate recommendations  Dressed with Hydrafera ready foam, gauze, kerlix, Ace per podiatry  WBAT heel emphasis to transfer bilateral. Ambulate in darco shoes  Bone bx and culture performed per podiatry; results pending

## 2025-05-07 NOTE — PROGRESS NOTES
[facility] Surg  Infectious Disease  Progress Note    Patient Name: Stevie Villalba  MRN: [id number]  Admission Date: 5/5/2025  Length of Stay: 2 days  Attending Physician: Gurinder Mendoza DO  Primary Care Provider: Ric Brambila MD    Isolation Status: No active isolations  Assessment/Plan:      Oncology  Leukocytosis  Source unclear but may be due to toe infection - now normalized  Continue antibiotics  We will trend    Orthopedic  Bilateral great toes ulcers  70-year-old male admitted out of concern for bilateral great toe wounds that have progressed.  Wound culture about 2 weeks ago showed MRSA but had no clinical signs of infection per Podiatry at that time.  He had been admitted for pneumonia and given Augmentin.  He was admitted from Podiatry Clinic out of concern for worsening wounds of the toes left greater than right.  MRI is concerning for left great toe osteomyelitis.  Initially on vanc and Zosyn now on vancomycin alone.  Podiatry is consulted and planning on doing bone biopsy.  He denies any abnormal exposure to his toes or water exposure though he has been washing his feet with dial soap.  He has a leukocytosis of unclear etiology and has been afebrile.  ABIs have been done and there is no concern for vascular compromise.    Bone biopsy done then culture pending.  Afebrile and white blood cell count has normalized.  CRP initially low at 6.2    Plan:  Continue vancomycin  Continue ceftriaxone for Gram-negative coverage until cultures results  Follow up bone cultures  Discussed with ID staff  We will follow            Anticipated Disposition: tbd    Thank you for your consult. I will follow-up with patient. Please contact us if you have any additional questions.    ROSANNA Fuller  Infectious Disease  [facility] - Med Surg    Subjective:     Principal Problem:Osteomyelitis of great toe of left foot    HPI: Stevie Villalba is a 70 y.o. male with afib, hypertension, HLD, gout and chronic back pain  being admitted to hospital for bilateral great toe ulcers. Ulcers initially started after he went to a nail salon for a pedicure where they scraped off both great toe callouses causing wounds. As the wounds started to heal he continued to pull of the dead/healing skin because he thought that would help. He has been washing the ulcers by scrubbing them with water and soap while in the shower. Initially the ulcers were starting to improve but now have worsened redness and swelling over the past few days. He was seen in podiatry clinic today and was advised to come to the ED for further workup and treatment.  Of note he was recently admitted and diagnosed with a pneumonia and sent home on Augmentin.  Blood cultures during that admission were negative.  At that admission he was evaluated by Podiatry and a wound culture was done which grew MRSA but the patient was felt not to have clinical signs of toe infection.  Unfortunately his toes have worsened.      Patient was admitted and MRI of the toes was obtained showing:  Bilateral hallux soft tissue ulcers, more prominent on the left.  Left hallux distal phalanx bone marrow edema in the setting of adjacent ulcer suspicious for early osteomyelitis.  He has been afebrile but with a leukocytosis of 13-14.  Initially treated with vanc and Zosyn but now on vancomycin alone.  Podiatry consulted him bone biopsy pending.  ID is consulted for osteomyelitis  Interval History:   No acute events  Afebrile and WBC WNL now from 14  On vanc and ceftriaxone  Bone bx sent 5/6/25  The patient denies any recent fever, chills, or sweats.    Review of Systems   Constitutional:  Negative for chills, diaphoresis and fever.   Respiratory:  Negative for shortness of breath.    Cardiovascular:  Negative for chest pain.   Gastrointestinal:  Negative for abdominal pain, diarrhea, nausea and vomiting.   Genitourinary:  Negative for dysuria and hematuria.   Skin:  Positive for color change and wound.  "    Objective:     Vital Signs (Most Recent):  Temp: 98 °F (36.7 °C) (05/07/25 1113)  Pulse: 66 (05/07/25 1113)  Resp: 18 (05/07/25 1113)  BP: 112/70 (05/07/25 1113)  SpO2: (!) 94 % (05/07/25 1113) Vital Signs (24h Range):  Temp:  [97.8 °F (36.6 °C)-98.2 °F (36.8 °C)] 98 °F (36.7 °C)  Pulse:  [66-74] 66  Resp:  [18-20] 18  SpO2:  [91 %-97 %] 94 %  BP: (106-145)/(62-84) 112/70     Weight: 98.9 kg (218 lb 0.6 oz)  Body mass index is 31.28 kg/m².    Estimated Creatinine Clearance: 102.8 mL/min (based on SCr of 0.8 mg/dL).     Physical Exam  Constitutional:       General: He is not in acute distress.     Appearance: Normal appearance. He is well-developed. He is not ill-appearing, toxic-appearing or diaphoretic.       HENT:      Head: Normocephalic and atraumatic.   Cardiovascular:      Rate and Rhythm: Normal rate and regular rhythm.      Heart sounds: Normal heart sounds. No murmur heard.     No friction rub. No gallop.   Pulmonary:      Effort: Pulmonary effort is normal. No respiratory distress.      Breath sounds: Normal breath sounds. No wheezing or rales.   Abdominal:      General: Bowel sounds are normal. There is no distension.      Palpations: Abdomen is soft. There is no mass.      Tenderness: There is no abdominal tenderness. There is no guarding or rebound.   Skin:     General: Skin is warm and dry.   Neurological:      Mental Status: He is alert and oriented to person, place, and time.   Psychiatric:         Behavior: Behavior normal.     Pics 5/6 below                             Significant Labs: Blood Culture: No results for input(s): "LABBLOO" in the last 4320 hours.  CBC:   Recent Labs   Lab 05/05/25  1144 05/06/25  0534 05/07/25  0256   WBC 13.87* 14.10* 12.12   HGB 14.2 12.7* 12.2*   HCT 41.6 37.9* 36.9*    309 297     CMP:   Recent Labs   Lab 05/05/25  1144 05/06/25  0534 05/07/25  0256    137 139   K 3.9 3.4* 3.7    103 104   CO2 28 26 26    105 103   BUN 17 14 13 " "  CREATININE 0.8 0.8 0.8   CALCIUM 9.1 9.1 8.9   PROT 7.0  --   --    ALBUMIN 3.4*  --   --    BILITOT 1.1*  --   --    ALKPHOS 111  --   --    AST 27  --   --    ALT 30  --   --    ANIONGAP 9 8 9     Wound Culture: No results for input(s): "LABAERO" in the last 4320 hours.  All pertinent labs within the past 24 hours have been reviewed.    Significant Imaging: I have reviewed all pertinent imaging results/findings within the past 24 hours.  MRI Foot Toes WO Contrast CHINO [[id number]] Resulted: 05/06/25 1030   Order Status: Completed Updated: 05/06/25 1032   Narrative:     EXAMINATION:  MRI FOOT TOES WO CONTRAST CHINO; MRI FOREFOOT WO CONTRAST CHINO    CLINICAL HISTORY:  Osteomyelitis, foot;Great toes, bilaterally (Left > right);; Osteomyelitis, foot;    TECHNIQUE:  Multiplanar, multisequence MR imaging of the left and right forefoot without the use of intravenous gadolinium IV contrast.    COMPARISON:  Radiographs 05/05/2025    FINDINGS:  Bones: Right foot bone marrow signal is maintained.  There is bone marrow edema of the left hallux distal phalanx without geographic T1 marrow signal hypointensity.  Given adjacent soft tissue ulcer, findings are suspicious for early osteomyelitis.  Remainder of left forefoot marrow signal is maintained.  No fracture.    Joints: No joint effusions or evidence of septic arthritis.  Moderate to severe scattered degenerative changes throughout the midfoot and forefoot bilaterally.  Degenerative subchondral edema noted about the left naviculocuneiform joint.    Ligaments: No evidence for acute ligamentous injury.    Tendons: Regional tendons appear unremarkable.    Soft Tissues: There is bilateral generalized subcutaneous edema.  There is ulceration at the medial plantar aspect of the bilateral great toes, more conspicuous on the left.    Muscles: Moderate atrophy and mild edema of intrinsic foot musculature bilaterally.    Miscellaneous: No evidence of Zavaleta's neuroma.   Impression:   "     1. Bilateral hallux soft tissue ulcers, more prominent on the left.  Left hallux distal phalanx bone marrow edema in the setting of adjacent ulcer suspicious for early osteomyelitis.  2. Advanced degenerative changes bilaterally.    Electronically signed by resident: Marci Dodd  Date: 05/06/2025  Time: 08:08    Electronically signed by: Clark Best MD  Date: 05/06/2025  Time: 10:30   MRI Forefoot WO Contrast CHINO [[id number]] Resulted: 05/06/25 1030   Order Status: Completed Updated: 05/06/25 1032   Narrative:     EXAMINATION:  MRI FOOT TOES WO CONTRAST CHINO; MRI FOREFOOT WO CONTRAST CHINO    CLINICAL HISTORY:  Osteomyelitis, foot;Great toes, bilaterally (Left > right);; Osteomyelitis, foot;    TECHNIQUE:  Multiplanar, multisequence MR imaging of the left and right forefoot without the use of intravenous gadolinium IV contrast.    COMPARISON:  Radiographs 05/05/2025    FINDINGS:  Bones: Right foot bone marrow signal is maintained.  There is bone marrow edema of the left hallux distal phalanx without geographic T1 marrow signal hypointensity.  Given adjacent soft tissue ulcer, findings are suspicious for early osteomyelitis.  Remainder of left forefoot marrow signal is maintained.  No fracture.    Joints: No joint effusions or evidence of septic arthritis.  Moderate to severe scattered degenerative changes throughout the midfoot and forefoot bilaterally.  Degenerative subchondral edema noted about the left naviculocuneiform joint.    Ligaments: No evidence for acute ligamentous injury.    Tendons: Regional tendons appear unremarkable.    Soft Tissues: There is bilateral generalized subcutaneous edema.  There is ulceration at the medial plantar aspect of the bilateral great toes, more conspicuous on the left.    Muscles: Moderate atrophy and mild edema of intrinsic foot musculature bilaterally.    Miscellaneous: No evidence of Zavaleta's neuroma.   Impression:       1. Bilateral hallux soft tissue ulcers, more  prominent on the left.  Left hallux distal phalanx bone marrow edema in the setting of adjacent ulcer suspicious for early osteomyelitis.  2. Advanced degenerative changes bilaterally.    Electronically signed by resident: Marci Dodd  Date: 05/06/2025  Time: 08:08    Electronically signed by: Clark Best MD  Date: 05/06/2025  Time: 10:30   US Lower Extrem Arteries Bilat with VIGNESH (xpd) [[id number]] Resulted: 05/05/25 1843   Order Status: Completed Updated: 05/05/25 1846   Narrative:     EXAMINATION:  US ARTERIAL LOWER EXTREMITY BILAT WITH VIGNESH (XPD)    CLINICAL HISTORY:  concern for PAD causing lower extremity infections    TECHNIQUE:  Bilateral lower extremity arterial duplex ultrasound examination performed. Multiple gray scale and color doppler images were obtained in addition to waveform analysis.  Ankle-brachial indices were calculated.    COMPARISON:  None    FINDINGS:  The ankle brachial index on the right is 1.1 and on the left is 1.1.    The peak systolic velocities on the right are as follows, in centimeters/second:    Common femoral artery: 159 cm/sec.    Deep femoral artery: 91cm/sec.    Superficial femoral artery, proximal: 130cm/sec.    Superficial femoral artery, mid portion: 96cm/sec.    Superficial femoral artery, distal: 76cm/sec.    Popliteal artery: Proximal 90cm/sec and distal 88 cm/sec.    Posterior tibial artery: 66cm/sec.    Anterior tibial artery: 78cm/sec.    The peak systolic velocities on the left are as follows, in centimeters/second:    Common femoral artery: 124cm/sec.    Deep femoral artery:  82cm/sec.    Superficial femoral artery, proximal: 118cm/sec.    Superficial femoral artery, mid portion: 94cm/sec.    Superficial femoral artery, distal: 89cm/sec.    Popliteal artery: Proximal 92cm/sec and distal 87 cm/sec.    Posterior tibial artery: 103cm/sec.    Anterior tibial artery: 102cm/sec.    Waveforms are triphasic throughout the lower extremities.   Impression:        Ankle-brachial index of 1.1 on the right and 1.1 on the left.    No hemodynamically significant stenosis demonstrated in the right or left lower extremity arterial system.    Electronically signed by resident: Elli Escalante  Date: 05/05/2025  Time: 17:58    Electronically signed by: Woo Bashir MD  Date: 05/05/2025  Time: 18:43   X-Ray Foot Complete Bilateral [[id number]] Resulted: 05/05/25 1703   Order Status: Completed Updated: 05/05/25 1705   Narrative:     EXAMINATION:  XR FOOT COMPLETE 3 VIEW BILATERAL    CLINICAL HISTORY:  Non-pressure chronic ulcer of other part of unspecified foot with unspecified severity    TECHNIQUE:  AP, lateral, and oblique views of both feet were performed.    COMPARISON:  Left foot radiographs 05/05/2025 at 10:34; right foot radiographs 04/23/2025    FINDINGS:  Right foot: Chart review demonstrates a soft tissue ulcer along the medial aspect of the 1st toe.  There is soft tissue swelling.  No radiopaque foreign body.  No periostitis or bone destruction to suggest osteomyelitis.    Hallux valgus deformity.  No fracture or dislocation.  Normal Lisfranc alignment.  Scattered mild-moderate degenerative changes.  Achilles enthesopathy and plantar calcaneal spur.  There are vascular calcifications.    No changes in the right foot from earlier today.    Left foot: Chart review demonstrates a soft tissue ulcer along the medial aspect of the 1st toe.  There is soft tissue swelling.  No radiopaque foreign body.  Indolent erosion in the 1st metatarsal head, unchanged from 04/23/2025.  No new periostitis or bone destruction to suggest osteomyelitis.    No fracture or dislocation.  Normal Lisfranc alignment.  Scattered mild-moderate degenerative changes.  Achilles enthesopathy and plantar calcaneal spur.  There are vascular calcifications.   Impression:       As above.      Electronically signed by: Orville Wong  Date: 05/05/2025  Time: 17:03      Imaging History     2025    Date Procedure  Name Study Review Link PACS Link Status Accession Number Location   05/05/25 06:22 PM MRI Forefoot WO Contrast CHINO Study Review  Images Final [id number] [facility]   05/05/25 06:22 PM MRI Foot Toes WO Contrast CHINO Study Review  Images Final [id number] [facility]   05/05/25 05:43 PM US Lower Extrem Arteries Bilat with VIGNESH (xpd) Study Review  Images Final [id number] [facility]   05/05/25 02:35 PM X-Ray Foot Complete Bilateral Study Review  Images Final [id number] [facility]   05/05/25 10:35 AM X-Ray Foot Complete Left Study Review  Images Final [id number] [facility]   04/24/25 07:34 PM Cardiac monitoring strips Study Review  Final     04/24/25 09:22 AM Cardiac monitoring strips Study Review  Final     04/23/25 11:42 AM X-Ray Chest PA And Lateral Study Review  Images Final [id number] [facility]   04/23/25 09:10 AM X-Ray Foot 2 View Right Study Review  Images Final [id number] [facility]   04/23/25 08:10 AM Cardiac monitoring strips Study Review  Final     04/22/25 09:25 PM MRI Lumbar Spine W WO Cont Study Review  Images Final [id number] [facility]   04/22/25 06:30 PM X-Ray Foot 2 View Left Study Review  Images Final [id number] [facility]   04/22/25 06:30 PM X-Ray Chest PA And Lateral Study Review  Images Final [id number] [facility]   04/14/25 11:38 AM US Abdomen Limited_Hernia Study Review  Images Final [id number] [facility]

## 2025-05-07 NOTE — SUBJECTIVE & OBJECTIVE
Interval History:   No acute events  Afebrile and WBC WNL now from 14  On vanc and ceftriaxone  Bone bx sent 5/6/25  The patient denies any recent fever, chills, or sweats.    Review of Systems   Constitutional:  Negative for chills, diaphoresis and fever.   Respiratory:  Negative for shortness of breath.    Cardiovascular:  Negative for chest pain.   Gastrointestinal:  Negative for abdominal pain, diarrhea, nausea and vomiting.   Genitourinary:  Negative for dysuria and hematuria.   Skin:  Positive for color change and wound.     Objective:     Vital Signs (Most Recent):  Temp: 98 °F (36.7 °C) (05/07/25 1113)  Pulse: 66 (05/07/25 1113)  Resp: 18 (05/07/25 1113)  BP: 112/70 (05/07/25 1113)  SpO2: (!) 94 % (05/07/25 1113) Vital Signs (24h Range):  Temp:  [97.8 °F (36.6 °C)-98.2 °F (36.8 °C)] 98 °F (36.7 °C)  Pulse:  [66-74] 66  Resp:  [18-20] 18  SpO2:  [91 %-97 %] 94 %  BP: (106-145)/(62-84) 112/70     Weight: 98.9 kg (218 lb 0.6 oz)  Body mass index is 31.28 kg/m².    Estimated Creatinine Clearance: 102.8 mL/min (based on SCr of 0.8 mg/dL).     Physical Exam  Constitutional:       General: He is not in acute distress.     Appearance: Normal appearance. He is well-developed. He is not ill-appearing, toxic-appearing or diaphoretic.       HENT:      Head: Normocephalic and atraumatic.   Cardiovascular:      Rate and Rhythm: Normal rate and regular rhythm.      Heart sounds: Normal heart sounds. No murmur heard.     No friction rub. No gallop.   Pulmonary:      Effort: Pulmonary effort is normal. No respiratory distress.      Breath sounds: Normal breath sounds. No wheezing or rales.   Abdominal:      General: Bowel sounds are normal. There is no distension.      Palpations: Abdomen is soft. There is no mass.      Tenderness: There is no abdominal tenderness. There is no guarding or rebound.   Skin:     General: Skin is warm and dry.   Neurological:      Mental Status: He is alert and oriented to person, place, and  "time.   Psychiatric:         Behavior: Behavior normal.     Pics 5/6 below                             Significant Labs: Blood Culture: No results for input(s): "LABBLOO" in the last 4320 hours.  CBC:   Recent Labs   Lab 05/05/25  1144 05/06/25  0534 05/07/25  0256   WBC 13.87* 14.10* 12.12   HGB 14.2 12.7* 12.2*   HCT 41.6 37.9* 36.9*    309 297     CMP:   Recent Labs   Lab 05/05/25  1144 05/06/25  0534 05/07/25  0256    137 139   K 3.9 3.4* 3.7    103 104   CO2 28 26 26    105 103   BUN 17 14 13   CREATININE 0.8 0.8 0.8   CALCIUM 9.1 9.1 8.9   PROT 7.0  --   --    ALBUMIN 3.4*  --   --    BILITOT 1.1*  --   --    ALKPHOS 111  --   --    AST 27  --   --    ALT 30  --   --    ANIONGAP 9 8 9     Wound Culture: No results for input(s): "LABAERO" in the last 4320 hours.  All pertinent labs within the past 24 hours have been reviewed.    Significant Imaging: I have reviewed all pertinent imaging results/findings within the past 24 hours.  MRI Foot Toes WO Contrast CHINO [[id number]] Resulted: 05/06/25 1030   Order Status: Completed Updated: 05/06/25 1032   Narrative:     EXAMINATION:  MRI FOOT TOES WO CONTRAST CHINO; MRI FOREFOOT WO CONTRAST CHINO    CLINICAL HISTORY:  Osteomyelitis, foot;Great toes, bilaterally (Left > right);; Osteomyelitis, foot;    TECHNIQUE:  Multiplanar, multisequence MR imaging of the left and right forefoot without the use of intravenous gadolinium IV contrast.    COMPARISON:  Radiographs 05/05/2025    FINDINGS:  Bones: Right foot bone marrow signal is maintained.  There is bone marrow edema of the left hallux distal phalanx without geographic T1 marrow signal hypointensity.  Given adjacent soft tissue ulcer, findings are suspicious for early osteomyelitis.  Remainder of left forefoot marrow signal is maintained.  No fracture.    Joints: No joint effusions or evidence of septic arthritis.  Moderate to severe scattered degenerative changes throughout the midfoot and forefoot " bilaterally.  Degenerative subchondral edema noted about the left naviculocuneiform joint.    Ligaments: No evidence for acute ligamentous injury.    Tendons: Regional tendons appear unremarkable.    Soft Tissues: There is bilateral generalized subcutaneous edema.  There is ulceration at the medial plantar aspect of the bilateral great toes, more conspicuous on the left.    Muscles: Moderate atrophy and mild edema of intrinsic foot musculature bilaterally.    Miscellaneous: No evidence of Zavaleta's neuroma.   Impression:       1. Bilateral hallux soft tissue ulcers, more prominent on the left.  Left hallux distal phalanx bone marrow edema in the setting of adjacent ulcer suspicious for early osteomyelitis.  2. Advanced degenerative changes bilaterally.    Electronically signed by resident: Marci Dodd  Date: 05/06/2025  Time: 08:08    Electronically signed by: Clark Best MD  Date: 05/06/2025  Time: 10:30   MRI Forefoot WO Contrast CHINO [[id number]] Resulted: 05/06/25 1030   Order Status: Completed Updated: 05/06/25 1032   Narrative:     EXAMINATION:  MRI FOOT TOES WO CONTRAST CHINO; MRI FOREFOOT WO CONTRAST CHINO    CLINICAL HISTORY:  Osteomyelitis, foot;Great toes, bilaterally (Left > right);; Osteomyelitis, foot;    TECHNIQUE:  Multiplanar, multisequence MR imaging of the left and right forefoot without the use of intravenous gadolinium IV contrast.    COMPARISON:  Radiographs 05/05/2025    FINDINGS:  Bones: Right foot bone marrow signal is maintained.  There is bone marrow edema of the left hallux distal phalanx without geographic T1 marrow signal hypointensity.  Given adjacent soft tissue ulcer, findings are suspicious for early osteomyelitis.  Remainder of left forefoot marrow signal is maintained.  No fracture.    Joints: No joint effusions or evidence of septic arthritis.  Moderate to severe scattered degenerative changes throughout the midfoot and forefoot bilaterally.  Degenerative subchondral edema  noted about the left naviculocuneiform joint.    Ligaments: No evidence for acute ligamentous injury.    Tendons: Regional tendons appear unremarkable.    Soft Tissues: There is bilateral generalized subcutaneous edema.  There is ulceration at the medial plantar aspect of the bilateral great toes, more conspicuous on the left.    Muscles: Moderate atrophy and mild edema of intrinsic foot musculature bilaterally.    Miscellaneous: No evidence of Zavaleta's neuroma.   Impression:       1. Bilateral hallux soft tissue ulcers, more prominent on the left.  Left hallux distal phalanx bone marrow edema in the setting of adjacent ulcer suspicious for early osteomyelitis.  2. Advanced degenerative changes bilaterally.    Electronically signed by resident: Marci Dodd  Date: 05/06/2025  Time: 08:08    Electronically signed by: Clark Best MD  Date: 05/06/2025  Time: 10:30   US Lower Extrem Arteries Bilat with VIGNESH (xpd) [[id number]] Resulted: 05/05/25 1843   Order Status: Completed Updated: 05/05/25 1846   Narrative:     EXAMINATION:  US ARTERIAL LOWER EXTREMITY BILAT WITH VIGNESH (XPD)    CLINICAL HISTORY:  concern for PAD causing lower extremity infections    TECHNIQUE:  Bilateral lower extremity arterial duplex ultrasound examination performed. Multiple gray scale and color doppler images were obtained in addition to waveform analysis.  Ankle-brachial indices were calculated.    COMPARISON:  None    FINDINGS:  The ankle brachial index on the right is 1.1 and on the left is 1.1.    The peak systolic velocities on the right are as follows, in centimeters/second:    Common femoral artery: 159 cm/sec.    Deep femoral artery: 91cm/sec.    Superficial femoral artery, proximal: 130cm/sec.    Superficial femoral artery, mid portion: 96cm/sec.    Superficial femoral artery, distal: 76cm/sec.    Popliteal artery: Proximal 90cm/sec and distal 88 cm/sec.    Posterior tibial artery: 66cm/sec.    Anterior tibial artery:  78cm/sec.    The peak systolic velocities on the left are as follows, in centimeters/second:    Common femoral artery: 124cm/sec.    Deep femoral artery:  82cm/sec.    Superficial femoral artery, proximal: 118cm/sec.    Superficial femoral artery, mid portion: 94cm/sec.    Superficial femoral artery, distal: 89cm/sec.    Popliteal artery: Proximal 92cm/sec and distal 87 cm/sec.    Posterior tibial artery: 103cm/sec.    Anterior tibial artery: 102cm/sec.    Waveforms are triphasic throughout the lower extremities.   Impression:       Ankle-brachial index of 1.1 on the right and 1.1 on the left.    No hemodynamically significant stenosis demonstrated in the right or left lower extremity arterial system.    Electronically signed by resident: Elli Escalante  Date: 05/05/2025  Time: 17:58    Electronically signed by: Woo Bashir MD  Date: 05/05/2025  Time: 18:43   X-Ray Foot Complete Bilateral [[id number]] Resulted: 05/05/25 1703   Order Status: Completed Updated: 05/05/25 1705   Narrative:     EXAMINATION:  XR FOOT COMPLETE 3 VIEW BILATERAL    CLINICAL HISTORY:  Non-pressure chronic ulcer of other part of unspecified foot with unspecified severity    TECHNIQUE:  AP, lateral, and oblique views of both feet were performed.    COMPARISON:  Left foot radiographs 05/05/2025 at 10:34; right foot radiographs 04/23/2025    FINDINGS:  Right foot: Chart review demonstrates a soft tissue ulcer along the medial aspect of the 1st toe.  There is soft tissue swelling.  No radiopaque foreign body.  No periostitis or bone destruction to suggest osteomyelitis.    Hallux valgus deformity.  No fracture or dislocation.  Normal Lisfranc alignment.  Scattered mild-moderate degenerative changes.  Achilles enthesopathy and plantar calcaneal spur.  There are vascular calcifications.    No changes in the right foot from earlier today.    Left foot: Chart review demonstrates a soft tissue ulcer along the medial aspect of the 1st toe.  There is  soft tissue swelling.  No radiopaque foreign body.  Indolent erosion in the 1st metatarsal head, unchanged from 04/23/2025.  No new periostitis or bone destruction to suggest osteomyelitis.    No fracture or dislocation.  Normal Lisfranc alignment.  Scattered mild-moderate degenerative changes.  Achilles enthesopathy and plantar calcaneal spur.  There are vascular calcifications.   Impression:       As above.      Electronically signed by: Orville Wong  Date: 05/05/2025  Time: 17:03      Imaging History     2025    Date Procedure Name Study Review Link PACS Link Status Accession Number Location   05/05/25 06:22 PM MRI Forefoot WO Contrast CHINO Study Review  Images Final [id number] [facility]   05/05/25 06:22 PM MRI Foot Toes WO Contrast CHINO Study Review  Images Final [id number] [facility]   05/05/25 05:43 PM US Lower Extrem Arteries Bilat with VIGNESH (xpd) Study Review  Images Final [id number] [facility]   05/05/25 02:35 PM X-Ray Foot Complete Bilateral Study Review  Images Final [id number] [facility]   05/05/25 10:35 AM X-Ray Foot Complete Left Study Review  Images Final [id number] [facility]   04/24/25 07:34 PM Cardiac monitoring strips Study Review  Final     04/24/25 09:22 AM Cardiac monitoring strips Study Review  Final     04/23/25 11:42 AM X-Ray Chest PA And Lateral Study Review  Images Final [id number] [facility]   04/23/25 09:10 AM X-Ray Foot 2 View Right Study Review  Images Final [id number] [facility]   04/23/25 08:10 AM Cardiac monitoring strips Study Review  Final     04/22/25 09:25 PM MRI Lumbar Spine W WO Cont Study Review  Images Final [id number] [facility]   04/22/25 06:30 PM X-Ray Foot 2 View Left Study Review  Images Final [id number] [facility]   04/22/25 06:30 PM X-Ray Chest PA And Lateral Study Review  Images Final [id number] [facility]   04/14/25 11:38 AM US Abdomen Limited_Hernia Study Review  Images Final [id number] [facility]

## 2025-05-07 NOTE — PLAN OF CARE
05/07/25 1349   Medicare Message   Important Message from Medicare regarding Discharge Appeal Rights Given to patient/caregiver;Explained to patient/caregiver;Signed/date by patient/caregiver   Date IMM was signed 05/07/25   Time IMM was signed 0145     Discharge Plan A and Plan B have been determined by review of patient's clinical status, future medical and therapeutic needs, and coverage/benefits for post-acute care in coordination with multidisciplinary team members.     Woo Perera LCSW [facility]    [phone]    initiation of breastfeeding/breast milk feeding

## 2025-05-07 NOTE — PROGRESS NOTES
Pharmacokinetic Assessment Follow Up: IV Vancomycin    Vancomycin serum concentration assessment(s):    The trough level was drawn correctly and can be used to guide therapy at this time. The measurement is within the desired definitive target range of 15 to 20 mcg/mL.    Vancomycin Regimen Plan:    Continue regimen to Vancomycin 1500 mg IV every 12 hours with next serum trough concentration measured at 15:00 prior to 9th dose on 05/09/2025.    Drug levels (last 3 results):  Recent Labs   Lab Result Units 05/07/25  0256   Vancomycin Trough ug/ml 14.6       Pharmacy will continue to follow and monitor vancomycin.    Please contact pharmacy at extension [phone] for questions regarding this assessment.    Thank you for the consult,   Aníbal Sheldon       Patient brief summary:  Stevie Villalba is a 70 y.o. male initiated on antimicrobial therapy with IV Vancomycin for treatment of bone/joint infection    The patient's current regimen is vancomycin 1500 mg every 12 hours.    Drug Allergies:   Review of patient's allergies indicates:  No Known Allergies    Actual Body Weight:   98.9 kg    Renal Function:   Estimated Creatinine Clearance: 102.8 mL/min (based on SCr of 0.8 mg/dL).,     Dialysis Method (if applicable):  N/A    CBC (last 72 hours):  Recent Labs   Lab Result Units 05/05/25  1144 05/06/25  0534 05/07/25  0256   WBC K/uL 13.87* 14.10* 12.12   HGB gm/dL 14.2 12.7* 12.2*   Hemoglobin A1c % 5.2  --   --    HCT % 41.6 37.9* 36.9*   Platelet Count K/uL 370 309 297   Lymph % % 13.6* 12.0* 20.5   Mono % % 5.3 7.2 8.0   Eos % % 0.3 0.6 1.1   Basophil % % 0.4 0.4 0.6       Metabolic Panel (last 72 hours):  Recent Labs   Lab Result Units 05/05/25  1144 05/06/25  0534 05/07/25  0256   Sodium mmol/L 138 137 139   Potassium mmol/L 3.9 3.4* 3.7   Chloride mmol/L 101 103 104   CO2 mmol/L 28 26 26   Glucose mg/dL 107 105 103   BUN mg/dL 17 14 13   Creatinine mg/dL 0.8 0.8 0.8   Albumin g/dL 3.4*  --   --    Bilirubin Total mg/dL  1.1*  --   --    ALP unit/L 111  --   --    AST unit/L 27  --   --    ALT unit/L 30  --   --    Magnesium  mg/dL  --  1.7 1.7   Phosphorus Level mg/dL  --  3.4 3.5       Vancomycin Administrations:  vancomycin given in the last 96 hours                     vancomycin 1,500 mg in 0.9% NaCl 250 mL IVPB (admixture device) (mg) 1,500 mg New Bag 05/06/25 1645     1,500 mg New Bag  0414    vancomycin 2 g in 0.9% sodium chloride 500 mL IVPB (mg) 2,000 mg New Bag 05/05/25 1458                    Microbiologic Results:  Microbiology Results (last 7 days)       Procedure Component Value Units Date/Time    Gram stain [[id number]] Collected: 05/06/25 1450    Order Status: Completed Specimen: Bone from Toe, Left Foot Updated: 05/06/25 2229     GRAM STAIN Rare WBC seen      No organisms seen    Afb Culture Stain [[id number]] Collected: 05/06/25 1450    Order Status: Sent Specimen: Bone from Toe, Left Foot Updated: 05/06/25 1501    AFB Culture & Smear [[id number]] Collected: 05/06/25 1450    Order Status: Sent Specimen: Bone from Toe, Left Foot Updated: 05/06/25 1501    Fungus culture [[id number]] Collected: 05/06/25 1450    Order Status: Sent Specimen: Bone from Toe, Left Foot Updated: 05/06/25 1501    Culture, Anaerobic [[id number]] Collected: 05/06/25 1451    Order Status: Sent Specimen: Bone from Toe, Left Foot Updated: 05/06/25 1501    Aerobic culture [[id number]] Collected: 05/06/25 1451    Order Status: Sent Specimen: Bone from Toe, Left Foot Updated: 05/06/25 1501

## 2025-05-08 VITALS
BODY MASS INDEX: 31.22 KG/M2 | DIASTOLIC BLOOD PRESSURE: 73 MMHG | OXYGEN SATURATION: 98 % | WEIGHT: 218.06 LBS | HEART RATE: 67 BPM | SYSTOLIC BLOOD PRESSURE: 126 MMHG | HEIGHT: 70 IN | RESPIRATION RATE: 18 BRPM | TEMPERATURE: 98 F

## 2025-05-08 PROBLEM — L03.90 CELLULITIS: Status: ACTIVE | Noted: 2025-05-08

## 2025-05-08 LAB
ABSOLUTE EOSINOPHIL (OHS): 0.16 K/UL
ABSOLUTE MONOCYTE (OHS): 0.9 K/UL (ref 0.3–1)
ABSOLUTE NEUTROPHIL COUNT (OHS): 6.97 K/UL (ref 1.8–7.7)
ANION GAP (OHS): 8 MMOL/L (ref 8–16)
BASOPHILS # BLD AUTO: 0.07 K/UL
BASOPHILS NFR BLD AUTO: 0.7 %
BUN SERPL-MCNC: 11 MG/DL (ref 8–23)
CALCIUM SERPL-MCNC: 9 MG/DL (ref 8.7–10.5)
CHLORIDE SERPL-SCNC: 99 MMOL/L (ref 95–110)
CO2 SERPL-SCNC: 29 MMOL/L (ref 23–29)
CREAT SERPL-MCNC: 0.8 MG/DL (ref 0.5–1.4)
ERYTHROCYTE [DISTWIDTH] IN BLOOD BY AUTOMATED COUNT: 12.1 % (ref 11.5–14.5)
GFR SERPLBLD CREATININE-BSD FMLA CKD-EPI: >60 ML/MIN/1.73/M2
GLUCOSE SERPL-MCNC: 95 MG/DL (ref 70–110)
HCT VFR BLD AUTO: 39 % (ref 40–54)
HGB BLD-MCNC: 13.3 GM/DL (ref 14–18)
IMM GRANULOCYTES # BLD AUTO: 0.08 K/UL (ref 0–0.04)
IMM GRANULOCYTES NFR BLD AUTO: 0.8 % (ref 0–0.5)
LYMPHOCYTES # BLD AUTO: 2.13 K/UL (ref 1–4.8)
MAGNESIUM SERPL-MCNC: 1.7 MG/DL (ref 1.6–2.6)
MCH RBC QN AUTO: 33.5 PG (ref 27–31)
MCHC RBC AUTO-ENTMCNC: 34.1 G/DL (ref 32–36)
MCV RBC AUTO: 98 FL (ref 82–98)
NUCLEATED RBC (/100WBC) (OHS): 0 /100 WBC
PHOSPHATE SERPL-MCNC: 3.9 MG/DL (ref 2.7–4.5)
PLATELET # BLD AUTO: 319 K/UL (ref 150–450)
PMV BLD AUTO: 9.4 FL (ref 9.2–12.9)
POTASSIUM SERPL-SCNC: 3.4 MMOL/L (ref 3.5–5.1)
RBC # BLD AUTO: 3.97 M/UL (ref 4.6–6.2)
RELATIVE EOSINOPHIL (OHS): 1.6 %
RELATIVE LYMPHOCYTE (OHS): 20.7 % (ref 18–48)
RELATIVE MONOCYTE (OHS): 8.7 % (ref 4–15)
RELATIVE NEUTROPHIL (OHS): 67.5 % (ref 38–73)
SODIUM SERPL-SCNC: 136 MMOL/L (ref 136–145)
WBC # BLD AUTO: 10.31 K/UL (ref 3.9–12.7)

## 2025-05-08 PROCEDURE — 83735 ASSAY OF MAGNESIUM: CPT | Mod: HCNC

## 2025-05-08 PROCEDURE — 36415 COLL VENOUS BLD VENIPUNCTURE: CPT | Mod: HCNC

## 2025-05-08 PROCEDURE — 99232 SBSQ HOSP IP/OBS MODERATE 35: CPT | Mod: HCNC,,, | Performed by: PHYSICIAN ASSISTANT

## 2025-05-08 PROCEDURE — 25000003 PHARM REV CODE 250: Mod: HCNC

## 2025-05-08 PROCEDURE — 0JBQ0ZZ EXCISION OF RIGHT FOOT SUBCUTANEOUS TISSUE AND FASCIA, OPEN APPROACH: ICD-10-PCS | Performed by: PODIATRIST

## 2025-05-08 PROCEDURE — 0JBR0ZZ EXCISION OF LEFT FOOT SUBCUTANEOUS TISSUE AND FASCIA, OPEN APPROACH: ICD-10-PCS | Performed by: PODIATRIST

## 2025-05-08 PROCEDURE — 82310 ASSAY OF CALCIUM: CPT | Mod: HCNC

## 2025-05-08 PROCEDURE — 85025 COMPLETE CBC W/AUTO DIFF WBC: CPT | Mod: HCNC

## 2025-05-08 PROCEDURE — 63600175 PHARM REV CODE 636 W HCPCS: Mod: HCNC

## 2025-05-08 PROCEDURE — 99233 SBSQ HOSP IP/OBS HIGH 50: CPT | Mod: 25,HCNC,, | Performed by: PODIATRIST

## 2025-05-08 PROCEDURE — 99900035 HC TECH TIME PER 15 MIN (STAT): Mod: HCNC

## 2025-05-08 PROCEDURE — 94761 N-INVAS EAR/PLS OXIMETRY MLT: CPT | Mod: HCNC

## 2025-05-08 PROCEDURE — 84100 ASSAY OF PHOSPHORUS: CPT | Mod: HCNC

## 2025-05-08 RX ORDER — DOXYCYCLINE 100 MG/1
100 CAPSULE ORAL EVERY 12 HOURS
Qty: 24 CAPSULE | Refills: 0 | Status: SHIPPED | OUTPATIENT
Start: 2025-05-08 | End: 2025-05-20

## 2025-05-08 RX ORDER — POTASSIUM CHLORIDE 20 MEQ/1
40 TABLET, EXTENDED RELEASE ORAL ONCE
Status: CANCELLED | OUTPATIENT
Start: 2025-05-08 | End: 2025-05-08

## 2025-05-08 RX ADMIN — FLECAINIDE ACETATE 100 MG: 50 TABLET ORAL at 09:05

## 2025-05-08 RX ADMIN — VANCOMYCIN HYDROCHLORIDE 1500 MG: 1.5 INJECTION, POWDER, LYOPHILIZED, FOR SOLUTION INTRAVENOUS at 03:05

## 2025-05-08 RX ADMIN — ALLOPURINOL 300 MG: 300 TABLET ORAL at 09:05

## 2025-05-08 RX ADMIN — VALSARTAN 320 MG: 160 TABLET, FILM COATED ORAL at 09:05

## 2025-05-08 RX ADMIN — HYDROCHLOROTHIAZIDE 25 MG: 25 TABLET ORAL at 09:05

## 2025-05-08 RX ADMIN — MUPIROCIN: 20 OINTMENT TOPICAL at 09:05

## 2025-05-08 RX ADMIN — PANTOPRAZOLE SODIUM 40 MG: 40 TABLET, DELAYED RELEASE ORAL at 09:05

## 2025-05-08 NOTE — SUBJECTIVE & OBJECTIVE
Interval History:   No acute events  Afebrile and WBC WNL now from 14  On vanc and ceftriaxone  Bone bx sent 5/6/25 - no growth and pathology negative.  The patient denies any recent fever, chills, or sweats.    Review of Systems   Constitutional:  Negative for chills, diaphoresis and fever.   Respiratory:  Negative for shortness of breath.    Cardiovascular:  Negative for chest pain.   Gastrointestinal:  Negative for abdominal pain, diarrhea, nausea and vomiting.   Genitourinary:  Negative for dysuria and hematuria.   Skin:  Positive for color change and wound.     Objective:     Vital Signs (Most Recent):  Temp: 98.3 °F (36.8 °C) (05/08/25 1524)  Pulse: 67 (05/08/25 1524)  Resp: 18 (05/08/25 1524)  BP: 126/73 (05/08/25 1524)  SpO2: 98 % (05/08/25 1524) Vital Signs (24h Range):  Temp:  [97.9 °F (36.6 °C)-98.4 °F (36.9 °C)] 98.3 °F (36.8 °C)  Pulse:  [58-76] 67  Resp:  [18] 18  SpO2:  [93 %-98 %] 98 %  BP: (113-136)/(54-74) 126/73     Weight: 98.9 kg (218 lb 0.6 oz)  Body mass index is 31.28 kg/m².    Estimated Creatinine Clearance: 102.8 mL/min (based on SCr of 0.8 mg/dL).     Physical Exam  Constitutional:       General: He is not in acute distress.     Appearance: Normal appearance. He is well-developed. He is not ill-appearing, toxic-appearing or diaphoretic.       HENT:      Head: Normocephalic and atraumatic.   Cardiovascular:      Rate and Rhythm: Normal rate and regular rhythm.      Heart sounds: Normal heart sounds. No murmur heard.     No friction rub. No gallop.   Pulmonary:      Effort: Pulmonary effort is normal. No respiratory distress.      Breath sounds: Normal breath sounds. No wheezing or rales.   Abdominal:      General: Bowel sounds are normal. There is no distension.      Palpations: Abdomen is soft. There is no mass.      Tenderness: There is no abdominal tenderness. There is no guarding or rebound.   Skin:     General: Skin is warm and dry.   Neurological:      Mental Status: He is alert and  "oriented to person, place, and time.   Psychiatric:         Behavior: Behavior normal.   Pics 5/6 below                        Specimen to Pathology Podiatry: [id number]  Order: [id number]   Status: Final result       Next appt: 05/12/2025 at 08:30 AM in Orthopedics (Yvonne Davis PA-C)    Test Result Released: Yes (seen)    0 Result Notes      Component  Ref Range & Units (hover)    Case Report   [facility]                           Case: [id number]                                 Authorizing Provider:  Ted Tai MD          Collected:           05/06/2025 02:51 PM           Ordering Location:     [facility]        Received:            05/06/2025 08:04 PM           Pathologist:           Gabriel Manuel MD                                                         Specimen:    Toe, Left Foot, left hallux distal phalanx                                                Clinical Information    Bone, left hallux distal phalanx   Final Diagnosis   Bone, left hallux distal phalanx, biopsy:  - Viable lamellar bone and fibrous tissue  - Negative for inflammation or malignancy   Electronically signed by Gabriel Manuel MD on 5/7/2025 at 4:36 PM    Microscopic Description    Microscopic examination performed.      Gross Description    1. Toe, Left Foot: left hallux distal phalanx  One Part Case:     Part 1  Pathology MRN# [id number]  [facility] MRN# [id number]     Received in formalin labeled with the patient's name, MRN, and "bone left hallux distal phalanx is an irregular, tan-pink, 0.4 x 0.2 x 0.1 cm bone fragment.  The specimen is placed in immunocal and submitted in toto in cassette:  1A     BREANN Walls PA (ASCP)CM     Grossing was completed by Bryan Perdomo on 5/6/2025.           Significant Labs: Blood Culture: No results for input(s): "LABBLOO" in the last 4320 hours.  CBC:   Recent Labs   Lab 05/07/25  0256 05/08/25  0420   WBC 12.12 10.31   HGB 12.2* 13.3*   HCT 36.9* 39.0*    " "319     CMP:   Recent Labs   Lab 05/07/25  0256 05/08/25  0420    136   K 3.7 3.4*    99   CO2 26 29    95   BUN 13 11   CREATININE 0.8 0.8   CALCIUM 8.9 9.0   ANIONGAP 9 8     Wound Culture: No results for input(s): "LABAERO" in the last 4320 hours.  All pertinent labs within the past 24 hours have been reviewed.    Significant Imaging: I have reviewed all pertinent imaging results/findings within the past 24 hours.  MRI Foot Toes WO Contrast CHINO [[id number]] Resulted: 05/06/25 1030   Order Status: Completed Updated: 05/06/25 1032   Narrative:     EXAMINATION:  MRI FOOT TOES WO CONTRAST CHINO; MRI FOREFOOT WO CONTRAST CHINO    CLINICAL HISTORY:  Osteomyelitis, foot;Great toes, bilaterally (Left > right);; Osteomyelitis, foot;    TECHNIQUE:  Multiplanar, multisequence MR imaging of the left and right forefoot without the use of intravenous gadolinium IV contrast.    COMPARISON:  Radiographs 05/05/2025    FINDINGS:  Bones: Right foot bone marrow signal is maintained.  There is bone marrow edema of the left hallux distal phalanx without geographic T1 marrow signal hypointensity.  Given adjacent soft tissue ulcer, findings are suspicious for early osteomyelitis.  Remainder of left forefoot marrow signal is maintained.  No fracture.    Joints: No joint effusions or evidence of septic arthritis.  Moderate to severe scattered degenerative changes throughout the midfoot and forefoot bilaterally.  Degenerative subchondral edema noted about the left naviculocuneiform joint.    Ligaments: No evidence for acute ligamentous injury.    Tendons: Regional tendons appear unremarkable.    Soft Tissues: There is bilateral generalized subcutaneous edema.  There is ulceration at the medial plantar aspect of the bilateral great toes, more conspicuous on the left.    Muscles: Moderate atrophy and mild edema of intrinsic foot musculature bilaterally.    Miscellaneous: No evidence of Zavaleta's neuroma.   Impression:       1. " Bilateral hallux soft tissue ulcers, more prominent on the left.  Left hallux distal phalanx bone marrow edema in the setting of adjacent ulcer suspicious for early osteomyelitis.  2. Advanced degenerative changes bilaterally.    Electronically signed by resident: Marci Dodd  Date: 05/06/2025  Time: 08:08    Electronically signed by: Clark Best MD  Date: 05/06/2025  Time: 10:30   MRI Forefoot WO Contrast CHINO [[id number]] Resulted: 05/06/25 1030   Order Status: Completed Updated: 05/06/25 1032   Narrative:     EXAMINATION:  MRI FOOT TOES WO CONTRAST CHINO; MRI FOREFOOT WO CONTRAST CHINO    CLINICAL HISTORY:  Osteomyelitis, foot;Great toes, bilaterally (Left > right);; Osteomyelitis, foot;    TECHNIQUE:  Multiplanar, multisequence MR imaging of the left and right forefoot without the use of intravenous gadolinium IV contrast.    COMPARISON:  Radiographs 05/05/2025    FINDINGS:  Bones: Right foot bone marrow signal is maintained.  There is bone marrow edema of the left hallux distal phalanx without geographic T1 marrow signal hypointensity.  Given adjacent soft tissue ulcer, findings are suspicious for early osteomyelitis.  Remainder of left forefoot marrow signal is maintained.  No fracture.    Joints: No joint effusions or evidence of septic arthritis.  Moderate to severe scattered degenerative changes throughout the midfoot and forefoot bilaterally.  Degenerative subchondral edema noted about the left naviculocuneiform joint.    Ligaments: No evidence for acute ligamentous injury.    Tendons: Regional tendons appear unremarkable.    Soft Tissues: There is bilateral generalized subcutaneous edema.  There is ulceration at the medial plantar aspect of the bilateral great toes, more conspicuous on the left.    Muscles: Moderate atrophy and mild edema of intrinsic foot musculature bilaterally.    Miscellaneous: No evidence of Zavaleta's neuroma.   Impression:       1. Bilateral hallux soft tissue ulcers, more  prominent on the left.  Left hallux distal phalanx bone marrow edema in the setting of adjacent ulcer suspicious for early osteomyelitis.  2. Advanced degenerative changes bilaterally.    Electronically signed by resident: Marci Dodd  Date: 05/06/2025  Time: 08:08    Electronically signed by: Clark Best MD  Date: 05/06/2025  Time: 10:30   US Lower Extrem Arteries Bilat with VIGNESH (xpd) [[id number]] Resulted: 05/05/25 1843   Order Status: Completed Updated: 05/05/25 1846   Narrative:     EXAMINATION:  US ARTERIAL LOWER EXTREMITY BILAT WITH VIGNESH (XPD)    CLINICAL HISTORY:  concern for PAD causing lower extremity infections    TECHNIQUE:  Bilateral lower extremity arterial duplex ultrasound examination performed. Multiple gray scale and color doppler images were obtained in addition to waveform analysis.  Ankle-brachial indices were calculated.    COMPARISON:  None    FINDINGS:  The ankle brachial index on the right is 1.1 and on the left is 1.1.    The peak systolic velocities on the right are as follows, in centimeters/second:    Common femoral artery: 159 cm/sec.    Deep femoral artery: 91cm/sec.    Superficial femoral artery, proximal: 130cm/sec.    Superficial femoral artery, mid portion: 96cm/sec.    Superficial femoral artery, distal: 76cm/sec.    Popliteal artery: Proximal 90cm/sec and distal 88 cm/sec.    Posterior tibial artery: 66cm/sec.    Anterior tibial artery: 78cm/sec.    The peak systolic velocities on the left are as follows, in centimeters/second:    Common femoral artery: 124cm/sec.    Deep femoral artery:  82cm/sec.    Superficial femoral artery, proximal: 118cm/sec.    Superficial femoral artery, mid portion: 94cm/sec.    Superficial femoral artery, distal: 89cm/sec.    Popliteal artery: Proximal 92cm/sec and distal 87 cm/sec.    Posterior tibial artery: 103cm/sec.    Anterior tibial artery: 102cm/sec.    Waveforms are triphasic throughout the lower extremities.   Impression:        Ankle-brachial index of 1.1 on the right and 1.1 on the left.    No hemodynamically significant stenosis demonstrated in the right or left lower extremity arterial system.    Electronically signed by resident: Elli Escalante  Date: 05/05/2025  Time: 17:58    Electronically signed by: Woo Bashir MD  Date: 05/05/2025  Time: 18:43   X-Ray Foot Complete Bilateral [[id number]] Resulted: 05/05/25 1703   Order Status: Completed Updated: 05/05/25 1705   Narrative:     EXAMINATION:  XR FOOT COMPLETE 3 VIEW BILATERAL    CLINICAL HISTORY:  Non-pressure chronic ulcer of other part of unspecified foot with unspecified severity    TECHNIQUE:  AP, lateral, and oblique views of both feet were performed.    COMPARISON:  Left foot radiographs 05/05/2025 at 10:34; right foot radiographs 04/23/2025    FINDINGS:  Right foot: Chart review demonstrates a soft tissue ulcer along the medial aspect of the 1st toe.  There is soft tissue swelling.  No radiopaque foreign body.  No periostitis or bone destruction to suggest osteomyelitis.    Hallux valgus deformity.  No fracture or dislocation.  Normal Lisfranc alignment.  Scattered mild-moderate degenerative changes.  Achilles enthesopathy and plantar calcaneal spur.  There are vascular calcifications.    No changes in the right foot from earlier today.    Left foot: Chart review demonstrates a soft tissue ulcer along the medial aspect of the 1st toe.  There is soft tissue swelling.  No radiopaque foreign body.  Indolent erosion in the 1st metatarsal head, unchanged from 04/23/2025.  No new periostitis or bone destruction to suggest osteomyelitis.    No fracture or dislocation.  Normal Lisfranc alignment.  Scattered mild-moderate degenerative changes.  Achilles enthesopathy and plantar calcaneal spur.  There are vascular calcifications.   Impression:       As above.      Electronically signed by: Orville Wong  Date: 05/05/2025  Time: 17:03      Imaging History     2025    Date Procedure  Name Study Review Link PACS Link Status Accession Number Location   05/05/25 06:22 PM MRI Forefoot WO Contrast CHINO Study Review  Images Final [id number] [facility]   05/05/25 06:22 PM MRI Foot Toes WO Contrast CHINO Study Review  Images Final [id number] [facility]   05/05/25 05:43 PM US Lower Extrem Arteries Bilat with VIGNESH (xpd) Study Review  Images Final [id number] [facility]   05/05/25 02:35 PM X-Ray Foot Complete Bilateral Study Review  Images Final [id number] [facility]   05/05/25 10:35 AM X-Ray Foot Complete Left Study Review  Images Final [id number] [facility]   04/24/25 07:34 PM Cardiac monitoring strips Study Review  Final     04/24/25 09:22 AM Cardiac monitoring strips Study Review  Final     04/23/25 11:42 AM X-Ray Chest PA And Lateral Study Review  Images Final [id number] [facility]   04/23/25 09:10 AM X-Ray Foot 2 View Right Study Review  Images Final [id number] [facility]   04/23/25 08:10 AM Cardiac monitoring strips Study Review  Final     04/22/25 09:25 PM MRI Lumbar Spine W WO Cont Study Review  Images Final [id number] [facility]   04/22/25 06:30 PM X-Ray Foot 2 View Left Study Review  Images Final [id number] [facility]   04/22/25 06:30 PM X-Ray Chest PA And Lateral Study Review  Images Final [id number] [facility]   04/14/25 11:38 AM US Abdomen Limited_Hernia Study Review  Images Final [id number] [facility]

## 2025-05-08 NOTE — ASSESSMENT & PLAN NOTE
AFVSS  CBC with leukocytosis of 13; resolved  xray bilateral feet reveals soft tissue swelling along the 1st right toe and indolent erosion in the 1st left metatarsal head  VIGNESH's showed no significant stenosis  MR showed Bilateral hallux soft tissue ulcers, more prominent on the left. Left hallux distal phalanx bone marrow edema in the setting of adjacent ulcer suspicious for early osteomyelitis    Cont vanc/rocephin  ID consulted, appreciate recommendations  Podiatry consulted, appreciate recommendations  Dressed with Hydrafera ready foam, gauze, kerlix, Ace per podiatry  WBAT heel emphasis to transfer bilateral. Ambulate in darco shoes  Bone bx negative for osteo  Culture sterile to date  ID recommending doxy 100mg BID x14 days  Will follow up outpatient with ID

## 2025-05-08 NOTE — ASSESSMENT & PLAN NOTE
70-year-old male with PMH HTN, HLD, obesity, paroxysmal Afib, OA of right knee presents to ED 5/5/25 on referral from Dr. Rodriguez podiatrist to present to the ED for imaging workup, IV antibiotics. Podiatry consulted for management of bilateral great toe wounds, to which patient attests was a pedicure several weeks prior.     Assessment: Bilateral great toe ulcers, depth to subcutaneous. Chronic in nature. XR of left foot was obtained in the clinic setting demonstrating pathological fracture within the IPJ of the hallux proximal phalanx, as well as suspicious osseous destruction.  MRI demonstrating bone marrow edema signal uptake suggestive of early osteomyelitis along the distal phalanx of the left hallux.    Plan:   - Patient seen and evaluated.  Plan of care was discussed with patient.  - debrided ulcers at bedside  - Bone biopsy results was discussed with patient  - Tracing cultures  - Dressed with Hydrafera ready foam, gauze, kerlix, Ace.   - WBAT heel emphasis to transfer bilateral. Ambulate in darco shoes.   - Podiatry will follow    Future discharge recs:  - Patient to follow up in Podiatry Clinic outpatient, Podiatry will arrange.  - SNF or HH to apply bandaging as described above, x3/week   - Abx plan per ID  - Patient to only to transfer in short distances to feet with Darco shoe, partial weightbearing to heel  - Patient to keep dressings clean dry and intact  - Patient explained the importance of daily foot checks

## 2025-05-08 NOTE — PROGRESS NOTES
[facility] - [facility] Surg  Podiatry  Progress Note    Patient Name: Stevie Villalba  MRN: [id number]  Admission Date: 5/5/2025  Hospital Length of Stay: 3 days  Attending Physician: Gurinder Mendoza DO  Primary Care Provider: Ric Brambila MD     Subjective:     Interval History:  Patient seen and evaluated bedside by Podiatry during afternoon rounds.  Plan of care was discussed with patient regarding post hospital course.  Debridement was performed of bilateral hallux ulcers at bedside.  Dressings change.  Examination stable        Scheduled Meds:   allopurinoL  300 mg Oral Daily    atorvastatin  40 mg Oral QHS    cefTRIAXone (Rocephin) IV (PEDS and ADULTS)  2 g Intravenous Q24H    enoxparin  40 mg Subcutaneous Daily    flecainide  100 mg Oral Q12H    hydroCHLOROthiazide  25 mg Oral Daily    mupirocin   Nasal BID    pantoprazole  40 mg Oral BID    polyethylene glycol  17 g Oral Daily    valsartan  320 mg Oral Daily    vancomycin (VANCOCIN) IV (PEDS and ADULTS)  15 mg/kg Intravenous Q12H     Continuous Infusions:  PRN Meds:  Current Facility-Administered Medications:     acetaminophen, 650 mg, Oral, Q8H PRN    acetaminophen, 650 mg, Oral, Q4H PRN    albuterol-ipratropium, 3 mL, Nebulization, Q6H PRN    aluminum-magnesium hydroxide-simethicone, 30 mL, Oral, QID PRN    carisoprodoL, 350 mg, Oral, QID PRN    dextrose 50%, 12.5 g, Intravenous, PRN    dextrose 50%, 25 g, Intravenous, PRN    glucagon (human recombinant), 1 mg, Intramuscular, PRN    glucose, 16 g, Oral, PRN    glucose, 24 g, Oral, PRN    HYDROcodone-acetaminophen, 1 tablet, Oral, Q6H PRN    melatonin, 6 mg, Oral, Nightly PRN    naloxone, 0.02 mg, Intravenous, PRN    ondansetron, 8 mg, Oral, Q8H PRN    sodium chloride 0.9%, 10 mL, Intravenous, Q12H PRN    Pharmacy to dose Vancomycin consult, , , Once **AND** vancomycin - pharmacy to dose, , Intravenous, pharmacy to manage frequency    Review of Systems   Constitutional:  Negative for chills, diaphoresis and fever.    Respiratory:  Negative for shortness of breath.    Cardiovascular:  Negative for chest pain.   Gastrointestinal:  Negative for abdominal pain, diarrhea, nausea and vomiting.   Genitourinary:  Negative for dysuria and hematuria.   Skin:  Positive for color change and wound.     Objective:     Vital Signs (Most Recent):  Temp: 98.3 °F (36.8 °C) (05/08/25 1524)  Pulse: 67 (05/08/25 1524)  Resp: 18 (05/08/25 1524)  BP: 126/73 (05/08/25 1524)  SpO2: 98 % (05/08/25 1524) Vital Signs (24h Range):  Temp:  [97.9 °F (36.6 °C)-98.4 °F (36.9 °C)] 98.3 °F (36.8 °C)  Pulse:  [58-76] 67  Resp:  [18] 18  SpO2:  [93 %-98 %] 98 %  BP: (113-136)/(54-74) 126/73     Weight: 98.9 kg (218 lb 0.6 oz)  Body mass index is 31.28 kg/m².    Foot Exam    General  Orientation: alert and oriented to person, place, and time       Right Foot/Ankle     Inspection and Palpation  Skin Exam: skin changes and ulcer;     Neurovascular  Dorsalis pedis: 1+  Posterior tibial: 1+  Saphenous nerve sensation: diminished  Tibial nerve sensation: diminished  Superficial peroneal nerve sensation: diminished  Deep peroneal nerve sensation: diminished  Sural nerve sensation: diminished    Comments  Right foot: Patient has wound noted to the plantar aspect of the right great toe.  This of subcutaneous tissue.  Fibrogranular tissue noted.  No ascending erythema or swelling noted.  No probe to bone purulence fluctuance or crepitus noted at this time nontender to palpation    Left Foot/Ankle      Inspection and Palpation  Skin Exam: skin changes and ulcer;     Neurovascular  Dorsalis pedis: 1+  Posterior tibial: 1+  Saphenous nerve sensation: diminished  Tibial nerve sensation: diminished  Superficial peroneal nerve sensation: diminished  Deep peroneal nerve sensation: diminished  Sural nerve sensation: diminished    Comments  Patient has wound noted to the plantar aspect of left great toe.  No probe to bone noted.  Fibrogranular tissue noted.  No fluctuance crepitus  or purulence noted at this time.  Mild erythema noted.  No other signs of any acute foot infection noted along the foot      Laboratory:  CBC:   Recent Labs   Lab 05/08/25  0420   WBC 10.31   RBC 3.97*   HGB 13.3*   HCT 39.0*      MCV 98   MCH 33.5*   MCHC 34.1     CMP:   Recent Labs   Lab 05/05/25  1144 05/06/25  0534 05/08/25  0420      < > 95   CALCIUM 9.1   < > 9.0   ALBUMIN 3.4*  --   --    PROT 7.0  --   --       < > 136   K 3.9   < > 3.4*   CO2 28   < > 29      < > 99   BUN 17   < > 11   CREATININE 0.8   < > 0.8   ALKPHOS 111  --   --    ALT 30  --   --    AST 27  --   --    BILITOT 1.1*  --   --     < > = values in this interval not displayed.     CRP:   Recent Labs   Lab 05/05/25  1144   CRP 6.2     ESR:   Recent Labs   Lab 05/05/25  1318   SEDRATE 36*     Microbiology Results (last 7 days)       Procedure Component Value Units Date/Time    Culture, Anaerobic [[id number]] Collected: 05/06/25 1451    Order Status: Completed Specimen: Bone from Toe, Left Foot Updated: 05/08/25 1102     Anaerobe Culture Culture In Progress    Aerobic culture [[id number]] Collected: 05/06/25 1451    Order Status: Completed Specimen: Bone from Toe, Left Foot Updated: 05/08/25 0810     CULTURE, AEROBIC No Growth To Date    Afb Culture Stain [[id number]] Collected: 05/06/25 1450    Order Status: Completed Specimen: Bone from Toe, Left Foot Updated: 05/07/25 1345     ACID FAST STAIN  No acid fast bacilli seen    Fungus culture [[id number]]  (Normal) Collected: 05/06/25 1450    Order Status: Completed Specimen: Bone from Toe, Left Foot Updated: 05/07/25 0643     Fungal Culture Culture In Progress    Gram stain [[id number]] Collected: 05/06/25 1450    Order Status: Completed Specimen: Bone from Toe, Left Foot Updated: 05/06/25 2229     GRAM STAIN Rare WBC seen      No organisms seen    AFB Culture & Smear [[id number]] Collected: 05/06/25 1450    Order Status: Resulted Specimen: Bone from Toe, Left Foot  Updated: 05/06/25 1501          All pertinent labs reviewed within the last 24 hours.    Diagnostic Results:  I have reviewed all pertinent imaging results/findings within the past 24 hours.    Clinical Findings:  Left foot pre debridement   Left foot post debridement   Right foot pre debridement  Right foot post debridement                  Assessment/Plan:     Orthopedic  Bilateral great toes ulcers  70-year-old male with PMH HTN, HLD, obesity, paroxysmal Afib, OA of right knee presents to ED 5/5/25 on referral from Dr. Rodriguez podiatrist to present to the ED for imaging workup, IV antibiotics. Podiatry consulted for management of bilateral great toe wounds, to which patient attests was a pedicure several weeks prior.     Assessment: Bilateral great toe ulcers, depth to subcutaneous. Chronic in nature. XR of left foot was obtained in the clinic setting demonstrating pathological fracture within the IPJ of the hallux proximal phalanx, as well as suspicious osseous destruction.  MRI demonstrating bone marrow edema signal uptake suggestive of early osteomyelitis along the distal phalanx of the left hallux.    Plan:   - Patient seen and evaluated.  Plan of care was discussed with patient.  - debrided ulcers at bedside  - Bone biopsy results was discussed with patient  - Tracing cultures  - Dressed with Hydrafera ready foam, gauze, kerlix, Ace.   - WBAT heel emphasis to transfer bilateral. Ambulate in darco shoes.   - Podiatry will follow    Future discharge recs:  - Patient to follow up in Podiatry Clinic outpatient, Podiatry will arrange.  - SNF or  to apply bandaging as described above, x3/week   - Abx plan per ID  - Patient to only to transfer in short distances to feet with Darco shoe, partial weightbearing to heel  - Patient to keep dressings clean dry and intact  - Patient explained the importance of daily foot checks      Debridement    Date/Time: 5/8/2025 5:06 PM    Performed by: Ted Tai MD  Authorized  "by: Ted Tai MD    Time out: Immediately prior to procedure a "time out" was called to verify the correct patient, procedure, equipment, support staff and site/side marked as required.    Consent Done?:  Yes (Verbal)    Preparation: Patient was prepped and draped in usual sterile fashion    Local anesthesia used?: No      Wound Details:    Location:  Left foot    Location:  Left 1st Toe    Type of Debridement:  Excisional       Length (cm):  2.5       Width (cm):  2       Depth (cm):  0.3       Area (sq cm):  3.93       Percent Debrided (%):  100       Total Area Debrided (sq cm):  3.93    Depth of debridement:  Subcutaneous tissue    Tissue debrided:  Dermis, Epidermis and Subcutaneous    Devitalized tissue debrided:  Slough and Exudate    Instruments:  Blade and Curette  Bleeding:  Minimal  Hemostasis Achieved: Yes  Method Used:  Pressure  Patient tolerance:  Patient tolerated the procedure well with no immediate complications  Debridement    Date/Time: 5/8/2025 5:07 PM    Performed by: Tobias Melissa MD  Authorized by: Hollie Rodriguez DPM    Time out: Immediately prior to procedure a "time out" was called to verify the correct patient, procedure, equipment, support staff and site/side marked as required.    Consent Done?:  Yes (Verbal)    Preparation: Patient was prepped and draped in usual sterile fashion    Local anesthesia used?: No      Wound Details:    Location:  Right foot    Location:  Right 1st Toe    Type of Debridement:  Excisional       Length (cm):  1       Width (cm):  0.8       Depth (cm):  0.3       Area (sq cm):  0.63       Percent Debrided (%):  95       Total Area Debrided (sq cm):  0.6    Depth of debridement:  Subcutaneous tissue    Tissue debrided:  Dermis, Epidermis and Subcutaneous    Devitalized tissue debrided:  Callus, Slough and Exudate    Instruments:  Blade and Curette  Bleeding:  Minimal  Hemostasis Achieved: Yes  Method Used:  Pressure  Patient tolerance:  Patient " tolerated the procedure well with no immediate complications  1st Wound Pain Assessment: 0    Ted Tai DPM PGY-2  Podiatric Medicine & Surgery  [facility]  Secure Chat Preferred  Pager: [phone]    [facility]

## 2025-05-08 NOTE — SUBJECTIVE & OBJECTIVE
Subjective:     Interval History:  Patient seen and evaluated bedside by Podiatry during afternoon rounds.  Plan of care was discussed with patient regarding post hospital course.  Debridement was performed of bilateral hallux ulcers at bedside.  Dressings change.  Examination stable        Scheduled Meds:   allopurinoL  300 mg Oral Daily    atorvastatin  40 mg Oral QHS    cefTRIAXone (Rocephin) IV (PEDS and ADULTS)  2 g Intravenous Q24H    enoxparin  40 mg Subcutaneous Daily    flecainide  100 mg Oral Q12H    hydroCHLOROthiazide  25 mg Oral Daily    mupirocin   Nasal BID    pantoprazole  40 mg Oral BID    polyethylene glycol  17 g Oral Daily    valsartan  320 mg Oral Daily    vancomycin (VANCOCIN) IV (PEDS and ADULTS)  15 mg/kg Intravenous Q12H     Continuous Infusions:  PRN Meds:  Current Facility-Administered Medications:     acetaminophen, 650 mg, Oral, Q8H PRN    acetaminophen, 650 mg, Oral, Q4H PRN    albuterol-ipratropium, 3 mL, Nebulization, Q6H PRN    aluminum-magnesium hydroxide-simethicone, 30 mL, Oral, QID PRN    carisoprodoL, 350 mg, Oral, QID PRN    dextrose 50%, 12.5 g, Intravenous, PRN    dextrose 50%, 25 g, Intravenous, PRN    glucagon (human recombinant), 1 mg, Intramuscular, PRN    glucose, 16 g, Oral, PRN    glucose, 24 g, Oral, PRN    HYDROcodone-acetaminophen, 1 tablet, Oral, Q6H PRN    melatonin, 6 mg, Oral, Nightly PRN    naloxone, 0.02 mg, Intravenous, PRN    ondansetron, 8 mg, Oral, Q8H PRN    sodium chloride 0.9%, 10 mL, Intravenous, Q12H PRN    Pharmacy to dose Vancomycin consult, , , Once **AND** vancomycin - pharmacy to dose, , Intravenous, pharmacy to manage frequency    Review of Systems   Constitutional:  Negative for chills, diaphoresis and fever.   Respiratory:  Negative for shortness of breath.    Cardiovascular:  Negative for chest pain.   Gastrointestinal:  Negative for abdominal pain, diarrhea, nausea and vomiting.   Genitourinary:  Negative for dysuria and hematuria.   Skin:   Positive for color change and wound.     Objective:     Vital Signs (Most Recent):  Temp: 98.3 °F (36.8 °C) (05/08/25 1524)  Pulse: 67 (05/08/25 1524)  Resp: 18 (05/08/25 1524)  BP: 126/73 (05/08/25 1524)  SpO2: 98 % (05/08/25 1524) Vital Signs (24h Range):  Temp:  [97.9 °F (36.6 °C)-98.4 °F (36.9 °C)] 98.3 °F (36.8 °C)  Pulse:  [58-76] 67  Resp:  [18] 18  SpO2:  [93 %-98 %] 98 %  BP: (113-136)/(54-74) 126/73     Weight: 98.9 kg (218 lb 0.6 oz)  Body mass index is 31.28 kg/m².    Foot Exam    General  Orientation: alert and oriented to person, place, and time       Right Foot/Ankle     Inspection and Palpation  Skin Exam: skin changes and ulcer;     Neurovascular  Dorsalis pedis: 1+  Posterior tibial: 1+  Saphenous nerve sensation: diminished  Tibial nerve sensation: diminished  Superficial peroneal nerve sensation: diminished  Deep peroneal nerve sensation: diminished  Sural nerve sensation: diminished    Comments  Right foot: Patient has wound noted to the plantar aspect of the right great toe.  This of subcutaneous tissue.  Fibrogranular tissue noted.  No ascending erythema or swelling noted.  No probe to bone purulence fluctuance or crepitus noted at this time nontender to palpation    Left Foot/Ankle      Inspection and Palpation  Skin Exam: skin changes and ulcer;     Neurovascular  Dorsalis pedis: 1+  Posterior tibial: 1+  Saphenous nerve sensation: diminished  Tibial nerve sensation: diminished  Superficial peroneal nerve sensation: diminished  Deep peroneal nerve sensation: diminished  Sural nerve sensation: diminished    Comments  Patient has wound noted to the plantar aspect of left great toe.  No probe to bone noted.  Fibrogranular tissue noted.  No fluctuance crepitus or purulence noted at this time.  Mild erythema noted.  No other signs of any acute foot infection noted along the foot      Laboratory:  CBC:   Recent Labs   Lab 05/08/25  0420   WBC 10.31   RBC 3.97*   HGB 13.3*   HCT 39.0*       MCV 98   MCH 33.5*   MCHC 34.1     CMP:   Recent Labs   Lab 05/05/25  1144 05/06/25  0534 05/08/25  0420      < > 95   CALCIUM 9.1   < > 9.0   ALBUMIN 3.4*  --   --    PROT 7.0  --   --       < > 136   K 3.9   < > 3.4*   CO2 28   < > 29      < > 99   BUN 17   < > 11   CREATININE 0.8   < > 0.8   ALKPHOS 111  --   --    ALT 30  --   --    AST 27  --   --    BILITOT 1.1*  --   --     < > = values in this interval not displayed.     CRP:   Recent Labs   Lab 05/05/25  1144   CRP 6.2     ESR:   Recent Labs   Lab 05/05/25  1318   SEDRATE 36*     Microbiology Results (last 7 days)       Procedure Component Value Units Date/Time    Culture, Anaerobic [[id number]] Collected: 05/06/25 1451    Order Status: Completed Specimen: Bone from Toe, Left Foot Updated: 05/08/25 1102     Anaerobe Culture Culture In Progress    Aerobic culture [[id number]] Collected: 05/06/25 1451    Order Status: Completed Specimen: Bone from Toe, Left Foot Updated: 05/08/25 0810     CULTURE, AEROBIC No Growth To Date    Afb Culture Stain [[id number]] Collected: 05/06/25 1450    Order Status: Completed Specimen: Bone from Toe, Left Foot Updated: 05/07/25 1345     ACID FAST STAIN  No acid fast bacilli seen    Fungus culture [[id number]]  (Normal) Collected: 05/06/25 1450    Order Status: Completed Specimen: Bone from Toe, Left Foot Updated: 05/07/25 0643     Fungal Culture Culture In Progress    Gram stain [[id number]] Collected: 05/06/25 1450    Order Status: Completed Specimen: Bone from Toe, Left Foot Updated: 05/06/25 2229     GRAM STAIN Rare WBC seen      No organisms seen    AFB Culture & Smear [[id number]] Collected: 05/06/25 1450    Order Status: Resulted Specimen: Bone from Toe, Left Foot Updated: 05/06/25 1501          All pertinent labs reviewed within the last 24 hours.    Diagnostic Results:  I have reviewed all pertinent imaging results/findings within the past 24 hours.    Clinical Findings:  Left foot pre debridement    Left foot post debridement   Right foot pre debridement  Right foot post debridement

## 2025-05-08 NOTE — ASSESSMENT & PLAN NOTE
Ruled out  AFVSS  CBC with leukocytosis of 13; resolved  xray bilateral feet reveals soft tissue swelling along the 1st right toe and indolent erosion in the 1st left metatarsal head  VIGNESH's showed no significant stenosis  MR showed Bilateral hallux soft tissue ulcers, more prominent on the left. Left hallux distal phalanx bone marrow edema in the setting of adjacent ulcer suspicious for early osteomyelitis    Cont vanc/rocephin  ID consulted, appreciate recommendations  Podiatry consulted, appreciate recommendations  Dressed with Hydrafera ready foam, gauze, kerlix, Ace per podiatry  WBAT heel emphasis to transfer bilateral. Ambulate in darco shoes  Bone bx and culture performed per podiatry; results pending

## 2025-05-08 NOTE — NURSING
Patient discharged home. IV removed, catheter intact. AVS received. Education completed, patient demonstrated understanding. Medication delivered at the bedside. Patient voices no concerns or complaints. Patient transported home with family.

## 2025-05-08 NOTE — PLAN OF CARE
05/08/25 1304   Post-Acute Status   Post-Acute Authorization Home Health  (Home)   Home Health Status Patient declined/refused   Coverage [employer] Managed Medicare   Other Status No Post-Acute Service Needs   Hospital Resources/Appts/Education Provided Provided patient/caregiver with written discharge plan information;Appointments scheduled and added to AVS   Discharge Delays None known at this time   Discharge Plan   Discharge Plan A Home   Discharge Plan B Home;Home Health     SW met with Pt at bedside.  Pt stated that he is ready to go home and stated that either his son or daughter will pick him up from the hospital.    Pt declined offer of HH.       Discharge Plan A and Plan B have been determined by review of patient's clinical status, future medical and therapeutic needs, and coverage/benefits for post-acute care in coordination with multidisciplinary team members.     Woo Perera LCSW [facility]    [phone]

## 2025-05-08 NOTE — ASSESSMENT & PLAN NOTE
70-year-old male admitted out of concern for bilateral great toe wounds that have progressed.  Wound culture about 2 weeks ago showed MRSA but had no clinical signs of infection per Podiatry at that time.  He had been admitted for pneumonia and given Augmentin.  He was admitted from Podiatry Clinic out of concern for worsening wounds of the toes left greater than right.  MRI is concerning for left great toe osteomyelitis.  Initially on vanc and Zosyn now on vancomycin alone.  Podiatry is consulted and planning on doing bone biopsy.  He denies any abnormal exposure to his toes or water exposure though he has been washing his feet with dial soap.  He has a leukocytosis of unclear etiology and has been afebrile.  ABIs have been done and there is no concern for vascular compromise.    Bone biopsy done then culture with no growth. Pathology negative for osteomyelitis. Afebrile and white blood cell count has normalized.  CRP initially low at 6.2    Plan:  DC vancomycin and ceftriaxone   Rec DC on doxycycline 100mg po bid to complete 2 weeks (including inpt vanc) out of concern for cellulitis and MRSA infection given prior cx. EOC 5/20/25   Discussed with ID staff  We will sign off   FU next week on Thursday at 8am

## 2025-05-09 ENCOUNTER — TELEPHONE (OUTPATIENT)
Dept: PODIATRY | Facility: CLINIC | Age: 70
End: 2025-05-09
Payer: MEDICARE

## 2025-05-09 ENCOUNTER — TELEPHONE (OUTPATIENT)
Dept: INFECTIOUS DISEASES | Facility: CLINIC | Age: 70
End: 2025-05-09
Payer: MEDICARE

## 2025-05-09 NOTE — DISCHARGE SUMMARY
[facility] - Internal Medicine [facility] Medicine  Discharge Summary      Patient Name: Stevie Villalba  MRN: [id number]  AFIA: [id number]  Patient Class: IP- Inpatient  Admission Date: 4/22/2025  Hospital Length of Stay: 2 days  Discharge Date and Time: 4/25/2025  1:10 PM  Attending Physician: No att. providers found   Discharging Provider: Kash Bell MD  Primary Care Provider: Ric Brambila MD  [facility] Medicine [place] [facility] [place] [facility] [place] Kash Bell MD  Primary Care Team: [facility] [place] [facility] [place]    HPI:   Stevie Villalba is a 70 y.o. male with a PMHx of gout, HLD, pAFib, HTN, chronic low back pain with lumbar radiculopathy who [place] [facility] [place]. Family at bedside assist with history. Patient has been feeling generally unwell over the last few days with fatigue, poor PO intake, subjective fever and chills. He had full body shaking earlier today. He has bilateral big toe wounds which initially began when he got a pedicure a few weeks ago. Says the wounds appear much better and he feels they are healing well. No associated pain. He did have an epidural spinal injection in January from his back pain/ disc disease. Reports slight low back pain at the site of the injection earlier today. Denies numbness/tingling, bowel/bladder incontinence, saddle anesthesia. He does have a new cough which has been ongoing for the past 2-3 days. Denies chest pain, SOB, LE swelling, vision changes, abdominal pain, N/V or syncope.     ED: febrile to Tmax 101.1, vitals otherwise stable. Leukocytosis of WBC 13. CXR with possible pulm edema. UA non infectious. Given IV rocephin, vanc, 1.5L IVFs.     * No surgery found *      Hospital Course:   Mr. Villalba was admitted to Hospital Medicine for management of sepsis.  Underlying source of infection initially suspected to be skin/soft tissue/bone from bilateral toe wounds, with alternative considerations for pulmonary infection given patient's cough prior to admission and  consideration given to possible spinal infection with recent injection procedure.  He was covered broadly with empiric antimicrobials vancomycin and piperacillin-tazobactam.  Admission labs were remarkable for leukocytosis of 13, hyponatremia 131, and elevated .  Creatinine was at baseline 1.2, lactic acid was negative at 1.1.  Infectious workup including influenza and COVID swabs were negative, group a strep swab was negative.  Urinalysis showed no infection signs  chest film showed mild coarse interstitial attenuation. There is bilateral basilar subsegmental atelectasis.. No large focal consolidation.     Podiatry was consulted and evaluated his foot wounds, at this time have low concern for acute osteomyelitis.  X-rays of bilateral feet were performed showing nonspecific edema, otherwise no acute abnormality.    Over concern for likely pulmonary infection, sputum culture sample was ordered and repeat chest radiograph was ordered to ascertain focal consolidation now that sepsis physiology has been resuscitated.    Cultures are NGTD- on vanc and zosyn, feeling well, will plan on dc home today.     Goals of Care Treatment Preferences:  Code Status: Full Code         Consults:   Consults (From admission, onward)          Status Ordering Provider     Inpatient consult to Podiatry  Once        Provider:  (Not yet assigned)    Completed ARIES EVANS            Assessment & Plan  Bilateral great toes ulcers  - bilateral great toe ulcers from pedicure a few weeks ago  - no hx DM or PAD  - diabetes screen negative A1c   - podiatry consulted, recommendations appreciated    Hypertriglyceridemia  - continue statin   Primary hypertension  - now that sepsis physiology has resolved, okay to restart home metoprolol succinate  - metoprolol succinate 25 mg p.o. daily  History of prostate cancer  - noted hx, in remission per chart review  Paroxysmal atrial fibrillation  - patient has conflicting dispensed report,  reviewed documentation from Dr. Ric Brambila less than 1 month ago which states that patient should continue to take flecainide and metoprolol  - continue flecainide 100 mg p.o. b.i.d.  - continue metoprolol 25 mg p.o. daily  Gout  - continue allopurinol   Severe obstructive sleep apnea  - cpap hs     Class 1 obesity with body mass index (BMI) of 31.0 to 31.9 in adult  Body mass index is 33.43 kg/m². Morbid obesity complicates all aspects of disease management from diagnostic modalities to treatment. Weight loss encouraged and health benefits explained to patient.       Hypokalemia  Patient's most recent potassium results are listed below.   Recent Labs     05/07/25  0256 05/08/25  0420   K 3.7 3.4*     Plan  - Replete potassium per protocol  - Monitor potassium Daily  - Patient's hypokalemia is worsening. Will continue current treatment    Hyponatremia  Hyponatremia is likely due to Dehydration/hypovolemia. The patient's most recent sodium results are listed below.  Recent Labs     05/07/25  0256 05/08/25  0420    136     Plan  - Correct the sodium by 4-6mEq in 24 hours.   - hyponatremia treated with IV fluids  - Monitor sodium Daily.   - Patient hyponatremia is stable    Hypophosphatemia  Patient's most recent phosphorus results are listed below.   Recent Labs     05/07/25  0256 05/08/25  0420   PHOS 3.5 3.9     Plan  - Will treat hypophosphatemia with potassium phosphate supplement  - Patient's hypophosphatemia is worsening. Will continue current treatment      Final Active Diagnoses:    Diagnosis Date Noted POA    Hypokalemia [E87.6] 04/23/2025 Yes    Hyponatremia [E87.1] 04/23/2025 Yes    Hypophosphatemia [E83.39] 04/23/2025 Yes    Bilateral great toes ulcers [L97.519, L97.529] 04/22/2025 Yes    Gout [M10.9] 03/26/2023 Yes     Chronic    Severe obstructive sleep apnea [G47.33] 09/27/2022 Yes    Paroxysmal atrial fibrillation [I48.0] 08/11/2022 Yes     Chronic    Class 1 obesity with body mass index (BMI) of  31.0 to 31.9 in adult [E66.811, Z68.31] 08/11/2022 Not Applicable    History of prostate cancer [Z85.46] 08/18/2020 Not Applicable    Hypertriglyceridemia [E78.1] 09/08/2014 Yes     Chronic    Primary hypertension [I10] 09/08/2014 Yes      Problems Resolved During this Admission:    Diagnosis Date Noted Date Resolved POA    PRINCIPAL PROBLEM:  Sepsis [A41.9] 04/22/2025 04/28/2025 Yes    Pneumonia of left lower lobe due to infectious organism [J18.9] 04/23/2025 04/28/2025 Yes       Discharged Condition: good    Disposition: Home or Self Care    Follow Up:    Patient Instructions:   No discharge procedures on file.    Significant Diagnostic Studies: Labs: BMP:   Recent Labs   Lab 05/08/25  0420   GLU 95      K 3.4*   CL 99   CO2 29   BUN 11   CREATININE 0.8   CALCIUM 9.0   MG 1.7       Pending Diagnostic Studies:       Procedure Component Value Units Date/Time    HCV Virus Hold Specimen [[id number]] Collected: 04/22/25 1737    Order Status: Sent Lab Status: In process Updated: 04/22/25 1741    Specimen: Blood            Medications:  Reconciled Home Medications:      Medication List        PAUSE taking these medications      metoprolol succinate 25 MG 24 hr tablet  Wait to take this until your doctor or other care provider tells you to start again.  Commonly known as: TOPROL-XL  TAKE 1 TABLET BY MOUTH EVERY DAY            CONTINUE taking these medications      allopurinoL 300 MG tablet  Commonly known as: ZYLOPRIM  Take 1 tablet (300 mg total) by mouth once daily.     atorvastatin 40 MG tablet  Commonly known as: LIPITOR  TAKE 1 TABLET BY MOUTH EVERY DAY IN THE EVENING     carisoprodoL 350 MG tablet  Commonly known as: SOMA  Take 350 mg by mouth 4 (four) times daily as needed for Muscle spasms.     flecainide 100 MG Tab  Commonly known as: TAMBOCOR  Take 1 tablet (100 mg total) by mouth every 12 (twelve) hours.     GAVILAX 17 gram/dose powder  Generic drug: polyethylene glycol  Measure 17g with cap and mix with  "liquid. Then take by mouth 2 (two) times daily.     hydroCHLOROthiazide 25 MG tablet  Commonly known as: HYDRODIURIL  TAKE 1 TABLET BY MOUTH EVERY DAY     hydrocortisone 2.5 % rectal cream  Commonly known as: ANUSOL-HC  Place rectally 2 (two) times daily.     insulin syringe-needle U-100 0.5 mL 31 gauge x 5/16" Syrg  Use along with ICI therapy.     pantoprazole 40 MG tablet  Commonly known as: PROTONIX  TAKE 1 TABLET BY MOUTH TWICE A DAY     valsartan 320 MG tablet  Commonly known as: DIOVAN  TAKE 1 TABLET BY MOUTH ONCE DAILY.            ASK your doctor about these medications      amoxicillin-clavulanate 875-125mg 875-125 mg per tablet  Commonly known as: AUGMENTIN  Take 1 tablet by mouth every 12 (twelve) hours. for 5 days  Ask about: Should I take this medication?              Indwelling Lines/Drains at time of discharge:   Lines/Drains/Airways       None                   Time spent on the discharge of patient: 15 minutes         Kash Bell MD  Department of Hospital Medicine  [facility] - Internal Medicine Telemetry  "

## 2025-05-09 NOTE — ASSESSMENT & PLAN NOTE
Patient's most recent phosphorus results are listed below.   Recent Labs     05/07/25  0256 05/08/25  0420   PHOS 3.5 3.9     Plan  - Will treat hypophosphatemia with potassium phosphate supplement  - Patient's hypophosphatemia is worsening. Will continue current treatment

## 2025-05-09 NOTE — PLAN OF CARE
[facility] - Med Surg  Discharge Final Note    Primary Care Provider: Ric Brambila MD    Expected Discharge Date: 5/8/2025    Final Discharge Note (most recent)       Final Note - 05/09/25 0818          Final Note    Assessment Type Final Discharge Note (P)      Anticipated Discharge Disposition Home or Self Care (P)      Hospital Resources/Appts/Education Provided Provided patient/caregiver with written discharge plan information;Appointments scheduled and added to AVS (P)         Post-Acute Status    Coverage [employer] Man[employer] Medicare (P)      Other Status No Post-Acute Service Needs (P)      Discharge Delays None known at this time (P)                      Important Message from Medicare  Important Message from Medicare regarding Discharge Appeal Rights: Given to patient/caregiver, Explained to patient/caregiver, Signed/date by patient/caregiver     Date IMM was signed: 05/07/25  Time IMM was signed: 0145    Contact Info       Ric Brambila MD   Specialty: Internal Medicine   Relationship: PCP - General    [address]  [place] [id number]   Phone: [phone]       Next Steps: Follow up          Pt discharged home with no needs.  Pt declined of of .  Transportation provided by family.    Discharge Plan A and Plan B have been determined by review of patient's clinical status, future medical and therapeutic needs, and coverage/benefits for post-acute care in coordination with multidisciplinary team members.     Future Appointments   Date Time Provider Department Center   5/12/2025  8:30 AM [facility] [facility]   5/16/2025 10:00 AM Ric Brambila MD MET IM [place]   9/29/2025  8:20 AM Ric Brambila MD [facility] [place]    [phone]

## 2025-05-09 NOTE — ASSESSMENT & PLAN NOTE
Hyponatremia is likely due to Dehydration/hypovolemia. The patient's most recent sodium results are listed below.  Recent Labs     05/07/25  0256 05/08/25  0420    136     Plan  - Correct the sodium by 4-6mEq in 24 hours.   - hyponatremia treated with IV fluids  - Monitor sodium Daily.   - Patient hyponatremia is stable

## 2025-05-09 NOTE — ASSESSMENT & PLAN NOTE
Patient's most recent potassium results are listed below.   Recent Labs     05/07/25  0256 05/08/25  0420   K 3.7 3.4*     Plan  - Replete potassium per protocol  - Monitor potassium Daily  - Patient's hypokalemia is worsening. Will continue current treatment

## 2025-05-09 NOTE — TELEPHONE ENCOUNTER
Scheduled patient 5/15 at 8am with Niels, also mailed a letter reminder.    ----- Message from ROSANNA Aldrich sent at 5/8/2025  5:27 PM CDT -----  Regarding: FU up with me at 815-830 am thurs 5/15  70-year-old male admitted out of concern for bilateral great toe wounds that have progressed.  Wound culture about 2 weeks ago showed MRSA but had no clinical signs of infection per Podiatry at that time.  He had been admitted for pneumonia and given Augmentin.  He was admitted from Podiatry Clinic out of concern for worsening wounds of the toes left greater than right.  MRI is concerning for left great toe osteomyelitis.  Initially on vanc and Zosyn now on vancomycin alone.  Podiatry is consulted and planning on doing bone biopsy.  He denies any abnormal exposure to his toes or water exposure though he has been washing his feet with dial soap.  He has a leukocytosis of unclear etiology and has been afebrile.  ABIs have been done and there is no concern for vascular compromise.     Bone biopsy done then culture with no growth. Pathology negative for osteomyelitis. Afebrile and white blood cell count has normalized.  CRP initially low at 6.2     Plan:   · DC vancomycin and ceftriaxone   · Rec DC on doxycycline 100mg po bid to complete 2 weeks (including inpt vanc) out of concern for cellulitis and MRSA infection given prior cx.   · Discussed with ID staff   · We will sign off   · FU next week on Thursday at 8am

## 2025-05-10 LAB — BACTERIA SPEC AEROBE CULT: NO GROWTH

## 2025-05-12 ENCOUNTER — OFFICE VISIT (OUTPATIENT)
Dept: PODIATRY | Facility: CLINIC | Age: 70
End: 2025-05-12
Payer: MEDICARE

## 2025-05-12 ENCOUNTER — OFFICE VISIT (OUTPATIENT)
Dept: ORTHOPEDICS | Facility: CLINIC | Age: 70
End: 2025-05-12
Payer: MEDICARE

## 2025-05-12 VITALS
SYSTOLIC BLOOD PRESSURE: 135 MMHG | BODY MASS INDEX: 31.28 KG/M2 | HEIGHT: 70 IN | HEART RATE: 74 BPM | DIASTOLIC BLOOD PRESSURE: 85 MMHG

## 2025-05-12 DIAGNOSIS — L97.523 SKIN ULCER OF TOE OF LEFT FOOT WITH NECROSIS OF MUSCLE: Primary | ICD-10-CM

## 2025-05-12 DIAGNOSIS — L97.512 SKIN ULCER OF TOE OF RIGHT FOOT WITH FAT LAYER EXPOSED: ICD-10-CM

## 2025-05-12 DIAGNOSIS — M17.12 PRIMARY OSTEOARTHRITIS OF LEFT KNEE: ICD-10-CM

## 2025-05-12 DIAGNOSIS — R21 RASH: Primary | ICD-10-CM

## 2025-05-12 LAB — BACTERIA SPEC ANAEROBE CULT: NORMAL

## 2025-05-12 PROCEDURE — 11044 DBRDMT BONE 1ST 20 SQ CM/<: CPT | Mod: HCNC,S$GLB,, | Performed by: PODIATRIST

## 2025-05-12 PROCEDURE — 1101F PT FALLS ASSESS-DOCD LE1/YR: CPT | Mod: CPTII,HCNC,S$GLB, | Performed by: PODIATRIST

## 2025-05-12 PROCEDURE — 3288F FALL RISK ASSESSMENT DOCD: CPT | Mod: CPTII,HCNC,S$GLB, | Performed by: PHYSICIAN ASSISTANT

## 2025-05-12 PROCEDURE — 1125F AMNT PAIN NOTED PAIN PRSNT: CPT | Mod: CPTII,HCNC,S$GLB, | Performed by: PHYSICIAN ASSISTANT

## 2025-05-12 PROCEDURE — 99212 OFFICE O/P EST SF 10 MIN: CPT | Mod: HCNC,S$GLB,, | Performed by: PHYSICIAN ASSISTANT

## 2025-05-12 PROCEDURE — 1101F PT FALLS ASSESS-DOCD LE1/YR: CPT | Mod: CPTII,HCNC,S$GLB, | Performed by: PHYSICIAN ASSISTANT

## 2025-05-12 PROCEDURE — 3044F HG A1C LEVEL LT 7.0%: CPT | Mod: CPTII,HCNC,S$GLB, | Performed by: PHYSICIAN ASSISTANT

## 2025-05-12 PROCEDURE — 3288F FALL RISK ASSESSMENT DOCD: CPT | Mod: CPTII,HCNC,S$GLB, | Performed by: PODIATRIST

## 2025-05-12 PROCEDURE — 1111F DSCHRG MED/CURRENT MED MERGE: CPT | Mod: CPTII,HCNC,S$GLB, | Performed by: PODIATRIST

## 2025-05-12 PROCEDURE — 11042 DBRDMT SUBQ TIS 1ST 20SQCM/<: CPT | Mod: XS,HCNC,S$GLB, | Performed by: PODIATRIST

## 2025-05-12 PROCEDURE — 99999 PR PBB SHADOW E&M-EST. PATIENT-LVL IV: CPT | Mod: PBBFAC,HCNC,, | Performed by: PHYSICIAN ASSISTANT

## 2025-05-12 PROCEDURE — 3044F HG A1C LEVEL LT 7.0%: CPT | Mod: CPTII,HCNC,S$GLB, | Performed by: PODIATRIST

## 2025-05-12 PROCEDURE — 99214 OFFICE O/P EST MOD 30 MIN: CPT | Mod: 25,HCNC,S$GLB, | Performed by: PODIATRIST

## 2025-05-12 PROCEDURE — 3008F BODY MASS INDEX DOCD: CPT | Mod: CPTII,HCNC,S$GLB, | Performed by: PODIATRIST

## 2025-05-12 PROCEDURE — 3075F SYST BP GE 130 - 139MM HG: CPT | Mod: CPTII,HCNC,S$GLB, | Performed by: PODIATRIST

## 2025-05-12 PROCEDURE — 99999 PR PBB SHADOW E&M-EST. PATIENT-LVL IV: CPT | Mod: PBBFAC,HCNC,, | Performed by: PODIATRIST

## 2025-05-12 PROCEDURE — 4010F ACE/ARB THERAPY RXD/TAKEN: CPT | Mod: CPTII,HCNC,S$GLB, | Performed by: PODIATRIST

## 2025-05-12 PROCEDURE — 1111F DSCHRG MED/CURRENT MED MERGE: CPT | Mod: CPTII,HCNC,S$GLB, | Performed by: PHYSICIAN ASSISTANT

## 2025-05-12 PROCEDURE — 3079F DIAST BP 80-89 MM HG: CPT | Mod: CPTII,HCNC,S$GLB, | Performed by: PODIATRIST

## 2025-05-12 PROCEDURE — 4010F ACE/ARB THERAPY RXD/TAKEN: CPT | Mod: CPTII,HCNC,S$GLB, | Performed by: PHYSICIAN ASSISTANT

## 2025-05-12 PROCEDURE — 1126F AMNT PAIN NOTED NONE PRSNT: CPT | Mod: CPTII,HCNC,S$GLB, | Performed by: PODIATRIST

## 2025-05-12 PROCEDURE — 1159F MED LIST DOCD IN RCRD: CPT | Mod: CPTII,HCNC,S$GLB, | Performed by: PHYSICIAN ASSISTANT
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
## 2025-05-12 NOTE — PATIENT INSTRUCTIONS
Future Appointments   Date Time Provider Department Center   5/12/2025  8:30 AM Yvonne Davis PA-C [facility]TH[facility] [address]   5/12/2025  9:45 AM Hollie Rodriguez DPM [facility] POD [address]   5/15/2025  8:00 AM Niels Campbell Jr., PA [facility] ID [address]   5/15/2025  9:15 AM Yvonne Davis PA-C [facility] ORTHO [address]   5/16/2025 10:00 AM Ric Brambila MD [facility] IM [place]   9/29/2025  8:20 AM Ric Brambila MD [facility] IM [place]

## 2025-05-15 ENCOUNTER — OFFICE VISIT (OUTPATIENT)
Dept: INFECTIOUS DISEASES | Facility: CLINIC | Age: 70
End: 2025-05-15
Payer: MEDICARE

## 2025-05-15 ENCOUNTER — OFFICE VISIT (OUTPATIENT)
Dept: ORTHOPEDICS | Facility: CLINIC | Age: 70
End: 2025-05-15
Payer: MEDICARE

## 2025-05-15 VITALS
DIASTOLIC BLOOD PRESSURE: 77 MMHG | TEMPERATURE: 98 F | WEIGHT: 222 LBS | HEIGHT: 70 IN | BODY MASS INDEX: 31.78 KG/M2 | SYSTOLIC BLOOD PRESSURE: 124 MMHG | HEART RATE: 87 BPM

## 2025-05-15 DIAGNOSIS — L03.90 CELLULITIS, UNSPECIFIED CELLULITIS SITE: ICD-10-CM

## 2025-05-15 DIAGNOSIS — M17.12 PRIMARY OSTEOARTHRITIS OF LEFT KNEE: Primary | ICD-10-CM

## 2025-05-15 DIAGNOSIS — Z22.322 MRSA (METHICILLIN RESISTANT STAPH AUREUS) CULTURE POSITIVE: Primary | ICD-10-CM

## 2025-05-15 PROCEDURE — 3074F SYST BP LT 130 MM HG: CPT | Mod: CPTII,HCNC,S$GLB, | Performed by: PHYSICIAN ASSISTANT

## 2025-05-15 PROCEDURE — 3008F BODY MASS INDEX DOCD: CPT | Mod: CPTII,HCNC,S$GLB, | Performed by: PHYSICIAN ASSISTANT

## 2025-05-15 PROCEDURE — 99999 PR PBB SHADOW E&M-EST. PATIENT-LVL III: CPT | Mod: PBBFAC,HCNC,, | Performed by: PHYSICIAN ASSISTANT

## 2025-05-15 PROCEDURE — 99214 OFFICE O/P EST MOD 30 MIN: CPT | Mod: HCNC,S$GLB,, | Performed by: PHYSICIAN ASSISTANT

## 2025-05-15 PROCEDURE — 1101F PT FALLS ASSESS-DOCD LE1/YR: CPT | Mod: CPTII,HCNC,S$GLB, | Performed by: PHYSICIAN ASSISTANT

## 2025-05-15 PROCEDURE — 3288F FALL RISK ASSESSMENT DOCD: CPT | Mod: CPTII,HCNC,S$GLB, | Performed by: PHYSICIAN ASSISTANT

## 2025-05-15 PROCEDURE — 1126F AMNT PAIN NOTED NONE PRSNT: CPT | Mod: CPTII,HCNC,S$GLB, | Performed by: PHYSICIAN ASSISTANT

## 2025-05-15 PROCEDURE — 4010F ACE/ARB THERAPY RXD/TAKEN: CPT | Mod: CPTII,HCNC,S$GLB, | Performed by: PHYSICIAN ASSISTANT

## 2025-05-15 PROCEDURE — 1111F DSCHRG MED/CURRENT MED MERGE: CPT | Mod: CPTII,HCNC,S$GLB, | Performed by: PHYSICIAN ASSISTANT

## 2025-05-15 PROCEDURE — 3078F DIAST BP <80 MM HG: CPT | Mod: CPTII,HCNC,S$GLB, | Performed by: PHYSICIAN ASSISTANT

## 2025-05-15 PROCEDURE — 99999 PR PBB SHADOW E&M-EST. PATIENT-LVL IV: CPT | Mod: PBBFAC,HCNC,, | Performed by: PHYSICIAN ASSISTANT

## 2025-05-15 PROCEDURE — 3044F HG A1C LEVEL LT 7.0%: CPT | Mod: CPTII,HCNC,S$GLB, | Performed by: PHYSICIAN ASSISTANT

## 2025-05-15 RX ORDER — AMOXICILLIN AND CLAVULANATE POTASSIUM 875; 125 MG/1; MG/1
1 TABLET, FILM COATED ORAL 2 TIMES DAILY
Qty: 14 TABLET | Refills: 0 | Status: SHIPPED | OUTPATIENT
Start: 2025-05-15 | End: 2025-06-09

## 2025-05-15 RX ORDER — MUPIROCIN 20 MG/G
OINTMENT TOPICAL
Qty: 30 G | Refills: 1 | Status: SHIPPED | OUTPATIENT
Start: 2025-05-15

## 2025-05-15 RX ORDER — LIDOCAINE HYDROCHLORIDE 10 MG/ML
4 INJECTION, SOLUTION INFILTRATION; PERINEURAL
Status: DISCONTINUED | OUTPATIENT
Start: 2025-05-15 | End: 2025-05-15 | Stop reason: HOSPADM

## 2025-05-15 RX ORDER — TRIAMCINOLONE ACETONIDE 40 MG/ML
40 INJECTION, SUSPENSION INTRA-ARTICULAR; INTRAMUSCULAR
Status: DISCONTINUED | OUTPATIENT
Start: 2025-05-15 | End: 2025-05-15 | Stop reason: HOSPADM

## 2025-05-15 RX ORDER — MINOCYCLINE HYDROCHLORIDE 100 MG/1
100 CAPSULE ORAL 2 TIMES DAILY
Qty: 14 CAPSULE | Refills: 0 | Status: SHIPPED | OUTPATIENT
Start: 2025-05-15

## 2025-05-15 RX ADMIN — TRIAMCINOLONE ACETONIDE 40 MG: 40 INJECTION, SUSPENSION INTRA-ARTICULAR; INTRAMUSCULAR at 09:05

## 2025-05-15 RX ADMIN — LIDOCAINE HYDROCHLORIDE 4 ML: 10 INJECTION, SOLUTION INFILTRATION; PERINEURAL at 09:05
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
## 2025-05-15 NOTE — PROCEDURES
Large Joint Aspiration/Injection: L knee    Date/Time: 5/15/2025 9:15 AM    Performed by: Yvonne Davis PA-C  Authorized by: Yvonne Davis PA-C    Indications:  Arthritis and pain  Site marked: the procedure site was marked    Prep: patient was prepped and draped in usual sterile fashion      Local anesthesia used?: Yes    Local anesthetic:  Topical anesthetic    Details:  Needle Size:  22 G  Ultrasonic Guidance for needle placement?: No    Approach:  Lateral  Location:  Knee  Site:  L knee  Medications:  4 mL LIDOcaine HCL 10 mg/ml (1%) 10 mg/mL (1 %); 40 mg triamcinolone acetonide 40 mg/mL  Patient tolerance:  Patient tolerated the procedure well with no immediate complications

## 2025-05-16 ENCOUNTER — OFFICE VISIT (OUTPATIENT)
Dept: INTERNAL MEDICINE | Facility: CLINIC | Age: 70
End: 2025-05-16
Payer: MEDICARE

## 2025-05-16 VITALS
TEMPERATURE: 98 F | WEIGHT: 226.94 LBS | OXYGEN SATURATION: 98 % | BODY MASS INDEX: 32.49 KG/M2 | SYSTOLIC BLOOD PRESSURE: 130 MMHG | DIASTOLIC BLOOD PRESSURE: 76 MMHG | HEART RATE: 73 BPM | HEIGHT: 70 IN

## 2025-05-16 DIAGNOSIS — L97.509 ULCER OF TOE, UNSPECIFIED LATERALITY, UNSPECIFIED ULCER STAGE: ICD-10-CM

## 2025-05-16 DIAGNOSIS — L03.90 CELLULITIS, UNSPECIFIED CELLULITIS SITE: ICD-10-CM

## 2025-05-16 DIAGNOSIS — Z09 HOSPITAL DISCHARGE FOLLOW-UP: Primary | ICD-10-CM

## 2025-05-16 DIAGNOSIS — Z22.322 MRSA (METHICILLIN RESISTANT STAPH AUREUS) CULTURE POSITIVE: ICD-10-CM

## 2025-05-16 DIAGNOSIS — T36.4X5A ADVERSE EFFECT OF DOXYCYCLINE, INITIAL ENCOUNTER: ICD-10-CM

## 2025-05-16 PROCEDURE — 99999 PR PBB SHADOW E&M-EST. PATIENT-LVL V: CPT | Mod: PBBFAC,HCNC,, | Performed by: INTERNAL MEDICINE

## 2025-05-16 NOTE — PROGRESS NOTES
Assessment:       1. Hospital discharge follow-up    2. MRSA (methicillin resistant staph aureus) culture positive    3. Cellulitis, unspecified cellulitis site    4. Ulcer of toe, unspecified laterality, unspecified ulcer stage    5. Adverse effect of doxycycline, initial encounter  - Ambulatory referral/consult to Allergy; Future        Plan:       1/2/3.  Continue Augmentin 875 mg twice daily, minocycline 100 mg twice daily, Bactroban ointment per Infectious Disease.    4. Follow up with Podiatry.    5. Refer to allergy.    Deep Scribe:  IMPRESSION:  1. Reviewed hospital records and progress notes due to lack of discharge summary.  2. Rash likely caused by previously prescribed doxycycline.  3. Toe wound improved based on recent podiatry visit and uploaded images.  4. Discontinued doxycycline due to rash and continued with newly prescribed antibiotics.  5. Noted upcoming trip to [place], factoring this into treatment decisions.  6. Pneumonia improved based on recent exam.    SUMMARY:   Discontinue doxycycline due to rash   Prescribe minocycline as alternative antibiotic   Continue Augmentin for infection treatment   Prescribe topical Bactroban for affected area   Refer to allergy specialist for doxycycline evaluation after trip   Advise limiting activities and avoiding excessive alcohol during trip   Instruct patient to avoid submerging feet in water   Follow-up with podiatry on 05/26/2025   Follow-up with infectious disease doctor after returning from trip    CHRONIC ULCER OF FOOT:   Monitored patient's toe wound which initially looked terrible based on pictures from infectious disease on the seventh showing worsening, though skin is now starting to grow in where it was, indicating some improvement.   Recent pictures from podiatrist confirm the toe is looking better.   Advised patient to avoid submerging feet in water, limit physical activity and walking to prevent further complications.   Prescribed Augmentin  for infection treatment and topical Bactroban to be applied in small amounts to the affected area and rewrapped with bandage.   Scheduled follow-up with podiatry on 05/26/2025 and with infectious disease doctor after returning from trip.   Instructed patient to contact the office if any concerns arise during the trip.    ADVERSE EFFECT OF TETRACYCLINES:   Mr. Villalba developed a rash from doxycycline, which was taken every 12 hours.   Evaluated and confirmed the rash was attributed to doxycycline.   Advised discontinuation of doxycycline and prescribed minocycline as an alternative antibiotic.   Referred patient to an allergy specialist for evaluation after returning from trip to determine if they are allergic to doxycycline or if it can be used in the future.    HISTORY OF PNEUMONIA:   Mr. Villalba had pneumonia which was treated with Augmentin during hospital admission and is now feeling better with lungs assessed as doing well.   Continuing Augmentin treatment and adding minocycline as additional antibiotic therapy.   Discussed need to limit activities during recovery, particularly while on vacation, and advised patient to avoid excessive alcohol during trip.   Follow-up with infectious disease doctor scheduled after returning from trip.                 This note was generated with the assistance of ambient listening technology. Verbal consent was obtained by the patient and accompanying visitor(s) for the recording of patient appointment to facilitate this note. I attest to having reviewed and edited the generated note for accuracy, though some syntax or spelling errors may persist. Please contact the author of this note for any clarification.       Subjective:       Patient ID: Stevie Villalba is a 70 y.o. male.    Chief Complaint: Hospital Follow Up    HPI    70-year-old male here for hospital follow-up.  Discharged 05/08/2025.    Family and/or Caretaker present at visit?  No.  Diagnostic tests  reviewed/disposition: I have reviewed all completed as well as pending diagnostic tests at the time of discharge.  Disease/illness education:  Osteomyelitis  Home health/community services discussion/referrals: Patient does not have home health established from hospital visit.  They do not need home health.  If needed, we will set up home health for the patient.   Establishment or re-establishment of referral orders for community resources: No other necessary community resources.   Discussion with other health care providers: No discussion with other health care providers necessary.  I reviewed and reconciled the medications from hospital discharge.    History of Present Illness    CHIEF COMPLAINT:  Mr. Villalba presents today for hospital follow-up    RECENT HOSPITALIZATION:  He was recently hospitalized for pneumonia and worsening toe wounds, requiring transfer from podiatry clinic. During hospitalization, he received IV vancomycin and Rocephin. Podiatry performed biopsy and debridement of toe ulcers. He was discharged on 05/08/2025.    MEDICATIONS AND ADVERSE REACTIONS:  He is currently taking Augmentin, minocycline, and a topical antibiotic cream. He developed a rash after starting doxycycline at home (dosed twice daily at 8 AM and 8 PM), leading to discontinuation. He notes no rash occurred with previous antibiotics during hospitalization.      ROS:  Integumentary: +rash         Discharge summary:  None available on discharge follow-up.    Last ID note:     Oncology  Leukocytosis  Source unclear but may be due to toe infection - now normalized  Continue antibiotics  We will trend     Orthopedic  Bilateral great toes ulcers  70-year-old male admitted out of concern for bilateral great toe wounds that have progressed.  Wound culture about 2 weeks ago showed MRSA but had no clinical signs of infection per Podiatry at that time.  He had been admitted for pneumonia and given Augmentin.  He was admitted from Podiatry  Clinic out of concern for worsening wounds of the toes left greater than right.  MRI is concerning for left great toe osteomyelitis.  Initially on vanc and Zosyn now on vancomycin alone.  Podiatry is consulted and planning on doing bone biopsy.  He denies any abnormal exposure to his toes or water exposure though he has been washing his feet with dial soap.  He has a leukocytosis of unclear etiology and has been afebrile.  ABIs have been done and there is no concern for vascular compromise.     Bone biopsy done then culture with no growth. Pathology negative for osteomyelitis. Afebrile and white blood cell count has normalized.  CRP initially low at 6.2     Plan:  DC vancomycin and ceftriaxone   Rec DC on doxycycline 100mg po bid to complete 2 weeks (including inpt vanc) out of concern for cellulitis and MRSA infection given prior cx. EOC 5/20/25   Discussed with ID staff  We will sign off   FU next week on Thursday at 8am    Last hospitalist note:  Osteomyelitis of great toe of left foot  Bilateral great toes ulcers  Leukocytosis  AFVSS  CBC with leukocytosis of 13; resolved  xray bilateral feet reveals soft tissue swelling along the 1st right toe and indolent erosion in the 1st left metatarsal head  VIGNESH's showed no significant stenosis  MR showed Bilateral hallux soft tissue ulcers, more prominent on the left. Left hallux distal phalanx bone marrow edema in the setting of adjacent ulcer suspicious for early osteomyelitis     Cont vanc/rocephin  ID consulted, appreciate recommendations  Podiatry consulted, appreciate recommendations  Dressed with Hydrafera ready foam, gauze, kerlix, Ace per podiatry  WBAT heel emphasis to transfer bilateral. Ambulate in darco shoes  Bone bx and culture performed per podiatry; results pending     Hypertriglyceridemia  Continue statin     Primary hypertension  Chronic, controlled  Continue home HCTZ and valsartan     Lumbago  Continue home prn pain medications     Paroxysmal atrial  fibrillation  Patient has paroxysmal (<7 days) atrial fibrillation. Patient is currently in sinus rhythm. [id number] Score: 1.   Continue home flecainide BID  Previously on eliquis, no longer taking; unclear why     Class 1 obesity with body mass index (BMI) of 31.0 to 31.9 in adult  Body mass index is 31.28 kg/m². Morbid obesity complicates all aspects of disease management from diagnostic modalities to treatment. Weight loss encouraged and health benefits explained to patient  Severe obstructive sleep apnea  CPAP QHS     Gout  Continue home allopurinol    Review of Systems          Objective:      Physical Exam  Vitals reviewed.   Constitutional:       Appearance: He is well-developed.   HENT:      Head: Normocephalic and atraumatic.      Mouth/Throat:      Pharynx: No oropharyngeal exudate.   Eyes:      General: No scleral icterus.        Right eye: No discharge.         Left eye: No discharge.      Pupils: Pupils are equal, round, and reactive to light.   Neck:      Thyroid: No thyromegaly.      Trachea: No tracheal deviation.   Cardiovascular:      Rate and Rhythm: Normal rate and regular rhythm.      Heart sounds: Normal heart sounds. No murmur heard.     No friction rub. No gallop.   Pulmonary:      Effort: Pulmonary effort is normal. No respiratory distress.      Breath sounds: Normal breath sounds. No wheezing or rales.   Chest:      Chest wall: No tenderness.   Abdominal:      General: Bowel sounds are normal. There is no distension.      Palpations: Abdomen is soft. There is no mass.      Tenderness: There is no abdominal tenderness. There is no guarding or rebound.   Musculoskeletal:         General: No tenderness. Normal range of motion.      Cervical back: Normal range of motion and neck supple.   Skin:     General: Skin is warm and dry.      Coloration: Skin is not pale.      Findings: No erythema or rash.   Neurological:      Mental Status: He is alert and oriented to person, place, and time.    Psychiatric:         Behavior: Behavior normal.

## 2025-05-20 LAB — MYCOBACTERIUM SPEC QL CULT: NORMAL

## 2025-05-20 NOTE — DISCHARGE SUMMARY
[facility]  Discharge Summary      Patient Name: Stevie Villalba  MRN: [id number]  AFIA: [id number]  Patient Class: IP- Inpatient  Admission Date: 5/5/2025  Hospital Length of Stay: 3 days  Discharge Date and Time: 5/8/2025  6:09 PM  Attending Physician: Taniya att. providers found   Discharging Provider: Gurinder Mendoza DO  Primary Care Provider: Ric Brambila MD  [facility] Medicine Team: [facility] R Gurinder Mendoza DO  Primary Care Team: [facility] MED R    HPI:   Stevie Villalba is a 70 y.o. male with afib on flecainide (no longer on eliquis, unclear why), hypertension, HLD, gout and chronic back pain being admitted to hospital for bilateral great toe ulcers. Patient reports the ulcers initially started after he went to a nail salon for a pedicure where they scraped off both great toe callouses causing wounds. As the wounds started to heal he continued to pull of the dead/healing skin because he thought that would help. He has been washing the ulcers by scrubbing them with water and soap while in the shower. Initially the ulcers were starting to improve but now have worsened redness and swelling over the past few days. He was seen in podiatry clinic today and was advised to come to the ED for further workup and treatment. He denies any pain or purulent drainage, no fever, chills, abdominal pain, N/V/D, chest pain or shortness of breath. He does not smoke or drink.      In ED: AFVSS. CBC with leukocytosis if 13.78. CMP unremarkable. ESR 36, CRP 6.2. Bilateral foot xray reveals soft tissue swelling along the 1st right toe and indolent erosion in the 1st left metatarsal head. Started on IV vanc and zosyn in the ED. Podiatry consulted, recommended admission with MRI to rule out osteo.     * No surgery found *      Hospital Course:   Admitted for bilateral great toe wounds with associated SSTI and concern for underlying osteo.  Started on vanc/rocephin.  VIGNESH's showed no flow limiting stenosis.   XR's looked ok.  MR showed possible developing osteo of the left hallux distal phalanx.  ID consulted.  Podiatry following.  Bone bx and culture pending.  Bone culture resulted negative for osteo.  ID recommended discharging on oral doxy for total 14 days for SSTI.  Pt deemed appropriate for discharge; seen and examined prior to departure.  Plan discussed with pt, who was agreeable and amenable; medications were discussed and reviewed, outpatient follow-up scheduled, ER precautions were given, all questions were answered to the pt's satisfaction, and was subsequently discharged.     Goals of Care Treatment Preferences:  Code Status: Full Code      SDOH Screening:  The patient was screened for utility difficulties, food insecurity, transport difficulties, housing insecurity, and interpersonal safety and there were no concerns identified this admission.     Consults:   Consults (From admission, onward)          Status Ordering Provider     Inpatient consult to Infectious Diseases  Once        Provider:  Naz Reynolds MD    Completed ZEE FRANKLIN     Inpatient consult to Podiatry  Once        Provider:  (Not yet assigned)    Completed JORGE AGUILAR     Inpatient consult to Podiatry  Once        Provider:  (Not yet assigned)    Completed LEX LEWIS            Assessment & Plan  Osteomyelitis of great toe of left foot  Bilateral great toes ulcers  Leukocytosis  Ruled out  AFVSS  CBC with leukocytosis of 13; resolved  xray bilateral feet reveals soft tissue swelling along the 1st right toe and indolent erosion in the 1st left metatarsal head  VIGNESH's showed no significant stenosis  MR showed Bilateral hallux soft tissue ulcers, more prominent on the left. Left hallux distal phalanx bone marrow edema in the setting of adjacent ulcer suspicious for early osteomyelitis    Cont vanc/rocephin  ID consulted, appreciate recommendations  Podiatry consulted, appreciate recommendations  Dressed with Hydrafera ready  foam, gauze, kerlix, Ace per podiatry  WBAT heel emphasis to transfer bilateral. Ambulate in darco shoes  Bone bx and culture performed per podiatry; results pending    Hypertriglyceridemia  Continue statin    Primary hypertension  Chronic, controlled  Continue home HCTZ and valsartan    Lumbago  Continue home prn pain medications    Paroxysmal atrial fibrillation  Patient has paroxysmal (<7 days) atrial fibrillation. Patient is currently in sinus rhythm. [id number] Score: 1.   Continue home flecainide BID  Previously on eliquis, no longer taking; unclear why    Class 1 obesity with body mass index (BMI) of 31.0 to 31.9 in adult  Body mass index is 31.28 kg/m². Morbid obesity complicates all aspects of disease management from diagnostic modalities to treatment. Weight loss encouraged and health benefits explained to patient  Severe obstructive sleep apnea  CPAP QHS    Gout  Continue home allopurinol    Hypokalemia  Resolved    Cellulitis  AFVSS  CBC with leukocytosis of 13; resolved  xray bilateral feet reveals soft tissue swelling along the 1st right toe and indolent erosion in the 1st left metatarsal head  VIGNESH's showed no significant stenosis  MR showed Bilateral hallux soft tissue ulcers, more prominent on the left. Left hallux distal phalanx bone marrow edema in the setting of adjacent ulcer suspicious for early osteomyelitis    Cont vanc/rocephin  ID consulted, appreciate recommendations  Podiatry consulted, appreciate recommendations  Dressed with Hydrafera ready foam, gauze, kerlix, Ace per podiatry  WBAT heel emphasis to transfer bilateral. Ambulate in darco shoes  Bone bx negative for osteo  Culture sterile to date  ID recommending doxy 100mg BID x14 days  Will follow up outpatient with ID  Final Active Diagnoses:    Diagnosis Date Noted POA    PRINCIPAL PROBLEM:  Cellulitis [L03.90] 05/08/2025 Yes    Osteomyelitis of great toe of left foot [M86.9] 05/07/2025 No    Leukocytosis [D72.829] 05/05/2025 Yes     Hypokalemia [E87.6] 04/23/2025 Yes    Bilateral great toes ulcers [L97.519, L97.529] 04/22/2025 Yes    Gout [M10.9] 03/26/2023 Yes     Chronic    Severe obstructive sleep apnea [G47.33] 09/27/2022 Yes    Class 1 obesity with body mass index (BMI) of 31.0 to 31.9 in adult [E66.811, Z68.31] 08/11/2022 Not Applicable    Paroxysmal atrial fibrillation [I48.0] 08/11/2022 Yes     Chronic    Primary hypertension [I10] 09/08/2014 Yes    Hypertriglyceridemia [E78.1] 09/08/2014 Yes     Chronic    Lumbago [M54.50] 09/08/2014 Yes      Problems Resolved During this Admission:       Discharged Condition: good    Disposition: Home or Self Care    Follow Up:   Follow-up Information       Ric Brambila MD Follow up.    Specialty: Internal Medicine  Contact information:  [address] [facility] [id number] [phone]                           Patient Instructions:      Diet Adult Regular     Leave dressing on - Keep it clean, dry, and intact until clinic visit     Activity as tolerated       Significant Diagnostic Studies: N/A    Pending Diagnostic Studies:       None           Medications:  Reconciled Home Medications:      Medication List        PAUSE taking these medications      metoprolol succinate 25 MG 24 hr tablet  Wait to take this until your doctor or other care provider tells you to start again.  Commonly known as: TOPROL-XL  TAKE 1 TABLET BY MOUTH EVERY DAY            START taking these medications      doxycycline 100 MG Cap  Commonly known as: VIBRAMYCIN  Take 1 capsule (100 mg total) by mouth every 12 (twelve) hours. for 12 days  Notes to patient: Take this medication sitting up or standing up with a full glass of water and some food in your stomach. Do not lay down for one hour after taking this medication. Do not consume milk or other calcium containing products with this medication.             CONTINUE taking these medications      allopurinoL 300 MG tablet  Commonly known as: ZYLOPRIM  Take 1 tablet  "(300 mg total) by mouth once daily.     atorvastatin 40 MG tablet  Commonly known as: LIPITOR  TAKE 1 TABLET BY MOUTH EVERY DAY IN THE EVENING     carisoprodoL 350 MG tablet  Commonly known as: SOMA  Take 350 mg by mouth 4 (four) times daily as needed for Muscle spasms.     flecainide 100 MG Tab  Commonly known as: TAMBOCOR  Take 1 tablet (100 mg total) by mouth every 12 (twelve) hours.     GAVILAX 17 gram/dose powder  Generic drug: polyethylene glycol  Measure 17g with cap and mix with liquid. Then take by mouth 2 (two) times daily.     hydroCHLOROthiazide 25 MG tablet  Commonly known as: HYDRODIURIL  TAKE 1 TABLET BY MOUTH EVERY DAY     HYDROcodone-acetaminophen  mg per tablet  Commonly known as: NORCO  Take 1 tablet by mouth every 6 (six) hours as needed.     hydrocortisone 2.5 % rectal cream  Commonly known as: ANUSOL-HC  Place rectally 2 (two) times daily.     insulin syringe-needle U-100 0.5 mL 31 gauge x 5/16" Syrg  Use along with ICI therapy.     meloxicam 7.5 MG tablet  Commonly known as: MOBIC  TAKE 1 TABLET BY MOUTH EVERY DAY     pantoprazole 40 MG tablet  Commonly known as: PROTONIX  TAKE 1 TABLET BY MOUTH TWICE A DAY     valsartan 320 MG tablet  Commonly known as: DIOVAN  TAKE 1 TABLET BY MOUTH ONCE DAILY.            STOP taking these medications      colchicine 0.6 mg tablet  Commonly known as: COLCRYS              Indwelling Lines/Drains at time of discharge:   Lines/Drains/Airways       None                   Time spent on the discharge of patient: >35 minutes         Gurinder Mendoza DO  Department of Hospital Medicine  [facility]  "

## 2025-05-20 NOTE — ASSESSMENT & PLAN NOTE
Patient has paroxysmal (<7 days) atrial fibrillation. Patient is currently in sinus rhythm. [id number] Score: 1.   Continue home flecainide BID  Previously on eliquis, no longer taking; unclear why

## 2025-05-22 LAB — FUNGUS SPEC CULT: NORMAL

## 2025-05-26 ENCOUNTER — OFFICE VISIT (OUTPATIENT)
Dept: PODIATRY | Facility: CLINIC | Age: 70
End: 2025-05-26
Payer: MEDICARE

## 2025-05-26 VITALS
SYSTOLIC BLOOD PRESSURE: 114 MMHG | BODY MASS INDEX: 32.57 KG/M2 | DIASTOLIC BLOOD PRESSURE: 62 MMHG | HEIGHT: 70 IN | HEART RATE: 71 BPM

## 2025-05-26 DIAGNOSIS — L97.519 BILATERAL GREAT TOES ULCERS: Primary | ICD-10-CM

## 2025-05-26 DIAGNOSIS — G60.9 IDIOPATHIC PERIPHERAL NEUROPATHY: ICD-10-CM

## 2025-05-26 DIAGNOSIS — L97.529 BILATERAL GREAT TOES ULCERS: Primary | ICD-10-CM

## 2025-05-26 PROCEDURE — 99999 PR PBB SHADOW E&M-EST. PATIENT-LVL IV: CPT | Mod: PBBFAC,HCNC,, | Performed by: PODIATRIST

## 2025-05-26 NOTE — PROGRESS NOTES
Subjective:      Patient ID: Stevie Villalba is a 70 y.o. male.    Chief Complaint: Wound Care (B/L hallux)    Stevie is a 70 y.o. male who presents to the clinic for evaluation and treatment of high risk feet. Stevie has a past medical history of Hyperlipidemia, Hypertension, Insomnia, and Lumbago. The patient's chief complaint is foot ulcer, both hallux nails. This patient has documented high risk feet requiring routine maintenance secondary to peripheral neuropathy.    PCP: Ric Brambila MD    Date Last Seen by PCP:   Chief Complaint   Patient presents with    Wound Care     B/L hallux         Current shoe gear:  Affected Foot: Slip-on shoes     Unaffected Foot: Slip-on shoes    History of Trauma: negative  Sign of Infection: none    Hemoglobin A1C   Date Value Ref Range Status   09/30/2024 5.3 4.0 - 5.6 % Final     Comment:     ADA Screening Guidelines:  5.7-6.4%  Consistent with prediabetes  >or=6.5%  Consistent with diabetes    High levels of fetal hemoglobin interfere with the HbA1C  assay. Heterozygous hemoglobin variants (HbS, HgC, etc)do  not significantly interfere with this assay.   However, presence of multiple variants may affect accuracy.     09/25/2023 5.2 4.0 - 5.6 % Final     Comment:     ADA Screening Guidelines:  5.7-6.4%  Consistent with prediabetes  >or=6.5%  Consistent with diabetes    High levels of fetal hemoglobin interfere with the HbA1C  assay. Heterozygous hemoglobin variants (HbS, HgC, etc)do  not significantly interfere with this assay.   However, presence of multiple variants may affect accuracy.     09/12/2022 5.3 4.0 - 5.6 % Final     Comment:     ADA Screening Guidelines:  5.7-6.4%  Consistent with prediabetes  >or=6.5%  Consistent with diabetes    High levels of fetal hemoglobin interfere with the HbA1C  assay. Heterozygous hemoglobin variants (HbS, HgC, etc)do  not significantly interfere with this assay.   However, presence of multiple variants may affect accuracy.        Hemoglobin A1c   Date Value Ref Range Status   05/05/2025 5.2 4.0 - 5.6 % Final     Comment:     ADA Screening Guidelines:  5.7-6.4%  Consistent with prediabetes  >=6.5%  Consistent with diabetes    High levels of fetal hemoglobin interfere with the HbA1C  assay. Heterozygous hemoglobin variants (HbS, HgC, etc)do  not significantly interfere with this assay.   However, presence of multiple variants may affect accuracy.   04/23/2025 5.1 4.0 - 5.6 % Final     Comment:     ADA Screening Guidelines:  5.7-6.4%  Consistent with prediabetes  >=6.5%  Consistent with diabetes    High levels of fetal hemoglobin interfere with the HbA1C  assay. Heterozygous hemoglobin variants (HbS, HgC, etc)do  not significantly interfere with this assay.   However, presence of multiple variants may affect accuracy.       Review of Systems   Constitutional: Negative for chills, fever and malaise/fatigue.   HENT:  Negative for hearing loss.    Cardiovascular:  Negative for claudication.   Respiratory:  Negative for shortness of breath.    Skin:  Positive for poor wound healing. Negative for flushing and rash.   Musculoskeletal:  Negative for joint pain and myalgias.   Gastrointestinal:  Negative for nausea and vomiting.   Neurological:  Positive for sensory change. Negative for loss of balance, numbness and paresthesias.   Psychiatric/Behavioral:  Negative for altered mental status.    Allergic/Immunologic: Negative for hives.           Objective:      Physical Exam  Cardiovascular:      Pulses:           Dorsalis pedis pulses are 1+ on the right side and 1+ on the left side.        Posterior tibial pulses are 2+ on the right side and 2+ on the left side.   Musculoskeletal:      Right foot: Decreased range of motion. Deformity present.      Left foot: Decreased range of motion. Deformity present.      Comments:        Feet:      Right foot:      Skin integrity: Ulcer present.      Left foot:      Skin integrity: Ulcer present.   Skin:      General: Skin is warm.      Capillary Refill: Capillary refill takes 2 to 3 seconds.      Comments: Ulceration  Location: right hallux  Measurements: 1.5x 1.5x 0.2cm  Drainage: serous  Wound base: granular  SOI: mild erythema       Neurological:      Mental Status: He is oriented to person, place, and time.      Sensory: Sensory deficit present.                   Ulceration  Location: left hallux  Measurements: 2.6x 2.0x 0.4cm  Drainage: serosangious  Wound base: fibrogranular  SOI: erythema        Assessment:       Encounter Diagnoses   Name Primary?    Skin ulcer of toe of left foot with necrosis of muscle Yes    Skin ulcer of toe of right foot with fat layer exposed          Plan:       Stevie was seen today for wound care.    Diagnoses and all orders for this visit:    Skin ulcer of toe of left foot with necrosis of muscle    Skin ulcer of toe of right foot with fat layer exposed      I counseled the patient on his conditions, their implications and medical management.    Ulceration on left hallux debrided through muscle tissue using an tissue nipper. Ulceration debrided down to healthy tissue. Pt tolerated debridement well.     Ulceration on right hallux debrided through sub-q tissue using an tissue nipper. Ulceration debrided down to healthy tissue. Pt tolerated debridement well.     [place] going [place] the [place], home instructions given on how to care for wounds, pt has supplied  Pt strongly advised to not get wounds dirty or wet in any type of water, pt voiced understanding    B/L DSD applied     Pt is under the care of ID    RTC in 1 week or sooner if any new pedal problems arise, any signs of infection occur, or if condition worsens.

## 2025-05-26 NOTE — PROGRESS NOTES
Subjective:      Patient ID: Stevie Villalba is a 70 y.o. male.    Chief Complaint: Wound Care (B/L hallux, just back from [place] trip)    Stevie is a 70 y.o. male who presents to the clinic for evaluation and treatment of high risk feet. Stevie has a past medical history of Hyperlipidemia, Hypertension, Insomnia, and Lumbago. The patient's chief complaint is foot ulcer, both hallux nails. This patient has documented high risk feet requiring routine maintenance secondary to peripheral neuropathy.    PCP: Ric Brambila MD    Date Last Seen by PCP:   Chief Complaint   Patient presents with    Wound Care     B/L hallux, just back from [place] trip         Current shoe gear:  Affected Foot: Slip-on shoes     Unaffected Foot: Slip-on shoes    History of Trauma: negative  Sign of Infection: none    Hemoglobin A1C   Date Value Ref Range Status   09/30/2024 5.3 4.0 - 5.6 % Final     Comment:     ADA Screening Guidelines:  5.7-6.4%  Consistent with prediabetes  >or=6.5%  Consistent with diabetes    High levels of fetal hemoglobin interfere with the HbA1C  assay. Heterozygous hemoglobin variants (HbS, HgC, etc)do  not significantly interfere with this assay.   However, presence of multiple variants may affect accuracy.     09/25/2023 5.2 4.0 - 5.6 % Final     Comment:     ADA Screening Guidelines:  5.7-6.4%  Consistent with prediabetes  >or=6.5%  Consistent with diabetes    High levels of fetal hemoglobin interfere with the HbA1C  assay. Heterozygous hemoglobin variants (HbS, HgC, etc)do  not significantly interfere with this assay.   However, presence of multiple variants may affect accuracy.     09/12/2022 5.3 4.0 - 5.6 % Final     Comment:     ADA Screening Guidelines:  5.7-6.4%  Consistent with prediabetes  >or=6.5%  Consistent with diabetes    High levels of fetal hemoglobin interfere with the HbA1C  assay. Heterozygous hemoglobin variants (HbS, HgC, etc)do  not significantly interfere with this assay.   However, presence  of multiple variants may affect accuracy.       Hemoglobin A1c   Date Value Ref Range Status   05/05/2025 5.2 4.0 - 5.6 % Final     Comment:     ADA Screening Guidelines:  5.7-6.4%  Consistent with prediabetes  >=6.5%  Consistent with diabetes    High levels of fetal hemoglobin interfere with the HbA1C  assay. Heterozygous hemoglobin variants (HbS, HgC, etc)do  not significantly interfere with this assay.   However, presence of multiple variants may affect accuracy.   04/23/2025 5.1 4.0 - 5.6 % Final     Comment:     ADA Screening Guidelines:  5.7-6.4%  Consistent with prediabetes  >=6.5%  Consistent with diabetes    High levels of fetal hemoglobin interfere with the HbA1C  assay. Heterozygous hemoglobin variants (HbS, HgC, etc)do  not significantly interfere with this assay.   However, presence of multiple variants may affect accuracy.       Review of Systems   Constitutional: Negative for chills, fever and malaise/fatigue.   HENT:  Negative for hearing loss.    Cardiovascular:  Negative for claudication.   Respiratory:  Negative for shortness of breath.    Skin:  Positive for poor wound healing. Negative for flushing and rash.   Musculoskeletal:  Negative for joint pain and myalgias.   Gastrointestinal:  Negative for nausea and vomiting.   Neurological:  Positive for sensory change. Negative for loss of balance, numbness and paresthesias.   Psychiatric/Behavioral:  Negative for altered mental status.    Allergic/Immunologic: Negative for hives.           Objective:      Physical Exam  Cardiovascular:      Pulses:           Dorsalis pedis pulses are 1+ on the right side and 1+ on the left side.        Posterior tibial pulses are 2+ on the right side and 2+ on the left side.   Musculoskeletal:      Right foot: Decreased range of motion. Deformity present.      Left foot: Decreased range of motion. Deformity present.      Comments:        Feet:      Right foot:      Skin integrity: Ulcer present.      Left foot:       Skin integrity: Ulcer present.   Skin:     General: Skin is warm.      Capillary Refill: Capillary refill takes 2 to 3 seconds.      Comments: Ulceration  Location: right hallux  Measurements: 1.5x 1.0x 0.2cm  Drainage: serous  Wound base: granular  SOI: mild erythema, improved       Neurological:      Mental Status: He is oriented to person, place, and time.      Sensory: Sensory deficit present.                   Ulceration  Location: left hallux  Measurements: 2.0x 2.0x 0.3cm  Drainage: serosangious  Wound base: hypergranular  SOI: mild erythema, improved        Assessment:       Encounter Diagnoses   Name Primary?    Bilateral great toes ulcers Yes    Idiopathic peripheral neuropathy          Plan:       Stevie was seen today for wound care.    Diagnoses and all orders for this visit:    Bilateral great toes ulcers    Idiopathic peripheral neuropathy      I counseled the patient on his conditions, their implications and medical management.    Ulceration on left hallux debrided through sub-q tissue using an tissue nipper. Ulceration debrided down to healthy tissue. Pt tolerated debridement well.     Ulceration on right hallux debrided through sub-q tissue using an tissue nipper. Ulceration debrided down to healthy tissue. Pt tolerated debridement well.     Football dressing applied to pts left foot by medical assistant under my direct supervision. Pt tolerated dressing well.     DSD applied to left hallux  Iodosorb applied to both wounds    Pt is under the care of ID    RTC in 1 week or sooner if any new pedal problems arise, any signs of infection occur, or if condition worsens.

## 2025-06-02 ENCOUNTER — OFFICE VISIT (OUTPATIENT)
Dept: PODIATRY | Facility: CLINIC | Age: 70
End: 2025-06-02
Payer: MEDICARE

## 2025-06-02 VITALS
HEIGHT: 70 IN | WEIGHT: 227.06 LBS | SYSTOLIC BLOOD PRESSURE: 149 MMHG | DIASTOLIC BLOOD PRESSURE: 75 MMHG | BODY MASS INDEX: 32.51 KG/M2 | HEART RATE: 82 BPM

## 2025-06-02 DIAGNOSIS — L97.529 BILATERAL GREAT TOES ULCERS: Primary | ICD-10-CM

## 2025-06-02 DIAGNOSIS — L97.519 BILATERAL GREAT TOES ULCERS: Primary | ICD-10-CM

## 2025-06-02 DIAGNOSIS — G60.9 IDIOPATHIC PERIPHERAL NEUROPATHY: ICD-10-CM

## 2025-06-02 PROCEDURE — 99999 PR PBB SHADOW E&M-EST. PATIENT-LVL IV: CPT | Mod: PBBFAC,HCNC,, | Performed by: PODIATRIST

## 2025-06-07 NOTE — PROGRESS NOTES
[facility]  Infectious Disease  Progress Note    Patient Name: Stevie Villalba  MRN: [id number]  Admission Date: (Not on file)  Length of Stay: 0 days  Attending Physician: No att. providers found  Primary Care Provider: Ric Brambila MD    Isolation Status: No active isolations  Assessment/Plan:      Assessment & plan notes cannot be loaded without a specified hospital service.      Anticipated Disposition: tbd    Thank you for your consult. I will sign off. Please contact us if you have any additional questions.    ROSANNA Fuller  Infectious Disease  [facility]    Subjective:     Principal Problem:Cellulitis    HPI: No notes on file  Subjective & objective note cannot be loaded without a specified hospital service.    Subjective     Patient ID: Stevie Villalba is a 70 y.o. male.    Chief Complaint:Follow-up      History of Present Illness  70-year-old male recently admitted out of concern for bilateral great toe wounds that have progressed.  Wound culture about 2 weeks prior to admission showed MRSA but had no clinical signs of infection per Podiatry at that time.  He had been admitted for pneumonia and given Augmentin.  He was admitted from Podiatry Clinic out of concern for worsening wounds of the toes left greater than right.  MRI is concerning for left great toe osteomyelitis.  Initially on vanc and Zosyn then on vancomycin alone.  Podiatry was consulted and bone biopsy done.  He did not have any abnormal exposure to his toes or water exposure though he has been washing his feet with dial soap.  He had a leukocytosis of unclear etiology and has been afebrile.  ABIs have been done and there is no concern for vascular compromise.    Bone biopsy done then culture with no growth. Pathology negative for osteomyelitis. Afebrile and white blood cell count has normalized.  CRP initially low at 6.2    ID DC Plan:  DC vancomycin and ceftriaxone   Rec DC on doxycycline 100mg po bid to complete 2  weeks (including inpt vanc) out of concern for cellulitis and MRSA infection given prior cx. EOC 5/20/25     Patient follows up today in his doing well.  He feels his toes healing well.  He may have had some type of reaction to doxycycline.    Review of Systems   Constitutional: Positive for weight loss. Negative for chills, decreased appetite, fever, malaise/fatigue, night sweats and weight gain.   HENT:  Negative for congestion, ear pain, hearing loss, hoarse voice, sore throat and tinnitus.    Eyes:  Negative for blurred vision, redness and visual disturbance.   Cardiovascular:  Negative for chest pain, leg swelling and palpitations.   Respiratory:  Negative for cough, hemoptysis, shortness of breath, sputum production and wheezing.    Endocrine: Negative for cold intolerance and heat intolerance.   Hematologic/Lymphatic: Negative for adenopathy. Does not bruise/bleed easily.   Skin:  Positive for rash. Negative for dry skin, itching and suspicious lesions.   Musculoskeletal:  Negative for back pain, joint pain, myalgias and neck pain.   Gastrointestinal:  Negative for abdominal pain, constipation, diarrhea, heartburn, nausea and vomiting.   Genitourinary:  Negative for dysuria, flank pain, frequency, hematuria, hesitancy and urgency.   Neurological:  Negative for dizziness, headaches, numbness, paresthesias and weakness.   Psychiatric/Behavioral:  Negative for depression and memory loss. The patient does not have insomnia and is not nervous/anxious.    Allergic/Immunologic: Negative for environmental allergies, HIV exposure, hives and persistent infections.        Objective   Physical Exam  Constitutional:       General: He is not in acute distress.     Appearance: He is well-developed. He is not diaphoretic.       HENT:      Head: Normocephalic and atraumatic.   Cardiovascular:      Rate and Rhythm: Normal rate and regular rhythm.      Heart sounds: Normal heart sounds. No murmur heard.     No friction rub. No  gallop.   Pulmonary:      Effort: Pulmonary effort is normal. No respiratory distress.      Breath sounds: Normal breath sounds. No wheezing or rales.   Abdominal:      General: Bowel sounds are normal. There is no distension.      Palpations: Abdomen is soft. There is no mass.      Tenderness: There is no abdominal tenderness. There is no guarding or rebound.   Skin:     General: Skin is warm and dry.   Neurological:      Mental Status: He is alert and oriented to person, place, and time.   Psychiatric:         Behavior: Behavior normal.                          Assessment and Plan     1. MRSA (methicillin resistant staph aureus) culture positive    2. Cellulitis, unspecified cellulitis site      70-year-old male with bilateral toe ulcers with concern for osteomyelitis and prior wound culture with MRSA.  He was treated as inpatient with IV vancomycin and discharged home on doxycycline for 2 weeks out of concern for soft tissue infection as the bone biopsy that was done was negative for osteomyelitis and had no growth on culture.  Suspect he may have had some type of reaction to doxycycline.  [place] will be going out of the country soon and he was counseled not to let his toes get wet or have any other exposure that could increase infection.    Plan:  Stop doxycycline  Start minocycline 100 mg p.o. b.i.d. to complete 7 days which will take him through the 2 week plan.  For soft tissue infection  Needs to follow up for wound care with Podiatry  Follow up PRN

## 2025-06-09 ENCOUNTER — OFFICE VISIT (OUTPATIENT)
Dept: PODIATRY | Facility: CLINIC | Age: 70
End: 2025-06-09
Payer: MEDICARE

## 2025-06-09 ENCOUNTER — PATIENT MESSAGE (OUTPATIENT)
Dept: INFECTIOUS DISEASES | Facility: CLINIC | Age: 70
End: 2025-06-09
Payer: MEDICARE

## 2025-06-09 ENCOUNTER — TELEPHONE (OUTPATIENT)
Dept: INFECTIOUS DISEASES | Facility: CLINIC | Age: 70
End: 2025-06-09
Payer: MEDICARE

## 2025-06-09 VITALS
HEART RATE: 67 BPM | BODY MASS INDEX: 32.58 KG/M2 | DIASTOLIC BLOOD PRESSURE: 78 MMHG | HEIGHT: 70 IN | SYSTOLIC BLOOD PRESSURE: 137 MMHG

## 2025-06-09 DIAGNOSIS — L97.512 SKIN ULCER OF TOE OF RIGHT FOOT WITH FAT LAYER EXPOSED: ICD-10-CM

## 2025-06-09 DIAGNOSIS — L97.523 SKIN ULCER OF TOE OF LEFT FOOT WITH NECROSIS OF MUSCLE: Primary | ICD-10-CM

## 2025-06-09 PROCEDURE — 87077 CULTURE AEROBIC IDENTIFY: CPT | Mod: HCNC | Performed by: PODIATRIST

## 2025-06-09 PROCEDURE — 11043 DBRDMT MUSC&/FSCA 1ST 20/<: CPT | Mod: HCNC,S$GLB,, | Performed by: PODIATRIST

## 2025-06-09 PROCEDURE — 3008F BODY MASS INDEX DOCD: CPT | Mod: CPTII,HCNC,S$GLB, | Performed by: PODIATRIST

## 2025-06-09 PROCEDURE — 11042 DBRDMT SUBQ TIS 1ST 20SQCM/<: CPT | Mod: XS,HCNC,S$GLB, | Performed by: PODIATRIST

## 2025-06-09 PROCEDURE — 1159F MED LIST DOCD IN RCRD: CPT | Mod: CPTII,HCNC,S$GLB, | Performed by: PODIATRIST

## 2025-06-09 PROCEDURE — 3075F SYST BP GE 130 - 139MM HG: CPT | Mod: CPTII,HCNC,S$GLB, | Performed by: PODIATRIST

## 2025-06-09 PROCEDURE — 1101F PT FALLS ASSESS-DOCD LE1/YR: CPT | Mod: CPTII,HCNC,S$GLB, | Performed by: PODIATRIST

## 2025-06-09 PROCEDURE — 99999 PR PBB SHADOW E&M-EST. PATIENT-LVL IV: CPT | Mod: PBBFAC,HCNC,, | Performed by: PODIATRIST

## 2025-06-09 PROCEDURE — 3288F FALL RISK ASSESSMENT DOCD: CPT | Mod: CPTII,HCNC,S$GLB, | Performed by: PODIATRIST

## 2025-06-09 PROCEDURE — 99214 OFFICE O/P EST MOD 30 MIN: CPT | Mod: 25,HCNC,S$GLB, | Performed by: PODIATRIST

## 2025-06-09 PROCEDURE — 87075 CULTR BACTERIA EXCEPT BLOOD: CPT | Mod: HCNC | Performed by: PODIATRIST

## 2025-06-09 PROCEDURE — 1126F AMNT PAIN NOTED NONE PRSNT: CPT | Mod: CPTII,HCNC,S$GLB, | Performed by: PODIATRIST

## 2025-06-09 PROCEDURE — 4010F ACE/ARB THERAPY RXD/TAKEN: CPT | Mod: CPTII,HCNC,S$GLB, | Performed by: PODIATRIST

## 2025-06-09 PROCEDURE — 3078F DIAST BP <80 MM HG: CPT | Mod: CPTII,HCNC,S$GLB, | Performed by: PODIATRIST

## 2025-06-09 PROCEDURE — 3044F HG A1C LEVEL LT 7.0%: CPT | Mod: CPTII,HCNC,S$GLB, | Performed by: PODIATRIST

## 2025-06-09 NOTE — PROGRESS NOTES
Subjective:      Patient ID: Stevie Villalba is a 70 y.o. male.    Chief Complaint: Wound Care (B/L hallux)    Stevie is a 70 y.o. male who presents to the clinic for evaluation and treatment of high risk feet. Stevie has a past medical history of Hyperlipidemia, Hypertension, Insomnia, and Lumbago. The patient's chief complaint is foot ulcer, both hallux nails. This patient has documented high risk feet requiring routine maintenance secondary to peripheral neuropathy.    PCP: Ric Brambila MD    Date Last Seen by PCP:   Chief Complaint   Patient presents with    Wound Care     B/L hallux         Current shoe gear:  Affected Foot: Slip-on shoes     Unaffected Foot: Slip-on shoes    History of Trauma: negative  Sign of Infection: none    Hemoglobin A1C   Date Value Ref Range Status   09/30/2024 5.3 4.0 - 5.6 % Final     Comment:     ADA Screening Guidelines:  5.7-6.4%  Consistent with prediabetes  >or=6.5%  Consistent with diabetes    High levels of fetal hemoglobin interfere with the HbA1C  assay. Heterozygous hemoglobin variants (HbS, HgC, etc)do  not significantly interfere with this assay.   However, presence of multiple variants may affect accuracy.     09/25/2023 5.2 4.0 - 5.6 % Final     Comment:     ADA Screening Guidelines:  5.7-6.4%  Consistent with prediabetes  >or=6.5%  Consistent with diabetes    High levels of fetal hemoglobin interfere with the HbA1C  assay. Heterozygous hemoglobin variants (HbS, HgC, etc)do  not significantly interfere with this assay.   However, presence of multiple variants may affect accuracy.     09/12/2022 5.3 4.0 - 5.6 % Final     Comment:     ADA Screening Guidelines:  5.7-6.4%  Consistent with prediabetes  >or=6.5%  Consistent with diabetes    High levels of fetal hemoglobin interfere with the HbA1C  assay. Heterozygous hemoglobin variants (HbS, HgC, etc)do  not significantly interfere with this assay.   However, presence of multiple variants may affect accuracy.        Hemoglobin A1c   Date Value Ref Range Status   05/05/2025 5.2 4.0 - 5.6 % Final     Comment:     ADA Screening Guidelines:  5.7-6.4%  Consistent with prediabetes  >=6.5%  Consistent with diabetes    High levels of fetal hemoglobin interfere with the HbA1C  assay. Heterozygous hemoglobin variants (HbS, HgC, etc)do  not significantly interfere with this assay.   However, presence of multiple variants may affect accuracy.   04/23/2025 5.1 4.0 - 5.6 % Final     Comment:     ADA Screening Guidelines:  5.7-6.4%  Consistent with prediabetes  >=6.5%  Consistent with diabetes    High levels of fetal hemoglobin interfere with the HbA1C  assay. Heterozygous hemoglobin variants (HbS, HgC, etc)do  not significantly interfere with this assay.   However, presence of multiple variants may affect accuracy.       Review of Systems   Constitutional: Negative for chills, fever and malaise/fatigue.   HENT:  Negative for hearing loss.    Cardiovascular:  Negative for claudication.   Respiratory:  Negative for shortness of breath.    Skin:  Positive for poor wound healing. Negative for flushing and rash.   Musculoskeletal:  Negative for joint pain and myalgias.   Gastrointestinal:  Negative for nausea and vomiting.   Neurological:  Positive for sensory change. Negative for loss of balance, numbness and paresthesias.   Psychiatric/Behavioral:  Negative for altered mental status.    Allergic/Immunologic: Negative for hives.           Objective:      Physical Exam  Vitals reviewed.   Cardiovascular:      Pulses:           Dorsalis pedis pulses are 1+ on the right side and 1+ on the left side.        Posterior tibial pulses are 2+ on the right side and 2+ on the left side.   Musculoskeletal:      Right foot: Decreased range of motion. Deformity present.      Left foot: Decreased range of motion. Deformity present.      Comments:        Feet:      Right foot:      Skin integrity: Ulcer present.      Left foot:      Skin integrity: Ulcer  present.   Skin:     General: Skin is warm.      Capillary Refill: Capillary refill takes 2 to 3 seconds.      Findings: Erythema and wound present.      Comments: Ulceration  Location: right hallux  Measurements: 1.5x 1.0x 0.2cm  Drainage: serous  Wound base: granular  SOI: mild erythema     Neurological:      Mental Status: He is alert and oriented to person, place, and time.      Sensory: Sensory deficit present.                         Ulceration  Location: left hallux  Measurements: 2.0x 3.0x 0.3cm  Drainage: serosangious  Wound base: hypergranular  SOI: erythema, warmth  cultured      Assessment:       Encounter Diagnoses   Name Primary?    Skin ulcer of toe of left foot with necrosis of muscle Yes    Skin ulcer of toe of right foot with fat layer exposed          Plan:       Stevie was seen today for wound care.    Diagnoses and all orders for this visit:    Skin ulcer of toe of left foot with necrosis of muscle  -     Aerobic culture  -     Culture, Anaerobic    Skin ulcer of toe of right foot with fat layer exposed      I counseled the patient on his conditions, their implications and medical management.    Ulceration on left hallux debrided through muscle tissue using an tissue nipper. Ulceration debrided down to healthy tissue. Pt tolerated debridement well.     Ulceration on right hallux debrided through sub-q tissue using an tissue nipper. Ulceration debrided down to healthy tissue. Pt tolerated debridement well.     Football dressing applied to pts left foot by medical assistant under my direct supervision. Pt tolerated dressing well.     DSD applied to left hallux  Iodosorb applied to both wounds    Wound cultured  RTC in 1 week or sooner if any new pedal problems arise, any signs of infection occur, or if condition worsens.

## 2025-06-09 NOTE — TELEPHONE ENCOUNTER
Tried calling pt no answer, left MyChart message letting him know he can f/u as needed.    ----- Message from ROSANNA Aldrich sent at 6/7/2025 10:11 AM CDT -----  Follow up p.r.n.

## 2025-06-12 DIAGNOSIS — L97.512 SKIN ULCER OF TOE OF RIGHT FOOT WITH FAT LAYER EXPOSED: ICD-10-CM

## 2025-06-12 DIAGNOSIS — L97.523 SKIN ULCER OF TOE OF LEFT FOOT WITH NECROSIS OF MUSCLE: Primary | ICD-10-CM

## 2025-06-12 LAB
BACTERIA SPEC AEROBE CULT: ABNORMAL
BACTERIA SPEC AEROBE CULT: ABNORMAL
BACTERIA SPEC ANAEROBE CULT: NORMAL

## 2025-06-13 ENCOUNTER — TELEPHONE (OUTPATIENT)
Dept: INFECTIOUS DISEASES | Facility: CLINIC | Age: 70
End: 2025-06-13
Payer: MEDICARE

## 2025-06-13 NOTE — TELEPHONE ENCOUNTER
Scheduled pt 6/20, also mailed a letter reminder.    -----Niels Campbell Jr., PA [place], [place]  Can see me on 6/20          Previous Messages       ----- Message -----  From: Lakesha James  Sent: 6/12/2025   4:39 PM CDT  To: ROSANNA Stallworth Jr.  Subject: FW: Ambulatory referral/consult to Infectiou*    When would you like me to schedule?  ----- Message -----  From: Jaida Davis  Sent: 6/12/2025   4:38 PM CDT  To: [facility] Staff  Subject: Ambulatory referral/consult to Infectious Di*    Hello,    This patient has a referral to Infectious Disease for Skin ulcer of toe of left foot with necrosis of muscle [L97.523]; Skin ulcer of toe of right foot with fat layer exposed [L97.512] (please see culture). The patient is established with Niels Campbell PA-C and asks to schedule with him again. Patient is fine doing either in person or virtual. Please call the patient to assist at [phone].    Thank you so much, have a wonderful day!  Jaida

## 2025-06-16 ENCOUNTER — OFFICE VISIT (OUTPATIENT)
Dept: PODIATRY | Facility: CLINIC | Age: 70
End: 2025-06-16
Payer: MEDICARE

## 2025-06-16 VITALS
BODY MASS INDEX: 32.58 KG/M2 | HEART RATE: 66 BPM | SYSTOLIC BLOOD PRESSURE: 159 MMHG | HEIGHT: 70 IN | DIASTOLIC BLOOD PRESSURE: 83 MMHG

## 2025-06-16 DIAGNOSIS — L97.512 SKIN ULCER OF TOE OF RIGHT FOOT WITH FAT LAYER EXPOSED: ICD-10-CM

## 2025-06-16 DIAGNOSIS — L97.523 SKIN ULCER OF TOE OF LEFT FOOT WITH NECROSIS OF MUSCLE: Primary | ICD-10-CM

## 2025-06-16 PROCEDURE — 3288F FALL RISK ASSESSMENT DOCD: CPT | Mod: CPTII,HCNC,S$GLB, | Performed by: PODIATRIST

## 2025-06-16 PROCEDURE — 3044F HG A1C LEVEL LT 7.0%: CPT | Mod: CPTII,HCNC,S$GLB, | Performed by: PODIATRIST

## 2025-06-16 PROCEDURE — 11042 DBRDMT SUBQ TIS 1ST 20SQCM/<: CPT | Mod: XS,HCNC,S$GLB, | Performed by: PODIATRIST

## 2025-06-16 PROCEDURE — 1159F MED LIST DOCD IN RCRD: CPT | Mod: CPTII,HCNC,S$GLB, | Performed by: PODIATRIST

## 2025-06-16 PROCEDURE — 1101F PT FALLS ASSESS-DOCD LE1/YR: CPT | Mod: CPTII,HCNC,S$GLB, | Performed by: PODIATRIST

## 2025-06-16 PROCEDURE — 99214 OFFICE O/P EST MOD 30 MIN: CPT | Mod: 25,HCNC,S$GLB, | Performed by: PODIATRIST

## 2025-06-16 PROCEDURE — 3008F BODY MASS INDEX DOCD: CPT | Mod: CPTII,HCNC,S$GLB, | Performed by: PODIATRIST

## 2025-06-16 PROCEDURE — 3077F SYST BP >= 140 MM HG: CPT | Mod: CPTII,HCNC,S$GLB, | Performed by: PODIATRIST

## 2025-06-16 PROCEDURE — 99999 PR PBB SHADOW E&M-EST. PATIENT-LVL IV: CPT | Mod: PBBFAC,HCNC,, | Performed by: PODIATRIST

## 2025-06-16 PROCEDURE — 11044 DBRDMT BONE 1ST 20 SQ CM/<: CPT | Mod: HCNC,S$GLB,, | Performed by: PODIATRIST

## 2025-06-16 PROCEDURE — 4010F ACE/ARB THERAPY RXD/TAKEN: CPT | Mod: CPTII,HCNC,S$GLB, | Performed by: PODIATRIST

## 2025-06-16 PROCEDURE — 1126F AMNT PAIN NOTED NONE PRSNT: CPT | Mod: CPTII,HCNC,S$GLB, | Performed by: PODIATRIST

## 2025-06-16 PROCEDURE — 3079F DIAST BP 80-89 MM HG: CPT | Mod: CPTII,HCNC,S$GLB, | Performed by: PODIATRIST

## 2025-06-16 NOTE — PROGRESS NOTES
Subjective:      Patient ID: Stevie Villalba is a 70 y.o. male.    Chief Complaint: Wound Care (B/L hallux)    Stevie is a 70 y.o. male who presents to the clinic for evaluation and treatment of high risk feet. Stevie has a past medical history of Hyperlipidemia, Hypertension, Insomnia, and Lumbago. The patient's chief complaint is foot ulcer, both hallux nails. This patient has documented high risk feet requiring routine maintenance secondary to peripheral neuropathy.    PCP: Ric Brambila MD    Date Last Seen by PCP:   Chief Complaint   Patient presents with    Wound Care     B/L hallux         Current shoe gear:  Affected Foot: Slip-on shoes     Unaffected Foot: Slip-on shoes    History of Trauma: negative  Sign of Infection: none    Hemoglobin A1C   Date Value Ref Range Status   09/30/2024 5.3 4.0 - 5.6 % Final     Comment:     ADA Screening Guidelines:  5.7-6.4%  Consistent with prediabetes  >or=6.5%  Consistent with diabetes    High levels of fetal hemoglobin interfere with the HbA1C  assay. Heterozygous hemoglobin variants (HbS, HgC, etc)do  not significantly interfere with this assay.   However, presence of multiple variants may affect accuracy.     09/25/2023 5.2 4.0 - 5.6 % Final     Comment:     ADA Screening Guidelines:  5.7-6.4%  Consistent with prediabetes  >or=6.5%  Consistent with diabetes    High levels of fetal hemoglobin interfere with the HbA1C  assay. Heterozygous hemoglobin variants (HbS, HgC, etc)do  not significantly interfere with this assay.   However, presence of multiple variants may affect accuracy.     09/12/2022 5.3 4.0 - 5.6 % Final     Comment:     ADA Screening Guidelines:  5.7-6.4%  Consistent with prediabetes  >or=6.5%  Consistent with diabetes    High levels of fetal hemoglobin interfere with the HbA1C  assay. Heterozygous hemoglobin variants (HbS, HgC, etc)do  not significantly interfere with this assay.   However, presence of multiple variants may affect accuracy.        Hemoglobin A1c   Date Value Ref Range Status   05/05/2025 5.2 4.0 - 5.6 % Final     Comment:     ADA Screening Guidelines:  5.7-6.4%  Consistent with prediabetes  >=6.5%  Consistent with diabetes    High levels of fetal hemoglobin interfere with the HbA1C  assay. Heterozygous hemoglobin variants (HbS, HgC, etc)do  not significantly interfere with this assay.   However, presence of multiple variants may affect accuracy.   04/23/2025 5.1 4.0 - 5.6 % Final     Comment:     ADA Screening Guidelines:  5.7-6.4%  Consistent with prediabetes  >=6.5%  Consistent with diabetes    High levels of fetal hemoglobin interfere with the HbA1C  assay. Heterozygous hemoglobin variants (HbS, HgC, etc)do  not significantly interfere with this assay.   However, presence of multiple variants may affect accuracy.       Review of Systems   Constitutional: Negative for chills, fever and malaise/fatigue.   HENT:  Negative for hearing loss.    Cardiovascular:  Negative for claudication.   Respiratory:  Negative for shortness of breath.    Skin:  Positive for poor wound healing. Negative for flushing and rash.   Musculoskeletal:  Negative for joint pain and myalgias.   Gastrointestinal:  Negative for nausea and vomiting.   Neurological:  Positive for sensory change. Negative for loss of balance, numbness and paresthesias.   Psychiatric/Behavioral:  Negative for altered mental status.    Allergic/Immunologic: Negative for hives.           Objective:      Physical Exam  Vitals reviewed.   Cardiovascular:      Pulses:           Dorsalis pedis pulses are 1+ on the right side and 1+ on the left side.        Posterior tibial pulses are 2+ on the right side and 2+ on the left side.   Musculoskeletal:      Right foot: Decreased range of motion. Deformity present.      Left foot: Decreased range of motion. Deformity present.      Comments:        Feet:      Right foot:      Skin integrity: Ulcer present.      Left foot:      Skin integrity: Ulcer  present.   Skin:     General: Skin is warm.      Capillary Refill: Capillary refill takes 2 to 3 seconds.      Findings: Erythema and wound present.      Comments: Ulceration  Location: right hallux  Measurements: 1.5x 1.0x 0.2cm  Drainage: serous  Wound base: granular  SOI: mild erythema    unchanged   Neurological:      Mental Status: He is alert and oriented to person, place, and time.      Sensory: Sensory deficit present.                         Ulceration  Location: left hallux  Measurements: 2.0x 3.0x 0.3cm  Drainage: serosangious  Wound base: hypergranular  SOI: erythema, warmth    unchanged      Assessment:       Encounter Diagnoses   Name Primary?    Skin ulcer of toe of left foot with necrosis of muscle Yes    Skin ulcer of toe of right foot with fat layer exposed          Plan:       Stevie was seen today for wound care.    Diagnoses and all orders for this visit:    Skin ulcer of toe of left foot with necrosis of muscle    Skin ulcer of toe of right foot with fat layer exposed      I counseled the patient on his conditions, their implications and medical management.    Culture results explained to pt  Pt advised Infectious disease will be prescribing and managing abx and infection  Pt has appointment on Friday    Ulceration on left hallux debrided through muscle tissue using an tissue nipper. Ulceration debrided down to healthy tissue. Pt tolerated debridement well.     Ulceration on right hallux debrided through sub-q tissue using an tissue nipper. Ulceration debrided down to healthy tissue. Pt tolerated debridement well.     Football dressing applied to pts left foot by medical assistant under my direct supervision. Pt tolerated dressing well.     DSD applied to left hallux  Iodosorb applied to both wounds    RTC in 1 week or sooner if any new pedal problems arise, any signs of infection occur, or if condition worsens.

## 2025-06-20 ENCOUNTER — TELEPHONE (OUTPATIENT)
Dept: INFECTIOUS DISEASES | Facility: CLINIC | Age: 70
End: 2025-06-20

## 2025-06-20 ENCOUNTER — OFFICE VISIT (OUTPATIENT)
Dept: INFECTIOUS DISEASES | Facility: CLINIC | Age: 70
End: 2025-06-20
Payer: MEDICARE

## 2025-06-20 VITALS
HEIGHT: 70 IN | BODY MASS INDEX: 33.64 KG/M2 | DIASTOLIC BLOOD PRESSURE: 83 MMHG | TEMPERATURE: 98 F | HEART RATE: 79 BPM | WEIGHT: 235 LBS | SYSTOLIC BLOOD PRESSURE: 159 MMHG

## 2025-06-20 DIAGNOSIS — Z51.81 THERAPEUTIC DRUG MONITORING: ICD-10-CM

## 2025-06-20 DIAGNOSIS — L03.90 CELLULITIS, UNSPECIFIED CELLULITIS SITE: Primary | ICD-10-CM

## 2025-06-20 PROCEDURE — 99999 PR PBB SHADOW E&M-EST. PATIENT-LVL IV: CPT | Mod: PBBFAC,HCNC,, | Performed by: PHYSICIAN ASSISTANT

## 2025-06-20 NOTE — PROGRESS NOTES
History of Present Illness  70-year-old male recently admitted out of concern for bilateral great toe wounds that have progressed.  Wound culture about 2 weeks prior to admission showed MRSA but had no clinical signs of infection per Podiatry at that time.  He had been admitted for pneumonia and given Augmentin.  He was admitted from Podiatry Clinic out of concern for worsening wounds of the toes left greater than right.  MRI is concerning for left great toe osteomyelitis.  Initially on vanc and Zosyn then on vancomycin alone.  Podiatry was consulted and bone biopsy done.  He did not have any abnormal exposure to his toes or water exposure though he has been washing his feet with dial soap.  He had a leukocytosis of unclear etiology and has been afebrile.  ABIs have been done and there is no concern for vascular compromise.    Bone biopsy done then culture with no growth. Pathology negative for osteomyelitis. Afebrile and white blood cell count has normalized.  CRP initially low at 6.2    ID DC Plan:  DC vancomycin and ceftriaxone   Rec DC on doxycycline 100mg po bid to complete 2 weeks (including inpt vanc) out of concern for cellulitis and MRSA infection given prior cx. EOC 5/20/25     5/15/25: Patient follows up today in his doing well.  He feels his toes healing well.  He may have had some type of reaction to doxycycline.    6/20/25:  Follows up today in ID clinic and reports doing well.  He was changed to minocycline to complete 14d and he tolerated that.  His toes are felt to be healing.  He had going to [place] last month and did not get his feet wet.  He has follow up with Podiatry, Dr. Rodriguez and on 6 9/25 there was a culture done which grew out Proteus as well as Pseudomonas.  No treatment was instituted.  He reports his toes have been doing fine without any worsening redness swelling pain or odor.  He has not had any fevers, chills, sweats.    Review of Systems   Constitutional: Negative for chills,  decreased appetite, fever, malaise/fatigue, night sweats, weight gain and weight loss.   HENT:  Negative for congestion, ear pain, hearing loss, hoarse voice, sore throat and tinnitus.    Eyes:  Negative for blurred vision, redness and visual disturbance.   Cardiovascular:  Negative for chest pain, leg swelling and palpitations.   Respiratory:  Negative for cough, hemoptysis, shortness of breath, sputum production and wheezing.    Endocrine: Negative for cold intolerance and heat intolerance.   Hematologic/Lymphatic: Negative for adenopathy. Does not bruise/bleed easily.   Skin:  Positive for poor wound healing. Negative for dry skin, itching, rash and suspicious lesions.   Musculoskeletal:  Negative for back pain, joint pain, myalgias and neck pain.   Gastrointestinal:  Negative for abdominal pain, constipation, diarrhea, heartburn, nausea and vomiting.   Genitourinary:  Negative for dysuria, flank pain, frequency, hematuria, hesitancy and urgency.   Neurological:  Negative for dizziness, headaches, numbness, paresthesias and weakness.   Psychiatric/Behavioral:  Negative for depression and memory loss. The patient does not have insomnia and is not nervous/anxious.    Allergic/Immunologic: Negative for environmental allergies, HIV exposure, hives and persistent infections.        Objective   Physical Exam  Constitutional:       General: He is not in acute distress.     Appearance: Normal appearance. He is well-developed. He is not ill-appearing, toxic-appearing or diaphoretic.       HENT:      Head: Normocephalic and atraumatic.   Cardiovascular:      Rate and Rhythm: Normal rate and regular rhythm.      Heart sounds: Normal heart sounds. No murmur heard.     No friction rub. No gallop.   Pulmonary:      Effort: Pulmonary effort is normal. No respiratory distress.      Breath sounds: Normal breath sounds. No wheezing or rales.   Abdominal:      General: Bowel sounds are normal. There is no distension.       Palpations: Abdomen is soft. There is no mass.      Tenderness: There is no abdominal tenderness. There is no guarding or rebound.   Skin:     General: Skin is warm and dry.   Neurological:      Mental Status: He is alert and oriented to person, place, and time.   Psychiatric:         Behavior: Behavior normal.             Above - 6/20/25  Prior to 5/15/25                Procedure Component Value Units Date/Time   Aerobic culture [[id number]] (Abnormal)  Collected: 06/09/25 0918   Order Status: Completed Specimen: Wound from Toe, Left Foot Updated: 06/12/25 1225    CULTURE, AEROBIC Moderate Providencia stuartii Abnormal      Few Pseudomonas aeruginosa Abnormal    Susceptibility     Providencia stuartii Pseudomonas aeruginosa     KWAME KWAME     Cefepime <=2 µg/ml Sensitive <=2 µg/ml Sensitive     Ceftriaxone <=1 µg/ml Sensitive       Ciprofloxacin <=0.25 µg/ml Sensitive 1 µg/ml Intermediate     Ertapenem <=0.5 µg/ml Sensitive       Gentamicin <=2 µg/ml Sensitive       Levofloxacin <=0.5 µg/ml Sensitive <=0.5 µg/ml Sensitive     Meropenem <=1 µg/ml Sensitive <=1 µg/ml Sensitive     Minocycline >8 µg/ml Resistant       Piperacillin/Tazobactam <=8 µg/ml Sensitive <=8 µg/ml Sensitive     Tobramycin 4 µg/ml Intermediate       Trimeth/Sulfa <=2/38 µg/ml Sensitive                   Linear View                Assessment and Plan     1. Cellulitis, unspecified cellulitis site      70-year-old male with bilateral toe ulcers with concern for osteomyelitis and prior wound culture with MRSA.  He was treated as inpatient with IV vancomycin and discharged home on doxycycline for 2 weeks out of concern for soft tissue infection as the bone biopsy that was done was negative for osteomyelitis and had no growth on culture.  Suspect he may have had some type of reaction to doxycycline.  [place] out of the country [place] and he was counseled not to let his toes get wet or have any other exposure that could increase  infection.    6/20/25:  Doing well and and left toe appears to be without infection and has good granulation tissue.  There was no surrounding signs of infection.  Though he had the positive culture on 06/09 with Providencia and Pseudomonas suspect that reflects wound colonization given no active signs of infection.  No bone exposed on exam.    Plan:  Continue off antibiotics for now.  Recommend aggressive localized wound care with close monitoring for signs and symptoms of infection as colonization as suspected.  If his wound does not heal or continue to heal, we will consider re-culture it instituting antibiotics because a high bacterial load may cause the wound healing to stall.  Follow up on 06/23 and at next visit we will follow up his CRP  He was given nutrition support instructions in regards to vitamins and minerals for wound healing   The patient was encouraged to call the office for further concerns or complaints.

## 2025-06-20 NOTE — TELEPHONE ENCOUNTER
Scheduled pt follow up and labs on 6/23, also mailed a letter reminder.    ----- Message from ROSANNA Aldrich sent at 6/20/2025  2:39 PM CDT -----  Put him on my schedule for 6/23 and we will seen with Podiatry next week.  Also please schedule him for a CMP and CRP next Monday.  I have ordered the labs

## 2025-06-20 NOTE — Clinical Note
Put him on my schedule for 6/23 and we will seen with Podiatry next week.  Also please schedule him for a CMP and CRP next Monday.  I have ordered the labs

## 2025-06-21 ENCOUNTER — NURSE TRIAGE (OUTPATIENT)
Dept: ADMINISTRATIVE | Facility: CLINIC | Age: 70
End: 2025-06-21
Payer: MEDICARE

## 2025-06-21 NOTE — TELEPHONE ENCOUNTER
Stevie Muñiz's daughter states pt currently being treated by ID for wounds to toes which are football wrapped. Bandages clean dry and intact since office visit yesterday. Stevie currently reports no symptoms to triage but states he came home early today from work following sudden onset of episode of sweating and chills. 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 Current temp reported by pt is 98.4. Advised pt per triage protocol home care advice. Informed pt to call at time of symptoms if symptoms reoccur or develop. Stevie v/u.   Reason for Disposition   Normal sweating from heat exposure    Additional Information   Negative: SEVERE difficulty breathing (e.g., struggling for each breath, speaks in single words)   Negative: Shock suspected (e.g., cold/pale/clammy skin, too weak to stand, low BP, rapid pulse)   Negative: Sounds like a life-threatening emergency to the triager   Negative: Fever   Negative: Chest pain or cardiac ischemia is suspected (e.g., unexplained visible sweat on face)   Negative: Weakness   Negative: Feeling weak or lightheaded (e.g., woozy, feeling like they might faint)   Negative: Heat exhaustion suspected (i.e., dehydration from heat exposure)   Negative: [1] Diabetes mellitus AND [2] sweating from low blood glucose (i.e., 70 mg/dL [3.9 mmol/L] or below)   Negative: Difficulty breathing   Negative: Patient sounds very sick or weak to the triager   Negative: [1] SEVERE sweating (e.g., drenched with sweat) AND [2] cause unknown   Negative: [1] NIGHT SWEATS occur (e.g., drenching sweat that occurs at night and has to change bed clothes or bed sheets) AND [2] cause unknown   Negative: [1] MODERATE sweating (e.g., interferes with normal activities; causes embarrassment) AND [2] possibly related to new medicine or change in medication dosage   Negative: [1] MODERATE sweating (e.g., interferes with normal activities like work or school) AND  [2] cause unknown AND [3] new-onset in the last 4 weeks   Negative: [1] Significant weight loss (more than 10 pounds [4.5 kg]; 5% or more) AND [2] not dieting   Negative: [1] MODERATE sweating (e.g., interferes with normal activities like work or sleep) AND [2] menopause is suspected   Negative: [1] MODERATE sweating (e.g., interferes with normal activities like work or school; causes embarrassment in social situations) AND [2] involves just face, hands (palms), underarms, or feet (soles) AND [3] present > 4 weeks   Negative: Excessive sweating is a chronic symptom (recurrent or ongoing AND present > 4 weeks)   Negative: Normal sweating from exercise    Protocols used: [id number]

## 2025-06-22 RX ORDER — ALLOPURINOL 300 MG/1
300 TABLET ORAL
Qty: 90 TABLET | Refills: 3 | Status: SHIPPED | OUTPATIENT
Start: 2025-06-22

## 2025-06-22 NOTE — TELEPHONE ENCOUNTER
Refill Decision Note   Stevie Karenjerry  is requesting a refill authorization.  Brief Assessment and Rationale for Refill:  Approve     Medication Therapy Plan:        Comments:     Note composed:10:25 AM 06/22/2025

## 2025-06-22 NOTE — TELEPHONE ENCOUNTER
No care due was identified.  [facility] Embedded Care Due Messages. Reference number: [id number].   6/22/2025 7:03:07 AM CDT

## 2025-06-23 ENCOUNTER — OFFICE VISIT (OUTPATIENT)
Dept: INFECTIOUS DISEASES | Facility: CLINIC | Age: 70
End: 2025-06-23
Payer: MEDICARE

## 2025-06-23 ENCOUNTER — RESULTS FOLLOW-UP (OUTPATIENT)
Dept: PODIATRY | Facility: CLINIC | Age: 70
End: 2025-06-23

## 2025-06-23 ENCOUNTER — TELEPHONE (OUTPATIENT)
Dept: INFECTIOUS DISEASES | Facility: CLINIC | Age: 70
End: 2025-06-23
Payer: MEDICARE

## 2025-06-23 ENCOUNTER — HOSPITAL ENCOUNTER (OUTPATIENT)
Dept: RADIOLOGY | Facility: HOSPITAL | Age: 70
Discharge: HOME | End: 2025-06-23
Attending: PODIATRIST
Payer: MEDICARE

## 2025-06-23 ENCOUNTER — OFFICE VISIT (OUTPATIENT)
Dept: PODIATRY | Facility: CLINIC | Age: 70
End: 2025-06-23
Payer: MEDICARE

## 2025-06-23 VITALS
HEIGHT: 70 IN | DIASTOLIC BLOOD PRESSURE: 81 MMHG | HEART RATE: 70 BPM | BODY MASS INDEX: 33.72 KG/M2 | SYSTOLIC BLOOD PRESSURE: 144 MMHG

## 2025-06-23 DIAGNOSIS — L97.522 SKIN ULCER OF TOE OF LEFT FOOT WITH FAT LAYER EXPOSED: Primary | ICD-10-CM

## 2025-06-23 DIAGNOSIS — L03.90 CELLULITIS, UNSPECIFIED CELLULITIS SITE: Primary | ICD-10-CM

## 2025-06-23 DIAGNOSIS — S91.109D OPEN WOUND OF TOE, SUBSEQUENT ENCOUNTER: ICD-10-CM

## 2025-06-23 DIAGNOSIS — L97.519 BILATERAL GREAT TOES ULCERS: ICD-10-CM

## 2025-06-23 DIAGNOSIS — L97.512 SKIN ULCER OF TOE OF RIGHT FOOT WITH FAT LAYER EXPOSED: ICD-10-CM

## 2025-06-23 DIAGNOSIS — G60.9 IDIOPATHIC PERIPHERAL NEUROPATHY: ICD-10-CM

## 2025-06-23 DIAGNOSIS — L97.529 BILATERAL GREAT TOES ULCERS: ICD-10-CM

## 2025-06-23 PROCEDURE — 73630 X-RAY EXAM OF FOOT: CPT | Mod: TC,HCNC,RT

## 2025-06-23 PROCEDURE — 99999 PR PBB SHADOW E&M-EST. PATIENT-LVL V: CPT | Mod: PBBFAC,HCNC,, | Performed by: PODIATRIST

## 2025-06-23 PROCEDURE — 73630 X-RAY EXAM OF FOOT: CPT | Mod: 26,HCNC,RT, | Performed by: RADIOLOGY

## 2025-06-23 PROCEDURE — 4010F ACE/ARB THERAPY RXD/TAKEN: CPT | Mod: CPTII,HCNC,S$GLB, | Performed by: PHYSICIAN ASSISTANT

## 2025-06-23 PROCEDURE — 3044F HG A1C LEVEL LT 7.0%: CPT | Mod: CPTII,HCNC,S$GLB, | Performed by: PHYSICIAN ASSISTANT

## 2025-06-23 PROCEDURE — 99213 OFFICE O/P EST LOW 20 MIN: CPT | Mod: HCNC,S$GLB,, | Performed by: PHYSICIAN ASSISTANT

## 2025-06-23 NOTE — TELEPHONE ENCOUNTER
Scheduled pt 6/30 at 11:30am to see with Podiatry, also mailed letter reminders.     ----- Message from ROSANNA Aldrich sent at 6/23/2025 10:00 AM CDT -----  Follow up 1 week when back to see Podiatry

## 2025-06-23 NOTE — PATIENT INSTRUCTIONS
Wound Care & Healing Support     To ensure optimal healing, please follow these wound care instructions until your next appointment or until you are seen by home health wound care nursing.     1. Keeping Your Wound Dressing Clean, Dry, and Intact   Keep your wound dressing clean, dry, and securely in place at all times.   Avoid getting the dressing wet when showering or bathing. If needed, cover the area with a waterproof barrier (e.g., plastic wrap or waterproof dressing).   Do not remove or change the dressing unless it becomes wet, soiled, or loose.       2. What to Do If the Dressing Becomes Wet or Soiled     If your wound dressing becomes wet, dirty, or falls off, follow these steps immediately:  1. Remove the soiled dressing.  2. Clean the wound gently with antibacterial soap and water (do not scrub).  3. Pat dry with a clean towel (do not rub).  4. If you are not allergic to Betadine (povidone-iodine) or iodine-based products, apply Betadine or iodine to the wound every 12 hours.  5. Re-cover the wound with a clean, dry dressing.  6. Contact our office as soon as possible for further instructions or if you notice any signs of infection (redness, warmth, swelling, foul odor, or increased pain).     3. Signs of Infection - When to Call the Office     ?? Call our office immediately if you notice:   Increased redness, warmth, or swelling around the wound.   Pus, foul-smelling drainage, or excessive bleeding.   Fever or chills.   Sudden increase in pain.  4. Nutrition & Supplements to Support Healing     Proper nutrition is essential for wound healing and infection prevention. Consider incorporating the following into your diet:     ? Protein-Rich Foods (supports tissue repair):   Lean meats, poultry, fish, eggs, tofu, Greek yogurt, and legumes.     ? Vitamin C (boosts collagen production & immune function):   Citrus fruits, strawberries, bell peppers, and dark leafy greens.     ? Zinc (enhances wound healing &  immune defense):   Pumpkin seeds, nuts, beans, and fortified cereals.     ? Vitamin A (promotes skin regeneration):   Carrots, sweet potatoes, and dark leafy greens.     ? Iron (supports oxygen delivery to the wound):   Lean meats, beans, spinach, and iron-fortified grains.     ? Hydration (essential for cell function & healing):   Drink at least 6-8 glasses of water per day to keep your body hydrated and support healing.  Recommended Supplements (If Needed):   Arginine + Glutamine - Enhances wound healing in chronic wounds.   Collagen Peptides - May improve skin integrity and tissue repair.   Omega-3 Fatty Acids - Reduces inflammation and supports tissue repair.     ?? Consult with our office before starting any new supplements, especially if you have medical conditions or take prescription medications.  Follow-Up & Next Steps   Continue wound care as instructed until your next appointment or home health visit.   Bring any questions or concerns to your next visit.   Call our office if you have any concerns about the wound or notice signs of infection.    Nutrition and MyPlate: Protein Foods  This group includes foods that are high in protein. Protein helps the body build new cells and keeps tissues healthy. Most Americans get enough protein without even trying. It can be harder for vegetarians, but plenty of non-meat foods are rich in protein, too. Its best to get protein from a variety of sources.    Nutrient-Rich Choices  Theres a lot more to this food group than just meat and beans. It also includes nuts, seeds, and eggs. There are all sorts of nutrient-rich choices:  Chicken and turkey with the skin removed  Fish and shellfish  Lean beef, pork, or lamb (without visible fat)  Soy products, such as tofu, soybeans (edamame), tempeh, or soymilk  Black beans, kidney beans, latham beans, chickpeas (garbanzo beans), and lentils (Note: beans and peas count as both a protein and a vegetable)  Peanuts, almonds, walnuts,  sesame seeds, and sunflower  seeds, as well as foods made from these (such as peanut butter or tahini)  Eggs and foods made with eggs (such as quiche or frittata)  What Makes Meat and Beans Less Healthy?  Fatty meat is not healthy. Before you cook meat, trim off all the fat you can see. Chicken and turkey skin is also high in fat, and should be removed before cooking.  Breading and frying make food less healthy. This includes dishes like fried chicken, fried fish, and refried beans.  Sausage and lunch meats tend to be high in fat and salt. Buy low-fat, low-sodium versions.  One Small Change  Make a meal that includes a non-meat source of protein (such as tofu, lentils, or any other food listed above). Have a better idea? Write it here:  _____________________________________________________________  © 3712-8996 [employer]. [address] [place] [address], [place] [address]. All rights reserved. This information is not intended as a substitute for professional medical care. Always follow your healthcare professional's instructions.

## 2025-06-23 NOTE — PROGRESS NOTES
History of Present Illness  70-year-old male recently admitted out of concern for bilateral great toe wounds that have progressed.  Wound culture about 2 weeks prior to admission showed MRSA but had no clinical signs of infection per Podiatry at that time.  He had been admitted for pneumonia and given Augmentin.  He was admitted from Podiatry Clinic out of concern for worsening wounds of the toes left greater than right.  MRI is concerning for left great toe osteomyelitis.  Initially on vanc and Zosyn then on vancomycin alone.  Podiatry was consulted and bone biopsy done.  He did not have any abnormal exposure to his toes or water exposure though he has been washing his feet with dial soap.  He had a leukocytosis of unclear etiology and has been afebrile.  ABIs have been done and there is no concern for vascular compromise.    Bone biopsy done then culture with no growth. Pathology negative for osteomyelitis. Afebrile and white blood cell count has normalized.  CRP initially low at 6.2    ID DC Plan:  DC vancomycin and ceftriaxone   Rec DC on doxycycline 100mg po bid to complete 2 weeks (including inpt vanc) out of concern for cellulitis and MRSA infection given prior cx. EOC 5/20/25     5/15/25: Patient follows up today in his doing well.  He feels his toes healing well.  He may have had some type of reaction to doxycycline.    6/20/25:  Follows up today in ID clinic and reports doing well.  He was changed to minocycline to complete 14d and he tolerated that.  His toes are felt to be healing.  He had going to [place] last month and did not get his feet wet.  He has follow up with Podiatry, Dr. Rodriguez and on 6 9/25 there was a culture done which grew out Proteus as well as Pseudomonas.  No treatment was instituted.  He reports his toes have been doing fine without any worsening redness swelling pain or odor.  He has not had any fevers, chills, sweats.    06/23/2025:  Seen in Podiatry Clinic in follow up regarding  left toe wound and right great toe callus.  He had some chills 1 day over the weekend but those resolved without issue and has not had fever.  Now with interdigit toe wounds due to the football and no padding between the toes.  Denies any pain to either foot.  Currently denies any fever, chills, sweating.    Review of Systems   Constitutional: Positive for chills. Negative for fever, malaise/fatigue and night sweats.   Cardiovascular:  Negative for chest pain.   Respiratory:  Negative for cough, hemoptysis, shortness of breath, sputum production and wheezing.    Skin:  Positive for poor wound healing. Negative for rash and suspicious lesions.   Gastrointestinal:  Negative for abdominal pain, constipation, diarrhea, heartburn, nausea and vomiting.   Genitourinary:  Negative for dysuria and hematuria.        Objective   Physical Exam  Constitutional:       General: He is not in acute distress.     Appearance: Normal appearance. He is well-developed. He is not ill-appearing, toxic-appearing or diaphoretic.       HENT:      Head: Normocephalic and atraumatic.   Cardiovascular:      Rate and Rhythm: Normal rate and regular rhythm.      Heart sounds: Normal heart sounds. No murmur heard.     No friction rub. No gallop.   Pulmonary:      Effort: Pulmonary effort is normal. No respiratory distress.      Breath sounds: Normal breath sounds. No wheezing or rales.   Abdominal:      General: Bowel sounds are normal. There is no distension.      Palpations: Abdomen is soft. There is no mass.      Tenderness: There is no abdominal tenderness. There is no guarding or rebound.   Skin:     General: Skin is warm and dry.   Neurological:      Mental Status: He is alert and oriented to person, place, and time.   Psychiatric:         Behavior: Behavior normal.             Above - 6/20/25  Prior to 5/15/25                Procedure Component Value Units Date/Time   Aerobic culture [[id number]] (Abnormal)  Collected: 06/09/25 0918   Order  Status: Completed Specimen: Wound from Toe, Left Foot Updated: 06/12/25 1225    CULTURE, AEROBIC Moderate Providencia stuartii Abnormal      Few Pseudomonas aeruginosa Abnormal    Susceptibility     Providencia stuartii Pseudomonas aeruginosa     KWAME KWAME     Cefepime <=2 µg/ml Sensitive <=2 µg/ml Sensitive     Ceftriaxone <=1 µg/ml Sensitive       Ciprofloxacin <=0.25 µg/ml Sensitive 1 µg/ml Intermediate     Ertapenem <=0.5 µg/ml Sensitive       Gentamicin <=2 µg/ml Sensitive       Levofloxacin <=0.5 µg/ml Sensitive <=0.5 µg/ml Sensitive     Meropenem <=1 µg/ml Sensitive <=1 µg/ml Sensitive     Minocycline >8 µg/ml Resistant       Piperacillin/Tazobactam <=8 µg/ml Sensitive <=8 µg/ml Sensitive     Tobramycin 4 µg/ml Intermediate       Trimeth/Sulfa <=2/38 µg/ml Sensitive                   Linear View                Assessment and Plan     1. Cellulitis, unspecified cellulitis site    2. Open wound of toe, subsequent encounter        70-year-old male with bilateral toe ulcers with concern for osteomyelitis and prior wound culture with MRSA.  He was treated as inpatient with IV vancomycin and discharged home on doxycycline for 2 weeks out of concern for soft tissue infection as the bone biopsy that was done was negative for osteomyelitis and had no growth on culture.  Suspect he may have had some type of reaction to doxycycline.  [place] going out of the country [place] and he was counseled not to let his toes get wet or have any other exposure that could increase infection.    6/20/25:  Doing well and and left toe appears to be without infection and has good granulation tissue.  There was no surrounding signs of infection.  Though he had the positive culture on 06/09 with Providencia and Pseudomonas suspect that reflects wound colonization given no active signs of infection.  No bone exposed on exam.    6/23/25:  Doing well.  Left great toe wound seems to be healing.  Now has small wounds in between some of the toes  on the right due to the football dressing and not having padding in between his toes.  This will need close monitoring and padding.  Those rubor to the toes but I suspect that is noninfectious but possibly more vascular as his toes shirley somewhat with elevation they are no heat, tenderness, or pain.  CRP in process.     Plan:  Continue off antibiotics for now.  Recommend aggressive localized wound care with close monitoring for signs and symptoms of infection as colonization as suspected.  If his wound does not heal or continue to heal, we will consider re-culture it instituting antibiotics because a high bacterial load may cause the wound healing to stall.  Follow up results of CRP  He was given nutrition support instructions in regards to vitamins and minerals for wound healing   The patient was encouraged to call the office for further concerns or complaints.  I have asked him to notify me via MyChart for any concerns or complaints.  FU next week when back to see pod

## 2025-06-23 NOTE — PROGRESS NOTES
Subjective:      Patient ID: Stevie Villalba is a 70 y.o. male.    Chief Complaint: Wound Care (B/L hallux)    Stevie is a 70 y.o. male who presents to the clinic for evaluation and treatment of high risk feet. Stevie has a past medical history of Hyperlipidemia, Hypertension, Insomnia, and Lumbago. The patient's chief complaint is foot ulcer, both hallux nails. This patient has documented high risk feet requiring routine maintenance secondary to peripheral neuropathy.    06/23/2025 patient returns to Podiatry Clinic for follow up of bilateral foot wounds.  Previously seen by my colleague, new to me.  Since he was last seen in Podiatry he was seen in infectious disease where he states dressings were removed.  Unfortunately, patient states they did not place cotton between the toes of his right foot.  Patient continues to work he is a  and often has his feet down for several hours a day.  He relates that his Darco shoes are worn and he had to duck tape them together.  Infectious Disease also saw patient again today prior to our encounter.  Presents to clinic with his daughter whom is a nurse.    PCP: Ric Brambila MD    Date Last Seen by PCP:   Chief Complaint   Patient presents with    Wound Care     B/L hallux     Current shoe gear:   Darco shoes, worn    History of Trauma: negative  Sign of Infection: none    Hemoglobin A1C   Date Value Ref Range Status   09/30/2024 5.3 4.0 - 5.6 % Final     Comment:     ADA Screening Guidelines:  5.7-6.4%  Consistent with prediabetes  >or=6.5%  Consistent with diabetes    High levels of fetal hemoglobin interfere with the HbA1C  assay. Heterozygous hemoglobin variants (HbS, HgC, etc)do  not significantly interfere with this assay.   However, presence of multiple variants may affect accuracy.     09/25/2023 5.2 4.0 - 5.6 % Final     Comment:     ADA Screening Guidelines:  5.7-6.4%  Consistent with prediabetes  >or=6.5%  Consistent with diabetes    High levels of fetal  hemoglobin interfere with the HbA1C  assay. Heterozygous hemoglobin variants (HbS, HgC, etc)do  not significantly interfere with this assay.   However, presence of multiple variants may affect accuracy.     09/12/2022 5.3 4.0 - 5.6 % Final     Comment:     ADA Screening Guidelines:  5.7-6.4%  Consistent with prediabetes  >or=6.5%  Consistent with diabetes    High levels of fetal hemoglobin interfere with the HbA1C  assay. Heterozygous hemoglobin variants (HbS, HgC, etc)do  not significantly interfere with this assay.   However, presence of multiple variants may affect accuracy.       Hemoglobin A1c   Date Value Ref Range Status   05/05/2025 5.2 4.0 - 5.6 % Final     Comment:     ADA Screening Guidelines:  5.7-6.4%  Consistent with prediabetes  >=6.5%  Consistent with diabetes    High levels of fetal hemoglobin interfere with the HbA1C  assay. Heterozygous hemoglobin variants (HbS, HgC, etc)do  not significantly interfere with this assay.   However, presence of multiple variants may affect accuracy.   04/23/2025 5.1 4.0 - 5.6 % Final     Comment:     ADA Screening Guidelines:  5.7-6.4%  Consistent with prediabetes  >=6.5%  Consistent with diabetes    High levels of fetal hemoglobin interfere with the HbA1C  assay. Heterozygous hemoglobin variants (HbS, HgC, etc)do  not significantly interfere with this assay.   However, presence of multiple variants may affect accuracy.       Review of Systems   Constitutional: Negative for chills, fever and malaise/fatigue.   HENT:  Negative for hearing loss.    Cardiovascular:  Negative for claudication.   Respiratory:  Negative for shortness of breath.    Skin:  Positive for poor wound healing. Negative for flushing and rash.   Musculoskeletal:  Negative for joint pain and myalgias.   Gastrointestinal:  Negative for nausea and vomiting.   Neurological:  Positive for numbness, paresthesias and sensory change. Negative for loss of balance.   Psychiatric/Behavioral:  Negative for  "altered mental status.    Allergic/Immunologic: Negative for hives.           Objective:      Vitals:    06/23/25 0914   BP: (!) 144/81   Pulse: 70   Height: 5' 10" (1.778 m)   PainSc: 0-No pain   PainLoc: Toe       Physical Exam  Vitals and nursing note reviewed.   Constitutional:       General: He is not in acute distress.     Appearance: He is well-developed. He is not toxic-appearing or diaphoretic.      Comments: alert and oriented x 3.    Cardiovascular:      Pulses:           Dorsalis pedis pulses are 1+ on the right side and 1+ on the left side.        Posterior tibial pulses are 1+ on the right side and 1+ on the left side.      Comments: Dorsalis pedis and posterior tibial pulses are diminished Bilaterally. Toes are cool to touch. Feet are warm proximally.There is decreased digital hair. Skin is atrophic  Pulmonary:      Effort: No respiratory distress.   Musculoskeletal:      Right ankle: No tenderness. No lateral malleolus, medial malleolus, AITF ligament, CF ligament or posterior TF ligament tenderness.      Right Achilles Tendon: No defects. Swartz's test negative.      Left ankle: No tenderness. No lateral malleolus, medial malleolus, AITF ligament, CF ligament or posterior TF ligament tenderness.      Left Achilles Tendon: No defects. Swartz's test negative.      Right foot: Decreased range of motion. Deformity present. No tenderness or bony tenderness.      Left foot: Decreased range of motion. Deformity present. No tenderness or bony tenderness.      Comments:        Feet:      Right foot:      Protective Sensation: 10 sites tested.   1 site sensed.     Skin integrity: Ulcer and erythema present.      Left foot:      Protective Sensation: 10 sites tested.  2 sites sensed.      Skin integrity: Ulcer present.   Lymphadenopathy:      Comments: No lymphatic streaking     Skin:     General: Skin is warm and dry.      Capillary Refill: Capillary refill takes 2 to 3 seconds.      Coloration: Skin is " not pale.      Findings: Erythema and wound present. No rash.      Nails: There is no clubbing.   Neurological:      Mental Status: He is alert and oriented to person, place, and time.      Sensory: Sensory deficit present.      Motor: No atrophy.   Psychiatric:         Attention and Perception: He is attentive.         Mood and Affect: Mood is not anxious. Affect is not inappropriate.         Speech: He is communicative. Speech is not slurred.         Behavior: Behavior is not combative.       06/23/2025    Left    Ulcer location: left medial hallux IPJ  Measurements : 1.1x0.6x0.2  cm   Signs of infection: local edema  Drainage: Sero-Sanguinous  Purulence: No  Crepitus/fluctuance: No  Periwound: Macerated, Calloused  Base: Mixed Granular/Fibrotic  Depth: subcutaneous tissue  Probe to bone: No              Right    Ulcer location: right medial hallux IPJ, lateral 2nd digit PIPJ, lateral 3rd digit PIPJ  Signs of infection: local edema and erythema  Drainage: Sero-Sanguinous  Purulence: No  Crepitus/fluctuance: No  Periwound: Macerated, Calloused  Base: Mixed Granular/Fibrotic  Depth: subcutaneous tissue, probe to joint 2nd digit                                Impression:     1. Bilateral hallux soft tissue ulcers, more prominent on the left.  Left hallux distal phalanx bone marrow edema in the setting of adjacent ulcer suspicious for early osteomyelitis.  2. Advanced degenerative changes bilaterally.     Electronically signed by resident: Marci Dodd  Date:                                            05/06/2025  Time:                                           08:08     Impression:     1. Bilateral hallux soft tissue ulcers, more prominent on the left.  Left hallux distal phalanx bone marrow edema in the setting of adjacent ulcer suspicious for early osteomyelitis.  2. Advanced degenerative changes bilaterally.     Electronically signed by resident: Marci Dodd  Date:                                            " 05/06/2025  Time:                                           08:08    Impression:     Ankle-brachial index of 1.1 on the right and 1.1 on the left.     No hemodynamically significant stenosis demonstrated in the right or left lower extremity arterial system.     Electronically signed by resident: Elli Escalante  Date:                                            05/05/2025    CULTURE, AEROBIC Moderate Providencia stuartii Abnormal    Few Pseudomonas aeruginosa Abnormal         Resulting Agency: [facility]     Susceptibility     Providencia stuartii Pseudomonas aeruginosa     KWAME KWAME     Cefepime <=2 µg/ml Sensitive <=2 µg/ml Sensitive     Ceftriaxone <=1 µg/ml Sensitive       Ciprofloxacin <=0.25 µg/ml Sensitive 1 µg/ml Intermediate     Ertapenem <=0.5 µg/ml Sensitive       Gentamicin <=2 µg/ml Sensitive       Levofloxacin <=0.5 µg/ml Sensitive <=0.5 µg/ml Sensitive     Meropenem <=1 µg/ml Sensitive <=1 µg/ml Sensitive     Minocycline >8 µg/ml Resistant       Piperacillin/Tazobactam <=8 µg/ml Sensitive <=8 µg/ml Sensitive     Tobramycin 4 µg/ml Intermediate       Trimeth/Sulfa <=2/38 µg/ml Sensitive                   Linear View        Specimen Collected: 06/09/25 09:18 CDT Last Resulted: 06/12/25 12:25 CDT           CV Resting VIGNESH    Height: 5' 10" (1.778 m)   Weight: 115.9 kg (255 lb 8.2 oz)   Blood Pressure: Not recorded    Date of Study: 8/15/22   Ordering Provider: Saroj Walker MD   Clinical Indications: Lower extremity edema [R60.0 (ICD-10-CM)]       [facility]  Performing Staff   Cardiology: Antoni Harrell MD PhD     Tech: Shay Flores         Interpretation Summary  Show Result ComparisonRight resting VIGNESH 1.53 is unreliable, and suggestive of medial calcinosis.  Right resting VIGNESH 1.32, is normal.  PVR waveforms are mildly dampened at the ankle level bilaterally, and digit level on the right.    Assessment:       Encounter Diagnoses   Name Primary?    Skin ulcer of toe of left foot with fat " layer exposed Yes    Skin ulcer of toe of right foot with fat layer exposed     Bilateral great toes ulcers     Idiopathic peripheral neuropathy            Plan:       Stevie was seen today for wound care.    Diagnoses and all orders for this visit:    Skin ulcer of toe of left foot with fat layer exposed  -     US Lower Extrem Arteries Bilat with VIGNESH (xpd); Future    Skin ulcer of toe of right foot with fat layer exposed  -     X-Ray Foot Complete Right; Future  -     US Lower Extrem Arteries Bilat with VIGNESH (xpd); Future    Bilateral great toes ulcers  -     US Lower Extrem Arteries Bilat with VIGNESH (xpd); Future    Idiopathic peripheral neuropathy        I counseled the patient on his conditions, their implications and medical management.    Because patient is to to me, all potential relevant notes, labs, microbiology reviewed.    Education about the prevention of limb loss.    Discussed wound healing cycle, skin integrity, ways to care for skin.Counseled patient on the effects of biomechanical pressure and perfusion on healing. He verbalizes understanding that it can increase the chances of delayed healing and this prolonged exposure leads to infection or progression of infection which subsequently can result in loss of limb.    Arterial studies ordered based on old VIGNESH which showed some medial calcinosis and some potential decreased flow.    Adequate vitamin supplementation, protein intake, and hydration - discussed with patient    Imaging ordered to rule out bone involvement or gas in the soft tissues for new onset ulcerations to his lesser digits, I am most concerned about the 2nd digital wound that has deep probe to joint.     The wound is cleansed of foreign material as much as possible and the base inspected for bone or abscess. Bases fibrogranular and without bone nor joint exposure    Dressings:  Endoform Iodosorb  Offloading:I custom cut and personally applied an offloading pad to be applied to the foot  due to deformity, body habitus, activity level, followed by phone footballs.  New Darco shoes dispensed    Continue antibiotics per Infectious Disease.    Follow-up: 1 week but should call [facility] immediately if any signs of infection, such as fever, chills, sweats, increased redness or pain.    Short-term goals include maintaining good offloading and minimizing bioburden, promoting granulation and epithelialization to healing.  Long-term goals include keeping the wound healed by good offloading and medical management under the direction of internist.    Shoe inspection. Diabetic Foot Education. Patient reminded of the importance of good nutrition and blood sugar control to help prevent podiatric complications of diabetes. Patient instructed on proper foot hygeine. We discussed wearing proper shoe gear, daily foot inspections, never walking without protective shoe gear, never putting sharp instruments to feet.                I spent a total of 41 minutes on the day of the visit.This includes face to face time and non-face to face time preparing to see the patient (eg, review of tests), obtaining and/or reviewing separately obtained history, documenting clinical information in the electronic or other health record, independently interpreting results and communicating results to the patient/family/caregiver, or care coordinator.

## 2025-06-26 ENCOUNTER — RESULTS FOLLOW-UP (OUTPATIENT)
Dept: INFECTIOUS DISEASES | Facility: CLINIC | Age: 70
End: 2025-06-26

## 2025-06-26 ENCOUNTER — TELEPHONE (OUTPATIENT)
Dept: INFECTIOUS DISEASES | Facility: CLINIC | Age: 70
End: 2025-06-26
Payer: MEDICARE

## 2025-06-26 NOTE — TELEPHONE ENCOUNTER
Contacted patient has as CRP was elevated at 38 but was normal a month ago.  He denies any new problems.  Toes appear to be the same.  He may have had some upper respiratory congestion before having the test done.  Otherwise he denies any fever, chills, sweating.    Plan:  Reassess on 06/30 for any signs or symptoms of infection and repeat labs.  We will plan on further care based on that    Niels Campbell PA-C [facility]

## 2025-06-30 ENCOUNTER — OFFICE VISIT (OUTPATIENT)
Dept: INFECTIOUS DISEASES | Facility: CLINIC | Age: 70
End: 2025-06-30
Payer: MEDICARE

## 2025-06-30 ENCOUNTER — LAB VISIT (OUTPATIENT)
Dept: LAB | Facility: HOSPITAL | Age: 70
End: 2025-06-30
Payer: MEDICARE

## 2025-06-30 ENCOUNTER — OFFICE VISIT (OUTPATIENT)
Dept: PODIATRY | Facility: CLINIC | Age: 70
End: 2025-06-30
Payer: MEDICARE

## 2025-06-30 VITALS
WEIGHT: 233.69 LBS | BODY MASS INDEX: 33.46 KG/M2 | RESPIRATION RATE: 18 BRPM | HEIGHT: 70 IN | SYSTOLIC BLOOD PRESSURE: 145 MMHG | DIASTOLIC BLOOD PRESSURE: 76 MMHG | HEART RATE: 72 BPM

## 2025-06-30 DIAGNOSIS — M86.172 OTHER ACUTE OSTEOMYELITIS OF LEFT FOOT: Primary | ICD-10-CM

## 2025-06-30 DIAGNOSIS — L97.512 SKIN ULCER OF TOE OF RIGHT FOOT WITH FAT LAYER EXPOSED: ICD-10-CM

## 2025-06-30 DIAGNOSIS — M86.9 OSTEOMYELITIS OF GREAT TOE OF LEFT FOOT: Primary | ICD-10-CM

## 2025-06-30 DIAGNOSIS — L97.522 SKIN ULCER OF TOE OF LEFT FOOT WITH FAT LAYER EXPOSED: Primary | ICD-10-CM

## 2025-06-30 DIAGNOSIS — G60.9 IDIOPATHIC PERIPHERAL NEUROPATHY: ICD-10-CM

## 2025-06-30 DIAGNOSIS — M86.9 OSTEOMYELITIS OF GREAT TOE OF LEFT FOOT: ICD-10-CM

## 2025-06-30 LAB
ABSOLUTE EOSINOPHIL (OHS): 0.04 K/UL
ABSOLUTE MONOCYTE (OHS): 0.71 K/UL (ref 0.3–1)
ABSOLUTE NEUTROPHIL COUNT (OHS): 8.14 K/UL (ref 1.8–7.7)
ALBUMIN SERPL BCP-MCNC: 3.8 G/DL (ref 3.5–5.2)
ALP SERPL-CCNC: 107 UNIT/L (ref 40–150)
ALT SERPL W/O P-5'-P-CCNC: 18 UNIT/L (ref 10–44)
ANION GAP (OHS): 11 MMOL/L (ref 8–16)
AST SERPL-CCNC: 21 UNIT/L (ref 11–45)
BASOPHILS # BLD AUTO: 0.05 K/UL
BASOPHILS NFR BLD AUTO: 0.5 %
BILIRUB SERPL-MCNC: 1 MG/DL (ref 0.1–1)
BUN SERPL-MCNC: 21 MG/DL (ref 8–23)
CALCIUM SERPL-MCNC: 9.1 MG/DL (ref 8.7–10.5)
CHLORIDE SERPL-SCNC: 102 MMOL/L (ref 95–110)
CO2 SERPL-SCNC: 27 MMOL/L (ref 23–29)
CREAT SERPL-MCNC: 0.9 MG/DL (ref 0.5–1.4)
CRP SERPL-MCNC: 4.7 MG/L
ERYTHROCYTE [DISTWIDTH] IN BLOOD BY AUTOMATED COUNT: 13.1 % (ref 11.5–14.5)
GFR SERPLBLD CREATININE-BSD FMLA CKD-EPI: >60 ML/MIN/1.73/M2
GLUCOSE SERPL-MCNC: 86 MG/DL (ref 70–110)
HCT VFR BLD AUTO: 43 % (ref 40–54)
HGB BLD-MCNC: 14.2 GM/DL (ref 14–18)
IMM GRANULOCYTES # BLD AUTO: 0.11 K/UL (ref 0–0.04)
IMM GRANULOCYTES NFR BLD AUTO: 1 % (ref 0–0.5)
LYMPHOCYTES # BLD AUTO: 1.99 K/UL (ref 1–4.8)
MCH RBC QN AUTO: 32.1 PG (ref 27–31)
MCHC RBC AUTO-ENTMCNC: 33 G/DL (ref 32–36)
MCV RBC AUTO: 97 FL (ref 82–98)
NUCLEATED RBC (/100WBC) (OHS): 0 /100 WBC
PLATELET # BLD AUTO: 243 K/UL (ref 150–450)
PMV BLD AUTO: 9.3 FL (ref 9.2–12.9)
POTASSIUM SERPL-SCNC: 4.1 MMOL/L (ref 3.5–5.1)
PROT SERPL-MCNC: 7.3 GM/DL (ref 6–8.4)
RBC # BLD AUTO: 4.43 M/UL (ref 4.6–6.2)
RELATIVE EOSINOPHIL (OHS): 0.4 %
RELATIVE LYMPHOCYTE (OHS): 18 % (ref 18–48)
RELATIVE MONOCYTE (OHS): 6.4 % (ref 4–15)
RELATIVE NEUTROPHIL (OHS): 73.7 % (ref 38–73)
SODIUM SERPL-SCNC: 140 MMOL/L (ref 136–145)
WBC # BLD AUTO: 11.04 K/UL (ref 3.9–12.7)

## 2025-06-30 PROCEDURE — 1159F MED LIST DOCD IN RCRD: CPT | Mod: CPTII,HCNC,S$GLB, | Performed by: PODIATRIST

## 2025-06-30 PROCEDURE — 86140 C-REACTIVE PROTEIN: CPT | Mod: HCNC

## 2025-06-30 PROCEDURE — 3008F BODY MASS INDEX DOCD: CPT | Mod: CPTII,HCNC,S$GLB, | Performed by: PODIATRIST

## 2025-06-30 PROCEDURE — 36415 COLL VENOUS BLD VENIPUNCTURE: CPT | Mod: HCNC

## 2025-06-30 PROCEDURE — 3077F SYST BP >= 140 MM HG: CPT | Mod: CPTII,HCNC,S$GLB, | Performed by: PODIATRIST

## 2025-06-30 PROCEDURE — 4010F ACE/ARB THERAPY RXD/TAKEN: CPT | Mod: CPTII,HCNC,S$GLB, | Performed by: PODIATRIST

## 2025-06-30 PROCEDURE — 1160F RVW MEDS BY RX/DR IN RCRD: CPT | Mod: CPTII,HCNC,S$GLB, | Performed by: PODIATRIST

## 2025-06-30 PROCEDURE — 3044F HG A1C LEVEL LT 7.0%: CPT | Mod: CPTII,HCNC,S$GLB, | Performed by: PHYSICIAN ASSISTANT

## 2025-06-30 PROCEDURE — 99213 OFFICE O/P EST LOW 20 MIN: CPT | Mod: HCNC,S$GLB,, | Performed by: PHYSICIAN ASSISTANT

## 2025-06-30 PROCEDURE — 99999 PR PBB SHADOW E&M-EST. PATIENT-LVL IV: CPT | Mod: PBBFAC,HCNC,, | Performed by: PODIATRIST

## 2025-06-30 PROCEDURE — 99999 PR PBB SHADOW E&M-EST. PATIENT-LVL I: CPT | Mod: PBBFAC,HCNC,, | Performed by: PHYSICIAN ASSISTANT

## 2025-06-30 PROCEDURE — 84075 ASSAY ALKALINE PHOSPHATASE: CPT | Mod: HCNC

## 2025-06-30 PROCEDURE — 3044F HG A1C LEVEL LT 7.0%: CPT | Mod: CPTII,HCNC,S$GLB, | Performed by: PODIATRIST

## 2025-06-30 PROCEDURE — 4010F ACE/ARB THERAPY RXD/TAKEN: CPT | Mod: CPTII,HCNC,S$GLB, | Performed by: PHYSICIAN ASSISTANT

## 2025-06-30 PROCEDURE — 99213 OFFICE O/P EST LOW 20 MIN: CPT | Mod: HCNC,S$GLB,, | Performed by: PODIATRIST

## 2025-06-30 PROCEDURE — 3078F DIAST BP <80 MM HG: CPT | Mod: CPTII,HCNC,S$GLB, | Performed by: PODIATRIST

## 2025-06-30 PROCEDURE — 85025 COMPLETE CBC W/AUTO DIFF WBC: CPT | Mod: HCNC

## 2025-06-30 PROCEDURE — 1126F AMNT PAIN NOTED NONE PRSNT: CPT | Mod: CPTII,HCNC,S$GLB, | Performed by: PODIATRIST

## 2025-06-30 NOTE — PROGRESS NOTES
Subjective:      Patient ID: Stevie Villalba is a 70 y.o. male.    Chief Complaint: Wound Care (Bilateral wound care ) and Dressing Change    Stevie is a 70 y.o. male who presents to the clinic for evaluation and treatment of high risk feet. Stevie has a past medical history of Hyperlipidemia, Hypertension, Insomnia, and Lumbago. The patient's chief complaint is foot ulcer, both hallux nails. This patient has documented high risk feet requiring routine maintenance secondary to peripheral neuropathy.    06/23/2025 patient returns to Podiatry Clinic for follow up of bilateral foot wounds.  Previously seen by my colleague, new to me.  Since he was last seen in Podiatry he was seen in infectious disease where he states dressings were removed.  Unfortunately, patient states they did not place cotton between the toes of his right foot.  Patient continues to work he is a  and often has his feet down for several hours a day.  He relates that his Darco shoes are worn and he had to duck tape them together.  Infectious Disease also saw patient again today prior to our encounter.  Presents to clinic with his daughter whom is a nurse.    06/30/2025 patient returns to Podiatry Clinic for follow up of bilateral foot wounds.  Dressing has remained intact since last encounter.  No new pedal complaints.      PCP: Ric Brambila MD    Date Last Seen by PCP:   Chief Complaint   Patient presents with    Wound Care     Bilateral wound care     Dressing Change     Current shoe gear:   Darco shoes, worn    History of Trauma: negative  Sign of Infection: none    Hemoglobin A1C   Date Value Ref Range Status   09/30/2024 5.3 4.0 - 5.6 % Final     Comment:     ADA Screening Guidelines:  5.7-6.4%  Consistent with prediabetes  >or=6.5%  Consistent with diabetes    High levels of fetal hemoglobin interfere with the HbA1C  assay. Heterozygous hemoglobin variants (HbS, HgC, etc)do  not significantly interfere with this assay.   However,  presence of multiple variants may affect accuracy.     09/25/2023 5.2 4.0 - 5.6 % Final     Comment:     ADA Screening Guidelines:  5.7-6.4%  Consistent with prediabetes  >or=6.5%  Consistent with diabetes    High levels of fetal hemoglobin interfere with the HbA1C  assay. Heterozygous hemoglobin variants (HbS, HgC, etc)do  not significantly interfere with this assay.   However, presence of multiple variants may affect accuracy.     09/12/2022 5.3 4.0 - 5.6 % Final     Comment:     ADA Screening Guidelines:  5.7-6.4%  Consistent with prediabetes  >or=6.5%  Consistent with diabetes    High levels of fetal hemoglobin interfere with the HbA1C  assay. Heterozygous hemoglobin variants (HbS, HgC, etc)do  not significantly interfere with this assay.   However, presence of multiple variants may affect accuracy.       Hemoglobin A1c   Date Value Ref Range Status   05/05/2025 5.2 4.0 - 5.6 % Final     Comment:     ADA Screening Guidelines:  5.7-6.4%  Consistent with prediabetes  >=6.5%  Consistent with diabetes    High levels of fetal hemoglobin interfere with the HbA1C  assay. Heterozygous hemoglobin variants (HbS, HgC, etc)do  not significantly interfere with this assay.   However, presence of multiple variants may affect accuracy.   04/23/2025 5.1 4.0 - 5.6 % Final     Comment:     ADA Screening Guidelines:  5.7-6.4%  Consistent with prediabetes  >=6.5%  Consistent with diabetes    High levels of fetal hemoglobin interfere with the HbA1C  assay. Heterozygous hemoglobin variants (HbS, HgC, etc)do  not significantly interfere with this assay.   However, presence of multiple variants may affect accuracy.       Review of Systems   Constitutional: Negative for chills, fever and malaise/fatigue.   HENT:  Negative for hearing loss.    Cardiovascular:  Negative for claudication.   Respiratory:  Negative for shortness of breath.    Skin:  Positive for poor wound healing. Negative for flushing and rash.   Musculoskeletal:  Negative  "for joint pain and myalgias.   Gastrointestinal:  Negative for nausea and vomiting.   Neurological:  Positive for numbness, paresthesias and sensory change. Negative for loss of balance.   Psychiatric/Behavioral:  Negative for altered mental status.    Allergic/Immunologic: Negative for hives.           Objective:      Vitals:    06/30/25 1054   BP: (!) 145/76   Pulse: 72   Resp: 18   Weight: 106 kg (233 lb 11 oz)   Height: 5' 10" (1.778 m)   PainSc: 0-No pain         Physical Exam  Vitals and nursing note reviewed.   Constitutional:       General: He is not in acute distress.     Appearance: He is well-developed. He is not toxic-appearing or diaphoretic.      Comments: alert and oriented x 3.    Cardiovascular:      Pulses:           Dorsalis pedis pulses are 1+ on the right side and 1+ on the left side.        Posterior tibial pulses are 1+ on the right side and 1+ on the left side.      Comments: Dorsalis pedis and posterior tibial pulses are diminished Bilaterally. Toes are cool to touch. Feet are warm proximally.There is decreased digital hair. Skin is atrophic  Pulmonary:      Effort: No respiratory distress.   Musculoskeletal:      Right ankle: No tenderness. No lateral malleolus, medial malleolus, AITF ligament, CF ligament or posterior TF ligament tenderness.      Right Achilles Tendon: No defects. Swartz's test negative.      Left ankle: No tenderness. No lateral malleolus, medial malleolus, AITF ligament, CF ligament or posterior TF ligament tenderness.      Left Achilles Tendon: No defects. Swartz's test negative.      Right foot: Decreased range of motion. Deformity present. No tenderness or bony tenderness.      Left foot: Decreased range of motion. Deformity present. No tenderness or bony tenderness.      Comments:        Feet:      Right foot:      Protective Sensation: 10 sites tested.   1 site sensed.     Skin integrity: Ulcer and erythema present.      Left foot:      Protective Sensation: 10 " sites tested.  2 sites sensed.      Skin integrity: Ulcer present.   Lymphadenopathy:      Comments: No lymphatic streaking     Skin:     General: Skin is warm and dry.      Capillary Refill: Capillary refill takes 2 to 3 seconds.      Coloration: Skin is not pale.      Findings: Erythema and wound present. No rash.      Nails: There is no clubbing.   Neurological:      Mental Status: He is alert and oriented to person, place, and time.      Sensory: Sensory deficit present.      Motor: No atrophy.   Psychiatric:         Attention and Perception: He is attentive.         Mood and Affect: Mood is not anxious. Affect is not inappropriate.         Speech: He is communicative. Speech is not slurred.         Behavior: Behavior is not combative.       06/30/2025     Right             Left             06/23/2025    Left    Ulcer location: left medial hallux IPJ  Measurements : 1.1x0.6x0.2  cm   Signs of infection: local edema  Drainage: Sero-Sanguinous  Purulence: No  Crepitus/fluctuance: No  Periwound: Macerated, Calloused  Base: Mixed Granular/Fibrotic  Depth: subcutaneous tissue  Probe to bone: No              Right    Ulcer location: right medial hallux IPJ, lateral 2nd digit PIPJ, lateral 3rd digit PIPJ  Signs of infection: local edema and erythema  Drainage: Sero-Sanguinous  Purulence: No  Crepitus/fluctuance: No  Periwound: Macerated, Calloused  Base: Mixed Granular/Fibrotic  Depth: subcutaneous tissue, probe to joint 2nd digit                                Impression:     1. Bilateral hallux soft tissue ulcers, more prominent on the left.  Left hallux distal phalanx bone marrow edema in the setting of adjacent ulcer suspicious for early osteomyelitis.  2. Advanced degenerative changes bilaterally.     Electronically signed by resident: Marci Dodd  Date:                                            05/06/2025  Time:                                           08:08     Impression:     1. Bilateral hallux soft  "tissue ulcers, more prominent on the left.  Left hallux distal phalanx bone marrow edema in the setting of adjacent ulcer suspicious for early osteomyelitis.  2. Advanced degenerative changes bilaterally.     Electronically signed by resident: Marci Dodd  Date:                                            05/06/2025  Time:                                           08:08    Impression:     Ankle-brachial index of 1.1 on the right and 1.1 on the left.     No hemodynamically significant stenosis demonstrated in the right or left lower extremity arterial system.     Electronically signed by resident: Elli Escalante  Date:                                            05/05/2025    CULTURE, AEROBIC Moderate Providencia stuartii Abnormal    Few Pseudomonas aeruginosa Abnormal         Resulting Agency: [facility]     Susceptibility     Providencia stuartii Pseudomonas aeruginosa     KWAME KWAME     Cefepime <=2 µg/ml Sensitive <=2 µg/ml Sensitive     Ceftriaxone <=1 µg/ml Sensitive       Ciprofloxacin <=0.25 µg/ml Sensitive 1 µg/ml Intermediate     Ertapenem <=0.5 µg/ml Sensitive       Gentamicin <=2 µg/ml Sensitive       Levofloxacin <=0.5 µg/ml Sensitive <=0.5 µg/ml Sensitive     Meropenem <=1 µg/ml Sensitive <=1 µg/ml Sensitive     Minocycline >8 µg/ml Resistant       Piperacillin/Tazobactam <=8 µg/ml Sensitive <=8 µg/ml Sensitive     Tobramycin 4 µg/ml Intermediate       Trimeth/Sulfa <=2/38 µg/ml Sensitive                   Linear View        Specimen Collected: 06/09/25 09:18 CDT Last Resulted: 06/12/25 12:25 CDT           CV Resting VIGNESH    Height: 5' 10" (1.778 m)   Weight: 115.9 kg (255 lb 8.2 oz)   Blood Pressure: Not recorded    Date of Study: 8/15/22   Ordering Provider: Saroj Walker MD   Clinical Indications: Lower extremity edema [R60.0 (ICD-10-CM)]       [facility]  Performing Staff   Cardiology: Antoni Harrell MD PhD     Tech: Shay Flores         Interpretation Summary  Show Result " ComparisonRight resting VIGNESH 1.53 is unreliable, and suggestive of medial calcinosis.  Right resting VIGNESH 1.32, is normal.  PVR waveforms are mildly dampened at the ankle level bilaterally, and digit level on the right.    Assessment:       Encounter Diagnoses   Name Primary?    Skin ulcer of toe of left foot with fat layer exposed Yes    Skin ulcer of toe of right foot with fat layer exposed     Idiopathic peripheral neuropathy              Plan:       Stevie was seen today for wound care and dressing change.    Diagnoses and all orders for this visit:    Skin ulcer of toe of left foot with fat layer exposed    Skin ulcer of toe of right foot with fat layer exposed    Idiopathic peripheral neuropathy          I counseled the patient on his conditions, their implications and medical management.    Education about the prevention of limb loss.    Discussed wound healing cycle, skin integrity, ways to care for skin.Counseled patient on the effects of biomechanical pressure and perfusion on healing. He verbalizes understanding that it can increase the chances of delayed healing and this prolonged exposure leads to infection or progression of infection which subsequently can result in loss of limb.    Arterial studies ordered based on old VIGNESH which showed some medial calcinosis and some potential decreased flow.    I continue to be concerned about the 2nd digital wound that has deep probe to joint.     The wounds are cleansed of foreign material as much as possible and the base inspected for bone or abscess. Bases fibrogranular     Dressings:  Endoform Iodosorb  Offloading: I custom cut and personally applied an offloading pad to be applied to the foot due to deformity, body habitus, activity level, followed by phone footballs.  New Darco shoes dispensed    Continue antibiotics per Infectious Disease.    Follow-up: 1 week but [facility] if any signs of infection, such as fever, chills, sweats,  increased redness or pain.    Short-term goals include maintaining good offloading and minimizing bioburden, promoting granulation and epithelialization to healing.  Long-term goals include keeping the wound healed by good offloading and medical management under the direction of internist.    Shoe inspection. Diabetic Foot Education. Patient reminded of the importance of good nutrition and blood sugar control to help prevent podiatric complications of diabetes. Patient instructed on proper foot hygeine. We discussed wearing proper shoe gear, daily foot inspections, never walking without protective shoe gear, never putting sharp instruments to feet.                I spent a total of 41 minutes on the day of the visit.This includes face to face time and non-face to face time preparing to see the patient (eg, review of tests), obtaining and/or reviewing separately obtained history, documenting clinical information in the electronic or other health record, independently interpreting results and communicating results to the patient/family/caregiver, or care coordinator.

## 2025-06-30 NOTE — Clinical Note
Please schedule him for next week when he sees Podiatry.  Malu would you be able to see him quickly at that time to make sure his left toe is getting better

## 2025-06-30 NOTE — PROGRESS NOTES
History of Present Illness  70-year-old male recently admitted out of concern for bilateral great toe wounds that have progressed.  Wound culture about 2 weeks prior to admission showed MRSA but had no clinical signs of infection per Podiatry at that time.  He had been admitted for pneumonia and given Augmentin.  He was admitted from Podiatry Clinic out of concern for worsening wounds of the toes left greater than right.  MRI is concerning for left great toe osteomyelitis.  Initially on vanc and Zosyn then on vancomycin alone.  Podiatry was consulted and bone biopsy done.  He did not have any abnormal exposure to his toes or water exposure though he has been washing his feet with dial soap.  He had a leukocytosis of unclear etiology and has been afebrile.  ABIs have been done and there is no concern for vascular compromise.    Bone biopsy done then culture with no growth. Pathology negative for osteomyelitis. Afebrile and white blood cell count has normalized.  CRP initially low at 6.2    ID DC Plan:  DC vancomycin and ceftriaxone   Rec DC on doxycycline 100mg po bid to complete 2 weeks (including inpt vanc) out of concern for cellulitis and MRSA infection given prior cx. EOC 5/20/25     5/15/25: Patient follows up today in his doing well.  He feels his toes healing well.  He may have had some type of reaction to doxycycline.    6/20/25:  Follows up today in ID clinic and reports doing well.  He was changed to minocycline to complete 14d and he tolerated that.  His toes are felt to be healing.  He had going to [place] last month and did not get his feet wet.  He has follow up with Podiatry, Dr. Rodriguez and on 6 9/25 there was a culture done which grew out Proteus as well as Pseudomonas.  No treatment was instituted.  He reports his toes have been doing fine without any worsening redness swelling pain or odor.  He has not had any fevers, chills, sweats.    06/23/2025:  Seen in Podiatry Clinic in follow up regarding  left toe wound and right great toe callus.  He had some chills 1 day over the weekend but those resolved without issue and has not had fever.  Now with interdigit toe wounds due to the football and no padding between the toes.  Denies any pain to either foot.  Currently denies any fever, chills, sweating.    06/30/2025:  Seen in Podiatry Clinic.  Doing well.  Redness in the right toes has resolved though still with small wounds that are healing.  Left toe great toe wound appears to be getting smaller.  The patient denies any recent fever, chills, or sweats.      Review of Systems   Constitutional: Negative for chills, fever, malaise/fatigue and night sweats.   Cardiovascular:  Negative for chest pain.   Respiratory:  Negative for cough, hemoptysis, shortness of breath, sputum production and wheezing.    Skin:  Positive for poor wound healing. Negative for rash and suspicious lesions.   Gastrointestinal:  Negative for abdominal pain, constipation, diarrhea, heartburn, nausea and vomiting.   Genitourinary:  Negative for dysuria and hematuria.        Objective   Physical Exam  Constitutional:       General: He is not in acute distress.     Appearance: Normal appearance. He is well-developed. He is not ill-appearing, toxic-appearing or diaphoretic.       HENT:      Head: Normocephalic and atraumatic.   Cardiovascular:      Rate and Rhythm: Normal rate and regular rhythm.      Heart sounds: Normal heart sounds. No murmur heard.     No friction rub. No gallop.   Pulmonary:      Effort: Pulmonary effort is normal. No respiratory distress.      Breath sounds: Normal breath sounds. No wheezing or rales.   Abdominal:      General: Bowel sounds are normal. There is no distension.      Palpations: Abdomen is soft. There is no mass.      Tenderness: There is no abdominal tenderness. There is no guarding or rebound.   Skin:     General: Skin is warm and dry.   Neurological:      Mental Status: He is alert and oriented to person,  place, and time.   Psychiatric:         Behavior: Behavior normal.                   6/30 above          Above - 6/20/25  Prior to 5/15/25                Procedure Component Value Units Date/Time   Aerobic culture [[id number]] (Abnormal)  Collected: 06/09/25 0918   Order Status: Completed Specimen: Wound from Toe, Left Foot Updated: 06/12/25 1225    CULTURE, AEROBIC Moderate Providencia stuartii Abnormal      Few Pseudomonas aeruginosa Abnormal    Susceptibility     Providencia stuartii Pseudomonas aeruginosa     KWAME KWAME     Cefepime <=2 µg/ml Sensitive <=2 µg/ml Sensitive     Ceftriaxone <=1 µg/ml Sensitive       Ciprofloxacin <=0.25 µg/ml Sensitive 1 µg/ml Intermediate     Ertapenem <=0.5 µg/ml Sensitive       Gentamicin <=2 µg/ml Sensitive       Levofloxacin <=0.5 µg/ml Sensitive <=0.5 µg/ml Sensitive     Meropenem <=1 µg/ml Sensitive <=1 µg/ml Sensitive     Minocycline >8 µg/ml Resistant       Piperacillin/Tazobactam <=8 µg/ml Sensitive <=8 µg/ml Sensitive     Tobramycin 4 µg/ml Intermediate       Trimeth/Sulfa <=2/38 µg/ml Sensitive                   Linear View                Assessment and Plan     1. Other acute osteomyelitis of left foot        70-year-old male with bilateral toe ulcers with concern for osteomyelitis and prior wound culture with MRSA.  He was treated as inpatient with IV vancomycin and discharged home on doxycycline for 2 weeks out of concern for soft tissue infection as the bone biopsy that was done was negative for osteomyelitis and had no growth on culture.  Suspect he may have had some type of reaction to doxycycline.  [place] be going out of the country [place] and he was counseled not to let his toes get wet or have any other exposure that could increase infection.    6/20/25:  Doing well and and left toe appears to be without infection and has good granulation tissue.  There was no surrounding signs of infection.  Though he had the positive culture on 06/09 with Providencia and  Pseudomonas suspect that reflects wound colonization given no active signs of infection.  No bone exposed on exam.    6/23/25:  Doing well.  Left great toe wound seems to be healing.  Now has small wounds in between some of the toes on the right due to the football dressing and not having padding in between his toes.  This will need close monitoring and padding.  Those rubor to the toes but I suspect that is noninfectious but possibly more vascular as his toes shirley somewhat with elevation they are no heat, tenderness, or pain.  CRP in process.     06/30/2025:  Seen in Podiatry Clinic with Podiatry.  Doing well.  Redness of the right toes resolved small wounds healing.  No complaints about the left great toe and reportedly and wound is getting smaller.  CRP now normalized without any intervention - may have had a viral URI around the time of the last CRP which caused it to be elevated. Denies any fevers, chills, sweating.    Plan:  Continue off antibiotics for now.  Recommend aggressive localized wound care with close monitoring for signs and symptoms of infection as colonization as suspected.  If his wound does not heal or continue to heal, we will consider re-culture it instituting antibiotics because a high bacterial load may cause the wound healing to stall.  Labs today as above  He was given nutrition support instructions in regards to vitamins and minerals for wound healing - which he is put in place   The patient was encouraged to call the office for further concerns or complaints.  I have asked him to notify me via [facility] for any concerns or complaints.  FU next week when back to see pod

## 2025-07-07 ENCOUNTER — OFFICE VISIT (OUTPATIENT)
Dept: PODIATRY | Facility: CLINIC | Age: 70
End: 2025-07-07
Payer: MEDICARE

## 2025-07-07 ENCOUNTER — TELEPHONE (OUTPATIENT)
Dept: INFECTIOUS DISEASES | Facility: CLINIC | Age: 70
End: 2025-07-07
Payer: MEDICARE

## 2025-07-07 VITALS
WEIGHT: 233.69 LBS | SYSTOLIC BLOOD PRESSURE: 148 MMHG | BODY MASS INDEX: 33.46 KG/M2 | HEART RATE: 71 BPM | HEIGHT: 70 IN | DIASTOLIC BLOOD PRESSURE: 77 MMHG | RESPIRATION RATE: 19 BRPM

## 2025-07-07 DIAGNOSIS — G60.9 IDIOPATHIC PERIPHERAL NEUROPATHY: ICD-10-CM

## 2025-07-07 DIAGNOSIS — L97.522 SKIN ULCER OF TOE OF LEFT FOOT WITH FAT LAYER EXPOSED: Primary | ICD-10-CM

## 2025-07-07 DIAGNOSIS — L97.512 SKIN ULCER OF TOE OF RIGHT FOOT WITH FAT LAYER EXPOSED: ICD-10-CM

## 2025-07-07 PROCEDURE — 3078F DIAST BP <80 MM HG: CPT | Mod: CPTII,HCNC,S$GLB, | Performed by: PODIATRIST

## 2025-07-07 PROCEDURE — 99213 OFFICE O/P EST LOW 20 MIN: CPT | Mod: HCNC,S$GLB,, | Performed by: PODIATRIST

## 2025-07-07 PROCEDURE — 1126F AMNT PAIN NOTED NONE PRSNT: CPT | Mod: CPTII,HCNC,S$GLB, | Performed by: PODIATRIST

## 2025-07-07 PROCEDURE — 3044F HG A1C LEVEL LT 7.0%: CPT | Mod: CPTII,HCNC,S$GLB, | Performed by: PODIATRIST

## 2025-07-07 PROCEDURE — 1159F MED LIST DOCD IN RCRD: CPT | Mod: CPTII,HCNC,S$GLB, | Performed by: PODIATRIST

## 2025-07-07 PROCEDURE — 3008F BODY MASS INDEX DOCD: CPT | Mod: CPTII,HCNC,S$GLB, | Performed by: PODIATRIST

## 2025-07-07 PROCEDURE — 3077F SYST BP >= 140 MM HG: CPT | Mod: CPTII,HCNC,S$GLB, | Performed by: PODIATRIST

## 2025-07-07 PROCEDURE — 4010F ACE/ARB THERAPY RXD/TAKEN: CPT | Mod: CPTII,HCNC,S$GLB, | Performed by: PODIATRIST

## 2025-07-07 PROCEDURE — 99999 PR PBB SHADOW E&M-EST. PATIENT-LVL IV: CPT | Mod: PBBFAC,HCNC,, | Performed by: PODIATRIST

## 2025-07-07 PROCEDURE — 1160F RVW MEDS BY RX/DR IN RCRD: CPT | Mod: CPTII,HCNC,S$GLB, | Performed by: PODIATRIST

## 2025-07-07 NOTE — TELEPHONE ENCOUNTER
Notified Niels that I did schedule pt next Monday 7/14 at 10am per Malu, also mailed a letter reminder.    ----- Message from Malu Washington PA-C sent at 7/7/2025 11:35 AM CDT -----  I am so sorry. I was off last week and just seeing this. Please schedule follow up with Niels on Monday when he comes back.    ----- Message -----  From: Lakesha James  Sent: 7/2/2025   9:13 AM CDT  To: Malu Washington PA-C    Please let me know if it's okay to schedule on 7/7. Thanks!    ----- Message -----  From: Niels Campbell Jr., PA  Sent: 6/30/2025   5:15 PM CDT  To: Malu Washington PA-C; Lakesha James    Please schedule him for next week when he sees Podiatry.  Malu would you be able to see him quickly at that time to make sure his left toe is getting better

## 2025-07-11 DIAGNOSIS — M17.12 PRIMARY OSTEOARTHRITIS OF LEFT KNEE: ICD-10-CM

## 2025-07-11 RX ORDER — MELOXICAM 7.5 MG/1
7.5 TABLET ORAL
Qty: 30 TABLET | Refills: 1 | Status: SHIPPED | OUTPATIENT
Start: 2025-07-11

## 2025-07-13 NOTE — PROGRESS NOTES
Subjective:      Patient ID: Stevie Villalba is a 70 y.o. male.    Chief Complaint: Wound Care (Bilateral foot ulcers ) and Dressing Change (football)    Stevie is a 70 y.o. male who presents to the clinic for evaluation and treatment of high risk feet. Stevie has a past medical history of Hyperlipidemia, Hypertension, Insomnia, and Lumbago. The patient's chief complaint is foot ulcer, both hallux nails. This patient has documented high risk feet requiring routine maintenance secondary to peripheral neuropathy.    06/23/2025 patient returns to Podiatry Clinic for follow up of bilateral foot wounds.  Previously seen by my colleague, new to me.  Since he was last seen in Podiatry he was seen in infectious disease where he states dressings were removed.  Unfortunately, patient states they did not place cotton between the toes of his right foot.  Patient continues to work he is a  and often has his feet down for several hours a day.  He relates that his Darco shoes are worn and he had to duck tape them together.  Infectious Disease also saw patient again today prior to our encounter.  Presents to clinic with his daughter whom is a nurse.    06/30/2025 patient returns to Podiatry Clinic for follow up of bilateral foot wounds.  Dressing has remained intact since last encounter.  No new pedal complaints.    07/07/2025 patient returns to Podiatry Clinic for follow up of bilateral foot wounds.  Dressing has remained intact since last encounter.       PCP: Ric Brambila MD    Date Last Seen by PCP:   Chief Complaint   Patient presents with    Wound Care     Bilateral foot ulcers     Dressing Change     football     Current shoe gear:   Darco shoes    Hemoglobin A1C   Date Value Ref Range Status   09/30/2024 5.3 4.0 - 5.6 % Final     Comment:     ADA Screening Guidelines:  5.7-6.4%  Consistent with prediabetes  >or=6.5%  Consistent with diabetes    High levels of fetal hemoglobin interfere with the HbA1C  assay.  Heterozygous hemoglobin variants (HbS, HgC, etc)do  not significantly interfere with this assay.   However, presence of multiple variants may affect accuracy.     09/25/2023 5.2 4.0 - 5.6 % Final     Comment:     ADA Screening Guidelines:  5.7-6.4%  Consistent with prediabetes  >or=6.5%  Consistent with diabetes    High levels of fetal hemoglobin interfere with the HbA1C  assay. Heterozygous hemoglobin variants (HbS, HgC, etc)do  not significantly interfere with this assay.   However, presence of multiple variants may affect accuracy.     09/12/2022 5.3 4.0 - 5.6 % Final     Comment:     ADA Screening Guidelines:  5.7-6.4%  Consistent with prediabetes  >or=6.5%  Consistent with diabetes    High levels of fetal hemoglobin interfere with the HbA1C  assay. Heterozygous hemoglobin variants (HbS, HgC, etc)do  not significantly interfere with this assay.   However, presence of multiple variants may affect accuracy.       Hemoglobin A1c   Date Value Ref Range Status   05/05/2025 5.2 4.0 - 5.6 % Final     Comment:     ADA Screening Guidelines:  5.7-6.4%  Consistent with prediabetes  >=6.5%  Consistent with diabetes    High levels of fetal hemoglobin interfere with the HbA1C  assay. Heterozygous hemoglobin variants (HbS, HgC, etc)do  not significantly interfere with this assay.   However, presence of multiple variants may affect accuracy.   04/23/2025 5.1 4.0 - 5.6 % Final     Comment:     ADA Screening Guidelines:  5.7-6.4%  Consistent with prediabetes  >=6.5%  Consistent with diabetes    High levels of fetal hemoglobin interfere with the HbA1C  assay. Heterozygous hemoglobin variants (HbS, HgC, etc)do  not significantly interfere with this assay.   However, presence of multiple variants may affect accuracy.       Review of Systems   Constitutional: Negative for chills, fever and malaise/fatigue.   HENT:  Negative for hearing loss.    Cardiovascular:  Negative for claudication.   Respiratory:  Negative for shortness of  "breath.    Skin:  Positive for poor wound healing. Negative for flushing and rash.   Musculoskeletal:  Negative for joint pain and myalgias.   Gastrointestinal:  Negative for nausea and vomiting.   Neurological:  Positive for numbness, paresthesias and sensory change. Negative for loss of balance.   Psychiatric/Behavioral:  Negative for altered mental status.    Allergic/Immunologic: Negative for hives.           Objective:      Vitals:    07/07/25 1112   BP: (!) 148/77   Pulse: 71   Resp: 19   Weight: 106 kg (233 lb 11 oz)   Height: 5' 10" (1.778 m)   PainSc: 0-No pain         Physical Exam  Vitals and nursing note reviewed.   Constitutional:       General: He is not in acute distress.     Appearance: He is well-developed. He is not toxic-appearing or diaphoretic.      Comments: alert and oriented x 3.    Cardiovascular:      Pulses:           Dorsalis pedis pulses are 1+ on the right side and 1+ on the left side.        Posterior tibial pulses are 1+ on the right side and 1+ on the left side.      Comments: Dorsalis pedis and posterior tibial pulses are diminished Bilaterally. Toes are cool to touch. Feet are warm proximally.There is decreased digital hair. Skin is atrophic  Pulmonary:      Effort: No respiratory distress.   Musculoskeletal:      Right ankle: No tenderness. No lateral malleolus, medial malleolus, AITF ligament, CF ligament or posterior TF ligament tenderness.      Right Achilles Tendon: No defects. Swartz's test negative.      Left ankle: No tenderness. No lateral malleolus, medial malleolus, AITF ligament, CF ligament or posterior TF ligament tenderness.      Left Achilles Tendon: No defects. Swartz's test negative.      Right foot: Decreased range of motion. Deformity present. No tenderness or bony tenderness.      Left foot: Decreased range of motion. Deformity present. No tenderness or bony tenderness.      Comments:        Feet:      Right foot:      Protective Sensation: 10 sites tested. "   1 site sensed.     Skin integrity: Ulcer and erythema present.      Left foot:      Protective Sensation: 10 sites tested.  2 sites sensed.      Skin integrity: Ulcer present.   Lymphadenopathy:      Comments: No lymphatic streaking     Skin:     General: Skin is warm and dry.      Capillary Refill: Capillary refill takes 2 to 3 seconds.      Coloration: Skin is not pale.      Findings: Erythema and wound present. No rash.      Nails: There is no clubbing.   Neurological:      Mental Status: He is alert and oriented to person, place, and time.      Sensory: Sensory deficit present.      Motor: No atrophy.   Psychiatric:         Attention and Perception: He is attentive.         Mood and Affect: Mood is not anxious. Affect is not inappropriate.         Speech: He is communicative. Speech is not slurred.         Behavior: Behavior is not combative.       07/07/2025    Right Foot                Left Foot                06/30/2025     Right             Left             06/23/2025    Left    Ulcer location: left medial hallux IPJ  Measurements : 1.1x0.6x0.2  cm   Signs of infection: local edema  Drainage: Sero-Sanguinous  Purulence: No  Crepitus/fluctuance: No  Periwound: Macerated, Calloused  Base: Mixed Granular/Fibrotic  Depth: subcutaneous tissue  Probe to bone: No              Right    Ulcer location: right medial hallux IPJ, lateral 2nd digit PIPJ, lateral 3rd digit PIPJ  Signs of infection: local edema and erythema  Drainage: Sero-Sanguinous  Purulence: No  Crepitus/fluctuance: No  Periwound: Macerated, Calloused  Base: Mixed Granular/Fibrotic  Depth: subcutaneous tissue, probe to joint 2nd digit                                Impression:     1. Bilateral hallux soft tissue ulcers, more prominent on the left.  Left hallux distal phalanx bone marrow edema in the setting of adjacent ulcer suspicious for early osteomyelitis.  2. Advanced degenerative changes bilaterally.     Electronically signed by resident:  "Marci Dodd  Date:                                            05/06/2025  Time:                                           08:08     Impression:     1. Bilateral hallux soft tissue ulcers, more prominent on the left.  Left hallux distal phalanx bone marrow edema in the setting of adjacent ulcer suspicious for early osteomyelitis.  2. Advanced degenerative changes bilaterally.     Electronically signed by resident: Marci Dodd  Date:                                            05/06/2025  Time:                                           08:08    Impression:     Ankle-brachial index of 1.1 on the right and 1.1 on the left.     No hemodynamically significant stenosis demonstrated in the right or left lower extremity arterial system.     Electronically signed by resident: Elli Escalante  Date:                                            05/05/2025    CULTURE, AEROBIC Moderate Providencia stuartii Abnormal    Few Pseudomonas aeruginosa Abnormal         Resulting Agency: [facility]     Susceptibility     Providencia stuartii Pseudomonas aeruginosa     KWAME KWAME     Cefepime <=2 µg/ml Sensitive <=2 µg/ml Sensitive     Ceftriaxone <=1 µg/ml Sensitive       Ciprofloxacin <=0.25 µg/ml Sensitive 1 µg/ml Intermediate     Ertapenem <=0.5 µg/ml Sensitive       Gentamicin <=2 µg/ml Sensitive       Levofloxacin <=0.5 µg/ml Sensitive <=0.5 µg/ml Sensitive     Meropenem <=1 µg/ml Sensitive <=1 µg/ml Sensitive     Minocycline >8 µg/ml Resistant       Piperacillin/Tazobactam <=8 µg/ml Sensitive <=8 µg/ml Sensitive     Tobramycin 4 µg/ml Intermediate       Trimeth/Sulfa <=2/38 µg/ml Sensitive                   Linear View        Specimen Collected: 06/09/25 09:18 CDT Last Resulted: 06/12/25 12:25 CDT           CV Resting VIGNESH    Height: 5' 10" (1.778 m)   Weight: 115.9 kg (255 lb 8.2 oz)   Blood Pressure: Not recorded    Date of Study: 8/15/22   Ordering Provider: Saroj Walker MD   Clinical Indications: Lower extremity edema [R60.0 " (ICD-10-CM)]       [facility]  Performing Staff   Cardiology: Antoni Harrell MD PhD     Tech: Shay Flores         Interpretation Summary  Show Result ComparisonRight resting VIGNESH 1.53 is unreliable, and suggestive of medial calcinosis.  Right resting VIGNESH 1.32, is normal.  PVR waveforms are mildly dampened at the ankle level bilaterally, and digit level on the right.    Assessment:       Encounter Diagnoses   Name Primary?    Skin ulcer of toe of left foot with fat layer exposed Yes    Skin ulcer of toe of right foot with fat layer exposed     Idiopathic peripheral neuropathy              Plan:       Stevie was seen today for wound care and dressing change.    Diagnoses and all orders for this visit:    Skin ulcer of toe of left foot with fat layer exposed    Skin ulcer of toe of right foot with fat layer exposed    Idiopathic peripheral neuropathy      I counseled the patient on his conditions, their implications and medical management.    Education about the prevention of limb loss.    Discussed wound healing cycle, skin integrity, ways to care for skin.Counseled patient on the effects of biomechanical pressure and perfusion on healing. He verbalizes understanding that it can increase the chances of delayed healing and this prolonged exposure leads to infection or progression of infection which subsequently can result in loss of limb.    Arterial studies ordered based on old VIGNESH which showed some medial calcinosis and some potential decreased flow.    I continue to be concerned about the 2nd digital wound that has deep probe to joint.     The wounds are cleansed of foreign material as much as possible and the base inspected for bone or abscess. Bases fibrogranular     Dressings:  judith  Offloading: I custom cut and personally applied an offloading pad to be applied to the foot due to deformity, body habitus, activity level, followed by foam footballs.  Darco shoes     Follow-up: 1 week but  should call [facility] immediately if any signs of infection, such as fever, chills, sweats, increased redness or pain.    Short-term goals include maintaining good offloading and minimizing bioburden, promoting granulation and epithelialization to healing.  Long-term goals include keeping the wound healed by good offloading and medical management under the direction of internist.    Shoe inspection. Diabetic Foot Education. Patient reminded of the importance of good nutrition and blood sugar control to help prevent podiatric complications of diabetes. Patient instructed on proper foot hygeine. We discussed wearing proper shoe gear, daily foot inspections, never walking without protective shoe gear, never putting sharp instruments to feet.                I spent a total of 41 minutes on the day of the visit.This includes face to face time and non-face to face time preparing to see the patient (eg, review of tests), obtaining and/or reviewing separately obtained history, documenting clinical information in the electronic or other health record, independently interpreting results and communicating results to the patient/family/caregiver, or care coordinator.

## 2025-07-14 ENCOUNTER — OFFICE VISIT (OUTPATIENT)
Dept: PODIATRY | Facility: CLINIC | Age: 70
End: 2025-07-14
Payer: MEDICARE

## 2025-07-14 ENCOUNTER — LAB VISIT (OUTPATIENT)
Dept: LAB | Facility: HOSPITAL | Age: 70
End: 2025-07-14
Attending: PHYSICIAN ASSISTANT
Payer: MEDICARE

## 2025-07-14 ENCOUNTER — OFFICE VISIT (OUTPATIENT)
Dept: INFECTIOUS DISEASES | Facility: CLINIC | Age: 70
End: 2025-07-14
Payer: MEDICARE

## 2025-07-14 VITALS
HEART RATE: 75 BPM | BODY MASS INDEX: 33.46 KG/M2 | SYSTOLIC BLOOD PRESSURE: 157 MMHG | TEMPERATURE: 98 F | RESPIRATION RATE: 18 BRPM | DIASTOLIC BLOOD PRESSURE: 84 MMHG | WEIGHT: 233.69 LBS | HEIGHT: 70 IN

## 2025-07-14 DIAGNOSIS — Z22.322 MRSA (METHICILLIN RESISTANT STAPH AUREUS) CULTURE POSITIVE: ICD-10-CM

## 2025-07-14 DIAGNOSIS — L97.529 BILATERAL GREAT TOES ULCERS: ICD-10-CM

## 2025-07-14 DIAGNOSIS — M86.9 OSTEOMYELITIS OF GREAT TOE OF LEFT FOOT: Primary | ICD-10-CM

## 2025-07-14 DIAGNOSIS — G60.9 IDIOPATHIC PERIPHERAL NEUROPATHY: ICD-10-CM

## 2025-07-14 DIAGNOSIS — L97.519 BILATERAL GREAT TOES ULCERS: ICD-10-CM

## 2025-07-14 DIAGNOSIS — M86.9 OSTEOMYELITIS OF GREAT TOE OF LEFT FOOT: ICD-10-CM

## 2025-07-14 DIAGNOSIS — L03.90 CELLULITIS, UNSPECIFIED CELLULITIS SITE: ICD-10-CM

## 2025-07-14 DIAGNOSIS — L97.512 SKIN ULCER OF TOE OF RIGHT FOOT WITH FAT LAYER EXPOSED: ICD-10-CM

## 2025-07-14 DIAGNOSIS — M86.279 SUBACUTE OSTEOMYELITIS OF FOOT, UNSPECIFIED LATERALITY: Primary | ICD-10-CM

## 2025-07-14 LAB
ABSOLUTE EOSINOPHIL (OHS): 0.04 K/UL
ABSOLUTE MONOCYTE (OHS): 0.7 K/UL (ref 0.3–1)
ABSOLUTE NEUTROPHIL COUNT (OHS): 9.71 K/UL (ref 1.8–7.7)
ALBUMIN SERPL BCP-MCNC: 3.9 G/DL (ref 3.5–5.2)
ALP SERPL-CCNC: 116 UNIT/L (ref 40–150)
ALT SERPL W/O P-5'-P-CCNC: 31 UNIT/L (ref 10–44)
ANION GAP (OHS): 9 MMOL/L (ref 8–16)
AST SERPL-CCNC: 46 UNIT/L (ref 11–45)
BASOPHILS # BLD AUTO: 0.04 K/UL
BASOPHILS NFR BLD AUTO: 0.3 %
BILIRUB SERPL-MCNC: 1.2 MG/DL (ref 0.1–1)
BUN SERPL-MCNC: 20 MG/DL (ref 8–23)
CALCIUM SERPL-MCNC: 9 MG/DL (ref 8.7–10.5)
CHLORIDE SERPL-SCNC: 103 MMOL/L (ref 95–110)
CO2 SERPL-SCNC: 27 MMOL/L (ref 23–29)
CREAT SERPL-MCNC: 1 MG/DL (ref 0.5–1.4)
CRP SERPL-MCNC: 2.2 MG/L
ERYTHROCYTE [DISTWIDTH] IN BLOOD BY AUTOMATED COUNT: 13.4 % (ref 11.5–14.5)
GFR SERPLBLD CREATININE-BSD FMLA CKD-EPI: >60 ML/MIN/1.73/M2
GLUCOSE SERPL-MCNC: 100 MG/DL (ref 70–110)
HCT VFR BLD AUTO: 44.9 % (ref 40–54)
HGB BLD-MCNC: 14.9 GM/DL (ref 14–18)
IMM GRANULOCYTES # BLD AUTO: 0.06 K/UL (ref 0–0.04)
IMM GRANULOCYTES NFR BLD AUTO: 0.5 % (ref 0–0.5)
LYMPHOCYTES # BLD AUTO: 2.04 K/UL (ref 1–4.8)
MCH RBC QN AUTO: 32.7 PG (ref 27–31)
MCHC RBC AUTO-ENTMCNC: 33.2 G/DL (ref 32–36)
MCV RBC AUTO: 99 FL (ref 82–98)
NUCLEATED RBC (/100WBC) (OHS): 0 /100 WBC
PLATELET # BLD AUTO: 197 K/UL (ref 150–450)
PMV BLD AUTO: 9.7 FL (ref 9.2–12.9)
POTASSIUM SERPL-SCNC: 4.5 MMOL/L (ref 3.5–5.1)
PROT SERPL-MCNC: 7.3 GM/DL (ref 6–8.4)
RBC # BLD AUTO: 4.55 M/UL (ref 4.6–6.2)
RELATIVE EOSINOPHIL (OHS): 0.3 %
RELATIVE LYMPHOCYTE (OHS): 16.2 % (ref 18–48)
RELATIVE MONOCYTE (OHS): 5.6 % (ref 4–15)
RELATIVE NEUTROPHIL (OHS): 77.1 % (ref 38–73)
SODIUM SERPL-SCNC: 139 MMOL/L (ref 136–145)
WBC # BLD AUTO: 12.59 K/UL (ref 3.9–12.7)

## 2025-07-14 PROCEDURE — 80053 COMPREHEN METABOLIC PANEL: CPT | Mod: HCNC

## 2025-07-14 PROCEDURE — 36415 COLL VENOUS BLD VENIPUNCTURE: CPT | Mod: HCNC,PN

## 2025-07-14 PROCEDURE — 87075 CULTR BACTERIA EXCEPT BLOOD: CPT | Mod: HCNC,59 | Performed by: PODIATRIST

## 2025-07-14 PROCEDURE — 85025 COMPLETE CBC W/AUTO DIFF WBC: CPT | Mod: HCNC

## 2025-07-14 PROCEDURE — 99499 UNLISTED E&M SERVICE: CPT | Mod: HCNC,S$GLB,, | Performed by: PHYSICIAN ASSISTANT

## 2025-07-14 PROCEDURE — 87205 SMEAR GRAM STAIN: CPT | Mod: HCNC | Performed by: PODIATRIST

## 2025-07-14 PROCEDURE — 99999 PR PBB SHADOW E&M-EST. PATIENT-LVL III: CPT | Mod: PBBFAC,HCNC,, | Performed by: PODIATRIST

## 2025-07-14 PROCEDURE — 88311 DECALCIFY TISSUE: CPT | Mod: TC,59,HCNC | Performed by: PODIATRIST

## 2025-07-14 PROCEDURE — 86140 C-REACTIVE PROTEIN: CPT | Mod: HCNC

## 2025-07-14 PROCEDURE — 87186 SC STD MICRODIL/AGAR DIL: CPT | Mod: HCNC | Performed by: PODIATRIST

## 2025-07-14 PROCEDURE — 99999 PR PBB SHADOW E&M-EST. PATIENT-LVL III: CPT | Mod: PBBFAC,HCNC,, | Performed by: PHYSICIAN ASSISTANT

## 2025-07-14 NOTE — PROGRESS NOTES
Subjective:      Patient ID: Stevie Villalba is a 70 y.o. male.    Chief Complaint: Wound Care (Bilateral foot ulcers ) and Dressing Change (football)    Stevie is a 70 y.o. male who presents to the clinic for evaluation and treatment of high risk feet. Stevie has a past medical history of Hyperlipidemia, Hypertension, Insomnia, and Lumbago. The patient's chief complaint is foot ulcer, both hallux nails. This patient has documented high risk feet requiring routine maintenance secondary to peripheral neuropathy.    06/23/2025 patient returns to Podiatry Clinic for follow up of bilateral foot wounds.  Previously seen by my colleague, new to me.  Since he was last seen in Podiatry he was seen in infectious disease where he states dressings were removed.  Unfortunately, patient states they did not place cotton between the toes of his right foot.  Patient continues to work he is a  and often has his feet down for several hours a day.  He relates that his Darco shoes are worn and he had to duck tape them together.  Infectious Disease also saw patient again today prior to our encounter.  Presents to clinic with his daughter whom is a nurse.    06/30/2025 patient returns to Podiatry Clinic for follow up of bilateral foot wounds.  Dressing has remained intact since last encounter.  No new pedal complaints.    07/07/2025 patient returns to Podiatry Clinic for follow up of bilateral foot wounds.  Dressing has remained intact since last encounter.     07/14/2025 patient returns to Podiatry Clinic for follow up of bilateral foot wounds.  Dressing has remained intact since last encounter.  He relates he may have done more walking and standing since our last encounter. ID will also examine patient this encounter.      PCP: Ric Brambila MD    Date Last Seen by PCP:   Chief Complaint   Patient presents with    Wound Care     Bilateral foot ulcers     Dressing Change     football     Current shoe gear:   Darco  gus    Hemoglobin A1C   Date Value Ref Range Status   09/30/2024 5.3 4.0 - 5.6 % Final     Comment:     ADA Screening Guidelines:  5.7-6.4%  Consistent with prediabetes  >or=6.5%  Consistent with diabetes    High levels of fetal hemoglobin interfere with the HbA1C  assay. Heterozygous hemoglobin variants (HbS, HgC, etc)do  not significantly interfere with this assay.   However, presence of multiple variants may affect accuracy.     09/25/2023 5.2 4.0 - 5.6 % Final     Comment:     ADA Screening Guidelines:  5.7-6.4%  Consistent with prediabetes  >or=6.5%  Consistent with diabetes    High levels of fetal hemoglobin interfere with the HbA1C  assay. Heterozygous hemoglobin variants (HbS, HgC, etc)do  not significantly interfere with this assay.   However, presence of multiple variants may affect accuracy.     09/12/2022 5.3 4.0 - 5.6 % Final     Comment:     ADA Screening Guidelines:  5.7-6.4%  Consistent with prediabetes  >or=6.5%  Consistent with diabetes    High levels of fetal hemoglobin interfere with the HbA1C  assay. Heterozygous hemoglobin variants (HbS, HgC, etc)do  not significantly interfere with this assay.   However, presence of multiple variants may affect accuracy.       Hemoglobin A1c   Date Value Ref Range Status   05/05/2025 5.2 4.0 - 5.6 % Final     Comment:     ADA Screening Guidelines:  5.7-6.4%  Consistent with prediabetes  >=6.5%  Consistent with diabetes    High levels of fetal hemoglobin interfere with the HbA1C  assay. Heterozygous hemoglobin variants (HbS, HgC, etc)do  not significantly interfere with this assay.   However, presence of multiple variants may affect accuracy.   04/23/2025 5.1 4.0 - 5.6 % Final     Comment:     ADA Screening Guidelines:  5.7-6.4%  Consistent with prediabetes  >=6.5%  Consistent with diabetes    High levels of fetal hemoglobin interfere with the HbA1C  assay. Heterozygous hemoglobin variants (HbS, HgC, etc)do  not significantly interfere with this assay.  "  However, presence of multiple variants may affect accuracy.       Review of Systems   Constitutional: Negative for chills, fever and malaise/fatigue.   HENT:  Negative for hearing loss.    Cardiovascular:  Negative for claudication.   Respiratory:  Negative for shortness of breath.    Skin:  Positive for poor wound healing. Negative for flushing and rash.   Musculoskeletal:  Negative for joint pain and myalgias.   Gastrointestinal:  Negative for nausea and vomiting.   Neurological:  Positive for numbness, paresthesias and sensory change. Negative for loss of balance.   Psychiatric/Behavioral:  Negative for altered mental status.    Allergic/Immunologic: Negative for hives.           Objective:      Vitals:    07/14/25 0815   BP: (!) 157/84   Pulse: 75   Resp: 18   Temp: 98.3 °F (36.8 °C)   TempSrc: Oral   Weight: 106 kg (233 lb 11 oz)   Height: 5' 10" (1.778 m)   PainSc: 0-No pain           Physical Exam  Vitals and nursing note reviewed.   Constitutional:       General: He is not in acute distress.     Appearance: He is well-developed. He is not toxic-appearing or diaphoretic.      Comments: alert and oriented x 3.    Cardiovascular:      Pulses:           Dorsalis pedis pulses are 1+ on the right side and 1+ on the left side.        Posterior tibial pulses are 1+ on the right side and 1+ on the left side.      Comments: Dorsalis pedis and posterior tibial pulses are diminished Bilaterally. Toes are cool to touch. Feet are warm proximally.There is decreased digital hair. Skin is atrophic  Pulmonary:      Effort: No respiratory distress.   Musculoskeletal:      Right ankle: No tenderness. No lateral malleolus, medial malleolus, AITF ligament, CF ligament or posterior TF ligament tenderness.      Right Achilles Tendon: No defects. Swartz's test negative.      Left ankle: No tenderness. No lateral malleolus, medial malleolus, AITF ligament, CF ligament or posterior TF ligament tenderness.      Left Achilles " Tendon: No defects. Swartz's test negative.      Right foot: Decreased range of motion. Deformity present. No tenderness or bony tenderness.      Left foot: Decreased range of motion. Deformity present. No tenderness or bony tenderness.      Comments:        Feet:      Right foot:      Protective Sensation: 10 sites tested.   1 site sensed.     Skin integrity: Ulcer, erythema and warmth present.      Left foot:      Protective Sensation: 10 sites tested.  2 sites sensed.      Skin integrity: Ulcer, erythema and warmth present.   Lymphadenopathy:      Comments: No lymphatic streaking     Skin:     General: Skin is warm and dry.      Capillary Refill: Capillary refill takes 2 to 3 seconds.      Coloration: Skin is not pale.      Findings: Erythema and wound present. No rash.      Nails: There is no clubbing.   Neurological:      Mental Status: He is alert and oriented to person, place, and time.      Sensory: Sensory deficit present.      Motor: No atrophy.   Psychiatric:         Attention and Perception: He is attentive.         Mood and Affect: Mood is not anxious. Affect is not inappropriate.         Speech: He is communicative. Speech is not slurred.         Behavior: Behavior is not combative.       07/14/2025    Right            left              07/07/2025    Right Foot                Left Foot                06/30/2025     Right             Left             06/23/2025    Left    Ulcer location: left medial hallux IPJ  Measurements : 1.1x0.6x0.2  cm   Signs of infection: local edema  Drainage: Sero-Sanguinous  Purulence: No  Crepitus/fluctuance: No  Periwound: Macerated, Calloused  Base: Mixed Granular/Fibrotic  Depth: subcutaneous tissue  Probe to bone: No              Right    Ulcer location: right medial hallux IPJ, lateral 2nd digit PIPJ, lateral 3rd digit PIPJ  Signs of infection: local edema and erythema  Drainage: Sero-Sanguinous  Purulence: No  Crepitus/fluctuance: No  Periwound: Macerated,  Calloused  Base: Mixed Granular/Fibrotic  Depth: subcutaneous tissue, probe to joint 2nd digit                                Impression:     1. Bilateral hallux soft tissue ulcers, more prominent on the left.  Left hallux distal phalanx bone marrow edema in the setting of adjacent ulcer suspicious for early osteomyelitis.  2. Advanced degenerative changes bilaterally.     Electronically signed by resident: Marci Dodd  Date:                                            05/06/2025  Time:                                           08:08     Impression:     1. Bilateral hallux soft tissue ulcers, more prominent on the left.  Left hallux distal phalanx bone marrow edema in the setting of adjacent ulcer suspicious for early osteomyelitis.  2. Advanced degenerative changes bilaterally.     Electronically signed by resident: Marci Dodd  Date:                                            05/06/2025  Time:                                           08:08    Impression:     Ankle-brachial index of 1.1 on the right and 1.1 on the left.     No hemodynamically significant stenosis demonstrated in the right or left lower extremity arterial system.     Electronically signed by resident: Elli Escalante  Date:                                            05/05/2025    CULTURE, AEROBIC Moderate Providencia stuartii Abnormal    Few Pseudomonas aeruginosa Abnormal         Resulting Agency: [facility]     Susceptibility     Providencia stuartii Pseudomonas aeruginosa     KWAME KWAME     Cefepime <=2 µg/ml Sensitive <=2 µg/ml Sensitive     Ceftriaxone <=1 µg/ml Sensitive       Ciprofloxacin <=0.25 µg/ml Sensitive 1 µg/ml Intermediate     Ertapenem <=0.5 µg/ml Sensitive       Gentamicin <=2 µg/ml Sensitive       Levofloxacin <=0.5 µg/ml Sensitive <=0.5 µg/ml Sensitive     Meropenem <=1 µg/ml Sensitive <=1 µg/ml Sensitive     Minocycline >8 µg/ml Resistant       Piperacillin/Tazobactam <=8 µg/ml Sensitive <=8 µg/ml Sensitive     Tobramycin 4  "µg/ml Intermediate       Trimeth/Sulfa <=2/38 µg/ml Sensitive                   Linear View        Specimen Collected: 06/09/25 09:18 CDT Last Resulted: 06/12/25 12:25 CDT           CV Resting VIGNESH    Height: 5' 10" (1.778 m)   Weight: 115.9 kg (255 lb 8.2 oz)   Blood Pressure: Not recorded    Date of Study: 8/15/22   Ordering Provider: Saroj Walker MD   Clinical Indications: Lower extremity edema [R60.0 (ICD-10-CM)]       [facility]  Performing Staff   Cardiology: Antoni Harrell MD PhD     Tech: Shay Flores         Interpretation Summary  Show Result ComparisonRight resting VIGNESH 1.53 is unreliable, and suggestive of medial calcinosis.  Right resting VIGNESH 1.32, is normal.  PVR waveforms are mildly dampened at the ankle level bilaterally, and digit level on the right.    Assessment:       Encounter Diagnoses   Name Primary?    Subacute osteomyelitis of foot, unspecified laterality Yes    Skin ulcer of toe of right foot with fat layer exposed     Bilateral great toes ulcers     Idiopathic peripheral neuropathy                Plan:       Stevie was seen today for wound care and dressing change.    Diagnoses and all orders for this visit:    Subacute osteomyelitis of foot, unspecified laterality  -     Specimen to Pathology Podiatry  -     Specimen to Pathology Podiatry  -     Culture, Anaerobic  -     Aerobic culture  -     Gram stain  -     Gram stain  -     Aerobic culture  -     Culture, Anaerobic    Skin ulcer of toe of right foot with fat layer exposed    Bilateral great toes ulcers    Idiopathic peripheral neuropathy        I counseled the patient on his conditions, their implications and medical management.    Education about the prevention of limb loss.    Discussed wound healing cycle, skin integrity, ways to care for skin.Counseled patient on the effects of biomechanical pressure and perfusion on healing. He verbalizes understanding that it can increase the chances of delayed healing and " this prolonged exposure leads to infection or progression of infection which subsequently can result in loss of limb.    Arterial studies ordered based on old VIGNESH which showed some medial calcinosis and some potential decreased flow.    I continue to be concerned about the 2nd digital wound that has deep probe to joint.     The wounds are cleansed of foreign material as much as possible and the base inspected for bone or abscess. Bases fibrogranular     Dressings:  judith  Offloading: I custom cut and personally applied an offloading pad to be applied to the foot due to deformity, body habitus, activity level, followed by foam footballs.  Darco shoes     Follow-up: 1 week but should call [facility] immediately if any signs of infection, such as fever, chills, sweats, increased redness or pain.    Short-term goals include maintaining good offloading and minimizing bioburden, promoting granulation and epithelialization to healing.  Long-term goals include keeping the wound healed by good offloading and medical management under the direction of internist.    Shoe inspection. Diabetic Foot Education. Patient reminded of the importance of good nutrition and blood sugar control to help prevent podiatric complications of diabetes. Patient instructed on proper foot hygeine. We discussed wearing proper shoe gear, daily foot inspections, never walking without protective shoe gear, never putting sharp instruments to feet.              BONE BIOPSY OPERATIVE REPORT     SURGEON: Deysi Richardson DPM    Assisting Surgeon: Aurelia Hope DPM      Surgery Date: 07/14/2025    Indications: Non-healing foot ulcerations with clinical suspicion of OM     PRE-OP: DIAGNOSIS: neurotrophic ulceration with MRI suspected early osteomyelitis to the left hallux and positive probe to joint to the right 2nd digit     POST-OP DIAGNOSIS: Same    PROCEDURE: trochar biopsy of BONE, sanjay    Estimated Blood Loss: minimal    PROCEDURE IN DETAIL:    The patient  was seen in the  patient Room. The risks, benefits, complications, treatment options, and expected outcomes were discussed with the patient. The risks and potential complications of their problem and purposed treatment include but are not limited to infection, nerve injury, vascular injury, persistent pain, potential skin necrosis, deep vein thrombosis, possible pulmonary embolus, complications of the anesthetic.  The patient concurred with the proposed plan, giving informed consent.  The site of surgery properly noted/marked.    A Time Out was held and the above information confirmed.    At this time attention was directed to the left hallux where a full thickness ulceration was noted.  A Jamshidi needle was utilized to take a plug of bone for culture. No purulent drainage or abscess was found. Next, copious amounts of sterile normal saline were instilled into the wound.  Attention was then directed to the right 2nd digit where a full thickness ulceration was noted.  A Jamshidi needle was utilized to take a plug of bone for culture. No purulent drainage or abscess was found. Next, copious amounts of sterile normal saline were instilled into the wound      All bone sent for culture and pathology    Short-term goals include maintaining good offloading and minimizing bioburden, promoting granulation and epithelialization to healing.   Long-term goals include keeping the wound healed by good offloading and medical management under the direction of internist. ,

## 2025-07-15 LAB
GRAM STN SPEC: NORMAL
GRAM STN SPEC: NORMAL

## 2025-07-16 LAB
ESTROGEN SERPL-MCNC: NORMAL PG/ML
INSULIN SERPL-ACNC: NORMAL U[IU]/ML
LAB AP GROSS DESCRIPTION: NORMAL
LAB AP PERFORMING LOCATION(S): NORMAL
LAB AP REPORT FOOTNOTES: NORMAL

## 2025-07-17 ENCOUNTER — PATIENT MESSAGE (OUTPATIENT)
Dept: PODIATRY | Facility: CLINIC | Age: 70
End: 2025-07-17
Payer: MEDICARE

## 2025-07-17 LAB
BACTERIA SPEC AEROBE CULT: ABNORMAL
BACTERIA SPEC ANAEROBE CULT: NORMAL
BACTERIA SPEC ANAEROBE CULT: NORMAL
ESTROGEN SERPL-MCNC: NORMAL PG/ML
INSULIN SERPL-ACNC: NORMAL U[IU]/ML
LAB AP GROSS DESCRIPTION: NORMAL
LAB AP PERFORMING LOCATION(S): NORMAL
LAB AP REPORT FOOTNOTES: NORMAL
T3RU NFR SERPL: NORMAL %

## 2025-07-18 LAB
BACTERIA SPEC AEROBE CULT: ABNORMAL
BACTERIA SPEC AEROBE CULT: ABNORMAL

## 2025-07-19 LAB
GRAM STN SPEC: NORMAL
GRAM STN SPEC: NORMAL

## 2025-07-19 RX ORDER — LEVOFLOXACIN 750 MG/1
750 TABLET, FILM COATED ORAL DAILY
Qty: 42 TABLET | Refills: 0 | Status: SHIPPED | OUTPATIENT
Start: 2025-07-19 | End: 2025-08-30

## 2025-07-19 RX ORDER — MINOCYCLINE HYDROCHLORIDE 100 MG/1
100 CAPSULE ORAL 2 TIMES DAILY
Qty: 84 CAPSULE | Refills: 0 | Status: SHIPPED | OUTPATIENT
Start: 2025-07-19 | End: 2025-08-30

## 2025-07-22 ENCOUNTER — OFFICE VISIT (OUTPATIENT)
Dept: PODIATRY | Facility: CLINIC | Age: 70
End: 2025-07-22
Payer: MEDICARE

## 2025-07-22 ENCOUNTER — HOSPITAL ENCOUNTER (INPATIENT)
Facility: HOSPITAL | Age: 70
LOS: 5 days | Discharge: HOME HEALTH | DRG: 623 | End: 2025-07-27
Attending: STUDENT IN AN ORGANIZED HEALTH CARE EDUCATION/TRAINING PROGRAM | Admitting: HOSPITALIST
Payer: MEDICARE

## 2025-07-22 VITALS
SYSTOLIC BLOOD PRESSURE: 160 MMHG | WEIGHT: 229.94 LBS | HEIGHT: 70 IN | BODY MASS INDEX: 32.92 KG/M2 | DIASTOLIC BLOOD PRESSURE: 85 MMHG | HEART RATE: 80 BPM

## 2025-07-22 DIAGNOSIS — M86.9 OSTEOMYELITIS OF GREAT TOE OF LEFT FOOT: ICD-10-CM

## 2025-07-22 DIAGNOSIS — M86.279 SUBACUTE OSTEOMYELITIS OF FOOT, UNSPECIFIED LATERALITY: Primary | ICD-10-CM

## 2025-07-22 DIAGNOSIS — G60.9 IDIOPATHIC PERIPHERAL NEUROPATHY: ICD-10-CM

## 2025-07-22 DIAGNOSIS — L97.529 BILATERAL GREAT TOES ULCERS: ICD-10-CM

## 2025-07-22 DIAGNOSIS — L97.522 SKIN ULCER OF TOE OF LEFT FOOT WITH FAT LAYER EXPOSED: ICD-10-CM

## 2025-07-22 DIAGNOSIS — L97.529 DIABETIC ULCER OF BOTH FEET: Primary | ICD-10-CM

## 2025-07-22 DIAGNOSIS — L97.519 BILATERAL GREAT TOES ULCERS: ICD-10-CM

## 2025-07-22 DIAGNOSIS — L97.519 SKIN ULCERS OF FOOT, BILATERAL: ICD-10-CM

## 2025-07-22 DIAGNOSIS — L97.512 SKIN ULCER OF TOE OF RIGHT FOOT WITH FAT LAYER EXPOSED: ICD-10-CM

## 2025-07-22 DIAGNOSIS — E11.621 DIABETIC ULCER OF BOTH FEET: Primary | ICD-10-CM

## 2025-07-22 DIAGNOSIS — L97.523 SKIN ULCER OF TOE OF LEFT FOOT WITH NECROSIS OF MUSCLE: ICD-10-CM

## 2025-07-22 DIAGNOSIS — L97.519 DIABETIC ULCER OF BOTH FEET: Primary | ICD-10-CM

## 2025-07-22 DIAGNOSIS — L97.529 SKIN ULCERS OF FOOT, BILATERAL: ICD-10-CM

## 2025-07-22 DIAGNOSIS — L97.529 ULCER OF TOE OF LEFT FOOT, UNSPECIFIED ULCER STAGE: ICD-10-CM

## 2025-07-22 DIAGNOSIS — R07.9 CHEST PAIN: ICD-10-CM

## 2025-07-22 PROBLEM — E66.811 CLASS 1 OBESITY WITH BODY MASS INDEX (BMI) OF 31.0 TO 31.9 IN ADULT: Chronic | Status: ACTIVE | Noted: 2022-08-11

## 2025-07-22 PROBLEM — Z85.46 HISTORY OF PROSTATE CANCER: Chronic | Status: ACTIVE | Noted: 2020-08-18

## 2025-07-22 PROBLEM — L03.90 CELLULITIS: Status: RESOLVED | Noted: 2025-05-08 | Resolved: 2025-07-22

## 2025-07-22 PROBLEM — G47.33 SEVERE OBSTRUCTIVE SLEEP APNEA: Chronic | Status: ACTIVE | Noted: 2022-09-27

## 2025-07-22 LAB
ABSOLUTE EOSINOPHIL (OHS): 0.06 K/UL
ABSOLUTE MONOCYTE (OHS): 0.92 K/UL (ref 0.3–1)
ABSOLUTE NEUTROPHIL COUNT (OHS): 7.44 K/UL (ref 1.8–7.7)
ALBUMIN SERPL BCP-MCNC: 3.3 G/DL (ref 3.5–5.2)
ALP SERPL-CCNC: 106 UNIT/L (ref 40–150)
ALT SERPL W/O P-5'-P-CCNC: 15 UNIT/L (ref 10–44)
ANION GAP (OHS): 10 MMOL/L (ref 8–16)
AST SERPL-CCNC: 22 UNIT/L (ref 11–45)
BASOPHILS # BLD AUTO: 0.05 K/UL
BASOPHILS NFR BLD AUTO: 0.5 %
BILIRUB SERPL-MCNC: 0.7 MG/DL (ref 0.1–1)
BUN SERPL-MCNC: 21 MG/DL (ref 8–23)
CALCIUM SERPL-MCNC: 9.3 MG/DL (ref 8.7–10.5)
CHLORIDE SERPL-SCNC: 101 MMOL/L (ref 95–110)
CO2 SERPL-SCNC: 25 MMOL/L (ref 23–29)
CREAT SERPL-MCNC: 1.1 MG/DL (ref 0.5–1.4)
CRP SERPL-MCNC: 112 MG/L
ERYTHROCYTE [DISTWIDTH] IN BLOOD BY AUTOMATED COUNT: 12.8 % (ref 11.5–14.5)
GFR SERPLBLD CREATININE-BSD FMLA CKD-EPI: >60 ML/MIN/1.73/M2
GLUCOSE SERPL-MCNC: 103 MG/DL (ref 70–110)
HCT VFR BLD AUTO: 40.2 % (ref 40–54)
HGB BLD-MCNC: 13.5 GM/DL (ref 14–18)
IMM GRANULOCYTES # BLD AUTO: 0.14 K/UL (ref 0–0.04)
IMM GRANULOCYTES NFR BLD AUTO: 1.4 % (ref 0–0.5)
LYMPHOCYTES # BLD AUTO: 1.43 K/UL (ref 1–4.8)
MCH RBC QN AUTO: 31.9 PG (ref 27–31)
MCHC RBC AUTO-ENTMCNC: 33.6 G/DL (ref 32–36)
MCV RBC AUTO: 95 FL (ref 82–98)
NUCLEATED RBC (/100WBC) (OHS): 0 /100 WBC
PLATELET # BLD AUTO: 262 K/UL (ref 150–450)
PMV BLD AUTO: 9.2 FL (ref 9.2–12.9)
POCT GLUCOSE: 175 MG/DL (ref 70–110)
POTASSIUM SERPL-SCNC: 4.1 MMOL/L (ref 3.5–5.1)
PROT SERPL-MCNC: 7.5 GM/DL (ref 6–8.4)
RBC # BLD AUTO: 4.23 M/UL (ref 4.6–6.2)
RELATIVE EOSINOPHIL (OHS): 0.6 %
RELATIVE LYMPHOCYTE (OHS): 14.2 % (ref 18–48)
RELATIVE MONOCYTE (OHS): 9.2 % (ref 4–15)
RELATIVE NEUTROPHIL (OHS): 74.1 % (ref 38–73)
SODIUM SERPL-SCNC: 136 MMOL/L (ref 136–145)
WBC # BLD AUTO: 10.04 K/UL (ref 3.9–12.7)

## 2025-07-22 PROCEDURE — 11000001 HC ACUTE MED/SURG PRIVATE ROOM: Mod: HCNC

## 2025-07-22 PROCEDURE — 1126F AMNT PAIN NOTED NONE PRSNT: CPT | Mod: CPTII,HCNC,S$GLB, | Performed by: PODIATRIST

## 2025-07-22 PROCEDURE — 1101F PT FALLS ASSESS-DOCD LE1/YR: CPT | Mod: CPTII,HCNC,S$GLB, | Performed by: PODIATRIST

## 2025-07-22 PROCEDURE — 99999 PR PBB SHADOW E&M-EST. PATIENT-LVL IV: CPT | Mod: PBBFAC,HCNC,, | Performed by: PODIATRIST

## 2025-07-22 PROCEDURE — 85025 COMPLETE CBC W/AUTO DIFF WBC: CPT | Mod: HCNC | Performed by: PHYSICIAN ASSISTANT

## 2025-07-22 PROCEDURE — 4010F ACE/ARB THERAPY RXD/TAKEN: CPT | Mod: CPTII,HCNC,S$GLB, | Performed by: PODIATRIST

## 2025-07-22 PROCEDURE — 25500020 PHARM REV CODE 255: Mod: HCNC | Performed by: HOSPITALIST

## 2025-07-22 PROCEDURE — A9585 GADOBUTROL INJECTION: HCPCS | Mod: HCNC | Performed by: HOSPITALIST

## 2025-07-22 PROCEDURE — 25000003 PHARM REV CODE 250: Mod: HCNC | Performed by: STUDENT IN AN ORGANIZED HEALTH CARE EDUCATION/TRAINING PROGRAM

## 2025-07-22 PROCEDURE — 84155 ASSAY OF PROTEIN SERUM: CPT | Mod: HCNC | Performed by: PHYSICIAN ASSISTANT

## 2025-07-22 PROCEDURE — 25000003 PHARM REV CODE 250: Mod: HCNC | Performed by: PHYSICIAN ASSISTANT

## 2025-07-22 PROCEDURE — 3079F DIAST BP 80-89 MM HG: CPT | Mod: CPTII,HCNC,S$GLB, | Performed by: PODIATRIST

## 2025-07-22 PROCEDURE — 63600175 PHARM REV CODE 636 W HCPCS: Mod: HCNC | Performed by: STUDENT IN AN ORGANIZED HEALTH CARE EDUCATION/TRAINING PROGRAM

## 2025-07-22 PROCEDURE — 63600175 PHARM REV CODE 636 W HCPCS: Mod: HCNC | Performed by: PHYSICIAN ASSISTANT

## 2025-07-22 PROCEDURE — 1159F MED LIST DOCD IN RCRD: CPT | Mod: CPTII,HCNC,S$GLB, | Performed by: PODIATRIST

## 2025-07-22 PROCEDURE — 99285 EMERGENCY DEPT VISIT HI MDM: CPT | Mod: 25,HCNC

## 2025-07-22 PROCEDURE — 3044F HG A1C LEVEL LT 7.0%: CPT | Mod: CPTII,HCNC,S$GLB, | Performed by: PODIATRIST

## 2025-07-22 PROCEDURE — 3077F SYST BP >= 140 MM HG: CPT | Mod: CPTII,HCNC,S$GLB, | Performed by: PODIATRIST

## 2025-07-22 PROCEDURE — 87040 BLOOD CULTURE FOR BACTERIA: CPT | Mod: HCNC | Performed by: PHYSICIAN ASSISTANT

## 2025-07-22 PROCEDURE — 3288F FALL RISK ASSESSMENT DOCD: CPT | Mod: CPTII,HCNC,S$GLB, | Performed by: PODIATRIST

## 2025-07-22 PROCEDURE — 99499 UNLISTED E&M SERVICE: CPT | Mod: HCNC,S$GLB,, | Performed by: PODIATRIST

## 2025-07-22 PROCEDURE — 86140 C-REACTIVE PROTEIN: CPT | Mod: HCNC | Performed by: STUDENT IN AN ORGANIZED HEALTH CARE EDUCATION/TRAINING PROGRAM

## 2025-07-22 PROCEDURE — 99223 1ST HOSP IP/OBS HIGH 75: CPT | Mod: HCNC,,, | Performed by: PODIATRIST

## 2025-07-22 PROCEDURE — 3008F BODY MASS INDEX DOCD: CPT | Mod: CPTII,HCNC,S$GLB, | Performed by: PODIATRIST

## 2025-07-22 RX ORDER — SODIUM CHLORIDE 0.9 % (FLUSH) 0.9 %
10 SYRINGE (ML) INJECTION EVERY 8 HOURS PRN
Status: DISCONTINUED | OUTPATIENT
Start: 2025-07-22 | End: 2025-07-27 | Stop reason: HOSPADM

## 2025-07-22 RX ORDER — NALOXONE HCL 0.4 MG/ML
0.02 VIAL (ML) INJECTION
Status: DISCONTINUED | OUTPATIENT
Start: 2025-07-22 | End: 2025-07-27 | Stop reason: HOSPADM

## 2025-07-22 RX ORDER — VALSARTAN 160 MG/1
320 TABLET ORAL DAILY
Status: DISCONTINUED | OUTPATIENT
Start: 2025-07-23 | End: 2025-07-26

## 2025-07-22 RX ORDER — PROCHLORPERAZINE EDISYLATE 5 MG/ML
5 INJECTION INTRAMUSCULAR; INTRAVENOUS EVERY 6 HOURS PRN
Status: DISCONTINUED | OUTPATIENT
Start: 2025-07-22 | End: 2025-07-27 | Stop reason: HOSPADM

## 2025-07-22 RX ORDER — IBUPROFEN 200 MG
16 TABLET ORAL
Status: DISCONTINUED | OUTPATIENT
Start: 2025-07-22 | End: 2025-07-27 | Stop reason: HOSPADM

## 2025-07-22 RX ORDER — SIMETHICONE 80 MG
1 TABLET,CHEWABLE ORAL 4 TIMES DAILY PRN
Status: DISCONTINUED | OUTPATIENT
Start: 2025-07-22 | End: 2025-07-27 | Stop reason: HOSPADM

## 2025-07-22 RX ORDER — ALUMINUM HYDROXIDE, MAGNESIUM HYDROXIDE, AND SIMETHICONE 1200; 120; 1200 MG/30ML; MG/30ML; MG/30ML
30 SUSPENSION ORAL 4 TIMES DAILY PRN
Status: DISCONTINUED | OUTPATIENT
Start: 2025-07-22 | End: 2025-07-27 | Stop reason: HOSPADM

## 2025-07-22 RX ORDER — VALSARTAN 160 MG/1
320 TABLET ORAL DAILY
Status: DISCONTINUED | OUTPATIENT
Start: 2025-07-22 | End: 2025-07-22

## 2025-07-22 RX ORDER — PANTOPRAZOLE SODIUM 40 MG/1
40 TABLET, DELAYED RELEASE ORAL 2 TIMES DAILY
Status: DISCONTINUED | OUTPATIENT
Start: 2025-07-22 | End: 2025-07-27 | Stop reason: HOSPADM

## 2025-07-22 RX ORDER — OXYCODONE HYDROCHLORIDE 5 MG/1
5 TABLET ORAL EVERY 6 HOURS PRN
Refills: 0 | Status: DISCONTINUED | OUTPATIENT
Start: 2025-07-22 | End: 2025-07-27 | Stop reason: HOSPADM

## 2025-07-22 RX ORDER — GADOBUTROL 604.72 MG/ML
10 INJECTION INTRAVENOUS
Status: COMPLETED | OUTPATIENT
Start: 2025-07-22 | End: 2025-07-22

## 2025-07-22 RX ORDER — VANCOMYCIN 2 GRAM/500 ML IN 0.9 % SODIUM CHLORIDE INTRAVENOUS
20 ONCE
Status: COMPLETED | OUTPATIENT
Start: 2025-07-22 | End: 2025-07-22

## 2025-07-22 RX ORDER — ALLOPURINOL 300 MG/1
300 TABLET ORAL DAILY
Status: DISCONTINUED | OUTPATIENT
Start: 2025-07-23 | End: 2025-07-27 | Stop reason: HOSPADM

## 2025-07-22 RX ORDER — CARISOPRODOL 350 MG/1
350 TABLET ORAL 4 TIMES DAILY PRN
Refills: 0 | Status: DISCONTINUED | OUTPATIENT
Start: 2025-07-22 | End: 2025-07-27 | Stop reason: HOSPADM

## 2025-07-22 RX ORDER — IBUPROFEN 200 MG
24 TABLET ORAL
Status: DISCONTINUED | OUTPATIENT
Start: 2025-07-22 | End: 2025-07-27 | Stop reason: HOSPADM

## 2025-07-22 RX ORDER — GLUCAGON 1 MG
1 KIT INJECTION
Status: DISCONTINUED | OUTPATIENT
Start: 2025-07-22 | End: 2025-07-27 | Stop reason: HOSPADM

## 2025-07-22 RX ORDER — ATORVASTATIN CALCIUM 40 MG/1
40 TABLET, FILM COATED ORAL NIGHTLY
Status: DISCONTINUED | OUTPATIENT
Start: 2025-07-22 | End: 2025-07-27 | Stop reason: HOSPADM

## 2025-07-22 RX ORDER — IPRATROPIUM BROMIDE AND ALBUTEROL SULFATE 2.5; .5 MG/3ML; MG/3ML
3 SOLUTION RESPIRATORY (INHALATION) EVERY 4 HOURS PRN
Status: DISCONTINUED | OUTPATIENT
Start: 2025-07-22 | End: 2025-07-27 | Stop reason: HOSPADM

## 2025-07-22 RX ORDER — TALC
6 POWDER (GRAM) TOPICAL NIGHTLY PRN
Status: DISCONTINUED | OUTPATIENT
Start: 2025-07-22 | End: 2025-07-27 | Stop reason: HOSPADM

## 2025-07-22 RX ORDER — ONDANSETRON HYDROCHLORIDE 2 MG/ML
4 INJECTION, SOLUTION INTRAVENOUS EVERY 8 HOURS PRN
Status: DISCONTINUED | OUTPATIENT
Start: 2025-07-22 | End: 2025-07-27 | Stop reason: HOSPADM

## 2025-07-22 RX ORDER — BISACODYL 10 MG/1
10 SUPPOSITORY RECTAL DAILY PRN
Status: DISCONTINUED | OUTPATIENT
Start: 2025-07-22 | End: 2025-07-27 | Stop reason: HOSPADM

## 2025-07-22 RX ORDER — ENOXAPARIN SODIUM 100 MG/ML
40 INJECTION SUBCUTANEOUS EVERY 24 HOURS
Status: DISCONTINUED | OUTPATIENT
Start: 2025-07-22 | End: 2025-07-27 | Stop reason: HOSPADM

## 2025-07-22 RX ORDER — ACETAMINOPHEN 325 MG/1
650 TABLET ORAL EVERY 4 HOURS PRN
Status: DISCONTINUED | OUTPATIENT
Start: 2025-07-22 | End: 2025-07-27 | Stop reason: HOSPADM

## 2025-07-22 RX ORDER — POLYETHYLENE GLYCOL 3350 17 G/17G
17 POWDER, FOR SOLUTION ORAL DAILY PRN
Status: DISCONTINUED | OUTPATIENT
Start: 2025-07-22 | End: 2025-07-27 | Stop reason: HOSPADM

## 2025-07-22 RX ADMIN — PIPERACILLIN SODIUM AND TAZOBACTAM SODIUM 4.5 G: 4; .5 INJECTION, POWDER, FOR SOLUTION INTRAVENOUS at 10:07

## 2025-07-22 RX ADMIN — PANTOPRAZOLE SODIUM 40 MG: 40 TABLET, DELAYED RELEASE ORAL at 09:07

## 2025-07-22 RX ADMIN — GADOBUTROL 10 ML: 604.72 INJECTION INTRAVENOUS at 07:07

## 2025-07-22 RX ADMIN — PIPERACILLIN SODIUM AND TAZOBACTAM SODIUM 4.5 G: 4; .5 INJECTION, POWDER, FOR SOLUTION INTRAVENOUS at 01:07

## 2025-07-22 RX ADMIN — VANCOMYCIN HYDROCHLORIDE 2000 MG: 500 INJECTION, POWDER, LYOPHILIZED, FOR SOLUTION INTRAVENOUS at 03:07

## 2025-07-22 RX ADMIN — ATORVASTATIN CALCIUM 40 MG: 40 TABLET, FILM COATED ORAL at 09:07

## 2025-07-22 RX ADMIN — ENOXAPARIN SODIUM 40 MG: 40 INJECTION SUBCUTANEOUS at 05:07

## 2025-07-22 NOTE — PROGRESS NOTES
"Pharmacokinetic Initial Assessment: IV Vancomycin    Assessment/Plan:    Initiate intravenous vancomycin with loading dose of 2000 mg once followed by a maintenance dose of vancomycin 1500mg IV every 12 hours  Desired empiric serum trough concentration is 10 to 20 mcg/mL  Draw vancomycin trough level 60 min prior to fourth dose on 07/23 at approximately 01:30  Pharmacy will continue to follow and monitor vancomycin.      Please contact pharmacy at extension [phone] with any questions regarding this assessment.     Thank you for the consult,   Alejandra Lobo       Patient brief summary:  Stevie Villalba is a 70 y.o. male initiated on antimicrobial therapy with IV Vancomycin for treatment of suspected skin & soft tissue infection    Drug Allergies:   Review of patient's allergies indicates:  No Known Allergies    Actual Body Weight:   107 Kg    Renal Function:   Estimated Creatinine Clearance: 77.6 mL/min (based on SCr of 1.1 mg/dL).,     Dialysis Method (if applicable):  N/A.    CBC (last 72 hours):  Recent Labs   Lab Result Units 07/22/25  1207   WBC K/uL 10.04   HGB gm/dL 13.5*   HCT % 40.2   Platelet Count K/uL 262   Lymph % % 14.2*   Mono % % 9.2   Eos % % 0.6   Basophil % % 0.5       Metabolic Panel (last 72 hours):  Recent Labs   Lab Result Units 07/22/25  1207   Sodium mmol/L 136   Potassium mmol/L 4.1   Chloride mmol/L 101   CO2 mmol/L 25   Glucose mg/dL 103   BUN mg/dL 21   Creatinine mg/dL 1.1   Albumin g/dL 3.3*   Bilirubin Total mg/dL 0.7   ALP unit/L 106   AST unit/L 22   ALT unit/L 15       Drug levels (last 3 results):  No results for input(s): "VANCOMYCINRA", "VANCORANDOM", "VANCOMYCINPE", "VANCOPEAK", "VANCOMYCINTR", "VANCOTROUGH" in the last 72 hours.    Microbiologic Results:  Microbiology Results (last 7 days)       Procedure Component Value Units Date/Time    Blood culture #1 **CANNOT BE ORDERED STAT** [[id number]] Collected: 07/22/25 1206    Order Status: Resulted Specimen: Blood from " Peripheral, Antecubital, Right Updated: 07/22/25 1302    Blood culture #2 **CANNOT BE ORDERED STAT** [[id number]]     Order Status: Sent Specimen: Blood
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
## 2025-07-22 NOTE — ED TRIAGE NOTES
Stevie Villalba, a 70 y.o. male, presents to ED via POV with complaints of wound infection on bilat feet. Reports this is a freq problem. States he was sent by his podiatrist. Reports being on x2 oral abx since Sunday. States he had recent biopsy to foot. AAOx4. Denies fever or chills. Ambulatory upon arrival to room.

## 2025-07-22 NOTE — HPI
70M with a PMHx of HTN, HLD, pre-diabetes, Afib, [facility] podiatry consulted for b/l foot wounds.  Patient was sent to the ED from Dr. Cabral podiatry outpatient clinic for worsening of wounds with ascending cellulitis to R foot.  Patient says he started PO abx per ID on Saturday.  Patient had bone biopsy of b/l feet wound conducted in podiatry clinic last week with Dr. Richardson.  Patient has kept the dressings from last clinic visit clean, dry, and intact.  Patient denies any pain to his b/l feet.  Denies fevers, chills, nausea, or vomiting.  Denies SOB or chest pain.  Denies any other pedal complaints.    VSS, afebrile, WBC WNL, , glucose 102.  Pending XR and MRI of b/l feet
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
## 2025-07-22 NOTE — FIRST PROVIDER EVALUATION
Emergency Department TeleTriage Encounter Note      CHIEF COMPLAINT    Chief Complaint   Patient presents with    Wound Infection     Pt sent from clinic for worsened infection to both feet.  Pt states had biopsy last week on his toe       VITAL SIGNS   Initial Vitals [07/22/25 1139]   BP Pulse Resp Temp SpO2   135/76 79 20 97.5 °F (36.4 °C) 96 %      MAP       --            ALLERGIES    Review of patient's allergies indicates:  No Known Allergies    PROVIDER TRIAGE NOTE  Patient was advised by clinic to come to the ED due to worsening infection to the bilateral feet. No fever or chills.       ORDERS  Labs Reviewed - No data to display    ED Orders (720h ago, onward)      None              Virtual Visit Note: The provider triage portion of this emergency department evaluation and documentation was performed via [employer], a HIPAA-compliant telemedicine application, in concert with a tele-presenter in the room. A face to face patient evaluation with one of my colleagues will occur once the patient is placed in an emergency department room.      DISCLAIMER: This note was prepared with [employer] voice recognition transcription software. Garbled syntax, mangled pronouns, and other bizarre constructions may be attributed to that software system.

## 2025-07-22 NOTE — PROGRESS NOTES
Subjective:      Patient ID: Stevie Villalba is a 70 y.o. male.    Chief Complaint: Wound Care (Both feet.)    Stevie is a 70 y.o. male who presents to the clinic for evaluation and treatment of high risk feet. Stevie has a past medical history of Hyperlipidemia, Hypertension, Insomnia, and Lumbago. The patient's chief complaint is foot ulcer, both hallux nails. This patient has documented high risk feet requiring routine maintenance secondary to peripheral neuropathy.    06/23/2025 patient returns to Podiatry Clinic for follow up of bilateral foot wounds.  Previously seen by my colleague, new to me.  Since he was last seen in Podiatry he was seen in infectious disease where he states dressings were removed.  Unfortunately, patient states they did not place cotton between the toes of his right foot.  Patient continues to work he is a  and often has his feet down for several hours a day.  He relates that his Darco shoes are worn and he had to duck tape them together.  Infectious Disease also saw patient again today prior to our encounter.  Presents to clinic with his daughter whom is a nurse.    06/30/2025 patient returns to Podiatry Clinic for follow up of bilateral foot wounds.  Dressing has remained intact since last encounter.  No new pedal complaints.    07/07/2025 patient returns to Podiatry Clinic for follow up of bilateral foot wounds.  Dressing has remained intact since last encounter.     07/14/2025 patient returns to Podiatry Clinic for follow up of bilateral foot wounds.  Dressing has remained intact since last encounter.  He relates he may have done more walking and standing since our last encounter. ID will also examine patient this encounter.      7/22: Patient presents to clinic for b/l foot wounf f/u. He reports being started on PO abx per ID on Sunday. He reports swelling to his b/l legs. Denies any pain to his feet. Denies f/c/n/v. Denies SOB or chest pain. Denies any other pedal  complaints.    PCP: Ric Brambila MD    Date Last Seen by PCP:   Chief Complaint   Patient presents with    Wound Care     Both feet.     Current shoe gear:   Darco shoes    Hemoglobin A1C   Date Value Ref Range Status   09/30/2024 5.3 4.0 - 5.6 % Final     Comment:     ADA Screening Guidelines:  5.7-6.4%  Consistent with prediabetes  >or=6.5%  Consistent with diabetes    High levels of fetal hemoglobin interfere with the HbA1C  assay. Heterozygous hemoglobin variants (HbS, HgC, etc)do  not significantly interfere with this assay.   However, presence of multiple variants may affect accuracy.     09/25/2023 5.2 4.0 - 5.6 % Final     Comment:     ADA Screening Guidelines:  5.7-6.4%  Consistent with prediabetes  >or=6.5%  Consistent with diabetes    High levels of fetal hemoglobin interfere with the HbA1C  assay. Heterozygous hemoglobin variants (HbS, HgC, etc)do  not significantly interfere with this assay.   However, presence of multiple variants may affect accuracy.     09/12/2022 5.3 4.0 - 5.6 % Final     Comment:     ADA Screening Guidelines:  5.7-6.4%  Consistent with prediabetes  >or=6.5%  Consistent with diabetes    High levels of fetal hemoglobin interfere with the HbA1C  assay. Heterozygous hemoglobin variants (HbS, HgC, etc)do  not significantly interfere with this assay.   However, presence of multiple variants may affect accuracy.       Hemoglobin A1c   Date Value Ref Range Status   05/05/2025 5.2 4.0 - 5.6 % Final     Comment:     ADA Screening Guidelines:  5.7-6.4%  Consistent with prediabetes  >=6.5%  Consistent with diabetes    High levels of fetal hemoglobin interfere with the HbA1C  assay. Heterozygous hemoglobin variants (HbS, HgC, etc)do  not significantly interfere with this assay.   However, presence of multiple variants may affect accuracy.   04/23/2025 5.1 4.0 - 5.6 % Final     Comment:     ADA Screening Guidelines:  5.7-6.4%  Consistent with prediabetes  >=6.5%  Consistent with diabetes    High  "levels of fetal hemoglobin interfere with the HbA1C  assay. Heterozygous hemoglobin variants (HbS, HgC, etc)do  not significantly interfere with this assay.   However, presence of multiple variants may affect accuracy.       Review of Systems   Constitutional: Negative for chills, fever and malaise/fatigue.   HENT:  Negative for hearing loss.    Cardiovascular:  Negative for claudication.   Respiratory:  Negative for shortness of breath.    Skin:  Positive for poor wound healing. Negative for flushing and rash.   Musculoskeletal:  Negative for joint pain and myalgias.   Gastrointestinal:  Negative for nausea and vomiting.   Neurological:  Positive for numbness, paresthesias and sensory change. Negative for loss of balance.   Psychiatric/Behavioral:  Negative for altered mental status.    Allergic/Immunologic: Negative for hives.           Objective:      Vitals:    07/22/25 1052 07/22/25 1054   BP: (!) 167/77 (!) 160/85   Pulse: 80 80   Weight: 104.3 kg (229 lb 15 oz)    Height: 5' 10" (1.778 m)    PainSc: 0-No pain            Physical Exam  Vitals and nursing note reviewed.   Constitutional:       General: He is not in acute distress.     Appearance: He is well-developed. He is not toxic-appearing or diaphoretic.      Comments: alert and oriented x 3.    Cardiovascular:      Pulses:           Dorsalis pedis pulses are 1+ on the right side and 1+ on the left side.        Posterior tibial pulses are 1+ on the right side and 1+ on the left side.      Comments: Dorsalis pedis and posterior tibial pulses are diminished Bilaterally. Toes are cool to touch. Feet are warm proximally.There is decreased digital hair. Skin is atrophic  Pulmonary:      Effort: No respiratory distress.   Musculoskeletal:      Right ankle: No tenderness. No lateral malleolus, medial malleolus, AITF ligament, CF ligament or posterior TF ligament tenderness.      Right Achilles Tendon: No defects. Swartz's test negative.      Left ankle: No " tenderness. No lateral malleolus, medial malleolus, AITF ligament, CF ligament or posterior TF ligament tenderness.      Left Achilles Tendon: No defects. Swartz's test negative.      Right foot: Decreased range of motion. Deformity present. No tenderness or bony tenderness.      Left foot: Decreased range of motion. Deformity present. No tenderness or bony tenderness.      Comments:        Feet:      Right foot:      Protective Sensation: 10 sites tested.   1 site sensed.     Skin integrity: Ulcer, erythema and warmth present.      Left foot:      Protective Sensation: 10 sites tested.  2 sites sensed.      Skin integrity: Ulcer, erythema and warmth present.   Lymphadenopathy:      Comments: No lymphatic streaking     Skin:     General: Skin is warm and dry.      Capillary Refill: Capillary refill takes 2 to 3 seconds.      Coloration: Skin is not pale.      Findings: Erythema and wound present. No rash.      Nails: There is no clubbing.   Neurological:      Mental Status: He is alert and oriented to person, place, and time.      Sensory: Sensory deficit present.      Motor: No atrophy.   Psychiatric:         Attention and Perception: He is attentive.         Mood and Affect: Mood is not anxious. Affect is not inappropriate.         Speech: He is communicative. Speech is not slurred.         Behavior: Behavior is not combative.       07/14/2025    Right            left              07/07/2025    Right Foot                Left Foot                06/30/2025     Right             Left             06/23/2025    Left    Ulcer location: left medial hallux IPJ  Measurements : 1.1x0.6x0.2  cm   Signs of infection: local edema  Drainage: Sero-Sanguinous  Purulence: No  Crepitus/fluctuance: No  Periwound: Macerated, Calloused  Base: Mixed Granular/Fibrotic  Depth: subcutaneous tissue  Probe to bone: No              Right    Ulcer location: right medial hallux IPJ, lateral 2nd digit PIPJ, lateral 3rd digit PIPJ  Signs of  infection: local edema and erythema  Drainage: Sero-Sanguinous  Purulence: No  Crepitus/fluctuance: No  Periwound: Macerated, Calloused  Base: Mixed Granular/Fibrotic  Depth: subcutaneous tissue, probe to joint 2nd digit                    7/22  Ascedning erythema and cellulitus to the R 2nd digit. Masceration noted to R 2nd and 3rd digit. Swelling noted to 2nd digit  L hallux wound with hypergranular tissue and maseration noted.                  Impression:     1. Bilateral hallux soft tissue ulcers, more prominent on the left.  Left hallux distal phalanx bone marrow edema in the setting of adjacent ulcer suspicious for early osteomyelitis.  2. Advanced degenerative changes bilaterally.     Electronically signed by resident: Marci Dodd  Date:                                            05/06/2025  Time:                                           08:08     Impression:     1. Bilateral hallux soft tissue ulcers, more prominent on the left.  Left hallux distal phalanx bone marrow edema in the setting of adjacent ulcer suspicious for early osteomyelitis.  2. Advanced degenerative changes bilaterally.     Electronically signed by resident: Marci Dodd  Date:                                            05/06/2025  Time:                                           08:08    Impression:     Ankle-brachial index of 1.1 on the right and 1.1 on the left.     No hemodynamically significant stenosis demonstrated in the right or left lower extremity arterial system.     Electronically signed by resident: Elli Escalante  Date:                                            05/05/2025    CULTURE, AEROBIC Moderate Providencia stuartii Abnormal    Few Pseudomonas aeruginosa Abnormal         Resulting Agency: [facility]     Susceptibility     Providencia stuartii Pseudomonas aeruginosa     KWAME KWAME     Cefepime <=2 µg/ml Sensitive <=2 µg/ml Sensitive     Ceftriaxone <=1 µg/ml Sensitive       Ciprofloxacin <=0.25 µg/ml Sensitive 1 µg/ml  "Intermediate     Ertapenem <=0.5 µg/ml Sensitive       Gentamicin <=2 µg/ml Sensitive       Levofloxacin <=0.5 µg/ml Sensitive <=0.5 µg/ml Sensitive     Meropenem <=1 µg/ml Sensitive <=1 µg/ml Sensitive     Minocycline >8 µg/ml Resistant       Piperacillin/Tazobactam <=8 µg/ml Sensitive <=8 µg/ml Sensitive     Tobramycin 4 µg/ml Intermediate       Trimeth/Sulfa <=2/38 µg/ml Sensitive                   Linear View        Specimen Collected: 06/09/25 09:18 CDT Last Resulted: 06/12/25 12:25 CDT           CV Resting VIGNESH    Height: 5' 10" (1.778 m)   Weight: 115.9 kg (255 lb 8.2 oz)   Blood Pressure: Not recorded    Date of Study: 8/15/22   Ordering Provider: Saroj Walker MD   Clinical Indications: Lower extremity edema [R60.0 (ICD-10-CM)]       [facility]  Performing Staff   Cardiology: Antoni Harrell MD PhD     Tech: Shay Flores         Interpretation Summary  Show Result ComparisonRight resting VIGNESH 1.53 is unreliable, and suggestive of medial calcinosis.  Right resting VIGNESH 1.32, is normal.  PVR waveforms are mildly dampened at the ankle level bilaterally, and digit level on the right.    Assessment:       Encounter Diagnoses   Name Primary?    Subacute osteomyelitis of foot, unspecified laterality Yes    Bilateral great toes ulcers     Idiopathic peripheral neuropathy     Skin ulcer of toe of left foot with fat layer exposed     Ulcer of toe of left foot, unspecified ulcer stage     Skin ulcer of toe of left foot with necrosis of muscle     Skin ulcer of toe of right foot with fat layer exposed          Plan:       Stevie was seen today for wound care.    Diagnoses and all orders for this visit:    Subacute osteomyelitis of foot, unspecified laterality    Bilateral great toes ulcers    Idiopathic peripheral neuropathy    Skin ulcer of toe of left foot with fat layer exposed    Ulcer of toe of left foot, unspecified ulcer stage    Skin ulcer of toe of left foot with necrosis of muscle    Skin " ulcer of toe of right foot with fat layer exposed      I counseled the patient on his conditions, their implications and medical management.    Education about the prevention of limb loss.        Dressings:  Betadine  Offloading: foam     Short-term goals include maintaining good offloading and minimizing bioburden, promoting granulation and epithelialization to healing.  Long-term goals include keeping the wound healed by good offloading and medical management under the direction of internist.    Ascending cellulitis and worsening digital swelling and infection failed oral antibiotics  Advised patient to report to ED for IV abx, XR and MRI of b/l feet, and Podiatry and ID consult            Prateek Parker DPM   Podiatric Medicine & Surgery  [facility]               I have seen the patient, reviewed the Resident's procedure note. I have personally interviewed and examined the patient at bedside and agree with the findings.           Ioana L Cabral, DPM  System Chair, Podiatry Department

## 2025-07-22 NOTE — ASSESSMENT & PLAN NOTE
"Patient has paroxysmal (<7 days) atrial fibrillation. Patient is currently in sinus rhythm. [id number] Score: 1. The patients heart rate in the last 24 hours is as follows:  Pulse  Min: 76  Max: 80     Antiarrhythmics       Anticoagulants  enoxaparin injection 40 mg, Every 24 hours, Subcutaneous    Plan  - Replete lytes with a goal of K>4, Mg >2  - Patient is anticoagulated, see medications listed above.  - patient is unsure if he takes MTP, but it is "paused" on his MAR so will hold off for now  - Patient's afib is currently controlled  - Continue enoxaparin      "

## 2025-07-22 NOTE — ASSESSMENT & PLAN NOTE
Patient's blood pressure range in the last 24 hours was: BP  Min: 133/63  Max: 167/77.The patient's inpatient anti-hypertensive regimen is listed below:  Current Antihypertensives  valsartan tablet 320 mg, Daily, Oral    Plan  - BP is controlled, no changes needed to their regimen  - Continue home valsartan

## 2025-07-22 NOTE — ASSESSMENT & PLAN NOTE
Assesment  Ulcers to L hallux and R 2nd digit.  Ascending erythema from the R 2nd digit up to the dorsal midfoot, consistent with cellulitis.  Bone bx from clinic 7/12 +staph, proteus, cornybacterius. Pending XR and MRI of b/l feet.    Plan  -Physical exam findings discussed with the patient.  -Bedside debridement of wound conducted. Full procedure note to follow  -No emergent/urgent surgical intervention per Podiatry.  Recommend continuing with wound care and abx  -Abx per Primary/ID  -Infectious Disease consulted, recs appreciated  -Further intervention will be determined when imaging is complete  -Bilateral feet wounds dressed with Betadine-soaked gauze, dry gauze, cast padding, and ACE wrap  -Wound care orders to follow  -WBAT in Darco shoes to b/l feet  -Podiatry will continue to follow
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
## 2025-07-22 NOTE — ASSESSMENT & PLAN NOTE
- Ulcerations to L 1st digit and R 2nd digit  - previously has R 1st digit osteo that has resolved s/p long term Abx  -  follows with ID and podiatry  - reports new ulceration to R 2nd digit that he believes is 2/2 to not having proper dressings placed between 1st and 2nd digit after a visit  - Imaging in May 2025 showing early signs of osteo to L 1st digit  - Cultures on 7/14 of L 1st digit bone proteus and staph  - started on levaquin/minocycline at that time  - AFVSS  - no leukocytosis  - ESR elevated  - MRI B toes pending  - started on vanc/zosyn, will continue for now  - podiatry consulted, appreciate recs  - ID consulted, appreciate recs

## 2025-07-22 NOTE — ASSESSMENT & PLAN NOTE
Body mass index is 33.86 kg/m². Morbid obesity complicates all aspects of disease management from diagnostic modalities to treatment. Weight loss encouraged and health benefits explained to patient.
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
## 2025-07-23 PROBLEM — M86.9 OSTEOMYELITIS OF SECOND TOE OF RIGHT FOOT: Status: ACTIVE | Noted: 2025-07-23

## 2025-07-23 LAB
ABSOLUTE EOSINOPHIL (OHS): 0.16 K/UL
ABSOLUTE MONOCYTE (OHS): 0.93 K/UL (ref 0.3–1)
ABSOLUTE NEUTROPHIL COUNT (OHS): 6.34 K/UL (ref 1.8–7.7)
ALBUMIN SERPL BCP-MCNC: 2.9 G/DL (ref 3.5–5.2)
ALP SERPL-CCNC: 94 UNIT/L (ref 40–150)
ALT SERPL W/O P-5'-P-CCNC: 13 UNIT/L (ref 10–44)
ANION GAP (OHS): 8 MMOL/L (ref 8–16)
AST SERPL-CCNC: 16 UNIT/L (ref 11–45)
BASOPHILS # BLD AUTO: 0.04 K/UL
BASOPHILS NFR BLD AUTO: 0.4 %
BILIRUB SERPL-MCNC: 0.4 MG/DL (ref 0.1–1)
BILIRUB UR QL STRIP.AUTO: NEGATIVE
BUN SERPL-MCNC: 23 MG/DL (ref 8–23)
CALCIUM SERPL-MCNC: 9 MG/DL (ref 8.7–10.5)
CHLORIDE SERPL-SCNC: 106 MMOL/L (ref 95–110)
CLARITY UR: CLEAR
CO2 SERPL-SCNC: 28 MMOL/L (ref 23–29)
COLOR UR AUTO: YELLOW
CREAT SERPL-MCNC: 1.3 MG/DL (ref 0.5–1.4)
ERYTHROCYTE [DISTWIDTH] IN BLOOD BY AUTOMATED COUNT: 12.8 % (ref 11.5–14.5)
GFR SERPLBLD CREATININE-BSD FMLA CKD-EPI: 59 ML/MIN/1.73/M2
GLUCOSE SERPL-MCNC: 101 MG/DL (ref 70–110)
GLUCOSE UR QL STRIP: NEGATIVE
HCT VFR BLD AUTO: 36.7 % (ref 40–54)
HGB BLD-MCNC: 12.6 GM/DL (ref 14–18)
HGB UR QL STRIP: ABNORMAL
HYALINE CASTS UR QL AUTO: 3 /LPF (ref 0–1)
IMM GRANULOCYTES # BLD AUTO: 0.1 K/UL (ref 0–0.04)
IMM GRANULOCYTES NFR BLD AUTO: 1.1 % (ref 0–0.5)
KETONES UR QL STRIP: NEGATIVE
LEUKOCYTE ESTERASE UR QL STRIP: NEGATIVE
LYMPHOCYTES # BLD AUTO: 1.49 K/UL (ref 1–4.8)
MAGNESIUM SERPL-MCNC: 1.9 MG/DL (ref 1.6–2.6)
MCH RBC QN AUTO: 33 PG (ref 27–31)
MCHC RBC AUTO-ENTMCNC: 34.3 G/DL (ref 32–36)
MCV RBC AUTO: 96 FL (ref 82–98)
MICROSCOPIC COMMENT: ABNORMAL
NITRITE UR QL STRIP: NEGATIVE
NUCLEATED RBC (/100WBC) (OHS): 0 /100 WBC
PH UR STRIP: 6 [PH]
PHOSPHATE SERPL-MCNC: 4.1 MG/DL (ref 2.7–4.5)
PLATELET # BLD AUTO: 236 K/UL (ref 150–450)
PMV BLD AUTO: 8.7 FL (ref 9.2–12.9)
POCT GLUCOSE: 116 MG/DL (ref 70–110)
POCT GLUCOSE: 126 MG/DL (ref 70–110)
POCT GLUCOSE: 126 MG/DL (ref 70–110)
POCT GLUCOSE: 203 MG/DL (ref 70–110)
POTASSIUM SERPL-SCNC: 4.6 MMOL/L (ref 3.5–5.1)
PROT SERPL-MCNC: 6.4 GM/DL (ref 6–8.4)
PROT UR QL STRIP: ABNORMAL
RBC # BLD AUTO: 3.82 M/UL (ref 4.6–6.2)
RBC #/AREA URNS AUTO: 6 /HPF (ref 0–4)
RELATIVE EOSINOPHIL (OHS): 1.8 %
RELATIVE LYMPHOCYTE (OHS): 16.4 % (ref 18–48)
RELATIVE MONOCYTE (OHS): 10.3 % (ref 4–15)
RELATIVE NEUTROPHIL (OHS): 70 % (ref 38–73)
SODIUM SERPL-SCNC: 142 MMOL/L (ref 136–145)
SP GR UR STRIP: 1.02
SQUAMOUS #/AREA URNS AUTO: 1 /HPF
UROBILINOGEN UR STRIP-ACNC: NEGATIVE EU/DL
VANCOMYCIN TROUGH SERPL-MCNC: 16 UG/ML (ref 10–22)
VANCOMYCIN TROUGH SERPL-MCNC: 23.2 UG/ML (ref 10–22)
WBC # BLD AUTO: 9.06 K/UL (ref 3.9–12.7)
WBC #/AREA URNS AUTO: 1 /HPF (ref 0–5)

## 2025-07-23 PROCEDURE — 11042 DBRDMT SUBQ TIS 1ST 20SQCM/<: CPT | Mod: 51,HCNC,, | Performed by: PODIATRIST

## 2025-07-23 PROCEDURE — 84450 TRANSFERASE (AST) (SGOT): CPT | Mod: HCNC | Performed by: PHYSICIAN ASSISTANT

## 2025-07-23 PROCEDURE — 10060 I&D ABSCESS SIMPLE/SINGLE: CPT | Mod: HCNC

## 2025-07-23 PROCEDURE — 80202 ASSAY OF VANCOMYCIN: CPT | Mod: HCNC | Performed by: HOSPITALIST

## 2025-07-23 PROCEDURE — 99900035 HC TECH TIME PER 15 MIN (STAT): Mod: HCNC

## 2025-07-23 PROCEDURE — 94660 CPAP INITIATION&MGMT: CPT | Mod: HCNC

## 2025-07-23 PROCEDURE — 0JBQ0ZZ EXCISION OF RIGHT FOOT SUBCUTANEOUS TISSUE AND FASCIA, OPEN APPROACH: ICD-10-PCS | Performed by: PODIATRIST

## 2025-07-23 PROCEDURE — 87205 SMEAR GRAM STAIN: CPT | Mod: HCNC

## 2025-07-23 PROCEDURE — 85025 COMPLETE CBC W/AUTO DIFF WBC: CPT | Mod: HCNC | Performed by: PHYSICIAN ASSISTANT

## 2025-07-23 PROCEDURE — 36415 COLL VENOUS BLD VENIPUNCTURE: CPT | Mod: HCNC | Performed by: PHYSICIAN ASSISTANT

## 2025-07-23 PROCEDURE — 0JBR0ZZ EXCISION OF LEFT FOOT SUBCUTANEOUS TISSUE AND FASCIA, OPEN APPROACH: ICD-10-PCS | Performed by: PODIATRIST

## 2025-07-23 PROCEDURE — 25000003 PHARM REV CODE 250: Mod: HCNC | Performed by: PHYSICIAN ASSISTANT

## 2025-07-23 PROCEDURE — 99223 1ST HOSP IP/OBS HIGH 75: CPT | Mod: HCNC,,,

## 2025-07-23 PROCEDURE — 27000190 HC CPAP FULL FACE MASK W/VALVE: Mod: HCNC

## 2025-07-23 PROCEDURE — 25000003 PHARM REV CODE 250: Mod: HCNC | Performed by: HOSPITALIST

## 2025-07-23 PROCEDURE — 80202 ASSAY OF VANCOMYCIN: CPT | Mod: HCNC | Performed by: STUDENT IN AN ORGANIZED HEALTH CARE EDUCATION/TRAINING PROGRAM

## 2025-07-23 PROCEDURE — 87186 SC STD MICRODIL/AGAR DIL: CPT | Mod: HCNC

## 2025-07-23 PROCEDURE — 87075 CULTR BACTERIA EXCEPT BLOOD: CPT | Mod: HCNC

## 2025-07-23 PROCEDURE — 81003 URINALYSIS AUTO W/O SCOPE: CPT | Mod: HCNC | Performed by: PHYSICIAN ASSISTANT

## 2025-07-23 PROCEDURE — 94799 UNLISTED PULMONARY SVC/PX: CPT | Mod: HCNC

## 2025-07-23 PROCEDURE — 84100 ASSAY OF PHOSPHORUS: CPT | Mod: HCNC | Performed by: PHYSICIAN ASSISTANT

## 2025-07-23 PROCEDURE — 10061 I&D ABSCESS COMP/MULTIPLE: CPT | Mod: HCNC,,, | Performed by: PODIATRIST

## 2025-07-23 PROCEDURE — 83735 ASSAY OF MAGNESIUM: CPT | Mod: HCNC | Performed by: PHYSICIAN ASSISTANT

## 2025-07-23 PROCEDURE — G0545 PR VISIT INHERENT TO INPT OR OBS CARE, INFECTIOUS DISEASE: HCPCS | Mod: HCNC,,,

## 2025-07-23 PROCEDURE — 63600175 PHARM REV CODE 636 W HCPCS: Mod: HCNC | Performed by: STUDENT IN AN ORGANIZED HEALTH CARE EDUCATION/TRAINING PROGRAM

## 2025-07-23 PROCEDURE — 25000003 PHARM REV CODE 250: Mod: HCNC | Performed by: STUDENT IN AN ORGANIZED HEALTH CARE EDUCATION/TRAINING PROGRAM

## 2025-07-23 PROCEDURE — 63600175 PHARM REV CODE 636 W HCPCS: Mod: HCNC | Performed by: PHYSICIAN ASSISTANT

## 2025-07-23 PROCEDURE — 11000001 HC ACUTE MED/SURG PRIVATE ROOM: Mod: HCNC

## 2025-07-23 PROCEDURE — 94761 N-INVAS EAR/PLS OXIMETRY MLT: CPT | Mod: HCNC

## 2025-07-23 PROCEDURE — 99233 SBSQ HOSP IP/OBS HIGH 50: CPT | Mod: 25,HCNC,, | Performed by: PODIATRIST

## 2025-07-23 RX ADMIN — ALLOPURINOL 300 MG: 300 TABLET ORAL at 09:07

## 2025-07-23 RX ADMIN — VANCOMYCIN HYDROCHLORIDE 1500 MG: 1.5 INJECTION, POWDER, LYOPHILIZED, FOR SOLUTION INTRAVENOUS at 02:07

## 2025-07-23 RX ADMIN — VALSARTAN 320 MG: 160 TABLET, FILM COATED ORAL at 09:07

## 2025-07-23 RX ADMIN — PIPERACILLIN SODIUM AND TAZOBACTAM SODIUM 4.5 G: 4; .5 INJECTION, POWDER, FOR SOLUTION INTRAVENOUS at 02:07

## 2025-07-23 RX ADMIN — PANTOPRAZOLE SODIUM 40 MG: 40 TABLET, DELAYED RELEASE ORAL at 09:07

## 2025-07-23 RX ADMIN — PIPERACILLIN SODIUM AND TAZOBACTAM SODIUM 4.5 G: 4; .5 INJECTION, POWDER, FOR SOLUTION INTRAVENOUS at 05:07

## 2025-07-23 RX ADMIN — PIPERACILLIN SODIUM AND TAZOBACTAM SODIUM 4.5 G: 4; .5 INJECTION, POWDER, FOR SOLUTION INTRAVENOUS at 09:07

## 2025-07-23 RX ADMIN — ATORVASTATIN CALCIUM 40 MG: 40 TABLET, FILM COATED ORAL at 09:07

## 2025-07-23 RX ADMIN — ENOXAPARIN SODIUM 40 MG: 40 INJECTION SUBCUTANEOUS at 04:07

## 2025-07-23 RX ADMIN — VANCOMYCIN HYDROCHLORIDE 1500 MG: 1.5 INJECTION, POWDER, LYOPHILIZED, FOR SOLUTION INTRAVENOUS at 04:07

## 2025-07-23 NOTE — PLAN OF CARE
Problem: Hospitalized Older Adult  Goal: Optimal Cognitive Function  Outcome: Progressing  Goal: Effective Bowel Elimination  Outcome: Progressing  Goal: Optimal Coping  Outcome: Progressing  Goal: Fluid and Electrolyte Balance  Outcome: Progressing  Goal: Optimal Functional Ability  Outcome: Progressing  Goal: Improved Oral Intake  Outcome: Progressing  Goal: Adequate Sleep/Rest  Outcome: Progressing  Goal: Effective Urinary Elimination  Outcome: Progressing     Problem: Adult Inpatient Plan of Care  Goal: Plan of Care Review  Outcome: Progressing  Goal: Patient-Specific Goal (Individualized)  Outcome: Progressing  Goal: Absence of Hospital-Acquired Illness or Injury  Outcome: Progressing  Goal: Optimal Comfort and Wellbeing  Outcome: Progressing  Goal: Readiness for Transition of Care  Outcome: Progressing     Problem: Wound  Goal: Optimal Coping  Outcome: Progressing  Goal: Optimal Functional Ability  Outcome: Progressing  Goal: Absence of Infection Signs and Symptoms  Outcome: Progressing  Goal: Improved Oral Intake  Outcome: Progressing  Goal: Optimal Pain Control and Function  Outcome: Progressing  Goal: Skin Health and Integrity  Outcome: Progressing  Goal: Optimal Wound Healing  Outcome: Progressing

## 2025-07-23 NOTE — SUBJECTIVE & OBJECTIVE
Subjective:     Interval History:  No adverse events overnight.  VSS, afebrile, WBC WNL.  Patient doing well.  Denies any pain to his b/l feet.  Patient reports improvements of swelling to his b/l legs.  Denies fevers, chills, nausea, or vomiting.  Denies SOB or chest pain.  Denies any new pedal complaints.  Dressings clean, dry, and intact.    Scheduled Meds:   allopurinoL  300 mg Oral Daily    atorvastatin  40 mg Oral QHS    enoxparin  40 mg Subcutaneous Daily    pantoprazole  40 mg Oral BID    piperacillin-tazobactam (Zosyn) IV (PEDS and ADULTS) (extended infusion is not appropriate)  4.5 g Intravenous Q8H    valsartan  320 mg Oral Daily    vancomycin (VANCOCIN) IV (PEDS and ADULTS)  15 mg/kg Intravenous Q12H     Continuous Infusions:  PRN Meds:  Current Facility-Administered Medications:     acetaminophen, 650 mg, Oral, Q4H PRN    albuterol-ipratropium, 3 mL, Nebulization, Q4H PRN    aluminum-magnesium hydroxide-simethicone, 30 mL, Oral, QID PRN    bisacodyL, 10 mg, Rectal, Daily PRN    carisoprodoL, 350 mg, Oral, QID PRN    dextrose 50%, 12.5 g, Intravenous, PRN    dextrose 50%, 25 g, Intravenous, PRN    glucagon (human recombinant), 1 mg, Intramuscular, PRN    glucose, 16 g, Oral, PRN    glucose, 24 g, Oral, PRN    melatonin, 6 mg, Oral, Nightly PRN    naloxone, 0.02 mg, Intravenous, PRN    ondansetron, 4 mg, Intravenous, Q8H PRN    oxyCODONE, 5 mg, Oral, Q6H PRN    polyethylene glycol, 17 g, Oral, Daily PRN    prochlorperazine, 5 mg, Intravenous, Q6H PRN    simethicone, 1 tablet, Oral, QID PRN    sodium chloride 0.9%, 10 mL, Intravenous, Q8H PRN    Pharmacy to dose Vancomycin consult, , , Once **AND** vancomycin - pharmacy to dose, , Intravenous, pharmacy to manage frequency    Review of Systems   Constitutional:  Negative for fatigue and fever.   HENT: Negative.     Eyes: Negative.    Respiratory: Negative.     Cardiovascular: Negative.    Gastrointestinal: Negative.    Endocrine: Negative.     Genitourinary: Negative.    Skin:  Positive for color change and wound.        Reports wounds and redness to b/l feet   Allergic/Immunologic: Negative.    Hematological: Negative.    Psychiatric/Behavioral: Negative.    Objective:     Vital Signs (Most Recent):  Temp: 98.1 °F (36.7 °C) (07/23/25 0746)  Pulse: 75 (07/23/25 0746)  Resp: 18 (07/23/25 0746)  BP: 139/83 (07/23/25 0746)  SpO2: 98 % (07/23/25 0746) Vital Signs (24h Range):  Temp:  [97.5 °F (36.4 °C)-98.4 °F (36.9 °C)] 98.1 °F (36.7 °C)  Pulse:  [66-80] 75  Resp:  [17-20] 18  SpO2:  [93 %-98 %] 98 %  BP: (107-167)/(53-85) 139/83     Weight: 107 kg (236 lb)  Body mass index is 33.86 kg/m².    Foot Exam     Right Foot/Ankle   Inspection and Palpation  Ecchymosis: none  Tenderness: none   Swelling: dorsum and lesser metatarsophalangeal joints (Noted to 2nd and 3rddigit and dorsum of foot)  Skin Exam: callus, dry skin, maceration, cellulitis, skin changes, abnormal color, ulcer and erythema; no drainage      Neurovascular  Dorsalis pedis: doppler  Posterior tibial: doppler  Saphenous nerve sensation: absent  Tibial nerve sensation: absent  Superficial peroneal nerve sensation: absent  Deep peroneal nerve sensation: absent  Sural nerve sensation: absent     Comments  R foot:  Wound noted to second digit.  Swelling noted to 2nd digit, third digit, and dorsum of foot.  Ascending cellulitis noticed from 2nd digit up to the dorsal foot.  No maceration, drainage, malodor, bone exposure, or fluctuance noted.  No tenderness to palpation.    7/23:  Improvements of swelling to leg.  Improvements of swelling and redness to the dorsal foot and second digit.  The second digit with fluctuance and scant serous drainage from wound to medial PIPJ.  Mild maceration noted to the lesser digits.  No exposed bone.  No tenderness to palpation.     Left Foot/Ankle    Inspection and Palpation  Ecchymosis: none  Tenderness: none   Swelling: great toe metatarsophalangeal joint  (Swelling noted to hallux)  Skin Exam: callus, dry skin, maceration, cellulitis, skin changes, abnormal color, ulcer and erythema; no blister and no drainage      Neurovascular  Dorsalis pedis: doppler  Posterior tibial: doppler  Saphenous nerve sensation: absent  Tibial nerve sensation: absent  Superficial peroneal nerve sensation: absent  Deep peroneal nerve sensation: absent  Sural nerve sensation: absent     Comments  L foot:  Wound noted to lateral hallux.  Wound base hypergranular tissue with surrounding erythema and swelling.  No maceration, drainage, malodor, bone exposed, or fluctuance noted.  No tenderness to palpation.    7/23:  L hallux wound without bone exposure.  No maceration, drainage, malodor, fluctuance.  No tenderness to palpation    L hallux    R foot    R second digit      Laboratory:  BMP:   Recent Labs   Lab 07/23/25  0213         K 4.6      CO2 28   BUN 23   CREATININE 1.3   CALCIUM 9.0   MG 1.9     CBC:   Recent Labs   Lab 07/23/25  0212   WBC 9.06   RBC 3.82*   HGB 12.6*   HCT 36.7*      MCV 96   MCH 33.0*   MCHC 34.3     Diagnostic Results:  I have reviewed all pertinent imaging results/findings within the past 24 hours.
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
## 2025-07-23 NOTE — PROGRESS NOTES
Pharmacokinetic Assessment Follow Up: IV Vancomycin    Vancomycin serum concentration assessment(s)/Regimen Plan:    The trough level was drawn incorrectly and cannot be used to guide therapy at this time.  Trough was drawn while 2nd dose of Vancomycin was being infused  Continue regimen to Vancomycin 1500 mg IV every 12 hours with next serum trough concentration measured at 7/23 @1300     Drug levels (last 3 results):  Recent Labs   Lab Result Units 07/23/25  0212   Vancomycin Trough ug/ml 23.2*       Pharmacy will continue to follow and monitor vancomycin.    Please contact pharmacy at extension [phone] for questions regarding this assessment.    Thank you for the consult,   Mary Menjivar       Patient brief summary:  Stevie Villalba is a 70 y.o. male initiated on antimicrobial therapy with IV Vancomycin for treatment of bone/joint infection    The patient's current regimen is Vancomycin 2 grams x1 followed by Vancomycin 1500 mg IV Q12h    Drug Allergies:   Review of patient's allergies indicates:  No Known Allergies    Actual Body Weight:   107 kg    Renal Function:   Estimated Creatinine Clearance: 65.7 mL/min (based on SCr of 1.3 mg/dL).,     CBC (last 72 hours):  Recent Labs   Lab Result Units 07/22/25  1207 07/23/25  0212   WBC K/uL 10.04 9.06   HGB gm/dL 13.5* 12.6*   HCT % 40.2 36.7*   Platelet Count K/uL 262 236   Lymph % % 14.2* 16.4*   Mono % % 9.2 10.3   Eos % % 0.6 1.8   Basophil % % 0.5 0.4       Metabolic Panel (last 72 hours):  Recent Labs   Lab Result Units 07/22/25  1207 07/23/25  0213   Sodium mmol/L 136 142   Potassium mmol/L 4.1 4.6   Chloride mmol/L 101 106   CO2 mmol/L 25 28   Glucose mg/dL 103 101   BUN mg/dL 21 23   Creatinine mg/dL 1.1 1.3   Albumin g/dL 3.3* 2.9*   Bilirubin Total mg/dL 0.7 0.4   ALP unit/L 106 94   AST unit/L 22 16   ALT unit/L 15 13   Magnesium  mg/dL  --  1.9   Phosphorus Level mg/dL  --  4.1       Vancomycin Administrations:  vancomycin given in the last 96 hours                      vancomycin 1,500 mg in 0.9% NaCl 250 mL IVPB (admixture device) (mg) 1,500 mg New Bag 07/23/25 0200    vancomycin 2 g in 0.9% sodium chloride 500 mL IVPB (mg) 2,000 mg New Bag 07/22/25 1506                    Microbiologic Results:  Microbiology Results (last 7 days)       Procedure Component Value Units Date/Time    Blood culture #1 **CANNOT BE ORDERED STAT** [[id number]]  (Normal) Collected: 07/22/25 1206    Order Status: Completed Specimen: Blood from Peripheral, Antecubital, Right Updated: 07/22/25 2001     Blood Culture No Growth After 6 Hours    Blood culture #2 **CANNOT BE ORDERED STAT** [[id number]]  (Normal) Collected: 07/22/25 1320    Order Status: Completed Specimen: Blood from Peripheral, Antecubital, Left Updated: 07/22/25 2001     Blood Culture No Growth After 6 Hours

## 2025-07-23 NOTE — PLAN OF CARE
Problem: Hospitalized Older Adult  Goal: Optimal Cognitive Function  7/23/2025 0301 by SmartSuzanne redmonde, LPN  Outcome: Progressing  7/23/2025 0251 by SmartRubiChristelle, LPN  Outcome: Progressing  7/23/2025 0251 by Smart Christelle, LPN  Outcome: Progressing  Goal: Effective Bowel Elimination  7/23/2025 0301 by SmartRubiChristelle, LPN  Outcome: Progressing  7/23/2025 0251 by SmartRubiChristelle, LPN  Outcome: Progressing  7/23/2025 0251 by SmartRubiChristelle, LPN  Outcome: Progressing  Goal: Optimal Coping  7/23/2025 0301 by Suzanne Smarte, LPN  Outcome: Progressing  7/23/2025 0251 by SmartRubiChristelle, LPN  Outcome: Progressing  7/23/2025 0251 by SmartRubiChristelle, LPN  Outcome: Progressing  Goal: Fluid and Electrolyte Balance  7/23/2025 0301 by Suzanne Smarte, LPN  Outcome: Progressing  7/23/2025 0251 by Suzanne Smarte, LPN  Outcome: Progressing  7/23/2025 0251 by Rubi Smartbie, LPN  Outcome: Progressing  Goal: Optimal Functional Ability  7/23/2025 0301 by Suzanne Smarte, LPN  Outcome: Progressing  7/23/2025 0251 by SmartRubiChristelle, LPN  Outcome: Progressing  7/23/2025 0251 by SmartRubiChristelle, LPN  Outcome: Progressing     Problem: Adult Inpatient Plan of Care  Goal: Plan of Care Review  Outcome: Progressing

## 2025-07-23 NOTE — ASSESSMENT & PLAN NOTE
Assesment  Ulcers to L hallux and R 2nd digit.  Ascending erythema from the R 2nd digit up to the dorsal midfoot, consistent with cellulitis.  Bone bx from clinic 7/12 +staph, proteus, cornybacterius. XR b/l feet unremarkable. MRI b/l feet OM to R 2nd prox/mid phalanx and L 1st distal phalanx and R 2nd PIPJ septic arthritis w/ 1.2 x 0.7 cm abscess. IDSA mild foot wound infection    Plan  -Physical exam findings discussed with the patient.  Discussed with the patient that his erythema and swelling has improved with broad-spectrum abx  -Bedside debridement of wound conducted. Full procedure note to follow  -No emergent/urgent surgical intervention per Podiatry.  Recommend continuing with wound care and abx  -Abx per Primary/ID  -Infectious Disease consulted, recs appreciated  -Further intervention will be determined when imaging is complete  -Bilateral feet wounds dressed with Betadine-soaked gauze, dry gauze, cast padding, and ACE wrap  -Wound care orders to follow  -WBAT in [place] shoes to b/l feet  -Podiatry will continue to follow tomorrow

## 2025-07-23 NOTE — HOSPITAL COURSE
7/23 -MRI w/ osteo of L 1st toe, R 2nd toe. Bedside debridement done by podiatry, no other procedure planned. ID recommend continue IV vanc/zosyn  7/24: Continue Vanc. Zosyn d/c'd. Started Ceftriaxone. Cultures growing proteus mirabilis and corynebacterium.   7/25: DAMARIS worsening, holding valsartan, given 1L IVF  7/26: ID recommending continuing home minocycline and Levaquin until 9/1.      Patient discharged with , acute care at home. Will follow DAMARIS. Hold valsartan until DAMARIS resolved, appreciate acute care at home. Patient verbalized understanding, all questions answered.

## 2025-07-23 NOTE — PROGRESS NOTES
[facility] Medicine  Progress Note    Patient Name: Stevie Villalba  MRN: [id number]  Patient Class: IP- Inpatient   Admission Date: 7/22/2025  Length of Stay: 1 days  Attending Physician: Efrain Knapp MD  Primary Care Provider: Ric Brambila MD        Subjective     Principal Problem:Osteomyelitis of great toe of left foot        HPI:  Stevie Villalba is a 70 y.o male with Pmhx of HTN, HLP, pre-diabetes, paroxymal Afib, R 1st digit osteo, and severe LATANYA admitted to [facility] medicine for bilateral foot ulcers. Patient was seen this morning in podiatry clinic for new ulceration to R 2nd digit and L 1st digit where he was advised to come to the ED for IV therapy and MRI 2/2 concerns for osteo. He reports that new ulcerations have been worsening over the past few weeks. He was seen in ID clinic on 7/19 where he was started on levaquin and minocycline. He reports BLE edema after starting the Abx. Reports he is able to ambulate without assistance, but endorses mild pain with ambulation. 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 Denies calf pain, fever, chills, night sweats, chest pain, shortness of breath, abdominal pain, nausea, vomiting, dysuria, hematuria, diarrhea, constipation.    In the ED, AFVSS. Neut 74.1, Lymph 14.2, Immat granulocytes 1.4, albumin 3.3, . MRI B toes pending. Given atorvastatin x 1, enoxaparin x 1, pantoprazole x 1, Zosyn x 3, valsartan x 1, vancomycin x 1.    Overview/Hospital Course:  7/23 -MRI w/ osteo of L 1st toe, R 2nd toe. Bedside debridement done by podiatry, no other procedure planned. ID recommend continue IV vanc/zosyn    Interval History: Pt. Reports improvement in erythema of L leg since starting abx, no fevers, chills, systemic symptoms    Review of Systems  Objective:     Vital Signs (Most Recent):  Temp: 98.1 °F (36.7 °C) (07/23/25 1509)  Pulse: 74 (07/23/25 1509)  Resp: 18 (07/23/25 1509)  BP: 138/70  (07/23/25 1509)  SpO2: 99 % (07/23/25 1509) Vital Signs (24h Range):  Temp:  [97.5 °F (36.4 °C)-98.4 °F (36.9 °C)] 98.1 °F (36.7 °C)  Pulse:  [71-80] 74  Resp:  [18-19] 18  SpO2:  [93 %-99 %] 99 %  BP: (107-139)/(53-83) 138/70     Weight: 107 kg (236 lb)  Body mass index is 33.86 kg/m².  No intake or output data in the 24 hours ending 07/23/25 1603      Physical Exam  Vitals reviewed.   Constitutional:       General: He is not in acute distress.     Appearance: He is well-developed.   HENT:      Head: Normocephalic and atraumatic.   Eyes:      Extraocular Movements: Extraocular movements intact.      Pupils: Pupils are equal, round, and reactive to light.   Neck:      Vascular: No JVD.      Trachea: No tracheal deviation.   Cardiovascular:      Rate and Rhythm: Normal rate and regular rhythm.      Heart sounds: No murmur heard.     No friction rub. No gallop.   Pulmonary:      Effort: No respiratory distress.      Breath sounds: Normal breath sounds. No wheezing or rales.   Abdominal:      General: Bowel sounds are normal. There is no distension.      Palpations: Abdomen is soft. There is no mass.      Tenderness: There is no abdominal tenderness.   Musculoskeletal:         General: Deformity present.      Cervical back: Neck supple.      Comments: B/L feet wrapped by podiatry   Lymphadenopathy:      Cervical: No cervical adenopathy.   Skin:     General: Skin is warm and dry.      Findings: No rash.   Neurological:      Mental Status: He is alert and oriented to person, place, and time.               Significant Labs: All pertinent labs within the past 24 hours have been reviewed.    Significant Imaging: I have reviewed all pertinent imaging results/findings within the past 24 hours.      Assessment & Plan  Osteomyelitis of great toe of left foot  Skin ulcers of foot, bilateral  Osteomyelitis of second toe of right foot  - Ulcerations to L 1st digit and R 2nd digit, erythema extending up R leg concerning for  "cellulitis  -MRI feet shows osteo of L 1st toe, R 2nd toe.  - started on vanc/zosyn, will continue for now per ID  - podiatry consulted, bedside debridement performed, no further intervention planned    Hypertriglyceridemia  - Continue home atorvastatin    Primary hypertension  -Home regimen valsartan and HCTZ BP low-normal. Hold HCTZ and metoprolol, continue valsartan  Insomnia  -Continue home melatonin PRN    Paroxysmal atrial fibrillation  Patient has paroxysmal (<7 days) atrial fibrillation. Patient is currently in sinus rhythm. [id number] Score: 1. The patients heart rate in the last 24 hours is as follows:  Pulse  Min: 71  Max: 80     Antiarrhythmics       Anticoagulants  enoxaparin injection 40 mg, Every 24 hours, Subcutaneous    Plan  - Replete lytes with a goal of K>4, Mg >2  - Patient is anticoagulated, see medications listed above.  - patient is unsure if he takes MTP, but it is "paused" on his MAR so will hold off for now  - Patient's afib is currently controlled  - Continue enoxaparin      Class 1 obesity with body mass index (BMI) of 31.0 to 31.9 in adult  Body mass index is 33.86 kg/m². Morbid obesity complicates all aspects of disease management from diagnostic modalities to treatment. Weight loss encouraged and health benefits explained to patient.       Severe obstructive sleep apnea  - CPAP daily    Gout  - Continue home allopurinol  VTE Risk Mitigation (From admission, onward)           Ordered     enoxaparin injection 40 mg  Daily         07/22/25 1340     IP VTE HIGH RISK PATIENT  Once         07/22/25 1340     Place sequential compression device  Until discontinued         07/22/25 1340                    Discharge Planning   JUANY: 7/29/2025     Code Status: Full Code   Medical Readiness for Discharge Date:                            Efrain Knapp MD  Department of Hospital Medicine   [facility] - Med Surg    "

## 2025-07-23 NOTE — PLAN OF CARE
Problem: Hospitalized Older Adult  Goal: Optimal Cognitive Function  7/23/2025 0251 by Christelle Smrat LPN  Outcome: Progressing  7/23/2025 0251 by Christelle Smart LPN  Outcome: Progressing  Goal: Effective Bowel Elimination  7/23/2025 0251 by Christelle Smart LPN  Outcome: Progressing  7/23/2025 0251 by Christelle Smart LPN  Outcome: Progressing  Goal: Optimal Coping  7/23/2025 0251 by Christelle Smart LPN  Outcome: Progressing  7/23/2025 0251 by Christelle Smart LPN  Outcome: Progressing  Goal: Fluid and Electrolyte Balance  7/23/2025 0251 by Christelle Smart LPN  Outcome: Progressing  7/23/2025 0251 by Christelle Smart LPN  Outcome: Progressing  Goal: Optimal Functional Ability  7/23/2025 0251 by Christelle Smart LPN  Outcome: Progressing  7/23/2025 0251 by Christelle Smart LPN  Outcome: Progressing     Problem: Adult Inpatient Plan of Care  Goal: Plan of Care Review  Outcome: Progressing

## 2025-07-23 NOTE — PLAN OF CARE
[facility] - Med Surg  Initial Discharge Assessment       Primary Care Provider: Ric Brambila MD    Admission Diagnosis: Chest pain [R07.9]  Diabetic ulcer of both feet [E11.621, L97.519, L97.529]    Admission Date: 7/22/2025  Expected Discharge Date: 7/29/2025    Transition of Care Barriers: (P) None    Payor: HUMANA MANAGED MEDICARE / Plan: HUMANA MEDICARE SELECT PARTNER / Product Type: Medicare Advantage /     Extended Emergency Contact Information  Primary Emergency Contact: Mary Kay Villalba  Mobile Phone: [phone]  Relation: Daughter  Preferred language: English   needed? No  Secondary Emergency Contact: ZEB BROWN  Mobile Phone: [phone]  Relation: Sister  Preferred language: English   needed? No    Discharge Plan A: (P) Home, Home with family, Home Health  Discharge Plan B: (P) Home, Home with family, Home Health      [id number] - [place], [place] - [address]  [address]  [place] [address]  Phone: [phone] Fax: [phone]    [facility] ([place]) - [place] - [address] [place] [address]  [address] [place] [address]  [place] [address]  Phone: [phone] Fax: [phone]    [facility] [place] - [address]  [address]  [place] [address]  Phone: [phone] Fax: [phone]      Initial Assessment (most recent)       Adult Discharge Assessment - 07/23/25 1702          Discharge Assessment    Assessment Type Discharge Planning Assessment     Confirmed/corrected address, phone number and insurance Yes     Confirmed Demographics Correct on Facesheet     Source of Information patient     Communicated JUANY with patient/caregiver Yes     People in Home child(mai), adult     Do you expect to return to your current living situation? Yes (P)      Do you have help at home or someone to help you manage your care at home? Yes (P)      Who are your caregiver(s) and their phone number(s)? Mary Kay Asencio (daughter  ) [phone] (P)      Prior to hospitilization cognitive status: Alert/Oriented (P)      Current cognitive status: Alert/Oriented (P)      Walking or Climbing Stairs Difficulty no (P)      Dressing/Bathing Difficulty no (P)      Equipment Currently Used at Home none (P)      Readmission within 30 days? No (P)      Patient currently being followed by outpatient case management? No (P)      Do you currently have service(s) that help you manage your care at home? No (P)      Do you take prescription medications? Yes (P)      Do you have prescription coverage? Yes (P)      Do you have any problems affording any of your prescribed medications? No (P)      Is the patient taking medications as prescribed? yes (P)      Who is going to help you get home at discharge? Mary Kay Asencio (daughter ) [phone] (P)      How do you get to doctors appointments? car, drives self (P)      Are you on dialysis? No (P)      Do you take coumadin? No (P)      Discharge Plan A Home;Home with family;Home Health (P)      Discharge Plan B Home;Home with family;Home Health (P)      DME Needed Upon Discharge  none (P)      Discharge Plan discussed with: Patient (P)      Transition of Care Barriers None (P)         Physical Activity    On average, how many days per week do you engage in moderate to strenuous exercise (like a brisk walk)? 5 days (P)      On average, how many minutes do you engage in exercise at this level? 40 min (P)         Financial Resource Strain    How hard is it for you to pay for the very basics like food, housing, medical care, and heating? Not hard at all (P)         Housing Stability    In the last 12 months, was there a time when you were not able to pay the mortgage or rent on time? No (P)      At any time in the past 12 months, were you homeless or living in a shelter (including now)? No (P)         Transportation Needs    In the past 12 months, has lack of transportation kept you from medical appointments or from  getting medications? No (P)      In the past 12 months, has lack of transportation kept you from meetings, work, or from getting things needed for daily living? No (P)         Food Insecurity    Within the past 12 months, you worried that your food would run out before you got the money to buy more. Never true (P)      Within the past 12 months, the food you bought just didn't last and you didn't have money to get more. Never true (P)         Stress    Do you feel stress - tense, restless, nervous, or anxious, or unable to sleep at night because your mind is troubled all the time - these days? Not at all (P)         Social Isolation    How often do you feel lonely or isolated from those around you?  Never (P)         Alcohol Use    Q1: How often do you have a drink containing alcohol? Monthly or less (P)      Q2: How many drinks containing alcohol do you have on a typical day when you are drinking? Patient does not drink (P)      Q3: How often do you have six or more drinks on one occasion? Never (P)         [employer]    In the past 12 months has the electric, gas, oil, or water company threatened to shut off services in your home? No (P)         Health Literacy    How often do you need to have someone help you when you read instructions, pamphlets, or other written material from your doctor or pharmacy? Never (P)                    CM to the bedside to complete discharge assessment.  Pt is independent of all ADLs and ambulate without the use of equipment. Pt works for [employer] for 50yrs.  Pt lives with adult daughter in a one story home with 5 steps to enter the front door. Pt denies smoking, consumes  alcohol once a week or less and does note receive dialysis.  CM will cont to follow pt for IVABX needs.  Podiatry and Infectious Disease consulted.  Will cont to coordinate care until discharge.   Discharge booklet give nto pt to review at leisure regarding discharge plan, no questions at this time.      Discharge Plan A and Plan B have been determined by review of patient's clinical status, future medical and therapeutic needs, and coverage/benefits for post-acute care in coordination with multidisciplinary team members.     Cassi Rodriguez, MSN   [facility]  [phone]

## 2025-07-23 NOTE — ASSESSMENT & PLAN NOTE
"Patient has paroxysmal (<7 days) atrial fibrillation. Patient is currently in sinus rhythm. [id number] Score: 1. The patients heart rate in the last 24 hours is as follows:  Pulse  Min: 71  Max: 80     Antiarrhythmics       Anticoagulants  enoxaparin injection 40 mg, Every 24 hours, Subcutaneous    Plan  - Replete lytes with a goal of K>4, Mg >2  - Patient is anticoagulated, see medications listed above.  - patient is unsure if he takes MTP, but it is "paused" on his MAR so will hold off for now  - Patient's afib is currently controlled  - Continue enoxaparin      "

## 2025-07-23 NOTE — PROCEDURES
Incision and Drainage    Date/Time: 7/23/2025 2:45 PM  Location procedure was performed: [facility] PODIATRY    Performed by: Aurelia Hope MD  Authorized by: Ioana Cabral DPM  Assisting provider: Prateek Parker DPM  Pre-operative diagnosis: osteomyelitis  Consent Done: Yes  Consent: Verbal consent obtained. Written consent obtained  Risks and benefits: risks, benefits and alternatives were discussed  Consent given by: patient  Patient understanding: patient states understanding of the procedure being performed  Patient consent: the patient's understanding of the procedure matches consent given  Procedure consent: procedure consent matches procedure scheduled  Imaging studies: imaging studies available  Type: abscess  Body area: lower extremity  Location details: right second toe  Anesthesia method: none.    Patient sedated: no  Description of findings: seropurulent and serosanguinous drainage   Scalpel size: 15  Incision type: single straight  Incision depth: subcutaneous  Drainage: serosanguinous and pus  Drainage amount: moderate  Wound treatment: incision and drainage  Packing material: none  Complications: No  Estimated blood loss (mL): 5  Specimens: Yes  Implants: No  Patient tolerance: Patient tolerated the procedure well with no immediate complications  Comments: Right 2nd digit I&D, medal aspect. Culture obtained sent to micro.     Incision depth: subcutaneous

## 2025-07-23 NOTE — ASSESSMENT & PLAN NOTE
I have reviewed hospital notes from   service and other specialty providers. I have also reviewed CBC, CMP/BMP,  cultures and imaging with my interpretation as documented.      70 y.o. male with history of peripheral neuropathy and chronic bilateral foot wounds (ulcers to L hallux and R 2nd digit). Admitted for worsening bilat foot wounds with associated cellulitis of R foot. He has been followed by podiatry and infectious disease for management of foot wounds. Recently started Minocycline and Levaquin on 7/20 for recent bone biopsy 7/14 that grew Proteus mirabilis and corynebacterium (R foot) and MSSA (L foot). Bone biopsy pathology 7/14 was positive for acute osteomyelitis of R 2nd toe, but negative for L hallux. Prior L foot wound cultures 6/9/25 grew Providencia and Pseudomonas and R 2nd toe wound culture (4/23/25) grew MRSA. Currently on IV Vancomycin and Zosyn. MRI reveals osteomyelitis of R 2nd toe and L 1st toe, as well as septic arthritis and associated abscess of R 2nd toe. Podiatry consulted and s/p debridement on 7/23. Stable at this time. Afebrile, WBC wnl, and no fever or chills. Cellulitis improving on IV antibiotics. Cultures of R toe abscess cultures obtained 7/23.      Recommendations / Plan:  Continue Vanc and Zosyn for now pending new cultures, plan to de-escalate tomorrow  Follow up R toe abscess cultures  Anticipate 6 weeks of IV antibiotics for osteomyelitis of R 2nd toe  Follow up podiatry plans       -- Discussed with ID staff and primary team   -- ID will continue to follow w/ further recs.

## 2025-07-23 NOTE — PLAN OF CARE
Stevie Villalba is a 70 year old white man with hypertension, hypertriglyceridemia, paroxysmal atrial fibrillation status post cardioversion on 8/12/2022, severe obstructive sleep apnea, insomnia, obesity, prediabetes, peripheral neuropathy, gout, aortic atherosclerosis, history of stage IIB prostate cancer status post radioactive seed implantation on 4/14/2021, diverticulosis, knee osteoarthritis status post right total knee arthroplasty 5/30/2018, left hallux osteomyelitis on 5/5/2025. He lives in [facility]. He is . He works as a . His primary care physician is Dr. Ric Brambila.    He was seen at [facility] clinic on 7/22/2025 for follow-up of foot ulcers of both feet with subacute osteomyelitis. He had new right 2nd toe and left 1st toe ulceration. He was advised to go to the emergency department for IV antibiotic treatment. He reported ulcerations to be worsening over the past few weeks. He had been started on levofloxacin and minocycline by Infectious Disease on 7/19/2025. He reported bilateral lower extremity edema starting after antibiotic treatment. He had mild pain with ambulation. He reported exposure to water on or near his feet at work.   In the emergency department, labs showed WBC 58874/uL with 74% neutrophils,  mg/L, albumin 3.3 g/dL. He was given atorvastatin, enoxaparin, pantoprazole, valsartan, piperacillin-tazobactam, and vancomycin. He was admitted to Hospital Medicine Team S.    Piperacillin-tazobactam and vancomycin were continued. Podiatry was consulted and recommended wound are and antibiotics. Infectious Disease was consulted.     MRI Foot Toes W WO Contrast Left Right 7/23/25: LEFT FOOT:   Bones: Bone marrow edema throughout the 1st distal phalanx with corresponding T1 medullary hypointensity and suspected cortical erosion.  No acute fractures.  No avascular necrosis.  No marrow infiltrative process.   BILATERAL FEET:    Joints: Right 2nd PIP joint effusion with enhancing synovitis and an adjacent 1.2 x 0.7 cm rim enhancing fluid collection that extend into the dorsal subcutaneous soft tissues and abuts the adjacent extensor tendon.  Bilateral tarsometatarsal, naviculocuneiform and hallux MTP osteoarthritis.  No subluxation or dislocation.   Tendons: Suspected tenosynovitis of the right 2nd extensor tendon at the level of the PIP joint with questionable partial-thickness fiber disruption.   Muscles: Moderate to severe fatty degeneration of the visualized musculature.   Miscellaneous: Suspected soft tissue ulcer at the medial aspect of the left hallux and lateral aspect of the right 2nd digit with surrounding enhancing subcutaneous edema.  No fluid collection.  Nonspecific subcutaneous edema along the dorsum of the feet.   Impression:  1. Osteomyelitis of the RIGHT 2nd proximal and middle phalanges and LEFT 1st distal phalanx with adjacent soft tissue ulcers and surrounding cellulitis.   2. RIGHT 2nd PIP septic arthritis with an adjacent 1.2 x 0.7 cm dorsal soft tissue abscess that abuts and possibly partially erodes the adjacent 2nd extensor tendon.   X-Ray Foot Complete Bilateral 7/23/25: FINDINGS:   Right: No new periostitis or bone destruction to suggest osteomyelitis.  No fracture or dislocation.  Lisfranc articulation is congruent.  Scattered degenerative change.  There is diffuse soft tissue swelling.  No foreign body.  Achilles and plantar bony spurs.  And  vascular calcifications are noted.   Left: No new periostitis or bone destruction to suggest osteomyelitis.  No fracture or dislocation.  Lisfranc articulation is congruent.  Scattered degenerative change.  There is disuse soft tissue swelling.  No foreign body.  Achilles and plantar bony spurs.  Vascular calcifications are noted.   Impression:  No radiographic findings to suggest osteomyelitis.

## 2025-07-23 NOTE — SUBJECTIVE & OBJECTIVE
Interval History: Pt. Reports improvement in erythema of L leg since starting abx, no fevers, chills, systemic symptoms    Review of Systems  Objective:     Vital Signs (Most Recent):  Temp: 98.1 °F (36.7 °C) (07/23/25 1509)  Pulse: 74 (07/23/25 1509)  Resp: 18 (07/23/25 1509)  BP: 138/70 (07/23/25 1509)  SpO2: 99 % (07/23/25 1509) Vital Signs (24h Range):  Temp:  [97.5 °F (36.4 °C)-98.4 °F (36.9 °C)] 98.1 °F (36.7 °C)  Pulse:  [71-80] 74  Resp:  [18-19] 18  SpO2:  [93 %-99 %] 99 %  BP: (107-139)/(53-83) 138/70     Weight: 107 kg (236 lb)  Body mass index is 33.86 kg/m².  No intake or output data in the 24 hours ending 07/23/25 1603      Physical Exam  Vitals reviewed.   Constitutional:       General: He is not in acute distress.     Appearance: He is well-developed.   HENT:      Head: Normocephalic and atraumatic.   Eyes:      Extraocular Movements: Extraocular movements intact.      Pupils: Pupils are equal, round, and reactive to light.   Neck:      Vascular: No JVD.      Trachea: No tracheal deviation.   Cardiovascular:      Rate and Rhythm: Normal rate and regular rhythm.      Heart sounds: No murmur heard.     No friction rub. No gallop.   Pulmonary:      Effort: No respiratory distress.      Breath sounds: Normal breath sounds. No wheezing or rales.   Abdominal:      General: Bowel sounds are normal. There is no distension.      Palpations: Abdomen is soft. There is no mass.      Tenderness: There is no abdominal tenderness.   Musculoskeletal:         General: Deformity present.      Cervical back: Neck supple.      Comments: B/L feet wrapped by podiatry   Lymphadenopathy:      Cervical: No cervical adenopathy.   Skin:     General: Skin is warm and dry.      Findings: No rash.   Neurological:      Mental Status: He is alert and oriented to person, place, and time.               Significant Labs: All pertinent labs within the past 24 hours have been reviewed.    Significant Imaging: I have reviewed all  pertinent imaging results/findings within the past 24 hours.

## 2025-07-23 NOTE — PROCEDURES
"Stevie Villalba is a 70 y.o. male patient.    Temp: 98.1 °F (36.7 °C) (07/23/25 0746)  Pulse: 75 (07/23/25 0746)  Resp: 18 (07/23/25 0746)  BP: 139/83 (07/23/25 0746)  SpO2: 98 % (07/23/25 0746)  Weight: 107 kg (236 lb) (07/23/25 0240)  Height: 5' 10" (177.8 cm) (07/23/25 0240)       Debridement    Date/Time: 7/23/2025 8:49 AM    Performed by: Prateek Parker DPM  Authorized by: Ioana Cabral DPM    Time out: Immediately prior to procedure a "time out" was called to verify the correct patient, procedure, equipment, support staff and site/side marked as required.    Consent Done?:  Yes (Verbal)    Preparation: Patient was prepped and draped with clean technique    Local anesthesia used?: No      Wound Details:    Location:  Left foot    Location:  Left 1st Toe    Type of Debridement:  Excisional       Length (cm):  1.2       Width (cm):  0.8       Depth (cm):  0.2       Area (sq cm):  0.75       Percent Debrided (%):  100       Total Area Debrided (sq cm):  0.75    Depth of debridement:  Subcutaneous tissue    Tissue debrided:  Subcutaneous, Dermis, Epidermis, Adipose and Hypergranulation    Devitalized tissue debrided:  Biofilm, Callus, Clots and Slough    Instruments:  Curette and Blade  Bleeding:  None    Additional wounds:  1    2nd Wound Details:     Location:  Right foot    Location:  Right 2nd Toe    Location:  Right 2nd Toe    Type of Debridement:  Excisional       Length (cm):  0.1       Area (sq cm):  0.01       Width (cm):  0.1       Percent Debrided (%):  100       Depth (cm):  0       Total Area Debrided (sq cm):  0.01    Depth of debridement:  Subcutaneous tissue    Tissue debrided:  Subcutaneous    Devitalized tissue debrided:  Biofilm, Slough and Callus    Instruments:  Curette  Bleeding:  None  Patient tolerance:  Patient tolerated the procedure well with no immediate complications      7/23/2025    "

## 2025-07-23 NOTE — HPI
70 y.o. male with history of HTN, HLD, insomnia, peripheral neuropathy, and chronic bilateral foot wounds (ulcers to L hallux and R 2nd digit). He has been followed by podiatry and infectious disease for management of foot wounds. He works as a  and on his feet for several hours a day. Recent bone biopsy of R 2nd toe and L great toe obtained in podiatry clinic on 7/14/25. Bone cultures are positive for corynebacterium and Proteus mirabilis (R foot) and MSSA (L foot). Pathology was positive for acute osteomyelitis of R 2nd toe, but negative for L hallux. Seen by podiatry and ID last week. He started oral antibiotics, Minocycline and Levaquin on Sunday (7/20/25) per ID. On 7/22/25, podiatry clinic referred patient to ER for bilateral foot wounds and associated cellulitis of R foot. No systemic symptoms such as fever, chills, nausea, or vomiting.     Afebrile. No leukocytosis. Blood cultures NGTD. XR b/l feet unremarkable. MRI reveals osteomyelitis of R 2nd toe and L 1st toe, as well as septic arthritis and associated abscess of R 2nd toe. Started on IV Vancomycin and Zosyn on 7/22. Podiatry consulted and performed bedside debridement 7/23/25. No surgical intervention recommended at this time. Cellulitis improved after two days of antibiotics.    First seen by ID in May 2025 for wounds. Prior L toe wound culture (4/23/25) grew MRSA for which he completed 2 weeks of Doxycycline/Minocycline. There was concern for osteomyelitis on imaging, but bone biopsy was negative for osteomyelitis and had no growth on culture. He continued to follow with podiatry and ID. Wound cultures in clinic on 6/9/25 grew Providencia and Pseudomonas, but suspected that reflected wound colonization given no active signs of infection at the time. So no antibiotics given.

## 2025-07-23 NOTE — PROGRESS NOTES
[facility]  Podiatry  Progress Note    Patient Name: Stevie Villalba  MRN: [id number]  Admission Date: 7/22/2025  Hospital Length of Stay: 1 days  Attending Physician: Efrain Knapp MD  Primary Care Provider: Ric Brambila MD     Subjective:     Interval History:  No adverse events overnight.  VSS, afebrile, WBC WNL.  Patient doing well.  Denies any pain to his b/l feet.  Patient reports improvements of swelling to his b/l legs.  Denies fevers, chills, nausea, or vomiting.  Denies SOB or chest pain.  Denies any new pedal complaints.  Dressings clean, dry, and intact.    Scheduled Meds:   allopurinoL  300 mg Oral Daily    atorvastatin  40 mg Oral QHS    enoxparin  40 mg Subcutaneous Daily    pantoprazole  40 mg Oral BID    piperacillin-tazobactam (Zosyn) IV (PEDS and ADULTS) (extended infusion is not appropriate)  4.5 g Intravenous Q8H    valsartan  320 mg Oral Daily    vancomycin (VANCOCIN) IV (PEDS and ADULTS)  15 mg/kg Intravenous Q12H     Continuous Infusions:  PRN Meds:  Current Facility-Administered Medications:     acetaminophen, 650 mg, Oral, Q4H PRN    albuterol-ipratropium, 3 mL, Nebulization, Q4H PRN    aluminum-magnesium hydroxide-simethicone, 30 mL, Oral, QID PRN    bisacodyL, 10 mg, Rectal, Daily PRN    carisoprodoL, 350 mg, Oral, QID PRN    dextrose 50%, 12.5 g, Intravenous, PRN    dextrose 50%, 25 g, Intravenous, PRN    glucagon (human recombinant), 1 mg, Intramuscular, PRN    glucose, 16 g, Oral, PRN    glucose, 24 g, Oral, PRN    melatonin, 6 mg, Oral, Nightly PRN    naloxone, 0.02 mg, Intravenous, PRN    ondansetron, 4 mg, Intravenous, Q8H PRN    oxyCODONE, 5 mg, Oral, Q6H PRN    polyethylene glycol, 17 g, Oral, Daily PRN    prochlorperazine, 5 mg, Intravenous, Q6H PRN    simethicone, 1 tablet, Oral, QID PRN    sodium chloride 0.9%, 10 mL, Intravenous, Q8H PRN    Pharmacy to dose Vancomycin consult, , , Once **AND** vancomycin - pharmacy to dose, , Intravenous, pharmacy to manage  frequency    Review of Systems   Constitutional:  Negative for fatigue and fever.   HENT: Negative.     Eyes: Negative.    Respiratory: Negative.     Cardiovascular: Negative.    Gastrointestinal: Negative.    Endocrine: Negative.    Genitourinary: Negative.    Skin:  Positive for color change and wound.        Reports wounds and redness to b/l feet   Allergic/Immunologic: Negative.    Hematological: Negative.    Psychiatric/Behavioral: Negative.    Objective:     Vital Signs (Most Recent):  Temp: 98.1 °F (36.7 °C) (07/23/25 0746)  Pulse: 75 (07/23/25 0746)  Resp: 18 (07/23/25 0746)  BP: 139/83 (07/23/25 0746)  SpO2: 98 % (07/23/25 0746) Vital Signs (24h Range):  Temp:  [97.5 °F (36.4 °C)-98.4 °F (36.9 °C)] 98.1 °F (36.7 °C)  Pulse:  [66-80] 75  Resp:  [17-20] 18  SpO2:  [93 %-98 %] 98 %  BP: (107-167)/(53-85) 139/83     Weight: 107 kg (236 lb)  Body mass index is 33.86 kg/m².    Foot Exam     Right Foot/Ankle   Inspection and Palpation  Ecchymosis: none  Tenderness: none   Swelling: dorsum and lesser metatarsophalangeal joints (Noted to 2nd and 3rddigit and dorsum of foot)  Skin Exam: callus, dry skin, maceration, cellulitis, skin changes, abnormal color, ulcer and erythema; no drainage      Neurovascular  Dorsalis pedis: doppler  Posterior tibial: doppler  Saphenous nerve sensation: absent  Tibial nerve sensation: absent  Superficial peroneal nerve sensation: absent  Deep peroneal nerve sensation: absent  Sural nerve sensation: absent     Comments  R foot:  Wound noted to second digit.  Swelling noted to 2nd digit, third digit, and dorsum of foot.  Ascending cellulitis noticed from 2nd digit up to the dorsal foot.  No maceration, drainage, malodor, bone exposure, or fluctuance noted.  No tenderness to palpation.    7/23:  Improvements of swelling to leg.  Improvements of swelling and redness to the dorsal foot and second digit.  The second digit with fluctuance and scant serous drainage from wound to medial PIPJ.   Mild maceration noted to the lesser digits.  No exposed bone.  No tenderness to palpation.     Left Foot/Ankle    Inspection and Palpation  Ecchymosis: none  Tenderness: none   Swelling: great toe metatarsophalangeal joint (Swelling noted to hallux)  Skin Exam: callus, dry skin, maceration, cellulitis, skin changes, abnormal color, ulcer and erythema; no blister and no drainage      Neurovascular  Dorsalis pedis: doppler  Posterior tibial: doppler  Saphenous nerve sensation: absent  Tibial nerve sensation: absent  Superficial peroneal nerve sensation: absent  Deep peroneal nerve sensation: absent  Sural nerve sensation: absent     Comments  L foot:  Wound noted to lateral hallux.  Wound base hypergranular tissue with surrounding erythema and swelling.  No maceration, drainage, malodor, bone exposed, or fluctuance noted.  No tenderness to palpation.    7/23:  L hallux wound without bone exposure.  No maceration, drainage, malodor, fluctuance.  No tenderness to palpation    L hallux    R foot    R second digit      Laboratory:  BMP:   Recent Labs   Lab 07/23/25  0213         K 4.6      CO2 28   BUN 23   CREATININE 1.3   CALCIUM 9.0   MG 1.9     CBC:   Recent Labs   Lab 07/23/25  0212   WBC 9.06   RBC 3.82*   HGB 12.6*   HCT 36.7*      MCV 96   MCH 33.0*   MCHC 34.3     Diagnostic Results:  I have reviewed all pertinent imaging results/findings within the past 24 hours.  Assessment/Plan:     Orthopedic  * Skin ulcers of foot, bilateral  Assesment  Ulcers to L hallux and R 2nd digit.  Ascending erythema from the R 2nd digit up to the dorsal midfoot, consistent with cellulitis.  Bone bx from clinic 7/12 +staph, proteus, cornybacterius. XR b/l feet unremarkable. MRI b/l feet OM to R 2nd prox/mid phalanx and L 1st distal phalanx and R 2nd PIPJ septic arthritis w/ 1.2 x 0.7 cm abscess. IDSA mild foot wound infection    Plan  -Physical exam findings discussed with the patient.  Discussed with the  patient that his erythema and swelling has improved with broad-spectrum abx  -Bedside debridement of wound conducted. Full procedure note to follow  -No emergent/urgent surgical intervention per Podiatry.  Recommend continuing with wound care and abx  -Abx per Primary/ID  -Infectious Disease consulted, recs appreciated  -Further intervention will be determined when imaging is complete  -Bilateral feet wounds dressed with Betadine-soaked gauze, dry gauze, cast padding, and ACE wrap  -Wound care orders to follow  -WBAT in Darco shoes to b/l feet  -Podiatry will continue to follow up with patient tomorrow     Prateek Praker DPM  Podiatry  [facility]

## 2025-07-23 NOTE — ASSESSMENT & PLAN NOTE
- Ulcerations to L 1st digit and R 2nd digit, erythema extending up R leg concerning for cellulitis  -MRI feet shows osteo of L 1st toe, R 2nd toe.  - started on vanc/zosyn, will continue for now per ID  - podiatry consulted, bedside debridement performed, no further intervention planned

## 2025-07-23 NOTE — PROGRESS NOTES
Pharmacokinetic Assessment Follow Up: IV Vancomycin    Vancomycin serum concentration assessment(s)/Regimen Plan:    Vancomycin trough level=16.0  Level was drawn ~11 hours post previous dose, prior to 3rd total dose  The measurement is within the desired definitive target range of 10 to 20 mcg/mL.  Expect further accumulation  Change regimen to Vancomycin 1250 mg IV every q12 hours   with next serum trough concentration measured at 7/25 @1400  Will continue to monitor and adjust sooner if needed    Drug levels (last 3 results):  Recent Labs   Lab Result Units 07/23/25  0212 07/23/25  1302   Vancomycin Trough ug/ml 23.2* 16.0       Pharmacy will continue to follow and monitor vancomycin.    Please contact pharmacy at extension [phone] for questions regarding this assessment.    Thank you for the consult,   Mary Menjivar       Patient brief summary:  Stevie Villalba is a 70 y.o. male initiated on antimicrobial therapy with IV Vancomycin for treatment of bone/joint infection    The patient's current regimen is Vancomycin 2000 mg x1 followed by Vancomycin 1500 mg IV Q12h    Drug Allergies:   Review of patient's allergies indicates:  No Known Allergies    Actual Body Weight:   107 kg    Renal Function:   Estimated Creatinine Clearance: 65.7 mL/min (based on SCr of 1.3 mg/dL).,       CBC (last 72 hours):  Recent Labs   Lab Result Units 07/22/25  1207 07/23/25  0212   WBC K/uL 10.04 9.06   HGB gm/dL 13.5* 12.6*   HCT % 40.2 36.7*   Platelet Count K/uL 262 236   Lymph % % 14.2* 16.4*   Mono % % 9.2 10.3   Eos % % 0.6 1.8   Basophil % % 0.5 0.4       Metabolic Panel (last 72 hours):  Recent Labs   Lab Result Units 07/22/25  1207 07/23/25  0213 07/23/25  1331   Sodium mmol/L 136 142  --    Potassium mmol/L 4.1 4.6  --    Chloride mmol/L 101 106  --    CO2 mmol/L 25 28  --    Glucose mg/dL 103 101  --    Glucose, UA   --   --  Negative   BUN mg/dL 21 23  --    Creatinine mg/dL 1.1 1.3  --    Albumin g/dL 3.3* 2.9*  --     Bilirubin Total mg/dL 0.7 0.4  --    ALP unit/L 106 94  --    AST unit/L 22 16  --    ALT unit/L 15 13  --    Magnesium  mg/dL  --  1.9  --    Phosphorus Level mg/dL  --  4.1  --        Vancomycin Administrations:  vancomycin given in the last 96 hours                     vancomycin 1,500 mg in 0.9% NaCl 250 mL IVPB (admixture device) (mg) 1,500 mg New Bag 07/23/25 0200    vancomycin 2 g in 0.9% sodium chloride 500 mL IVPB (mg) 2,000 mg New Bag 07/22/25 1506                    Microbiologic Results:  Microbiology Results (last 7 days)       Procedure Component Value Units Date/Time    Blood culture #2 **CANNOT BE ORDERED STAT** [[id number]]  (Normal) Collected: 07/22/25 1320    Order Status: Completed Specimen: Blood from Peripheral, Antecubital, Left Updated: 07/23/25 1401     Blood Culture No Growth After 24 Hours    Blood culture #1 **CANNOT BE ORDERED STAT** [[id number]]  (Normal) Collected: 07/22/25 1206    Order Status: Completed Specimen: Blood from Peripheral, Antecubital, Right Updated: 07/23/25 1401     Blood Culture No Growth After 24 Hours    Aerobic culture [[id number]] Collected: 07/23/25 1331    Order Status: Sent Specimen: Abscess from Toe, Right Foot     Culture, Anaerobic [[id number]] Collected: 07/23/25 1331    Order Status: Sent Specimen: Abscess from Toe, Right Foot     Gram stain [[id number]] Collected: 07/23/25 1331    Order Status: Sent Specimen: Abscess from Toe, Right Foot     Gram stain [[id number]]     Order Status: Canceled Specimen: Wound from Toe, Right Foot     Aerobic culture [[id number]]     Order Status: Canceled Specimen: Wound from Toe, Right Foot     Culture, Anaerobic [[id number]]     Order Status: Canceled Specimen: Wound from Toe, Right Foot

## 2025-07-24 PROBLEM — D64.9 ANEMIA: Status: ACTIVE | Noted: 2025-07-24

## 2025-07-24 LAB
ABSOLUTE EOSINOPHIL (OHS): 0.21 K/UL
ABSOLUTE MONOCYTE (OHS): 0.76 K/UL (ref 0.3–1)
ABSOLUTE NEUTROPHIL COUNT (OHS): 5.26 K/UL (ref 1.8–7.7)
ALBUMIN SERPL BCP-MCNC: 2.8 G/DL (ref 3.5–5.2)
ALP SERPL-CCNC: 88 UNIT/L (ref 40–150)
ALT SERPL W/O P-5'-P-CCNC: 13 UNIT/L (ref 10–44)
ANION GAP (OHS): 9 MMOL/L (ref 8–16)
AST SERPL-CCNC: 16 UNIT/L (ref 11–45)
BASOPHILS # BLD AUTO: 0.06 K/UL
BASOPHILS NFR BLD AUTO: 0.7 %
BILIRUB SERPL-MCNC: 0.3 MG/DL (ref 0.1–1)
BUN SERPL-MCNC: 17 MG/DL (ref 8–23)
CALCIUM SERPL-MCNC: 8.7 MG/DL (ref 8.7–10.5)
CHLORIDE SERPL-SCNC: 109 MMOL/L (ref 95–110)
CO2 SERPL-SCNC: 24 MMOL/L (ref 23–29)
CREAT SERPL-MCNC: 1 MG/DL (ref 0.5–1.4)
ERYTHROCYTE [DISTWIDTH] IN BLOOD BY AUTOMATED COUNT: 12.9 % (ref 11.5–14.5)
GFR SERPLBLD CREATININE-BSD FMLA CKD-EPI: >60 ML/MIN/1.73/M2
GLUCOSE SERPL-MCNC: 104 MG/DL (ref 70–110)
GRAM STN SPEC: NORMAL
GRAM STN SPEC: NORMAL
HCT VFR BLD AUTO: 37.2 % (ref 40–54)
HGB BLD-MCNC: 12.3 GM/DL (ref 14–18)
HOLD SPECIMEN: NORMAL
IMM GRANULOCYTES # BLD AUTO: 0.1 K/UL (ref 0–0.04)
IMM GRANULOCYTES NFR BLD AUTO: 1.1 % (ref 0–0.5)
LYMPHOCYTES # BLD AUTO: 2.42 K/UL (ref 1–4.8)
MAGNESIUM SERPL-MCNC: 1.9 MG/DL (ref 1.6–2.6)
MCH RBC QN AUTO: 32.4 PG (ref 27–31)
MCHC RBC AUTO-ENTMCNC: 33.1 G/DL (ref 32–36)
MCV RBC AUTO: 98 FL (ref 82–98)
NUCLEATED RBC (/100WBC) (OHS): 0 /100 WBC
PHOSPHATE SERPL-MCNC: 4.5 MG/DL (ref 2.7–4.5)
PLATELET # BLD AUTO: 251 K/UL (ref 150–450)
PMV BLD AUTO: 9.1 FL (ref 9.2–12.9)
POCT GLUCOSE: 106 MG/DL (ref 70–110)
POCT GLUCOSE: 110 MG/DL (ref 70–110)
POCT GLUCOSE: 177 MG/DL (ref 70–110)
POTASSIUM SERPL-SCNC: 4.3 MMOL/L (ref 3.5–5.1)
PROT SERPL-MCNC: 6.3 GM/DL (ref 6–8.4)
RBC # BLD AUTO: 3.8 M/UL (ref 4.6–6.2)
RELATIVE EOSINOPHIL (OHS): 2.4 %
RELATIVE LYMPHOCYTE (OHS): 27.5 % (ref 18–48)
RELATIVE MONOCYTE (OHS): 8.6 % (ref 4–15)
RELATIVE NEUTROPHIL (OHS): 59.7 % (ref 38–73)
SODIUM SERPL-SCNC: 142 MMOL/L (ref 136–145)
WBC # BLD AUTO: 8.81 K/UL (ref 3.9–12.7)

## 2025-07-24 PROCEDURE — 80053 COMPREHEN METABOLIC PANEL: CPT | Mod: HCNC | Performed by: PHYSICIAN ASSISTANT

## 2025-07-24 PROCEDURE — 85025 COMPLETE CBC W/AUTO DIFF WBC: CPT | Mod: HCNC | Performed by: PHYSICIAN ASSISTANT

## 2025-07-24 PROCEDURE — 94660 CPAP INITIATION&MGMT: CPT | Mod: HCNC

## 2025-07-24 PROCEDURE — 25000003 PHARM REV CODE 250: Mod: HCNC | Performed by: PHYSICIAN ASSISTANT

## 2025-07-24 PROCEDURE — 25000003 PHARM REV CODE 250: Mod: HCNC | Performed by: HOSPITALIST

## 2025-07-24 PROCEDURE — 84100 ASSAY OF PHOSPHORUS: CPT | Mod: HCNC | Performed by: PHYSICIAN ASSISTANT

## 2025-07-24 PROCEDURE — 27000190 HC CPAP FULL FACE MASK W/VALVE: Mod: HCNC

## 2025-07-24 PROCEDURE — 83735 ASSAY OF MAGNESIUM: CPT | Mod: HCNC | Performed by: PHYSICIAN ASSISTANT

## 2025-07-24 PROCEDURE — 63600175 PHARM REV CODE 636 W HCPCS: Mod: HCNC | Performed by: PHYSICIAN ASSISTANT

## 2025-07-24 PROCEDURE — 36415 COLL VENOUS BLD VENIPUNCTURE: CPT | Mod: HCNC | Performed by: PHYSICIAN ASSISTANT

## 2025-07-24 PROCEDURE — 99900035 HC TECH TIME PER 15 MIN (STAT): Mod: HCNC

## 2025-07-24 PROCEDURE — 94761 N-INVAS EAR/PLS OXIMETRY MLT: CPT | Mod: HCNC

## 2025-07-24 PROCEDURE — 27100171 HC OXYGEN HIGH FLOW UP TO 24 HOURS: Mod: HCNC

## 2025-07-24 PROCEDURE — 27100108: Mod: HCNC

## 2025-07-24 PROCEDURE — 11000001 HC ACUTE MED/SURG PRIVATE ROOM: Mod: HCNC

## 2025-07-24 PROCEDURE — 63600175 PHARM REV CODE 636 W HCPCS: Mod: HCNC | Performed by: HOSPITALIST

## 2025-07-24 PROCEDURE — 99233 SBSQ HOSP IP/OBS HIGH 50: CPT | Mod: HCNC,,,

## 2025-07-24 PROCEDURE — 63600175 PHARM REV CODE 636 W HCPCS: Mod: HCNC

## 2025-07-24 PROCEDURE — G0545 PR VISIT INHERENT TO INPT OR OBS CARE, INFECTIOUS DISEASE: HCPCS | Mod: HCNC,,,

## 2025-07-24 RX ORDER — CEFTRIAXONE 2 G/1
2 INJECTION, POWDER, FOR SOLUTION INTRAMUSCULAR; INTRAVENOUS
Status: DISCONTINUED | OUTPATIENT
Start: 2025-07-24 | End: 2025-07-27 | Stop reason: HOSPADM

## 2025-07-24 RX ADMIN — PIPERACILLIN SODIUM AND TAZOBACTAM SODIUM 4.5 G: 4; .5 INJECTION, POWDER, FOR SOLUTION INTRAVENOUS at 06:07

## 2025-07-24 RX ADMIN — VANCOMYCIN HYDROCHLORIDE 1250 MG: 1.25 INJECTION, POWDER, LYOPHILIZED, FOR SOLUTION INTRAVENOUS at 03:07

## 2025-07-24 RX ADMIN — ALLOPURINOL 300 MG: 300 TABLET ORAL at 09:07

## 2025-07-24 RX ADMIN — PANTOPRAZOLE SODIUM 40 MG: 40 TABLET, DELAYED RELEASE ORAL at 09:07

## 2025-07-24 RX ADMIN — ATORVASTATIN CALCIUM 40 MG: 40 TABLET, FILM COATED ORAL at 09:07

## 2025-07-24 RX ADMIN — ENOXAPARIN SODIUM 40 MG: 40 INJECTION SUBCUTANEOUS at 04:07

## 2025-07-24 RX ADMIN — CEFTRIAXONE SODIUM 2 G: 2 INJECTION, POWDER, FOR SOLUTION INTRAMUSCULAR; INTRAVENOUS at 03:07

## 2025-07-24 RX ADMIN — VALSARTAN 320 MG: 160 TABLET, FILM COATED ORAL at 09:07

## 2025-07-24 NOTE — PROGRESS NOTES
[facility] Medicine  Progress Note    Patient Name: Stevie Villalba  MRN: [id number]  Patient Class: IP- Inpatient   Admission Date: 7/22/2025  Length of Stay: 2 days  Attending Physician: Fco Valenzuela MD  Primary Care Provider: Ric Brambila MD        Subjective     Principal Problem:Osteomyelitis of great toe of left foot        HPI:  Stevie Villalba is a 70 y.o male with Pmhx of HTN, HLP, pre-diabetes, paroxymal Afib, R 1st digit osteo, and severe LATANYA admitted to hospital medicine for bilateral foot ulcers. Patient was seen this morning in podiatry clinic for new ulceration to R 2nd digit and L 1st digit where he was advised to come to the ED for IV therapy and MRI 2/2 concerns for osteo. He reports that new ulcerations have been worsening over the past few weeks. He was seen in ID clinic on 7/19 where he was started on levaquin and minocycline. He reports BLE edema after starting the Abx. Reports he is able to ambulate without assistance, but endorses mild pain with ambulation. 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 Denies calf pain, fever, chills, night sweats, chest pain, shortness of breath, abdominal pain, nausea, vomiting, dysuria, hematuria, diarrhea, constipation.    In the ED, AFVSS. Neut 74.1, Lymph 14.2, Immat granulocytes 1.4, albumin 3.3, . MRI B toes pending. Given atorvastatin x 1, enoxaparin x 1, pantoprazole x 1, Zosyn x 3, valsartan x 1, vancomycin x 1.    Overview/Hospital Course:  7/23 -MRI w/ osteo of L 1st toe, R 2nd toe. Bedside debridement done by podiatry, no other procedure planned. ID recommend continue IV vanc/zosyn  7/24: Continue Vanc. Zosyn d/c'd. Started Ceftriaxone.     Interval History: No acute events overnight. Pt appears stable on exam. Denies new concerns or complaints. Tolerating PO intake.     Review of Systems   Constitutional:  Positive for fatigue. Negative for chills and fever.   HENT:   Negative for congestion, hearing loss, trouble swallowing and voice change.    Respiratory:  Negative for cough and shortness of breath.    Cardiovascular:  Negative for chest pain.   Gastrointestinal:  Negative for abdominal pain, constipation, diarrhea, nausea and vomiting.   Musculoskeletal:  Negative for arthralgias and joint swelling.   Skin:  Positive for wound. Negative for pallor and rash.   Neurological:  Negative for dizziness, weakness and light-headedness.   Psychiatric/Behavioral:  Negative for agitation and behavioral problems.      Objective:     Vital Signs (Most Recent):  Temp: 98.2 °F (36.8 °C) (07/24/25 1553)  Pulse: 62 (07/24/25 1553)  Resp: 18 (07/24/25 1553)  BP: (!) 120/59 (07/24/25 1553)  SpO2: 98 % (07/24/25 1553) Vital Signs (24h Range):  Temp:  [97.5 °F (36.4 °C)-98.2 °F (36.8 °C)] 98.2 °F (36.8 °C)  Pulse:  [60-71] 62  Resp:  [17-23] 18  SpO2:  [95 %-99 %] 98 %  BP: (109-143)/(59-75) 120/59     Weight: 107 kg (236 lb)  Body mass index is 33.86 kg/m².  No intake or output data in the 24 hours ending 07/24/25 1558      Physical Exam  Vitals reviewed.   Constitutional:       General: He is not in acute distress.  HENT:      Head: Normocephalic and atraumatic.      Nose: Nose normal.   Eyes:      General: No scleral icterus.     Extraocular Movements: Extraocular movements intact.   Cardiovascular:      Rate and Rhythm: Normal rate and regular rhythm.      Heart sounds: No murmur heard.     No friction rub. No gallop.   Pulmonary:      Effort: Pulmonary effort is normal. No respiratory distress.      Breath sounds: No wheezing, rhonchi or rales.   Abdominal:      General: There is no distension.      Palpations: Abdomen is soft.      Tenderness: There is no abdominal tenderness. There is no guarding or rebound.   Musculoskeletal:      Cervical back: Neck supple. No rigidity.      Comments: Right foot erythema/swelling improved     Skin:     General: Skin is warm and dry.      Capillary  "Refill: Capillary refill takes less than 2 seconds.   Neurological:      General: No focal deficit present.      Mental Status: He is alert and oriented to person, place, and time.   Psychiatric:         Mood and Affect: Mood normal.         Behavior: Behavior normal.               Significant Labs: All pertinent labs within the past 24 hours have been reviewed.    Significant Imaging: I have reviewed all pertinent imaging results/findings within the past 24 hours.      Assessment & Plan  Osteomyelitis of great toe of left foot  Skin ulcers of foot, bilateral  Osteomyelitis of second toe of right foot  - Ulcerations to L 1st digit and R 2nd digit, erythema extending up R leg concerning for cellulitis  -MRI feet shows osteo of L 1st toe, R 2nd toe.  - started on vanc/zosyn, will d/c Zosyn and start Ceftriaxone  -ID consulted and following   - podiatry consulted, bedside debridement performed, no further intervention planned          Hypertriglyceridemia  - Continue home atorvastatin    Primary hypertension  Patients blood pressure range in the last 24 hours was: BP  Min: 107/53  Max: 167/77.The patient's inpatient anti-hypertensive regimen is listed below:  Current Antihypertensives  valsartan tablet 320 mg, Daily, Oral    Plan  - BP is controlled, no changes needed to their regimen    Insomnia  -Continue home melatonin PRN    Paroxysmal atrial fibrillation  Patient has paroxysmal (<7 days) atrial fibrillation. Patient is currently in sinus rhythm. [id number] Score: 1. The patients heart rate in the last 24 hours is as follows:  Pulse  Min: 60  Max: 71     Antiarrhythmics       Anticoagulants  enoxaparin injection 40 mg, Every 24 hours, Subcutaneous    Plan  - Replete lytes with a goal of K>4, Mg >2  - Patient is anticoagulated, see medications listed above.  - patient is unsure if he takes MTP, but it is "paused" on his MAR so will hold off for now  - Patient's afib is currently controlled  - Continue " enoxaparin      Class 1 obesity with body mass index (BMI) of 31.0 to 31.9 in adult  Body mass index is 33.86 kg/m². Morbid obesity complicates all aspects of disease management from diagnostic modalities to treatment. Weight loss encouraged and health benefits explained to patient.       Severe obstructive sleep apnea  - CPAP daily    Gout  - Continue home allopurinol  Anemia  Anemia is likely due to Iron deficiency. Most recent hemoglobin and hematocrit are listed below.  Recent Labs     07/22/25  1207 07/23/25  0212 07/24/25  0453   HGB 13.5* 12.6* 12.3*   HCT 40.2 36.7* 37.2*     Plan  - Monitor serial CBC: Daily  - Transfuse PRBC if patient becomes hemodynamically unstable, symptomatic or H/H drops below 7/21.  - Patient has not received any PRBC transfusions to date  - Patient's anemia is currently stable    VTE Risk Mitigation (From admission, onward)           Ordered     enoxaparin injection 40 mg  Daily         07/22/25 1340     IP VTE HIGH RISK PATIENT  Once         07/22/25 1340     Place sequential compression device  Until discontinued         07/22/25 1340                    Discharge Planning   JUANY: 7/29/2025     Code Status: Full Code   Medical Readiness for Discharge Date:   Discharge Plan A: Home, Home with family, Home Health                        Fco Valenzuela MD  Department of Hospital Medicine   [facility] [address] - [facility] Surg

## 2025-07-24 NOTE — ASSESSMENT & PLAN NOTE
"Patient has paroxysmal (<7 days) atrial fibrillation. Patient is currently in sinus rhythm. [id number] Score: 1. The patients heart rate in the last 24 hours is as follows:  Pulse  Min: 60  Max: 71     Antiarrhythmics       Anticoagulants  enoxaparin injection 40 mg, Every 24 hours, Subcutaneous    Plan  - Replete lytes with a goal of K>4, Mg >2  - Patient is anticoagulated, see medications listed above.  - patient is unsure if he takes MTP, but it is "paused" on his MAR so will hold off for now  - Patient's afib is currently controlled  - Continue enoxaparin      "

## 2025-07-24 NOTE — SUBJECTIVE & OBJECTIVE
Interval History: Afebrile. WBC wnl. Patient notes swelling of legs improved. Cellulitis of R foot improved. Pain controlled. S/p I&D of abscess to R 2nd toe yesterday.  Cultures pending.    Discussed necessity of patient limiting walking on his feet while he is recovering and on antibiotics. Continued walking long distances daily will impede his healing.    Review of Systems   Constitutional:  Negative for chills and fever.   Respiratory:  Negative for cough.    Cardiovascular:  Negative for chest pain.   Gastrointestinal:  Negative for abdominal pain, diarrhea, nausea and vomiting.   Musculoskeletal:  Positive for arthralgias.   Skin:  Positive for color change and wound.   All other systems reviewed and are negative.    Objective:     Vital Signs (Most Recent):  Temp: 98.1 °F (36.7 °C) (07/24/25 1142)  Pulse: 63 (07/24/25 1142)  Resp: 18 (07/24/25 1142)  BP: 129/70 (07/24/25 1142)  SpO2: 96 % (07/24/25 1142) Vital Signs (24h Range):  Temp:  [97.5 °F (36.4 °C)-98.1 °F (36.7 °C)] 98.1 °F (36.7 °C)  Pulse:  [60-74] 63  Resp:  [17-23] 18  SpO2:  [95 %-99 %] 96 %  BP: (109-143)/(59-75) 129/70     Weight: 107 kg (236 lb)  Body mass index is 33.86 kg/m².    Estimated Creatinine Clearance: 85.4 mL/min (based on SCr of 1 mg/dL).     Physical Exam  Vitals and nursing note reviewed.   Constitutional:       General: He is not in acute distress.     Appearance: Normal appearance. He is not ill-appearing.   HENT:      Head: Normocephalic and atraumatic.   Eyes:      Extraocular Movements: Extraocular movements intact.      Conjunctiva/sclera: Conjunctivae normal.      Pupils: Pupils are equal, round, and reactive to light.   Cardiovascular:      Rate and Rhythm: Normal rate and regular rhythm.      Pulses: Normal pulses.      Heart sounds: Normal heart sounds. No murmur heard.  Pulmonary:      Effort: Pulmonary effort is normal. No respiratory distress.      Breath sounds: Normal breath sounds.   Abdominal:      General:  "Abdomen is flat. Bowel sounds are normal.      Tenderness: There is no abdominal tenderness.   Musculoskeletal:         General: Normal range of motion.      Cervical back: Normal range of motion.      Right lower leg: No edema.      Left lower leg: No edema.   Skin:     General: Skin is warm and dry.      Capillary Refill: Capillary refill takes less than 2 seconds.      Findings: Erythema (improving) and lesion present.      Comments: BLE wrapped in clean, dry bandages   Neurological:      General: No focal deficit present.      Mental Status: He is alert and oriented to person, place, and time.      Cranial Nerves: No cranial nerve deficit.   Psychiatric:         Mood and Affect: Mood normal.         Behavior: Behavior normal.          Significant Labs: Blood Culture: No results for input(s): "LABBLOO" in the last 4320 hours.  CBC:   Recent Labs   Lab 07/23/25 0212 07/24/25  0453   WBC 9.06 8.81   HGB 12.6* 12.3*   HCT 36.7* 37.2*    251     CMP:   Recent Labs   Lab 07/23/25 0213 07/24/25  0453    142   K 4.6 4.3    109   CO2 28 24    104   BUN 23 17   CREATININE 1.3 1.0   CALCIUM 9.0 8.7   PROT 6.4 6.3   ALBUMIN 2.9* 2.8*   BILITOT 0.4 0.3   ALKPHOS 94 88   AST 16 16   ALT 13 13   ANIONGAP 8 9     Microbiology Results (last 7 days)       Procedure Component Value Units Date/Time    Blood culture #2 **CANNOT BE ORDERED STAT** [[id number]]  (Normal) Collected: 07/22/25 1320    Order Status: Completed Specimen: Blood from Peripheral, Antecubital, Left Updated: 07/24/25 1401     Blood Culture No Growth After 48 Hours    Blood culture #1 **CANNOT BE ORDERED STAT** [[id number]]  (Normal) Collected: 07/22/25 1206    Order Status: Completed Specimen: Blood from Peripheral, Antecubital, Right Updated: 07/24/25 1401     Blood Culture No Growth After 48 Hours    Gram stain [[id number]] Collected: 07/23/25 1331    Order Status: Completed Specimen: Abscess from Toe, Right Foot Updated: 07/24/25 " 0151     GRAM STAIN No WBCs      No organisms seen    Culture, Anaerobic [[id number]] Collected: 07/23/25 1331    Order Status: Sent Specimen: Abscess from Toe, Right Foot Updated: 07/23/25 1914    Aerobic culture [[id number]] Collected: 07/23/25 1331    Order Status: Sent Specimen: Abscess from Toe, Right Foot Updated: 07/23/25 1914    Gram stain [[id number]]     Order Status: Canceled Specimen: Wound from Toe, Right Foot     Aerobic culture [[id number]]     Order Status: Canceled Specimen: Wound from Toe, Right Foot     Culture, Anaerobic [[id number]]     Order Status: Canceled Specimen: Wound from Toe, Right Foot             Significant Imaging: I have reviewed all pertinent imaging results/findings within the past 24 hours.

## 2025-07-24 NOTE — ASSESSMENT & PLAN NOTE
Patients blood pressure range in the last 24 hours was: BP  Min: 107/53  Max: 167/77.The patient's inpatient anti-hypertensive regimen is listed below:  Current Antihypertensives  valsartan tablet 320 mg, Daily, Oral    Plan  - BP is controlled, no changes needed to their regimen

## 2025-07-24 NOTE — SUBJECTIVE & OBJECTIVE
Subjective:     Interval History: NAEON. VSS, afebrile. WBC wnl. 1 day s/p right 2nd digit I&D. No pain. No worsening of symptoms. Dressing changed.          Scheduled Meds:   allopurinoL  300 mg Oral Daily    atorvastatin  40 mg Oral QHS    cefTRIAXone (Rocephin) IV (PEDS and ADULTS)  2 g Intravenous Q24H    enoxparin  40 mg Subcutaneous Daily    pantoprazole  40 mg Oral BID    valsartan  320 mg Oral Daily    vancomycin (VANCOCIN) IV (PEDS and ADULTS)  1,250 mg Intravenous Q12H     Continuous Infusions:  PRN Meds:  Current Facility-Administered Medications:     acetaminophen, 650 mg, Oral, Q4H PRN    albuterol-ipratropium, 3 mL, Nebulization, Q4H PRN    aluminum-magnesium hydroxide-simethicone, 30 mL, Oral, QID PRN    bisacodyL, 10 mg, Rectal, Daily PRN    carisoprodoL, 350 mg, Oral, QID PRN    dextrose 50%, 12.5 g, Intravenous, PRN    dextrose 50%, 25 g, Intravenous, PRN    glucagon (human recombinant), 1 mg, Intramuscular, PRN    glucose, 16 g, Oral, PRN    glucose, 24 g, Oral, PRN    melatonin, 6 mg, Oral, Nightly PRN    naloxone, 0.02 mg, Intravenous, PRN    ondansetron, 4 mg, Intravenous, Q8H PRN    oxyCODONE, 5 mg, Oral, Q6H PRN    polyethylene glycol, 17 g, Oral, Daily PRN    prochlorperazine, 5 mg, Intravenous, Q6H PRN    simethicone, 1 tablet, Oral, QID PRN    sodium chloride 0.9%, 10 mL, Intravenous, Q8H PRN    Pharmacy to dose Vancomycin consult, , , Once **AND** vancomycin - pharmacy to dose, , Intravenous, pharmacy to manage frequency    Review of Systems  Objective:     Vital Signs (Most Recent):  Temp: 98.1 °F (36.7 °C) (07/24/25 1142)  Pulse: 63 (07/24/25 1142)  Resp: 18 (07/24/25 1142)  BP: 129/70 (07/24/25 1142)  SpO2: 96 % (07/24/25 1142) Vital Signs (24h Range):  Temp:  [97.5 °F (36.4 °C)-98.1 °F (36.7 °C)] 98.1 °F (36.7 °C)  Pulse:  [60-74] 63  Resp:  [17-23] 18  SpO2:  [95 %-99 %] 96 %  BP: (109-143)/(59-75) 129/70     Weight: 107 kg (236 lb)  Body mass index is 33.86 kg/m².    Foot  Exam    Right Foot/Ankle     Inspection and Palpation  Ecchymosis: none  Tenderness: none   Swelling: dorsum and lesser metatarsophalangeal joints (Noted to 2nd and 3rddigit and dorsum of foot)  Skin Exam: callus, dry skin, maceration, cellulitis, skin changes, abnormal color, ulcer and erythema; no drainage     Neurovascular  Dorsalis pedis: doppler  Posterior tibial: doppler  Saphenous nerve sensation: absent  Tibial nerve sensation: absent  Superficial peroneal nerve sensation: absent  Deep peroneal nerve sensation: absent  Sural nerve sensation: absent    Comments   Wound noted to second digit.  Swelling noted to 2nd digit, third digit, and dorsum of foot.  Ascending cellulitis noticed from 2nd digit up to the dorsal foot.  No maceration, drainage, malodor, bone exposure, or fluctuance noted.  No tenderness to palpation.    7/24/25: S/p I&D, improved erythema and edema. No significant drainage.     Left Foot/Ankle      Inspection and Palpation  Ecchymosis: none  Tenderness: none   Swelling: great toe metatarsophalangeal joint (Swelling noted to hallux)  Skin Exam: callus, dry skin, maceration, cellulitis, skin changes, abnormal color, ulcer and erythema; no blister and no drainage     Neurovascular  Dorsalis pedis: doppler  Posterior tibial: doppler  Saphenous nerve sensation: absent  Tibial nerve sensation: absent  Superficial peroneal nerve sensation: absent  Deep peroneal nerve sensation: absent  Sural nerve sensation: absent    Comments  L foot:  Wound noted to lateral hallux.  Wound base hypergranular tissue with surrounding erythema and swelling.  No maceration, drainage, malodor, bone exposed, or fluctuance noted.  No tenderness to palpation.      7/24/25: Dressing C/D/I.    Clinical media:          Prior media:              Laboratory:  All pertinent labs reviewed within the last 24 hours.    Diagnostic Results:  I have reviewed all pertinent imaging results/findings within the past 24 hours.

## 2025-07-24 NOTE — ASSESSMENT & PLAN NOTE
Anemia is likely due to Iron deficiency. Most recent hemoglobin and hematocrit are listed below.  Recent Labs     07/22/25  1207 07/23/25  0212 07/24/25  0453   HGB 13.5* 12.6* 12.3*   HCT 40.2 36.7* 37.2*     Plan  - Monitor serial CBC: Daily  - Transfuse PRBC if patient becomes hemodynamically unstable, symptomatic or H/H drops below 7/21.  - Patient has not received any PRBC transfusions to date  - Patient's anemia is currently stable

## 2025-07-24 NOTE — SUBJECTIVE & OBJECTIVE
Interval History: No acute events overnight. Pt appears stable on exam. Denies new concerns or complaints. Tolerating PO intake.     Review of Systems   Constitutional:  Positive for fatigue. Negative for chills and fever.   HENT:  Negative for congestion, hearing loss, trouble swallowing and voice change.    Respiratory:  Negative for cough and shortness of breath.    Cardiovascular:  Negative for chest pain.   Gastrointestinal:  Negative for abdominal pain, constipation, diarrhea, nausea and vomiting.   Musculoskeletal:  Negative for arthralgias and joint swelling.   Skin:  Positive for wound. Negative for pallor and rash.   Neurological:  Negative for dizziness, weakness and light-headedness.   Psychiatric/Behavioral:  Negative for agitation and behavioral problems.      Objective:     Vital Signs (Most Recent):  Temp: 98.2 °F (36.8 °C) (07/24/25 1553)  Pulse: 62 (07/24/25 1553)  Resp: 18 (07/24/25 1553)  BP: (!) 120/59 (07/24/25 1553)  SpO2: 98 % (07/24/25 1553) Vital Signs (24h Range):  Temp:  [97.5 °F (36.4 °C)-98.2 °F (36.8 °C)] 98.2 °F (36.8 °C)  Pulse:  [60-71] 62  Resp:  [17-23] 18  SpO2:  [95 %-99 %] 98 %  BP: (109-143)/(59-75) 120/59     Weight: 107 kg (236 lb)  Body mass index is 33.86 kg/m².  No intake or output data in the 24 hours ending 07/24/25 1558      Physical Exam  Vitals reviewed.   Constitutional:       General: He is not in acute distress.  HENT:      Head: Normocephalic and atraumatic.      Nose: Nose normal.   Eyes:      General: No scleral icterus.     Extraocular Movements: Extraocular movements intact.   Cardiovascular:      Rate and Rhythm: Normal rate and regular rhythm.      Heart sounds: No murmur heard.     No friction rub. No gallop.   Pulmonary:      Effort: Pulmonary effort is normal. No respiratory distress.      Breath sounds: No wheezing, rhonchi or rales.   Abdominal:      General: There is no distension.      Palpations: Abdomen is soft.      Tenderness: There is no  abdominal tenderness. There is no guarding or rebound.   Musculoskeletal:      Cervical back: Neck supple. No rigidity.      Comments: Right foot erythema/swelling improved     Skin:     General: Skin is warm and dry.      Capillary Refill: Capillary refill takes less than 2 seconds.   Neurological:      General: No focal deficit present.      Mental Status: He is alert and oriented to person, place, and time.   Psychiatric:         Mood and Affect: Mood normal.         Behavior: Behavior normal.               Significant Labs: All pertinent labs within the past 24 hours have been reviewed.    Significant Imaging: I have reviewed all pertinent imaging results/findings within the past 24 hours.

## 2025-07-24 NOTE — ASSESSMENT & PLAN NOTE
- Ulcerations to L 1st digit and R 2nd digit, erythema extending up R leg concerning for cellulitis  -MRI feet shows osteo of L 1st toe, R 2nd toe.  - started on vanc/zosyn, will d/c Zosyn and start Ceftriaxone  -ID consulted and following   - podiatry consulted, bedside debridement performed, no further intervention planned

## 2025-07-24 NOTE — ASSESSMENT & PLAN NOTE
Assesment  Ulcers to L hallux and R 2nd digit.  Ascending erythema from the R 2nd digit up to the dorsal midfoot, consistent with cellulitis.  Bone bx from clinic 7/12 +staph, proteus, cornybacterius. XR b/l feet unremarkable. MRI b/l feet OM to R 2nd prox/mid phalanx and L 1st distal phalanx and R 2nd PIPJ septic arthritis w/ 1.2 x 0.7 cm abscess. IDSA mild foot wound infection    Plan  - Now 1 day s/p I&D R 2nd digit based on MRI findings. Doing well. Normal post op course. Improvement noted overall to R foot  -Physical exam findings discussed with the patient. Discussed with the patient that his erythema and swelling has improved  -Bedside debridement of wound and I&D completed this admission. See procedure notes  -No emergent/urgent surgical intervention per Podiatry.  Recommend continuing with wound care and abx  -Abx per Primary/ID  -Infectious Disease consulted, recs appreciated  -Further intervention will be determined on acute worsening changes to bilateral foot  -Right foot wound dressed with Betadine-soaked gauze, dry gauze, cast padding, and ACE wrap  -Wound care orders to follow  -WBAT in [employer] shoes to b/l feet  -Podiatry will continue to follow tomorrow

## 2025-07-24 NOTE — PROGRESS NOTES
[facility] - Med Surg  Infectious Disease  Progress Note    Patient Name: Stevie Villalba  MRN: [id number]  Admission Date: 7/22/2025  Length of Stay: 2 days  Attending Physician: Fco Valenzuela MD  Primary Care Provider: Ric Brambila MD    Isolation Status: No active isolations  Assessment/Plan:      ID  * Osteomyelitis of great toe of left foot  I have reviewed hospital notes from   service and other specialty providers. I have also reviewed CBC, CMP/BMP,  cultures and imaging with my interpretation as documented.      70 y.o. male with history of peripheral neuropathy and chronic bilateral foot wounds (ulcers to L hallux and R 2nd digit). Admitted for worsening bilat foot wounds with associated cellulitis of R foot. He has been followed by podiatry and infectious disease for management of foot wounds. Recently started Minocycline and Levaquin on 7/20 for recent bone biopsy 7/14 that grew Proteus mirabilis and corynebacterium (R foot) and MSSA (L foot). Bone biopsy pathology 7/14 was positive for acute osteomyelitis of R 2nd toe, but negative for L hallux. Prior L foot wound cultures 6/9/25 grew Providencia and Pseudomonas and R 2nd toe wound culture (4/23/25) grew MRSA. Currently on IV Vancomycin and Zosyn. MRI reveals osteomyelitis of R 2nd toe and L 1st toe, as well as septic arthritis and associated abscess of R 2nd toe. Podiatry consulted and s/p debridement on 7/23. Stable at this time. Afebrile, WBC wnl, and no fever or chills. Cellulitis improving on IV antibiotics. I&D of R toe abscess on 7/23 and cultures obtained.      Recommendations / Plan:  Continue IV Vancomycin for now  De-escalate Zosyn to Ceftriaxone 2g q24 hours   Follow up R toe abscess cultures  Anticipate 6 weeks of antibiotics for osteomyelitis of R 2nd toe, potentially consider orals  Discussed case with podiatry. Will follow up podiatry plans.     -- Discussed with ID staff and primary team   -- ID will continue to follow w/ further  recs.          Anticipated Disposition: TBD    Thank you for your consult. I will follow-up with patient. Please contact us if you have any additional questions.    Elis Murrell PA-C  Infectious Disease  [facility] - Med Surg    Subjective:     Principal Problem:Osteomyelitis of great toe of left foot    HPI: 70 y.o. male with history of HTN, HLD, insomnia, peripheral neuropathy, and chronic bilateral foot wounds (ulcers to L hallux and R 2nd digit). He has been followed by podiatry and infectious disease for management of foot wounds. He works as a  and on his feet for several hours a day. Recent bone biopsy of R 2nd toe and L great toe obtained in podiatry clinic on 7/14/25. Bone cultures are positive for corynebacterium and Proteus mirabilis (R foot) and MSSA (L foot). Pathology was positive for acute osteomyelitis of R 2nd toe, but negative for L hallux. Seen by podiatry and ID last week. He started oral antibiotics, Minocycline and Levaquin on Sunday (7/20/25) per ID. On 7/22/25, podiatry clinic referred patient to ER for bilateral foot wounds and associated cellulitis of R foot. No systemic symptoms such as fever, chills, nausea, or vomiting.     Afebrile. No leukocytosis. Blood cultures NGTD. XR b/l feet unremarkable. MRI reveals osteomyelitis of R 2nd toe and L 1st toe, as well as septic arthritis and associated abscess of R 2nd toe. Started on IV Vancomycin and Zosyn on 7/22. Podiatry consulted and performed bedside debridement 7/23/25. No surgical intervention recommended at this time. Cellulitis improved after two days of antibiotics.    First seen by ID in May 2025 for wounds. Prior L toe wound culture grew MRSA for which he completed 2 weeks of Doxycycline/Minocycline. There was concern for osteomyelitis on imaging, but bone biopsy was negative for osteomyelitis and had no growth on culture. He continued to follow with podiatry and ID. Wound cultures in clinic on 6/9/25 grew  Providencia and Pseudomonas, but suspected that reflected wound colonization given no active signs of infection at the time.   Interval History: Afebrile. WBC wnl. Patient notes swelling of legs improved. Cellulitis of R foot improved. Pain controlled. S/p I&D of abscess to R 2nd toe yesterday.  Cultures pending.    Discussed necessity of patient limiting walking on his feet while he is recovering and on antibiotics. Continued walking long distances daily will impede his healing.    Review of Systems   Constitutional:  Negative for chills and fever.   Respiratory:  Negative for cough.    Cardiovascular:  Negative for chest pain.   Gastrointestinal:  Negative for abdominal pain, diarrhea, nausea and vomiting.   Musculoskeletal:  Positive for arthralgias.   Skin:  Positive for color change and wound.   All other systems reviewed and are negative.    Objective:     Vital Signs (Most Recent):  Temp: 98.1 °F (36.7 °C) (07/24/25 1142)  Pulse: 63 (07/24/25 1142)  Resp: 18 (07/24/25 1142)  BP: 129/70 (07/24/25 1142)  SpO2: 96 % (07/24/25 1142) Vital Signs (24h Range):  Temp:  [97.5 °F (36.4 °C)-98.1 °F (36.7 °C)] 98.1 °F (36.7 °C)  Pulse:  [60-74] 63  Resp:  [17-23] 18  SpO2:  [95 %-99 %] 96 %  BP: (109-143)/(59-75) 129/70     Weight: 107 kg (236 lb)  Body mass index is 33.86 kg/m².    Estimated Creatinine Clearance: 85.4 mL/min (based on SCr of 1 mg/dL).     Physical Exam  Vitals and nursing note reviewed.   Constitutional:       General: He is not in acute distress.     Appearance: Normal appearance. He is not ill-appearing.   HENT:      Head: Normocephalic and atraumatic.   Eyes:      Extraocular Movements: Extraocular movements intact.      Conjunctiva/sclera: Conjunctivae normal.      Pupils: Pupils are equal, round, and reactive to light.   Cardiovascular:      Rate and Rhythm: Normal rate and regular rhythm.      Pulses: Normal pulses.      Heart sounds: Normal heart sounds. No murmur heard.  Pulmonary:      Effort:  "Pulmonary effort is normal. No respiratory distress.      Breath sounds: Normal breath sounds.   Abdominal:      General: Abdomen is flat. Bowel sounds are normal.      Tenderness: There is no abdominal tenderness.   Musculoskeletal:         General: Normal range of motion.      Cervical back: Normal range of motion.      Right lower leg: No edema.      Left lower leg: No edema.   Skin:     General: Skin is warm and dry.      Capillary Refill: Capillary refill takes less than 2 seconds.      Findings: Erythema (improving) and lesion present.      Comments: BLE wrapped in clean, dry bandages   Neurological:      General: No focal deficit present.      Mental Status: He is alert and oriented to person, place, and time.      Cranial Nerves: No cranial nerve deficit.   Psychiatric:         Mood and Affect: Mood normal.         Behavior: Behavior normal.          Significant Labs: Blood Culture: No results for input(s): "LABBLOO" in the last 4320 hours.  CBC:   Recent Labs   Lab 07/23/25  0212 07/24/25  0453   WBC 9.06 8.81   HGB 12.6* 12.3*   HCT 36.7* 37.2*    251     CMP:   Recent Labs   Lab 07/23/25  0213 07/24/25  0453    142   K 4.6 4.3    109   CO2 28 24    104   BUN 23 17   CREATININE 1.3 1.0   CALCIUM 9.0 8.7   PROT 6.4 6.3   ALBUMIN 2.9* 2.8*   BILITOT 0.4 0.3   ALKPHOS 94 88   AST 16 16   ALT 13 13   ANIONGAP 8 9     Microbiology Results (last 7 days)       Procedure Component Value Units Date/Time    Blood culture #2 **CANNOT BE ORDERED STAT** [[id number]]  (Normal) Collected: 07/22/25 1320    Order Status: Completed Specimen: Blood from Peripheral, Antecubital, Left Updated: 07/24/25 1401     Blood Culture No Growth After 48 Hours    Blood culture #1 **CANNOT BE ORDERED STAT** [[id number]]  (Normal) Collected: 07/22/25 1206    Order Status: Completed Specimen: Blood from Peripheral, Antecubital, Right Updated: 07/24/25 1401     Blood Culture No Growth After 48 Hours    Gram stain " [[id number]] Collected: 07/23/25 1331    Order Status: Completed Specimen: Abscess from Toe, Right Foot Updated: 07/24/25 0151     GRAM STAIN No WBCs      No organisms seen    Culture, Anaerobic [[id number]] Collected: 07/23/25 1331    Order Status: Sent Specimen: Abscess from Toe, Right Foot Updated: 07/23/25 1914    Aerobic culture [[id number]] Collected: 07/23/25 1331    Order Status: Sent Specimen: Abscess from Toe, Right Foot Updated: 07/23/25 1914    Gram stain [[id number]]     Order Status: Canceled Specimen: Wound from Toe, Right Foot     Aerobic culture [[id number]]     Order Status: Canceled Specimen: Wound from Toe, Right Foot     Culture, Anaerobic [[id number]]     Order Status: Canceled Specimen: Wound from Toe, Right Foot             Significant Imaging: I have reviewed all pertinent imaging results/findings within the past 24 hours.

## 2025-07-24 NOTE — PROGRESS NOTES
[facility] - [facility] Surg  Podiatry  Progress Note    Patient Name: Stevie Villalba  MRN: [id number]  Admission Date: 7/22/2025  Hospital Length of Stay: 2 days  Attending Physician: Fco Valenzuela MD  Primary Care Provider: Ric Brambila MD     Subjective:     Interval History: NAEON. VSS, afebrile. WBC wnl. 1 day s/p right 2nd digit I&D. No pain. No worsening of symptoms. Dressing changed.          Scheduled Meds:   allopurinoL  300 mg Oral Daily    atorvastatin  40 mg Oral QHS    cefTRIAXone (Rocephin) IV (PEDS and ADULTS)  2 g Intravenous Q24H    enoxparin  40 mg Subcutaneous Daily    pantoprazole  40 mg Oral BID    valsartan  320 mg Oral Daily    vancomycin (VANCOCIN) IV (PEDS and ADULTS)  1,250 mg Intravenous Q12H     Continuous Infusions:  PRN Meds:  Current Facility-Administered Medications:     acetaminophen, 650 mg, Oral, Q4H PRN    albuterol-ipratropium, 3 mL, Nebulization, Q4H PRN    aluminum-magnesium hydroxide-simethicone, 30 mL, Oral, QID PRN    bisacodyL, 10 mg, Rectal, Daily PRN    carisoprodoL, 350 mg, Oral, QID PRN    dextrose 50%, 12.5 g, Intravenous, PRN    dextrose 50%, 25 g, Intravenous, PRN    glucagon (human recombinant), 1 mg, Intramuscular, PRN    glucose, 16 g, Oral, PRN    glucose, 24 g, Oral, PRN    melatonin, 6 mg, Oral, Nightly PRN    naloxone, 0.02 mg, Intravenous, PRN    ondansetron, 4 mg, Intravenous, Q8H PRN    oxyCODONE, 5 mg, Oral, Q6H PRN    polyethylene glycol, 17 g, Oral, Daily PRN    prochlorperazine, 5 mg, Intravenous, Q6H PRN    simethicone, 1 tablet, Oral, QID PRN    sodium chloride 0.9%, 10 mL, Intravenous, Q8H PRN    Pharmacy to dose Vancomycin consult, , , Once **AND** vancomycin - pharmacy to dose, , Intravenous, pharmacy to manage frequency    Review of Systems  Objective:     Vital Signs (Most Recent):  Temp: 98.1 °F (36.7 °C) (07/24/25 1142)  Pulse: 63 (07/24/25 1142)  Resp: 18 (07/24/25 1142)  BP: 129/70 (07/24/25 1142)  SpO2: 96 % (07/24/25 1142) Vital Signs (24h  Range):  Temp:  [97.5 °F (36.4 °C)-98.1 °F (36.7 °C)] 98.1 °F (36.7 °C)  Pulse:  [60-74] 63  Resp:  [17-23] 18  SpO2:  [95 %-99 %] 96 %  BP: (109-143)/(59-75) 129/70     Weight: 107 kg (236 lb)  Body mass index is 33.86 kg/m².    Foot Exam    Right Foot/Ankle     Inspection and Palpation  Ecchymosis: none  Tenderness: none   Swelling: dorsum and lesser metatarsophalangeal joints (Noted to 2nd and 3rddigit and dorsum of foot)  Skin Exam: callus, dry skin, maceration, cellulitis, skin changes, abnormal color, ulcer and erythema; no drainage     Neurovascular  Dorsalis pedis: doppler  Posterior tibial: doppler  Saphenous nerve sensation: absent  Tibial nerve sensation: absent  Superficial peroneal nerve sensation: absent  Deep peroneal nerve sensation: absent  Sural nerve sensation: absent    Comments   Wound noted to second digit.  Swelling noted to 2nd digit, third digit, and dorsum of foot.  Ascending cellulitis noticed from 2nd digit up to the dorsal foot.  No maceration, drainage, malodor, bone exposure, or fluctuance noted.  No tenderness to palpation.    7/24/25: S/p I&D, improved erythema and edema. No significant drainage.     Left Foot/Ankle      Inspection and Palpation  Ecchymosis: none  Tenderness: none   Swelling: great toe metatarsophalangeal joint (Swelling noted to hallux)  Skin Exam: callus, dry skin, maceration, cellulitis, skin changes, abnormal color, ulcer and erythema; no blister and no drainage     Neurovascular  Dorsalis pedis: doppler  Posterior tibial: doppler  Saphenous nerve sensation: absent  Tibial nerve sensation: absent  Superficial peroneal nerve sensation: absent  Deep peroneal nerve sensation: absent  Sural nerve sensation: absent    Comments  L foot:  Wound noted to lateral hallux.  Wound base hypergranular tissue with surrounding erythema and swelling.  No maceration, drainage, malodor, bone exposed, or fluctuance noted.  No tenderness to palpation.      7/24/25: Dressing  C/D/I.    Clinical media:          Prior media:              Laboratory:  All pertinent labs reviewed within the last 24 hours.    Diagnostic Results:  I have reviewed all pertinent imaging results/findings within the past 24 hours.  Assessment/Plan:     Orthopedic  Skin ulcers of foot, bilateral  Assesment  Ulcers to L hallux and R 2nd digit.  Ascending erythema from the R 2nd digit up to the dorsal midfoot, consistent with cellulitis.  Bone bx from clinic 7/12 +staph, proteus, cornybacterius. XR b/l feet unremarkable. MRI b/l feet OM to R 2nd prox/mid phalanx and L 1st distal phalanx and R 2nd PIPJ septic arthritis w/ 1.2 x 0.7 cm abscess. IDSA mild foot wound infection    Plan  - Now 1 day s/p I&D R 2nd digit based on MRI findings. Doing well. Normal post op course. Improvement noted overall to R foot  -Physical exam findings discussed with the patient. Discussed with the patient that his erythema and swelling has improved  -Bedside debridement of wound and I&D completed this admission. See procedure notes  -No emergent/urgent surgical intervention per Podiatry.  Recommend continuing with wound care and abx  -Abx per Primary/ID  -Infectious Disease consulted, recs appreciated  -Further intervention will be determined on acute worsening changes to bilateral foot  -Right foot wound dressed with Betadine-soaked gauze, dry gauze, cast padding, and ACE wrap  -Wound care orders to follow  -WBAT in Darco shoes to b/l feet  -Podiatry will continue to follow tomorrow               Aurelia Hope MD  Podiatry  [facility] - Med Surg

## 2025-07-25 LAB
ABSOLUTE EOSINOPHIL (OHS): 0.21 K/UL
ABSOLUTE MONOCYTE (OHS): 0.93 K/UL (ref 0.3–1)
ABSOLUTE NEUTROPHIL COUNT (OHS): 7.43 K/UL (ref 1.8–7.7)
ALBUMIN SERPL BCP-MCNC: 2.9 G/DL (ref 3.5–5.2)
ALP SERPL-CCNC: 84 UNIT/L (ref 40–150)
ALT SERPL W/O P-5'-P-CCNC: 13 UNIT/L (ref 0–55)
ANION GAP (OHS): 8 MMOL/L (ref 8–16)
AST SERPL-CCNC: 21 UNIT/L (ref 0–50)
BASOPHILS # BLD AUTO: 0.06 K/UL
BASOPHILS NFR BLD AUTO: 0.6 %
BILIRUB SERPL-MCNC: 0.3 MG/DL (ref 0.1–1)
BUN SERPL-MCNC: 16 MG/DL (ref 8–23)
CALCIUM SERPL-MCNC: 8.7 MG/DL (ref 8.7–10.5)
CHLORIDE SERPL-SCNC: 109 MMOL/L (ref 95–110)
CO2 SERPL-SCNC: 26 MMOL/L (ref 23–29)
CREAT SERPL-MCNC: 1.3 MG/DL (ref 0.5–1.4)
ERYTHROCYTE [DISTWIDTH] IN BLOOD BY AUTOMATED COUNT: 12.8 % (ref 11.5–14.5)
GFR SERPLBLD CREATININE-BSD FMLA CKD-EPI: 59 ML/MIN/1.73/M2
GLUCOSE SERPL-MCNC: 93 MG/DL (ref 70–110)
HCT VFR BLD AUTO: 38.2 % (ref 40–54)
HGB BLD-MCNC: 12.8 GM/DL (ref 14–18)
IMM GRANULOCYTES # BLD AUTO: 0.08 K/UL (ref 0–0.04)
IMM GRANULOCYTES NFR BLD AUTO: 0.8 % (ref 0–0.5)
LYMPHOCYTES # BLD AUTO: 1.94 K/UL (ref 1–4.8)
MAGNESIUM SERPL-MCNC: 1.9 MG/DL (ref 1.6–2.6)
MCH RBC QN AUTO: 32.6 PG (ref 27–31)
MCHC RBC AUTO-ENTMCNC: 33.5 G/DL (ref 32–36)
MCV RBC AUTO: 97 FL (ref 82–98)
NUCLEATED RBC (/100WBC) (OHS): 0 /100 WBC
PHOSPHATE SERPL-MCNC: 4.3 MG/DL (ref 2.7–4.5)
PLATELET # BLD AUTO: 250 K/UL (ref 150–450)
PMV BLD AUTO: 8.9 FL (ref 9.2–12.9)
POCT GLUCOSE: 102 MG/DL (ref 70–110)
POCT GLUCOSE: 141 MG/DL (ref 70–110)
POCT GLUCOSE: 91 MG/DL (ref 70–110)
POCT GLUCOSE: 93 MG/DL (ref 70–110)
POTASSIUM SERPL-SCNC: 3.9 MMOL/L (ref 3.5–5.1)
PROT SERPL-MCNC: 6.4 GM/DL (ref 6–8.4)
RBC # BLD AUTO: 3.93 M/UL (ref 4.6–6.2)
RELATIVE EOSINOPHIL (OHS): 2 %
RELATIVE LYMPHOCYTE (OHS): 18.2 % (ref 18–48)
RELATIVE MONOCYTE (OHS): 8.7 % (ref 4–15)
RELATIVE NEUTROPHIL (OHS): 69.7 % (ref 38–73)
SODIUM SERPL-SCNC: 143 MMOL/L (ref 136–145)
VANCOMYCIN TROUGH SERPL-MCNC: 26.1 UG/ML (ref 10–22)
WBC # BLD AUTO: 10.65 K/UL (ref 3.9–12.7)

## 2025-07-25 PROCEDURE — 84100 ASSAY OF PHOSPHORUS: CPT | Mod: HCNC | Performed by: PHYSICIAN ASSISTANT

## 2025-07-25 PROCEDURE — 11000001 HC ACUTE MED/SURG PRIVATE ROOM: Mod: HCNC

## 2025-07-25 PROCEDURE — 36415 COLL VENOUS BLD VENIPUNCTURE: CPT | Mod: HCNC | Performed by: PHYSICIAN ASSISTANT

## 2025-07-25 PROCEDURE — 63600175 PHARM REV CODE 636 W HCPCS: Mod: HCNC | Performed by: HOSPITALIST

## 2025-07-25 PROCEDURE — 25000003 PHARM REV CODE 250: Mod: HCNC | Performed by: PHYSICIAN ASSISTANT

## 2025-07-25 PROCEDURE — 25000003 PHARM REV CODE 250: Mod: HCNC | Performed by: HOSPITALIST

## 2025-07-25 PROCEDURE — 94660 CPAP INITIATION&MGMT: CPT | Mod: HCNC

## 2025-07-25 PROCEDURE — 85025 COMPLETE CBC W/AUTO DIFF WBC: CPT | Mod: HCNC | Performed by: PHYSICIAN ASSISTANT

## 2025-07-25 PROCEDURE — 99233 SBSQ HOSP IP/OBS HIGH 50: CPT | Mod: HCNC,,,

## 2025-07-25 PROCEDURE — 80053 COMPREHEN METABOLIC PANEL: CPT | Mod: HCNC | Performed by: PHYSICIAN ASSISTANT

## 2025-07-25 PROCEDURE — G0545 PR VISIT INHERENT TO INPT OR OBS CARE, INFECTIOUS DISEASE: HCPCS | Mod: HCNC,,,

## 2025-07-25 PROCEDURE — 99900035 HC TECH TIME PER 15 MIN (STAT): Mod: HCNC

## 2025-07-25 PROCEDURE — 80202 ASSAY OF VANCOMYCIN: CPT | Mod: HCNC | Performed by: HOSPITALIST

## 2025-07-25 PROCEDURE — 94761 N-INVAS EAR/PLS OXIMETRY MLT: CPT | Mod: HCNC

## 2025-07-25 PROCEDURE — 63600175 PHARM REV CODE 636 W HCPCS: Mod: HCNC | Performed by: PHYSICIAN ASSISTANT

## 2025-07-25 PROCEDURE — 63600175 PHARM REV CODE 636 W HCPCS: Mod: HCNC

## 2025-07-25 PROCEDURE — 83735 ASSAY OF MAGNESIUM: CPT | Mod: HCNC | Performed by: PHYSICIAN ASSISTANT

## 2025-07-25 RX ADMIN — CEFTRIAXONE SODIUM 2 G: 2 INJECTION, POWDER, FOR SOLUTION INTRAMUSCULAR; INTRAVENOUS at 03:07

## 2025-07-25 RX ADMIN — VANCOMYCIN HYDROCHLORIDE 1250 MG: 1.25 INJECTION, POWDER, LYOPHILIZED, FOR SOLUTION INTRAVENOUS at 03:07

## 2025-07-25 RX ADMIN — ENOXAPARIN SODIUM 40 MG: 40 INJECTION SUBCUTANEOUS at 04:07

## 2025-07-25 RX ADMIN — ALLOPURINOL 300 MG: 300 TABLET ORAL at 08:07

## 2025-07-25 RX ADMIN — VALSARTAN 320 MG: 160 TABLET, FILM COATED ORAL at 08:07

## 2025-07-25 RX ADMIN — PANTOPRAZOLE SODIUM 40 MG: 40 TABLET, DELAYED RELEASE ORAL at 09:07

## 2025-07-25 RX ADMIN — ATORVASTATIN CALCIUM 40 MG: 40 TABLET, FILM COATED ORAL at 09:07

## 2025-07-25 RX ADMIN — PANTOPRAZOLE SODIUM 40 MG: 40 TABLET, DELAYED RELEASE ORAL at 08:07

## 2025-07-25 NOTE — ASSESSMENT & PLAN NOTE
I have reviewed hospital notes from   service and other specialty providers. I have also reviewed CBC, CMP/BMP,  cultures and imaging with my interpretation as documented.      70 y.o. male with history of peripheral neuropathy and chronic bilateral foot wounds (ulcers to L hallux and R 2nd digit). Admitted for worsening bilat foot wounds with associated cellulitis of R foot. He has been followed by podiatry and infectious disease for management of foot wounds. Recently started Minocycline and Levaquin on 7/20 for recent bone biopsy 7/14 that grew Proteus mirabilis and corynebacterium (R foot) and MSSA (L foot). Bone biopsy pathology 7/14 was positive for acute osteomyelitis of R 2nd toe, but negative for L hallux. MRI reveals osteomyelitis of R 2nd toe and L 1st toe, as well as septic arthritis and associated abscess of R 2nd toe. Podiatry consulted and s/p debridement on 7/23. Started on IV Vancomycin and Zosyn (completed 3 days), and transitioned to Vanc/CTX on 7/24. Stable at this time. Afebrile, WBC wnl, and no fever or chills. Cellulitis improving on IV antibiotics. I&D of R toe abscess on 7/23 and cultures no growth so far.      Recommendations / Plan:  Continue IV Vancomycin and Ceftriaxone for now  Follow up R toe abscess cultures - will call micro lab to check on status of aerobic culture  Anticipate 6 weeks of antibiotics for osteomyelitis of R 2nd toe, considering orals, but pt agreeable to IV abx as well  Discussed case with podiatry. No surgical intervention now. Will follow up podiatry plans.     -- Discussed with ID staff and primary team   -- ID will continue to follow w/ further recs.    I spent a total of 50 minutes on the day of the visit.This includes face to face time and non-face to face time preparing to see the patient (eg, review of tests), obtaining and/or reviewing separately obtained history, documenting clinical information in the electronic or other health record, independently  interpreting results and communicating results to the patient/family/caregiver, or care coordinator.

## 2025-07-25 NOTE — PROGRESS NOTES
[facility] - Med Surg  Infectious Disease  Progress Note    Patient Name: Stevie Villalba  MRN: [id number]  Admission Date: 7/22/2025  Length of Stay: 3 days  Attending Physician: Fco Valenzuela MD  Primary Care Provider: Ric Brambila MD    Isolation Status: No active isolations  Assessment/Plan:      ID  * Osteomyelitis of great toe of left foot  I have reviewed hospital notes from   service and other specialty providers. I have also reviewed CBC, CMP/BMP,  cultures and imaging with my interpretation as documented.      70 y.o. male with history of peripheral neuropathy and chronic bilateral foot wounds (ulcers to L hallux and R 2nd digit). Admitted for worsening bilat foot wounds with associated cellulitis of R foot. He has been followed by podiatry and infectious disease for management of foot wounds. Recently started Minocycline and Levaquin on 7/20 for recent bone biopsy 7/14 that grew Proteus mirabilis and corynebacterium (R foot) and MSSA (L foot). Bone biopsy pathology 7/14 was positive for acute osteomyelitis of R 2nd toe, but negative for L hallux. MRI reveals osteomyelitis of R 2nd toe and L 1st toe, as well as septic arthritis and associated abscess of R 2nd toe. Podiatry consulted and s/p debridement on 7/23. Started on IV Vancomycin and Zosyn (completed 3 days), and transitioned to Vanc/CTX on 7/24. Stable at this time. Afebrile, WBC wnl, and no fever or chills. Cellulitis improving on IV antibiotics. I&D of R toe abscess on 7/23 and cultures no growth so far.      Recommendations / Plan:  Continue IV Vancomycin and Ceftriaxone for now  Follow up R toe abscess cultures - will call micro lab to check on status of aerobic culture  Anticipate 6 weeks of antibiotics for osteomyelitis of R 2nd toe, considering orals, but pt agreeable to IV abx as well  Discussed case with podiatry. No surgical intervention now. Will follow up podiatry plans.     -- Discussed with ID staff and primary team   -- ID will  continue to follow w/ further recs. Final  abx recs pending final culture data.    I spent a total of 50 minutes on the day of the visit.This includes face to face time and non-face to face time preparing to see the patient (eg, review of tests), obtaining and/or reviewing separately obtained history, documenting clinical information in the electronic or other health record, independently interpreting results and communicating results to the patient/family/caregiver, or care coordinator.            Anticipated Disposition: TBD pending cx data    Thank you for your consult. I will follow-up with patient. Please contact us if you have any additional questions.     Elis Murrell PA-C  Infectious Disease  [facility] - Med Surg    Subjective:     Principal Problem:Osteomyelitis of great toe of left foot    HPI: 70 y.o. male with history of HTN, HLD, insomnia, peripheral neuropathy, and chronic bilateral foot wounds (ulcers to L hallux and R 2nd digit). He has been followed by podiatry and infectious disease for management of foot wounds. He works as a  and on his feet for several hours a day. Recent bone biopsy of R 2nd toe and L great toe obtained in podiatry clinic on 7/14/25. Bone cultures are positive for corynebacterium and Proteus mirabilis (R foot) and MSSA (L foot). Pathology was positive for acute osteomyelitis of R 2nd toe, but negative for L hallux. Seen by podiatry and ID last week. He started oral antibiotics, Minocycline and Levaquin on Sunday (7/20/25) per ID. On 7/22/25, podiatry clinic referred patient to ER for bilateral foot wounds and associated cellulitis of R foot. No systemic symptoms such as fever, chills, nausea, or vomiting.     Afebrile. No leukocytosis. Blood cultures NGTD. XR b/l feet unremarkable. MRI reveals osteomyelitis of R 2nd toe and L 1st toe, as well as septic arthritis and associated abscess of R 2nd toe. Started on IV Vancomycin and Zosyn on 7/22. Podiatry  consulted and performed bedside debridement 7/23/25. No surgical intervention recommended at this time. Cellulitis improved after two days of antibiotics.    First seen by ID in May 2025 for wounds. Prior L toe wound culture (4/23/25) grew MRSA for which he completed 2 weeks of Doxycycline/Minocycline. There was concern for osteomyelitis on imaging, but bone biopsy was negative for osteomyelitis and had no growth on culture. He continued to follow with podiatry and ID. Wound cultures in clinic on 6/9/25 grew Providencia and Pseudomonas, but suspected that reflected wound colonization given no active signs of infection at the time. So no antibiotics given.    Interval History: Afebrile. WBC wnl. Wounds and cellulitis continue to improve on IV antibiotics. Maintained on IV Vanc and Ceftriaxone. Pending cultures from R toe abscess.    Review of Systems   Constitutional:  Negative for chills and fever.   Respiratory:  Negative for cough.    Cardiovascular:  Negative for chest pain.   Gastrointestinal:  Negative for abdominal pain, diarrhea, nausea and vomiting.   Musculoskeletal:  Positive for arthralgias.   Skin:  Positive for color change and wound.   All other systems reviewed and are negative.    Objective:     Vital Signs (Most Recent):  Temp: 97.9 °F (36.6 °C) (07/25/25 1109)  Pulse: 60 (07/25/25 1109)  Resp: 18 (07/25/25 1109)  BP: 134/72 (07/25/25 1109)  SpO2: 97 % (07/25/25 1109) Vital Signs (24h Range):  Temp:  [97.6 °F (36.4 °C)-98.2 °F (36.8 °C)] 97.9 °F (36.6 °C)  Pulse:  [60-69] 60  Resp:  [16-19] 18  SpO2:  [94 %-99 %] 97 %  BP: (120-141)/(59-80) 134/72     Weight: 107 kg (236 lb)  Body mass index is 33.86 kg/m².    Estimated Creatinine Clearance: 65.7 mL/min (based on SCr of 1.3 mg/dL).     Physical Exam  Vitals and nursing note reviewed.   Constitutional:       General: He is not in acute distress.     Appearance: Normal appearance. He is not ill-appearing.   HENT:      Head: Normocephalic and  "atraumatic.   Eyes:      Extraocular Movements: Extraocular movements intact.      Conjunctiva/sclera: Conjunctivae normal.      Pupils: Pupils are equal, round, and reactive to light.   Cardiovascular:      Rate and Rhythm: Normal rate and regular rhythm.      Pulses: Normal pulses.      Heart sounds: Normal heart sounds. No murmur heard.  Pulmonary:      Effort: Pulmonary effort is normal. No respiratory distress.      Breath sounds: Normal breath sounds.   Abdominal:      General: Abdomen is flat. Bowel sounds are normal.      Tenderness: There is no abdominal tenderness.   Musculoskeletal:         General: Normal range of motion.      Cervical back: Normal range of motion.      Right lower leg: No edema.      Left lower leg: No edema.   Skin:     General: Skin is warm and dry.      Capillary Refill: Capillary refill takes less than 2 seconds.      Findings: Lesion present.      Comments: BLE wrapped in clean, dry bandages   Neurological:      General: No focal deficit present.      Mental Status: He is alert and oriented to person, place, and time.      Cranial Nerves: No cranial nerve deficit.   Psychiatric:         Mood and Affect: Mood normal.         Behavior: Behavior normal.          Significant Labs: Blood Culture: No results for input(s): "LABBLOO" in the last 4320 hours.  CBC:   Recent Labs   Lab 07/24/25  0453 07/25/25  0657   WBC 8.81 10.65   HGB 12.3* 12.8*   HCT 37.2* 38.2*    250     CMP:   Recent Labs   Lab 07/24/25  0453 07/25/25  0657    143   K 4.3 3.9    109   CO2 24 26    93   BUN 17 16   CREATININE 1.0 1.3   CALCIUM 8.7 8.7   PROT 6.3 6.4   ALBUMIN 2.8* 2.9*   BILITOT 0.3 0.3   ALKPHOS 88 84   AST 16 21   ALT 13 13   ANIONGAP 9 8     Microbiology Results (last 7 days)       Procedure Component Value Units Date/Time    Aerobic culture [[id number]] Collected: 07/23/25 1331    Order Status: Completed Specimen: Abscess from Toe, Right Foot Updated: 07/25/25 0914     " "CULTURE, AEROBIC No significant growth to date    Blood culture #2 **CANNOT BE ORDERED STAT** [[id number]]  (Normal) Collected: 07/22/25 1320    Order Status: Completed Specimen: Blood from Peripheral, Antecubital, Left Updated: 07/24/25 1401     Blood Culture No Growth After 48 Hours    Blood culture #1 **CANNOT BE ORDERED STAT** [[id number]]  (Normal) Collected: 07/22/25 1206    Order Status: Completed Specimen: Blood from Peripheral, Antecubital, Right Updated: 07/24/25 1401     Blood Culture No Growth After 48 Hours    Gram stain [[id number]] Collected: 07/23/25 1331    Order Status: Completed Specimen: Abscess from Toe, Right Foot Updated: 07/24/25 0151     GRAM STAIN No WBCs      No organisms seen    Culture, Anaerobic [[id number]] Collected: 07/23/25 1331    Order Status: Sent Specimen: Abscess from Toe, Right Foot Updated: 07/23/25 1914    Gram stain [[id number]]     Order Status: Canceled Specimen: Wound from Toe, Right Foot     Aerobic culture [[id number]]     Order Status: Canceled Specimen: Wound from Toe, Right Foot     Culture, Anaerobic [[id number]]     Order Status: Canceled Specimen: Wound from Toe, Right Foot           Wound Culture: No results for input(s): "LABAERO" in the last 4320 hours.    Significant Imaging: I have reviewed all pertinent imaging results/findings within the past 24 hours.  "

## 2025-07-25 NOTE — PLAN OF CARE
[facility] Surg  Discharge Reassessment    Primary Care Provider: Ric Brambila MD    Expected Discharge Date: 7/29/2025    Reassessment (most recent)       Discharge Reassessment - 07/25/25 1530          Discharge Reassessment    Assessment Type Discharge Planning Reassessment (P)      Did the patient's condition or plan change since previous assessment? Yes (P)      Discharge Plan discussed with: Patient (P)      Communicated JUANY with patient/caregiver Yes (P)      Discharge Plan A Home;Home with family;Home Health (P)      Discharge Plan B Home;Home with family;Home Health (P)      DME Needed Upon Discharge  none (P)      Transition of Care Barriers None (P)      Why the patient remains in the hospital Requires continued medical care (P)         Post-Acute Status    Discharge Delays None known at this time (P)                    CM spoke with pt at bedside and reviewed chart, ID pending cultures for final recs IVABX.  Referrals placed for  [facility] accepted for IVABX coverage.  Will cont to coordinate care until discharge.     Discharge Plan A and Plan B have been determined by review of patient's clinical status, future medical and therapeutic needs, and coverage/benefits for post-acute care in coordination with multidisciplinary team members.     Cassi Rodriguez, MSN   [facility]  [phone]

## 2025-07-25 NOTE — PROGRESS NOTES
[facility] - [facility]  Podiatry  Progress Note    Patient Name: Stevie Villalba  MRN: [id number]  Admission Date: 7/22/2025  Hospital Length of Stay: 3 days  Attending Physician: Fco Valenzuela MD  Primary Care Provider: Ric Brambila MD     Subjective:     Interval History:  No adverse events overnight.  VSS, afebrile, WBC WNL, glucose WNL.  Patient doing well and sitting up in bed watching TV.  Grandson also bedside sleeping.  Dressings clean, dry, intact.  Patient denies any pain to his b/l feet.  Denies fevers, chills, nausea, or vomiting.  Denies SOB or chest pain.  Denies any new pedal complaints.    Scheduled Meds:   allopurinoL  300 mg Oral Daily    atorvastatin  40 mg Oral QHS    cefTRIAXone (Rocephin) IV (PEDS and ADULTS)  2 g Intravenous Q24H    enoxparin  40 mg Subcutaneous Daily    pantoprazole  40 mg Oral BID    valsartan  320 mg Oral Daily    vancomycin (VANCOCIN) IV (PEDS and ADULTS)  1,250 mg Intravenous Q12H     Continuous Infusions:  PRN Meds:  Current Facility-Administered Medications:     acetaminophen, 650 mg, Oral, Q4H PRN    albuterol-ipratropium, 3 mL, Nebulization, Q4H PRN    aluminum-magnesium hydroxide-simethicone, 30 mL, Oral, QID PRN    bisacodyL, 10 mg, Rectal, Daily PRN    carisoprodoL, 350 mg, Oral, QID PRN    dextrose 50%, 12.5 g, Intravenous, PRN    dextrose 50%, 25 g, Intravenous, PRN    glucagon (human recombinant), 1 mg, Intramuscular, PRN    glucose, 16 g, Oral, PRN    glucose, 24 g, Oral, PRN    melatonin, 6 mg, Oral, Nightly PRN    naloxone, 0.02 mg, Intravenous, PRN    ondansetron, 4 mg, Intravenous, Q8H PRN    oxyCODONE, 5 mg, Oral, Q6H PRN    polyethylene glycol, 17 g, Oral, Daily PRN    prochlorperazine, 5 mg, Intravenous, Q6H PRN    simethicone, 1 tablet, Oral, QID PRN    sodium chloride 0.9%, 10 mL, Intravenous, Q8H PRN    Pharmacy to dose Vancomycin consult, , , Once **AND** vancomycin - pharmacy to dose, , Intravenous, pharmacy to manage frequency    Review of Systems    Constitutional:  Negative for fatigue and fever.   HENT: Negative.     Eyes: Negative.    Respiratory: Negative.     Cardiovascular: Negative.    Gastrointestinal: Negative.    Endocrine: Negative.    Genitourinary: Negative.    Skin:  Positive for color change and wound.        Reports wounds and redness to b/l feet   Allergic/Immunologic: Negative.    Hematological: Negative.    Psychiatric/Behavioral: Negative.    Objective:     Vital Signs (Most Recent):  Temp: 98 °F (36.7 °C) (07/25/25 0738)  Pulse: 62 (07/25/25 0738)  Resp: 16 (07/25/25 0738)  BP: 135/63 (07/25/25 0738)  SpO2: 96 % (07/25/25 0738) Vital Signs (24h Range):  Temp:  [97.6 °F (36.4 °C)-98.2 °F (36.8 °C)] 98 °F (36.7 °C)  Pulse:  [61-69] 62  Resp:  [16-19] 16  SpO2:  [94 %-99 %] 96 %  BP: (120-141)/(59-80) 135/63     Weight: 107 kg (236 lb)  Body mass index is 33.86 kg/m².    Foot Exam     Right Foot/Ankle   Inspection and Palpation  Ecchymosis: none  Tenderness: none   Swelling: dorsum and lesser metatarsophalangeal joints (Noted to 2nd and 3rddigit and dorsum of foot)  Skin Exam: callus, dry skin, maceration, cellulitis, skin changes, abnormal color, ulcer and erythema; no drainage      Neurovascular  Dorsalis pedis: doppler  Posterior tibial: doppler  Saphenous nerve sensation: absent  Tibial nerve sensation: absent  Superficial peroneal nerve sensation: absent  Deep peroneal nerve sensation: absent  Sural nerve sensation: absent     Comments  R foot:  Wound noted to second digit.  Swelling noted to 2nd digit, third digit, and dorsum of foot.  Ascending cellulitis noticed from 2nd digit up to the dorsal foot.  No maceration, drainage, malodor, bone exposure, or fluctuance noted.  No tenderness to palpation.     7/23:  Improvements of swelling to leg.  Improvements of swelling and redness to the dorsal foot and second digit.  The second digit with fluctuance and scant serous drainage from wound to medial PIPJ.  Mild maceration noted to the  lesser digits.  No exposed bone.  No tenderness to palpation.    7/24: S/p I&D, improved erythema and edema. No significant drainage.     7/25:  Great improvements to swelling and erythema to the dorsal foot and second digit.  Wound noted on the medial second digit PIPJ with granular tissue.  No bone exposure.  Mild swelling noted.  No tenderness to palpation or drainage noted.     Left Foot/Ankle    Inspection and Palpation  Ecchymosis: none  Tenderness: none   Swelling: great toe metatarsophalangeal joint (Swelling noted to hallux)  Skin Exam: callus, dry skin, maceration, cellulitis, skin changes, abnormal color, ulcer and erythema; no blister and no drainage      Neurovascular  Dorsalis pedis: doppler  Posterior tibial: doppler  Saphenous nerve sensation: absent  Tibial nerve sensation: absent  Superficial peroneal nerve sensation: absent  Deep peroneal nerve sensation: absent  Sural nerve sensation: absent     Comments  L foot:  Wound noted to lateral hallux.  Wound base hypergranular tissue with surrounding erythema and swelling.  No maceration, drainage, malodor, bone exposed, or fluctuance noted.  No tenderness to palpation.     7/23:  L hallux wound without bone exposure.  No maceration, drainage, malodor, fluctuance.  No tenderness to palpation    7/24: Dressing C/D/I.     7/25:  Superficial wound to the medial aspect of the hallux.  No granulation tissue noted.  Improved erythema and swelling to the hallux.  No tenderness to palpation    L foot      R foot    Bilateral LE      Laboratory:  BMP:   Recent Labs   Lab 07/25/25  0657   GLU 93      K 3.9      CO2 26   BUN 16   CREATININE 1.3   CALCIUM 8.7   MG 1.9     CBC:   Recent Labs   Lab 07/25/25  0657   WBC 10.65   RBC 3.93*   HGB 12.8*   HCT 38.2*      MCV 97   MCH 32.6*   MCHC 33.5     Diagnostic Results:  I have reviewed all pertinent imaging results/findings within the past 24 hours.  Assessment/Plan:     Orthopedic  Skin ulcers of  foot, bilateral  Assesment  Ulcers to L hallux and R 2nd digit.  Ascending erythema from the R 2nd digit up to the dorsal midfoot, consistent with cellulitis.  Bone bx from clinic 7/12 +staph, proteus, cornybacterius. XR b/l feet unremarkable. MRI b/l feet OM to R 2nd prox/mid phalanx and L 1st distal phalanx and R 2nd PIPJ septic arthritis w/ 1.2 x 0.7 cm abscess. IDSA mild foot wound infection    Plan  -Patinet now 2 day s/p I&D R 2nd digit. Appropriate post-op findings.  Great improvements to erythema and swelling to b/l lower extremity.  -Physical exam findings discussed with the patient. Discussed with the patient that his erythema and swelling has improved  -No emergent/urgent surgical intervention per Podiatry.  Recommend continuing with wound care and abx. Patient okay to d/c when medically stable  -Abx per Primary/ID  -Infectious Disease consulted, recs appreciated  -Bilateral foot wounds dressed with Betadine-soaked gauze, dry gauze, cast padding, and ACE wrap  -Wound care orders to follow  -WBAT in Darco shoes to b/l feet  -Podiatry will continue to follow tomorrow     Discharge Recommendations  -Patient to follow up in outpatient Podiatry clinic. Podiatry will schedule  -Antibiotics per Primary/ID  -HH/SNF to do dressings 2-3 times a week as follows:  Dress L hallux and R second digit wounds with Hydrofera blue, followed by Kerlix, and secure with ACE wrap  -WBAT in Darco shoes to b/l feet  -Keep dressings clean, dry, and intact until follow-up appointment    Prateek Parker DPM  Podiatry  [facility] - Med Surg

## 2025-07-25 NOTE — ASSESSMENT & PLAN NOTE
Assesment  Ulcers to L hallux and R 2nd digit.  Ascending erythema from the R 2nd digit up to the dorsal midfoot, consistent with cellulitis.  Bone bx from clinic 7/12 +staph, proteus, cornybacterius. XR b/l feet unremarkable. MRI b/l feet OM to R 2nd prox/mid phalanx and L 1st distal phalanx and R 2nd PIPJ septic arthritis w/ 1.2 x 0.7 cm abscess. IDSA mild foot wound infection    Plan  -Patinet now 2 day s/p I&D R 2nd digit. Appropriate post-op findings.  Great improvements to erythema and swelling to b/l lower extremity.  -Physical exam findings discussed with the patient. Discussed with the patient that his erythema and swelling has improved  -No emergent/urgent surgical intervention per Podiatry.  Recommend continuing with wound care and abx. Patient okay to d/c when medically stable  -Abx per Primary/ID  -Infectious Disease consulted, recs appreciated  -Bilateral foot wounds dressed with Betadine-soaked gauze, dry gauze, cast padding, and ACE wrap  -Wound care orders to follow  -WBAT in Darco shoes to b/l feet  -Podiatry will continue to follow tomorrow     Discharge Recommendations  -Patient to follow up in outpatient Podiatry clinic. Podiatry will schedule  -Antibiotics per Primary/ID  -HH/SNF to do dressings 2-3 times a week as follows:  Dress L hallux and R second digit wounds with Hydrofera blue, followed by Kerlix, and secure with ACE wrap  -WBAT in Darco shoes to b/l feet  -Keep dressings clean, dry, and intact until follow-up appointment

## 2025-07-25 NOTE — PROGRESS NOTES
Pharmacokinetic Assessment Follow Up: IV Vancomycin    Vancomycin serum concentration assessment(s):    Vancomycin trough =26.1  Level was drawn ~10.5 hours prior to previous dose (12 hours interval)  Expect level to be lower but still above 20 if drawn closer to interval  The measurement is above the desired definitive target range of 15 to 20 mcg/mL.  Scr 1.0 > 1.3    Vancomycin Regimen Plan:    Discontinue the scheduled vancomycin regimen and re-dose when the random level is less than 20 mcg/mL, next level to be drawn at 7/26 w/ AM labs    Drug levels (last 3 results):  Recent Labs   Lab Result Units 07/23/25  0212 07/23/25  1302 07/25/25  1343   Vancomycin Trough ug/ml 23.2* 16.0 26.1*       Pharmacy will continue to follow and monitor vancomycin.    Please contact pharmacy at extension [phone] for questions regarding this assessment.    Thank you for the consult,   Mary Menjivar       Patient brief summary:  Stevie Villalba is a 70 y.o. male initiated on antimicrobial therapy with IV Vancomycin for treatment of bone/joint infection    The patient's current regimen is  Vancomycin 1250 mg IV Q12h    Drug Allergies:   Review of patient's allergies indicates:  No Known Allergies    Actual Body Weight:   107 kg    Renal Function:   Estimated Creatinine Clearance: 65.7 mL/min (based on SCr of 1.3 mg/dL).,       CBC (last 72 hours):  Recent Labs   Lab Result Units 07/23/25  0212 07/24/25  0453 07/25/25  0657   WBC K/uL 9.06 8.81 10.65   HGB gm/dL 12.6* 12.3* 12.8*   HCT % 36.7* 37.2* 38.2*   Platelet Count K/uL 236 251 250   Lymph % % 16.4* 27.5 18.2   Mono % % 10.3 8.6 8.7   Eos % % 1.8 2.4 2.0   Basophil % % 0.4 0.7 0.6       Metabolic Panel (last 72 hours):  Recent Labs   Lab Result Units 07/23/25  0213 07/23/25  1331 07/24/25  0453 07/25/25  0657   Sodium mmol/L 142  --  142 143   Potassium mmol/L 4.6  --  4.3 3.9   Chloride mmol/L 106  --  109 109   CO2 mmol/L 28  --  24 26   Glucose mg/dL 101  --  104 93    Glucose, UA   --  Negative  --   --    BUN mg/dL 23  --  17 16   Creatinine mg/dL 1.3  --  1.0 1.3   Albumin g/dL 2.9*  --  2.8* 2.9*   Bilirubin Total mg/dL 0.4  --  0.3 0.3   ALP unit/L 94  --  88 84   AST unit/L 16  --  16 21   ALT unit/L 13  --  13 13   Magnesium  mg/dL 1.9  --  1.9 1.9   Phosphorus Level mg/dL 4.1  --  4.5 4.3       Vancomycin Administrations:  vancomycin given in the last 96 hours                     vancomycin 1,250 mg in 0.9% NaCl 250 mL IVPB (admixture device) (mg) 1,250 mg New Bag 07/25/25 0315     1,250 mg New Bag 07/24/25 1535     1,250 mg New Bag  0332    vancomycin 1,500 mg in 0.9% NaCl 250 mL IVPB (admixture device) (mg) 1,500 mg New Bag 07/23/25 1644     1,500 mg New Bag  0200    vancomycin 2 g in 0.9% sodium chloride 500 mL IVPB (mg) 2,000 mg New Bag 07/22/25 1506                    Microbiologic Results:  Microbiology Results (last 7 days)       Procedure Component Value Units Date/Time    Blood culture #2 **CANNOT BE ORDERED STAT** [[id number]]  (Normal) Collected: 07/22/25 1320    Order Status: Completed Specimen: Blood from Peripheral, Antecubital, Left Updated: 07/25/25 1401     Blood Culture No Growth After 72 Hours    Blood culture #1 **CANNOT BE ORDERED STAT** [[id number]]  (Normal) Collected: 07/22/25 1206    Order Status: Completed Specimen: Blood from Peripheral, Antecubital, Right Updated: 07/25/25 1401     Blood Culture No Growth After 72 Hours    Aerobic culture [[id number]] Collected: 07/23/25 1331    Order Status: Completed Specimen: Abscess from Toe, Right Foot Updated: 07/25/25 0914     CULTURE, AEROBIC No significant growth to date    Gram stain [[id number]] Collected: 07/23/25 1331    Order Status: Completed Specimen: Abscess from Toe, Right Foot Updated: 07/24/25 0151     GRAM STAIN No WBCs      No organisms seen    Culture, Anaerobic [[id number]] Collected: 07/23/25 1331    Order Status: Resulted Specimen: Abscess from Toe, Right Foot Updated: 07/23/25 6542     Gram stain [[id number]]     Order Status: Canceled Specimen: Wound from Toe, Right Foot     Aerobic culture [[id number]]     Order Status: Canceled Specimen: Wound from Toe, Right Foot     Culture, Anaerobic [[id number]]     Order Status: Canceled Specimen: Wound from Toe, Right Foot

## 2025-07-25 NOTE — SUBJECTIVE & OBJECTIVE
Interval History: Afebrile. WBC wnl. Wounds and cellulitis continue to improve on IV antibiotics. Maintained on IV Vanc and Ceftriaxone. Pending cultures from R toe abscess.    Review of Systems   Constitutional:  Negative for chills and fever.   Respiratory:  Negative for cough.    Cardiovascular:  Negative for chest pain.   Gastrointestinal:  Negative for abdominal pain, diarrhea, nausea and vomiting.   Musculoskeletal:  Positive for arthralgias.   Skin:  Positive for color change and wound.   All other systems reviewed and are negative.    Objective:     Vital Signs (Most Recent):  Temp: 97.9 °F (36.6 °C) (07/25/25 1109)  Pulse: 60 (07/25/25 1109)  Resp: 18 (07/25/25 1109)  BP: 134/72 (07/25/25 1109)  SpO2: 97 % (07/25/25 1109) Vital Signs (24h Range):  Temp:  [97.6 °F (36.4 °C)-98.2 °F (36.8 °C)] 97.9 °F (36.6 °C)  Pulse:  [60-69] 60  Resp:  [16-19] 18  SpO2:  [94 %-99 %] 97 %  BP: (120-141)/(59-80) 134/72     Weight: 107 kg (236 lb)  Body mass index is 33.86 kg/m².    Estimated Creatinine Clearance: 65.7 mL/min (based on SCr of 1.3 mg/dL).     Physical Exam  Vitals and nursing note reviewed.   Constitutional:       General: He is not in acute distress.     Appearance: Normal appearance. He is not ill-appearing.   HENT:      Head: Normocephalic and atraumatic.   Eyes:      Extraocular Movements: Extraocular movements intact.      Conjunctiva/sclera: Conjunctivae normal.      Pupils: Pupils are equal, round, and reactive to light.   Cardiovascular:      Rate and Rhythm: Normal rate and regular rhythm.      Pulses: Normal pulses.      Heart sounds: Normal heart sounds. No murmur heard.  Pulmonary:      Effort: Pulmonary effort is normal. No respiratory distress.      Breath sounds: Normal breath sounds.   Abdominal:      General: Abdomen is flat. Bowel sounds are normal.      Tenderness: There is no abdominal tenderness.   Musculoskeletal:         General: Normal range of motion.      Cervical back: Normal range  "of motion.      Right lower leg: No edema.      Left lower leg: No edema.   Skin:     General: Skin is warm and dry.      Capillary Refill: Capillary refill takes less than 2 seconds.      Findings: Lesion present.      Comments: BLE wrapped in clean, dry bandages   Neurological:      General: No focal deficit present.      Mental Status: He is alert and oriented to person, place, and time.      Cranial Nerves: No cranial nerve deficit.   Psychiatric:         Mood and Affect: Mood normal.         Behavior: Behavior normal.          Significant Labs: Blood Culture: No results for input(s): "LABBLOO" in the last 4320 hours.  CBC:   Recent Labs   Lab 07/24/25  0453 07/25/25  0657   WBC 8.81 10.65   HGB 12.3* 12.8*   HCT 37.2* 38.2*    250     CMP:   Recent Labs   Lab 07/24/25  0453 07/25/25  0657    143   K 4.3 3.9    109   CO2 24 26    93   BUN 17 16   CREATININE 1.0 1.3   CALCIUM 8.7 8.7   PROT 6.3 6.4   ALBUMIN 2.8* 2.9*   BILITOT 0.3 0.3   ALKPHOS 88 84   AST 16 21   ALT 13 13   ANIONGAP 9 8     Microbiology Results (last 7 days)       Procedure Component Value Units Date/Time    Aerobic culture [[id number]] Collected: 07/23/25 1331    Order Status: Completed Specimen: Abscess from Toe, Right Foot Updated: 07/25/25 0914     CULTURE, AEROBIC No significant growth to date    Blood culture #2 **CANNOT BE ORDERED STAT** [[id number]]  (Normal) Collected: 07/22/25 1320    Order Status: Completed Specimen: Blood from Peripheral, Antecubital, Left Updated: 07/24/25 1401     Blood Culture No Growth After 48 Hours    Blood culture #1 **CANNOT BE ORDERED STAT** [[id number]]  (Normal) Collected: 07/22/25 1206    Order Status: Completed Specimen: Blood from Peripheral, Antecubital, Right Updated: 07/24/25 1401     Blood Culture No Growth After 48 Hours    Gram stain [[id number]] Collected: 07/23/25 1331    Order Status: Completed Specimen: Abscess from Toe, Right Foot Updated: 07/24/25 0151     GRAM " "STAIN No WBCs      No organisms seen    Culture, Anaerobic [[id number]] Collected: 07/23/25 1331    Order Status: Sent Specimen: Abscess from Toe, Right Foot Updated: 07/23/25 1914    Gram stain [[id number]]     Order Status: Canceled Specimen: Wound from Toe, Right Foot     Aerobic culture [[id number]]     Order Status: Canceled Specimen: Wound from Toe, Right Foot     Culture, Anaerobic [[id number]]     Order Status: Canceled Specimen: Wound from Toe, Right Foot           Wound Culture: No results for input(s): "LABAERO" in the last 4320 hours.    Significant Imaging: I have reviewed all pertinent imaging results/findings within the past 24 hours.  "

## 2025-07-25 NOTE — PLAN OF CARE
07/23/25 1702   Rounds   Attendance Provider;Nurse ;;Assigned nurse   Discharge Plan A Home;Home with family;Home Health   Why the patient remains in the hospital Requires continued medical care   Transition of Care Barriers None     Pt will need IVABX, pending cultures    Cassi Rodriguez, MSN   [facility]  [phone]

## 2025-07-25 NOTE — SUBJECTIVE & OBJECTIVE
Subjective:     Interval History:  No adverse events overnight.  VSS, afebrile, WBC WNL, glucose WNL.  Patient doing well and sitting up in bed watching TV.  Grandson also bedside sleeping.  Dressings clean, dry, intact.  Patient denies any pain to his b/l feet.  Denies fevers, chills, nausea, or vomiting.  Denies SOB or chest pain.  Denies any new pedal complaints.    Scheduled Meds:   allopurinoL  300 mg Oral Daily    atorvastatin  40 mg Oral QHS    cefTRIAXone (Rocephin) IV (PEDS and ADULTS)  2 g Intravenous Q24H    enoxparin  40 mg Subcutaneous Daily    pantoprazole  40 mg Oral BID    valsartan  320 mg Oral Daily    vancomycin (VANCOCIN) IV (PEDS and ADULTS)  1,250 mg Intravenous Q12H     Continuous Infusions:  PRN Meds:  Current Facility-Administered Medications:     acetaminophen, 650 mg, Oral, Q4H PRN    albuterol-ipratropium, 3 mL, Nebulization, Q4H PRN    aluminum-magnesium hydroxide-simethicone, 30 mL, Oral, QID PRN    bisacodyL, 10 mg, Rectal, Daily PRN    carisoprodoL, 350 mg, Oral, QID PRN    dextrose 50%, 12.5 g, Intravenous, PRN    dextrose 50%, 25 g, Intravenous, PRN    glucagon (human recombinant), 1 mg, Intramuscular, PRN    glucose, 16 g, Oral, PRN    glucose, 24 g, Oral, PRN    melatonin, 6 mg, Oral, Nightly PRN    naloxone, 0.02 mg, Intravenous, PRN    ondansetron, 4 mg, Intravenous, Q8H PRN    oxyCODONE, 5 mg, Oral, Q6H PRN    polyethylene glycol, 17 g, Oral, Daily PRN    prochlorperazine, 5 mg, Intravenous, Q6H PRN    simethicone, 1 tablet, Oral, QID PRN    sodium chloride 0.9%, 10 mL, Intravenous, Q8H PRN    Pharmacy to dose Vancomycin consult, , , Once **AND** vancomycin - pharmacy to dose, , Intravenous, pharmacy to manage frequency    Review of Systems   Constitutional:  Negative for fatigue and fever.   HENT: Negative.     Eyes: Negative.    Respiratory: Negative.     Cardiovascular: Negative.    Gastrointestinal: Negative.    Endocrine: Negative.    Genitourinary: Negative.    Skin:   Positive for color change and wound.        Reports wounds and redness to b/l feet   Allergic/Immunologic: Negative.    Hematological: Negative.    Psychiatric/Behavioral: Negative.    Objective:     Vital Signs (Most Recent):  Temp: 98 °F (36.7 °C) (07/25/25 0738)  Pulse: 62 (07/25/25 0738)  Resp: 16 (07/25/25 0738)  BP: 135/63 (07/25/25 0738)  SpO2: 96 % (07/25/25 0738) Vital Signs (24h Range):  Temp:  [97.6 °F (36.4 °C)-98.2 °F (36.8 °C)] 98 °F (36.7 °C)  Pulse:  [61-69] 62  Resp:  [16-19] 16  SpO2:  [94 %-99 %] 96 %  BP: (120-141)/(59-80) 135/63     Weight: 107 kg (236 lb)  Body mass index is 33.86 kg/m².    Foot Exam     Right Foot/Ankle   Inspection and Palpation  Ecchymosis: none  Tenderness: none   Swelling: dorsum and lesser metatarsophalangeal joints (Noted to 2nd and 3rddigit and dorsum of foot)  Skin Exam: callus, dry skin, maceration, cellulitis, skin changes, abnormal color, ulcer and erythema; no drainage      Neurovascular  Dorsalis pedis: doppler  Posterior tibial: doppler  Saphenous nerve sensation: absent  Tibial nerve sensation: absent  Superficial peroneal nerve sensation: absent  Deep peroneal nerve sensation: absent  Sural nerve sensation: absent     Comments  R foot:  Wound noted to second digit.  Swelling noted to 2nd digit, third digit, and dorsum of foot.  Ascending cellulitis noticed from 2nd digit up to the dorsal foot.  No maceration, drainage, malodor, bone exposure, or fluctuance noted.  No tenderness to palpation.     7/23:  Improvements of swelling to leg.  Improvements of swelling and redness to the dorsal foot and second digit.  The second digit with fluctuance and scant serous drainage from wound to medial PIPJ.  Mild maceration noted to the lesser digits.  No exposed bone.  No tenderness to palpation.    7/24: S/p I&D, improved erythema and edema. No significant drainage.     7/25:  Great improvements to swelling and erythema to the dorsal foot and second digit.  Wound noted on  the medial second digit PIPJ with granular tissue.  No bone exposure.  Mild swelling noted.  No tenderness to palpation or drainage noted.     Left Foot/Ankle    Inspection and Palpation  Ecchymosis: none  Tenderness: none   Swelling: great toe metatarsophalangeal joint (Swelling noted to hallux)  Skin Exam: callus, dry skin, maceration, cellulitis, skin changes, abnormal color, ulcer and erythema; no blister and no drainage      Neurovascular  Dorsalis pedis: doppler  Posterior tibial: doppler  Saphenous nerve sensation: absent  Tibial nerve sensation: absent  Superficial peroneal nerve sensation: absent  Deep peroneal nerve sensation: absent  Sural nerve sensation: absent     Comments  L foot:  Wound noted to lateral hallux.  Wound base hypergranular tissue with surrounding erythema and swelling.  No maceration, drainage, malodor, bone exposed, or fluctuance noted.  No tenderness to palpation.     7/23:  L hallux wound without bone exposure.  No maceration, drainage, malodor, fluctuance.  No tenderness to palpation    7/24: Dressing C/D/I.     7/25:  Superficial wound to the medial aspect of the hallux.  No granulation tissue noted.  Improved erythema and swelling to the hallux.  No tenderness to palpation    L foot      R foot    Bilateral LE      Laboratory:  BMP:   Recent Labs   Lab 07/25/25  0657   GLU 93      K 3.9      CO2 26   BUN 16   CREATININE 1.3   CALCIUM 8.7   MG 1.9     CBC:   Recent Labs   Lab 07/25/25  0657   WBC 10.65   RBC 3.93*   HGB 12.8*   HCT 38.2*      MCV 97   MCH 32.6*   MCHC 33.5     Diagnostic Results:  I have reviewed all pertinent imaging results/findings within the past 24 hours.

## 2025-07-26 PROBLEM — N17.9 AKI (ACUTE KIDNEY INJURY): Status: ACTIVE | Noted: 2025-07-26

## 2025-07-26 LAB
ALBUMIN SERPL BCP-MCNC: 3.4 G/DL (ref 3.5–5.2)
ALP SERPL-CCNC: 102 UNIT/L (ref 40–150)
ALT SERPL W/O P-5'-P-CCNC: 20 UNIT/L (ref 0–55)
ANION GAP (OHS): 8 MMOL/L (ref 8–16)
AST SERPL-CCNC: 33 UNIT/L (ref 0–50)
BILIRUB SERPL-MCNC: 0.4 MG/DL (ref 0.1–1)
BUN SERPL-MCNC: 17 MG/DL (ref 8–23)
CALCIUM SERPL-MCNC: 9.3 MG/DL (ref 8.7–10.5)
CHLORIDE SERPL-SCNC: 106 MMOL/L (ref 95–110)
CO2 SERPL-SCNC: 28 MMOL/L (ref 23–29)
CREAT SERPL-MCNC: 1.5 MG/DL (ref 0.5–1.4)
GFR SERPLBLD CREATININE-BSD FMLA CKD-EPI: 50 ML/MIN/1.73/M2
GLUCOSE SERPL-MCNC: 88 MG/DL (ref 70–110)
POCT GLUCOSE: 86 MG/DL (ref 70–110)
POTASSIUM SERPL-SCNC: 3.9 MMOL/L (ref 3.5–5.1)
PROT SERPL-MCNC: 7.6 GM/DL (ref 6–8.4)
SODIUM SERPL-SCNC: 142 MMOL/L (ref 136–145)
VANCOMYCIN SERPL-MCNC: 14.9 UG/ML (ref ?–80)

## 2025-07-26 PROCEDURE — 27000207 HC ISOLATION: Mod: HCNC

## 2025-07-26 PROCEDURE — 99900035 HC TECH TIME PER 15 MIN (STAT): Mod: HCNC

## 2025-07-26 PROCEDURE — 25000003 PHARM REV CODE 250: Mod: HCNC | Performed by: STUDENT IN AN ORGANIZED HEALTH CARE EDUCATION/TRAINING PROGRAM

## 2025-07-26 PROCEDURE — 25000003 PHARM REV CODE 250: Mod: HCNC | Performed by: HOSPITALIST

## 2025-07-26 PROCEDURE — 63600175 PHARM REV CODE 636 W HCPCS: Mod: HCNC | Performed by: STUDENT IN AN ORGANIZED HEALTH CARE EDUCATION/TRAINING PROGRAM

## 2025-07-26 PROCEDURE — 80053 COMPREHEN METABOLIC PANEL: CPT | Mod: HCNC | Performed by: PHYSICIAN ASSISTANT

## 2025-07-26 PROCEDURE — 63600175 PHARM REV CODE 636 W HCPCS: Mod: HCNC

## 2025-07-26 PROCEDURE — 94660 CPAP INITIATION&MGMT: CPT | Mod: HCNC

## 2025-07-26 PROCEDURE — 25000003 PHARM REV CODE 250: Mod: HCNC | Performed by: INTERNAL MEDICINE

## 2025-07-26 PROCEDURE — 80202 ASSAY OF VANCOMYCIN: CPT | Mod: HCNC | Performed by: INTERNAL MEDICINE

## 2025-07-26 PROCEDURE — 99233 SBSQ HOSP IP/OBS HIGH 50: CPT | Mod: HCNC,GC,,

## 2025-07-26 PROCEDURE — 94761 N-INVAS EAR/PLS OXIMETRY MLT: CPT | Mod: HCNC

## 2025-07-26 PROCEDURE — 36415 COLL VENOUS BLD VENIPUNCTURE: CPT | Mod: HCNC | Performed by: PHYSICIAN ASSISTANT

## 2025-07-26 PROCEDURE — 63600175 PHARM REV CODE 636 W HCPCS: Mod: HCNC | Performed by: PHYSICIAN ASSISTANT

## 2025-07-26 PROCEDURE — G0545 PR VISIT INHERENT TO INPT OR OBS CARE, INFECTIOUS DISEASE: HCPCS | Mod: HCNC,,,

## 2025-07-26 PROCEDURE — 11000001 HC ACUTE MED/SURG PRIVATE ROOM: Mod: HCNC

## 2025-07-26 PROCEDURE — 25000003 PHARM REV CODE 250: Mod: HCNC | Performed by: PHYSICIAN ASSISTANT

## 2025-07-26 RX ORDER — MUPIROCIN 20 MG/G
OINTMENT TOPICAL 2 TIMES DAILY
Status: DISCONTINUED | OUTPATIENT
Start: 2025-07-26 | End: 2025-07-27 | Stop reason: HOSPADM

## 2025-07-26 RX ADMIN — CEFTRIAXONE SODIUM 2 G: 2 INJECTION, POWDER, FOR SOLUTION INTRAMUSCULAR; INTRAVENOUS at 03:07

## 2025-07-26 RX ADMIN — MUPIROCIN: 20 OINTMENT TOPICAL at 10:07

## 2025-07-26 RX ADMIN — SODIUM CHLORIDE, POTASSIUM CHLORIDE, SODIUM LACTATE AND CALCIUM CHLORIDE 1000 ML: 600; 310; 30; 20 INJECTION, SOLUTION INTRAVENOUS at 02:07

## 2025-07-26 RX ADMIN — ATORVASTATIN CALCIUM 40 MG: 40 TABLET, FILM COATED ORAL at 10:07

## 2025-07-26 RX ADMIN — PANTOPRAZOLE SODIUM 40 MG: 40 TABLET, DELAYED RELEASE ORAL at 10:07

## 2025-07-26 RX ADMIN — ALLOPURINOL 300 MG: 300 TABLET ORAL at 08:07

## 2025-07-26 RX ADMIN — VANCOMYCIN HYDROCHLORIDE 1250 MG: 1.25 INJECTION, POWDER, LYOPHILIZED, FOR SOLUTION INTRAVENOUS at 12:07

## 2025-07-26 RX ADMIN — VALSARTAN 320 MG: 160 TABLET, FILM COATED ORAL at 08:07

## 2025-07-26 RX ADMIN — PANTOPRAZOLE SODIUM 40 MG: 40 TABLET, DELAYED RELEASE ORAL at 08:07

## 2025-07-26 RX ADMIN — ENOXAPARIN SODIUM 40 MG: 40 INJECTION SUBCUTANEOUS at 04:07

## 2025-07-26 NOTE — SUBJECTIVE & OBJECTIVE
Interval History: Patient feeling well, no complaints. Cr increased from 1.3>1.5. Holding valsartan and giving 1L IVF. Awaiting final ID recs.    Review of Systems   Constitutional:  Negative for chills, fatigue and fever.   HENT:  Negative for congestion, hearing loss, trouble swallowing and voice change.    Respiratory:  Negative for cough and shortness of breath.    Cardiovascular:  Negative for chest pain.   Gastrointestinal:  Negative for abdominal pain, constipation, diarrhea, nausea and vomiting.   Genitourinary:  Negative for difficulty urinating.   Musculoskeletal:  Negative for arthralgias.   Skin:  Positive for wound. Negative for pallor and rash.   Neurological:  Negative for dizziness, weakness and light-headedness.   Psychiatric/Behavioral:  Negative for agitation and behavioral problems.      Objective:     Vital Signs (Most Recent):  Temp: 98.3 °F (36.8 °C) (07/26/25 1123)  Pulse: (!) 58 (07/26/25 1123)  Resp: 18 (07/26/25 1123)  BP: 134/71 (07/26/25 1123)  SpO2: 96 % (07/26/25 1200) Vital Signs (24h Range):  Temp:  [97 °F (36.1 °C)-98.5 °F (36.9 °C)] 98.3 °F (36.8 °C)  Pulse:  [58-66] 58  Resp:  [17-18] 18  SpO2:  [93 %-97 %] 96 %  BP: (125-143)/(71-78) 134/71     Weight: 107 kg (236 lb)  Body mass index is 33.86 kg/m².  No intake or output data in the 24 hours ending 07/26/25 1242      Physical Exam  Vitals reviewed.   Constitutional:       General: He is not in acute distress.  HENT:      Head: Normocephalic and atraumatic.      Nose: Nose normal.   Eyes:      General: No scleral icterus.     Extraocular Movements: Extraocular movements intact.   Cardiovascular:      Rate and Rhythm: Normal rate and regular rhythm.      Heart sounds: No murmur heard.     No friction rub. No gallop.   Pulmonary:      Effort: Pulmonary effort is normal. No respiratory distress.      Breath sounds: No wheezing, rhonchi or rales.   Abdominal:      General: There is no distension.      Palpations: Abdomen is soft.       Tenderness: There is no abdominal tenderness. There is no guarding or rebound.   Musculoskeletal:      Cervical back: Neck supple. No rigidity.   Skin:     General: Skin is warm and dry.      Capillary Refill: Capillary refill takes less than 2 seconds.      Comments:  BLE wrapped in clean, dry bandages    Neurological:      Mental Status: He is alert and oriented to person, place, and time.   Psychiatric:         Mood and Affect: Mood normal.         Behavior: Behavior normal.               Significant Labs: All pertinent labs within the past 24 hours have been reviewed.  CBC:   Recent Labs   Lab 07/25/25  0657   WBC 10.65   HGB 12.8*   HCT 38.2*        CMP:   Recent Labs   Lab 07/25/25  0657 07/26/25  1021    142   K 3.9 3.9    106   CO2 26 28   GLU 93 88   BUN 16 17   CREATININE 1.3 1.5*   CALCIUM 8.7 9.3   PROT 6.4 7.6   ALBUMIN 2.9* 3.4*   BILITOT 0.3 0.4   ALKPHOS 84 102   AST 21 33   ALT 13 20   ANIONGAP 8 8       Significant Imaging: I have reviewed all pertinent imaging results/findings within the past 24 hours.

## 2025-07-26 NOTE — ASSESSMENT & PLAN NOTE
"Patient has paroxysmal (<7 days) atrial fibrillation. Patient is currently in sinus rhythm. [id number] Score: 1. The patients heart rate in the last 24 hours is as follows:  Pulse  Min: 58  Max: 66     Antiarrhythmics       Anticoagulants  enoxaparin injection 40 mg, Every 24 hours, Subcutaneous    Plan  - Replete lytes with a goal of K>4, Mg >2  - Patient is anticoagulated, see medications listed above.  - patient is unsure if he takes MTP, but it is "paused" on his MAR so will hold off for now  - Patient's afib is currently controlled  - Continue enoxaparin      "

## 2025-07-26 NOTE — ASSESSMENT & PLAN NOTE
Anemia is likely due to Iron deficiency. Most recent hemoglobin and hematocrit are listed below.  Recent Labs     07/23/25  0212 07/24/25  0453 07/25/25  0657   HGB 12.6* 12.3* 12.8*   HCT 36.7* 37.2* 38.2*     Plan  - Monitor serial CBC: Daily  - Transfuse PRBC if patient becomes hemodynamically unstable, symptomatic or H/H drops below 7/21.  - Patient has not received any PRBC transfusions to date  - Patient's anemia is currently stable

## 2025-07-26 NOTE — ASSESSMENT & PLAN NOTE
"Patient has paroxysmal (<7 days) atrial fibrillation. Patient is currently in sinus rhythm. [id number] Score: 1. The patients heart rate in the last 24 hours is as follows:  Pulse  Min: 60  Max: 69     Antiarrhythmics       Anticoagulants  enoxaparin injection 40 mg, Every 24 hours, Subcutaneous    Plan  - Replete lytes with a goal of K>4, Mg >2  - Patient is anticoagulated, see medications listed above.  - patient is unsure if he takes MTP, but it is "paused" on his MAR so will hold off for now  - Patient's afib is currently controlled  - Continue enoxaparin      " no difficulty urinating/no dysuria/no hematuria/no dysmenorrhea/no pelvic pain

## 2025-07-26 NOTE — ASSESSMENT & PLAN NOTE
- Ulcerations to L 1st digit and R 2nd digit, erythema extending up R leg concerning for cellulitis  -MRI feet shows osteo of L 1st toe, R 2nd toe.  - Continue Vancomycin and Ceftriaxone.   -ID consulted and following. Awaiting culture results for final recs  - podiatry consulted, bedside debridement performed, no further intervention planned

## 2025-07-26 NOTE — SUBJECTIVE & OBJECTIVE
Interval History: Afebrile. WBC wnl. Wounds and cellulitis continue to improve on IV antibiotics. Maintained on IV Vanc and Ceftriaxone. Pending cultures from R toe abscess.    Review of Systems   Constitutional:  Negative for chills and fever.   Respiratory:  Negative for cough.    Cardiovascular:  Negative for chest pain.   Gastrointestinal:  Negative for abdominal pain, diarrhea, nausea and vomiting.   Musculoskeletal:  Positive for arthralgias.   Skin:  Positive for color change and wound.   All other systems reviewed and are negative.    Objective:     Vital Signs (Most Recent):  Temp: 98.5 °F (36.9 °C) (07/26/25 0724)  Pulse: 66 (07/26/25 0724)  Resp: 17 (07/26/25 0724)  BP: 134/76 (07/26/25 0724)  SpO2: (!) 94 % (07/26/25 0724) Vital Signs (24h Range):  Temp:  [97 °F (36.1 °C)-98.5 °F (36.9 °C)] 98.5 °F (36.9 °C)  Pulse:  [60-66] 66  Resp:  [17-18] 17  SpO2:  [93 %-97 %] 94 %  BP: (125-143)/(72-78) 134/76     Weight: 107 kg (236 lb)  Body mass index is 33.86 kg/m².    Estimated Creatinine Clearance: 65.7 mL/min (based on SCr of 1.3 mg/dL).     Physical Exam  Vitals and nursing note reviewed.   Constitutional:       General: He is not in acute distress.     Appearance: Normal appearance. He is not ill-appearing.   HENT:      Head: Normocephalic and atraumatic.   Eyes:      Extraocular Movements: Extraocular movements intact.      Conjunctiva/sclera: Conjunctivae normal.      Pupils: Pupils are equal, round, and reactive to light.   Cardiovascular:      Rate and Rhythm: Normal rate and regular rhythm.      Pulses: Normal pulses.      Heart sounds: Normal heart sounds. No murmur heard.  Pulmonary:      Effort: Pulmonary effort is normal. No respiratory distress.      Breath sounds: Normal breath sounds.   Abdominal:      General: Abdomen is flat. Bowel sounds are normal.      Tenderness: There is no abdominal tenderness.   Musculoskeletal:         General: Normal range of motion.      Cervical back: Normal range  "of motion.      Right lower leg: No edema.      Left lower leg: No edema.   Skin:     General: Skin is warm and dry.      Capillary Refill: Capillary refill takes less than 2 seconds.      Findings: Lesion present.      Comments: BLE wrapped in clean, dry bandages   Neurological:      General: No focal deficit present.      Mental Status: He is alert and oriented to person, place, and time.      Cranial Nerves: No cranial nerve deficit.   Psychiatric:         Mood and Affect: Mood normal.         Behavior: Behavior normal.          Significant Labs: Blood Culture: No results for input(s): "LABBLOO" in the last 4320 hours.  CBC:   Recent Labs   Lab 07/25/25  0657   WBC 10.65   HGB 12.8*   HCT 38.2*        CMP:   Recent Labs   Lab 07/25/25  0657      K 3.9      CO2 26   GLU 93   BUN 16   CREATININE 1.3   CALCIUM 8.7   PROT 6.4   ALBUMIN 2.9*   BILITOT 0.3   ALKPHOS 84   AST 21   ALT 13   ANIONGAP 8     Microbiology Results (last 7 days)       Procedure Component Value Units Date/Time    Culture, Anaerobic [[id number]] Collected: 07/23/25 1331    Order Status: Completed Specimen: Abscess from Toe, Right Foot Updated: 07/25/25 1440     Anaerobe Culture Culture In Progress    Blood culture #2 **CANNOT BE ORDERED STAT** [[id number]]  (Normal) Collected: 07/22/25 1320    Order Status: Completed Specimen: Blood from Peripheral, Antecubital, Left Updated: 07/25/25 1401     Blood Culture No Growth After 72 Hours    Blood culture #1 **CANNOT BE ORDERED STAT** [[id number]]  (Normal) Collected: 07/22/25 1206    Order Status: Completed Specimen: Blood from Peripheral, Antecubital, Right Updated: 07/25/25 1401     Blood Culture No Growth After 72 Hours    Aerobic culture [[id number]] Collected: 07/23/25 1331    Order Status: Completed Specimen: Abscess from Toe, Right Foot Updated: 07/25/25 0914     CULTURE, AEROBIC No significant growth to date    Gram stain [[id number]] Collected: 07/23/25 1331    Order " "Status: Completed Specimen: Abscess from Toe, Right Foot Updated: 07/24/25 0151     GRAM STAIN No WBCs      No organisms seen    Gram stain [[id number]]     Order Status: Canceled Specimen: Wound from Toe, Right Foot     Aerobic culture [[id number]]     Order Status: Canceled Specimen: Wound from Toe, Right Foot     Culture, Anaerobic [[id number]]     Order Status: Canceled Specimen: Wound from Toe, Right Foot           Wound Culture: No results for input(s): "LABAERO" in the last 4320 hours.    Significant Imaging: I have reviewed all pertinent imaging results/findings within the past 24 hours.    I spent a total of 50 minutes on the day of the visit.This includes face to face time and non-face to face time preparing to see the patient (eg, review of tests), obtaining and/or reviewing separately obtained history, documenting clinical information in the electronic or other health record, independently interpreting results and communicating results to the patient/family/caregiver, or care coordinator     "

## 2025-07-26 NOTE — PROGRESS NOTES
[facility] Medicine  Progress Note    Patient Name: Stevie Villalba  MRN: [id number]  Patient Class: IP- Inpatient   Admission Date: 7/22/2025  Length of Stay: 3 days  Attending Physician: Fco Valenzuela MD  Primary Care Provider: Ric Brambila MD        Subjective     Principal Problem:Osteomyelitis of great toe of left foot        HPI:  Stevie Villalba is a 70 y.o male with Pmhx of HTN, HLP, pre-diabetes, paroxymal Afib, R 1st digit osteo, and severe LATANAY admitted to hospital medicine for bilateral foot ulcers. Patient was seen this morning in podiatry clinic for new ulceration to R 2nd digit and L 1st digit where he was advised to come to the ED for IV therapy and MRI 2/2 concerns for osteo. He reports that new ulcerations have been worsening over the past few weeks. He was seen in ID clinic on 7/19 where he was started on levaquin and minocycline. He reports BLE edema after starting the Abx. Reports he is able to ambulate without assistance, but endorses mild pain with ambulation. 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 Denies calf pain, fever, chills, night sweats, chest pain, shortness of breath, abdominal pain, nausea, vomiting, dysuria, hematuria, diarrhea, constipation.    In the ED, AFVSS. Neut 74.1, Lymph 14.2, Immat granulocytes 1.4, albumin 3.3, . MRI B toes pending. Given atorvastatin x 1, enoxaparin x 1, pantoprazole x 1, Zosyn x 3, valsartan x 1, vancomycin x 1.    Overview/Hospital Course:  7/23 -MRI w/ osteo of L 1st toe, R 2nd toe. Bedside debridement done by podiatry, no other procedure planned. ID recommend continue IV vanc/zosyn  7/24: Continue Vanc. Zosyn d/c'd. Started Ceftriaxone.     Interval History: No acute events overnight. Pt appears stable on exam. Tolerating PO intake. No new complaints.     Review of Systems   Constitutional:  Positive for fatigue. Negative for chills and fever.   HENT:  Negative for  congestion, hearing loss, trouble swallowing and voice change.    Respiratory:  Negative for cough and shortness of breath.    Cardiovascular:  Negative for chest pain.   Gastrointestinal:  Negative for abdominal pain, constipation, diarrhea, nausea and vomiting.   Genitourinary:  Negative for difficulty urinating.   Musculoskeletal:  Negative for arthralgias.   Skin:  Positive for wound. Negative for pallor and rash.   Neurological:  Negative for dizziness, weakness and light-headedness.   Psychiatric/Behavioral:  Negative for agitation and behavioral problems.      Objective:     Vital Signs (Most Recent):  Temp: 97 °F (36.1 °C) (07/25/25 1937)  Pulse: 62 (07/25/25 1937)  Resp: 18 (07/25/25 1937)  BP: (!) 143/77 (07/25/25 1937)  SpO2: 95 % (07/25/25 1937) Vital Signs (24h Range):  Temp:  [97 °F (36.1 °C)-98.1 °F (36.7 °C)] 97 °F (36.1 °C)  Pulse:  [60-69] 62  Resp:  [16-19] 18  SpO2:  [94 %-99 %] 95 %  BP: (125-143)/(63-78) 143/77     Weight: 107 kg (236 lb)  Body mass index is 33.86 kg/m².  No intake or output data in the 24 hours ending 07/25/25 1948      Physical Exam  Vitals reviewed.   Constitutional:       General: He is not in acute distress.  HENT:      Head: Normocephalic and atraumatic.      Nose: Nose normal.   Eyes:      General: No scleral icterus.     Extraocular Movements: Extraocular movements intact.   Cardiovascular:      Rate and Rhythm: Normal rate and regular rhythm.      Heart sounds: No murmur heard.     No friction rub. No gallop.   Pulmonary:      Effort: Pulmonary effort is normal. No respiratory distress.      Breath sounds: No wheezing, rhonchi or rales.   Abdominal:      General: There is no distension.      Palpations: Abdomen is soft.      Tenderness: There is no abdominal tenderness. There is no guarding or rebound.   Musculoskeletal:      Cervical back: Neck supple. No rigidity.   Skin:     General: Skin is warm and dry.      Capillary Refill: Capillary refill takes less than 2  "seconds.      Comments:  BLE wrapped in clean, dry bandages    Neurological:      Mental Status: He is alert and oriented to person, place, and time.   Psychiatric:         Mood and Affect: Mood normal.         Behavior: Behavior normal.               Significant Labs: All pertinent labs within the past 24 hours have been reviewed.    Significant Imaging: I have reviewed all pertinent imaging results/findings within the past 24 hours.      Assessment & Plan  Osteomyelitis of great toe of left foot  Skin ulcers of foot, bilateral  Osteomyelitis of second toe of right foot  - Ulcerations to L 1st digit and R 2nd digit, erythema extending up R leg concerning for cellulitis  -MRI feet shows osteo of L 1st toe, R 2nd toe.  - Continue Vancomycin and Ceftriaxone.   -ID consulted and following. Awaiting culture results for final recs  - podiatry consulted, bedside debridement performed, no further intervention planned                   Hypertriglyceridemia  - Continue home atorvastatin    Primary hypertension  Patients blood pressure range in the last 24 hours was: BP  Min: 107/53  Max: 167/77.The patient's inpatient anti-hypertensive regimen is listed below:  Current Antihypertensives  valsartan tablet 320 mg, Daily, Oral    Plan  - BP is controlled, no changes needed to their regimen    Insomnia  -Continue home melatonin PRN    Paroxysmal atrial fibrillation  Patient has paroxysmal (<7 days) atrial fibrillation. Patient is currently in sinus rhythm. [id number] Score: 1. The patients heart rate in the last 24 hours is as follows:  Pulse  Min: 60  Max: 69     Antiarrhythmics       Anticoagulants  enoxaparin injection 40 mg, Every 24 hours, Subcutaneous    Plan  - Replete lytes with a goal of K>4, Mg >2  - Patient is anticoagulated, see medications listed above.  - patient is unsure if he takes MTP, but it is "paused" on his MAR so will hold off for now  - Patient's afib is currently controlled  - Continue " enoxaparin      Class 1 obesity with body mass index (BMI) of 31.0 to 31.9 in adult  Body mass index is 33.86 kg/m². Morbid obesity complicates all aspects of disease management from diagnostic modalities to treatment. Weight loss encouraged and health benefits explained to patient.       Severe obstructive sleep apnea  - CPAP daily    Gout  - Continue home allopurinol  Anemia  Anemia is likely due to Iron deficiency. Most recent hemoglobin and hematocrit are listed below.  Recent Labs     07/23/25  0212 07/24/25  0453 07/25/25  0657   HGB 12.6* 12.3* 12.8*   HCT 36.7* 37.2* 38.2*     Plan  - Monitor serial CBC: Daily  - Transfuse PRBC if patient becomes hemodynamically unstable, symptomatic or H/H drops below 7/21.  - Patient has not received any PRBC transfusions to date  - Patient's anemia is currently stable    VTE Risk Mitigation (From admission, onward)           Ordered     enoxaparin injection 40 mg  Daily         07/22/25 1340     IP VTE HIGH RISK PATIENT  Once         07/22/25 1340     Place sequential compression device  Until discontinued         07/22/25 1340                    Discharge Planning   JUANY: 7/29/2025     Code Status: Full Code   Medical Readiness for Discharge Date:   Discharge Plan A: Home, Home with family, Home Health   Discharge Delays: None known at this time                    Fco Valenzuela MD  Department of Hospital Medicine   [facility]

## 2025-07-26 NOTE — ASSESSMENT & PLAN NOTE
Anemia is likely due to Iron deficiency. Most recent hemoglobin and hematocrit are listed below.  Recent Labs     07/24/25  0453 07/25/25  0657   HGB 12.3* 12.8*   HCT 37.2* 38.2*     Plan  - Monitor serial CBC: Daily  - Transfuse PRBC if patient becomes hemodynamically unstable, symptomatic or H/H drops below 7/21.  - Patient has not received any PRBC transfusions to date  - Patient's anemia is currently stable

## 2025-07-26 NOTE — PROGRESS NOTES
[facility] Medicine  Progress Note    Patient Name: Stevie Villalba  MRN: [id number]  Patient Class: IP- Inpatient   Admission Date: 7/22/2025  Length of Stay: 4 days  Attending Physician: Sarmad Reis MD  Primary Care Provider: Ric Brambila MD        Subjective     Principal Problem:Osteomyelitis of great toe of left foot        HPI:  Stevie Villalba is a 70 y.o male with Pmhx of HTN, HLP, pre-diabetes, paroxymal Afib, R 1st digit osteo, and severe LATANYA admitted to hospital medicine for bilateral foot ulcers. Patient was seen this morning in podiatry clinic for new ulceration to R 2nd digit and L 1st digit where he was advised to come to the ED for IV therapy and MRI 2/2 concerns for osteo. He reports that new ulcerations have been worsening over the past few weeks. He was seen in ID clinic on 7/19 where he was started on levaquin and minocycline. He reports BLE edema after starting the Abx. Reports he is able to ambulate without assistance, but endorses mild pain with ambulation. 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 Denies calf pain, fever, chills, night sweats, chest pain, shortness of breath, abdominal pain, nausea, vomiting, dysuria, hematuria, diarrhea, constipation.    In the ED, AFVSS. Neut 74.1, Lymph 14.2, Immat granulocytes 1.4, albumin 3.3, . MRI B toes pending. Given atorvastatin x 1, enoxaparin x 1, pantoprazole x 1, Zosyn x 3, valsartan x 1, vancomycin x 1.    Overview/Hospital Course:  7/23 -MRI w/ osteo of L 1st toe, R 2nd toe. Bedside debridement done by podiatry, no other procedure planned. ID recommend continue IV vanc/zosyn  7/24: Continue Vanc. Zosyn d/c'd. Started Ceftriaxone. Cultures growing proteus mirabilis and corynebacterium.   7/25: DAMARIS worsening, holding valsartan, given 1L IVF    Interval History: Patient feeling well, no complaints. Cr increased from 1.3>1.5. Holding valsartan and giving 1L IVF. Awaiting  final ID recs.    Review of Systems   Constitutional:  Negative for chills, fatigue and fever.   HENT:  Negative for congestion, hearing loss, trouble swallowing and voice change.    Respiratory:  Negative for cough and shortness of breath.    Cardiovascular:  Negative for chest pain.   Gastrointestinal:  Negative for abdominal pain, constipation, diarrhea, nausea and vomiting.   Genitourinary:  Negative for difficulty urinating.   Musculoskeletal:  Negative for arthralgias.   Skin:  Positive for wound. Negative for pallor and rash.   Neurological:  Negative for dizziness, weakness and light-headedness.   Psychiatric/Behavioral:  Negative for agitation and behavioral problems.      Objective:     Vital Signs (Most Recent):  Temp: 98.3 °F (36.8 °C) (07/26/25 1123)  Pulse: (!) 58 (07/26/25 1123)  Resp: 18 (07/26/25 1123)  BP: 134/71 (07/26/25 1123)  SpO2: 96 % (07/26/25 1200) Vital Signs (24h Range):  Temp:  [97 °F (36.1 °C)-98.5 °F (36.9 °C)] 98.3 °F (36.8 °C)  Pulse:  [58-66] 58  Resp:  [17-18] 18  SpO2:  [93 %-97 %] 96 %  BP: (125-143)/(71-78) 134/71     Weight: 107 kg (236 lb)  Body mass index is 33.86 kg/m².  No intake or output data in the 24 hours ending 07/26/25 1242      Physical Exam  Vitals reviewed.   Constitutional:       General: He is not in acute distress.  HENT:      Head: Normocephalic and atraumatic.      Nose: Nose normal.   Eyes:      General: No scleral icterus.     Extraocular Movements: Extraocular movements intact.   Cardiovascular:      Rate and Rhythm: Normal rate and regular rhythm.      Heart sounds: No murmur heard.     No friction rub. No gallop.   Pulmonary:      Effort: Pulmonary effort is normal. No respiratory distress.      Breath sounds: No wheezing, rhonchi or rales.   Abdominal:      General: There is no distension.      Palpations: Abdomen is soft.      Tenderness: There is no abdominal tenderness. There is no guarding or rebound.   Musculoskeletal:      Cervical back: Neck  supple. No rigidity.   Skin:     General: Skin is warm and dry.      Capillary Refill: Capillary refill takes less than 2 seconds.      Comments:  BLE wrapped in clean, dry bandages    Neurological:      Mental Status: He is alert and oriented to person, place, and time.   Psychiatric:         Mood and Affect: Mood normal.         Behavior: Behavior normal.               Significant Labs: All pertinent labs within the past 24 hours have been reviewed.  CBC:   Recent Labs   Lab 07/25/25  0657   WBC 10.65   HGB 12.8*   HCT 38.2*        CMP:   Recent Labs   Lab 07/25/25  0657 07/26/25  1021    142   K 3.9 3.9    106   CO2 26 28   GLU 93 88   BUN 16 17   CREATININE 1.3 1.5*   CALCIUM 8.7 9.3   PROT 6.4 7.6   ALBUMIN 2.9* 3.4*   BILITOT 0.3 0.4   ALKPHOS 84 102   AST 21 33   ALT 13 20   ANIONGAP 8 8       Significant Imaging: I have reviewed all pertinent imaging results/findings within the past 24 hours.      Assessment & Plan  Osteomyelitis of great toe of left foot  Skin ulcers of foot, bilateral  Osteomyelitis of second toe of right foot  - Ulcerations to L 1st digit and R 2nd digit, erythema extending up R leg concerning for cellulitis  -MRI feet shows osteo of L 1st toe, R 2nd toe.  - Continue Vancomycin and Ceftriaxone.   -ID consulted and following. Awaiting culture results for final recs  - podiatry consulted, bedside debridement performed, no further intervention planned    Hypertriglyceridemia  - Continue home atorvastatin    Primary hypertension  Patients blood pressure range in the last 24 hours was: BP  Min: 107/53  Max: 167/77.The patient's inpatient anti-hypertensive regimen is listed below:  Current Antihypertensives       Plan  - BP is controlled, no changes needed to their regimen    Insomnia  -Continue home melatonin PRN    Paroxysmal atrial fibrillation  Patient has paroxysmal (<7 days) atrial fibrillation. Patient is currently in sinus rhythm. [id number] Score: 1. The patients  "heart rate in the last 24 hours is as follows:  Pulse  Min: 58  Max: 66     Antiarrhythmics       Anticoagulants  enoxaparin injection 40 mg, Every 24 hours, Subcutaneous    Plan  - Replete lytes with a goal of K>4, Mg >2  - Patient is anticoagulated, see medications listed above.  - patient is unsure if he takes MTP, but it is "paused" on his MAR so will hold off for now  - Patient's afib is currently controlled  - Continue enoxaparin      Class 1 obesity with body mass index (BMI) of 31.0 to 31.9 in adult  Body mass index is 33.86 kg/m². Morbid obesity complicates all aspects of disease management from diagnostic modalities to treatment. Weight loss encouraged and health benefits explained to patient.       Severe obstructive sleep apnea  - CPAP daily    Gout  - Continue home allopurinol  Anemia  Anemia is likely due to Iron deficiency. Most recent hemoglobin and hematocrit are listed below.  Recent Labs     07/24/25  0453 07/25/25  0657   HGB 12.3* 12.8*   HCT 37.2* 38.2*     Plan  - Monitor serial CBC: Daily  - Transfuse PRBC if patient becomes hemodynamically unstable, symptomatic or H/H drops below 7/21.  - Patient has not received any PRBC transfusions to date  - Patient's anemia is currently stable    DAMARIS (acute kidney injury)  DAMARIS is likely due to pre-renal azotemia due to intravascular volume depletion. Baseline creatinine is 1. Most recent creatinine and eGFR are listed below.  Recent Labs     07/24/25  0453 07/25/25  0657 07/26/25  1021   CREATININE 1.0 1.3 1.5*   EGFRNORACEVR >60 59* 50*      Plan  - DAMARIS is worsening. Will hold valsartan and give 1L IVF  - Avoid nephrotoxins and renally dose meds for GFR listed above  - Monitor urine output, serial BMP, and adjust therapy as needed  -  VTE Risk Mitigation (From admission, onward)           Ordered     enoxaparin injection 40 mg  Daily         07/22/25 1340     IP VTE HIGH RISK PATIENT  Once         07/22/25 1340     Place sequential compression device  " Until discontinued         07/22/25 1340                    Discharge Planning   JUANY: 7/28/2025     Code Status: Full Code   Medical Readiness for Discharge Date:   Discharge Plan A: Home, Home with family, Home Health   Discharge Delays: None known at this time                    Ly Zamora PA-C  Department of Hospital Medicine   [facility]

## 2025-07-26 NOTE — PROGRESS NOTES
Pharmacokinetic Assessment Follow Up: IV Vancomycin    Vancomycin serum concentration assessment(s) and Plan:    -Vancomycin random returned at 14.9 mcg/mL.  -ke = 0.027 hr^-1, T 1/2 26 hrs.   -Goal 15-20 mcg/mL for osteomyelitis.  -Scr 1 -> 1.3 -> 1.5 mg/dL. Pulse dosing.    -Give vancomycin 1250 x1 today.  -VR with AM labs tomorrow, 7/27 (Anticipating ~ 15-16 hr level).     Drug levels (last 3 results):  Recent Labs   Lab Result Units 07/23/25  1302 07/25/25  1343 07/26/25  1021   Vancomycin Random ug/ml  --   --  14.9   Vancomycin Trough ug/ml 16.0 26.1*  --        Pharmacy will continue to follow and monitor vancomycin.    Please contact pharmacy at extension [phone] for questions regarding this assessment.    Thank you for the consult,   Duglas John       Patient brief summary:  Stevie Villalba is a 70 y.o. male initiated on antimicrobial therapy with IV Vancomycin for treatment of bone/joint infection    Drug Allergies:   Review of patient's allergies indicates:  No Known Allergies    Actual Body Weight:   107 kg    Renal Function:   Estimated Creatinine Clearance: 56.9 mL/min (A) (based on SCr of 1.5 mg/dL (H)).,     Dialysis Method (if applicable):  N/A    CBC (last 72 hours):  Recent Labs   Lab Result Units 07/24/25  0453 07/25/25  0657   WBC K/uL 8.81 10.65   HGB gm/dL 12.3* 12.8*   HCT % 37.2* 38.2*   Platelet Count K/uL 251 250   Lymph % % 27.5 18.2   Mono % % 8.6 8.7   Eos % % 2.4 2.0   Basophil % % 0.7 0.6       Metabolic Panel (last 72 hours):  Recent Labs   Lab Result Units 07/23/25  1331 07/24/25  0453 07/25/25  0657 07/26/25  1021   Sodium mmol/L  --  142 143 142   Potassium mmol/L  --  4.3 3.9 3.9   Chloride mmol/L  --  109 109 106   CO2 mmol/L  --  24 26 28   Glucose mg/dL  --  104 93 88   Glucose, UA  Negative  --   --   --    BUN mg/dL  --  17 16 17   Creatinine mg/dL  --  1.0 1.3 1.5*   Albumin g/dL  --  2.8* 2.9* 3.4*   Bilirubin Total mg/dL  --  0.3 0.3 0.4   ALP unit/L  --  88 84 102    AST unit/L  --  16 21 33   ALT unit/L  --  13 13 20   Magnesium  mg/dL  --  1.9 1.9  --    Phosphorus Level mg/dL  --  4.5 4.3  --        Vancomycin Administrations:  vancomycin given in the last 96 hours                     vancomycin 1,250 mg in 0.9% NaCl 250 mL IVPB (admixture device) (mg) 1,250 mg New Bag 07/25/25 0315     1,250 mg New Bag 07/24/25 1535     1,250 mg New Bag  0332    vancomycin 1,500 mg in 0.9% NaCl 250 mL IVPB (admixture device) (mg) 1,500 mg New Bag 07/23/25 1644     1,500 mg New Bag  0200    vancomycin 2 g in 0.9% sodium chloride 500 mL IVPB (mg) 2,000 mg New Bag 07/22/25 1506                    Microbiologic Results:  Microbiology Results (last 7 days)       Procedure Component Value Units Date/Time    Culture, Anaerobic [[id number]] Collected: 07/23/25 1331    Order Status: Completed Specimen: Abscess from Toe, Right Foot Updated: 07/25/25 1440     Anaerobe Culture Culture In Progress    Blood culture #2 **CANNOT BE ORDERED STAT** [[id number]]  (Normal) Collected: 07/22/25 1320    Order Status: Completed Specimen: Blood from Peripheral, Antecubital, Left Updated: 07/25/25 1401     Blood Culture No Growth After 72 Hours    Blood culture #1 **CANNOT BE ORDERED STAT** [[id number]]  (Normal) Collected: 07/22/25 1206    Order Status: Completed Specimen: Blood from Peripheral, Antecubital, Right Updated: 07/25/25 1401     Blood Culture No Growth After 72 Hours    Aerobic culture [[id number]] Collected: 07/23/25 1331    Order Status: Completed Specimen: Abscess from Toe, Right Foot Updated: 07/25/25 0914     CULTURE, AEROBIC No significant growth to date    Gram stain [[id number]] Collected: 07/23/25 1331    Order Status: Completed Specimen: Abscess from Toe, Right Foot Updated: 07/24/25 0151     GRAM STAIN No WBCs      No organisms seen    Gram stain [[id number]]     Order Status: Canceled Specimen: Wound from Toe, Right Foot     Aerobic culture [[id number]]     Order Status: Canceled Specimen:  Wound from Toe, Right Foot     Culture, Anaerobic [[id number]]     Order Status: Canceled Specimen: Wound from Toe, Right Foot

## 2025-07-26 NOTE — ASSESSMENT & PLAN NOTE
Patients blood pressure range in the last 24 hours was: BP  Min: 107/53  Max: 167/77.The patient's inpatient anti-hypertensive regimen is listed below:  Current Antihypertensives       Plan  - BP is controlled, no changes needed to their regimen

## 2025-07-26 NOTE — ASSESSMENT & PLAN NOTE
I have reviewed hospital notes from   service and other specialty providers. I have also reviewed CBC, CMP/BMP,  cultures and imaging with my interpretation as documented.      70 y.o. male with history of peripheral neuropathy and chronic bilateral foot wounds (ulcers to L hallux and R 2nd digit). Admitted for worsening bilat foot wounds with associated cellulitis of R foot. He has been followed by podiatry and infectious disease for management of foot wounds.     Recently started Minocycline and Levaquin on 7/20 for recent bone biopsy 7/14 that grew Proteus mirabilis and corynebacterium (R foot) and MSSA (L foot). Bone biopsy pathology 7/14 was positive for acute osteomyelitis of R 2nd toe, but negative for L hallux. MRI reveals osteomyelitis of R 2nd toe and L 1st toe, as well as septic arthritis and associated abscess of R 2nd toe. Podiatry consulted and s/p debridement on 7/23. Started on IV Vancomycin and Zosyn (completed 3 days), and transitioned to Vanc/CTX on 7/24. Stable at this time. Afebrile, WBC wnl, and no fever or chills. Cellulitis improving on IV antibiotics. I&D of R toe abscess on 7/23 and cultures no growth so far.      Recommendations / Plan:  Continue IV Vancomycin and Ceftriaxone for now  Follow up R toe abscess cultures  Anticipate 6 weeks of antibiotics for osteomyelitis of R 2nd toe, considering orals, but pt agreeable to IV abx as well  Will follow up podiatry plans.     -- Discussed with ID staff and primary team   -- ID will continue to follow w/ further recs.

## 2025-07-26 NOTE — ASSESSMENT & PLAN NOTE
DAMARIS is likely due to pre-renal azotemia due to intravascular volume depletion. Baseline creatinine is 1. Most recent creatinine and eGFR are listed below.  Recent Labs     07/24/25  0453 07/25/25  0657 07/26/25  1021   CREATININE 1.0 1.3 1.5*   EGFRNORACEVR >60 59* 50*      Plan  - DAMARIS is worsening. Will hold valsartan and give 1L IVF  - Avoid nephrotoxins and renally dose meds for GFR listed above  - Monitor urine output, serial BMP, and adjust therapy as needed  -

## 2025-07-26 NOTE — SUBJECTIVE & OBJECTIVE
Interval History: No acute events overnight. Pt appears stable on exam. Tolerating PO intake. No new complaints.     Review of Systems   Constitutional:  Positive for fatigue. Negative for chills and fever.   HENT:  Negative for congestion, hearing loss, trouble swallowing and voice change.    Respiratory:  Negative for cough and shortness of breath.    Cardiovascular:  Negative for chest pain.   Gastrointestinal:  Negative for abdominal pain, constipation, diarrhea, nausea and vomiting.   Genitourinary:  Negative for difficulty urinating.   Musculoskeletal:  Negative for arthralgias.   Skin:  Positive for wound. Negative for pallor and rash.   Neurological:  Negative for dizziness, weakness and light-headedness.   Psychiatric/Behavioral:  Negative for agitation and behavioral problems.      Objective:     Vital Signs (Most Recent):  Temp: 97 °F (36.1 °C) (07/25/25 1937)  Pulse: 62 (07/25/25 1937)  Resp: 18 (07/25/25 1937)  BP: (!) 143/77 (07/25/25 1937)  SpO2: 95 % (07/25/25 1937) Vital Signs (24h Range):  Temp:  [97 °F (36.1 °C)-98.1 °F (36.7 °C)] 97 °F (36.1 °C)  Pulse:  [60-69] 62  Resp:  [16-19] 18  SpO2:  [94 %-99 %] 95 %  BP: (125-143)/(63-78) 143/77     Weight: 107 kg (236 lb)  Body mass index is 33.86 kg/m².  No intake or output data in the 24 hours ending 07/25/25 1948      Physical Exam  Vitals reviewed.   Constitutional:       General: He is not in acute distress.  HENT:      Head: Normocephalic and atraumatic.      Nose: Nose normal.   Eyes:      General: No scleral icterus.     Extraocular Movements: Extraocular movements intact.   Cardiovascular:      Rate and Rhythm: Normal rate and regular rhythm.      Heart sounds: No murmur heard.     No friction rub. No gallop.   Pulmonary:      Effort: Pulmonary effort is normal. No respiratory distress.      Breath sounds: No wheezing, rhonchi or rales.   Abdominal:      General: There is no distension.      Palpations: Abdomen is soft.      Tenderness: There  is no abdominal tenderness. There is no guarding or rebound.   Musculoskeletal:      Cervical back: Neck supple. No rigidity.   Skin:     General: Skin is warm and dry.      Capillary Refill: Capillary refill takes less than 2 seconds.      Comments:  BLE wrapped in clean, dry bandages    Neurological:      Mental Status: He is alert and oriented to person, place, and time.   Psychiatric:         Mood and Affect: Mood normal.         Behavior: Behavior normal.               Significant Labs: All pertinent labs within the past 24 hours have been reviewed.    Significant Imaging: I have reviewed all pertinent imaging results/findings within the past 24 hours.

## 2025-07-26 NOTE — PROGRESS NOTES
[facility] - Med Surg  Infectious Disease  Progress Note    Patient Name: Stevie Villalba  MRN: [id number]  Admission Date: 7/22/2025  Length of Stay: 4 days  Attending Physician: Sarmad Reis MD  Primary Care Provider: Ric Brambila MD    Isolation Status: Contact  Assessment/Plan:      ID  * Osteomyelitis of great toe of left foot  I have reviewed hospital notes from   service and other specialty providers. I have also reviewed CBC, CMP/BMP,  cultures and imaging with my interpretation as documented.      70 y.o. male with history of peripheral neuropathy and chronic bilateral foot wounds (ulcers to L hallux and R 2nd digit). Admitted for worsening bilat foot wounds with associated cellulitis of R foot. He has been followed by podiatry and infectious disease for management of foot wounds.     Recently started Minocycline and Levaquin on 7/20 for recent bone biopsy 7/14 that grew Proteus mirabilis and corynebacterium (R foot) and MSSA (L foot). Bone biopsy pathology 7/14 was positive for acute osteomyelitis of R 2nd toe, but negative for L hallux. MRI reveals osteomyelitis of R 2nd toe and L 1st toe, as well as septic arthritis and associated abscess of R 2nd toe. Podiatry consulted and s/p debridement on 7/23. Started on IV Vancomycin and Zosyn (completed 3 days), and transitioned to Vanc/CTX on 7/24. Stable at this time. Afebrile, WBC wnl, and no fever or chills. Cellulitis improving on IV antibiotics. I&D of R toe abscess on 7/23 and cultures no growth so far.      Recommendations / Plan:  Continue IV Vancomycin and Ceftriaxone for now  Follow up R toe abscess cultures  Anticipate 6 weeks of antibiotics for osteomyelitis of R 2nd toe, considering orals, but pt agreeable to IV abx as well  Will follow up podiatry plans.     -- Discussed with ID staff and primary team   -- ID will continue to follow w/ further recs.              Anticipated Disposition: see above    Thank you for your consult. I will follow-up  with patient. Please contact us if you have any additional questions.    Lindsay Callejas, NICOL  Infectious Disease  [facility] - [facility] Surg    Subjective:     Principal Problem:Osteomyelitis of great toe of left foot    HPI: 70 y.o. male with history of HTN, HLD, insomnia, peripheral neuropathy, and chronic bilateral foot wounds (ulcers to L hallux and R 2nd digit). He has been followed by podiatry and infectious disease for management of foot wounds. He works as a  and on his feet for several hours a day. Recent bone biopsy of R 2nd toe and L great toe obtained in podiatry clinic on 7/14/25. Bone cultures are positive for corynebacterium and Proteus mirabilis (R foot) and MSSA (L foot). Pathology was positive for acute osteomyelitis of R 2nd toe, but negative for L hallux. Seen by podiatry and ID last week. He started oral antibiotics, Minocycline and Levaquin on Sunday (7/20/25) per ID. On 7/22/25, podiatry clinic referred patient to ER for bilateral foot wounds and associated cellulitis of R foot. No systemic symptoms such as fever, chills, nausea, or vomiting.     Afebrile. No leukocytosis. Blood cultures NGTD. XR b/l feet unremarkable. MRI reveals osteomyelitis of R 2nd toe and L 1st toe, as well as septic arthritis and associated abscess of R 2nd toe. Started on IV Vancomycin and Zosyn on 7/22. Podiatry consulted and performed bedside debridement 7/23/25. No surgical intervention recommended at this time. Cellulitis improved after two days of antibiotics.    First seen by ID in May 2025 for wounds. Prior L toe wound culture (4/23/25) grew MRSA for which he completed 2 weeks of Doxycycline/Minocycline. There was concern for osteomyelitis on imaging, but bone biopsy was negative for osteomyelitis and had no growth on culture. He continued to follow with podiatry and ID. Wound cultures in clinic on 6/9/25 grew Providencia and Pseudomonas, but suspected that reflected wound colonization given no active  signs of infection at the time. So no antibiotics given.    Interval History: Afebrile. WBC wnl. Wounds and cellulitis continue to improve on IV antibiotics. Maintained on IV Vanc and Ceftriaxone. Pending cultures from R toe abscess.    Review of Systems   Constitutional:  Negative for chills and fever.   Respiratory:  Negative for cough.    Cardiovascular:  Negative for chest pain.   Gastrointestinal:  Negative for abdominal pain, diarrhea, nausea and vomiting.   Musculoskeletal:  Positive for arthralgias.   Skin:  Positive for color change and wound.   All other systems reviewed and are negative.    Objective:     Vital Signs (Most Recent):  Temp: 98.5 °F (36.9 °C) (07/26/25 0724)  Pulse: 66 (07/26/25 0724)  Resp: 17 (07/26/25 0724)  BP: 134/76 (07/26/25 0724)  SpO2: (!) 94 % (07/26/25 0724) Vital Signs (24h Range):  Temp:  [97 °F (36.1 °C)-98.5 °F (36.9 °C)] 98.5 °F (36.9 °C)  Pulse:  [60-66] 66  Resp:  [17-18] 17  SpO2:  [93 %-97 %] 94 %  BP: (125-143)/(72-78) 134/76     Weight: 107 kg (236 lb)  Body mass index is 33.86 kg/m².    Estimated Creatinine Clearance: 65.7 mL/min (based on SCr of 1.3 mg/dL).     Physical Exam  Vitals and nursing note reviewed.   Constitutional:       General: He is not in acute distress.     Appearance: Normal appearance. He is not ill-appearing.   HENT:      Head: Normocephalic and atraumatic.   Eyes:      Extraocular Movements: Extraocular movements intact.      Conjunctiva/sclera: Conjunctivae normal.      Pupils: Pupils are equal, round, and reactive to light.   Cardiovascular:      Rate and Rhythm: Normal rate and regular rhythm.      Pulses: Normal pulses.      Heart sounds: Normal heart sounds. No murmur heard.  Pulmonary:      Effort: Pulmonary effort is normal. No respiratory distress.      Breath sounds: Normal breath sounds.   Abdominal:      General: Abdomen is flat. Bowel sounds are normal.      Tenderness: There is no abdominal tenderness.   Musculoskeletal:          "General: Normal range of motion.      Cervical back: Normal range of motion.      Right lower leg: No edema.      Left lower leg: No edema.   Skin:     General: Skin is warm and dry.      Capillary Refill: Capillary refill takes less than 2 seconds.      Findings: Lesion present.      Comments: BLE wrapped in clean, dry bandages   Neurological:      General: No focal deficit present.      Mental Status: He is alert and oriented to person, place, and time.      Cranial Nerves: No cranial nerve deficit.   Psychiatric:         Mood and Affect: Mood normal.         Behavior: Behavior normal.          Significant Labs: Blood Culture: No results for input(s): "LABBLOO" in the last 4320 hours.  CBC:   Recent Labs   Lab 07/25/25  0657   WBC 10.65   HGB 12.8*   HCT 38.2*        CMP:   Recent Labs   Lab 07/25/25  0657      K 3.9      CO2 26   GLU 93   BUN 16   CREATININE 1.3   CALCIUM 8.7   PROT 6.4   ALBUMIN 2.9*   BILITOT 0.3   ALKPHOS 84   AST 21   ALT 13   ANIONGAP 8     Microbiology Results (last 7 days)       Procedure Component Value Units Date/Time    Culture, Anaerobic [[id number]] Collected: 07/23/25 1331    Order Status: Completed Specimen: Abscess from Toe, Right Foot Updated: 07/25/25 1440     Anaerobe Culture Culture In Progress    Blood culture #2 **CANNOT BE ORDERED STAT** [[id number]]  (Normal) Collected: 07/22/25 1320    Order Status: Completed Specimen: Blood from Peripheral, Antecubital, Left Updated: 07/25/25 1401     Blood Culture No Growth After 72 Hours    Blood culture #1 **CANNOT BE ORDERED STAT** [[id number]]  (Normal) Collected: 07/22/25 1206    Order Status: Completed Specimen: Blood from Peripheral, Antecubital, Right Updated: 07/25/25 1401     Blood Culture No Growth After 72 Hours    Aerobic culture [[id number]] Collected: 07/23/25 1331    Order Status: Completed Specimen: Abscess from Toe, Right Foot Updated: 07/25/25 0914     CULTURE, AEROBIC No significant growth to date " "   Gram stain [[id number]] Collected: 07/23/25 1331    Order Status: Completed Specimen: Abscess from Toe, Right Foot Updated: 07/24/25 0151     GRAM STAIN No WBCs      No organisms seen    Gram stain [[id number]]     Order Status: Canceled Specimen: Wound from Toe, Right Foot     Aerobic culture [[id number]]     Order Status: Canceled Specimen: Wound from Toe, Right Foot     Culture, Anaerobic [[id number]]     Order Status: Canceled Specimen: Wound from Toe, Right Foot           Wound Culture: No results for input(s): "LABAERO" in the last 4320 hours.    Significant Imaging: I have reviewed all pertinent imaging results/findings within the past 24 hours.    I spent a total of 50 minutes on the day of the visit.This includes face to face time and non-face to face time preparing to see the patient (eg, review of tests), obtaining and/or reviewing separately obtained history, documenting clinical information in the electronic or other health record, independently interpreting results and communicating results to the patient/family/caregiver, or care coordinator     "

## 2025-07-27 VITALS
WEIGHT: 236 LBS | OXYGEN SATURATION: 98 % | DIASTOLIC BLOOD PRESSURE: 76 MMHG | HEIGHT: 70 IN | SYSTOLIC BLOOD PRESSURE: 168 MMHG | HEART RATE: 73 BPM | TEMPERATURE: 98 F | RESPIRATION RATE: 18 BRPM | BODY MASS INDEX: 33.79 KG/M2

## 2025-07-27 LAB
ALBUMIN SERPL BCP-MCNC: 3.1 G/DL (ref 3.5–5.2)
ALP SERPL-CCNC: 97 UNIT/L (ref 40–150)
ALT SERPL W/O P-5'-P-CCNC: 19 UNIT/L (ref 0–55)
ANION GAP (OHS): 5 MMOL/L (ref 8–16)
ANION GAP (OHS): 7 MMOL/L (ref 8–16)
AST SERPL-CCNC: 29 UNIT/L (ref 0–50)
BACTERIA BLD CULT: NORMAL
BACTERIA BLD CULT: NORMAL
BILIRUB SERPL-MCNC: 0.3 MG/DL (ref 0.1–1)
BUN SERPL-MCNC: 18 MG/DL (ref 8–23)
BUN SERPL-MCNC: 19 MG/DL (ref 8–23)
CALCIUM SERPL-MCNC: 8.9 MG/DL (ref 8.7–10.5)
CALCIUM SERPL-MCNC: 8.9 MG/DL (ref 8.7–10.5)
CHLORIDE SERPL-SCNC: 105 MMOL/L (ref 95–110)
CHLORIDE SERPL-SCNC: 107 MMOL/L (ref 95–110)
CO2 SERPL-SCNC: 29 MMOL/L (ref 23–29)
CO2 SERPL-SCNC: 31 MMOL/L (ref 23–29)
CREAT SERPL-MCNC: 1.4 MG/DL (ref 0.5–1.4)
CREAT SERPL-MCNC: 1.4 MG/DL (ref 0.5–1.4)
GFR SERPLBLD CREATININE-BSD FMLA CKD-EPI: 54 ML/MIN/1.73/M2
GFR SERPLBLD CREATININE-BSD FMLA CKD-EPI: 54 ML/MIN/1.73/M2
GLUCOSE SERPL-MCNC: 121 MG/DL (ref 70–110)
GLUCOSE SERPL-MCNC: 95 MG/DL (ref 70–110)
POCT GLUCOSE: 118 MG/DL (ref 70–110)
POTASSIUM SERPL-SCNC: 4.1 MMOL/L (ref 3.5–5.1)
POTASSIUM SERPL-SCNC: 4.2 MMOL/L (ref 3.5–5.1)
PROT SERPL-MCNC: 6.9 GM/DL (ref 6–8.4)
SODIUM SERPL-SCNC: 141 MMOL/L (ref 136–145)
SODIUM SERPL-SCNC: 143 MMOL/L (ref 136–145)
VANCOMYCIN SERPL-MCNC: 14.7 UG/ML (ref ?–80)

## 2025-07-27 PROCEDURE — 63600175 PHARM REV CODE 636 W HCPCS: Mod: HCNC

## 2025-07-27 PROCEDURE — 63600175 PHARM REV CODE 636 W HCPCS: Mod: HCNC | Performed by: STUDENT IN AN ORGANIZED HEALTH CARE EDUCATION/TRAINING PROGRAM

## 2025-07-27 PROCEDURE — 94761 N-INVAS EAR/PLS OXIMETRY MLT: CPT | Mod: HCNC

## 2025-07-27 PROCEDURE — 25000003 PHARM REV CODE 250: Mod: HCNC | Performed by: STUDENT IN AN ORGANIZED HEALTH CARE EDUCATION/TRAINING PROGRAM

## 2025-07-27 PROCEDURE — 25000003 PHARM REV CODE 250: Mod: HCNC | Performed by: PHYSICIAN ASSISTANT

## 2025-07-27 PROCEDURE — 36415 COLL VENOUS BLD VENIPUNCTURE: CPT | Mod: HCNC | Performed by: PHYSICIAN ASSISTANT

## 2025-07-27 PROCEDURE — G0545 PR VISIT INHERENT TO INPT OR OBS CARE, INFECTIOUS DISEASE: HCPCS | Mod: HCNC,,,

## 2025-07-27 PROCEDURE — 99900035 HC TECH TIME PER 15 MIN (STAT): Mod: HCNC

## 2025-07-27 PROCEDURE — 63600175 PHARM REV CODE 636 W HCPCS: Mod: HCNC | Performed by: PHYSICIAN ASSISTANT

## 2025-07-27 PROCEDURE — 99233 SBSQ HOSP IP/OBS HIGH 50: CPT | Mod: HCNC,GC,,

## 2025-07-27 RX ADMIN — ALLOPURINOL 300 MG: 300 TABLET ORAL at 09:07

## 2025-07-27 RX ADMIN — CEFTRIAXONE SODIUM 2 G: 2 INJECTION, POWDER, FOR SOLUTION INTRAMUSCULAR; INTRAVENOUS at 03:07

## 2025-07-27 RX ADMIN — VANCOMYCIN HYDROCHLORIDE 1250 MG: 1.25 INJECTION, POWDER, LYOPHILIZED, FOR SOLUTION INTRAVENOUS at 11:07

## 2025-07-27 RX ADMIN — PANTOPRAZOLE SODIUM 40 MG: 40 TABLET, DELAYED RELEASE ORAL at 09:07

## 2025-07-27 RX ADMIN — SODIUM CHLORIDE, POTASSIUM CHLORIDE, SODIUM LACTATE AND CALCIUM CHLORIDE 1000 ML: 600; 310; 30; 20 INJECTION, SOLUTION INTRAVENOUS at 09:07

## 2025-07-27 RX ADMIN — MUPIROCIN: 20 OINTMENT TOPICAL at 09:07

## 2025-07-27 NOTE — DISCHARGE SUMMARY
[facility] Medicine  Discharge Summary      Patient Name: Stevie Villalba  MRN: [id number]  AFIA: [id number]  Patient Class: IP- Inpatient  Admission Date: 7/22/2025  Hospital Length of Stay: 5 days  Discharge Date and Time: 07/27/2025 1:40 PM  Attending Physician: Sarmad Reis MD   Discharging Provider: Ly Zamora PA-C  Primary Care Provider: Ric Brambila MD  [facility] Medicine Team: [facility] R Ly Zamora PA-C  Primary Care Team: [facility] R    HPI:   Stevie Villalba is a 70 y.o male with Pmhx of HTN, HLP, pre-diabetes, paroxymal Afib, R 1st digit osteo, and severe LATANYA admitted to hospital medicine for bilateral foot ulcers. Patient was seen this morning in podiatry clinic for new ulceration to R 2nd digit and L 1st digit where he was advised to come to the ED for IV therapy and MRI 2/2 concerns for osteo. He reports that new ulcerations have been worsening over the past few weeks. He was seen in ID clinic on 7/19 where he was started on levaquin and minocycline. He reports BLE edema after starting the Abx. Reports he is able to ambulate without assistance, but endorses mild pain with ambulation. 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 Denies calf pain, fever, chills, night sweats, chest pain, shortness of breath, abdominal pain, nausea, vomiting, dysuria, hematuria, diarrhea, constipation.    In the ED, AFVSS. Neut 74.1, Lymph 14.2, Immat granulocytes 1.4, albumin 3.3, . MRI B toes pending. Given atorvastatin x 1, enoxaparin x 1, pantoprazole x 1, Zosyn x 3, valsartan x 1, vancomycin x 1.    * No surgery found *      Hospital Course:   7/23 -MRI w/ osteo of L 1st toe, R 2nd toe. Bedside debridement done by podiatry, no other procedure planned. ID recommend continue IV vanc/zosyn  7/24: Continue Vanc. Zosyn d/c'd. Started Ceftriaxone. Cultures growing proteus mirabilis and corynebacterium.   7/25: DAMARIS worsening,  holding valsartan, given 1L IVF  7/26: ID recommending continuing home minocycline and Levaquin until 9/1.      Patient discharged with HH, acute care at home. Will follow DAMARIS. Hold valsartan until DAMARIS resolved, appreciate acute care at home. Patient verbalized understanding, all questions answered.          Goals of Care Treatment Preferences:  Code Status: Full Code         Consults:   Consults (From admission, onward)          Status Ordering Provider     Inpatient consult to Infectious Diseases  Once        Provider:  (Not yet assigned)    Completed ИРИНА FERNANDEZ     Inpatient consult to Podiatry  Once        Provider:  (Not yet assigned)    Completed LOYD WICK            Assessment & Plan  Osteomyelitis of great toe of left foot  Skin ulcers of foot, bilateral  Osteomyelitis of second toe of right foot  - Ulcerations to L 1st digit and R 2nd digit, erythema extending up R leg concerning for cellulitis  -MRI feet shows osteo of L 1st toe, R 2nd toe.  - Continue Vancomycin and Ceftriaxone.   -ID consulted and following. Awaiting culture results for final recs  - podiatry consulted, bedside debridement performed, no further intervention planned  - d/c on minocycline and levaquin until 9/1  - follow up in ID clinic  - HH wound care      Hypertriglyceridemia  - Continue home atorvastatin    Primary hypertension  Patients blood pressure range in the last 24 hours was: BP  Min: 107/53  Max: 181/96.The patient's inpatient anti-hypertensive regimen is listed below:  Current Antihypertensives       Plan  - BP is controlled, no changes needed to their regimen    Insomnia  -Continue home melatonin PRN    Paroxysmal atrial fibrillation  Patient has paroxysmal (<7 days) atrial fibrillation. Patient is currently in sinus rhythm. [id number] Score: 1. The patients heart rate in the last 24 hours is as follows:  Pulse  Min: 57  Max: 89     Antiarrhythmics       Anticoagulants  enoxaparin injection 40 mg, Every 24 hours,  "Subcutaneous    Plan  - Replete lytes with a goal of K>4, Mg >2  - Patient is anticoagulated, see medications listed above.  - patient is unsure if he takes MTP, but it is "paused" on his MAR so will hold off for now  - Patient's afib is currently controlled  - Continue enoxaparin      Class 1 obesity with body mass index (BMI) of 31.0 to 31.9 in adult  Body mass index is 33.86 kg/m². Morbid obesity complicates all aspects of disease management from diagnostic modalities to treatment. Weight loss encouraged and health benefits explained to patient.       Severe obstructive sleep apnea  - CPAP daily    Gout  - Continue home allopurinol  Anemia  Anemia is likely due to Iron deficiency. Most recent hemoglobin and hematocrit are listed below.  Recent Labs     07/25/25  0657   HGB 12.8*   HCT 38.2*     Plan  - Monitor serial CBC: Daily  - Transfuse PRBC if patient becomes hemodynamically unstable, symptomatic or H/H drops below 7/21.  - Patient has not received any PRBC transfusions to date  - Patient's anemia is currently stable    DAMARIS (acute kidney injury)  DAMARIS is likely due to pre-renal azotemia due to intravascular volume depletion. Baseline creatinine is 1. Most recent creatinine and eGFR are listed below.  Recent Labs     07/25/25  0657 07/26/25  1021 07/27/25  0733   CREATININE 1.3 1.5* 1.4   EGFRNORACEVR 59* 50* 54*      Plan  - DAMARIS is worsening. Will hold valsartan and give 1L IVF  - Avoid nephrotoxins and renally dose meds for GFR listed above  - Monitor urine output, serial BMP, and adjust therapy as needed  - given additional 1L of IVF at discharge, patient to hold valsartan  - acute care at home will follow DAMARIS at home  Final Active Diagnoses:    Diagnosis Date Noted POA    PRINCIPAL PROBLEM:  Osteomyelitis of great toe of left foot [M86.9] 05/07/2025 Yes    DAMARIS (acute kidney injury) [N17.9] 07/26/2025 Yes    Anemia [D64.9] 07/24/2025 Yes    Osteomyelitis of second toe of right foot [M86.9] 07/23/2025 Yes    " Skin ulcers of foot, bilateral [L97.519, L97.529] 07/22/2025 Yes    Gout [M10.9] 03/26/2023 Yes     Chronic    Severe obstructive sleep apnea [G47.33] 09/27/2022 Yes     Chronic    Paroxysmal atrial fibrillation [I48.0] 08/11/2022 Yes     Chronic    Class 1 obesity with body mass index (BMI) of 31.0 to 31.9 in adult [E66.811, Z68.31] 08/11/2022 Not Applicable     Chronic    Hypertriglyceridemia [E78.1] 09/08/2014 Yes     Chronic    Primary hypertension [I10] 09/08/2014 Yes     Chronic    Insomnia [G47.00] 09/08/2014 Yes     Chronic      Problems Resolved During this Admission:       Discharged Condition: good    Disposition:     Follow Up:   Follow-up Information       Provider, Gwen .    Specialty: Internal Medicine  Contact information:  [address] [address]                           Patient Instructions:      Ambulatory referral/consult to [facility]   Standing Status: Future   Referral Priority: Routine Referral Type: Consultation   Referred to Provider: GWEN PROVIDER Requested Specialty: Internal Medicine   Number of Visits Requested: 1       Significant Diagnostic Studies: Microbiology: Wound Culture: positive    Pending Diagnostic Studies:       Procedure Component Value Units Date/Time    Basic metabolic panel [[id number]]     Order Status: Sent Lab Status: No result     Specimen: Blood            Aerobic culture [[id number]] (Abnormal)  Collected: 07/14/25 1156   Order Status: Completed Specimen: Bone from Toe, Right Foot Updated: 07/24/25 1514    CULTURE, AEROBIC Moderate Proteus mirabilis Abnormal      Moderate Corynebacterium striatum group Abnormal    Susceptibility     Proteus mirabilis Corynebacterium striatum group     KWAME E-TEST (Preliminary)     Ampicillin <=8 µg/ml Resistant      Ampicillin/Sulbactam <=8/4 µg/ml Sensitive      Cefazolin <=2 µg/ml Sensitive      Cefepime <=2 µg/ml Sensitive      Ceftriaxone <=1 µg/ml Sensitive      Ciprofloxacin  <=0.25 µg/ml Sensitive      Ertapenem <=0.5 µg/ml Sensitive      Gentamicin <=2 µg/ml Sensitive      Levofloxacin <=0.5 µg/ml Sensitive 0.094     Meropenem <=1 µg/ml Sensitive      Piperacillin/Tazobactam <=8 µg/ml Sensitive      Tobramycin <=2 µg/ml Sensitive      Trimeth/Sulfa <=2/38 µg/ml Sensitive                  Linear View           Procedure Component Value Units Date/Time   Aerobic culture [[id number]] (Abnormal) Collected: 07/23/25 1331   Order Status: Completed Specimen: Abscess from Toe, Right Foot Updated: 07/27/25 1319    CULTURE, AEROBIC Rare Staphylococcus aureus Abnormal     Comment: Susceptibility pending      Culture, Anaerobic [[id number]] Collected: 07/23/25 1331   Order Status: Completed Specimen: Abscess from Toe, Right Foot Updated: 07/25/25 1440    Anaerobe Culture Culture In Progress   Gram stain [[id number]] Collected: 07/23/25 1331   Order Status: Completed Specimen: Abscess from Toe, Right Foot Updated: 07/24/25 0151    GRAM STAIN No WBCs     No organisms seen   Gram stain [[id number]]    Order Status: Canceled Specimen: Wound from Toe, Right Foot    Aerobic culture [[id number]]    Order Status: Canceled Specimen: Wound from Toe, Right Foot    Culture, Anaerobic [[id number]]    Order Status: Canceled Specimen: Wound from Toe, Right Foot    Blood culture #2 **CANNOT BE ORDERED STAT** [[id number]] (Normal) Collected: 07/22/25 1320   Order Status: Completed Specimen: Blood from Peripheral, Antecubital, Left Updated: 07/26/25 1401    Blood Culture No Growth After 96 hours   Blood culture #1 **CANNOT BE ORDERED STAT** [[id number]] (Normal) Collected: 07/22/25 1206   Order Status: Completed Specimen: Blood from Peripheral, Antecubital, Right Updated: 07/26/25 1401    Blood Culture No Growth After 96 hours   Culture, Anaerobic [[id number]] Collected: 07/14/25 1156   Order Status: Completed Specimen: Bone from Toe, Right Foot Updated: 07/21/25 0959    Anaerobe Culture No Anaerobes Isolated    Aerobic culture [[id number]] (Abnormal)  Collected: 07/14/25 1156   Order Status: Completed Specimen: Bone from Toe, Right Foot Updated: 07/24/25 1514    CULTURE, AEROBIC Moderate Proteus mirabilis Abnormal      Moderate Corynebacterium striatum group Abnormal    Susceptibility     Proteus mirabilis Corynebacterium striatum group     KWAME E-TEST (Preliminary)     Ampicillin <=8 µg/ml Resistant      Ampicillin/Sulbactam <=8/4 µg/ml Sensitive      Cefazolin <=2 µg/ml Sensitive      Cefepime <=2 µg/ml Sensitive      Ceftriaxone <=1 µg/ml Sensitive      Ciprofloxacin <=0.25 µg/ml Sensitive      Ertapenem <=0.5 µg/ml Sensitive      Gentamicin <=2 µg/ml Sensitive      Levofloxacin <=0.5 µg/ml Sensitive 0.094     Meropenem <=1 µg/ml Sensitive      Piperacillin/Tazobactam <=8 µg/ml Sensitive      Tobramycin <=2 µg/ml Sensitive      Trimeth/Sulfa <=2/38 µg/ml Sensitive                  Linear View         Gram stain [[id number]] Collected: 07/14/25 1156   Order Status: Completed Specimen: Bone from Toe, Left Foot Updated: 07/19/25 0237    GRAM STAIN Rare WBC seen     Moderate Gram positive cocci   Gram stain [[id number]] Collected: 07/14/25 1156   Order Status: Completed Specimen: Bone from Toe, Right Foot Updated: 07/15/25 0129    GRAM STAIN No WBCs     No organisms seen   Aerobic culture [[id number]] (Abnormal)  Collected: 07/14/25 1156   Order Status: Completed Specimen: Bone from Toe, Left Foot Updated: 07/17/25 1133    CULTURE, AEROBIC Few Staphylococcus aureus Abnormal    Susceptibility     Staphylococcus aureus     KWAME     Clindamycin <=0.5 µg/ml Sensitive     Erythromycin <=0.5 µg/ml Sensitive     Oxacillin 0.5 µg/ml Sensitive     Penicillin 8 µg/ml Resistant     Tetracycline <=4 µg/ml Sensitive     Trimeth/Sulfa <=0.5/9.5 µ... Sensitive                    Medications:  Reconciled Home Medications:      Medication List        PAUSE taking these medications      metoprolol succinate 25 MG 24 hr tablet  Wait to take  "this until your doctor or other care provider tells you to start again.  Commonly known as: TOPROL-XL  TAKE 1 TABLET BY MOUTH EVERY DAY            CONTINUE taking these medications      allopurinoL 300 MG tablet  Commonly known as: ZYLOPRIM  TAKE 1 TABLET BY MOUTH EVERY DAY     atorvastatin 40 MG tablet  Commonly known as: LIPITOR  TAKE 1 TABLET BY MOUTH EVERY DAY IN THE EVENING     carisoprodoL 350 MG tablet  Commonly known as: SOMA  Take 350 mg by mouth 4 (four) times daily as needed for Muscle spasms.     GAVILAX 17 gram/dose powder  Generic drug: polyethylene glycol  Measure 17g with cap and mix with liquid. Then take by mouth 2 (two) times daily.     hydroCHLOROthiazide 25 MG tablet  Commonly known as: HYDRODIURIL  TAKE 1 TABLET BY MOUTH EVERY DAY     HYDROcodone-acetaminophen  mg per tablet  Commonly known as: NORCO  Take 1 tablet by mouth every 6 (six) hours as needed.     hydrocortisone 2.5 % rectal cream  Commonly known as: ANUSOL-HC  Place rectally 2 (two) times daily.     insulin syringe-needle U-100 0.5 mL 31 gauge x 5/16" Syrg  Use along with ICI therapy.     levoFLOXacin 750 MG tablet  Commonly known as: LEVAQUIN  Take 1 tablet (750 mg total) by mouth once daily.     meloxicam 7.5 MG tablet  Commonly known as: MOBIC  TAKE 1 TABLET BY MOUTH EVERY DAY     * minocycline 100 MG capsule  Commonly known as: MINOCIN,DYNACIN  Take 1 capsule (100 mg total) by mouth 2 (two) times daily.     * minocycline 100 MG capsule  Commonly known as: MINOCIN,DYNACIN  Take 1 capsule (100 mg total) by mouth 2 (two) times daily.     mupirocin 2 % ointment  Commonly known as: BACTROBAN  Apply small amount to wounds with bandage changes     pantoprazole 40 MG tablet  Commonly known as: PROTONIX  TAKE 1 TABLET BY MOUTH TWICE A DAY           * This list has 2 medication(s) that are the same as other medications prescribed for you. Read the directions carefully, and ask your doctor or other care provider to review them with " you.                ASK your doctor about these medications      valsartan 320 MG tablet  Commonly known as: DIOVAN  TAKE 1 TABLET BY MOUTH ONCE DAILY.              Indwelling Lines/Drains at time of discharge:   Lines/Drains/Airways       None                       Time spent on the discharge of patient: 36 minutes         Ly Zamora PA-C  Department of Hospital Medicine  [facility] Surg

## 2025-07-27 NOTE — ASSESSMENT & PLAN NOTE
"Patient has paroxysmal (<7 days) atrial fibrillation. Patient is currently in sinus rhythm. [id number] Score: 1. The patients heart rate in the last 24 hours is as follows:  Pulse  Min: 57  Max: 89     Antiarrhythmics       Anticoagulants  enoxaparin injection 40 mg, Every 24 hours, Subcutaneous    Plan  - Replete lytes with a goal of K>4, Mg >2  - Patient is anticoagulated, see medications listed above.  - patient is unsure if he takes MTP, but it is "paused" on his MAR so will hold off for now  - Patient's afib is currently controlled  - Continue enoxaparin      "

## 2025-07-27 NOTE — PROGRESS NOTES
[facility] - Med Surg  Infectious Disease  Progress Note    Patient Name: Stevie Villalba  MRN: [id number]  Admission Date: 7/22/2025  Length of Stay: 5 days  Attending Physician: Sarmad Reis MD  Primary Care Provider: Ric Brambila MD    Isolation Status: Contact  Assessment/Plan:      ID  * Osteomyelitis of great toe of left foot  I have reviewed hospital notes from   service and other specialty providers. I have also reviewed CBC, CMP/BMP,  cultures and imaging with my interpretation as documented.      70 y.o. male with history of peripheral neuropathy and chronic bilateral foot wounds (ulcers to L hallux and R 2nd digit). Admitted for worsening bilat foot wounds with associated cellulitis of R foot. He has been followed by podiatry and infectious disease for management of foot wounds.     Recently started Minocycline and Levaquin on 7/20 for recent bone biopsy 7/14 that grew Proteus mirabilis and corynebacterium (R foot) and MSSA (L foot). Bone biopsy pathology 7/14 was positive for acute osteomyelitis of R 2nd toe, but negative for L hallux. MRI reveals osteomyelitis of R 2nd toe and L 1st toe, as well as septic arthritis and associated abscess of R 2nd toe. Podiatry consulted and s/p debridement on 7/23. Started on IV Vancomycin and Zosyn (completed 3 days), and transitioned to Vanc/CTX on 7/24. Stable at this time. Afebrile, WBC wnl, and no fever or chills. Cellulitis improving on IV antibiotics. I&D of R toe abscess on 7/23 and cultures no growth so far.      Recommendations / Plan:  D/c IV Vancomycin and Ceftriaxone  Restart Minocycline 100 mg PO BID (DALE: 9/1/25)  Restart Levoquin 750 mg PO daily (DALE: 9/1/25)  Educated patient on the importance of wound care and offloading.   Pt to f/u in ID clinic near [facility]    -- Discussed with ID staff and primary team, CESILIA Elam   -- ID will continue to follow w/ further recs.              Anticipated Disposition: see above    Thank you for your consult. I will  sign off. Please contact us if you have any additional questions.    Lindsay Callejas, NICOL  Infectious Disease  [facility] Surg    Subjective:     Principal Problem:Osteomyelitis of great toe of left foot    HPI: 70 y.o. male with history of HTN, HLD, insomnia, peripheral neuropathy, and chronic bilateral foot wounds (ulcers to L hallux and R 2nd digit). He has been followed by podiatry and infectious disease for management of foot wounds. He works as a  and on his feet for several hours a day. Recent bone biopsy of R 2nd toe and L great toe obtained in podiatry clinic on 7/14/25. Bone cultures are positive for corynebacterium and Proteus mirabilis (R foot) and MSSA (L foot). Pathology was positive for acute osteomyelitis of R 2nd toe, but negative for L hallux. Seen by podiatry and ID last week. He started oral antibiotics, Minocycline and Levaquin on Sunday (7/20/25) per ID. On 7/22/25, podiatry clinic referred patient to ER for bilateral foot wounds and associated cellulitis of R foot. No systemic symptoms such as fever, chills, nausea, or vomiting.     Afebrile. No leukocytosis. Blood cultures NGTD. XR b/l feet unremarkable. MRI reveals osteomyelitis of R 2nd toe and L 1st toe, as well as septic arthritis and associated abscess of R 2nd toe. Started on IV Vancomycin and Zosyn on 7/22. Podiatry consulted and performed bedside debridement 7/23/25. No surgical intervention recommended at this time. Cellulitis improved after two days of antibiotics.    First seen by ID in May 2025 for wounds. Prior L toe wound culture (4/23/25) grew MRSA for which he completed 2 weeks of Doxycycline/Minocycline. There was concern for osteomyelitis on imaging, but bone biopsy was negative for osteomyelitis and had no growth on culture. He continued to follow with podiatry and ID. Wound cultures in clinic on 6/9/25 grew Providencia and Pseudomonas, but suspected that reflected wound colonization given no active signs  of infection at the time. So no antibiotics given.    Interval History: Afebrile. WBC wnl. Wounds and cellulitis continue to improve on antibiotics.   Bone cultures + proteus, staph aureus, and corynebacterium  Review of Systems   Constitutional:  Negative for chills and fever.   Respiratory:  Negative for cough.    Cardiovascular:  Negative for chest pain.   Gastrointestinal:  Negative for abdominal pain, diarrhea, nausea and vomiting.   Musculoskeletal:  Positive for arthralgias.   Skin:  Positive for color change and wound.   All other systems reviewed and are negative.    Objective:     Vital Signs (Most Recent):  Temp: 98.4 °F (36.9 °C) (07/27/25 1119)  Pulse: 73 (07/27/25 1119)  Resp: 18 (07/27/25 1119)  BP: (!) 168/76 (07/27/25 1119)  SpO2: 98 % (07/27/25 1119) Vital Signs (24h Range):  Temp:  [97.6 °F (36.4 °C)-99.1 °F (37.3 °C)] 98.4 °F (36.9 °C)  Pulse:  [57-89] 73  Resp:  [16-18] 18  SpO2:  [94 %-98 %] 98 %  BP: (131-181)/(71-96) 168/76     Weight: 107 kg (236 lb)  Body mass index is 33.86 kg/m².    Estimated Creatinine Clearance: 61 mL/min (based on SCr of 1.4 mg/dL).     Physical Exam  Vitals and nursing note reviewed.   Constitutional:       General: He is not in acute distress.     Appearance: Normal appearance. He is not ill-appearing.   HENT:      Head: Normocephalic and atraumatic.   Eyes:      Extraocular Movements: Extraocular movements intact.      Conjunctiva/sclera: Conjunctivae normal.      Pupils: Pupils are equal, round, and reactive to light.   Cardiovascular:      Rate and Rhythm: Normal rate and regular rhythm.      Pulses: Normal pulses.      Heart sounds: Normal heart sounds. No murmur heard.  Pulmonary:      Effort: Pulmonary effort is normal. No respiratory distress.      Breath sounds: Normal breath sounds.   Abdominal:      General: Abdomen is flat. Bowel sounds are normal.      Tenderness: There is no abdominal tenderness.   Musculoskeletal:         General: Normal range of  "motion.      Cervical back: Normal range of motion.      Right lower leg: No edema.      Left lower leg: No edema.   Skin:     General: Skin is warm and dry.      Capillary Refill: Capillary refill takes less than 2 seconds.      Findings: Lesion present.      Comments: BLE wrapped in clean, dry bandages   Neurological:      General: No focal deficit present.      Mental Status: He is alert and oriented to person, place, and time.      Cranial Nerves: No cranial nerve deficit.   Psychiatric:         Mood and Affect: Mood normal.         Behavior: Behavior normal.          Significant Labs: Blood Culture: No results for input(s): "LABBLOO" in the last 4320 hours.  CBC:   No results for input(s): "WBC", "HGB", "HCT", "PLT" in the last 48 hours.    CMP:   Recent Labs   Lab 07/26/25  1021 07/27/25  0733    143   K 3.9 4.1    107   CO2 28 29   GLU 88 95   BUN 17 19   CREATININE 1.5* 1.4   CALCIUM 9.3 8.9   PROT 7.6 6.9   ALBUMIN 3.4* 3.1*   BILITOT 0.4 0.3   ALKPHOS 102 97   AST 33 29   ALT 20 19   ANIONGAP 8 7*     Microbiology Results (last 7 days)       Procedure Component Value Units Date/Time    Aerobic culture [[id number]]  (Abnormal) Collected: 07/23/25 1331    Order Status: Completed Specimen: Abscess from Toe, Right Foot Updated: 07/26/25 1658     CULTURE, AEROBIC Rare Staphylococcus aureus     Comment: Susceptibility pending       Blood culture #2 **CANNOT BE ORDERED STAT** [[id number]]  (Normal) Collected: 07/22/25 1320    Order Status: Completed Specimen: Blood from Peripheral, Antecubital, Left Updated: 07/26/25 1401     Blood Culture No Growth After 96 hours    Blood culture #1 **CANNOT BE ORDERED STAT** [[id number]]  (Normal) Collected: 07/22/25 1206    Order Status: Completed Specimen: Blood from Peripheral, Antecubital, Right Updated: 07/26/25 1401     Blood Culture No Growth After 96 hours    Culture, Anaerobic [[id number]] Collected: 07/23/25 1331    Order Status: Completed Specimen: " "Abscess from Toe, Right Foot Updated: 07/25/25 1440     Anaerobe Culture Culture In Progress    Gram stain [[id number]] Collected: 07/23/25 1331    Order Status: Completed Specimen: Abscess from Toe, Right Foot Updated: 07/24/25 0151     GRAM STAIN No WBCs      No organisms seen    Gram stain [[id number]]     Order Status: Canceled Specimen: Wound from Toe, Right Foot     Aerobic culture [[id number]]     Order Status: Canceled Specimen: Wound from Toe, Right Foot     Culture, Anaerobic [[id number]]     Order Status: Canceled Specimen: Wound from Toe, Right Foot           Wound Culture: No results for input(s): "LABAERO" in the last 4320 hours.    Significant Imaging: I have reviewed all pertinent imaging results/findings within the past 24 hours.    I spent a total of 50 minutes on the day of the visit.This includes face to face time and non-face to face time preparing to see the patient (eg, review of tests), obtaining and/or reviewing separately obtained history, documenting clinical information in the electronic or other health record, independently interpreting results and communicating results to the patient/family/caregiver, or care coordinator     "

## 2025-07-27 NOTE — PLAN OF CARE
Problem: Hospitalized Older Adult  Goal: Optimal Cognitive Function  Outcome: Met  Goal: Effective Bowel Elimination  Outcome: Met  Goal: Optimal Coping  Outcome: Met  Goal: Fluid and Electrolyte Balance  Outcome: Met  Goal: Optimal Functional Ability  Outcome: Met  Goal: Improved Oral Intake  Outcome: Met  Goal: Adequate Sleep/Rest  Outcome: Met  Goal: Effective Urinary Elimination  Outcome: Met     Problem: Adult Inpatient Plan of Care  Goal: Plan of Care Review  Outcome: Met  Goal: Patient-Specific Goal (Individualized)  Outcome: Met  Goal: Absence of Hospital-Acquired Illness or Injury  Outcome: Met  Goal: Optimal Comfort and Wellbeing  Outcome: Met  Goal: Readiness for Transition of Care  Outcome: Met     Problem: Wound  Goal: Optimal Coping  Outcome: Met  Goal: Optimal Functional Ability  Outcome: Met  Goal: Absence of Infection Signs and Symptoms  Outcome: Met  Goal: Improved Oral Intake  Outcome: Met  Goal: Optimal Pain Control and Function  Outcome: Met  Goal: Skin Health and Integrity  Outcome: Met  Goal: Optimal Wound Healing  Outcome: Met     Problem: Infection  Goal: Absence of Infection Signs and Symptoms  Outcome: Met     Problem: Acute Kidney Injury/Impairment  Goal: Fluid and Electrolyte Balance  Outcome: Met  Goal: Improved Oral Intake  Outcome: Met  Goal: Effective Renal Function  Outcome: Met

## 2025-07-27 NOTE — PROGRESS NOTES
Pharmacokinetic Assessment Follow Up: IV Vancomycin    Vancomycin serum concentration assessment(s):    The random level was drawn correctly and can be used to guide therapy at this time. The measurement is below the desired definitive target range of 15 to 20 mcg/mL.    Vancomycin Regimen Plan:  Vancomycin random returned at 14.7 mcg/mL.  Goal 15-20 mcg/mL for osteomyelitis.  Scr 1 -> 1.3 -> 1.5 >1.4 mg/dL. Pulse dosing.   Give vancomycin 1250 x1 today.   VR with AM labs tomorrow, 7/28 (Anticipating ~ 15-16 hr level).         Drug levels (last 3 results):  Recent Labs   Lab Result Units 07/25/25  1343 07/26/25  1021 07/27/25  0733   Vancomycin Random ug/ml  --  14.9 14.7   Vancomycin Trough ug/ml 26.1*  --   --        Pharmacy will continue to follow and monitor vancomycin.    Please contact pharmacy at extension [phone] for questions regarding this assessment.    Thank you for the consult,   Emma Umaña       Patient brief summary:  Stevie Villalba is a 70 y.o. male initiated on antimicrobial therapy with IV Vancomycin for treatment of bone/joint infection      Drug Allergies:   Review of patient's allergies indicates:  No Known Allergies    Actual Body Weight:   107 kg    Renal Function:   Estimated Creatinine Clearance: 61 mL/min (based on SCr of 1.4 mg/dL).,     CBC (last 72 hours):  Recent Labs   Lab Result Units 07/25/25  0657   WBC K/uL 10.65   HGB gm/dL 12.8*   HCT % 38.2*   Platelet Count K/uL 250   Lymph % % 18.2   Mono % % 8.7   Eos % % 2.0   Basophil % % 0.6       Metabolic Panel (last 72 hours):  Recent Labs   Lab Result Units 07/25/25  0657 07/26/25  1021 07/27/25  0733   Sodium mmol/L 143 142 143   Potassium mmol/L 3.9 3.9 4.1   Chloride mmol/L 109 106 107   CO2 mmol/L 26 28 29   Glucose mg/dL 93 88 95   BUN mg/dL 16 17 19   Creatinine mg/dL 1.3 1.5* 1.4   Albumin g/dL 2.9* 3.4* 3.1*   Bilirubin Total mg/dL 0.3 0.4 0.3   ALP unit/L 84 102 97   AST unit/L 21 33 29   ALT unit/L 13 20 19    Magnesium  mg/dL 1.9  --   --    Phosphorus Level mg/dL 4.3  --   --        Vancomycin Administrations:  vancomycin given in the last 96 hours                     vancomycin 1,250 mg in 0.9% NaCl 250 mL IVPB (admixture device) (mg) 1,250 mg New Bag 07/26/25 1245    vancomycin 1,250 mg in 0.9% NaCl 250 mL IVPB (admixture device) (mg) 1,250 mg New Bag 07/25/25 0315     1,250 mg New Bag 07/24/25 1535     1,250 mg New Bag  0332    vancomycin 1,500 mg in 0.9% NaCl 250 mL IVPB (admixture device) (mg) 1,500 mg New Bag 07/23/25 1644                    Microbiologic Results:  Microbiology Results (last 7 days)       Procedure Component Value Units Date/Time    Aerobic culture [[id number]]  (Abnormal) Collected: 07/23/25 1331    Order Status: Completed Specimen: Abscess from Toe, Right Foot Updated: 07/26/25 1658     CULTURE, AEROBIC Rare Staphylococcus aureus     Comment: Susceptibility pending       Blood culture #2 **CANNOT BE ORDERED STAT** [[id number]]  (Normal) Collected: 07/22/25 1320    Order Status: Completed Specimen: Blood from Peripheral, Antecubital, Left Updated: 07/26/25 1401     Blood Culture No Growth After 96 hours    Blood culture #1 **CANNOT BE ORDERED STAT** [[id number]]  (Normal) Collected: 07/22/25 1206    Order Status: Completed Specimen: Blood from Peripheral, Antecubital, Right Updated: 07/26/25 1401     Blood Culture No Growth After 96 hours    Culture, Anaerobic [[id number]] Collected: 07/23/25 1331    Order Status: Completed Specimen: Abscess from Toe, Right Foot Updated: 07/25/25 1440     Anaerobe Culture Culture In Progress    Gram stain [[id number]] Collected: 07/23/25 1331    Order Status: Completed Specimen: Abscess from Toe, Right Foot Updated: 07/24/25 0151     GRAM STAIN No WBCs      No organisms seen    Gram stain [[id number]]     Order Status: Canceled Specimen: Wound from Toe, Right Foot     Aerobic culture [[id number]]     Order Status: Canceled Specimen: Wound from Toe, Right Foot      Culture, Anaerobic [[id number]]     Order Status: Canceled Specimen: Wound from Toe, Right Foot

## 2025-07-27 NOTE — PLAN OF CARE
07/27/25 1340   Post-Acute Status   Post-Acute Authorization Home Health   Home Health Status Referrals Sent   Discharge Delays None known at this time   Discharge Plan   Discharge Plan A Home Health   Discharge Plan B Home Health       Discharge Plan A and Plan B have been determined by review of patient's clinical status, future medical and therapeutic needs, and coverage/benefits for post-acute care in coordination with multidisciplinary team members.     Met with patient  to review discharge recommendation of HH and is agreeable to plan, patient states no preference, ok with blast of agencies that accept insurance.     Patient/family provided list of facilities in-network with patient's payor plan. Providers that are owned, operated, or affiliated with [facility] are included on the list.     Notified that referral sent to below listed facilities from in-network list based on proximity to home/family support:   None       Patient/family instructed to identify preference.    Preferred Facility: (if more than 1, listed in order of descending preference)  None     If an additional preferred facility not listed above is identified, additional referral to be sent. If above facilities unable to accept, will send additional referrals to in-network providers.     Patient to have Acute care at home as well.       CM reviewed with PRASHANTH that HH is pending HH states  OK with discharge today pending HH acceptance.     Enriqueta Kraus RN  Case Management  [phone]

## 2025-07-27 NOTE — PLAN OF CARE
[facility]      HOME HEALTH ORDERS  FACE TO FACE ENCOUNTER    Patient Name: Stevie Villalba  YOB: 1955    PCP: Ric Brambila MD   PCP Address: [address] [facility] / [place] [address]  PCP Phone Number: [phone]  PCP Fax: [phone]    Encounter Date: 7/22/25    Admit to Home Health    Diagnoses:  Active Hospital Problems    Diagnosis  POA    *Osteomyelitis of great toe of left foot [M86.9]  Yes    DAMARIS (acute kidney injury) [N17.9]  Yes    Anemia [D64.9]  Yes    Osteomyelitis of second toe of right foot [M86.9]  Yes    Skin ulcers of foot, bilateral [L97.519, L97.529]  Yes    Gout [M10.9]  Yes     Chronic    Severe obstructive sleep apnea [G47.33]  Yes     Chronic    Paroxysmal atrial fibrillation [I48.0]  Yes     Chronic    Class 1 obesity with body mass index (BMI) of 31.0 to 31.9 in adult [E66.811, Z68.31]  Not Applicable     Chronic    Hypertriglyceridemia [E78.1]  Yes     Chronic    Primary hypertension [I10]  Yes     Chronic    Insomnia [G47.00]  Yes     Chronic      Resolved Hospital Problems   No resolved problems to display.       Follow Up Appointments:  Future Appointments   Date Time Provider Department Center   9/29/2025  8:20 AM Ric Brambila MD [facility] [place]       Allergies:Review of patient's allergies indicates:  No Known Allergies    Medications: Review discharge medications with patient and family and provide education.    Current Medications[1]     Medication List        PAUSE taking these medications      metoprolol succinate 25 MG 24 hr tablet  Wait to take this until your doctor or other care provider tells you to start again.  Commonly known as: TOPROL-XL  TAKE 1 TABLET BY MOUTH EVERY DAY            CONTINUE taking these medications      allopurinoL 300 MG tablet  Commonly known as: ZYLOPRIM  TAKE 1 TABLET BY MOUTH EVERY DAY     atorvastatin 40 MG tablet  Commonly known as: LIPITOR  TAKE 1 TABLET BY MOUTH EVERY DAY IN THE EVENING     carisoprodoL 350 MG  "tablet  Commonly known as: SOMA  Take 350 mg by mouth 4 (four) times daily as needed for Muscle spasms.     GAVILAX 17 gram/dose powder  Generic drug: polyethylene glycol  Measure 17g with cap and mix with liquid. Then take by mouth 2 (two) times daily.     hydroCHLOROthiazide 25 MG tablet  Commonly known as: HYDRODIURIL  TAKE 1 TABLET BY MOUTH EVERY DAY     HYDROcodone-acetaminophen  mg per tablet  Commonly known as: NORCO  Take 1 tablet by mouth every 6 (six) hours as needed.     hydrocortisone 2.5 % rectal cream  Commonly known as: ANUSOL-HC  Place rectally 2 (two) times daily.     insulin syringe-needle U-100 0.5 mL 31 gauge x 5/16" Syrg  Use along with ICI therapy.     levoFLOXacin 750 MG tablet  Commonly known as: LEVAQUIN  Take 1 tablet (750 mg total) by mouth once daily.     meloxicam 7.5 MG tablet  Commonly known as: MOBIC  TAKE 1 TABLET BY MOUTH EVERY DAY     * minocycline 100 MG capsule  Commonly known as: MINOCIN,DYNACIN  Take 1 capsule (100 mg total) by mouth 2 (two) times daily.     * minocycline 100 MG capsule  Commonly known as: MINOCIN,DYNACIN  Take 1 capsule (100 mg total) by mouth 2 (two) times daily.     mupirocin 2 % ointment  Commonly known as: BACTROBAN  Apply small amount to wounds with bandage changes     pantoprazole 40 MG tablet  Commonly known as: PROTONIX  TAKE 1 TABLET BY MOUTH TWICE A DAY           * This list has 2 medication(s) that are the same as other medications prescribed for you. Read the directions carefully, and ask your doctor or other care provider to review them with you.                ASK your doctor about these medications      valsartan 320 MG tablet  Commonly known as: DIOVAN  TAKE 1 TABLET BY MOUTH ONCE DAILY.                I have seen and examined this patient within the last 30 days. My clinical findings that support the need for the home health skilled services and home bound status are the following:no   Weakness/numbness causing balance and gait " disturbance due to Infection making it taxing to leave home.     Diet:   cardiac diet    Labs:  SN to perform labs:  BMP: in 3 days; once and Report Lab results to PCP.    Referrals/ Consults  Physical Therapy to evaluate and treat. Evaluate for home safety and equipment needs; Establish/upgrade home exercise program. Perform / instruct on therapeutic exercises, gait training, transfer training, and Range of Motion.  Occupational Therapy to evaluate and treat. Evaluate home environment for safety and equipment needs. Perform/Instruct on transfers, ADL training, ROM, and therapeutic exercises.  Aide to provide assistance with personal care, ADLs, and vital signs.    Activities:   activity as tolerated WBAT in darco shoes    Nursing:   Agency to admit patient within 24 hours of hospital discharge unless specified on physician order or at patient request    SN to complete comprehensive assessment including routine vital signs. Instruct on disease process and s/s of complications to report to MD. Review/verify medication list sent home with the patient at time of discharge  and instruct patient/caregiver as needed. Frequency may be adjusted depending on start of care date.     Skilled nurse to perform up to 3 visits PRN for symptoms related to diagnosis    Notify MD if SBP > 160 or < 90; DBP > 90 or < 50; HR > 120 or < 50; Temp > 101; O2 < 88%; Other:       Ok to schedule additional visits based on staff availability and patient request on consecutive days within the home health episode.    When multiple disciplines ordered:    Start of Care occurs on Sunday - Wednesday schedule remaining discipline evaluations as ordered on separate consecutive days following the start of care.    Thursday SOC -schedule subsequent evaluations Friday and Monday the following week.     Friday - Saturday SOC - schedule subsequent discipline evaluations on consecutive days starting Monday of the following week.    For all post-discharge  communication and subsequent orders please contact patient's primary care physician.       Home Health Aide:  Nursing Three times weekly, Physical Therapy Three times weekly, Occupational Therapy Three times weekly, and Home Health Aide Three times weekly    Wound Care Orders  yes:  -HH/SNF to do dressings 2-3 times a week as follows:  Dress L hallux and R second digit wounds with Hydrofera blue, followed by Kerlix, and secure with ACE wrap    I certify that this patient is confined to his home and needs physical therapy and occupational therapy.               [1]   Current Facility-Administered Medications   Medication Dose Route Frequency Provider Last Rate Last Admin    acetaminophen tablet 650 mg  650 mg Oral Q4H PRN Ileana Mcmillan PA-C        albuterol-ipratropium 2.5 mg-0.5 mg/3 mL nebulizer solution 3 mL  3 mL Nebulization Q4H PRN Ileana Mcmillan PA-C        allopurinoL tablet 300 mg  300 mg Oral Daily Ileana Mcmillan PA-C   300 mg at 07/27/25 0949    aluminum-magnesium hydroxide-simethicone 200-200-20 mg/5 mL suspension 30 mL  30 mL Oral QID PRN Ileana Mcmillan PA-C        atorvastatin tablet 40 mg  40 mg Oral QHS Ileana Mcmillan PA-C   40 mg at 07/26/25 2224    bisacodyL suppository 10 mg  10 mg Rectal Daily PRN Ileana Mcmillan PA-C        carisoprodoL tablet 350 mg  350 mg Oral QID PRN Ileana Mcmillan PA-C        cefTRIAXone injection 2 g  2 g Intravenous Q24H Elis Murrell PA-C   2 g at 07/26/25 1511    dextrose 50% injection 12.5 g  12.5 g Intravenous PRN Ileana Mcmillan PA-C        dextrose 50% injection 25 g  25 g Intravenous PRN Ileana Mcmillan PA-C        enoxaparin injection 40 mg  40 mg Subcutaneous Daily Ileana Mcmillan PA-C   40 mg at 07/26/25 1634    glucagon (human recombinant) injection 1 mg  1 mg Intramuscular PRN Ileana Mcmillan PA-C        glucose chewable tablet 16 g  16 g Oral PRN Ileana Mcmillan PA-C        glucose chewable tablet 24 g  24 g Oral PRN  Ileana Mcmillan, LOPEZ        melatonin tablet 6 mg  6 mg Oral Nightly PRN Ileana Mcmillan, LOPEZ        mupirocin 2 % ointment   Nasal BID Baumgarten, Katherine L., MD   Given at 07/27/25 0956    naloxone 0.4 mg/mL injection 0.02 mg  0.02 mg Intravenous PRN Ileana Mcmillan PA-C        ondansetron injection 4 mg  4 mg Intravenous Q8H PRN Ileana Mcmillan, LOPEZ        oxyCODONE immediate release tablet 5 mg  5 mg Oral Q6H PRN Ileana Mcmillan, LOPEZ        pantoprazole EC tablet 40 mg  40 mg Oral BID Ileana Mcmillan PA-C   40 mg at 07/27/25 0950    polyethylene glycol packet 17 g  17 g Oral Daily PRN Ileana Mcmillan PA-C        prochlorperazine injection Soln 5 mg  5 mg Intravenous Q6H PRN Ileana Mcmillan, LOPEZ        simethicone chewable tablet 80 mg  1 tablet Oral QID PRN Ileana Mcmillan, LOPEZ        sodium chloride 0.9% flush 10 mL  10 mL Intravenous Q8H PRN Ileana Mcmillan PA-C        vancomycin - pharmacy to dose   Intravenous pharmacy to manage frequency Steven Marcial MD        vancomycin 1,250 mg in 0.9% NaCl 250 mL IVPB (admixture device)  1,250 mg Intravenous Once Sarmad eRis MD   Stopped at 07/27/25 9186

## 2025-07-27 NOTE — ASSESSMENT & PLAN NOTE
Anemia is likely due to Iron deficiency. Most recent hemoglobin and hematocrit are listed below.  Recent Labs     07/25/25  0657   HGB 12.8*   HCT 38.2*     Plan  - Monitor serial CBC: Daily  - Transfuse PRBC if patient becomes hemodynamically unstable, symptomatic or H/H drops below 7/21.  - Patient has not received any PRBC transfusions to date  - Patient's anemia is currently stable

## 2025-07-27 NOTE — ASSESSMENT & PLAN NOTE
Patients blood pressure range in the last 24 hours was: BP  Min: 107/53  Max: 181/96.The patient's inpatient anti-hypertensive regimen is listed below:  Current Antihypertensives       Plan  - BP is controlled, no changes needed to their regimen

## 2025-07-27 NOTE — PLAN OF CARE
[facility] - Med Surg  Discharge Final Note    Primary Care Provider: Ric Brambila MD    Expected Discharge Date: 7/27/2025    Final Discharge Note (most recent)       Final Note - 07/27/25 1558          Final Note    Assessment Type Final Discharge Note     Anticipated Discharge Disposition Home-Health Care [facility]     What phone number can be called within the next 1-3 days to see how you are doing after discharge? [phone]     Hospital Resources/Appts/Education Provided --   [facility]       Post-Acute Status    Post-Acute Authorization Home Health     Home Health Status Referrals Sent     Patient choice form signed by patient/caregiver List from System Post-Acute Care     Discharge Delays None known at this time                     Important Message from Medicare             Contact Info       [facility] Provider   Specialty: Internal Medicine    [address]  [address]  [place] [address]       Next Steps: Follow up    Ric Brambila MD   Specialty: Internal Medicine   Relationship: PCP - General    [address] [facility]  [place] [address]   Phone: [phone]       Next Steps: Follow up in 1 week(s)          Discharge Plan A and Plan B have been determined by review of patient's clinical status, future medical and therapeutic needs, and coverage/benefits for post-acute care in coordination with multidisciplinary team members.     Future Appointments   Date Time Provider Department Center   9/29/2025  8:20 AM Ric Brambila MD [facility]        Cm spoke with patent at bedside, reviewed POC is agree bale with discharge, HH pending set up, PRASHANTH ok with discharge, will have AC@H to see patient post discharge. No additional CM needs at this time. Bedside/virtual nurse to review discharge paperwork and instructions.     Enriqueta Kraus RN  Case Management  [phone]

## 2025-07-27 NOTE — ASSESSMENT & PLAN NOTE
- Ulcerations to L 1st digit and R 2nd digit, erythema extending up R leg concerning for cellulitis  -MRI feet shows osteo of L 1st toe, R 2nd toe.  - Continue Vancomycin and Ceftriaxone.   -ID consulted and following. Awaiting culture results for final recs  - podiatry consulted, bedside debridement performed, no further intervention planned  - d/c on minocycline and levaquin until 9/1  - follow up in ID clinic  -  wound care

## 2025-07-27 NOTE — ASSESSMENT & PLAN NOTE
DAMARIS is likely due to pre-renal azotemia due to intravascular volume depletion. Baseline creatinine is 1. Most recent creatinine and eGFR are listed below.  Recent Labs     07/25/25  0657 07/26/25  1021 07/27/25  0733   CREATININE 1.3 1.5* 1.4   EGFRNORACEVR 59* 50* 54*      Plan  - DAMARIS is worsening. Will hold valsartan and give 1L IVF  - Avoid nephrotoxins and renally dose meds for GFR listed above  - Monitor urine output, serial BMP, and adjust therapy as needed  - given additional 1L of IVF at discharge, patient to hold valsartan  - acute care at home will follow DAMARIS at home

## 2025-07-27 NOTE — PROGRESS NOTES
Pharmacokinetic Assessment Follow Up: IV Vancomycin    Therapy with vancomycin complete and/or consult discontinued by provider. Pharmacy will sign off, please re-consult as needed.    Saige Collins (Lillian), PharmD

## 2025-07-27 NOTE — DISCHARGE INSTRUCTIONS
Our goal at [facility] is to always give you outstanding care and exceptional service. You may receive a survey from [email or web address] by mail, text or e-mail in the next 24-48 hours asking about the care you received with us. The survey should only take 5-10 minutes to complete and is very important to us.     Your feedback provides us with a way to recognize our staff who work tirelessly to provide the best care! Also, your responses help us learn how to improve when your experience was below our aspiration of excellence. We are always looking for ways to improve your stay. We WILL use your feedback to continue making improvements to help us provide the highest quality care. We keep your personal information and feedback confidential. We appreciate your time completing this survey and can't wait to hear from you!!!    We look forward to your continued care with us! Thanks so much for choosing [facility] for your healthcare needs!

## 2025-07-27 NOTE — SUBJECTIVE & OBJECTIVE
Interval History: Afebrile. WBC wnl. Wounds and cellulitis continue to improve on antibiotics.   Bone cultures + proteus, staph aureus, and corynebacterium  Review of Systems   Constitutional:  Negative for chills and fever.   Respiratory:  Negative for cough.    Cardiovascular:  Negative for chest pain.   Gastrointestinal:  Negative for abdominal pain, diarrhea, nausea and vomiting.   Musculoskeletal:  Positive for arthralgias.   Skin:  Positive for color change and wound.   All other systems reviewed and are negative.    Objective:     Vital Signs (Most Recent):  Temp: 98.4 °F (36.9 °C) (07/27/25 1119)  Pulse: 73 (07/27/25 1119)  Resp: 18 (07/27/25 1119)  BP: (!) 168/76 (07/27/25 1119)  SpO2: 98 % (07/27/25 1119) Vital Signs (24h Range):  Temp:  [97.6 °F (36.4 °C)-99.1 °F (37.3 °C)] 98.4 °F (36.9 °C)  Pulse:  [57-89] 73  Resp:  [16-18] 18  SpO2:  [94 %-98 %] 98 %  BP: (131-181)/(71-96) 168/76     Weight: 107 kg (236 lb)  Body mass index is 33.86 kg/m².    Estimated Creatinine Clearance: 61 mL/min (based on SCr of 1.4 mg/dL).     Physical Exam  Vitals and nursing note reviewed.   Constitutional:       General: He is not in acute distress.     Appearance: Normal appearance. He is not ill-appearing.   HENT:      Head: Normocephalic and atraumatic.   Eyes:      Extraocular Movements: Extraocular movements intact.      Conjunctiva/sclera: Conjunctivae normal.      Pupils: Pupils are equal, round, and reactive to light.   Cardiovascular:      Rate and Rhythm: Normal rate and regular rhythm.      Pulses: Normal pulses.      Heart sounds: Normal heart sounds. No murmur heard.  Pulmonary:      Effort: Pulmonary effort is normal. No respiratory distress.      Breath sounds: Normal breath sounds.   Abdominal:      General: Abdomen is flat. Bowel sounds are normal.      Tenderness: There is no abdominal tenderness.   Musculoskeletal:         General: Normal range of motion.      Cervical back: Normal range of motion.       "Right lower leg: No edema.      Left lower leg: No edema.   Skin:     General: Skin is warm and dry.      Capillary Refill: Capillary refill takes less than 2 seconds.      Findings: Lesion present.      Comments: BLE wrapped in clean, dry bandages   Neurological:      General: No focal deficit present.      Mental Status: He is alert and oriented to person, place, and time.      Cranial Nerves: No cranial nerve deficit.   Psychiatric:         Mood and Affect: Mood normal.         Behavior: Behavior normal.          Significant Labs: Blood Culture: No results for input(s): "LABBLOO" in the last 4320 hours.  CBC:   No results for input(s): "WBC", "HGB", "HCT", "PLT" in the last 48 hours.    CMP:   Recent Labs   Lab 07/26/25  1021 07/27/25  0733    143   K 3.9 4.1    107   CO2 28 29   GLU 88 95   BUN 17 19   CREATININE 1.5* 1.4   CALCIUM 9.3 8.9   PROT 7.6 6.9   ALBUMIN 3.4* 3.1*   BILITOT 0.4 0.3   ALKPHOS 102 97   AST 33 29   ALT 20 19   ANIONGAP 8 7*     Microbiology Results (last 7 days)       Procedure Component Value Units Date/Time    Aerobic culture [[id number]]  (Abnormal) Collected: 07/23/25 1331    Order Status: Completed Specimen: Abscess from Toe, Right Foot Updated: 07/26/25 1658     CULTURE, AEROBIC Rare Staphylococcus aureus     Comment: Susceptibility pending       Blood culture #2 **CANNOT BE ORDERED STAT** [[id number]]  (Normal) Collected: 07/22/25 1320    Order Status: Completed Specimen: Blood from Peripheral, Antecubital, Left Updated: 07/26/25 1401     Blood Culture No Growth After 96 hours    Blood culture #1 **CANNOT BE ORDERED STAT** [[id number]]  (Normal) Collected: 07/22/25 1206    Order Status: Completed Specimen: Blood from Peripheral, Antecubital, Right Updated: 07/26/25 1401     Blood Culture No Growth After 96 hours    Culture, Anaerobic [[id number]] Collected: 07/23/25 1331    Order Status: Completed Specimen: Abscess from Toe, Right Foot Updated: 07/25/25 1440     " "Anaerobe Culture Culture In Progress    Gram stain [[id number]] Collected: 07/23/25 1331    Order Status: Completed Specimen: Abscess from Toe, Right Foot Updated: 07/24/25 0151     GRAM STAIN No WBCs      No organisms seen    Gram stain [[id number]]     Order Status: Canceled Specimen: Wound from Toe, Right Foot     Aerobic culture [[id number]]     Order Status: Canceled Specimen: Wound from Toe, Right Foot     Culture, Anaerobic [[id number]]     Order Status: Canceled Specimen: Wound from Toe, Right Foot           Wound Culture: No results for input(s): "LABAERO" in the last 4320 hours.    Significant Imaging: I have reviewed all pertinent imaging results/findings within the past 24 hours.    I spent a total of 50 minutes on the day of the visit.This includes face to face time and non-face to face time preparing to see the patient (eg, review of tests), obtaining and/or reviewing separately obtained history, documenting clinical information in the electronic or other health record, independently interpreting results and communicating results to the patient/family/caregiver, or care coordinator     "

## 2025-07-27 NOTE — ASSESSMENT & PLAN NOTE
I have reviewed hospital notes from   service and other specialty providers. I have also reviewed CBC, CMP/BMP,  cultures and imaging with my interpretation as documented.      70 y.o. male with history of peripheral neuropathy and chronic bilateral foot wounds (ulcers to L hallux and R 2nd digit). Admitted for worsening bilat foot wounds with associated cellulitis of R foot. He has been followed by podiatry and infectious disease for management of foot wounds.     Recently started Minocycline and Levaquin on 7/20 for recent bone biopsy 7/14 that grew Proteus mirabilis and corynebacterium (R foot) and MSSA (L foot). Bone biopsy pathology 7/14 was positive for acute osteomyelitis of R 2nd toe, but negative for L hallux. MRI reveals osteomyelitis of R 2nd toe and L 1st toe, as well as septic arthritis and associated abscess of R 2nd toe. Podiatry consulted and s/p debridement on 7/23. Started on IV Vancomycin and Zosyn (completed 3 days), and transitioned to Vanc/CTX on 7/24. Stable at this time. Afebrile, WBC wnl, and no fever or chills. Cellulitis improving on IV antibiotics. I&D of R toe abscess on 7/23 and cultures no growth so far.      Recommendations / Plan:  D/c IV Vancomycin and Ceftriaxone  Restart Minocycline 100 mg PO BID (DALE: 9/1/25)  Restart Levoquin 750 mg PO daily (DALE: 9/1/25)  Educated patient on the importance of wound care and offloading.   Pt to f/u in ID clinic near [facility]    -- Discussed with ID staff and primary team   -- ID will continue to follow w/ further recs.

## 2025-07-28 ENCOUNTER — PATIENT MESSAGE (OUTPATIENT)
Dept: PODIATRY | Facility: CLINIC | Age: 70
End: 2025-07-28
Payer: MEDICARE

## 2025-07-28 ENCOUNTER — PATIENT OUTREACH (OUTPATIENT)
Dept: ADMINISTRATIVE | Facility: CLINIC | Age: 70
End: 2025-07-28
Payer: MEDICARE

## 2025-07-28 ENCOUNTER — TELEPHONE (OUTPATIENT)
Dept: PODIATRY | Facility: CLINIC | Age: 70
End: 2025-07-28
Payer: MEDICARE

## 2025-07-28 ENCOUNTER — TELEPHONE (OUTPATIENT)
Dept: INTERNAL MEDICINE | Facility: CLINIC | Age: 70
End: 2025-07-28
Payer: MEDICARE

## 2025-07-28 ENCOUNTER — PATIENT MESSAGE (OUTPATIENT)
Dept: INTERNAL MEDICINE | Facility: CLINIC | Age: 70
End: 2025-07-28
Payer: MEDICARE

## 2025-07-28 LAB — BACTERIA SPEC AEROBE CULT: ABNORMAL

## 2025-07-28 NOTE — PROGRESS NOTES
C3 nurse spoke with Stevie Villalba for a TCC post hospital discharge follow up call. The patient has a scheduled HOSFU appointment with Elida Hinojosa  on 7/29/25 @ 8am.

## 2025-07-28 NOTE — TELEPHONE ENCOUNTER
Attempted to contact patient to schedule a F/U appt. per message as follows below, unable to leave a vm message due to pt. Phone continuing to ring w/no answer  An appt. has been scheduled for patient w/Dr. Cabral(Podiatry) and verbally confirmed by patient daughter(Mary Kay)              ----- Message from Aurelia Hope sent at 7/27/2025  8:05 PM CDT -----  Good evening,      This is a clinic patient of Dr Cabral who we admitted to the hospital for IV abx. He is now discharged, could we schedule him for a follow up within 1-2 weeks for a wound check?    Thanks,  Charles

## 2025-07-28 NOTE — TELEPHONE ENCOUNTER
----- Message from DA Main sent at 7/28/2025 10:46 AM CDT -----  Regarding: BP meds on hold  Pt discharged on 7/27/25 for osteomyelitis of LE. Pt was told to hold BP medication. Pt concerned of BP issues. Please assist.    Thank you,  Etelvina Snow RN  C3

## 2025-07-29 ENCOUNTER — OFFICE VISIT (OUTPATIENT)
Dept: HOME HEALTH SERVICES | Facility: CLINIC | Age: 70
End: 2025-07-29
Payer: MEDICARE

## 2025-07-29 DIAGNOSIS — I10 PRIMARY HYPERTENSION: Chronic | ICD-10-CM

## 2025-07-29 DIAGNOSIS — E66.811 CLASS 1 OBESITY WITH BODY MASS INDEX (BMI) OF 31.0 TO 31.9 IN ADULT, UNSPECIFIED OBESITY TYPE, UNSPECIFIED WHETHER SERIOUS COMORBIDITY PRESENT: Chronic | ICD-10-CM

## 2025-07-29 DIAGNOSIS — I48.0 PAROXYSMAL ATRIAL FIBRILLATION: Chronic | ICD-10-CM

## 2025-07-29 DIAGNOSIS — L97.519 SKIN ULCERS OF FOOT, BILATERAL: Primary | ICD-10-CM

## 2025-07-29 DIAGNOSIS — L97.529 SKIN ULCERS OF FOOT, BILATERAL: Primary | ICD-10-CM

## 2025-07-29 PROCEDURE — 3288F FALL RISK ASSESSMENT DOCD: CPT | Mod: CPTII,S$GLB,, | Performed by: NURSE PRACTITIONER

## 2025-07-29 PROCEDURE — 4010F ACE/ARB THERAPY RXD/TAKEN: CPT | Mod: CPTII,S$GLB,, | Performed by: NURSE PRACTITIONER

## 2025-07-29 PROCEDURE — 1126F AMNT PAIN NOTED NONE PRSNT: CPT | Mod: CPTII,S$GLB,, | Performed by: NURSE PRACTITIONER

## 2025-07-29 PROCEDURE — 3044F HG A1C LEVEL LT 7.0%: CPT | Mod: CPTII,S$GLB,, | Performed by: NURSE PRACTITIONER

## 2025-07-29 PROCEDURE — 1101F PT FALLS ASSESS-DOCD LE1/YR: CPT | Mod: CPTII,S$GLB,, | Performed by: NURSE PRACTITIONER

## 2025-07-29 PROCEDURE — 3079F DIAST BP 80-89 MM HG: CPT | Mod: CPTII,S$GLB,, | Performed by: NURSE PRACTITIONER

## 2025-07-29 PROCEDURE — 3077F SYST BP >= 140 MM HG: CPT | Mod: CPTII,S$GLB,, | Performed by: NURSE PRACTITIONER

## 2025-07-29 PROCEDURE — 99496 TRANSJ CARE MGMT HIGH F2F 7D: CPT | Mod: S$GLB,,, | Performed by: NURSE PRACTITIONER

## 2025-07-29 NOTE — TELEPHONE ENCOUNTER
Spoke to pt and his BP readings yesterday were   147/86 on 7/28/25  154/86 on 7/28/25    His HH nurse took these BP's     He doesn't have any other readings

## 2025-07-29 NOTE — TELEPHONE ENCOUNTER
Spoke to pt and let him know that he is to restart his Valsartan 320 and to continue to hold his Toprol and HCTZ. He repeated it back to me and understood

## 2025-07-31 ENCOUNTER — RESULTS FOLLOW-UP (OUTPATIENT)
Dept: INTERNAL MEDICINE | Facility: CLINIC | Age: 70
End: 2025-07-31

## 2025-07-31 ENCOUNTER — LAB VISIT (OUTPATIENT)
Dept: LAB | Facility: HOSPITAL | Age: 70
End: 2025-07-31
Attending: INTERNAL MEDICINE
Payer: MEDICARE

## 2025-07-31 ENCOUNTER — OFFICE VISIT (OUTPATIENT)
Dept: PODIATRY | Facility: CLINIC | Age: 70
End: 2025-07-31
Payer: MEDICARE

## 2025-07-31 ENCOUNTER — OFFICE VISIT (OUTPATIENT)
Dept: INTERNAL MEDICINE | Facility: CLINIC | Age: 70
End: 2025-07-31
Payer: MEDICARE

## 2025-07-31 VITALS
HEIGHT: 70 IN | SYSTOLIC BLOOD PRESSURE: 125 MMHG | WEIGHT: 231.06 LBS | DIASTOLIC BLOOD PRESSURE: 72 MMHG | HEART RATE: 68 BPM | BODY MASS INDEX: 33.08 KG/M2

## 2025-07-31 VITALS
WEIGHT: 231.06 LBS | DIASTOLIC BLOOD PRESSURE: 82 MMHG | SYSTOLIC BLOOD PRESSURE: 120 MMHG | HEART RATE: 63 BPM | TEMPERATURE: 98 F | RESPIRATION RATE: 10 BRPM | OXYGEN SATURATION: 97 % | BODY MASS INDEX: 33.08 KG/M2 | HEIGHT: 70 IN

## 2025-07-31 DIAGNOSIS — G60.9 IDIOPATHIC PERIPHERAL NEUROPATHY: ICD-10-CM

## 2025-07-31 DIAGNOSIS — N17.9 AKI (ACUTE KIDNEY INJURY): ICD-10-CM

## 2025-07-31 DIAGNOSIS — Z09 HOSPITAL DISCHARGE FOLLOW-UP: Primary | ICD-10-CM

## 2025-07-31 DIAGNOSIS — L97.509 DIABETIC FOOT ULCER ASSOCIATED WITH TYPE 2 DIABETES MELLITUS, UNSPECIFIED LATERALITY, UNSPECIFIED PART OF FOOT, UNSPECIFIED ULCER STAGE: ICD-10-CM

## 2025-07-31 DIAGNOSIS — M86.9 OSTEOMYELITIS OF GREAT TOE OF LEFT FOOT: ICD-10-CM

## 2025-07-31 DIAGNOSIS — I10 PRIMARY HYPERTENSION: ICD-10-CM

## 2025-07-31 DIAGNOSIS — M86.279 SUBACUTE OSTEOMYELITIS OF FOOT, UNSPECIFIED LATERALITY: Primary | ICD-10-CM

## 2025-07-31 DIAGNOSIS — E11.621 DIABETIC FOOT ULCER ASSOCIATED WITH TYPE 2 DIABETES MELLITUS, UNSPECIFIED LATERALITY, UNSPECIFIED PART OF FOOT, UNSPECIFIED ULCER STAGE: ICD-10-CM

## 2025-07-31 DIAGNOSIS — L97.512 SKIN ULCER OF TOE OF RIGHT FOOT WITH FAT LAYER EXPOSED: ICD-10-CM

## 2025-07-31 DIAGNOSIS — L97.529 ULCER OF TOE OF LEFT FOOT, UNSPECIFIED ULCER STAGE: ICD-10-CM

## 2025-07-31 LAB
ALBUMIN SERPL BCP-MCNC: 3.3 G/DL (ref 3.5–5.2)
ALP SERPL-CCNC: 101 UNIT/L (ref 40–150)
ALT SERPL W/O P-5'-P-CCNC: 16 UNIT/L (ref 0–55)
ANION GAP (OHS): 8 MMOL/L (ref 8–16)
AST SERPL-CCNC: 23 UNIT/L (ref 0–50)
BACTERIA SPEC ANAEROBE CULT: NORMAL
BILIRUB SERPL-MCNC: 0.5 MG/DL (ref 0.1–1)
BUN SERPL-MCNC: 18 MG/DL (ref 8–23)
CALCIUM SERPL-MCNC: 9.1 MG/DL (ref 8.7–10.5)
CHLORIDE SERPL-SCNC: 106 MMOL/L (ref 95–110)
CO2 SERPL-SCNC: 27 MMOL/L (ref 23–29)
CREAT SERPL-MCNC: 1.4 MG/DL (ref 0.5–1.4)
GFR SERPLBLD CREATININE-BSD FMLA CKD-EPI: 54 ML/MIN/1.73/M2
GLUCOSE SERPL-MCNC: 81 MG/DL (ref 70–110)
POTASSIUM SERPL-SCNC: 4.2 MMOL/L (ref 3.5–5.1)
PROT SERPL-MCNC: 6.9 GM/DL (ref 6–8.4)
SODIUM SERPL-SCNC: 141 MMOL/L (ref 136–145)

## 2025-07-31 PROCEDURE — 36415 COLL VENOUS BLD VENIPUNCTURE: CPT | Mod: HCNC

## 2025-07-31 PROCEDURE — 84450 TRANSFERASE (AST) (SGOT): CPT | Mod: HCNC

## 2025-07-31 PROCEDURE — 99999 PR PBB SHADOW E&M-EST. PATIENT-LVL III: CPT | Mod: PBBFAC,HCNC,, | Performed by: INTERNAL MEDICINE

## 2025-07-31 PROCEDURE — 99999 PR PBB SHADOW E&M-EST. PATIENT-LVL IV: CPT | Mod: PBBFAC,HCNC,, | Performed by: PODIATRIST

## 2025-07-31 NOTE — TELEPHONE ENCOUNTER
Pt asks if you could further explain the new chronic kidney disease diagnosis. Pt also asks if he has diabetes.

## 2025-07-31 NOTE — PROGRESS NOTES
Assessment:       1. Hospital discharge follow-up    2. Osteomyelitis of great toe of left foot    3. Diabetic foot ulcer associated with type 2 diabetes mellitus, unspecified laterality, unspecified part of foot, unspecified ulcer stage    4. DAMARIS (acute kidney injury)  - Comprehensive Metabolic Panel; Future    5. Primary hypertension        Plan:       1/2/3.  Complete course of antibiotics as prescribed.  Follow up with Podiatry and Infectious Disease.  4. Check CMP.  5. Continue valsartan 320 mg.  Hold HCTZ 12.5 mg and Toprol-XL 25 mg p.o. now.  Check blood pressures at home.    Deep Scribe:  IMPRESSION:  1. Assessed BP management post-hospital discharge.  2. Evaluated renal function in relation to BP medication adjustments.  3. Reviewed recent hospitalization for osteomyelitis and antibiotic treatment plan.  4. Considered ability to return to work in relation to ongoing antibiotic treatment and wound healing.    SUMMARY:   Started Valsartan   Ordered labs to check renal function   Continue Levaquin and minocycline until 9/1   Mr. Villalba to avoid returning to work until cleared at next appointment    OSTEOMYELITIS OF GREAT TOE OF LEFT FOOT:   Continued Levaquin and minocycline until 9/1.   Mr. Villalba to avoid returning to work until cleared, even if foot appears healed before next appointment.    HOSPITAL DISCHARGE FOLLOW-UP:   Mr. Villalba to avoid returning to work until cleared, even if foot appears healed before next appointment.    DAMARIS (ACUTE KIDNEY INJURY):   Mr. Villalba to monitor BP at home and report if it exceeds 140/90.   Started Valsartan.   Ordered labs to check renal function.    DIABETIC FOOT ULCER ASSOCIATED WITH TYPE 2 DIABETES MELLITUS, UNSPECIFIED LATERALITY, UNSPECIFIED PART OF FOOT, UNSPECIFIED ULCER STAGE:   Mr. Villalba to avoid returning to work until cleared, even if foot appears healed before next appointment.   Continued Levaquin and minocycline until 9/1.                 This note was  generated with the assistance of ambient listening technology. Verbal consent was obtained by the patient and accompanying visitor(s) for the recording of patient appointment to facilitate this note. I attest to having reviewed and edited the generated note for accuracy, though some syntax or spelling errors may persist. Please contact the author of this note for any clarification.       Subjective:       Patient ID: Stevie Villalba is a 70 y.o. male.    Chief Complaint: Hospital Follow Up    HPI    70-year-old male here for hospital follow-up.  Discharged 07/27/2025.    Family and/or Caretaker present at visit?  Yes.  Diagnostic tests reviewed/disposition: I have reviewed all completed as well as pending diagnostic tests at the time of discharge.  Disease/illness education: Osteomyelitis of foot  Home health/community services discussion/referrals: Patient has home health established at [facility].   Establishment or re-establishment of referral orders for community resources: No other necessary community resources.   Discussion with other health care providers: No discussion with other health care providers necessary.  I reviewed and reconciled the medications from hospital discharge.    History of Present Illness    CHIEF COMPLAINT:  Mr. Villalba presents today for hospital follow-up after discharge on 07/27/2025    OSTEOMYELITIS:  He has ongoing osteomyelitis since April with multiple hospitalizations. He was initially diagnosed after MRI revealed osteomyelitis and was sent to the emergency department from podiatry clinic for IV therapy. He had associated ulcerations and was followed by infectious disease specialists. He is currently discharged on minocycline and Levaquin with treatment planned until 9/1. He follows up with both infectious disease and podiatry simultaneously, with infectious disease physicians present during foot wound care. He is actively managing his condition and taking time off work as needed  "for medical treatment.    TOE INFECTION:  He reports a severe infection involving the second toe adjacent to the great toe. The infection was initially so serious that medical providers were concerned about potential toe amputation. During a prior infectious disease appointment, gauze was not properly applied to one side of the toe, which led to him walking on the infected area for a week. He acknowledges that continued walking likely exacerbated the infection. Subsequent medical review by both infectious disease and foot specialists indicated the toe's condition has improved and amputation is no longer being considered.    HYPERTENSION:  He is back on blood pressure medication after a brief interruption due to acute kidney injury during recent hospitalization which resulted in holding valsartan. He currently takes Valsartan for blood pressure management but is not yet taking hydrochlorothiazide. His blood pressure was well-controlled until Sunday. He was advised to restart Valsartan and continue holding metoprolol and hydrochlorothiazide. He understands to monitor blood pressure and notify provider if it exceeds 140/90 mmHg.    DIABETES:  He underwent frequent blood sugar monitoring during hospitalization with hourly glucose checks. His last hemoglobin A1c was 5.2 in May, indicating good glycemic control.    SPECIAL FOOTWEAR:  He wears special shoes that feel unusual while walking, describing them as feeling like "moon shoes" and walking on sponges. Despite the unique sensation, he notes the shoes are comfortable.      ROS:  Cardiovascular: -chest pain  Musculoskeletal: +difficulty walking, +abnormal gait         Discharge summary:  HPI:   Stevie Villalba is a 70 y.o male with Pmhx of HTN, HLP, pre-diabetes, paroxymal Afib, R 1st digit osteo, and severe LATANYA admitted to hospital medicine for bilateral foot ulcers. Patient was seen this morning in podiatry clinic for new ulceration to R 2nd digit and L 1st digit " where he was advised to come to the ED for IV therapy and MRI 2/2 concerns for osteo. He reports that new ulcerations have been worsening over the past few weeks. He was seen in ID clinic on 7/19 where he was started on levaquin and minocycline. He reports BLE edema after starting the Abx. Reports he is able to ambulate without assistance, but endorses mild pain with ambulation. 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 Denies calf pain, fever, chills, night sweats, chest pain, shortness of breath, abdominal pain, nausea, vomiting, dysuria, hematuria, diarrhea, constipation.     In the ED, AFVSS. Neut 74.1, Lymph 14.2, Immat granulocytes 1.4, albumin 3.3, . MRI B toes pending. Given atorvastatin x 1, enoxaparin x 1, pantoprazole x 1, Zosyn x 3, valsartan x 1, vancomycin x 1.     * No surgery found *       Hospital Course:   7/23 -MRI w/ osteo of L 1st toe, R 2nd toe. Bedside debridement done by podiatry, no other procedure planned. ID recommend continue IV vanc/zosyn  7/24: Continue Vanc. Zosyn d/c'd. Started Ceftriaxone. Cultures growing proteus mirabilis and corynebacterium.   7/25: DAMARIS worsening, holding valsartan, given 1L IVF  7/26: ID recommending continuing home minocycline and Levaquin until 9/1.       Patient discharged with , acute care at home. Will follow DAMARIS. Hold valsartan until DAMARIS resolved, appreciate acute care at home. Patient verbalized understanding, all questions answered.     Assessment & Plan  Osteomyelitis of great toe of left foot  Skin ulcers of foot, bilateral  Osteomyelitis of second toe of right foot  - Ulcerations to L 1st digit and R 2nd digit, erythema extending up R leg concerning for cellulitis  -MRI feet shows osteo of L 1st toe, R 2nd toe.  - Continue Vancomycin and Ceftriaxone.   -ID consulted and following. Awaiting culture results for final recs  - podiatry consulted, bedside debridement performed, no further intervention  "planned  - d/c on minocycline and levaquin until 9/1  - follow up in ID clinic  -  wound care        Hypertriglyceridemia  - Continue home atorvastatin     Primary hypertension  Patients blood pressure range in the last 24 hours was: BP  Min: 107/53  Max: 181/96.The patient's inpatient anti-hypertensive regimen is listed below:  Current Antihypertensives     Plan  - BP is controlled, no changes needed to their regimen     Insomnia  -Continue home melatonin PRN     Paroxysmal atrial fibrillation  Patient has paroxysmal (<7 days) atrial fibrillation. Patient is currently in sinus rhythm. [id number] Score: 1. The patients heart rate in the last 24 hours is as follows:  Pulse  Min: 57  Max: 89      Antiarrhythmics     Anticoagulants  enoxaparin injection 40 mg, Every 24 hours, Subcutaneous     Plan  - Replete lytes with a goal of K>4, Mg >2  - Patient is anticoagulated, see medications listed above.  - patient is unsure if he takes MTP, but it is "paused" on his MAR so will hold off for now  - Patient's afib is currently controlled  - Continue enoxaparin        Class 1 obesity with body mass index (BMI) of 31.0 to 31.9 in adult  Body mass index is 33.86 kg/m². Morbid obesity complicates all aspects of disease management from diagnostic modalities to treatment. Weight loss encouraged and health benefits explained to patient.                    Severe obstructive sleep apnea  - CPAP daily     Gout  - Continue home allopurinol  Anemia  Anemia is likely due to Iron deficiency. Most recent hemoglobin and hematocrit are listed below.      Recent Labs     07/25/25  0657   HGB 12.8*   HCT 38.2*      Plan  - Monitor serial CBC: Daily  - Transfuse PRBC if patient becomes hemodynamically unstable, symptomatic or H/H drops below 7/21.  - Patient has not received any PRBC transfusions to date  - Patient's anemia is currently stable     DAMARIS (acute kidney injury)  DAMARIS is likely due to pre-renal azotemia due to intravascular volume " depletion. Baseline creatinine is 1. Most recent creatinine and eGFR are listed below.        Recent Labs     07/25/25  0657 07/26/25  1021 07/27/25  0733   CREATININE 1.3 1.5* 1.4   EGFRNORACEVR 59* 50* 54*       Plan  - DAMARIS is worsening. Will hold valsartan and give 1L IVF  - Avoid nephrotoxins and renally dose meds for GFR listed above  - Monitor urine output, serial BMP, and adjust therapy as needed  - given additional 1L of IVF at discharge, patient to hold valsartan  - acute care at home will follow DAMARIS at home    Review of Systems          Objective:      Physical Exam  Vitals reviewed.   Constitutional:       Appearance: He is well-developed.   HENT:      Head: Normocephalic and atraumatic.      Mouth/Throat:      Pharynx: No oropharyngeal exudate.   Eyes:      General: No scleral icterus.        Right eye: No discharge.         Left eye: No discharge.      Pupils: Pupils are equal, round, and reactive to light.   Neck:      Thyroid: No thyromegaly.      Trachea: No tracheal deviation.   Cardiovascular:      Rate and Rhythm: Normal rate and regular rhythm.      Heart sounds: Normal heart sounds. No murmur heard.     No friction rub. No gallop.   Pulmonary:      Effort: Pulmonary effort is normal. No respiratory distress.      Breath sounds: Normal breath sounds. No wheezing or rales.   Chest:      Chest wall: No tenderness.   Abdominal:      General: Bowel sounds are normal. There is no distension.      Palpations: Abdomen is soft. There is no mass.      Tenderness: There is no abdominal tenderness. There is no guarding or rebound.   Musculoskeletal:         General: No tenderness. Normal range of motion.      Cervical back: Normal range of motion and neck supple.   Skin:     General: Skin is warm and dry.      Coloration: Skin is not pale.      Findings: No erythema or rash.   Neurological:      Mental Status: He is alert and oriented to person, place, and time.   Psychiatric:         Behavior: Behavior  normal.

## 2025-08-01 NOTE — TELEPHONE ENCOUNTER
I do not think patient has diabetes.    As as people age, organ function declines.  This declined may have been accelerated a little bit by the injury from the hospitalization.

## 2025-08-03 VITALS
SYSTOLIC BLOOD PRESSURE: 140 MMHG | RESPIRATION RATE: 20 BRPM | OXYGEN SATURATION: 99 % | HEART RATE: 65 BPM | TEMPERATURE: 98 F | DIASTOLIC BLOOD PRESSURE: 80 MMHG

## 2025-08-04 NOTE — ASSESSMENT & PLAN NOTE
Continue abx as prescribed  Keep bilateral foot dressings clean, dry and intact  Keep scheduled wound care appointments

## 2025-08-04 NOTE — PROGRESS NOTES
[facility]  Transitional Care Management (TCM) Home Visit    Encounter Provider: Elida Hinojosa   PCP: Ric Brambila MD  Consult Requested By: No ref. provider found  Admit Date: 7/22/25   IP Discharge Date: 7/27/25      HISTORY OF PRESENT ILLNESS      Patient ID: Stevie Villalba is a 70 y.o. male was recently admitted to the hospital, this is their TCM encounter.    HPI:   Stevie Villalba is a 70 y.o male with Pmhx of HTN, HLP, pre-diabetes, paroxymal Afib, R 1st digit osteo, and severe LATANYA admitted to hospital medicine for bilateral foot ulcers. Patient was seen this morning in podiatry clinic for new ulceration to R 2nd digit and L 1st digit where he was advised to come to the ED for IV therapy and MRI 2/2 concerns for osteo. He reports that new ulcerations have been worsening over the past few weeks. He was seen in ID clinic on 7/19 where he was started on levaquin and minocycline. He reports BLE edema after starting the Abx. Reports he is able to ambulate without assistance, but endorses mild pain with ambulation. 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 Denies calf pain, fever, chills, night sweats, chest pain, shortness of breath, abdominal pain, nausea, vomiting, dysuria, hematuria, diarrhea, constipation.   In the ED, AFVSS. Neut 74.1, Lymph 14.2, Immat granulocytes 1.4, albumin 3.3, . MRI B toes pending. Given atorvastatin x 1, enoxaparin x 1, pantoprazole x 1, Zosyn x 3, valsartan x 1, vancomycin x 1.      Hospital Course:   7/23 -MRI w/ osteo of L 1st toe, R 2nd toe. Bedside debridement done by podiatry, no other procedure planned. ID recommend continue IV vanc/zosyn  7/24: Continue Vanc. Zosyn d/c'd. Started Ceftriaxone. Cultures growing proteus mirabilis and corynebacterium.   7/25: DAMARIS worsening, holding valsartan, given 1L IVF  7/26: ID recommending continuing home minocycline and Levaquin until 9/1.     Patient discharged with , acute care  at home. Will follow DAMARIS. Hold valsartan until DAMARIS resolved, appreciate acute care at home. Patient verbalized understanding, all questions answered.     Today:  Reports no acute issues or concerns. VSS.  Reports taking meds as prescribed.  Denies fevers, chills, worsening pain or discomfort, cough, congestion, chest pain, sob, change in bladder habits, change in bowel habits.  Reports good bm pattern, sleep, appetite.  He is independent with ADLs.  He is aware of all upcoming appts.        DECISION MAKING TODAY       Assessment & Plan:  1. Skin ulcers of foot, bilateral  Assessment & Plan:  Continue abx as prescribed  Keep bilateral foot dressings clean, dry and intact  Keep scheduled wound care appointments      2. Primary hypertension  Assessment & Plan:  Chronic, stable  Continue meds as prescribed       3. Paroxysmal atrial fibrillation  Assessment & Plan:  Asymptomatic  Normal rate  Continue meds as prescribed       4. Class 1 obesity with body mass index (BMI) of 31.0 to 31.9 in adult, unspecified obesity type, unspecified whether serious comorbidity present  Assessment & Plan:  He has been educated on the negative effects of obesity to one's health and encouraged to consider lifestyle diet modifications and exercise.             Medication List on Discharge:     Medication List            Accurate as of July 29, 2025 11:59 PM. If you have any questions, ask your nurse or doctor.                PAUSE taking these medications      metoprolol succinate 25 MG 24 hr tablet  Wait to take this until your doctor or other care provider tells you to start again.  Commonly known as: TOPROL-XL  TAKE 1 TABLET BY MOUTH EVERY DAY            CONTINUE taking these medications      allopurinoL 300 MG tablet  Commonly known as: ZYLOPRIM  TAKE 1 TABLET BY MOUTH EVERY DAY     atorvastatin 40 MG tablet  Commonly known as: LIPITOR  TAKE 1 TABLET BY MOUTH EVERY DAY IN THE EVENING     carisoprodoL 350 MG tablet  Commonly known as:  "SOMA  Take 350 mg by mouth 4 (four) times daily as needed for Muscle spasms.     GAVILAX 17 gram/dose powder  Generic drug: polyethylene glycol  Measure 17g with cap and mix with liquid. Then take by mouth 2 (two) times daily.     hydroCHLOROthiazide 25 MG tablet  Commonly known as: HYDRODIURIL  TAKE 1 TABLET BY MOUTH EVERY DAY     HYDROcodone-acetaminophen  mg per tablet  Commonly known as: NORCO  Take 1 tablet by mouth every 6 (six) hours as needed.     hydrocortisone 2.5 % rectal cream  Commonly known as: ANUSOL-HC  Place rectally 2 (two) times daily.     insulin syringe-needle U-100 0.5 mL 31 gauge x 5/16" Syrg  Use along with ICI therapy.     levoFLOXacin 750 MG tablet  Commonly known as: LEVAQUIN  Take 1 tablet (750 mg total) by mouth once daily.     meloxicam 7.5 MG tablet  Commonly known as: MOBIC  TAKE 1 TABLET BY MOUTH EVERY DAY     * minocycline 100 MG capsule  Commonly known as: MINOCIN,DYNACIN  Take 1 capsule (100 mg total) by mouth 2 (two) times daily.     * minocycline 100 MG capsule  Commonly known as: MINOCIN,DYNACIN  Take 1 capsule (100 mg total) by mouth 2 (two) times daily.     mupirocin 2 % ointment  Commonly known as: BACTROBAN  Apply small amount to wounds with bandage changes     pantoprazole 40 MG tablet  Commonly known as: PROTONIX  TAKE 1 TABLET BY MOUTH TWICE A DAY     valsartan 320 MG tablet  Commonly known as: DIOVAN  TAKE 1 TABLET BY MOUTH ONCE DAILY.           * This list has 2 medication(s) that are the same as other medications prescribed for you. Read the directions carefully, and ask your doctor or other care provider to review them with you.                  Medication Reconciliation:  Were medications changed on discharge? Yes  Were medications in the home? Yes  Is the patient taking the medications as directed? Yes  Does the patient understand the medications and changes? Yes  Does updated med list accurately reflects meds patient is currently taking? Yes    ENVIRONMENT " OF CARE      Family and/or Caregiver present at visit?  Yes  Name of Caregiver: daughter  History provided by: patient and caregiver    Advance Care Planning   Advanced Care Planning Status:  Patient has had an ACP conversation  Living Will: No  Power of : No  [place]: No    Does Caregiver have HCPoA: No  Changes today: n/a  Is patient hospice appropriate: No  (If needed, use PPS <30 or FAST score >7)  Was referral to hospice placed: No       Impression upon entering the home:  Physical Dwelling: single family home   Appearance of home environment: cleaniness: clean  Functional Status: independent  Mobility: ambulatory  Nutritional access: adequate intake and access  Home Health: No, and does not need it at this time     Diagnostic tests reviewed/disposition: No diagnosic tests pending after this hospitalization.  Disease/illness education: signs and symptoms to report   Establishment or re-establishment of referral orders for community resources: No other necessary community resources.   Discussion with other health care providers: No discussion with other health care providers necessary.   Does patient have a PCP at [facility]? Yes   Repatriation plan with PCP? follow-up with PCP within 30d   Does patient have an ostomy (ileostomy, colostomy, suprapubic catheter, nephrostomy tube, tracheostomy, PEG tube, pleurex catheter, cholecystostomy, etc)? No  Were BPAs reviewed? No    Social History     Socioeconomic History    Marital status:     Number of children: 3   Tobacco Use    Smoking status: Never    Smokeless tobacco: Never   Substance and Sexual Activity    Alcohol use: Not Currently     Comment: weekends 6 beers maybe    Drug use: Never    Sexual activity: Not Currently     Partners: Female     Comment:      Social Drivers of Health     Financial Resource Strain: Low Risk  (7/23/2025)    Overall Financial Resource Strain (CARDIA)     Difficulty of Paying Living Expenses: Not hard at all    Food Insecurity: No Food Insecurity (7/23/2025)    Hunger Vital Sign     Worried About Running Out of Food in the Last Year: Never true     Ran Out of Food in the Last Year: Never true   Transportation Needs: No Transportation Needs (7/23/2025)    PRAPARE - Transportation     Lack of Transportation (Medical): No     Lack of Transportation (Non-Medical): No   Physical Activity: Sufficiently Active (7/23/2025)    Exercise Vital Sign     Days of Exercise per Week: 5 days     Minutes of Exercise per Session: 40 min   Recent Concern: Physical Activity - Insufficiently Active (5/12/2025)    Exercise Vital Sign     Days of Exercise per Week: 2 days     Minutes of Exercise per Session: 20 min   Stress: No Stress Concern Present (7/23/2025)    [employer] [place] [employer] - Occupational Stress Questionnaire     Feeling of Stress : Not at all   Housing Stability: Low Risk  (7/23/2025)    Housing Stability Vital Sign     Unable to Pay for Housing in the Last Year: No     Number of Times Moved in the Last Year: 0     Homeless in the Last Year: No       OBJECTIVE:     Vital Signs:  Vitals:    07/29/25 1600   BP: (!) 140/80   Pulse: 65   Resp: 20   Temp: 98 °F (36.7 °C)       Review of Systems   Constitutional:  Negative for chills and fever.   HENT:  Negative for congestion, postnasal drip and rhinorrhea.    Eyes:  Negative for visual disturbance.   Respiratory:  Negative for chest tightness, shortness of breath and wheezing.    Cardiovascular:  Negative for chest pain, palpitations and leg swelling.   Gastrointestinal:  Negative for abdominal pain, constipation, diarrhea, nausea and vomiting.   Genitourinary:  Negative for difficulty urinating.   Musculoskeletal:  Negative for arthralgias and myalgias.   Skin:  Positive for wound. Negative for color change.   Neurological:  Negative for dizziness, weakness, light-headedness and headaches.   Psychiatric/Behavioral:  Negative for agitation and confusion.         Physical Exam  Constitutional:       Appearance: Normal appearance.   HENT:      Head: Normocephalic and atraumatic.      Nose: No congestion or rhinorrhea.      Mouth/Throat:      Mouth: Mucous membranes are moist.   Cardiovascular:      Rate and Rhythm: Normal rate.      Pulses: Normal pulses.   Pulmonary:      Effort: No respiratory distress.      Breath sounds: No wheezing.   Abdominal:      General: Bowel sounds are normal.      Palpations: Abdomen is soft.   Musculoskeletal:      Cervical back: Normal range of motion and neck supple.   Skin:     General: Skin is warm and dry.      Comments: Dressings to bilateral feet clean, dry, intact    Neurological:      Mental Status: He is alert and oriented to person, place, and time. Mental status is at baseline.   Psychiatric:         Mood and Affect: Mood normal.     INSTRUCTIONS FOR PATIENT:     Scheduled Follow-up, Appts Reviewed with Modifications if Needed: Yes  Future Appointments   Date Time Provider Department Center   8/6/2025 10:30 AM Ioana Cabral DPM [facility] POD [address]   9/2/2025  3:00 PM Niels Campbell Jr. PA [facility] ID [address]   9/3/2025 11:00 AM Ric Brambila MD [facility] IM [place]   9/29/2025  8:20 AM Ric Brambila MD [facility] IM [place]       Signature: Elida Hinojosa NP    Transition of Care Visit:  I have reviewed and updated the history and problem list.  I have reconciled the medication list.  I have discussed the hospitalization and current medical issues, prognosis and plans with the patient/family.      Total face-to-face time was 60 min, >50% of this was spent on counseling and coordination of care. The following issues were discussed: primary and secondary diagnoses, co-morbidities, prescribed medications, treatment modalities, importance of compliance with medical advice and directives for follow-up care.

## 2025-08-06 ENCOUNTER — OFFICE VISIT (OUTPATIENT)
Dept: INFECTIOUS DISEASES | Facility: CLINIC | Age: 70
End: 2025-08-06
Payer: MEDICARE

## 2025-08-06 ENCOUNTER — OFFICE VISIT (OUTPATIENT)
Dept: PODIATRY | Facility: CLINIC | Age: 70
End: 2025-08-06
Payer: MEDICARE

## 2025-08-06 ENCOUNTER — HOSPITAL ENCOUNTER (OUTPATIENT)
Dept: RADIOLOGY | Facility: HOSPITAL | Age: 70
Discharge: HOME | End: 2025-08-06
Attending: PODIATRIST
Payer: MEDICARE

## 2025-08-06 VITALS — HEIGHT: 70 IN | BODY MASS INDEX: 32.82 KG/M2 | RESPIRATION RATE: 18 BRPM | WEIGHT: 229.25 LBS

## 2025-08-06 DIAGNOSIS — L97.529 ULCER OF TOE OF LEFT FOOT, UNSPECIFIED ULCER STAGE: ICD-10-CM

## 2025-08-06 DIAGNOSIS — G60.9 IDIOPATHIC PERIPHERAL NEUROPATHY: ICD-10-CM

## 2025-08-06 DIAGNOSIS — M86.9 OSTEOMYELITIS OF SECOND TOE OF RIGHT FOOT: Primary | ICD-10-CM

## 2025-08-06 DIAGNOSIS — L97.512 SKIN ULCER OF TOE OF RIGHT FOOT WITH FAT LAYER EXPOSED: ICD-10-CM

## 2025-08-06 DIAGNOSIS — M86.279 SUBACUTE OSTEOMYELITIS OF FOOT, UNSPECIFIED LATERALITY: ICD-10-CM

## 2025-08-06 DIAGNOSIS — M86.279 SUBACUTE OSTEOMYELITIS OF FOOT, UNSPECIFIED LATERALITY: Primary | ICD-10-CM

## 2025-08-06 DIAGNOSIS — M86.9 OSTEOMYELITIS OF GREAT TOE OF LEFT FOOT: ICD-10-CM

## 2025-08-06 PROCEDURE — 1111F DSCHRG MED/CURRENT MED MERGE: CPT | Mod: CPTII,HCNC,S$GLB, | Performed by: PODIATRIST

## 2025-08-06 PROCEDURE — 99214 OFFICE O/P EST MOD 30 MIN: CPT | Mod: HCNC,S$GLB,, | Performed by: PODIATRIST

## 2025-08-06 PROCEDURE — 1126F AMNT PAIN NOTED NONE PRSNT: CPT | Mod: CPTII,HCNC,S$GLB, | Performed by: PODIATRIST

## 2025-08-06 PROCEDURE — 99999 PR PBB SHADOW E&M-EST. PATIENT-LVL IV: CPT | Mod: PBBFAC,HCNC,, | Performed by: PODIATRIST

## 2025-08-06 PROCEDURE — 1111F DSCHRG MED/CURRENT MED MERGE: CPT | Mod: CPTII,HCNC,S$GLB, | Performed by: PHYSICIAN ASSISTANT

## 2025-08-06 PROCEDURE — 3044F HG A1C LEVEL LT 7.0%: CPT | Mod: CPTII,HCNC,S$GLB, | Performed by: PHYSICIAN ASSISTANT

## 2025-08-06 PROCEDURE — 73630 X-RAY EXAM OF FOOT: CPT | Mod: 26,50,HCNC, | Performed by: RADIOLOGY

## 2025-08-06 PROCEDURE — 3008F BODY MASS INDEX DOCD: CPT | Mod: CPTII,HCNC,S$GLB, | Performed by: PODIATRIST

## 2025-08-06 PROCEDURE — 1101F PT FALLS ASSESS-DOCD LE1/YR: CPT | Mod: CPTII,HCNC,S$GLB, | Performed by: PODIATRIST

## 2025-08-06 PROCEDURE — 3044F HG A1C LEVEL LT 7.0%: CPT | Mod: CPTII,HCNC,S$GLB, | Performed by: PODIATRIST

## 2025-08-06 PROCEDURE — 73630 X-RAY EXAM OF FOOT: CPT | Mod: TC,50,HCNC

## 2025-08-06 PROCEDURE — 3288F FALL RISK ASSESSMENT DOCD: CPT | Mod: CPTII,HCNC,S$GLB, | Performed by: PODIATRIST

## 2025-08-06 PROCEDURE — 4010F ACE/ARB THERAPY RXD/TAKEN: CPT | Mod: CPTII,HCNC,S$GLB, | Performed by: PHYSICIAN ASSISTANT

## 2025-08-06 PROCEDURE — 99213 OFFICE O/P EST LOW 20 MIN: CPT | Mod: HCNC,S$GLB,, | Performed by: PHYSICIAN ASSISTANT

## 2025-08-06 PROCEDURE — 4010F ACE/ARB THERAPY RXD/TAKEN: CPT | Mod: CPTII,HCNC,S$GLB, | Performed by: PODIATRIST

## 2025-08-13 ENCOUNTER — OFFICE VISIT (OUTPATIENT)
Dept: PODIATRY | Facility: CLINIC | Age: 70
End: 2025-08-13
Payer: MEDICARE

## 2025-08-13 ENCOUNTER — LAB VISIT (OUTPATIENT)
Dept: LAB | Facility: HOSPITAL | Age: 70
End: 2025-08-13
Payer: MEDICARE

## 2025-08-13 ENCOUNTER — OFFICE VISIT (OUTPATIENT)
Dept: INFECTIOUS DISEASES | Facility: CLINIC | Age: 70
End: 2025-08-13
Payer: MEDICARE

## 2025-08-13 VITALS
RESPIRATION RATE: 18 BRPM | SYSTOLIC BLOOD PRESSURE: 159 MMHG | HEART RATE: 61 BPM | DIASTOLIC BLOOD PRESSURE: 84 MMHG | BODY MASS INDEX: 32.82 KG/M2 | HEIGHT: 70 IN | WEIGHT: 229.25 LBS

## 2025-08-13 DIAGNOSIS — M86.00 ACUTE HEMATOGENOUS OSTEOMYELITIS, UNSPECIFIED SITE: ICD-10-CM

## 2025-08-13 DIAGNOSIS — G60.9 IDIOPATHIC PERIPHERAL NEUROPATHY: ICD-10-CM

## 2025-08-13 DIAGNOSIS — M86.279 SUBACUTE OSTEOMYELITIS OF FOOT, UNSPECIFIED LATERALITY: Primary | ICD-10-CM

## 2025-08-13 DIAGNOSIS — L97.512 SKIN ULCER OF TOE OF RIGHT FOOT WITH FAT LAYER EXPOSED: ICD-10-CM

## 2025-08-13 DIAGNOSIS — Z51.81 THERAPEUTIC DRUG MONITORING: ICD-10-CM

## 2025-08-13 DIAGNOSIS — M86.9 OSTEOMYELITIS OF GREAT TOE OF LEFT FOOT: ICD-10-CM

## 2025-08-13 DIAGNOSIS — M86.9 OSTEOMYELITIS OF SECOND TOE OF RIGHT FOOT: Primary | ICD-10-CM

## 2025-08-13 DIAGNOSIS — L97.529 ULCER OF TOE OF LEFT FOOT, UNSPECIFIED ULCER STAGE: ICD-10-CM

## 2025-08-13 LAB
ABSOLUTE EOSINOPHIL (OHS): 0.06 K/UL
ABSOLUTE MONOCYTE (OHS): 0.58 K/UL (ref 0.3–1)
ABSOLUTE NEUTROPHIL COUNT (OHS): 5.95 K/UL (ref 1.8–7.7)
ALBUMIN SERPL BCP-MCNC: 3.3 G/DL (ref 3.5–5.2)
ALP SERPL-CCNC: 103 UNIT/L (ref 40–150)
ALT SERPL W/O P-5'-P-CCNC: 21 UNIT/L (ref 0–55)
ANION GAP (OHS): 4 MMOL/L (ref 8–16)
AST SERPL-CCNC: 31 UNIT/L (ref 0–50)
BASOPHILS # BLD AUTO: 0.03 K/UL
BASOPHILS NFR BLD AUTO: 0.4 %
BILIRUB SERPL-MCNC: 0.7 MG/DL (ref 0.1–1)
BUN SERPL-MCNC: 13 MG/DL (ref 8–23)
CALCIUM SERPL-MCNC: 8.6 MG/DL (ref 8.7–10.5)
CHLORIDE SERPL-SCNC: 108 MMOL/L (ref 95–110)
CO2 SERPL-SCNC: 26 MMOL/L (ref 23–29)
CREAT SERPL-MCNC: 1.3 MG/DL (ref 0.5–1.4)
CRP SERPL-MCNC: 4 MG/L
ERYTHROCYTE [DISTWIDTH] IN BLOOD BY AUTOMATED COUNT: 13 % (ref 11.5–14.5)
GFR SERPLBLD CREATININE-BSD FMLA CKD-EPI: 59 ML/MIN/1.73/M2
GLUCOSE SERPL-MCNC: 93 MG/DL (ref 70–110)
HCT VFR BLD AUTO: 37.3 % (ref 40–54)
HGB BLD-MCNC: 12.6 GM/DL (ref 14–18)
IMM GRANULOCYTES # BLD AUTO: 0.03 K/UL (ref 0–0.04)
IMM GRANULOCYTES NFR BLD AUTO: 0.4 % (ref 0–0.5)
LYMPHOCYTES # BLD AUTO: 1.52 K/UL (ref 1–4.8)
MCH RBC QN AUTO: 32.1 PG (ref 27–31)
MCHC RBC AUTO-ENTMCNC: 33.8 G/DL (ref 32–36)
MCV RBC AUTO: 95 FL (ref 82–98)
NUCLEATED RBC (/100WBC) (OHS): 0 /100 WBC
PLATELET # BLD AUTO: 141 K/UL (ref 150–450)
PLATELET BLD QL SMEAR: NORMAL
PMV BLD AUTO: 10.2 FL (ref 9.2–12.9)
POTASSIUM SERPL-SCNC: 4.1 MMOL/L (ref 3.5–5.1)
PROT SERPL-MCNC: 6.4 GM/DL (ref 6–8.4)
RBC # BLD AUTO: 3.93 M/UL (ref 4.6–6.2)
RELATIVE EOSINOPHIL (OHS): 0.7 %
RELATIVE LYMPHOCYTE (OHS): 18.6 % (ref 18–48)
RELATIVE MONOCYTE (OHS): 7.1 % (ref 4–15)
RELATIVE NEUTROPHIL (OHS): 72.8 % (ref 38–73)
SODIUM SERPL-SCNC: 138 MMOL/L (ref 136–145)
WBC # BLD AUTO: 8.17 K/UL (ref 3.9–12.7)

## 2025-08-13 PROCEDURE — 1159F MED LIST DOCD IN RCRD: CPT | Mod: CPTII,HCNC,S$GLB, | Performed by: PHYSICIAN ASSISTANT

## 2025-08-13 PROCEDURE — 1160F RVW MEDS BY RX/DR IN RCRD: CPT | Mod: CPTII,HCNC,S$GLB, | Performed by: PHYSICIAN ASSISTANT

## 2025-08-13 PROCEDURE — 4010F ACE/ARB THERAPY RXD/TAKEN: CPT | Mod: CPTII,HCNC,S$GLB, | Performed by: PHYSICIAN ASSISTANT

## 2025-08-13 PROCEDURE — 86140 C-REACTIVE PROTEIN: CPT | Mod: HCNC

## 2025-08-13 PROCEDURE — 99999 PR PBB SHADOW E&M-EST. PATIENT-LVL II: CPT | Mod: PBBFAC,HCNC,, | Performed by: PHYSICIAN ASSISTANT

## 2025-08-13 PROCEDURE — 3044F HG A1C LEVEL LT 7.0%: CPT | Mod: CPTII,HCNC,S$GLB, | Performed by: PODIATRIST

## 2025-08-13 PROCEDURE — 3077F SYST BP >= 140 MM HG: CPT | Mod: CPTII,HCNC,S$GLB, | Performed by: PODIATRIST

## 2025-08-13 PROCEDURE — 3079F DIAST BP 80-89 MM HG: CPT | Mod: CPTII,HCNC,S$GLB, | Performed by: PODIATRIST

## 2025-08-13 PROCEDURE — 3044F HG A1C LEVEL LT 7.0%: CPT | Mod: CPTII,HCNC,S$GLB, | Performed by: PHYSICIAN ASSISTANT

## 2025-08-13 PROCEDURE — 99213 OFFICE O/P EST LOW 20 MIN: CPT | Mod: HCNC,S$GLB,, | Performed by: PHYSICIAN ASSISTANT

## 2025-08-13 PROCEDURE — 36415 COLL VENOUS BLD VENIPUNCTURE: CPT | Mod: HCNC

## 2025-08-13 PROCEDURE — 85025 COMPLETE CBC W/AUTO DIFF WBC: CPT | Mod: HCNC

## 2025-08-13 PROCEDURE — 1111F DSCHRG MED/CURRENT MED MERGE: CPT | Mod: CPTII,HCNC,S$GLB, | Performed by: PODIATRIST

## 2025-08-13 PROCEDURE — 1126F AMNT PAIN NOTED NONE PRSNT: CPT | Mod: CPTII,HCNC,S$GLB, | Performed by: PODIATRIST

## 2025-08-13 PROCEDURE — 3008F BODY MASS INDEX DOCD: CPT | Mod: CPTII,HCNC,S$GLB, | Performed by: PODIATRIST

## 2025-08-13 PROCEDURE — 80053 COMPREHEN METABOLIC PANEL: CPT | Mod: HCNC

## 2025-08-13 PROCEDURE — 1111F DSCHRG MED/CURRENT MED MERGE: CPT | Mod: CPTII,HCNC,S$GLB, | Performed by: PHYSICIAN ASSISTANT

## 2025-08-13 PROCEDURE — 99213 OFFICE O/P EST LOW 20 MIN: CPT | Mod: HCNC,S$GLB,, | Performed by: PODIATRIST

## 2025-08-13 PROCEDURE — 4010F ACE/ARB THERAPY RXD/TAKEN: CPT | Mod: CPTII,HCNC,S$GLB, | Performed by: PODIATRIST

## 2025-08-13 PROCEDURE — 99999 PR PBB SHADOW E&M-EST. PATIENT-LVL IV: CPT | Mod: PBBFAC,HCNC,, | Performed by: PODIATRIST

## 2025-08-13 PROCEDURE — 1159F MED LIST DOCD IN RCRD: CPT | Mod: CPTII,HCNC,S$GLB, | Performed by: PODIATRIST

## 2025-08-15 ENCOUNTER — TELEPHONE (OUTPATIENT)
Dept: INFECTIOUS DISEASES | Facility: CLINIC | Age: 70
End: 2025-08-15
Payer: MEDICARE

## 2025-08-20 ENCOUNTER — LAB VISIT (OUTPATIENT)
Dept: LAB | Facility: HOSPITAL | Age: 70
End: 2025-08-20
Payer: MEDICARE

## 2025-08-20 ENCOUNTER — OFFICE VISIT (OUTPATIENT)
Dept: PODIATRY | Facility: CLINIC | Age: 70
End: 2025-08-20
Payer: MEDICARE

## 2025-08-20 VITALS
HEART RATE: 68 BPM | SYSTOLIC BLOOD PRESSURE: 153 MMHG | DIASTOLIC BLOOD PRESSURE: 83 MMHG | HEIGHT: 70 IN | BODY MASS INDEX: 32.9 KG/M2

## 2025-08-20 DIAGNOSIS — Z51.81 THERAPEUTIC DRUG MONITORING: ICD-10-CM

## 2025-08-20 DIAGNOSIS — M86.279 SUBACUTE OSTEOMYELITIS OF FOOT, UNSPECIFIED LATERALITY: Primary | ICD-10-CM

## 2025-08-20 DIAGNOSIS — Z87.2 HEALED FOOT ULCER: ICD-10-CM

## 2025-08-20 DIAGNOSIS — G60.9 IDIOPATHIC PERIPHERAL NEUROPATHY: ICD-10-CM

## 2025-08-20 DIAGNOSIS — M86.00 ACUTE HEMATOGENOUS OSTEOMYELITIS, UNSPECIFIED SITE: ICD-10-CM

## 2025-08-20 LAB
ABSOLUTE EOSINOPHIL (OHS): 0.11 K/UL
ABSOLUTE MONOCYTE (OHS): 0.86 K/UL (ref 0.3–1)
ABSOLUTE NEUTROPHIL COUNT (OHS): 6.02 K/UL (ref 1.8–7.7)
ALBUMIN SERPL BCP-MCNC: 3.4 G/DL (ref 3.5–5.2)
ALP SERPL-CCNC: 92 UNIT/L (ref 40–150)
ALT SERPL W/O P-5'-P-CCNC: 30 UNIT/L (ref 0–55)
ANION GAP (OHS): 7 MMOL/L (ref 8–16)
AST SERPL-CCNC: 30 UNIT/L (ref 0–50)
BASOPHILS # BLD AUTO: 0.04 K/UL
BASOPHILS NFR BLD AUTO: 0.4 %
BILIRUB SERPL-MCNC: 0.8 MG/DL (ref 0.1–1)
BUN SERPL-MCNC: 16 MG/DL (ref 8–23)
CALCIUM SERPL-MCNC: 8.6 MG/DL (ref 8.7–10.5)
CHLORIDE SERPL-SCNC: 105 MMOL/L (ref 95–110)
CO2 SERPL-SCNC: 29 MMOL/L (ref 23–29)
CREAT SERPL-MCNC: 1.1 MG/DL (ref 0.5–1.4)
CRP SERPL-MCNC: 1.9 MG/L
ERYTHROCYTE [DISTWIDTH] IN BLOOD BY AUTOMATED COUNT: 12.8 % (ref 11.5–14.5)
GFR SERPLBLD CREATININE-BSD FMLA CKD-EPI: >60 ML/MIN/1.73/M2
GLUCOSE SERPL-MCNC: 89 MG/DL (ref 70–110)
HCT VFR BLD AUTO: 36.5 % (ref 40–54)
HGB BLD-MCNC: 12 GM/DL (ref 14–18)
IMM GRANULOCYTES # BLD AUTO: 0.03 K/UL (ref 0–0.04)
IMM GRANULOCYTES NFR BLD AUTO: 0.3 % (ref 0–0.5)
LYMPHOCYTES # BLD AUTO: 1.88 K/UL (ref 1–4.8)
MCH RBC QN AUTO: 31.3 PG (ref 27–31)
MCHC RBC AUTO-ENTMCNC: 32.9 G/DL (ref 32–36)
MCV RBC AUTO: 95 FL (ref 82–98)
NUCLEATED RBC (/100WBC) (OHS): 0 /100 WBC
PLATELET # BLD AUTO: 83 K/UL (ref 150–450)
PMV BLD AUTO: 11.2 FL (ref 9.2–12.9)
POTASSIUM SERPL-SCNC: 4.1 MMOL/L (ref 3.5–5.1)
PROT SERPL-MCNC: 6.2 GM/DL (ref 6–8.4)
RBC # BLD AUTO: 3.83 M/UL (ref 4.6–6.2)
RELATIVE EOSINOPHIL (OHS): 1.2 %
RELATIVE LYMPHOCYTE (OHS): 21 % (ref 18–48)
RELATIVE MONOCYTE (OHS): 9.6 % (ref 4–15)
RELATIVE NEUTROPHIL (OHS): 67.5 % (ref 38–73)
SODIUM SERPL-SCNC: 141 MMOL/L (ref 136–145)
WBC # BLD AUTO: 8.94 K/UL (ref 3.9–12.7)

## 2025-08-20 PROCEDURE — 36415 COLL VENOUS BLD VENIPUNCTURE: CPT | Mod: HCNC

## 2025-08-20 PROCEDURE — 4010F ACE/ARB THERAPY RXD/TAKEN: CPT | Mod: CPTII,HCNC,S$GLB, | Performed by: PODIATRIST

## 2025-08-20 PROCEDURE — 1111F DSCHRG MED/CURRENT MED MERGE: CPT | Mod: CPTII,HCNC,S$GLB, | Performed by: PODIATRIST

## 2025-08-20 PROCEDURE — 82040 ASSAY OF SERUM ALBUMIN: CPT | Mod: HCNC

## 2025-08-20 PROCEDURE — 1126F AMNT PAIN NOTED NONE PRSNT: CPT | Mod: CPTII,HCNC,S$GLB, | Performed by: PODIATRIST

## 2025-08-20 PROCEDURE — 1101F PT FALLS ASSESS-DOCD LE1/YR: CPT | Mod: CPTII,HCNC,S$GLB, | Performed by: PODIATRIST

## 2025-08-20 PROCEDURE — 99999 PR PBB SHADOW E&M-EST. PATIENT-LVL III: CPT | Mod: PBBFAC,HCNC,, | Performed by: PODIATRIST

## 2025-08-20 PROCEDURE — 3044F HG A1C LEVEL LT 7.0%: CPT | Mod: CPTII,HCNC,S$GLB, | Performed by: PODIATRIST

## 2025-08-20 PROCEDURE — 3288F FALL RISK ASSESSMENT DOCD: CPT | Mod: CPTII,HCNC,S$GLB, | Performed by: PODIATRIST

## 2025-08-20 PROCEDURE — 3077F SYST BP >= 140 MM HG: CPT | Mod: CPTII,HCNC,S$GLB, | Performed by: PODIATRIST

## 2025-08-20 PROCEDURE — 3079F DIAST BP 80-89 MM HG: CPT | Mod: CPTII,HCNC,S$GLB, | Performed by: PODIATRIST

## 2025-08-20 PROCEDURE — 99213 OFFICE O/P EST LOW 20 MIN: CPT | Mod: HCNC,S$GLB,, | Performed by: PODIATRIST

## 2025-08-20 PROCEDURE — 1159F MED LIST DOCD IN RCRD: CPT | Mod: CPTII,HCNC,S$GLB, | Performed by: PODIATRIST

## 2025-08-20 PROCEDURE — 85025 COMPLETE CBC W/AUTO DIFF WBC: CPT | Mod: HCNC

## 2025-08-20 PROCEDURE — 86140 C-REACTIVE PROTEIN: CPT | Mod: HCNC

## 2025-08-20 PROCEDURE — 3008F BODY MASS INDEX DOCD: CPT | Mod: CPTII,HCNC,S$GLB, | Performed by: PODIATRIST

## 2025-08-28 ENCOUNTER — LAB VISIT (OUTPATIENT)
Dept: LAB | Facility: HOSPITAL | Age: 70
End: 2025-08-28
Payer: MEDICARE

## 2025-08-28 ENCOUNTER — OFFICE VISIT (OUTPATIENT)
Dept: PODIATRY | Facility: CLINIC | Age: 70
End: 2025-08-28
Payer: MEDICARE

## 2025-08-28 VITALS
WEIGHT: 229.25 LBS | RESPIRATION RATE: 18 BRPM | HEIGHT: 70 IN | DIASTOLIC BLOOD PRESSURE: 86 MMHG | HEART RATE: 66 BPM | BODY MASS INDEX: 32.82 KG/M2 | SYSTOLIC BLOOD PRESSURE: 176 MMHG

## 2025-08-28 DIAGNOSIS — M86.279 SUBACUTE OSTEOMYELITIS OF FOOT, UNSPECIFIED LATERALITY: Primary | ICD-10-CM

## 2025-08-28 DIAGNOSIS — M86.00 ACUTE HEMATOGENOUS OSTEOMYELITIS, UNSPECIFIED SITE: ICD-10-CM

## 2025-08-28 DIAGNOSIS — Z51.81 THERAPEUTIC DRUG MONITORING: ICD-10-CM

## 2025-08-28 DIAGNOSIS — Z87.2 HEALED FOOT ULCER: ICD-10-CM

## 2025-08-28 DIAGNOSIS — G60.9 IDIOPATHIC PERIPHERAL NEUROPATHY: ICD-10-CM

## 2025-08-28 LAB
ABSOLUTE EOSINOPHIL (OHS): 0.07 K/UL
ABSOLUTE MONOCYTE (OHS): 0.73 K/UL (ref 0.3–1)
ABSOLUTE NEUTROPHIL COUNT (OHS): 6.81 K/UL (ref 1.8–7.7)
ALBUMIN SERPL BCP-MCNC: 3.5 G/DL (ref 3.5–5.2)
ALP SERPL-CCNC: 107 UNIT/L (ref 40–150)
ALT SERPL W/O P-5'-P-CCNC: 25 UNIT/L (ref 0–55)
ANION GAP (OHS): 10 MMOL/L (ref 8–16)
AST SERPL-CCNC: 28 UNIT/L (ref 0–50)
BASOPHILS # BLD AUTO: 0.03 K/UL
BASOPHILS NFR BLD AUTO: 0.3 %
BILIRUB SERPL-MCNC: 0.9 MG/DL (ref 0.1–1)
BUN SERPL-MCNC: 17 MG/DL (ref 8–23)
CALCIUM SERPL-MCNC: 9.1 MG/DL (ref 8.7–10.5)
CHLORIDE SERPL-SCNC: 107 MMOL/L (ref 95–110)
CO2 SERPL-SCNC: 25 MMOL/L (ref 23–29)
CREAT SERPL-MCNC: 1 MG/DL (ref 0.5–1.4)
CRP SERPL-MCNC: 5.2 MG/L
ERYTHROCYTE [DISTWIDTH] IN BLOOD BY AUTOMATED COUNT: 13.6 % (ref 11.5–14.5)
GFR SERPLBLD CREATININE-BSD FMLA CKD-EPI: >60 ML/MIN/1.73/M2
GLUCOSE SERPL-MCNC: 98 MG/DL (ref 70–110)
HCT VFR BLD AUTO: 36.6 % (ref 40–54)
HGB BLD-MCNC: 11.7 GM/DL (ref 14–18)
IMM GRANULOCYTES # BLD AUTO: 0.06 K/UL (ref 0–0.04)
IMM GRANULOCYTES NFR BLD AUTO: 0.7 % (ref 0–0.5)
LYMPHOCYTES # BLD AUTO: 1.51 K/UL (ref 1–4.8)
MCH RBC QN AUTO: 30.9 PG (ref 27–31)
MCHC RBC AUTO-ENTMCNC: 32 G/DL (ref 32–36)
MCV RBC AUTO: 97 FL (ref 82–98)
NUCLEATED RBC (/100WBC) (OHS): 0 /100 WBC
PLATELET # BLD AUTO: 186 K/UL (ref 150–450)
PMV BLD AUTO: 10.5 FL (ref 9.2–12.9)
POTASSIUM SERPL-SCNC: 4.2 MMOL/L (ref 3.5–5.1)
PROT SERPL-MCNC: 6.3 GM/DL (ref 6–8.4)
RBC # BLD AUTO: 3.79 M/UL (ref 4.6–6.2)
RELATIVE EOSINOPHIL (OHS): 0.8 %
RELATIVE LYMPHOCYTE (OHS): 16.4 % (ref 18–48)
RELATIVE MONOCYTE (OHS): 7.9 % (ref 4–15)
RELATIVE NEUTROPHIL (OHS): 73.9 % (ref 38–73)
SODIUM SERPL-SCNC: 142 MMOL/L (ref 136–145)
WBC # BLD AUTO: 9.21 K/UL (ref 3.9–12.7)

## 2025-08-28 PROCEDURE — 99213 OFFICE O/P EST LOW 20 MIN: CPT | Mod: HCNC,S$GLB,, | Performed by: PODIATRIST

## 2025-08-28 PROCEDURE — 99999 PR PBB SHADOW E&M-EST. PATIENT-LVL III: CPT | Mod: PBBFAC,HCNC,, | Performed by: PODIATRIST

## 2025-08-28 PROCEDURE — 1126F AMNT PAIN NOTED NONE PRSNT: CPT | Mod: CPTII,HCNC,S$GLB, | Performed by: PODIATRIST

## 2025-08-28 PROCEDURE — 4010F ACE/ARB THERAPY RXD/TAKEN: CPT | Mod: CPTII,HCNC,S$GLB, | Performed by: PODIATRIST

## 2025-08-28 PROCEDURE — 36415 COLL VENOUS BLD VENIPUNCTURE: CPT | Mod: HCNC

## 2025-08-28 PROCEDURE — 82040 ASSAY OF SERUM ALBUMIN: CPT | Mod: HCNC

## 2025-08-28 PROCEDURE — 3079F DIAST BP 80-89 MM HG: CPT | Mod: CPTII,HCNC,S$GLB, | Performed by: PODIATRIST

## 2025-08-28 PROCEDURE — 86140 C-REACTIVE PROTEIN: CPT | Mod: HCNC

## 2025-08-28 PROCEDURE — 3044F HG A1C LEVEL LT 7.0%: CPT | Mod: CPTII,HCNC,S$GLB, | Performed by: PODIATRIST

## 2025-08-28 PROCEDURE — 3077F SYST BP >= 140 MM HG: CPT | Mod: CPTII,HCNC,S$GLB, | Performed by: PODIATRIST

## 2025-08-28 PROCEDURE — 3008F BODY MASS INDEX DOCD: CPT | Mod: CPTII,HCNC,S$GLB, | Performed by: PODIATRIST

## 2025-08-28 PROCEDURE — 85025 COMPLETE CBC W/AUTO DIFF WBC: CPT | Mod: HCNC

## 2025-08-28 PROCEDURE — 1159F MED LIST DOCD IN RCRD: CPT | Mod: CPTII,HCNC,S$GLB, | Performed by: PODIATRIST

## 2025-08-29 ENCOUNTER — PATIENT MESSAGE (OUTPATIENT)
Dept: INFECTIOUS DISEASES | Facility: CLINIC | Age: 70
End: 2025-08-29
Payer: MEDICARE

## (undated) DEVICE — SYR 30CC LUER LOCK

## (undated) DEVICE — SUT VICRYL 3-0 27 SH

## (undated) DEVICE — SCREW HEX HEAD 3.5X38MM
Type: IMPLANTABLE DEVICE | Site: KNEE | Status: NON-FUNCTIONAL
Removed: 2018-05-30

## (undated) DEVICE — SUT MONOCRYL 3-0 SH U/D

## (undated) DEVICE — SEE MEDLINE ITEM 157117

## (undated) DEVICE — MASK FLYTE HOOD PEEL AWAY

## (undated) DEVICE — SEE MEDLINE ITEM 146298

## (undated) DEVICE — HOOD T-5 TEAR AWAY STERILE

## (undated) DEVICE — SOL IRR NACL .9% 3000ML

## (undated) DEVICE — BANDAGE ESMARK 6X12

## (undated) DEVICE — BANDAGE ACE ELASTIC 6"

## (undated) DEVICE — TOURNIQUET SB QC DP 34X4IN

## (undated) DEVICE — BLADE SAG 18.0X1.27X100

## (undated) DEVICE — SYR ONLY LUER LOCK 20CC

## (undated) DEVICE — PAD CAST SPECIALIST STRL 6

## (undated) DEVICE — BIT DRILL PIN TROCAR 3.2MM
Type: IMPLANTABLE DEVICE | Site: KNEE | Status: NON-FUNCTIONAL
Removed: 2018-05-30

## (undated) DEVICE — SEE MEDLINE ITEM 152487

## (undated) DEVICE — PUMP COLD THERAPY

## (undated) DEVICE — PAD DEFIB CADENCE ADULT R2

## (undated) DEVICE — SEE MEDLINE ITEM 157131

## (undated) DEVICE — PAD COLD THERAPY KNEE WRAP ON

## (undated) DEVICE — KIT IRR SUCTION HND PIECE

## (undated) DEVICE — BRUSH SURGICAL SCRUB STERILE

## (undated) DEVICE — ADHESIVE DERMABOND ADVANCED

## (undated) DEVICE — SEE MEDLINE ITEM 157110

## (undated) DEVICE — SUT CTD VICRYL 0 UND BR CPX

## (undated) DEVICE — Device

## (undated) DEVICE — DRESSING LEUKOPLAST FLEX 1X3IN

## (undated) DEVICE — TRAY CYSTO BASIN

## (undated) DEVICE — SYR 10CC LUER LOCK

## (undated) DEVICE — UNDERGLOVES BIOGEL PI SIZE 7.5

## (undated) DEVICE — UNDERGLOVES BIOGEL PI SZ 7 LF

## (undated) DEVICE — SYR 50ML CATH TIP

## (undated) DEVICE — SET PROSTATE STABILIZATION 18G

## (undated) DEVICE — BOWL CEMENT

## (undated) DEVICE — KIT TOTAL KNEE TKOFG

## (undated) DEVICE — PLUG CATHETER STERILE FOLEY

## (undated) DEVICE — DRESSING AQUACEL AG ADV 3.5X12

## (undated) DEVICE — SEE MEDLINE ITEM 152529

## (undated) DEVICE — NDL 18GA X1 1/2 REG BEVEL

## (undated) DEVICE — BLADE SAG 13.0 X1.27X90

## (undated) DEVICE — SUT VICRYL BR 1 GEN 27 CT-1

## (undated) DEVICE — DRAPE STERI INSTRUMENT 1018

## (undated) DEVICE — SET CEMENT (SCULP)

## (undated) DEVICE — TAPE SURG DURAPORE 2 X10YD

## (undated) DEVICE — PACK CYSTO

## (undated) DEVICE — ELECTRODE REM PLYHSV RETURN 9

## (undated) DEVICE — SEE MEDLINE ITEM 154981

## (undated) DEVICE — SCREW HEX HEAD
Type: IMPLANTABLE DEVICE | Site: KNEE | Status: NON-FUNCTIONAL
Removed: 2018-05-30

## (undated) DEVICE — BLADE RECIP RIBBED